# Patient Record
Sex: MALE | Race: WHITE | Employment: OTHER | URBAN - METROPOLITAN AREA
[De-identification: names, ages, dates, MRNs, and addresses within clinical notes are randomized per-mention and may not be internally consistent; named-entity substitution may affect disease eponyms.]

---

## 2017-09-11 PROBLEM — I87.009 POST-THROMBOTIC SYNDROME: Chronic | Status: ACTIVE | Noted: 2017-09-11

## 2018-05-23 ENCOUNTER — OFFICE VISIT (OUTPATIENT)
Dept: NON INVASIVE DIAGNOSTICS | Age: 68
End: 2018-05-23
Payer: MEDICARE

## 2018-05-23 VITALS
RESPIRATION RATE: 18 BRPM | HEIGHT: 67 IN | BODY MASS INDEX: 49.44 KG/M2 | HEART RATE: 71 BPM | DIASTOLIC BLOOD PRESSURE: 70 MMHG | WEIGHT: 315 LBS | SYSTOLIC BLOOD PRESSURE: 100 MMHG

## 2018-05-23 DIAGNOSIS — Z95.0 PACEMAKER: Primary | ICD-10-CM

## 2018-05-23 PROCEDURE — 93280 PM DEVICE PROGR EVAL DUAL: CPT | Performed by: INTERNAL MEDICINE

## 2018-05-23 PROCEDURE — 99214 OFFICE O/P EST MOD 30 MIN: CPT | Performed by: NURSE PRACTITIONER

## 2018-05-23 RX ORDER — ROSUVASTATIN CALCIUM 5 MG/1
5 TABLET, COATED ORAL DAILY
COMMUNITY
Start: 2018-05-14

## 2018-05-23 RX ORDER — AMOXICILLIN 500 MG/1
CAPSULE ORAL
Refills: 1 | COMMUNITY
Start: 2018-05-12 | End: 2019-03-22 | Stop reason: ALTCHOICE

## 2018-11-26 ENCOUNTER — NURSE ONLY (OUTPATIENT)
Dept: NON INVASIVE DIAGNOSTICS | Age: 68
End: 2018-11-26
Payer: MEDICARE

## 2018-11-26 DIAGNOSIS — Z95.0 PACEMAKER: Primary | ICD-10-CM

## 2018-11-26 PROCEDURE — 93280 PM DEVICE PROGR EVAL DUAL: CPT | Performed by: INTERNAL MEDICINE

## 2018-12-21 ENCOUNTER — TELEPHONE (OUTPATIENT)
Dept: NON INVASIVE DIAGNOSTICS | Age: 68
End: 2018-12-21

## 2018-12-21 ENCOUNTER — NURSE ONLY (OUTPATIENT)
Dept: NON INVASIVE DIAGNOSTICS | Age: 68
End: 2018-12-21
Payer: MEDICARE

## 2018-12-21 DIAGNOSIS — Z95.0 PACEMAKER: Primary | ICD-10-CM

## 2018-12-21 PROCEDURE — 93280 PM DEVICE PROGR EVAL DUAL: CPT | Performed by: INTERNAL MEDICINE

## 2018-12-21 RX ORDER — HYDROCHLOROTHIAZIDE 12.5 MG/1
6.25 CAPSULE, GELATIN COATED ORAL DAILY
COMMUNITY
End: 2019-03-22 | Stop reason: ALTCHOICE

## 2019-01-09 ENCOUNTER — TELEPHONE (OUTPATIENT)
Dept: NON INVASIVE DIAGNOSTICS | Age: 69
End: 2019-01-09

## 2019-02-11 ENCOUNTER — NURSE ONLY (OUTPATIENT)
Dept: NON INVASIVE DIAGNOSTICS | Age: 69
End: 2019-02-11
Payer: MEDICARE

## 2019-02-11 DIAGNOSIS — I44.2 COMPLETE HEART BLOCK (HCC): ICD-10-CM

## 2019-02-11 DIAGNOSIS — Z95.0 PACEMAKER: Primary | ICD-10-CM

## 2019-02-11 PROCEDURE — 93294 REM INTERROG EVL PM/LDLS PM: CPT | Performed by: INTERNAL MEDICINE

## 2019-02-11 PROCEDURE — 93296 REM INTERROG EVL PM/IDS: CPT | Performed by: INTERNAL MEDICINE

## 2019-02-12 PROBLEM — I44.2 COMPLETE HEART BLOCK (HCC): Status: ACTIVE | Noted: 2019-02-12

## 2019-03-22 ENCOUNTER — HOSPITAL ENCOUNTER (OUTPATIENT)
Dept: WOUND CARE | Age: 69
Discharge: HOME | End: 2019-03-22
Payer: MEDICARE

## 2019-03-22 VITALS
WEIGHT: 315 LBS | SYSTOLIC BLOOD PRESSURE: 120 MMHG | RESPIRATION RATE: 16 BRPM | BODY MASS INDEX: 47.74 KG/M2 | HEIGHT: 68 IN | TEMPERATURE: 99.1 F | DIASTOLIC BLOOD PRESSURE: 70 MMHG | HEART RATE: 72 BPM

## 2019-03-22 DIAGNOSIS — I87.2 VENOUS INSUFFICIENCY (CHRONIC) (PERIPHERAL): ICD-10-CM

## 2019-03-22 DIAGNOSIS — L97.922 NON-PRESSURE CHRONIC ULCER OF LEFT LOWER LEG WITH FAT LAYER EXPOSED (HCC): ICD-10-CM

## 2019-03-22 PROCEDURE — 99213 OFFICE O/P EST LOW 20 MIN: CPT

## 2019-03-22 PROCEDURE — 11042 DBRDMT SUBQ TIS 1ST 20SQCM/<: CPT

## 2019-03-22 PROCEDURE — 97605 NEG PRS WND THER DME<=50SQCM: CPT

## 2019-03-22 RX ORDER — ASCORBIC ACID 500 MG
500 TABLET ORAL DAILY
COMMUNITY
End: 2022-04-21 | Stop reason: ALTCHOICE

## 2019-03-22 ASSESSMENT — PAIN DESCRIPTION - ORIENTATION: ORIENTATION: LEFT

## 2019-03-22 ASSESSMENT — PAIN DESCRIPTION - DESCRIPTORS: DESCRIPTORS: ACHING

## 2019-03-22 ASSESSMENT — PAIN DESCRIPTION - LOCATION: LOCATION: LEG

## 2019-03-22 ASSESSMENT — PAIN DESCRIPTION - ONSET: ONSET: GRADUAL

## 2019-03-22 ASSESSMENT — PAIN DESCRIPTION - PAIN TYPE: TYPE: CHRONIC PAIN

## 2019-03-22 ASSESSMENT — PAIN SCALES - GENERAL: PAINLEVEL_OUTOF10: 4

## 2019-03-22 ASSESSMENT — PAIN DESCRIPTION - FREQUENCY: FREQUENCY: CONTINUOUS

## 2019-03-22 ASSESSMENT — PAIN DESCRIPTION - PROGRESSION: CLINICAL_PROGRESSION: NOT CHANGED

## 2019-03-22 ASSESSMENT — PAIN - FUNCTIONAL ASSESSMENT: PAIN_FUNCTIONAL_ASSESSMENT: ACTIVITIES ARE NOT PREVENTED

## 2019-03-29 ENCOUNTER — HOSPITAL ENCOUNTER (OUTPATIENT)
Dept: WOUND CARE | Age: 69
Discharge: HOME | End: 2019-03-29
Payer: MEDICARE

## 2019-03-29 VITALS
RESPIRATION RATE: 20 BRPM | HEIGHT: 68 IN | BODY MASS INDEX: 47.74 KG/M2 | DIASTOLIC BLOOD PRESSURE: 80 MMHG | WEIGHT: 315 LBS | SYSTOLIC BLOOD PRESSURE: 138 MMHG | HEART RATE: 80 BPM | TEMPERATURE: 98.3 F

## 2019-03-29 DIAGNOSIS — L97.922 NON-PRESSURE CHRONIC ULCER OF LEFT LOWER LEG WITH FAT LAYER EXPOSED (HCC): ICD-10-CM

## 2019-03-29 PROCEDURE — 97605 NEG PRS WND THER DME<=50SQCM: CPT

## 2019-03-29 PROCEDURE — 11042 DBRDMT SUBQ TIS 1ST 20SQCM/<: CPT

## 2019-03-29 PROCEDURE — 11045 DBRDMT SUBQ TISS EACH ADDL: CPT

## 2019-04-05 ENCOUNTER — HOSPITAL ENCOUNTER (OUTPATIENT)
Dept: WOUND CARE | Age: 69
Discharge: HOME | End: 2019-04-05
Payer: MEDICARE

## 2019-04-05 VITALS
SYSTOLIC BLOOD PRESSURE: 120 MMHG | HEART RATE: 64 BPM | HEIGHT: 68 IN | WEIGHT: 315 LBS | RESPIRATION RATE: 16 BRPM | BODY MASS INDEX: 47.74 KG/M2 | TEMPERATURE: 98 F | DIASTOLIC BLOOD PRESSURE: 80 MMHG

## 2019-04-05 DIAGNOSIS — L97.922 NON-PRESSURE CHRONIC ULCER OF LEFT LOWER LEG WITH FAT LAYER EXPOSED (HCC): ICD-10-CM

## 2019-04-05 PROCEDURE — 11042 DBRDMT SUBQ TIS 1ST 20SQCM/<: CPT

## 2019-04-05 PROCEDURE — 11045 DBRDMT SUBQ TISS EACH ADDL: CPT

## 2019-04-05 NOTE — PROGRESS NOTES
Wound Healing Center Followup Visit Note    Referring Physician : MD Tariq Boyer 112 RECORD NUMBER:  [id number]  AGE: 77 y.o. GENDER: male  : 1950  EPISODE DATE:  2019    Subjective:     Chief Complaint   Patient presents with    Wound Check     patient here for treatment of left lower leg ulcer      HISTORY of PRESENT ILLNESS HPI   Wallace Mendoza is a 77 y.o. male who presents today in regards to follow up evaluation and treatment of wound/ulcer. That patient's past medical, family and social hx were reviewed and changes were made if present. History of Wound Context:  Patient denies any nausea, vomiting, fever, chills, shortness of breath, chest pain, calf cramping and/or pain. Patient is tolerating the current dressing regimen without issues. No other new issues noted at this time. He is tolerating the SUAD system.      Wound/Ulcer Pain Timing/Severity: none  Quality of pain: N/A  Severity:  0 / 10   Modifying Factors: None  Associated Signs/Symptoms: edema    Ulcer Identification:  Ulcer Type: venous  Contributing Factors: edema, venous stasis and obesity    Diabetic/Pressure/Non Pressure Ulcers only:  Ulcer: Non-Pressure ulcer, fat layer exposed    Wound: Stasis ulceration left leg        PAST MEDICAL HISTORY      Diagnosis Date    CAD (coronary artery disease)     Cataracts, bilateral 2019    Cellulitis -    Left leg    Complete heart block (HCC)     h/o    Diabetes mellitus ([address]     Encounter for aspirin therapy     patient uses for heart health    H/O asbestosis     Hyperlipidemia     Hypertension     Hyperthyroidism     Lung disorder     weak lungs    Myocardial infarct ([address] 2010    Neuropathy     feet    Obesity     Obstructive sleep apnea     on cpap    Onychomycosis     Osteoarthritis     Pacemaker     Medtronic    Post-thrombotic syndrome 2017    Screening 16    for Colonoscopy    Stasis dermatitis     h/o mcg by mouth daily       gabapentin (NEURONTIN) 300 MG capsule Take 300 mg by mouth 3 times daily as needed Instructed to take with sip water am of procedure      aspirin 81 MG EC tablet Take 81 mg by mouth daily Last dose 1-14-16       No current facility-administered medications on file prior to encounter. REVIEW OF SYSTEMS See HPI    Objective:    /80   Pulse 64   Temp 98 °F (36.7 °C) (Oral)   Resp 16   Ht 5' 8\" (1.727 m)   Wt (!) 360 lb (163.3 kg)   BMI 54.74 kg/m²   Wt Readings from Last 3 Encounters:   04/05/19 (!) 360 lb (163.3 kg)   03/29/19 (!) 360 lb (163.3 kg)   03/22/19 (!) 360 lb (163.3 kg)     PHYSICAL EXAM  CONSTITUTIONAL:   Awake, alert, cooperative and in no acute distress  SKIN:  Open wound Present    Assessment:     Problem List Items Addressed This Visit     Non-pressure chronic ulcer of left lower leg with fat layer exposed ([address]        Procedure Note  Indications:  Based on my examination of this patient's wound(s)/ulcer(s) today, debridement is required to promote healing and evaluate the wound base. Performed by: Charles Bhagat DPM    Consent obtained:  Yes    Time out taken:  Yes    Pain Control: Anesthetic  Anesthetic: 2% Lidocaine Gel Topical     Debridement:Excisional Debridement    Using curette the wound(s)/ulcer(s) was/were sharply debrided down through and including the removal of subcutaneous tissue.         Devitalized Tissue Debrided:  fibrin and biofilm to stimulate bleeding to promote healing, post debridement good bleeding base and wound edges noted    Pre Debridement Measurements:  Are located in the [facility]  Documentation Flow Sheet    Wound/Ulcer #: 1    Post Debridement Measurements:  Wound/Ulcer Descriptions are Pre Debridement except measurements:    Wound 03/22/19 Leg Left;Lateral #1 acq: 3-22-17 Venous (Active)   Wound Image   3/22/2019  2:06 PM   Wound Diabetic [place] 1 3/22/2019  2:06 PM   Wound Length (cm) 4.8 cm 4/5/2019  3:02 PM Wound Width (cm) 5 cm 4/5/2019  3:02 PM   Wound Depth (cm) 0.1 cm 4/5/2019  3:02 PM   Wound Surface Area (cm^2) 24 cm^2 4/5/2019  3:02 PM   Change in Wound Size % (l*w) 1.23 4/5/2019  3:02 PM   Wound Volume (cm^3) 2.4 cm^3 4/5/2019  3:02 PM   Wound Healing % 1 4/5/2019  3:02 PM   Post-Procedure Length (cm) 4.8 cm 4/5/2019  3:05 PM   Post-Procedure Width (cm) 5 cm 4/5/2019  3:05 PM   Post-Procedure Depth (cm) 0.2 cm 4/5/2019  3:05 PM   Post-Procedure Surface Area (cm^2) 24 cm^2 4/5/2019  3:05 PM   Post-Procedure Volume (cm^3) 4.8 cm^3 4/5/2019  3:05 PM   Wound Assessment Red;Yellow 4/5/2019  3:02 PM   Drainage Amount Large 4/5/2019  3:02 PM   Drainage Description Yellow 4/5/2019  3:02 PM   Odor None 4/5/2019  3:02 PM   Maria Elena-wound Assessment Dark edges;Brown;Red 4/5/2019  3:02 PM   Number of days: 14     Percent of Wound/Ulcer Debrided: 100%    Total Surface Area Debrided:  24.0 sq cm (debridement +1)     Estimated Blood Loss:  Minimal  Hemostasis Achieved:  by pressure    Procedural Pain:  2  / 10   Post Procedural Pain:  0 / 10     Response to treatment:  Well tolerated by patient. We will continue with the SUAD system. Plan:   Treatment Note please see attached Discharge Instructions    Written patient dismissal instructions given to patient and signed by patient or POA. Discharge Instructions       Visit Discharge/Physician Orders     Discharge condition: Stable     Assessment of pain at discharge:minimal     Anesthetic used: 2% lidocaine     Discharge to: Home     Left via:Private automobile     Accompanied by: accompanied by wife     ECF/HHA:      Dressing Orders:LEFT LEG ULCER- SUAD VAC IN PLACE LEAVE DRESSING INTACT UNTIL NEXT APPOINTMENT.     IF VAC MALFUNCTIONS OR LEAKS APPLY DRY DRESSING.     Treatment Orders:  EAT FOODS HIGH IN PROTEIN AND VITAMIN C     TAKE MULTIVITAMIN DAILY IF OKAY WITH PRIMARY CARE     [address] [place] [address],[address] followup visit _____1 WEEK DR. LEVY________________________  (Please note your next appointment above and if you are unable to keep, kindly give a 24 hour notice.  Thank you.)     Physician signature:__________________________        If you experience any of the following, please call the [facility] during business hours:     * Increase in Pain  * Temperature over 101  * Increase in drainage from your wound  * Drainage with a foul odor  * Bleeding  * Increase in swelling  * Need for compression bandage changes due to slippage, breakthrough drainage.     If you need medical attention outside of the business hours of the [facility] please contact your PCP or go to the nearest emergency room.              Electronically signed by Nitin Alves DPM on 4/5/2019 at 3:39 PM

## 2019-04-12 ENCOUNTER — HOSPITAL ENCOUNTER (OUTPATIENT)
Dept: WOUND CARE | Age: 69
Discharge: HOME | End: 2019-04-12
Payer: MEDICARE

## 2019-04-12 VITALS
DIASTOLIC BLOOD PRESSURE: 80 MMHG | WEIGHT: 315 LBS | HEIGHT: 68 IN | TEMPERATURE: 97.9 F | BODY MASS INDEX: 47.74 KG/M2 | HEART RATE: 72 BPM | RESPIRATION RATE: 24 BRPM | SYSTOLIC BLOOD PRESSURE: 120 MMHG

## 2019-04-12 DIAGNOSIS — L97.922 NON-PRESSURE CHRONIC ULCER OF LEFT LOWER LEG WITH FAT LAYER EXPOSED (HCC): ICD-10-CM

## 2019-04-12 PROCEDURE — 97605 NEG PRS WND THER DME<=50SQCM: CPT

## 2019-04-12 PROCEDURE — 11042 DBRDMT SUBQ TIS 1ST 20SQCM/<: CPT

## 2019-04-12 NOTE — PROGRESS NOTES
Wound Healing Center Followup Visit Note    Referring Physician : MD Alyce SuarezJune aBr 112 RECORD NUMBER:  [id number]  AGE: 77 y.o. GENDER: male  : 1950  EPISODE DATE:  2019    Subjective:     Chief Complaint   Patient presents with    Wound Check     patient here for wound treatment of left lower leg ulcer      HISTORY of PRESENT ILLNESS HPI   Ruth Peña is a 77 y.o. male who presents today in regards to follow up evaluation and treatment of wound/ulcer. That patient's past medical, family and social hx were reviewed and changes were made if present. History of Wound Context:  Patient denies any nausea, vomiting, fever, chills, shortness of breath, chest pain, calf cramping and/or pain. Patient is tolerating the current dressing regimen without issues. No other new issues noted at this time. Patient is tolerating the SUAD system.      Wound/Ulcer Pain Timing/Severity: none  Quality of pain: N/A  Severity:  0 / 10   Modifying Factors: None  Associated Signs/Symptoms: edema    Ulcer Identification:  Ulcer Type: venous  Contributing Factors: edema, venous stasis and obesity    Diabetic/Pressure/Non Pressure Ulcers only:  Ulcer: Non-Pressure ulcer, fat layer exposed    Wound: Venous stasis ulceration left leg        PAST MEDICAL HISTORY      Diagnosis Date    CAD (coronary artery disease)     Cataracts, bilateral 2019    Cellulitis -    Left leg    Complete heart block (HCC)     h/o    Diabetes mellitus ([address]     Encounter for aspirin therapy     patient uses for heart health    H/O asbestosis     Hyperlipidemia     Hypertension     Hyperthyroidism     Lung disorder     weak lungs    Myocardial infarct ([address] 2010    Neuropathy     feet    Obesity     Obstructive sleep apnea     on cpap    Onychomycosis     Osteoarthritis     Pacemaker     Medtronic    Post-thrombotic syndrome 2017    Screening 16    for Colonoscopy    Stasis dermatitis     h/o on bilateral legs with leg edema    Varicose veins      Past Surgical History:   Procedure Laterality Date    CARDIAC PACEMAKER PLACEMENT  04/09/10    Medtronic    CHOLECYSTECTOMY      COLONOSCOPY  2016    JOINT REPLACEMENT Bilateral     Left TJAK     Family History   Problem Relation Age of Onset    Heart Disease Father            [facility] Other Mother             Other Brother         gall bladder     Social History     Tobacco Use    Smoking status: Former Smoker     Packs/day: 2.00     Years: 25.00     Pack years: 50.00     Types: Cigarettes     Start date: 1964     Last attempt to quit: 1990     Years since quittin.7    Smokeless tobacco: Never Used   Substance Use Topics    Alcohol use: No     Alcohol/week: 0.0 oz     Comment: rare   2 beers/month    Drug use: No     Allergies   Allergen Reactions    Other Hives and Shortness Of Breath     \"allergic to Potatoe white. \"    Clindamycin/Lincomycin      Not sure     Current Outpatient Medications on File Prior to Encounter   Medication Sig Dispense Refill    Indacaterol-Glycopyrrolate (Gely Check) 27.5-15.6 MCG CAPS Inhale 2 puffs into the lungs 2 times daily 60 capsule 5    vitamin C (ASCORBIC ACID) 500 MG tablet Take 500 mg by mouth daily      rosuvastatin (CRESTOR) 5 MG tablet Take 5 mg by mouth daily       glipiZIDE (GLUCOTROL XL) 5 MG extended release tablet Take 5 mg by mouth daily  3    ramipril (ALTACE) 10 MG capsule Take 10 mg by mouth daily       potassium chloride (KLOR-CON M) 20 MEQ extended release tablet Take 20 mEq by mouth daily       Cholecalciferol (VITAMIN D3) 2000 UNITS CAPS Take by mouth daily Last dose 16      carvedilol (COREG) 25 MG tablet Take 25 mg by mouth 2 times daily (with meals) Instructed to take with sip water am of procedure      Omega-3 Fatty Acids (FISH OIL) 600 MG CAPS Take 1,000 mg by mouth daily Last Dose 16      liothyronine (CYTOMEL) 50 MCG tablet Take 100 mcg by mouth daily       aspirin 81 MG EC tablet Take 81 mg by mouth daily Last dose 1-14-16      gabapentin (NEURONTIN) 300 MG capsule Take 300 mg by mouth 3 times daily as needed Instructed to take with sip water am of procedure       No current facility-administered medications on file prior to encounter. REVIEW OF SYSTEMS See HPI    Objective:    /80   Pulse 72   Temp 97.9 °F (36.6 °C) (Oral)   Resp 24   Ht 5' 8\" (1.727 m)   Wt (!) 360 lb (163.3 kg)   BMI 54.74 kg/m²   Wt Readings from Last 3 Encounters:   04/12/19 (!) 360 lb (163.3 kg)   04/05/19 (!) 360 lb (163.3 kg)   03/29/19 (!) 360 lb (163.3 kg)     PHYSICAL EXAM  CONSTITUTIONAL:   Awake, alert, cooperative and in no acute distress  SKIN:  Open wound Present    Assessment:     Problem List Items Addressed This Visit     Non-pressure chronic ulcer of left lower leg with fat layer exposed ([address]        Procedure Note  Indications:  Based on my examination of this patient's wound(s)/ulcer(s) today, debridement is required to promote healing and evaluate the wound base. Performed by: Alycia Farias DPM    Consent obtained:  Yes    Time out taken:  Yes    Pain Control: Anesthetic  Anesthetic: 2% Lidocaine Gel Topical     Debridement:Excisional Debridement    Using curette the wound(s)/ulcer(s) was/were sharply debrided down through and including the removal of subcutaneous tissue.         Devitalized Tissue Debrided:  fibrin and biofilm to stimulate bleeding to promote healing, post debridement good bleeding base and wound edges noted    Pre Debridement Measurements:  Are located in the [facility]  Documentation Flow Sheet    Wound/Ulcer #: 1    Post Debridement Measurements:  Wound/Ulcer Descriptions are Pre Debridement except measurements:    Wound 03/22/19 Leg Left;Lateral #1 acq: 3-22-17 Venous (Active)   Wound Image   3/22/2019  2:06 PM   Wound Diabetic [place] 1 3/22/2019  2:06 PM   Wound Length (cm) 4.3 cm 4/12/2019  2:35 PM   Wound Width (cm) 4.8 cm 4/12/2019  2:35 PM   Wound Depth (cm) 0.1 cm 4/12/2019  2:35 PM   Wound Surface Area (cm^2) 20.64 cm^2 4/12/2019  2:35 PM   Change in Wound Size % (l*w) 15.06 4/12/2019  2:35 PM   Wound Volume (cm^3) 2.06 cm^3 4/12/2019  2:35 PM   Wound Healing % 15 4/12/2019  2:35 PM   Post-Procedure Length (cm) 4.3 cm 4/12/2019  2:50 PM   Post-Procedure Width (cm) 4.8 cm 4/12/2019  2:50 PM   Post-Procedure Depth (cm) 0.2 cm 4/12/2019  2:50 PM   Post-Procedure Surface Area (cm^2) 20.64 cm^2 4/12/2019  2:50 PM   Post-Procedure Volume (cm^3) 4.13 cm^3 4/12/2019  2:50 PM   Wound Assessment Red;Yellow 4/12/2019  2:35 PM   Drainage Amount Large 4/12/2019  2:35 PM   Drainage Description Yellow 4/12/2019  2:35 PM   Odor None 4/12/2019  2:35 PM   Maria Elena-wound Assessment Dark edges;Brown;Red 4/12/2019  2:35 PM   Number of days: 21     Percent of Wound/Ulcer Debrided: 100%    Total Surface Area Debrided:  20.64 sq cm (debridement +1)    Estimated Blood Loss:  Minimal  Hemostasis Achieved:  by pressure    Procedural Pain:  2  / 10   Post Procedural Pain:  0 / 10     Response to treatment:  Well tolerated by patient. Will continue with the SUAD system. Plan:   Treatment Note please see attached Discharge Instructions    Written patient dismissal instructions given to patient and signed by patient or POA. Discharge Instructions         Visit Discharge/Physician Orders     Discharge condition: Stable     Assessment of pain at discharge:minimal     Anesthetic used: 2% lidocaine     Discharge to: Home     Left via:Private automobile     Accompanied by: accompanied by wife     ECF/HHA:      Dressing Orders:LEFT LEG ULCER- SUAD VAC IN PLACE LEAVE DRESSING INTACT UNTIL NEXT APPOINTMENT.     IF VAC MALFUNCTIONS OR LEAKS APPLY DRY DRESSING.     Treatment Orders:  EAT FOODS HIGH IN PROTEIN AND VITAMIN C     TAKE MULTIVITAMIN DAILY IF OKAY WITH PRIMARY CARE     [address] [place] [address],[address] followup visit _____1 WEEK  MILDRED at his main office, 2 weeks at wound care center________________________  (Please note your next appointment above and if you are unable to keep, kindly give a 24 hour notice.  Thank you.)     Physician signature:__________________________        If you experience any of the following, please call the [facility] during business hours:     * Increase in Pain  * Temperature over 101  * Increase in drainage from your wound  * Drainage with a foul odor  * Bleeding  * Increase in swelling  * Need for compression bandage changes due to slippage, breakthrough drainage.     If you need medical attention outside of the business hours of the [facility] please contact your PCP or go to the nearest emergency room.                         Electronically signed by Charles Amin DPM on 4/12/2019 at 3:27 PM

## 2019-04-26 ENCOUNTER — HOSPITAL ENCOUNTER (OUTPATIENT)
Dept: WOUND CARE | Age: 69
Discharge: HOME | End: 2019-04-26
Payer: MEDICARE

## 2019-04-26 VITALS
HEART RATE: 76 BPM | WEIGHT: 315 LBS | RESPIRATION RATE: 20 BRPM | DIASTOLIC BLOOD PRESSURE: 72 MMHG | BODY MASS INDEX: 47.74 KG/M2 | SYSTOLIC BLOOD PRESSURE: 138 MMHG | HEIGHT: 68 IN | TEMPERATURE: 98.7 F

## 2019-04-26 DIAGNOSIS — L97.922 NON-PRESSURE CHRONIC ULCER OF LEFT LOWER LEG WITH FAT LAYER EXPOSED (HCC): ICD-10-CM

## 2019-04-26 PROCEDURE — 11042 DBRDMT SUBQ TIS 1ST 20SQCM/<: CPT

## 2019-04-26 PROCEDURE — 97605 NEG PRS WND THER DME<=50SQCM: CPT

## 2019-04-26 ASSESSMENT — PAIN DESCRIPTION - PROGRESSION: CLINICAL_PROGRESSION: NOT CHANGED

## 2019-04-26 ASSESSMENT — PAIN DESCRIPTION - ORIENTATION: ORIENTATION: LEFT

## 2019-04-26 ASSESSMENT — PAIN DESCRIPTION - PAIN TYPE: TYPE: CHRONIC PAIN

## 2019-04-26 ASSESSMENT — PAIN - FUNCTIONAL ASSESSMENT: PAIN_FUNCTIONAL_ASSESSMENT: ACTIVITIES ARE NOT PREVENTED

## 2019-04-26 ASSESSMENT — PAIN SCALES - GENERAL: PAINLEVEL_OUTOF10: 1

## 2019-04-26 ASSESSMENT — PAIN DESCRIPTION - LOCATION: LOCATION: LEG

## 2019-04-26 ASSESSMENT — PAIN DESCRIPTION - FREQUENCY: FREQUENCY: INTERMITTENT

## 2019-04-26 ASSESSMENT — PAIN DESCRIPTION - ONSET: ONSET: GRADUAL

## 2019-04-26 NOTE — PROGRESS NOTES
Wound Healing Center Followup Visit Note    Referring Physician : MD Tariq Forde Yosvany 112 RECORD NUMBER:  [id number]  AGE: 77 y.o. GENDER: male  : 1950  EPISODE DATE:  2019    Subjective:     Chief Complaint   Patient presents with    Wound Check     left lateral lower leg      HISTORY of PRESENT ILLNESS HPI   Joel Sahu is a 77 y.o. male who presents today in regards to follow up evaluation and treatment of wound/ulcer. That patient's past medical, family and social hx were reviewed and changes were made if present. History of Wound Context:  Patient denies any nausea, vomiting, fever, chills, shortness of breath, chest pain, calf cramping and/or pain. Patient is tolerating the current dressing regimen without issues. No other new issues noted at this time.      Wound/Ulcer Pain Timing/Severity: none  Quality of pain: N/A  Severity:  0 / 10   Modifying Factors: None  Associated Signs/Symptoms: edema    Ulcer Identification:  Ulcer Type: venous  Contributing Factors: edema, venous stasis and obesity    Diabetic/Pressure/Non Pressure Ulcers only:  Ulcer: Non-Pressure ulcer, fat layer exposed    Wound: Venous stasis ulceration left leg        PAST MEDICAL HISTORY      Diagnosis Date    CAD (coronary artery disease)     Cataracts, bilateral 2019    Cellulitis -    Left leg    Complete heart block (HCC)     h/o    Diabetes mellitus ([address]     Encounter for aspirin therapy     patient uses for heart health    H/O asbestosis     Hyperlipidemia     Hypertension     Hyperthyroidism     Lung disorder     weak lungs    Myocardial infarct ([address] 2010    Neuropathy     feet    Obesity     Obstructive sleep apnea     on cpap    Onychomycosis     Osteoarthritis     Pacemaker     Medtronic    Post-thrombotic syndrome 2017    Screening 16    for Colonoscopy    Stasis dermatitis     h/o on bilateral legs with leg edema    Varicose veins Past Surgical History:   Procedure Laterality Date    CARDIAC PACEMAKER PLACEMENT  04/09/10    Medtronic    CHOLECYSTECTOMY      COLONOSCOPY  2016    JOINT REPLACEMENT Bilateral     Left TJAK     Family History   Problem Relation Age of Onset    Heart Disease Father            Vito Arora Other Mother             Other Brother         gall bladder     Social History     Tobacco Use    Smoking status: Former Smoker     Packs/day: 2.00     Years: 25.00     Pack years: 50.00     Types: Cigarettes     Start date: 1964     Last attempt to quit: 1990     Years since quittin.8    Smokeless tobacco: Never Used   Substance Use Topics    Alcohol use: No     Alcohol/week: 0.0 oz     Comment: rare   2 beers/month    Drug use: No     Allergies   Allergen Reactions    Other Hives and Shortness Of Breath     \"allergic to Potatoe white. \"    Clindamycin/Lincomycin      Not sure     Current Outpatient Medications on File Prior to Encounter   Medication Sig Dispense Refill    Indacaterol-Glycopyrrolate (Jilda Samples) 27.5-15.6 MCG CAPS Inhale 2 puffs into the lungs 2 times daily 60 capsule 5    vitamin C (ASCORBIC ACID) 500 MG tablet Take 500 mg by mouth daily      rosuvastatin (CRESTOR) 5 MG tablet Take 5 mg by mouth daily       glipiZIDE (GLUCOTROL XL) 5 MG extended release tablet Take 5 mg by mouth daily  3    ramipril (ALTACE) 10 MG capsule Take 10 mg by mouth daily       potassium chloride (KLOR-CON M) 20 MEQ extended release tablet Take 20 mEq by mouth daily       Cholecalciferol (VITAMIN D3) 2000 UNITS CAPS Take by mouth daily Last dose 16      carvedilol (COREG) 25 MG tablet Take 25 mg by mouth 2 times daily (with meals) Instructed to take with sip water am of procedure      Omega-3 Fatty Acids (FISH OIL) 600 MG CAPS Take 1,000 mg by mouth daily Last Dose 16      liothyronine (CYTOMEL) 50 MCG tablet Take 100 mcg by mouth daily       aspirin 81 MG EC tablet Take 81 mg by mouth daily Last dose 1-14-16      gabapentin (NEURONTIN) 300 MG capsule Take 300 mg by mouth 3 times daily as needed Instructed to take with sip water am of procedure       No current facility-administered medications on file prior to encounter. REVIEW OF SYSTEMS See HPI    Objective:    /72   Pulse 76   Temp 98.7 °F (37.1 °C) (Oral)   Resp 20   Ht 5' 8\" (1.727 m)   Wt (!) 360 lb (163.3 kg)   BMI 54.74 kg/m²   Wt Readings from Last 3 Encounters:   04/26/19 (!) 360 lb (163.3 kg)   04/12/19 (!) 360 lb (163.3 kg)   04/05/19 (!) 360 lb (163.3 kg)     PHYSICAL EXAM  CONSTITUTIONAL:   Awake, alert, cooperative and in no acute distress  SKIN:  Open wound Present    Assessment:     Problem List Items Addressed This Visit     Non-pressure chronic ulcer of left lower leg with fat layer exposed ([address]        Procedure Note  Indications:  Based on my examination of this patient's wound(s)/ulcer(s) today, debridement is required to promote healing and evaluate the wound base. Performed by: Arie Solorio DPM    Consent obtained:  Yes    Time out taken:  Yes    Pain Control: Anesthetic  Anesthetic: 2% Lidocaine Gel Topical     Debridement:Excisional Debridement    Using curette the wound(s)/ulcer(s) was/were sharply debrided down through and including the removal of subcutaneous tissue.         Devitalized Tissue Debrided:  fibrin, biofilm and slough to stimulate bleeding to promote healing, post debridement good bleeding base and wound edges noted    Pre Debridement Measurements:  Are located in the [facility]  Documentation Flow Sheet    Wound/Ulcer #: 1    Post Debridement Measurements:  Wound/Ulcer Descriptions are Pre Debridement except measurements:    Wound 03/22/19 Leg Left;Lateral #1 acq: 3-22-17 Venous (Active)   Wound Image   3/22/2019  2:06 PM   Wound Diabetic [place] 1 3/22/2019  2:06 PM   Dressing Changed Changed/New 4/26/2019  2:47 PM   Wound Cleansed Rinsed/Irrigated with saline 4/26/2019  2:47 PM   Wound Length (cm) 4 cm 4/26/2019  2:27 PM   Wound Width (cm) 4.8 cm 4/26/2019  2:27 PM   Wound Depth (cm) 0.1 cm 4/26/2019  2:27 PM   Wound Surface Area (cm^2) 19.2 cm^2 4/26/2019  2:27 PM   Change in Wound Size % (l*w) 20.99 4/26/2019  2:27 PM   Wound Volume (cm^3) 1.92 cm^3 4/26/2019  2:27 PM   Wound Healing % 21 4/26/2019  2:27 PM   Post-Procedure Length (cm) 4.3 cm 4/12/2019  2:50 PM   Post-Procedure Width (cm) 4.8 cm 4/12/2019  2:50 PM   Post-Procedure Depth (cm) 0.2 cm 4/12/2019  2:50 PM   Post-Procedure Surface Area (cm^2) 20.64 cm^2 4/12/2019  2:50 PM   Post-Procedure Volume (cm^3) 4.13 cm^3 4/12/2019  2:50 PM   Wound Assessment Red 4/26/2019  2:27 PM   Drainage Amount Moderate 4/26/2019  2:27 PM   Drainage Description Serosanguinous 4/26/2019  2:27 PM   Odor None 4/26/2019  2:27 PM   Maria Elena-wound Assessment Intact;Dry 4/26/2019  2:27 PM   Number of days: 35     Percent of Wound/Ulcer Debrided: 100%    Total Surface Area Debrided:  19.2 sq cm     Estimated Blood Loss:  Minimal  Hemostasis Achieved:  by pressure    Procedural Pain:  3  / 10   Post Procedural Pain:  0 / 10     Response to treatment:  Well tolerated by patient. SUAD device reapplied left lower extremity. Plan:   Treatment Note please see attached Discharge Instructions    Written patient dismissal instructions given to patient and signed by patient or POA.          Discharge Instructions         Visit Discharge/Physician Orders     Discharge condition: Stable     Assessment of pain at discharge:minimal     Anesthetic used: 2% lidocaine     Discharge to: Home     Left via:Private automobile     Accompanied by: accompanied by wife     ECF/HHA:      Dressing Orders:LEFT LEG ULCER- SUAD VAC IN PLACE LEAVE DRESSING INTACT UNTIL NEXT APPOINTMENT.     IF VAC MALFUNCTIONS OR LEAKS APPLY DRY DRESSING.     Treatment Orders:  EAT FOODS HIGH IN PROTEIN AND VITAMIN C     TAKE MULTIVITAMIN DAILY IF OKAY WITH PRIMARY CARE     [facility] followup visit _____1 WEEK DR. [place]a Penn _______________________  (Please note your next appointment above and if you are unable to keep, kindly give a 24 hour notice.  Thank you.)     Physician signature:__________________________        If you experience any of the following, please call the [facility] during business hours:     * Increase in Pain  * Temperature over 101  * Increase in drainage from your wound  * Drainage with a foul odor  * Bleeding  * Increase in swelling  * Need for compression bandage changes due to slippage, breakthrough drainage.     If you need medical attention outside of the business hours of the [facility] please contact your PCP or go to the nearest emergency room.                                      Electronically signed by Ju Joy DPM on 4/26/2019 at 2:47 PM

## 2019-05-03 ENCOUNTER — HOSPITAL ENCOUNTER (OUTPATIENT)
Dept: WOUND CARE | Age: 69
Discharge: HOME | End: 2019-05-03
Payer: MEDICARE

## 2019-05-03 VITALS
HEART RATE: 88 BPM | SYSTOLIC BLOOD PRESSURE: 146 MMHG | RESPIRATION RATE: 22 BRPM | WEIGHT: 315 LBS | DIASTOLIC BLOOD PRESSURE: 88 MMHG | BODY MASS INDEX: 47.74 KG/M2 | TEMPERATURE: 98.4 F | HEIGHT: 68 IN

## 2019-05-03 PROCEDURE — 99213 OFFICE O/P EST LOW 20 MIN: CPT

## 2019-05-03 PROCEDURE — 97605 NEG PRS WND THER DME<=50SQCM: CPT

## 2019-05-03 NOTE — PROGRESS NOTES
Post-thrombotic syndrome 2017    Screening 16    for Colonoscopy    Stasis dermatitis     h/o on bilateral legs with leg edema    Varicose veins      Past Surgical History:   Procedure Laterality Date    CARDIAC PACEMAKER PLACEMENT  04/09/10    Medtronic    CHOLECYSTECTOMY      COLONOSCOPY  2016    JOINT REPLACEMENT Bilateral     Left TJAK     Family History   Problem Relation Age of Onset    Heart Disease Father            Jason Kang Other Mother             Other Brother         gall bladder     Social History     Tobacco Use    Smoking status: Former Smoker     Packs/day: 2.00     Years: 25.00     Pack years: 50.00     Types: Cigarettes     Start date: 1964     Last attempt to quit: 1990     Years since quittin.8    Smokeless tobacco: Never Used   Substance Use Topics    Alcohol use: No     Alcohol/week: 0.0 oz     Comment: rare   2 beers/month    Drug use: No     Allergies   Allergen Reactions    Other Hives and Shortness Of Breath     \"allergic to Potatoe white. \"    Clindamycin/Lincomycin      Not sure     Current Outpatient Medications on File Prior to Encounter   Medication Sig Dispense Refill    Indacaterol-Glycopyrrolate (Wynettzeke Dukes) 27.5-15.6 MCG CAPS Inhale 2 puffs into the lungs 2 times daily 60 capsule 5    vitamin C (ASCORBIC ACID) 500 MG tablet Take 500 mg by mouth daily      rosuvastatin (CRESTOR) 5 MG tablet Take 5 mg by mouth daily       glipiZIDE (GLUCOTROL XL) 5 MG extended release tablet Take 5 mg by mouth daily  3    ramipril (ALTACE) 10 MG capsule Take 10 mg by mouth daily       potassium chloride (KLOR-CON M) 20 MEQ extended release tablet Take 20 mEq by mouth daily       Cholecalciferol (VITAMIN D3) 2000 UNITS CAPS Take by mouth daily Last dose 16      carvedilol (COREG) 25 MG tablet Take 25 mg by mouth 2 times daily (with meals) Instructed to take with sip water am of procedure      Omega-3 Fatty Acids (FISH OIL) 600 MG CAPS Take 1,000 mg by mouth daily Last Dose 1-14-16      liothyronine (CYTOMEL) 50 MCG tablet Take 100 mcg by mouth daily       aspirin 81 MG EC tablet Take 81 mg by mouth daily Last dose 1-14-16      gabapentin (NEURONTIN) 300 MG capsule Take 300 mg by mouth 3 times daily as needed Instructed to take with sip water am of procedure       No current facility-administered medications on file prior to encounter. REVIEW OF SYSTEMS See HPI    Objective:    BP (!) 146/88   Pulse 88   Temp 98.4 °F (36.9 °C) (Oral)   Resp 22   Ht 5' 8\" (1.727 m)   Wt (!) 360 lb (163.3 kg)   BMI 54.74 kg/m²   Wt Readings from Last 3 Encounters:   05/03/19 (!) 360 lb (163.3 kg)   04/26/19 (!) 360 lb (163.3 kg)   04/12/19 (!) 360 lb (163.3 kg)     PHYSICAL EXAM  CONSTITUTIONAL:   Awake, alert, cooperative and in no acute distress  SKIN:  Open wound Present    Assessment:     Problem List Items Addressed This Visit     None        Procedure Note  Indications:  Based on my examination of this patient's wound(s)/ulcer(s) today, debridement is not required to promote healing and evaluate the wound base. - No debridement performed on today's visit.       Wound 03/22/19 Leg Left;Lateral #1 acq: 3-22-17 Venous (Active)   Wound Image   3/22/2019  2:06 PM   Wound Diabetic [place] 1 3/22/2019  2:06 PM   Dressing Changed Changed/New 4/26/2019  2:47 PM   Wound Cleansed Rinsed/Irrigated with saline 4/26/2019  2:47 PM   Wound Length (cm) 4 cm 5/3/2019  2:18 PM   Wound Width (cm) 5.1 cm 5/3/2019  2:18 PM   Wound Depth (cm) 0.1 cm 5/3/2019  2:18 PM   Wound Surface Area (cm^2) 20.4 cm^2 5/3/2019  2:18 PM   Change in Wound Size % (l*w) 16.05 5/3/2019  2:18 PM   Wound Volume (cm^3) 2.04 cm^3 5/3/2019  2:18 PM   Wound Healing % 16 5/3/2019  2:18 PM   Post-Procedure Length (cm) 4 cm 4/26/2019  2:47 PM   Post-Procedure Width (cm) 4.8 cm 4/26/2019  2:47 PM   Post-Procedure Depth (cm) 0.1 cm 4/26/2019  2:47 PM   Post-Procedure Surface Area (cm^2) at 2:42 PM

## 2019-05-10 ENCOUNTER — HOSPITAL ENCOUNTER (OUTPATIENT)
Dept: WOUND CARE | Age: 69
Discharge: HOME | End: 2019-05-10
Payer: MEDICARE

## 2019-05-10 VITALS
BODY MASS INDEX: 54.74 KG/M2 | SYSTOLIC BLOOD PRESSURE: 140 MMHG | TEMPERATURE: 99 F | WEIGHT: 315 LBS | DIASTOLIC BLOOD PRESSURE: 76 MMHG | HEART RATE: 80 BPM | RESPIRATION RATE: 18 BRPM

## 2019-05-10 DIAGNOSIS — L97.222 VARICOSE VEINS OF LEFT LOWER EXTREMITY WITH ULCER OF CALF WITH FAT LAYER EXPOSED (HCC): ICD-10-CM

## 2019-05-10 DIAGNOSIS — I87.2 VENOUS INSUFFICIENCY (CHRONIC) (PERIPHERAL): ICD-10-CM

## 2019-05-10 DIAGNOSIS — L97.922 NON-PRESSURE CHRONIC ULCER OF LEFT LOWER LEG WITH FAT LAYER EXPOSED (HCC): Primary | ICD-10-CM

## 2019-05-10 DIAGNOSIS — I83.022 VARICOSE VEINS OF LEFT LOWER EXTREMITY WITH ULCER OF CALF WITH FAT LAYER EXPOSED (HCC): ICD-10-CM

## 2019-05-10 PROCEDURE — 11042 DBRDMT SUBQ TIS 1ST 20SQCM/<: CPT

## 2019-05-10 PROCEDURE — 11045 DBRDMT SUBQ TISS EACH ADDL: CPT

## 2019-05-10 PROCEDURE — 11042 DBRDMT SUBQ TIS 1ST 20SQCM/<: CPT | Performed by: PODIATRIST

## 2019-05-10 PROCEDURE — 11045 DBRDMT SUBQ TISS EACH ADDL: CPT | Performed by: PODIATRIST

## 2019-05-10 NOTE — PROGRESS NOTES
Wound Healing Center Followup Visit Note    Referring Physician : MD Alyce MeyersJune Bar [address] RECORD NUMBER:  [id number]  AGE: 77 y.o. GENDER: male  : 1950  EPISODE DATE:  5/10/2019    Subjective:     Chief Complaint   Patient presents with    Wound Check     Left leg      HISTORY of PRESENT ILLNESS HPI   Mahi Pratt is a 77 y.o. male who presents today in regards to follow up evaluation and treatment of wound/ulcer. That patient's past medical, family and social hx were reviewed and changes were made if present. History of Wound Context:  Patient did not have greqat success in keeping the SUAD device intact over the last week, and wanted to discuss additional treatment options.        Wound/Ulcer Pain Timing/Severity: mild  Quality of pain: dull, aching  Severity:  2 / 10   Modifying Factors: Pain is relieved/improved with rest  Associated Signs/Symptoms: edema    Ulcer Identification:  Ulcer Type: venous  Contributing Factors: edema, venous stasis, diabetes and obesity    Diabetic/Pressure/Non Pressure Ulcers only:  Ulcer: Non-Pressure ulcer, fat layer exposed    Wound: Chronic venous stasis ulceration left lower extremity        PAST MEDICAL HISTORY      Diagnosis Date    CAD (coronary artery disease)     Cataracts, bilateral 2019    Cellulitis -    Left leg    Complete heart block (HCC)     h/o    Diabetes mellitus ([address]     Encounter for aspirin therapy     patient uses for heart health    H/O asbestosis     Hyperlipidemia     Hypertension     Hyperthyroidism     Lung disorder     weak lungs    Myocardial infarct ([address] 2010    Neuropathy     feet    Obesity     Obstructive sleep apnea     on cpap    Onychomycosis     Osteoarthritis     Pacemaker     Medtronic    Post-thrombotic syndrome 2017    Screening 16    for Colonoscopy    Stasis dermatitis     h/o on bilateral legs with leg edema    Varicose veins      Past Surgical History:   Procedure Laterality Date    CARDIAC PACEMAKER PLACEMENT  04/09/10    Medtronic    CHOLECYSTECTOMY      COLONOSCOPY  2016    JOINT REPLACEMENT Bilateral     Left TJAK     Family History   Problem Relation Age of Onset    Heart Disease Father            [de-identified] Other Mother             Other Brother         gall bladder     Social History     Tobacco Use    Smoking status: Former Smoker     Packs/day: 2.00     Years: 25.00     Pack years: 50.00     Types: Cigarettes     Start date: 1964     Last attempt to quit: 1990     Years since quittin.8    Smokeless tobacco: Never Used   Substance Use Topics    Alcohol use: No     Alcohol/week: 0.0 oz     Comment: rare   2 beers/month    Drug use: No     Allergies   Allergen Reactions    Other Hives and Shortness Of Breath     \"allergic to Potatoe white. \"    Clindamycin/Lincomycin      Not sure     Current Outpatient Medications on File Prior to Encounter   Medication Sig Dispense Refill    Indacaterol-Glycopyrrolate (Asmita Kasal) 27.5-15.6 MCG CAPS Inhale 2 puffs into the lungs 2 times daily 60 capsule 5    vitamin C (ASCORBIC ACID) 500 MG tablet Take 500 mg by mouth daily      rosuvastatin (CRESTOR) 5 MG tablet Take 5 mg by mouth daily       glipiZIDE (GLUCOTROL XL) 5 MG extended release tablet Take 5 mg by mouth daily  3    ramipril (ALTACE) 10 MG capsule Take 10 mg by mouth daily       potassium chloride (KLOR-CON M) 20 MEQ extended release tablet Take 20 mEq by mouth daily       Cholecalciferol (VITAMIN D3) 2000 UNITS CAPS Take by mouth daily Last dose 16      carvedilol (COREG) 25 MG tablet Take 25 mg by mouth 2 times daily (with meals) Instructed to take with sip water am of procedure      Omega-3 Fatty Acids (FISH OIL) 600 MG CAPS Take 1,000 mg by mouth daily Last Dose 16      liothyronine (CYTOMEL) 50 MCG tablet Take 100 mcg by mouth daily       gabapentin (NEURONTIN) 300 MG capsule Take 300 mg by mouth 3 times daily as needed Instructed to take with sip water am of procedure      aspirin 81 MG EC tablet Take 81 mg by mouth daily Last dose 1-14-16       No current facility-administered medications on file prior to encounter. REVIEW OF SYSTEMS See HPI    Objective:    BP (!) 140/76   Pulse 80   Temp 99 °F (37.2 °C) (Oral)   Resp 18   Wt (!) 360 lb (163.3 kg)   BMI 54.74 kg/m²   Wt Readings from Last 3 Encounters:   05/10/19 (!) 360 lb (163.3 kg)   05/03/19 (!) 360 lb (163.3 kg)   04/26/19 (!) 360 lb (163.3 kg)     PHYSICAL EXAM  CONSTITUTIONAL:   Awake, alert, cooperative and in no acute distress  SKIN:  Open wound Present    Assessment:     Problem List Items Addressed This Visit     Non-pressure chronic ulcer of left lower leg with fat layer exposed ([address] - Primary    Venous insufficiency (chronic) (peripheral)    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 Performed by: Gustavo VelautantYOLY    Consent obtained:  Yes    Time out taken:  Yes    Pain Control: Anesthetic  Anesthetic: 2% Lidocaine Gel Topical     Debridement:Excisional Debridement    Using curette the wound(s)/ulcer(s) was/were sharply debrided down through and including the removal of subcutaneous tissue.         Devitalized Tissue Debrided:  fibrin and biofilm to stimulate bleeding to promote healing, post debridement good bleeding base and wound edges noted    Pre Debridement Measurements:  Are located in the [facility]  Documentation Flow Sheet    Wound/Ulcer #: 1    Post Debridement Measurements:  Wound/Ulcer Descriptions are Pre Debridement except measurements:    Wound 03/22/19 Leg Left;Lateral #1 acq: 3-22-17 Venous (Active)   Wound Image   3/22/2019  2:06 PM   Wound Diabetic [place] 1 3/22/2019 showering    Treatment Orders:  EAT FOODS HIGH IN PROTEIN AND VITAMIN C     TAKE MULTIVITAMIN DAILY IF OKAY WITH PRIMARY CARE     [facility] followup visit _____1 WEEK DR. Byron Steinberg _______________________  (Please note your next appointment above and if you are unable to keep, kindly give a 24 hour notice.  Thank you.)     Physician signature:__________________________        If you experience any of the following, please call the [facility] during business hours:     * Increase in Pain  * Temperature over 101  * Increase in drainage from your wound  * Drainage with a foul odor  * Bleeding  * Increase in swelling  * Need for compression bandage changes due to slippage, breakthrough drainage.     If you need medical attention outside of the business hours of the [facility] please contact your PCP or go to the nearest emergency room.                          Electronically signed by Gerry Pace DPM on 5/10/2019 at 1:26 PM

## 2019-05-13 ENCOUNTER — NURSE ONLY (OUTPATIENT)
Dept: NON INVASIVE DIAGNOSTICS | Age: 69
End: 2019-05-13
Payer: MEDICARE

## 2019-05-13 DIAGNOSIS — I44.2 COMPLETE HEART BLOCK (HCC): ICD-10-CM

## 2019-05-13 DIAGNOSIS — Z95.0 PACEMAKER: Primary | ICD-10-CM

## 2019-05-13 PROCEDURE — 93296 REM INTERROG EVL PM/IDS: CPT | Performed by: INTERNAL MEDICINE

## 2019-05-13 PROCEDURE — 93294 REM INTERROG EVL PM/LDLS PM: CPT | Performed by: INTERNAL MEDICINE

## 2019-05-17 ENCOUNTER — TELEPHONE (OUTPATIENT)
Dept: NON INVASIVE DIAGNOSTICS | Age: 69
End: 2019-05-17

## 2019-05-17 ENCOUNTER — HOSPITAL ENCOUNTER (OUTPATIENT)
Dept: WOUND CARE | Age: 69
Discharge: HOME | End: 2019-05-17
Payer: MEDICARE

## 2019-05-17 VITALS
TEMPERATURE: 98.8 F | RESPIRATION RATE: 18 BRPM | HEART RATE: 78 BPM | HEIGHT: 68 IN | BODY MASS INDEX: 47.74 KG/M2 | SYSTOLIC BLOOD PRESSURE: 110 MMHG | WEIGHT: 315 LBS | DIASTOLIC BLOOD PRESSURE: 62 MMHG

## 2019-05-17 DIAGNOSIS — L97.922 NON-PRESSURE CHRONIC ULCER OF LEFT LOWER LEG WITH FAT LAYER EXPOSED (HCC): ICD-10-CM

## 2019-05-17 DIAGNOSIS — L97.222 VARICOSE VEINS OF LEFT LOWER EXTREMITY WITH ULCER OF CALF WITH FAT LAYER EXPOSED (HCC): Primary | ICD-10-CM

## 2019-05-17 DIAGNOSIS — I83.022 VARICOSE VEINS OF LEFT LOWER EXTREMITY WITH ULCER OF CALF WITH FAT LAYER EXPOSED (HCC): Primary | ICD-10-CM

## 2019-05-17 DIAGNOSIS — I73.9 PERIPHERAL VASCULAR DISEASE (HCC): ICD-10-CM

## 2019-05-17 PROCEDURE — 11042 DBRDMT SUBQ TIS 1ST 20SQCM/<: CPT

## 2019-05-17 NOTE — TELEPHONE ENCOUNTER
We have received your remote transmission. Our staff will contact you if there is anything that needs to be discussed. Your next appointment is 07/19/2019 remote transmission from home    Pt is non-wireless.

## 2019-05-17 NOTE — PROGRESS NOTES
See [facility] report. Remote monitoring reviewed over a 90 day period. End of 90 day monitoring period date of service 5-13-19. Watching battery, dependent.      Terrell Arnold RN, BSN  [facility]

## 2019-05-17 NOTE — PROGRESS NOTES
Wound Healing Center Followup Visit Note    Referring Physician : MD Alyce DumontJune [address] RECORD NUMBER:  [id number]  AGE: 77 y.o. GENDER: male  : 1950  EPISODE DATE:  2019    Subjective:     Chief Complaint   Patient presents with    Wound Check     left leg      HISTORY of PRESENT ILLNESS HPI   Candy Alexis is a 77 y.o. male who presents today with his wife in regards to follow up evaluation and treatment of wound/ulcer. He denies pain.  + edema, keeps his legs in dependent position. That patient's past medical, family and social hx were reviewed and changes were made if present.     History of Wound Context:       Wound/Ulcer Pain Timing/Severity: none  Quality of pain: N/A  Severity:  0 / 10   Modifying Factors: None  Associated Signs/Symptoms: none    Ulcer Identification:  Ulcer Type: venous  Contributing Factors: edema, venous stasis, diabetes and obesity    Diabetic/Pressure/Non Pressure Ulcers only:  Ulcer: Non-Pressure ulcer, fat layer exposed    Wound: venous ulcer LLE        PAST MEDICAL HISTORY      Diagnosis Date    CAD (coronary artery disease)     Cataracts, bilateral 2019    Cellulitis -    Left leg    Complete heart block (HCC)     h/o    Diabetes mellitus ([address]     Encounter for aspirin therapy     patient uses for heart health    H/O asbestosis     Hyperlipidemia     Hypertension     Hyperthyroidism     Lung disorder     weak lungs    Myocardial infarct ([address] 2010    Neuropathy     feet    Obesity     Obstructive sleep apnea     on cpap    Onychomycosis     Osteoarthritis     Pacemaker     Medtronic    Post-thrombotic syndrome 2017    Screening 16    for Colonoscopy    Stasis dermatitis     h/o on bilateral legs with leg edema    Varicose veins      Past Surgical History:   Procedure Laterality Date    CARDIAC PACEMAKER PLACEMENT  04/09/10    [employer]tronic    CHOLECYSTECTOMY      COLONOSCOPY  2016    JOINT REPLACEMENT Bilateral     Left TJAK     Family History   Problem Relation Age of Onset    Heart Disease Father            [facility] Other Mother             Other Brother         gall bladder     Social History     Tobacco Use    Smoking status: Former Smoker     Packs/day: 2.00     Years: 25.00     Pack years: 50.00     Types: Cigarettes     Start date: 1964     Last attempt to quit: 1990     Years since quittin.8    Smokeless tobacco: Never Used   Substance Use Topics    Alcohol use: No     Alcohol/week: 0.0 oz     Comment: rare   2 beers/month    Drug use: No     Allergies   Allergen Reactions    Other Hives and Shortness Of Breath     \"allergic to Potatoe white. \"    Clindamycin/Lincomycin      Not sure     Current Outpatient Medications on File Prior to Encounter   Medication Sig Dispense Refill    Indacaterol-Glycopyrrolate (Gely Check) 27.5-15.6 MCG CAPS Inhale 2 puffs into the lungs 2 times daily 60 capsule 5    vitamin C (ASCORBIC ACID) 500 MG tablet Take 500 mg by mouth daily      rosuvastatin (CRESTOR) 5 MG tablet Take 5 mg by mouth daily       glipiZIDE (GLUCOTROL XL) 5 MG extended release tablet Take 5 mg by mouth daily  3    ramipril (ALTACE) 10 MG capsule Take 10 mg by mouth daily       potassium chloride (KLOR-CON M) 20 MEQ extended release tablet Take 20 mEq by mouth daily       Cholecalciferol (VITAMIN D3) 2000 UNITS CAPS Take by mouth daily Last dose 16      carvedilol (COREG) 25 MG tablet Take 25 mg by mouth 2 times daily (with meals) Instructed to take with sip water am of procedure      Omega-3 Fatty Acids (FISH OIL) 600 MG CAPS Take 1,000 mg by mouth daily Last Dose 16      liothyronine (CYTOMEL) 50 MCG tablet Take 100 mcg by mouth daily       gabapentin (NEURONTIN) 300 MG capsule Take 300 mg by mouth 3 times daily as needed Instructed to take with sip water am of procedure      aspirin 81 MG EC tablet Take 81 mg by mouth daily Last dose Descriptions are Pre Debridement except measurements:    Wound 03/22/19 Leg Left;Lateral #1 acq: 3-22-17 Venous (Active)   Wound Image   5/17/2019 12:57 PM   Wound Diabetic [place] 1 3/22/2019  2:06 PM   Dressing Status Clean;Dry; Intact 5/17/2019  1:36 PM   Dressing Changed Changed/New 5/17/2019  1:36 PM   Dressing/Treatment ABD; Moist to dry 5/17/2019  1:36 PM   Wound Cleansed Rinsed/Irrigated with saline 5/17/2019  1:36 PM   Wound Length (cm) 4.5 cm 5/17/2019 12:57 PM   Wound Width (cm) 5 cm 5/17/2019 12:57 PM   Wound Depth (cm) 0.3 cm 5/17/2019 12:57 PM   Wound Surface Area (cm^2) 22.5 cm^2 5/17/2019 12:57 PM   Change in Wound Size % (l*w) 7.41 5/17/2019 12:57 PM   Wound Volume (cm^3) 6.75 cm^3 5/17/2019 12:57 PM   Wound Healing % -178 5/17/2019 12:57 PM   Post-Procedure Length (cm) 4.5 cm 5/17/2019  1:18 PM   Post-Procedure Width (cm) 5 cm 5/17/2019  1:18 PM   Post-Procedure Depth (cm) 0.4 cm 5/17/2019  1:18 PM   Post-Procedure Surface Area (cm^2) 22.5 cm^2 5/17/2019  1:18 PM   Post-Procedure Volume (cm^3) 9 cm^3 5/17/2019  1:18 PM   Wound Assessment Zoë Garcia 5/17/2019 12:57 PM   Drainage Amount Moderate 5/17/2019 12:57 PM   Drainage Description Serosanguinous 5/17/2019 12:57 PM   Odor None 5/17/2019 12:57 PM   Maria Elena-wound Assessment Intact 5/17/2019 12:57 PM   Number of days: 56     Percent of Wound/Ulcer Debrided: 80%    Total Surface Area Debrided:  16 sq cm     Estimated Blood Loss:  Minimal  Hemostasis Achieved:  by pressure    Procedural Pain:  0  / 10   Post Procedural Pain:  0 / 10     Response to treatment:  Well tolerated by patient. Plan:   Treatment Note please see attached Discharge Instructions. Start santyl, tubi , vasc studies, than consider follow up venous to eval for valvular insufficiency. Keep legs elevated. Keep BS under control, consider A1c. Written patient dismissal instructions given to patient and signed by patient or POA.          Discharge Instructions         Visit

## 2019-05-17 NOTE — TELEPHONE ENCOUNTER
----- Message from Ashley Lyons RN sent at 5/17/2019  7:30 AM EDT -----  Early remote due to battery. Successful transmission received. Please call patient and give next appointment.

## 2019-05-24 ENCOUNTER — HOSPITAL ENCOUNTER (OUTPATIENT)
Dept: WOUND CARE | Age: 69
Discharge: HOME | End: 2019-05-24
Payer: MEDICARE

## 2019-05-24 VITALS
WEIGHT: 315 LBS | DIASTOLIC BLOOD PRESSURE: 62 MMHG | SYSTOLIC BLOOD PRESSURE: 120 MMHG | RESPIRATION RATE: 18 BRPM | HEART RATE: 76 BPM | TEMPERATURE: 97.7 F | BODY MASS INDEX: 54.74 KG/M2

## 2019-05-24 DIAGNOSIS — L97.922 NON-PRESSURE CHRONIC ULCER OF LEFT LOWER LEG WITH FAT LAYER EXPOSED (HCC): Primary | ICD-10-CM

## 2019-05-24 DIAGNOSIS — I83.022 VARICOSE VEINS OF LEFT LOWER EXTREMITY WITH ULCER OF CALF WITH FAT LAYER EXPOSED (HCC): ICD-10-CM

## 2019-05-24 DIAGNOSIS — I87.2 VENOUS INSUFFICIENCY (CHRONIC) (PERIPHERAL): ICD-10-CM

## 2019-05-24 DIAGNOSIS — L97.222 VARICOSE VEINS OF LEFT LOWER EXTREMITY WITH ULCER OF CALF WITH FAT LAYER EXPOSED (HCC): ICD-10-CM

## 2019-05-24 PROCEDURE — 11045 DBRDMT SUBQ TISS EACH ADDL: CPT

## 2019-05-24 PROCEDURE — 11042 DBRDMT SUBQ TIS 1ST 20SQCM/<: CPT | Performed by: PODIATRIST

## 2019-05-24 PROCEDURE — 11042 DBRDMT SUBQ TIS 1ST 20SQCM/<: CPT

## 2019-05-24 PROCEDURE — 11045 DBRDMT SUBQ TISS EACH ADDL: CPT | Performed by: PODIATRIST

## 2019-05-24 RX ORDER — LIDOCAINE HYDROCHLORIDE 20 MG/ML
JELLY TOPICAL ONCE
Status: DISCONTINUED | OUTPATIENT
Start: 2019-05-24 | End: 2019-05-25 | Stop reason: HOSPADM

## 2019-05-24 NOTE — PROGRESS NOTES
syndrome 2017    Screening 16    for Colonoscopy    Stasis dermatitis     h/o on bilateral legs with leg edema    Varicose veins      Past Surgical History:   Procedure Laterality Date    CARDIAC PACEMAKER PLACEMENT  04/09/10    Medtronic    CHOLECYSTECTOMY      COLONOSCOPY  2016    JOINT REPLACEMENT Bilateral     Left TJAK     Family History   Problem Relation Age of Onset    Heart Disease Father            Belkys Henriquez Other Mother             Other Brother         gall bladder     Social History     Tobacco Use    Smoking status: Former Smoker     Packs/day: 2.00     Years: 25.00     Pack years: 50.00     Types: Cigarettes     Start date: 1964     Last attempt to quit: 1990     Years since quittin.9    Smokeless tobacco: Never Used   Substance Use Topics    Alcohol use: No     Alcohol/week: 0.0 oz     Comment: rare   2 beers/month    Drug use: No     Allergies   Allergen Reactions    Other Hives and Shortness Of Breath     \"allergic to Potatoe white. \"    Clindamycin/Lincomycin      Not sure     Current Outpatient Medications on File Prior to Encounter   Medication Sig Dispense Refill    collagenase (SANTYL) 250 UNIT/GM ointment Apply topically daily to left lower extremity wound as instructed.  1 Tube 2    Indacaterol-Glycopyrrolate (UTIBRON NEOHALER) 27.5-15.6 MCG CAPS Inhale 2 puffs into the lungs 2 times daily 60 capsule 5    vitamin C (ASCORBIC ACID) 500 MG tablet Take 500 mg by mouth daily      rosuvastatin (CRESTOR) 5 MG tablet Take 5 mg by mouth daily       glipiZIDE (GLUCOTROL XL) 5 MG extended release tablet Take 5 mg by mouth daily  3    ramipril (ALTACE) 10 MG capsule Take 10 mg by mouth daily       potassium chloride (KLOR-CON M) 20 MEQ extended release tablet Take 20 mEq by mouth daily       Cholecalciferol (VITAMIN D3) 2000 UNITS CAPS Take by mouth daily Last dose 16      carvedilol (COREG) 25 MG tablet Take 25 mg by mouth 2 times daily (with meals) Instructed to take with sip water am of procedure      Omega-3 Fatty Acids (FISH OIL) 600 MG CAPS Take 1,000 mg by mouth daily Last Dose 1-14-16      liothyronine (CYTOMEL) 50 MCG tablet Take 75 mcg by mouth daily       aspirin 81 MG EC tablet Take 81 mg by mouth daily Last dose 1-14-16      gabapentin (NEURONTIN) 300 MG capsule Take 300 mg by mouth 3 times daily as needed Instructed to take with sip water am of procedure       No current facility-administered medications on file prior to encounter. REVIEW OF SYSTEMS See HPI    Objective:    /62   Pulse 76   Temp 97.7 °F (36.5 °C) (Oral)   Resp 18   Wt (!) 360 lb (163.3 kg)   BMI 54.74 kg/m²   Wt Readings from Last 3 Encounters:   05/24/19 (!) 360 lb (163.3 kg)   05/17/19 (!) 360 lb (163.3 kg)   05/10/19 (!) 360 lb (163.3 kg)     PHYSICAL EXAM  CONSTITUTIONAL:   Awake, alert, cooperative and in no acute distress  SKIN:  Open wound Present    Assessment:     Problem List Items Addressed This Visit     Non-pressure chronic ulcer of left lower leg with fat layer exposed ([address] - Primary    Venous insufficiency (chronic) (peripheral)    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 Performed by: Greta Hayes DPM    Consent obtained:  Yes    Time out taken:  Yes    Pain Control: Anesthetic  Anesthetic: 2% Lidocaine Gel Topical     Debridement:Excisional Debridement    Using curette the wound(s)/ulcer(s) was/were sharply debrided down through and including the removal of subcutaneous tissue.         Devitalized Tissue Debrided:  fibrin and biofilm to stimulate bleeding to promote healing, post debridement good bleeding base and wound edges noted    Pre Debridement Measurements:  Are located in the [facility]  Documentation Flow Sheet    Wound/Ulcer #: 1    Post Debridement Measurements:  Wound/Ulcer Descriptions are Pre Debridement except measurements:    Wound 03/22/19 Leg Left;Lateral #1 acq: 3-22-17 Venous (Active)   Wound Image   5/17/2019 12:57 PM   Wound Diabetic [place] 1 3/22/2019  2:06 PM   Dressing Status Clean;Dry; Intact 5/17/2019  1:36 PM   Dressing Changed Changed/New 5/17/2019  1:36 PM   Dressing/Treatment ABD; Moist to dry 5/17/2019  1:36 PM   Wound Cleansed Rinsed/Irrigated with saline 5/17/2019  1:36 PM   Wound Length (cm) 4.5 cm 5/24/2019  1:04 PM   Wound Width (cm) 5.5 cm 5/24/2019  1:04 PM   Wound Depth (cm) 0.2 cm 5/24/2019  1:04 PM   Wound Surface Area (cm^2) 24.75 cm^2 5/24/2019  1:04 PM   Change in Wound Size % (l*w) -1.85 5/24/2019  1:04 PM   Wound Volume (cm^3) 4.95 cm^3 5/24/2019  1:04 PM   Wound Healing % -104 5/24/2019  1:04 PM   Post-Procedure Length (cm) 4.5 cm 5/24/2019  1:17 PM   Post-Procedure Width (cm) 5.5 cm 5/24/2019  1:17 PM   Post-Procedure Depth (cm) 0.2 cm 5/24/2019  1:17 PM   Post-Procedure Surface Area (cm^2) 24.75 cm^2 5/24/2019  1:17 PM   Post-Procedure Volume (cm^3) 4.95 cm^3 5/24/2019  1:17 PM   Wound Assessment Red;Pink 5/24/2019  1:04 PM   Drainage Amount Moderate 5/24/2019  1:04 PM   Drainage Description Serosanguinous 5/24/2019  1:04 PM   Odor None 5/24/2019  1:04 PM   Maria Elena-wound Assessment Intact 5/24/2019  1:04 PM   Number of days: 63     Percent of Wound/Ulcer Debrided: 100%    Total Surface Area Debrided:  24.75 sq cm (debridement + 1)    Estimated Blood Loss:  Minimal  Hemostasis Achieved:  by pressure    Procedural Pain:  2  / 10   Post Procedural Pain:  0 / 10     Response to treatment:  Well tolerated by patient. SUAD device was applied today. Plan:   Treatment Note please see attached Discharge Instructions    Written patient dismissal instructions given to patient and signed by patient or POA.          Discharge Instructions         Visit Discharge/Physician Orders     Discharge condition: Stable     Assessment of pain at discharge:minimal     Anesthetic used: 2% lidocaine     Discharge to: Home     Left via:Private automobile     Accompanied by: accompanied by wife     ECF/HHA:      Dressing Orders:LEFT LEG ULCER- SUAD VAC APPLIED LEAVE IN PLACE X 1WEEK   single tubi  to bilateral lower legs     Vasculars/SHASHI to be scheduled     Treatment Orders:  EAT FOODS HIGH IN PROTEIN AND VITAMIN C     TAKE MULTIVITAMIN DAILY IF OKAY WITH PRIMARY CARE     [facility] followup visit _____1 WEEK DR. Victorina Delgado _______________________  (Please note your next appointment above and if you are unable to keep, kindly give a 24 hour notice.  Thank you.)     Physician signature:__________________________        If you experience any of the following, please call the [address] [place] [address] [employer] during business hours:     * Increase in Pain  * Temperature over 101  * Increase in drainage from your wound  * Drainage with a foul odor  * Bleeding  * Increase in swelling  * Need for compression bandage changes due to slippage, breakthrough drainage.     If you need medical attention outside of the business hours of the [address] [place] [address] Road please contact your PCP or go to the nearest emergency room.                                                     Electronically signed by Greta Hayes DPM on 5/24/2019 at 2:05 PM

## 2019-05-29 ENCOUNTER — HOSPITAL ENCOUNTER (OUTPATIENT)
Dept: INTERVENTIONAL RADIOLOGY/VASCULAR | Age: 69
Discharge: HOME | End: 2019-05-31
Payer: MEDICARE

## 2019-05-29 DIAGNOSIS — I73.9 PERIPHERAL VASCULAR DISEASE (HCC): ICD-10-CM

## 2019-05-29 PROCEDURE — 93923 UPR/LXTR ART STDY 3+ LVLS: CPT

## 2019-05-31 ENCOUNTER — HOSPITAL ENCOUNTER (OUTPATIENT)
Dept: WOUND CARE | Age: 69
Discharge: HOME | End: 2019-05-31
Payer: MEDICARE

## 2019-05-31 VITALS
TEMPERATURE: 98.7 F | HEIGHT: 68 IN | HEART RATE: 70 BPM | RESPIRATION RATE: 18 BRPM | DIASTOLIC BLOOD PRESSURE: 70 MMHG | SYSTOLIC BLOOD PRESSURE: 122 MMHG | WEIGHT: 315 LBS | BODY MASS INDEX: 47.74 KG/M2

## 2019-05-31 DIAGNOSIS — I83.022 VARICOSE VEINS OF LEFT LOWER EXTREMITY WITH ULCER OF CALF WITH FAT LAYER EXPOSED (HCC): Primary | ICD-10-CM

## 2019-05-31 DIAGNOSIS — L97.222 VARICOSE VEINS OF LEFT LOWER EXTREMITY WITH ULCER OF CALF WITH FAT LAYER EXPOSED (HCC): Primary | ICD-10-CM

## 2019-05-31 DIAGNOSIS — L97.922 NON-PRESSURE CHRONIC ULCER OF LEFT LOWER LEG WITH FAT LAYER EXPOSED (HCC): ICD-10-CM

## 2019-05-31 DIAGNOSIS — I87.2 VENOUS INSUFFICIENCY (CHRONIC) (PERIPHERAL): ICD-10-CM

## 2019-05-31 PROCEDURE — 11045 DBRDMT SUBQ TISS EACH ADDL: CPT | Performed by: PODIATRIST

## 2019-05-31 PROCEDURE — 97605 NEG PRS WND THER DME<=50SQCM: CPT

## 2019-05-31 PROCEDURE — 11042 DBRDMT SUBQ TIS 1ST 20SQCM/<: CPT

## 2019-05-31 PROCEDURE — 11042 DBRDMT SUBQ TIS 1ST 20SQCM/<: CPT | Performed by: PODIATRIST

## 2019-05-31 PROCEDURE — 11045 DBRDMT SUBQ TISS EACH ADDL: CPT

## 2019-05-31 RX ORDER — LIDOCAINE HYDROCHLORIDE 20 MG/ML
JELLY TOPICAL ONCE
Status: DISCONTINUED | OUTPATIENT
Start: 2019-05-31 | End: 2019-06-01 | Stop reason: HOSPADM

## 2019-05-31 NOTE — PROGRESS NOTES
Wound Healing Center Followup Visit Note    Referring Physician : MD Alyce ShresthaJune Bar [address] RECORD NUMBER:  [id number]  AGE: 77 y.o. GENDER: male  : 1950  EPISODE DATE:  2019    Subjective:     Chief Complaint   Patient presents with    Wound Check     left leg wound      HISTORY of PRESENT ILLNESS HPI   Kiersten Nicolas is a 77 y.o. male who presents today in regards to follow up evaluation and treatment of wound/ulcer. That patient's past medical, family and social hx were reviewed and changes were made if present. History of Wound Context:  Patient denies any nausea, vomiting, fever, chills, shortness of breath, chest pain, calf cramping and/or pain. Patient is tolerating the current dressing regimen without issues. No other new issues noted at this time.      Wound/Ulcer Pain Timing/Severity: none  Quality of pain: N/A  Severity:  0 / 10   Modifying Factors: None  Associated Signs/Symptoms: none    Ulcer Identification:  Ulcer Type: venous  Contributing Factors: edema, venous stasis and obesity    Diabetic/Pressure/Non Pressure Ulcers only:  Ulcer: Non-Pressure ulcer, fat layer exposed    Wound: Chronic venous stasis ulceration left lower extremity        PAST MEDICAL HISTORY      Diagnosis Date    CAD (coronary artery disease)     Cataracts, bilateral 2019    Cellulitis -    Left leg    Complete heart block (HCC)     h/o    Diabetes mellitus ([address]     Encounter for aspirin therapy     patient uses for heart health    H/O asbestosis     Hyperlipidemia     Hypertension     Hyperthyroidism     Lung disorder     weak lungs    Myocardial infarct ([address]     Neuropathy     feet    Obesity     Obstructive sleep apnea     on cpap    Onychomycosis     Osteoarthritis     Pacemaker     Medtronic    Post-thrombotic syndrome 2017    Screening 16    for Colonoscopy    Stasis dermatitis     h/o on bilateral legs with leg edema    Varicose veins      Past Surgical History:   Procedure Laterality Date    CARDIAC PACEMAKER PLACEMENT  04/09/10    [employer]tronic    CHOLECYSTECTOMY      COLONOSCOPY  2016    JOINT REPLACEMENT Bilateral     Left TJAK     Family History   Problem Relation Age of Onset    Heart Disease Father            [facility] Other Mother             Other Brother         gall bladder     Social History     Tobacco Use    Smoking status: Former Smoker     Packs/day: 2.00     Years: 25.00     Pack years: 50.00     Types: Cigarettes     Start date: 1964     Last attempt to quit: 1990     Years since quittin.9    Smokeless tobacco: Never Used   Substance Use Topics    Alcohol use: No     Alcohol/week: 0.0 oz     Comment: rare   2 beers/month    Drug use: No     Allergies   Allergen Reactions    Other Hives and Shortness Of Breath     \"allergic to Potatoe white. \"    Clindamycin/Lincomycin      Not sure     Current Outpatient Medications on File Prior to Encounter   Medication Sig Dispense Refill    Indacaterol-Glycopyrrolate (Maile Lecher) 27.5-15.6 MCG CAPS Inhale 2 puffs into the lungs 2 times daily 60 capsule 5    vitamin C (ASCORBIC ACID) 500 MG tablet Take 500 mg by mouth daily      rosuvastatin (CRESTOR) 5 MG tablet Take 5 mg by mouth daily       glipiZIDE (GLUCOTROL XL) 5 MG extended release tablet Take 5 mg by mouth daily  3    ramipril (ALTACE) 10 MG capsule Take 10 mg by mouth daily       potassium chloride (KLOR-CON M) 20 MEQ extended release tablet Take 20 mEq by mouth daily       Cholecalciferol (VITAMIN D3) 2000 UNITS CAPS Take by mouth daily Last dose 16      carvedilol (COREG) 25 MG tablet Take 25 mg by mouth 2 times daily (with meals) Instructed to take with sip water am of procedure      Omega-3 Fatty Acids (FISH OIL) 600 MG CAPS Take 1,000 mg by mouth daily Last Dose 16      liothyronine (CYTOMEL) 50 MCG tablet Take 75 mcg by mouth daily       gabapentin (NEURONTIN) 300 MG capsule Take 300 mg by mouth 3 times daily as needed Instructed to take with sip water am of procedure      aspirin 81 MG EC tablet Take 81 mg by mouth daily Last dose 1-14-16       No current facility-administered medications on file prior to encounter. REVIEW OF SYSTEMS See HPI    Objective:    /70   Pulse 70   Temp 98.7 °F (37.1 °C) (Oral)   Resp 18   Ht 5' 8\" (1.727 m)   Wt (!) 360 lb (163.3 kg)   BMI 54.74 kg/m²   Wt Readings from Last 3 Encounters:   05/31/19 (!) 360 lb (163.3 kg)   05/24/19 (!) 360 lb (163.3 kg)   05/17/19 (!) 360 lb (163.3 kg)     PHYSICAL EXAM  CONSTITUTIONAL:   Awake, alert, cooperative and in no acute distress  SKIN:  Open wound Present    Assessment:     Problem List Items Addressed This Visit     Non-pressure chronic ulcer of left lower leg with fat layer exposed ([address]    Venous insufficiency (chronic) (peripheral)    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 Performed by: Mireille Pyle DPM    Consent obtained:  Yes    Time out taken:  Yes    Pain Control: Anesthetic  Anesthetic: 2% Lidocaine Gel Topical     Debridement:Excisional Debridement    Using curette the wound(s)/ulcer(s) was/were sharply debrided down through and including the removal of subcutaneous tissue.         Devitalized Tissue Debrided:  fibrin and biofilm to stimulate bleeding to promote healing, post debridement good bleeding base and wound edges noted    Pre Debridement Measurements:  Are located in the [facility]  Documentation Flow Sheet    Wound/Ulcer #: 1    Post Debridement Measurements:  Wound/Ulcer Descriptions are Pre Debridement except measurements:    Wound 03/22/19 Leg Left;Lateral #1 acq: 3-22-17 Venous (Active)   Wound Image   5/17/2019 12:57 PM   Wound Diabetic [place] 1 3/22/2019  2:06 PM   Dressing Status Clean;Dry; Intact 5/31/2019  1:28 PM   Dressing Changed Changed/New 5/31/2019  1:28 PM   Dressing/Treatment Vacuum dressing 5/31/2019  1:28 PM   Wound Cleansed Rinsed/Irrigated with saline 5/31/2019  1:28 PM   Wound Length (cm) 6.2 cm 5/31/2019  1:02 PM   Wound Width (cm) 8.9 cm 5/31/2019  1:02 PM   Wound Depth (cm) 0.2 cm 5/31/2019  1:02 PM   Wound Surface Area (cm^2) 55.18 cm^2 5/31/2019  1:02 PM   Change in Wound Size % (l*w) -127.08 5/31/2019  1:02 PM   Wound Volume (cm^3) 11.04 cm^3 5/31/2019  1:02 PM   Wound Healing % -354 5/31/2019  1:02 PM   Post-Procedure Length (cm) 6.2 cm 5/31/2019  1:12 PM   Post-Procedure Width (cm) 8.9 cm 5/31/2019  1:12 PM   Post-Procedure Depth (cm) 0.2 cm 5/31/2019  1:12 PM   Post-Procedure Surface Area (cm^2) 55.18 cm^2 5/31/2019  1:12 PM   Post-Procedure Volume (cm^3) 11.04 cm^3 5/31/2019  1:12 PM   Wound Assessment Pink;Red;Yellow 5/31/2019  1:02 PM   Drainage Amount Moderate 5/31/2019  1:02 PM   Drainage Description Serosanguinous 5/31/2019  1:02 PM   Odor None 5/31/2019  1:02 PM   Maria Elena-wound Assessment Intact 5/31/2019  1:02 PM   Number of days: 70     Percent of Wound/Ulcer Debrided: 100%    Total Surface Area Debrided:  55.18 sq cm     Estimated Blood Loss:  Minimal  Hemostasis Achieved:  by pressure    Procedural Pain:  2  / 10   Post Procedural Pain:  0 / 10     Response to treatment:  Well tolerated by patient. Will continue with SUAD system. Plan:   Treatment Note please see attached Discharge Instructions    Written patient dismissal instructions given to patient and signed by patient or POA.          Discharge Instructions         Visit Discharge/Physician Orders     Discharge condition: Stable     Assessment of pain at discharge:minimal     Anesthetic used: 2% lidocaine     Discharge to: Home     Left via:Private automobile     Accompanied by: accompanied by wife     ECF/HHA:      Dressing Orders:LEFT LEG ULCER- SUAD VAC APPLIED LEAVE IN PLACE X 1WEEK   single tubi  to bilateral lower legs          Treatment Orders:  EAT FOODS HIGH IN PROTEIN AND VITAMIN C     TAKE MULTIVITAMIN DAILY IF OKAY WITH PRIMARY CARE     [facility] followup visit _____1 WEEK DR. Carina Benito _______________________  (Please note your next appointment above and if you are unable to keep, kindly give a 24 hour notice.  Thank you.)     Physician signature:__________________________        If you experience any of the following, please call the [facility] [address] during business hours:     * Increase in Pain  * Temperature over 101  * Increase in drainage from your wound  * Drainage with a foul odor  * Bleeding  * Increase in swelling  * Need for compression bandage changes due to slippage, breakthrough drainage.     If you need medical attention outside of the business hours of the [facility] [address] please contact your PCP or go to the nearest emergency room.                                                                      Electronically signed by Yesica Villalobos DPM on 5/31/2019 at 1:42 PM

## 2019-06-07 ENCOUNTER — HOSPITAL ENCOUNTER (OUTPATIENT)
Dept: WOUND CARE | Age: 69
Discharge: HOME | End: 2019-06-07
Payer: MEDICARE

## 2019-06-07 VITALS
HEART RATE: 68 BPM | BODY MASS INDEX: 47.74 KG/M2 | DIASTOLIC BLOOD PRESSURE: 60 MMHG | WEIGHT: 315 LBS | HEIGHT: 68 IN | RESPIRATION RATE: 18 BRPM | TEMPERATURE: 98.1 F | SYSTOLIC BLOOD PRESSURE: 118 MMHG

## 2019-06-07 DIAGNOSIS — I87.2 VENOUS INSUFFICIENCY (CHRONIC) (PERIPHERAL): ICD-10-CM

## 2019-06-07 DIAGNOSIS — L97.922 NON-PRESSURE CHRONIC ULCER OF LEFT LOWER LEG WITH FAT LAYER EXPOSED (HCC): ICD-10-CM

## 2019-06-07 DIAGNOSIS — L97.222 VARICOSE VEINS OF LEFT LOWER EXTREMITY WITH ULCER OF CALF WITH FAT LAYER EXPOSED (HCC): Primary | ICD-10-CM

## 2019-06-07 DIAGNOSIS — I83.022 VARICOSE VEINS OF LEFT LOWER EXTREMITY WITH ULCER OF CALF WITH FAT LAYER EXPOSED (HCC): Primary | ICD-10-CM

## 2019-06-07 PROCEDURE — 11042 DBRDMT SUBQ TIS 1ST 20SQCM/<: CPT | Performed by: PODIATRIST

## 2019-06-07 PROCEDURE — 11045 DBRDMT SUBQ TISS EACH ADDL: CPT

## 2019-06-07 PROCEDURE — 11042 DBRDMT SUBQ TIS 1ST 20SQCM/<: CPT

## 2019-06-07 PROCEDURE — 11045 DBRDMT SUBQ TISS EACH ADDL: CPT | Performed by: PODIATRIST

## 2019-06-07 RX ORDER — LIDOCAINE HYDROCHLORIDE 20 MG/ML
JELLY TOPICAL ONCE
Status: DISCONTINUED | OUTPATIENT
Start: 2019-06-07 | End: 2019-06-08 | Stop reason: HOSPADM

## 2019-06-07 ASSESSMENT — PAIN DESCRIPTION - LOCATION: LOCATION: LEG

## 2019-06-07 ASSESSMENT — PAIN DESCRIPTION - PROGRESSION: CLINICAL_PROGRESSION: NOT CHANGED

## 2019-06-07 ASSESSMENT — PAIN DESCRIPTION - FREQUENCY: FREQUENCY: INTERMITTENT

## 2019-06-07 ASSESSMENT — PAIN DESCRIPTION - ONSET: ONSET: ON-GOING

## 2019-06-07 ASSESSMENT — PAIN SCALES - GENERAL: PAINLEVEL_OUTOF10: 0

## 2019-06-07 ASSESSMENT — PAIN DESCRIPTION - DESCRIPTORS: DESCRIPTORS: ACHING;NAGGING

## 2019-06-07 ASSESSMENT — PAIN DESCRIPTION - PAIN TYPE: TYPE: ACUTE PAIN

## 2019-06-07 ASSESSMENT — PAIN DESCRIPTION - ORIENTATION: ORIENTATION: LEFT

## 2019-06-07 NOTE — PROGRESS NOTES
Wound Healing Center Followup Visit Note    Referring Physician : MD Tariq Samuels [address] RECORD NUMBER:  [id number]  AGE: 77 y.o. GENDER: male  : 1950  EPISODE DATE:  2019    Subjective:     Chief Complaint   Patient presents with    Wound Check     [facility] Wound Care Follow Up Appt @ [facility] with Dr Ning Casey of PRESENT ILLNESS ALY Alvarez is a 77 y.o. male who presents today in regards to follow up evaluation and treatment of wound/ulcer. That patient's past medical, family and social hx were reviewed and changes were made if present. History of Wound Context:  Patient denies any nausea, vomiting, fever, chills, shortness of breath, chest pain, calf cramping and/or pain. Patient is tolerating the current dressing regimen without issues. No other new issues noted at this time.      Wound/Ulcer Pain Timing/Severity: none  Quality of pain: N/A  Severity:  0 / 10   Modifying Factors: None  Associated Signs/Symptoms: edema    Ulcer Identification:  Ulcer Type: venous  Contributing Factors: edema and venous stasis    Diabetic/Pressure/Non Pressure Ulcers only:  Ulcer: Non-Pressure ulcer, fat layer exposed    Wound: Chronic venous stasis ulceration        PAST MEDICAL HISTORY      Diagnosis Date    CAD (coronary artery disease)     Cataracts, bilateral 2019    Cellulitis -    Left leg    Complete heart block (HCC)     h/o    Diabetes mellitus ([address]     Encounter for aspirin therapy     patient uses for heart health    H/O asbestosis     Hyperlipidemia     Hypertension     Hyperthyroidism     Lung disorder     weak lungs    Myocardial infarct ([address]     Neuropathy     feet    Obesity     Obstructive sleep apnea     on cpap    Onychomycosis     Osteoarthritis     Pacemaker     [employer]tronic    Post-thrombotic syndrome 2017    Screening 16    for Colonoscopy    Stasis dermatitis     h/o on bilateral legs with leg edema    Varicose veins      Past Surgical History:   Procedure Laterality Date    CARDIAC PACEMAKER PLACEMENT  04/09/10    [employer]tr[employer]    CHOLECYSTECTOMY      COLONOSCOPY  2016    JOINT REPLACEMENT Bilateral     Left TJAK     Family History   Problem Relation Age of Onset    Heart Disease Father            [facility] Other Mother             Other Brother         gall bladder     Social History     Tobacco Use    Smoking status: Former Smoker     Packs/day: 2.00     Years: 25.00     Pack years: 50.00     Types: Cigarettes     Start date: 1964     Last attempt to quit: 1990     Years since quittin.9    Smokeless tobacco: Never Used   Substance Use Topics    Alcohol use: No     Alcohol/week: 0.0 oz     Comment: rare   2 beers/month    Drug use: No     Allergies   Allergen Reactions    Other Hives and Shortness Of Breath     \"allergic to Potatoe white. \"    Clindamycin/Lincomycin      Not sure     Current Outpatient Medications on File Prior to Encounter   Medication Sig Dispense Refill    Indacaterol-Glycopyrrolate (Arnetha Spry) 27.5-15.6 MCG CAPS Inhale 2 puffs into the lungs 2 times daily 60 capsule 5    vitamin C (ASCORBIC ACID) 500 MG tablet Take 500 mg by mouth daily      rosuvastatin (CRESTOR) 5 MG tablet Take 5 mg by mouth daily       glipiZIDE (GLUCOTROL XL) 5 MG extended release tablet Take 5 mg by mouth daily  3    ramipril (ALTACE) 10 MG capsule Take 10 mg by mouth daily       potassium chloride (KLOR-CON M) 20 MEQ extended release tablet Take 20 mEq by mouth daily       Cholecalciferol (VITAMIN D3) 2000 UNITS CAPS Take by mouth daily Last dose 16      carvedilol (COREG) 25 MG tablet Take 25 mg by mouth 2 times daily (with meals) Instructed to take with sip water am of procedure      Omega-3 Fatty Acids (FISH OIL) 600 MG CAPS Take 1,000 mg by mouth daily Last Dose 16      liothyronine (CYTOMEL) 50 MCG tablet Take 75 mcg by mouth daily       gabapentin (NEURONTIN) 300 MG capsule Take 300 mg by mouth 3 times daily as needed Instructed to take with sip water am of procedure      aspirin 81 MG EC tablet Take 81 mg by mouth daily Last dose 1-14-16       No current facility-administered medications on file prior to encounter. REVIEW OF SYSTEMS See HPI    Objective:    /60   Pulse 68   Temp 98.1 °F (36.7 °C) (Oral)   Resp 18   Ht 5' 8\" (1.727 m)   Wt (!) 360 lb (163.3 kg)   BMI 54.74 kg/m²   Wt Readings from Last 3 Encounters:   06/07/19 (!) 360 lb (163.3 kg)   05/31/19 (!) 360 lb (163.3 kg)   05/24/19 (!) 360 lb (163.3 kg)     PHYSICAL EXAM  CONSTITUTIONAL:   Awake, alert, cooperative and in no acute distress  SKIN:  Open wound Present    Assessment:     Problem List Items Addressed This Visit     Non-pressure chronic ulcer of left lower leg with fat layer exposed ([address]    Venous insufficiency (chronic) (peripheral)    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 Performed by: Jeremiah Gibson DPM    Consent obtained:  Yes    Time out taken:  Yes    Pain Control: Anesthetic  Anesthetic: 2% Lidocaine Gel Topical     Debridement:Excisional Debridement    Using curette the wound(s)/ulcer(s) was/were sharply debrided down through and including the removal of subcutaneous tissue.         Devitalized Tissue Debrided:  fibrin, biofilm and exudate to stimulate bleeding to promote healing, post debridement good bleeding base and wound edges noted    Pre Debridement Measurements:  Are located in the [place] [facility]  Documentation Flow Sheet    Wound/Ulcer #: 1    Post Debridement Measurements:  Wound/Ulcer Descriptions are Pre Debridement except measurements:    Wound 03/22/19 Leg Left;Lateral #1 acq: 3-22-17 Venous accompanied by wife     ECF/HHA:      Dressing Orders:LEFT LEG ULCER- SUAD VAC APPLIED LEAVE IN PLACE X 1WEEK   single tubi  to bilateral lower legs           Treatment Orders:  EAT FOODS HIGH IN PROTEIN AND VITAMIN C     TAKE MULTIVITAMIN DAILY IF OKAY WITH PRIMARY CARE     [address] [place] [address] followup visit _____1 WEEK DR. Kingston Martin _______________________  (Please note your next appointment above and if you are unable to keep, kindly give a 24 hour notice.  Thank you.)     Physician signature:__________________________        If you experience any of the following, please call the [facility] during business hours:     * Increase in Pain  * Temperature over 101  * Increase in drainage from your wound  * Drainage with a foul odor  * Bleeding  * Increase in swelling  * Need for compression bandage changes due to slippage, breakthrough drainage.     If you need medical attention outside of the business hours of the [facility] please contact your PCP or go to the nearest emergency room.                          Electronically signed by Ana Musa DPM on 6/7/2019 at 2:00 PM

## 2019-06-14 ENCOUNTER — HOSPITAL ENCOUNTER (OUTPATIENT)
Dept: WOUND CARE | Age: 69
Discharge: HOME | End: 2019-06-14
Payer: MEDICARE

## 2019-06-14 VITALS
TEMPERATURE: 98.1 F | RESPIRATION RATE: 18 BRPM | SYSTOLIC BLOOD PRESSURE: 132 MMHG | HEIGHT: 68 IN | HEART RATE: 80 BPM | BODY MASS INDEX: 47.74 KG/M2 | DIASTOLIC BLOOD PRESSURE: 76 MMHG | WEIGHT: 315 LBS

## 2019-06-14 DIAGNOSIS — I87.2 VENOUS INSUFFICIENCY (CHRONIC) (PERIPHERAL): ICD-10-CM

## 2019-06-14 DIAGNOSIS — L97.922 NON-PRESSURE CHRONIC ULCER OF LEFT LOWER LEG WITH FAT LAYER EXPOSED (HCC): Primary | ICD-10-CM

## 2019-06-14 DIAGNOSIS — L97.222 VARICOSE VEINS OF LEFT LOWER EXTREMITY WITH ULCER OF CALF WITH FAT LAYER EXPOSED (HCC): ICD-10-CM

## 2019-06-14 DIAGNOSIS — I83.022 VARICOSE VEINS OF LEFT LOWER EXTREMITY WITH ULCER OF CALF WITH FAT LAYER EXPOSED (HCC): ICD-10-CM

## 2019-06-14 PROCEDURE — 97605 NEG PRS WND THER DME<=50SQCM: CPT

## 2019-06-14 PROCEDURE — 11042 DBRDMT SUBQ TIS 1ST 20SQCM/<: CPT | Performed by: PODIATRIST

## 2019-06-14 PROCEDURE — 11045 DBRDMT SUBQ TISS EACH ADDL: CPT

## 2019-06-14 PROCEDURE — 11042 DBRDMT SUBQ TIS 1ST 20SQCM/<: CPT

## 2019-06-14 PROCEDURE — 11045 DBRDMT SUBQ TISS EACH ADDL: CPT | Performed by: PODIATRIST

## 2019-06-14 RX ORDER — LIDOCAINE HYDROCHLORIDE 20 MG/ML
JELLY TOPICAL ONCE
Status: DISCONTINUED | OUTPATIENT
Start: 2019-06-14 | End: 2019-06-15 | Stop reason: HOSPADM

## 2019-06-14 NOTE — PROGRESS NOTES
W[facility] Healing Center Followup Visit Note    Referring Physician : MD Alyce MalloryJune Bar 112 RECORD NUMBER:  [id number]  AGE: 77 y.o. GENDER: male  : 1950  EPISODE DATE:  2019    Subjective:     Chief Complaint   Patient presents with    Wound Check     left leg      HISTORY of PRESENT ILLNESS ALY Barbosa is a 77 y.o. male who presents today in regards to follow up evaluation and treatment of wound/ulcer. That patient's past medical, family and social hx were reviewed and changes were made if present. History of Wound Context:  Patient denies any nausea, vomiting, fever, chills, shortness of breath, chest pain, calf cramping and/or pain. Patient is tolerating the current dressing regimen without issues. No other new issues noted at this time. Patient is tolerating this current compression dressing and caroline system without issues.     Wound/Ulcer Pain Timing/Severity: none  Quality of pain: N/A  Severity:  0 / 10   Modifying Factors: None  Associated Signs/Symptoms: edema    Ulcer Identification:  Ulcer Type: venous  Contributing Factors: edema, venous stasis and obesity    Diabetic/Pressure/Non Pressure Ulcers only:  Ulcer: Non-Pressure ulcer, fat layer exposed    Wound: Chronic venous stasis ulcer left lower extremity        PAST MEDICAL HISTORY      Diagnosis Date    CAD (coronary artery disease)     Cataracts, bilateral 2019    Cellulitis -    Left leg    Complete heart block (HCC)     h/o    Diabetes mellitus ([address]     Encounter for aspirin therapy     patient uses for heart health    H/O asbestosis     Hyperlipidemia     Hypertension     Hyperthyroidism     Lung disorder     weak lungs    Myocardial infarct ([address] 2010    Neuropathy     feet    Obesity     Obstructive sleep apnea     on cpap    Onychomycosis     Osteoarthritis     Pacemaker     Medtronic    Post-thrombotic syndrome 2017    Screening 16    for Colonoscopy    Stasis dermatitis     h/o on bilateral legs with leg edema    Varicose veins      Past Surgical History:   Procedure Laterality Date    CARDIAC PACEMAKER PLACEMENT  04/09/10    Medtronic    CHOLECYSTECTOMY      COLONOSCOPY  2016    JOINT REPLACEMENT Bilateral     Left TJAK     Family History   Problem Relation Age of Onset    Heart Disease Father            Olga Lidia Aguilar Other Mother             Other Brother         gall bladder     Social History     Tobacco Use    Smoking status: Former Smoker     Packs/day: 2.00     Years: 25.00     Pack years: 50.00     Types: Cigarettes     Start date: 1964     Last attempt to quit: 1990     Years since quittin.9    Smokeless tobacco: Never Used   Substance Use Topics    Alcohol use: No     Alcohol/week: 0.0 oz     Comment: rare   2 beers/month    Drug use: No     Allergies   Allergen Reactions    Other Hives and Shortness Of Breath     \"allergic to Potatoe white. \"    Clindamycin/Lincomycin      Not sure     Current Outpatient Medications on File Prior to Encounter   Medication Sig Dispense Refill    Indacaterol-Glycopyrrolate (Mar Garrick) 27.5-15.6 MCG CAPS Inhale 2 puffs into the lungs 2 times daily 60 capsule 5    vitamin C (ASCORBIC ACID) 500 MG tablet Take 500 mg by mouth daily      rosuvastatin (CRESTOR) 5 MG tablet Take 5 mg by mouth daily       glipiZIDE (GLUCOTROL XL) 5 MG extended release tablet Take 5 mg by mouth daily  3    ramipril (ALTACE) 10 MG capsule Take 10 mg by mouth daily       potassium chloride (KLOR-CON M) 20 MEQ extended release tablet Take 20 mEq by mouth daily       Cholecalciferol (VITAMIN D3) 2000 UNITS CAPS Take by mouth daily Last dose 16      carvedilol (COREG) 25 MG tablet Take 25 mg by mouth 2 times daily (with meals) Instructed to take with sip water am of procedure      Omega-3 Fatty Acids (FISH OIL) 600 MG CAPS Take 1,000 mg by mouth daily Last Dose 16      liothyronine (CYTOMEL) 50 MCG tablet Take 75 mcg by mouth daily       aspirin 81 MG EC tablet Take 81 mg by mouth daily Last dose 1-14-16      gabapentin (NEURONTIN) 300 MG capsule Take 300 mg by mouth 3 times daily as needed Instructed to take with sip water am of procedure       No current facility-administered medications on file prior to encounter. REVIEW OF SYSTEMS See HPI    Objective:    /76   Pulse 80   Temp 98.1 °F (36.7 °C) (Oral)   Resp 18   Ht 5' 8\" (1.727 m)   Wt (!) 360 lb (163.3 kg)   BMI 54.74 kg/m²   Wt Readings from Last 3 Encounters:   06/14/19 (!) 360 lb (163.3 kg)   06/07/19 (!) 360 lb (163.3 kg)   05/31/19 (!) 360 lb (163.3 kg)     PHYSICAL EXAM  CONSTITUTIONAL:   Awake, alert, cooperative and in no acute distress  SKIN:  Open wound Present    Assessment:     Problem List Items Addressed This Visit     Non-pressure chronic ulcer of left lower leg with fat layer exposed ([address] - Primary    Venous insufficiency (chronic) (peripheral)    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 Performed by: Gerry Pace DPM    Consent obtained:  Yes    Time out taken:  Yes    Pain Control: Anesthetic  Anesthetic: 2% Lidocaine Gel Topical     Debridement:Excisional Debridement    Using curette the wound(s)/ulcer(s) was/were sharply debrided down through and including the removal of subcutaneous tissue.         Devitalized Tissue Debrided:  fibrin and biofilm to stimulate bleeding to promote healing, post debridement good bleeding base and wound edges noted    Pre Debridement Measurements:  Are located in the Wound/Ulcer Documentation Flow Sheet    Wound/Ulcer #: 1    Post Debridement Measurements:  Wound/Ulcer Descriptions are Pre Debridement except measurements:    Wound 03/22/19 Leg Left;Lateral #1 acq: 3-22-17 Venous (Active)   Wound Image   6/14/2019 12:59 PM   Wound Diabetic [place] 1 3/22/2019  2:06 PM   Dressing Status Clean;Dry; Intact 5/31/2019  1:28 PM   Dressing Changed Changed/New 5/31/2019  1:28 PM   Dressing/Treatment Vacuum dressing 5/31/2019  1:28 PM   Wound Cleansed Rinsed/Irrigated with saline 5/31/2019  1:28 PM   Wound Length (cm) 6 cm 6/14/2019 12:59 PM   Wound Width (cm) 9 cm 6/14/2019 12:59 PM   Wound Depth (cm) 0.2 cm 6/14/2019 12:59 PM   Wound Surface Area (cm^2) 54 cm^2 6/14/2019 12:59 PM   Change in Wound Size % (l*w) -122.22 6/14/2019 12:59 PM   Wound Volume (cm^3) 10.8 cm^3 6/14/2019 12:59 PM   Wound Healing % -344 6/14/2019 12:59 PM   Post-Procedure Length (cm) 6 cm 6/14/2019  1:17 PM   Post-Procedure Width (cm) 9 cm 6/14/2019  1:17 PM   Post-Procedure Depth (cm) 0.2 cm 6/14/2019  1:17 PM   Post-Procedure Surface Area (cm^2) 54 cm^2 6/14/2019  1:17 PM   Post-Procedure Volume (cm^3) 10.8 cm^3 6/14/2019  1:17 PM   Wound Assessment Pink;Red;Yellow 6/14/2019 12:59 PM   Drainage Amount Large 6/14/2019 12:59 PM   Drainage Description Serous; Serosanguinous; Yellow 6/14/2019 12:59 PM   Odor None 6/14/2019 12:59 PM   Maria Elena-wound Assessment Pink 6/14/2019 12:59 PM   Number of days: 84     Percent of Wound/Ulcer Debrided: 100%    Total Surface Area Debrided:  54.0 sq cm (debridement +2)     Estimated Blood Loss:  Minimal  Hemostasis Achieved:  by pressure    Procedural Pain:  2  / 10   Post Procedural Pain:  0 / 10     Response to treatment:  Well tolerated by patient. Continue with [facility]. Plan:   Treatment Note please see attached Discharge Instructions    Written patient dismissal instructions given to patient and signed by patient or POA.          Discharge Instructions         Visit Discharge/Physician Orders     Discharge condition: Stable     Assessment of pain at discharge:minimal     Anesthetic used: 2% lidocaine     Discharge to: Home     Left via:Private automobile     Accompanied by: accompanied by wife     ECF/HHA:      Dressing Orders:LEFT LEG ULCER- SUAD VAC APPLIED LEAVE IN PLACE X 1WEEK   single tubi  to bilateral lower legs           Treatment Orders:  EAT FOODS HIGH IN PROTEIN AND VITAMIN C     TAKE MULTIVITAMIN DAILY IF OKAY WITH PRIMARY CARE     [address] [place] [address],[address] followup visit _____1 WEEK DR. Cheryl Milner _______________________  (Please note your next appointment above and if you are unable to keep, kindly give a 24 hour notice.  Thank you.)     Physician signature:__________________________        If you experience any of the following, please call the [facility] during business hours:     * Increase in Pain  * Temperature over 101  * Increase in drainage from your wound  * Drainage with a foul odor  * Bleeding  * Increase in swelling  * Need for compression bandage changes due to slippage, breakthrough drainage.     If you need medical attention outside of the business hours of the [facility] please contact your PCP or go to the nearest emergency room.                                      Electronically signed by Winifred Barron DPM on 6/14/2019 at 1:25 PM

## 2019-06-21 ENCOUNTER — HOSPITAL ENCOUNTER (OUTPATIENT)
Dept: WOUND CARE | Age: 69
Discharge: HOME | End: 2019-06-21
Payer: MEDICARE

## 2019-06-21 VITALS
HEART RATE: 76 BPM | DIASTOLIC BLOOD PRESSURE: 70 MMHG | WEIGHT: 315 LBS | HEIGHT: 68 IN | RESPIRATION RATE: 18 BRPM | SYSTOLIC BLOOD PRESSURE: 138 MMHG | TEMPERATURE: 98.4 F | BODY MASS INDEX: 47.74 KG/M2

## 2019-06-21 DIAGNOSIS — L97.922 NON-PRESSURE CHRONIC ULCER OF LEFT LOWER LEG WITH FAT LAYER EXPOSED (HCC): ICD-10-CM

## 2019-06-21 DIAGNOSIS — I83.022 VARICOSE VEINS OF LEFT LOWER EXTREMITY WITH ULCER OF CALF WITH FAT LAYER EXPOSED (HCC): Primary | ICD-10-CM

## 2019-06-21 DIAGNOSIS — L97.222 VARICOSE VEINS OF LEFT LOWER EXTREMITY WITH ULCER OF CALF WITH FAT LAYER EXPOSED (HCC): Primary | ICD-10-CM

## 2019-06-21 DIAGNOSIS — I87.2 VENOUS INSUFFICIENCY (CHRONIC) (PERIPHERAL): ICD-10-CM

## 2019-06-21 PROCEDURE — 11042 DBRDMT SUBQ TIS 1ST 20SQCM/<: CPT | Performed by: PODIATRIST

## 2019-06-21 PROCEDURE — 11042 DBRDMT SUBQ TIS 1ST 20SQCM/<: CPT

## 2019-06-21 PROCEDURE — 11045 DBRDMT SUBQ TISS EACH ADDL: CPT | Performed by: PODIATRIST

## 2019-06-21 PROCEDURE — 11045 DBRDMT SUBQ TISS EACH ADDL: CPT

## 2019-06-21 PROCEDURE — 97605 NEG PRS WND THER DME<=50SQCM: CPT

## 2019-06-21 RX ORDER — LIDOCAINE HYDROCHLORIDE 20 MG/ML
JELLY TOPICAL ONCE
Status: DISCONTINUED | OUTPATIENT
Start: 2019-06-21 | End: 2019-06-22 | Stop reason: HOSPADM

## 2019-06-21 ASSESSMENT — PAIN SCALES - GENERAL: PAINLEVEL_OUTOF10: 0

## 2019-06-21 NOTE — PROGRESS NOTES
Surgical History:   Procedure Laterality Date    CARDIAC PACEMAKER PLACEMENT  04/09/10    Medtronic    CHOLECYSTECTOMY      COLONOSCOPY  2016    JOINT REPLACEMENT Bilateral     Left TJAK     Family History   Problem Relation Age of Onset    Heart Disease Father            [de-identified] Other Mother             Other Brother         gall bladder     Social History     Tobacco Use    Smoking status: Former Smoker     Packs/day: 2.00     Years: 25.00     Pack years: 50.00     Types: Cigarettes     Start date: 1964     Last attempt to quit: 1990     Years since quittin.9    Smokeless tobacco: Never Used   Substance Use Topics    Alcohol use: No     Alcohol/week: 0.0 oz     Comment: rare   2 beers/month    Drug use: No     Allergies   Allergen Reactions    Other Hives and Shortness Of Breath     \"allergic to Potatoe white. \"    Clindamycin/Lincomycin      Not sure     Current Outpatient Medications on File Prior to Encounter   Medication Sig Dispense Refill    Indacaterol-Glycopyrrolate (Lynett ) 27.5-15.6 MCG CAPS Inhale 2 puffs into the lungs 2 times daily 60 capsule 5    vitamin C (ASCORBIC ACID) 500 MG tablet Take 500 mg by mouth daily      rosuvastatin (CRESTOR) 5 MG tablet Take 5 mg by mouth daily       ramipril (ALTACE) 10 MG capsule Take 10 mg by mouth daily       potassium chloride (KLOR-CON M) 20 MEQ extended release tablet Take 20 mEq by mouth daily       Cholecalciferol (VITAMIN D3) 2000 UNITS CAPS Take by mouth daily Last dose 16      carvedilol (COREG) 25 MG tablet Take 25 mg by mouth 2 times daily (with meals) Instructed to take with sip water am of procedure      Omega-3 Fatty Acids (FISH OIL) 600 MG CAPS Take 1,000 mg by mouth daily Last Dose 16      liothyronine (CYTOMEL) 50 MCG tablet Take 75 mcg by mouth daily       gabapentin (NEURONTIN) 300 MG capsule Take 300 mg by mouth 3 times daily as needed Instructed to take with sip water am of procedure      aspirin 81 MG EC tablet Take 81 mg by mouth daily Last dose 1-14-16      glipiZIDE (GLUCOTROL XL) 5 MG extended release tablet Take 5 mg by mouth daily  3     No current facility-administered medications on file prior to encounter. REVIEW OF SYSTEMS See HPI    Objective:    /70   Pulse 76   Temp 98.4 °F (36.9 °C) (Oral)   Resp 18   Ht 5' 8\" (1.727 m)   Wt (!) 360 lb (163.3 kg)   BMI 54.74 kg/m²   Wt Readings from Last 3 Encounters:   06/21/19 (!) 360 lb (163.3 kg)   06/14/19 (!) 360 lb (163.3 kg)   06/07/19 (!) 360 lb (163.3 kg)     PHYSICAL EXAM  CONSTITUTIONAL:   Awake, alert, cooperative and in no acute distress  SKIN:  Open wound Present    Assessment:     Problem List Items Addressed This Visit     Non-pressure chronic ulcer of left lower leg with fat layer exposed ([address]    Venous insufficiency (chronic) (peripheral)    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 Performed by: Mireille Pyle DPM    Consent obtained:  Yes    Time out taken:  Yes    Pain Control: Anesthetic  Anesthetic: 2% Lidocaine Gel Topical     Debridement:Excisional Debridement    Using curette the wound(s)/ulcer(s) was/were sharply debrided down through and including the removal of subcutaneous tissue.         Devitalized Tissue Debrided:  fibrin and biofilm to stimulate bleeding to promote healing, post debridement good bleeding base and wound edges noted    Pre Debridement Measurements:  Are located in the [facility]  Documentation Flow Sheet    Wound/Ulcer #: 1    Post Debridement Measurements:  Wound/Ulcer Descriptions are Pre Debridement except measurements:    Wound 03/22/19 Leg Left;Lateral #1 acq: 3-22-17 Venous (Active)   Wound Image   6/14/2019 12:59 PM   Wound Diabetic [place] 1 3/22/2019  2:06 PM   Dressing Status Other (Comment) 6/14/2019  1:30 PM   Dressing Changed Changed/New 6/21/2019  1:36 PM   Dressing/Treatment Vacuum dressing 6/21/2019  1:36 PM   Wound Cleansed Rinsed/Irrigated with saline 6/21/2019  1:36 PM   Wound Length (cm) 7.6 cm 6/21/2019  1:27 PM   Wound Width (cm) 13 cm 6/21/2019  1:27 PM   Wound Depth (cm) 0.2 cm 6/21/2019  1:27 PM   Wound Surface Area (cm^2) 98.8 cm^2 6/21/2019  1:27 PM   Change in Wound Size % (l*w) -306.58 6/21/2019  1:27 PM   Wound Volume (cm^3) 19.76 cm^3 6/21/2019  1:27 PM   Wound Healing % -713 6/21/2019  1:27 PM   Post-Procedure Length (cm) 7.6 cm 6/21/2019  1:36 PM   Post-Procedure Width (cm) 13 cm 6/21/2019  1:36 PM   Post-Procedure Depth (cm) 0.2 cm 6/21/2019  1:36 PM   Post-Procedure Surface Area (cm^2) 98.8 cm^2 6/21/2019  1:36 PM   Post-Procedure Volume (cm^3) 19.76 cm^3 6/21/2019  1:36 PM   Wound Assessment Pink;Yellow 6/21/2019  1:27 PM   Drainage Amount Large 6/21/2019  1:27 PM   Drainage Description Serous; Yellow 6/21/2019  1:27 PM   Odor None 6/21/2019  1:27 PM   Maria Elena-wound Assessment Intact; Pink 6/21/2019  1:27 PM   Number of days: 91     Percent of Wound/Ulcer Debrided: 100%    Total Surface Area Debrided:  98.8 sq cm (debridement + 4)    Estimated Blood Loss:  Minimal  Hemostasis Achieved:  by pressure    Procedural Pain:  2  / 10   Post Procedural Pain:  0 / 10     Response to treatment:  Well tolerated by patient. Plan:   Treatment Note please see attached Discharge Instructions    Written patient dismissal instructions given to patient and signed by patient or POA.          Discharge Instructions         Visit Discharge/Physician Orders     Discharge condition: Stable     Assessment of pain at discharge:minimal     Anesthetic used: 2% lidocaine     Discharge to: Home     Left via:Private automobile     Accompanied by: accompanied by wife     ECF/HHA:      Dressing Orders:LEFT LEG ULCER- SUAD VAC APPLIED LEAVE IN PLACE X 1WEEK   single tubi  to bilateral lower legs           Treatment Orders:  EAT FOODS HIGH IN PROTEIN AND VITAMIN C     TAKE MULTIVITAMIN DAILY IF OKAY WITH PRIMARY CARE     [facility] followup visit _____1 WEEK DR. Mica Harrington _______________________  (Please note your next appointment above and if you are unable to keep, kindly give a 24 hour notice.  Thank you.)     Physician signature:__________________________        If you experience any of the following, please call the [facility] during business hours:     * Increase in Pain  * Temperature over 101  * Increase in drainage from your wound  * Drainage with a foul odor  * Bleeding  * Increase in swelling  * Need for compression bandage changes due to slippage, breakthrough drainage.     If you need medical attention outside of the business hours of the [facility] please contact your PCP or go to the nearest emergency room.                                                     Electronically signed by Jerry Lara DPM on 6/21/2019 at 2:06 PM 75

## 2019-06-28 ENCOUNTER — HOSPITAL ENCOUNTER (OUTPATIENT)
Dept: WOUND CARE | Age: 69
Discharge: HOME | End: 2019-06-28
Payer: MEDICARE

## 2019-06-28 VITALS
BODY MASS INDEX: 47.74 KG/M2 | TEMPERATURE: 98.8 F | SYSTOLIC BLOOD PRESSURE: 142 MMHG | HEART RATE: 80 BPM | HEIGHT: 68 IN | WEIGHT: 315 LBS | RESPIRATION RATE: 18 BRPM | DIASTOLIC BLOOD PRESSURE: 70 MMHG

## 2019-06-28 DIAGNOSIS — L97.922 NON-PRESSURE CHRONIC ULCER OF LEFT LOWER LEG WITH FAT LAYER EXPOSED (HCC): ICD-10-CM

## 2019-06-28 DIAGNOSIS — I83.022 VARICOSE VEINS OF LEFT LOWER EXTREMITY WITH ULCER OF CALF WITH FAT LAYER EXPOSED (HCC): Primary | ICD-10-CM

## 2019-06-28 DIAGNOSIS — L97.222 VARICOSE VEINS OF LEFT LOWER EXTREMITY WITH ULCER OF CALF WITH FAT LAYER EXPOSED (HCC): Primary | ICD-10-CM

## 2019-06-28 DIAGNOSIS — I87.2 VENOUS INSUFFICIENCY (CHRONIC) (PERIPHERAL): ICD-10-CM

## 2019-06-28 PROCEDURE — 11045 DBRDMT SUBQ TISS EACH ADDL: CPT | Performed by: PODIATRIST

## 2019-06-28 PROCEDURE — 11042 DBRDMT SUBQ TIS 1ST 20SQCM/<: CPT

## 2019-06-28 PROCEDURE — 97605 NEG PRS WND THER DME<=50SQCM: CPT

## 2019-06-28 PROCEDURE — 11045 DBRDMT SUBQ TISS EACH ADDL: CPT

## 2019-06-28 PROCEDURE — 11042 DBRDMT SUBQ TIS 1ST 20SQCM/<: CPT | Performed by: PODIATRIST

## 2019-06-28 RX ORDER — LIDOCAINE HYDROCHLORIDE 20 MG/ML
JELLY TOPICAL ONCE
Status: DISCONTINUED | OUTPATIENT
Start: 2019-06-28 | End: 2019-06-29 | Stop reason: HOSPADM

## 2019-06-28 NOTE — PROGRESS NOTES
Wound Healing Center Followup Visit Note    Referring Physician : MD Tariq Leeramonazeke 112 RECORD NUMBER:  [id number]  AGE: 77 y.o. GENDER: male  : 1950  EPISODE DATE:  2019    Subjective:     Chief Complaint   Patient presents with    Wound Check     right leg      HISTORY of PRESENT ILLNESS HPI   Juan Antonio Oshea is a 77 y.o. male who presents today in regards to follow up evaluation and treatment of wound/ulcer. That patient's past medical, family and social hx were reviewed and changes were made if present. History of Wound Context:  Patient denies any nausea, vomiting, fever, chills, shortness of breath, chest pain, calf cramping and/or pain. Patient is tolerating the current dressing regimen without issues. No other new issues noted at this time.      Wound/Ulcer Pain Timing/Severity: none  Quality of pain: N/A  Severity:  0 / 10   Modifying Factors: None  Associated Signs/Symptoms: edema    Ulcer Identification:  Ulcer Type: venous  Contributing Factors: edema and obesity    Diabetic/Pressure/Non Pressure Ulcers only:  Ulcer: Non-Pressure ulcer, fat layer exposed    Wound: Chronic venous stasis ulcer left leg        PAST MEDICAL HISTORY      Diagnosis Date    CAD (coronary artery disease)     Cataracts, bilateral 2019    Cellulitis -    Left leg    Complete heart block (HCC)     h/o    Diabetes [facility]     Encounter for aspirin therapy     patient uses for heart health    H/O asbestosis     Hyperlipidemia     Hypertension     Hyperthyroidism     Lung disorder     weak lungs    Myocardial infarct ([facility]     Neuropathy     feet    Obesity     Obstructive sleep apnea     on cpap    Onychomycosis     Osteoarthritis     Pacemaker     Medtronic    Post-thrombotic syndrome 2017    Screening 16    for Colonoscopy    Stasis dermatitis     h/o on bilateral legs with leg edema    Varicose veins      Past Surgical History: Procedure Laterality Date    CARDIAC PACEMAKER PLACEMENT  04/09/10    Medtronic    CHOLECYSTECTOMY      COLONOSCOPY  2016    JOINT REPLACEMENT Bilateral     Left TJAK     Family History   Problem Relation Age of Onset    Heart Disease Father            Otoniel Gayle Other Mother             Other Brother         gall bladder     Social History     Tobacco Use    Smoking status: Former Smoker     Packs/day: 2.00     Years: 25.00     Pack years: 50.00     Types: Cigarettes     Start date: 1964     Last attempt to quit: 1990     Years since quittin.9    Smokeless tobacco: Never Used   Substance Use Topics    Alcohol use: No     Alcohol/week: 0.0 oz     Comment: rare   2 beers/month    Drug use: No     Allergies   Allergen Reactions    Other Hives and Shortness Of Breath     \"allergic to Potatoe white. \"    Clindamycin/Lincomycin      Not sure     Current Outpatient Medications on File Prior to Encounter   Medication Sig Dispense Refill    Indacaterol-Glycopyrrolate (Roxana Lee) 27.5-15.6 MCG CAPS Inhale 2 puffs into the lungs 2 times daily 60 capsule 5    vitamin C (ASCORBIC ACID) 500 MG tablet Take 500 mg by mouth daily      rosuvastatin (CRESTOR) 5 MG tablet Take 5 mg by mouth daily       glipiZIDE (GLUCOTROL XL) 5 MG extended release tablet Take 5 mg by mouth daily  3    ramipril (ALTACE) 10 MG capsule Take 10 mg by mouth daily       potassium chloride (KLOR-CON M) 20 MEQ extended release tablet Take 20 mEq by mouth daily       Cholecalciferol (VITAMIN D3) 2000 UNITS CAPS Take by mouth daily Last dose 16      carvedilol (COREG) 25 MG tablet Take 25 mg by mouth 2 times daily (with meals) Instructed to take with sip water am of procedure      Omega-3 Fatty Acids (FISH OIL) 600 MG CAPS Take 1,000 mg by mouth daily Last Dose 16      liothyronine (CYTOMEL) 50 MCG tablet Take 75 mcg by mouth daily       gabapentin (NEURONTIN) 300 MG capsule Take 300 mg by [place] 1 3/22/2019  2:06 PM   Dressing Status Other (Comment) 6/14/2019  1:30 PM   Dressing Changed Changed/New 6/21/2019  1:36 PM   Dressing/Treatment Vacuum dressing 6/21/2019  1:36 PM   Wound Cleansed Rinsed/Irrigated with saline 6/21/2019  1:36 PM   Wound Length (cm) 5 cm 6/28/2019  1:07 PM   Wound Width (cm) 11 cm 6/28/2019  1:07 PM   Wound Depth (cm) 0.2 cm 6/28/2019  1:07 PM   Wound Surface Area (cm^2) 55 cm^2 6/28/2019  1:07 PM   Change in Wound Size % (l*w) -126.34 6/28/2019  1:07 PM   Wound Volume (cm^3) 11 cm^3 6/28/2019  1:07 PM   Wound Healing % -353 6/28/2019  1:07 PM   Post-Procedure Length (cm) 5 cm 6/28/2019  1:33 PM   Post-Procedure Width (cm) 11 cm 6/28/2019  1:33 PM   Post-Procedure Depth (cm) 0.3 cm 6/28/2019  1:33 PM   Post-Procedure Surface Area (cm^2) 55 cm^2 6/28/2019  1:33 PM   Post-Procedure Volume (cm^3) 16.5 cm^3 6/28/2019  1:33 PM   Wound Assessment Pink;Yellow 6/28/2019  1:07 PM   Drainage Amount Large 6/28/2019  1:07 PM   Drainage Description Serous; Yellow 6/28/2019  1:07 PM   Odor None 6/28/2019  1:07 PM   Maria Elena-wound Assessment Maceration 6/28/2019  1:07 PM   Number of days: 98     Percent of Wound/Ulcer Debrided: 100%    Total Surface Area Debrided:  55 sq cm (debridement +2)    Estimated Blood Loss:  Minimal  Hemostasis Achieved:  by pressure    Procedural Pain:  2  / 10   Post Procedural Pain:  0 / 10     Response to treatment:  Well tolerated by patient. Continue with SUAD device. Plan:   Treatment Note please see attached Discharge Instructions    Written patient dismissal instructions given to patient and signed by patient or POA.          Discharge Instructions         Visit Discharge/Physician Orders     Discharge condition: Stable     Assessment of pain at discharge:minimal     Anesthetic used: 2% lidocaine     Discharge to: Home     Left via:Private automobile     Accompanied by: accompanied by wife     ECF/HHA:      Dressing Orders:LEFT LEG ULCER- SUAD VAC APPLIED LEAVE IN PLACE X 1WEEK   single tubi  to bilateral lower legs           Treatment Orders:  EAT FOODS HIGH IN PROTEIN AND VITAMIN C     TAKE MULTIVITAMIN DAILY IF OKAY WITH PRIMARY CARE     [facility],[address] followup visit _____1 WEEK DR. Byron Steinberg _______________________  (Please note your next appointment above and if you are unable to keep, kindly give a 24 hour notice.  Thank you.)     Physician signature:__________________________        If you experience any of the following, please call the [email or web address] during business hours:     * Increase in Pain  * Temperature over 101  * Increase in drainage from your wound  * Drainage with a foul odor  * Bleeding  * Increase in swelling  * Need for compression bandage changes due to slippage, breakthrough drainage.     If you need medical attention outside of the business hours of the [email or web address] please contact your PCP or go to the nearest emergency room.                                                                        Electronically signed by Gerry Pace DPM on 6/28/2019 at 1:39 PM

## 2019-07-01 ENCOUNTER — PROCEDURE VISIT (OUTPATIENT)
Dept: PODIATRY | Age: 69
End: 2019-07-01
Payer: MEDICARE

## 2019-07-01 VITALS — WEIGHT: 315 LBS | HEIGHT: 68 IN | BODY MASS INDEX: 47.74 KG/M2

## 2019-07-01 DIAGNOSIS — R26.2 DIFFICULTY WALKING: ICD-10-CM

## 2019-07-01 DIAGNOSIS — B35.1 ONYCHOMYCOSIS: Primary | ICD-10-CM

## 2019-07-01 DIAGNOSIS — E11.51 TYPE II DIABETES MELLITUS WITH PERIPHERAL CIRCULATORY DISORDER (HCC): ICD-10-CM

## 2019-07-01 DIAGNOSIS — I73.9 PVD (PERIPHERAL VASCULAR DISEASE) (HCC): ICD-10-CM

## 2019-07-01 DIAGNOSIS — M79.675 PAIN OF TOE OF LEFT FOOT: ICD-10-CM

## 2019-07-01 DIAGNOSIS — M79.674 PAIN OF TOE OF RIGHT FOOT: ICD-10-CM

## 2019-07-01 PROCEDURE — 11721 DEBRIDE NAIL 6 OR MORE: CPT | Performed by: PODIATRIST

## 2019-07-01 NOTE — PROGRESS NOTES
Patient in today for nail care. Patient does not have any complaints of pain at this time.  Patient's PCP is Carmen Hubbard MD date of last ov  8-1-18    Yasmin Dominguez LPN

## 2019-07-05 ENCOUNTER — HOSPITAL ENCOUNTER (OUTPATIENT)
Dept: WOUND CARE | Age: 69
Discharge: HOME | End: 2019-07-05
Payer: MEDICARE

## 2019-07-05 VITALS
DIASTOLIC BLOOD PRESSURE: 80 MMHG | HEIGHT: 68 IN | RESPIRATION RATE: 18 BRPM | SYSTOLIC BLOOD PRESSURE: 140 MMHG | TEMPERATURE: 97.6 F | WEIGHT: 315 LBS | BODY MASS INDEX: 47.74 KG/M2 | HEART RATE: 80 BPM

## 2019-07-05 DIAGNOSIS — I87.2 VENOUS INSUFFICIENCY (CHRONIC) (PERIPHERAL): ICD-10-CM

## 2019-07-05 DIAGNOSIS — L97.222 VARICOSE VEINS OF LEFT LOWER EXTREMITY WITH ULCER OF CALF WITH FAT LAYER EXPOSED (HCC): Primary | ICD-10-CM

## 2019-07-05 DIAGNOSIS — L97.922 NON-PRESSURE CHRONIC ULCER OF LEFT LOWER LEG WITH FAT LAYER EXPOSED (HCC): ICD-10-CM

## 2019-07-05 DIAGNOSIS — I83.022 VARICOSE VEINS OF LEFT LOWER EXTREMITY WITH ULCER OF CALF WITH FAT LAYER EXPOSED (HCC): Primary | ICD-10-CM

## 2019-07-05 PROCEDURE — 97605 NEG PRS WND THER DME<=50SQCM: CPT

## 2019-07-05 PROCEDURE — 11042 DBRDMT SUBQ TIS 1ST 20SQCM/<: CPT

## 2019-07-05 PROCEDURE — 11042 DBRDMT SUBQ TIS 1ST 20SQCM/<: CPT | Performed by: PODIATRIST

## 2019-07-05 PROCEDURE — 11045 DBRDMT SUBQ TISS EACH ADDL: CPT | Performed by: PODIATRIST

## 2019-07-05 PROCEDURE — 11045 DBRDMT SUBQ TISS EACH ADDL: CPT

## 2019-07-05 RX ORDER — LIDOCAINE HYDROCHLORIDE 20 MG/ML
JELLY TOPICAL ONCE
Status: DISCONTINUED | OUTPATIENT
Start: 2019-07-05 | End: 2019-07-06 | Stop reason: HOSPADM

## 2019-07-05 ASSESSMENT — PAIN DESCRIPTION - ORIENTATION: ORIENTATION: LEFT

## 2019-07-05 ASSESSMENT — PAIN DESCRIPTION - ONSET: ONSET: ON-GOING

## 2019-07-05 ASSESSMENT — PAIN DESCRIPTION - LOCATION: LOCATION: LEG

## 2019-07-05 ASSESSMENT — PAIN DESCRIPTION - DESCRIPTORS: DESCRIPTORS: ACHING;NAGGING

## 2019-07-05 ASSESSMENT — PAIN DESCRIPTION - PROGRESSION: CLINICAL_PROGRESSION: NOT CHANGED

## 2019-07-05 ASSESSMENT — PAIN SCALES - GENERAL: PAINLEVEL_OUTOF10: 0

## 2019-07-05 ASSESSMENT — PAIN DESCRIPTION - FREQUENCY: FREQUENCY: INTERMITTENT

## 2019-07-05 ASSESSMENT — PAIN DESCRIPTION - PAIN TYPE: TYPE: ACUTE PAIN

## 2019-07-05 NOTE — PROGRESS NOTES
[facility]aling Center Followup Visit Note    Referring Physician : MD Tariq Duffy 112 RECORD NUMBER:  [id number]  AGE: 77 y.o. GENDER: male  : 1950  EPISODE DATE:  2019    Subjective:     Chief Complaint   Patient presents with    Wound Check     [facility] Follow Up Appt with Dr Jessica Gilmore @ [address]      HISTORY of PRESENT ILLNESS HPI   Jorge L Draper is a 77 y.o. male who presents today in regards to follow up evaluation and treatment of wound/ulcer. That patient's past medical, family and social hx were reviewed and changes were made if present. History of Wound Context:  Patient denies any nausea, vomiting, fever, chills, shortness of breath, chest pain, calf cramping and/or pain. Patient is tolerating the current dressing regimen without issues. No other new issues noted at this time.      Wound/Ulcer Pain Timing/Severity: none  Quality of pain: N/A  Severity:  0 / 10   Modifying Factors: None  Associated Signs/Symptoms: edema    Ulcer Identification:  Ulcer Type: venous  Contributing Factors: edema, venous stasis and obesity    Diabetic/Pressure/Non Pressure Ulcers only:  Ulcer: Non-Pressure ulcer, fat layer exposed    Wound: Chronic venous stasis ulceration left lower extremity        PAST MEDICAL HISTORY      Diagnosis Date    CAD (coronary artery disease)     Cataracts, bilateral 2019    Cellulitis -    Left leg    Complete heart block (HCC)     h/o    Diabetes mellitus ([address]     Encounter for aspirin therapy     patient uses for heart health    H/O asbestosis     Hyperlipidemia     Hypertension     Hyperthyroidism     Lung disorder     weak lungs    [facility] 2010    Neuropathy     feet    Obesity     Obstructive sleep apnea     on cpap    Onychomycosis     Osteoarthritis     Pacemaker     Medtronic    Post-thrombotic syndrome 2017    Screening 16    for Colonoscopy    Stasis dermatitis     h/o on bilateral legs with leg edema    Varicose veins      Past Surgical History:   Procedure Laterality Date    CARDIAC PACEMAKER PLACEMENT  04/09/10    Medtronic    CHOLECYSTECTOMY      COLONOSCOPY  2016    JOINT REPLACEMENT Bilateral     Left TJAK     Family History   Problem Relation Age of Onset    Heart Disease Father            Jose Luis Lindsay Other Mother             Other Brother         gall bladder     Social History     Tobacco Use    Smoking status: Former Smoker     Packs/day: 2.00     Years: 25.00     Pack years: 50.00     Types: Cigarettes     Start date: 1964     Last attempt to quit: 1990     Years since quittin.0    Smokeless tobacco: Never Used   Substance Use Topics    Alcohol use: No     Alcohol/week: 0.0 oz     Comment: rare   2 beers/month    Drug use: No     Allergies   Allergen Reactions    Other Hives and Shortness Of Breath     \"allergic to Potatoe white. \"    Adaptic Non-Adhering Dressing  [Wound Dressings]     Clindamycin/Lincomycin      Not sure     Current Outpatient Medications on File Prior to Encounter   Medication Sig Dispense Refill    Indacaterol-Glycopyrrolate ([place]) 27.5-15.6 MCG CAPS Inhale 2 puffs into the lungs 2 times daily 60 capsule 5    vitamin C (ASCORBIC ACID) 500 MG tablet Take 500 mg by mouth daily      rosuvastatin (CRESTOR) 5 MG tablet Take 5 mg by mouth daily       glipiZIDE (GLUCOTROL XL) 5 MG extended release tablet Take 5 mg by mouth daily  3    ramipril (ALTACE) 10 MG capsule Take 10 mg by mouth daily       potassium chloride (KLOR-CON M) 20 MEQ extended release tablet Take 20 mEq by mouth daily       Cholecalciferol (VITAMIN D3) 2000 UNITS CAPS Take by mouth daily Last dose 16      carvedilol (COREG) 25 MG tablet Take 25 mg by mouth 2 times daily (with meals) Instructed to take with sip water am of procedure      Omega-3 Fatty Acids (FISH OIL) 600 MG CAPS Take 1,000 mg by mouth daily Last Dose 16 measurements:    Wound 03/22/19 Leg Left;Lateral #1 acq: 3-22-17 Venous (Active)   Wound Image   6/14/2019 12:59 PM   Wound Diabetic [place] 1 3/22/2019  2:06 PM   Dressing Status Other (Comment) 6/14/2019  1:30 PM   Dressing Changed Changed/New 7/5/2019  1:22 PM   Dressing/Treatment Vacuum dressing 7/5/2019  1:22 PM   Wound Cleansed Rinsed/Irrigated with saline 7/5/2019  1:22 PM   Wound Length (cm) 5 cm 7/5/2019  1:04 PM   Wound Width (cm) 8.7 cm 7/5/2019  1:04 PM   Wound Depth (cm) 0.2 cm 7/5/2019  1:04 PM   Wound Surface Area (cm^2) 43.5 cm^2 7/5/2019  1:04 PM   Change in Wound Size % (l*w) -79.01 7/5/2019  1:04 PM   Wound Volume (cm^3) 8.7 cm^3 7/5/2019  1:04 PM   Wound Healing % -258 7/5/2019  1:04 PM   Post-Procedure Length (cm) 5 cm 7/5/2019  1:15 PM   Post-Procedure Width (cm) 8.7 cm 7/5/2019  1:15 PM   Post-Procedure Depth (cm) 0.2 cm 7/5/2019  1:15 PM   Post-Procedure Surface Area (cm^2) 43.5 cm^2 7/5/2019  1:15 PM   Post-Procedure Volume (cm^3) 8.7 cm^3 7/5/2019  1:15 PM   Wound Assessment Drainage;Pink;Yellow 7/5/2019  1:04 PM   Drainage Amount Large 7/5/2019  1:04 PM   Drainage Description Serous; Yellow 7/5/2019  1:04 PM   Odor None 7/5/2019  1:04 PM   Maria Elena-wound Assessment Maceration 7/5/2019  1:04 PM   Number of days: 105     Percent of Wound/Ulcer Debrided: 100%    Total Surface Area Debrided:  43.5 sq cm (debridement + 2)    Estimated Blood Loss:  Minimal  Hemostasis Achieved:  by pressure    Procedural Pain:  2  / 10   Post Procedural Pain:  0 / 10     Response to treatment:  Well tolerated by patient. Plan:   Treatment Note please see attached Discharge Instructions    Written patient dismissal instructions given to patient and signed by patient or POA.          Discharge Instructions       Visit Discharge/Physician Orders    Discharge condition: Stable     Assessment of pain at discharge:minimal     Anesthetic used: 2% lidocaine     Discharge to: Home     Left via:Private

## 2019-07-12 ENCOUNTER — HOSPITAL ENCOUNTER (OUTPATIENT)
Dept: WOUND CARE | Age: 69
Discharge: HOME | End: 2019-07-12
Payer: MEDICARE

## 2019-07-12 VITALS
HEART RATE: 72 BPM | TEMPERATURE: 98 F | RESPIRATION RATE: 18 BRPM | SYSTOLIC BLOOD PRESSURE: 138 MMHG | HEIGHT: 68 IN | DIASTOLIC BLOOD PRESSURE: 80 MMHG | BODY MASS INDEX: 47.74 KG/M2 | WEIGHT: 315 LBS

## 2019-07-12 DIAGNOSIS — L97.222 VARICOSE VEINS OF LEFT LOWER EXTREMITY WITH ULCER OF CALF WITH FAT LAYER EXPOSED (HCC): Primary | ICD-10-CM

## 2019-07-12 DIAGNOSIS — L97.922 NON-PRESSURE CHRONIC ULCER OF LEFT LOWER LEG WITH FAT LAYER EXPOSED (HCC): ICD-10-CM

## 2019-07-12 DIAGNOSIS — I87.2 VENOUS INSUFFICIENCY (CHRONIC) (PERIPHERAL): ICD-10-CM

## 2019-07-12 DIAGNOSIS — I83.022 VARICOSE VEINS OF LEFT LOWER EXTREMITY WITH ULCER OF CALF WITH FAT LAYER EXPOSED (HCC): Primary | ICD-10-CM

## 2019-07-12 PROCEDURE — 11045 DBRDMT SUBQ TISS EACH ADDL: CPT | Performed by: PODIATRIST

## 2019-07-12 PROCEDURE — 11042 DBRDMT SUBQ TIS 1ST 20SQCM/<: CPT | Performed by: PODIATRIST

## 2019-07-12 PROCEDURE — 97605 NEG PRS WND THER DME<=50SQCM: CPT

## 2019-07-12 RX ORDER — LIDOCAINE HYDROCHLORIDE 20 MG/ML
JELLY TOPICAL ONCE
Status: DISCONTINUED | OUTPATIENT
Start: 2019-07-12 | End: 2019-07-13 | Stop reason: HOSPADM

## 2019-07-12 ASSESSMENT — PAIN SCALES - GENERAL: PAINLEVEL_OUTOF10: 0

## 2019-07-12 NOTE — PROGRESS NOTES
 liothyronine (CYTOMEL) 50 MCG tablet Take 75 mcg by mouth daily       gabapentin (NEURONTIN) 300 MG capsule Take 300 mg by mouth 3 times daily as needed Instructed to take with sip water am of procedure      aspirin 81 MG EC tablet Take 81 mg by mouth daily Last dose 1-14-16       No current facility-administered medications on file prior to encounter. REVIEW OF SYSTEMS See HPI    Objective:    /80   Pulse 72   Temp 98 °F (36.7 °C) (Oral)   Resp 18   Ht 5' 8\" (1.727 m)   Wt (!) 356 lb (161.5 kg)   BMI 54.13 kg/m²   Wt Readings from Last 3 Encounters:   07/12/19 (!) 356 lb (161.5 kg)   07/05/19 (!) 356 lb (161.5 kg)   07/01/19 (!) 360 lb (163.3 kg)     PHYSICAL EXAM  CONSTITUTIONAL:   Awake, alert, cooperative and in acute distress  SKIN:  Open wound Present    Assessment:     Problem List Items Addressed This Visit     Non-pressure chronic ulcer of left lower leg with fat layer exposed ([address]    Venous insufficiency (chronic) (peripheral)    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 Performed by: Maribel Taylor DPM    Consent obtained:  Yes    Time out taken:  Yes    Pain Control:       Debridement:Excisional Debridement    Using curette the wound(s)/ulcer(s) was/were sharply debrided down through and including the removal of subcutaneous tissue.         Devitalized Tissue Debrided:  fibrin, biofilm and slough to stimulate bleeding to promote healing, post debridement good bleeding base and wound edges noted    Pre Debridement Measurements:  Are located in the [place]  Documentation Flow Sheet    Wound/Ulcer #: 1    Post Debridement Measurements:  Wound/Ulcer Descriptions are Pre Debridement except measurements:    Wound 03/22/19 Leg Left;Lateral #1 acq: 3-22-17 Venous (Active)   Wound Image   7/12/2019  1:26 PM   Wound Diabetic [place] 1 3/22/2019  2:06 PM   Dressing Status Other (Comment) 6/14/2019  1:30 PM   Dressing Changed Changed/New 7/12/2019  1:45 PM   Dressing/Treatment Vacuum dressing 7/12/2019  1:45 PM   Wound Cleansed Rinsed/Irrigated with saline 7/12/2019  1:45 PM   Wound Length (cm) 6 cm 7/12/2019  1:26 PM   Wound Width (cm) 9 cm 7/12/2019  1:26 PM   Wound Depth (cm) 0.2 cm 7/12/2019  1:26 PM   Wound Surface Area (cm^2) 54 cm^2 7/12/2019  1:26 PM   Change in Wound Size % (l*w) -122.22 7/12/2019  1:26 PM   Wound Volume (cm^3) 10.8 cm^3 7/12/2019  1:26 PM   Wound Healing % -344 7/12/2019  1:26 PM   Post-Procedure Length (cm) 6 cm 7/12/2019  1:45 PM   Post-Procedure Width (cm) 9 cm 7/12/2019  1:45 PM   Post-Procedure Depth (cm) 0.2 cm 7/12/2019  1:45 PM   Post-Procedure Surface Area (cm^2) 54 cm^2 7/12/2019  1:45 PM   Post-Procedure Volume (cm^3) 10.8 cm^3 7/12/2019  1:45 PM   Wound Assessment Drainage;Pink;Tan;Yellow 7/12/2019  1:26 PM   Drainage Amount Large 7/12/2019  1:26 PM   Drainage Description Serous; Yellow 7/12/2019  1:26 PM   Odor None 7/12/2019  1:26 PM   Maria Elena-wound Assessment Maceration 7/12/2019  1:26 PM   Number of days: 112     Percent of Wound/Ulcer Debrided: 100%    Total Surface Area Debrided:  54.0 sq cm (debridement +2)    Estimated Blood Loss:  Minimal  Hemostasis Achieved:  by pressure    Procedural Pain:  2  / 10   Post Procedural Pain:  0 / 10     Response to treatment:  Well tolerated by patient. We will continue with SUAD device. Plan:   Treatment Note please see attached Discharge Instructions    Written patient dismissal instructions given to patient and signed by patient or POA.          Discharge Instructions       Visit Discharge/Physician Orders    Discharge condition: Stable     Assessment of pain at discharge:minimal     Anesthetic used: 2% lidocaine     Discharge to: Home     Left via:Private automobile     Accompanied by: accompanied by wife     ECF/HHA:      Dressing Orders:LEFT LEG ULCER-Cleansed left leg ulcers with normal saline. To superficial abrasion left pretib, apply alginate and bordered gauze. To left lateral lower leg ulcer, apply SUAD VAC APPLIED LEAVE IN PLACE X 1WEEK   single tubi  to bilateral lower legs     Please watch salt intake. Limit to less than 2000 mg sodium daily. Watch for \"hidden salt\"            Treatment Orders:  EAT FOODS HIGH IN PROTEIN AND VITAMIN C     TAKE MULTIVITAMIN DAILY IF OKAY WITH PRIMARY CARE     [facility] followup visit _____1 WEEK DR. Emi Powers _______________________  (Please note your next appointment above and if you are unable to keep, kindly give a 24 hour notice. Thank you.)    Physician signature:__________________________      If you experience any of the following, please call the [address] [place] [address] during business hours:    * Increase in Pain  * Temperature over 101  * Increase in drainage from your wound  * Drainage with a foul odor  * Bleeding  * Increase in swelling  * Need for compression bandage changes due to slippage, breakthrough drainage. If you need medical attention outside of the business hours of the [address] [place] [address] [employer] please contact your PCP or go to the nearest emergency room.         Electronically signed by Jose Arora DPM on 7/12/2019 at 1:55 PM

## 2019-07-19 ENCOUNTER — HOSPITAL ENCOUNTER (OUTPATIENT)
Dept: WOUND CARE | Age: 69
Discharge: HOME | End: 2019-07-19
Payer: MEDICARE

## 2019-07-19 VITALS
SYSTOLIC BLOOD PRESSURE: 124 MMHG | TEMPERATURE: 97.9 F | HEART RATE: 72 BPM | DIASTOLIC BLOOD PRESSURE: 80 MMHG | BODY MASS INDEX: 47.74 KG/M2 | WEIGHT: 315 LBS | RESPIRATION RATE: 18 BRPM | HEIGHT: 68 IN

## 2019-07-19 DIAGNOSIS — L97.922 NON-PRESSURE CHRONIC ULCER OF LEFT LOWER LEG WITH FAT LAYER EXPOSED (HCC): ICD-10-CM

## 2019-07-19 DIAGNOSIS — I83.022 VARICOSE VEINS OF LEFT LOWER EXTREMITY WITH ULCER OF CALF WITH FAT LAYER EXPOSED (HCC): Primary | ICD-10-CM

## 2019-07-19 DIAGNOSIS — I87.2 VENOUS INSUFFICIENCY (CHRONIC) (PERIPHERAL): ICD-10-CM

## 2019-07-19 DIAGNOSIS — L97.222 VARICOSE VEINS OF LEFT LOWER EXTREMITY WITH ULCER OF CALF WITH FAT LAYER EXPOSED (HCC): Primary | ICD-10-CM

## 2019-07-19 PROCEDURE — 11042 DBRDMT SUBQ TIS 1ST 20SQCM/<: CPT | Performed by: PODIATRIST

## 2019-07-19 PROCEDURE — 11045 DBRDMT SUBQ TISS EACH ADDL: CPT

## 2019-07-19 PROCEDURE — 11045 DBRDMT SUBQ TISS EACH ADDL: CPT | Performed by: PODIATRIST

## 2019-07-19 PROCEDURE — 11042 DBRDMT SUBQ TIS 1ST 20SQCM/<: CPT

## 2019-07-19 PROCEDURE — 97605 NEG PRS WND THER DME<=50SQCM: CPT

## 2019-07-19 RX ORDER — LIDOCAINE HYDROCHLORIDE 20 MG/ML
JELLY TOPICAL ONCE
Status: DISCONTINUED | OUTPATIENT
Start: 2019-07-19 | End: 2019-07-20 | Stop reason: HOSPADM

## 2019-07-19 ASSESSMENT — PAIN SCALES - GENERAL: PAINLEVEL_OUTOF10: 0

## 2019-07-19 NOTE — PROGRESS NOTES
dermatitis     h/o on bilateral legs with leg edema    Varicose veins      Past Surgical History:   Procedure Laterality Date    CARDIAC PACEMAKER PLACEMENT  04/09/10    Medtronic    CHOLECYSTECTOMY      COLONOSCOPY  2016    JOINT REPLACEMENT Bilateral     Left TJAK     Family History   Problem Relation Age of Onset    Heart Disease Father            [place] Other Mother             Other Brother         gall bladder     Social History     Tobacco Use    Smoking status: Former Smoker     Packs/day: 2.00     Years: 25.00     Pack years: 50.00     Types: Cigarettes     Start date: 1964     Last attempt to quit: 1990     Years since quittin.0    Smokeless tobacco: Never Used   Substance Use Topics    Alcohol use: No     Alcohol/week: 0.0 standard drinks     Comment: rare   2 beers/month    Drug use: No     Allergies   Allergen Reactions    Other Hives and Shortness Of Breath     \"allergic to [place] white. \"    Adaptic Non-Adhering Dressing  [Wound Dressings]     Clindamycin/Lincomycin      Not sure     Current Outpatient Medications on File Prior to Encounter   Medication Sig Dispense Refill    Indacaterol-Glycopyrrolate ([place]) 27.5-15.6 MCG CAPS Inhale 2 puffs into the lungs 2 times daily 60 capsule 5    vitamin C (ASCORBIC ACID) 500 MG tablet Take 500 mg by mouth daily      rosuvastatin (CRESTOR) 5 MG tablet Take 5 mg by mouth daily       glipiZIDE (GLUCOTROL XL) 5 MG extended release tablet Take 5 mg by mouth daily  3    ramipril (ALTACE) 10 MG capsule Take 10 mg by mouth daily       potassium chloride (KLOR-CON M) 20 MEQ extended release tablet Take 20 mEq by mouth daily       Cholecalciferol (VITAMIN D3) 2000 UNITS CAPS Take by mouth daily Last dose 16      carvedilol (COREG) 25 MG tablet Take 25 mg by mouth 2 times daily (with meals) Instructed to take with sip water am of procedure      Omega-3 Fatty Acids (FISH OIL) 600 MG CAPS Take 1,000 mg 03/22/19 Leg Left;Lateral #1 acq: 3-22-17 Venous (Active)   Wound Image   7/12/2019  1:26 PM   Wound Diabetic [place] 1 3/22/2019  2:06 PM   Dressing Status Other (Comment) 7/12/2019  2:06 PM   Dressing Changed Changed/New 7/19/2019  1:32 PM   Dressing/Treatment Vacuum dressing 7/19/2019  1:32 PM   Wound Cleansed Rinsed/Irrigated with saline 7/19/2019  1:32 PM   Wound Length (cm) 4.5 cm 7/19/2019  1:05 PM   Wound Width (cm) 7 cm 7/19/2019  1:05 PM   Wound Depth (cm) 0.2 cm 7/19/2019  1:05 PM   Wound Surface Area (cm^2) 31.5 cm^2 7/19/2019  1:05 PM   Change in Wound Size % (l*w) -29.63 7/19/2019  1:05 PM   Wound Volume (cm^3) 6.3 cm^3 7/19/2019  1:05 PM   Wound Healing % -159 7/19/2019  1:05 PM   Post-Procedure Length (cm) 6 cm 7/12/2019  1:45 PM   Post-Procedure Width (cm) 9 cm 7/12/2019  1:45 PM   Post-Procedure Depth (cm) 0.2 cm 7/12/2019  1:45 PM   Post-Procedure Surface Area (cm^2) 54 cm^2 7/12/2019  1:45 PM   Post-Procedure Volume (cm^3) 10.8 cm^3 7/12/2019  1:45 PM   Distance Tunneling (cm) 4.5 cm 7/19/2019  1:32 PM   Tunneling Position ___ O'Clock 7 7/19/2019  1:32 PM   Undermining Starts ___ O'Clock 0.3 7/19/2019  1:32 PM   Wound Assessment Drainage;[place]; Tan 7/19/2019  1:05 PM   Drainage Amount Moderate 7/19/2019  1:05 PM   Drainage Description Serous; Yellow 7/19/2019  1:05 PM   Odor None 7/19/2019  1:05 PM   Maria Elena-wound Assessment Dry;Pink 7/19/2019  1:05 PM   Number of days: 119     Percent of Wound/Ulcer Debrided: 100%    Total Surface Area Debrided:  31.5 sq cm     Estimated Blood Loss:  Minimal  Hemostasis Achieved:  by pressure    Procedural Pain:  2  / 10   Post Procedural Pain:  0 / 10     Response to treatment:  Well tolerated by patient. Will continue with SUAD device. Plan:   Treatment Note please see attached Discharge Instructions    Written patient dismissal instructions given to patient and signed by patient or POA.          Discharge Instructions         Visit Discharge/Physician

## 2019-07-24 ENCOUNTER — TELEPHONE (OUTPATIENT)
Dept: NON INVASIVE DIAGNOSTICS | Age: 69
End: 2019-07-24

## 2019-07-26 ENCOUNTER — HOSPITAL ENCOUNTER (OUTPATIENT)
Dept: WOUND CARE | Age: 69
Discharge: HOME | End: 2019-07-26
Payer: MEDICARE

## 2019-07-26 VITALS
HEIGHT: 68 IN | SYSTOLIC BLOOD PRESSURE: 110 MMHG | TEMPERATURE: 97.6 F | DIASTOLIC BLOOD PRESSURE: 58 MMHG | BODY MASS INDEX: 47.74 KG/M2 | HEART RATE: 88 BPM | RESPIRATION RATE: 20 BRPM | WEIGHT: 315 LBS

## 2019-07-26 DIAGNOSIS — I87.2 VENOUS INSUFFICIENCY (CHRONIC) (PERIPHERAL): ICD-10-CM

## 2019-07-26 DIAGNOSIS — I83.022 VARICOSE VEINS OF LEFT LOWER EXTREMITY WITH ULCER OF CALF WITH FAT LAYER EXPOSED (HCC): Primary | ICD-10-CM

## 2019-07-26 DIAGNOSIS — L97.922 NON-PRESSURE CHRONIC ULCER OF LEFT LOWER LEG WITH FAT LAYER EXPOSED (HCC): ICD-10-CM

## 2019-07-26 DIAGNOSIS — L97.222 VARICOSE VEINS OF LEFT LOWER EXTREMITY WITH ULCER OF CALF WITH FAT LAYER EXPOSED (HCC): Primary | ICD-10-CM

## 2019-07-26 PROCEDURE — 11045 DBRDMT SUBQ TISS EACH ADDL: CPT

## 2019-07-26 PROCEDURE — 11042 DBRDMT SUBQ TIS 1ST 20SQCM/<: CPT

## 2019-07-26 PROCEDURE — 97605 NEG PRS WND THER DME<=50SQCM: CPT

## 2019-07-26 PROCEDURE — 11042 DBRDMT SUBQ TIS 1ST 20SQCM/<: CPT | Performed by: PODIATRIST

## 2019-07-26 PROCEDURE — 11045 DBRDMT SUBQ TISS EACH ADDL: CPT | Performed by: PODIATRIST

## 2019-07-26 RX ORDER — LIDOCAINE HYDROCHLORIDE 10 MG/ML
2 INJECTION, SOLUTION EPIDURAL; INFILTRATION; INTRACAUDAL; PERINEURAL ONCE
Status: DISCONTINUED | OUTPATIENT
Start: 2019-07-26 | End: 2019-07-27 | Stop reason: HOSPADM

## 2019-07-26 NOTE — PROGRESS NOTES
mouth daily       gabapentin (NEURONTIN) 300 MG capsule Take 300 mg by mouth 3 times daily as needed Instructed to take with sip water am of procedure      aspirin 81 MG EC tablet Take 81 mg by mouth daily Last dose 1-14-16       No current facility-administered medications on file prior to encounter. REVIEW OF SYSTEMS See HPI    Objective:    BP (!) 110/58   Pulse 88   Temp 97.6 °F (36.4 °C) (Oral)   Resp 20   Ht 5' 8\" (1.727 m)   Wt (!) 356 lb (161.5 kg)   BMI 54.13 kg/m²   Wt Readings from Last 3 Encounters:   07/26/19 (!) 356 lb (161.5 kg)   07/19/19 (!) 356 lb (161.5 kg)   07/12/19 (!) 356 lb (161.5 kg)     PHYSICAL EXAM  CONSTITUTIONAL:   Awake, alert, cooperative and in no acute distress  SKIN:  Open wound Present    Assessment:     Problem List Items Addressed This Visit     Non-pressure chronic ulcer of left lower leg with fat layer exposed ([address]    Venous insufficiency (chronic) (peripheral)    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 Performed by: Ganga Weiss DPM    Consent obtained:  Yes    Time out taken:  Yes    Pain Control: Anesthetic  Anesthetic: 2% Lidocaine Gel Topical     Debridement:Excisional Debridement    Using curette the wound(s)/ulcer(s) was/were sharply debrided down through and including the removal of subcutaneous tissue.         Devitalized Tissue Debrided:  fibrin and biofilm to stimulate bleeding to promote healing, post debridement good bleeding base and wound edges noted    Pre Debridement Measurements:  Are located in the [place]  Documentation Flow Sheet    Wound/Ulcer #: 1    Post Debridement Measurements:  Wound/Ulcer Descriptions are Pre Debridement except measurements:    Wound 03/22/19 Leg Left;Lateral #1 acq:

## 2019-08-02 ENCOUNTER — HOSPITAL ENCOUNTER (OUTPATIENT)
Dept: WOUND CARE | Age: 69
Discharge: HOME | End: 2019-08-02
Payer: MEDICARE

## 2019-08-02 VITALS
SYSTOLIC BLOOD PRESSURE: 130 MMHG | DIASTOLIC BLOOD PRESSURE: 88 MMHG | RESPIRATION RATE: 20 BRPM | TEMPERATURE: 98.6 F | HEART RATE: 80 BPM

## 2019-08-02 DIAGNOSIS — L97.922 NON-PRESSURE CHRONIC ULCER OF LEFT LOWER LEG WITH FAT LAYER EXPOSED (HCC): ICD-10-CM

## 2019-08-02 DIAGNOSIS — L97.222 VARICOSE VEINS OF LEFT LOWER EXTREMITY WITH ULCER OF CALF WITH FAT LAYER EXPOSED (HCC): Primary | ICD-10-CM

## 2019-08-02 DIAGNOSIS — I87.2 VENOUS INSUFFICIENCY (CHRONIC) (PERIPHERAL): ICD-10-CM

## 2019-08-02 DIAGNOSIS — I83.022 VARICOSE VEINS OF LEFT LOWER EXTREMITY WITH ULCER OF CALF WITH FAT LAYER EXPOSED (HCC): Primary | ICD-10-CM

## 2019-08-02 PROCEDURE — 11042 DBRDMT SUBQ TIS 1ST 20SQCM/<: CPT

## 2019-08-02 PROCEDURE — 11045 DBRDMT SUBQ TISS EACH ADDL: CPT | Performed by: PODIATRIST

## 2019-08-02 PROCEDURE — 97605 NEG PRS WND THER DME<=50SQCM: CPT

## 2019-08-02 PROCEDURE — 11042 DBRDMT SUBQ TIS 1ST 20SQCM/<: CPT | Performed by: PODIATRIST

## 2019-08-02 PROCEDURE — 11045 DBRDMT SUBQ TISS EACH ADDL: CPT

## 2019-08-02 NOTE — PROGRESS NOTES
Wound Healing Center Followup Visit Note    Referring Physician : MD Tariq Whitmore 112 RECORD NUMBER:  [id number]  AGE: 68 y.o. GENDER: male  : 1950  EPISODE DATE:  2019    Subjective:     Chief Complaint   Patient presents with    Wound Check      HISTORY of PRESENT ILLNESS HPI   Mari Barbosa is a 68 y.o. male who presents today in regards to follow up evaluation and treatment of wound/ulcer. That patient's past medical, family and social hx were reviewed and changes were made if present. History of Wound Context:  Patient denies any nausea, vomiting, fever, chills, shortness of breath, chest pain, calf cramping and/or pain. Patient is tolerating the current dressing regimen without issues. No other new issues noted at this time.      Wound/Ulcer Pain Timing/Severity: none  Quality of pain: N/A  Severity:  0 / 10   Modifying Factors: None  Associated Signs/Symptoms: edema    Ulcer Identification:  Ulcer Type: venous  Contributing Factors: edema, venous stasis and obesity    Diabetic/Pressure/Non Pressure Ulcers only:  Ulcer: Non-Pressure ulcer, fat layer exposed    Wound: Venous stasis ulceration left leg        PAST MEDICAL HISTORY      Diagnosis Date    CAD (coronary artery disease)     Cataracts, bilateral 2019    Cellulitis -    Left leg    Complete heart block (HCC)     h/o    Diabetes mellitus ([address]     Encounter for aspirin therapy     patient uses for heart health    H/O asbestosis     Hyperlipidemia     Hypertension     Hyperthyroidism     Lung disorder     weak lungs    Myocardial infarct ([address] 2010    Neuropathy     feet    Obesity     Obstructive sleep apnea     on cpap    Onychomycosis     Osteoarthritis     Pacemaker     Medtronic    Post-thrombotic syndrome 2017    Screening 16    for Colonoscopy    Stasis dermatitis     h/o on bilateral legs with leg edema    Varicose veins      Past Surgical History: daily       gabapentin (NEURONTIN) 300 MG capsule Take 300 mg by mouth 3 times daily as needed Instructed to take with sip water am of procedure      aspirin 81 MG EC tablet Take 81 mg by mouth daily Last dose 1-14-16       No current facility-administered medications on file prior to encounter. REVIEW OF SYSTEMS See HPI    Objective:    /88   Pulse 80   Temp 98.6 °F (37 °C) (Oral)   Resp 20   Wt Readings from Last 3 Encounters:   07/26/19 (!) 356 lb (161.5 kg)   07/19/19 (!) 356 lb (161.5 kg)   07/12/19 (!) 356 lb (161.5 kg)     PHYSICAL EXAM  CONSTITUTIONAL:   Awake, alert, cooperative and in no acute distress  SKIN:  Open wound Present    Assessment:     Problem List Items Addressed This Visit     Non-pressure chronic ulcer of left lower leg with fat layer exposed ([address]    Venous insufficiency (chronic) (peripheral)    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 Performed by: Daniel Guerra DPM    Consent obtained:  Yes    Time out taken:  Yes    Pain Control:       Debridement:Excisional Debridement    Using curette the wound(s)/ulcer(s) was/were sharply debrided down through and including the removal of subcutaneous tissue.         Devitalized Tissue Debrided:  fibrin and biofilm to stimulate bleeding to promote healing, post debridement good bleeding base and wound edges noted    Pre Debridement Measurements:  Are located in the [place]  Documentation Flow Sheet    Wound/Ulcer #: 1    Post Debridement Measurements:  Wound/Ulcer Descriptions are Pre Debridement except measurements:    Wound 03/22/19 Leg Left;Lateral #1 acq: 3-22-17 Venous (Active)   Wound Image   7/12/2019  1:26 PM   Wound Diabetic [place] 1 3/22/2019  2:06 PM   Dressing Status Other (Comment) 7/12/2019  2:06 PM   Dressing Changed Changed/New 8/2/2019  1:32 PM   Dressing/Treatment Vacuum dressing 8/2/2019  1:32 PM   Wound Cleansed Rinsed/Irrigated with saline 8/2/2019  1:32 PM   Wound Length (cm) 4.4 cm 8/2/2019  1:08 PM   Wound Width (cm) 7.4 cm 8/2/2019  1:08 PM   Wound Depth (cm) 0.3 cm 8/2/2019  1:08 PM   Wound Surface Area (cm^2) 32.56 cm^2 8/2/2019  1:08 PM   Change in Wound Size % (l*w) -33.99 8/2/2019  1:08 PM   Wound Volume (cm^3) 9.77 cm^3 8/2/2019  1:08 PM   Wound Healing % -302 8/2/2019  1:08 PM   Post-Procedure Length (cm) 4.4 cm 8/2/2019  1:32 PM   Post-Procedure Width (cm) 7.4 cm 8/2/2019  1:32 PM   Post-Procedure Depth (cm) 0.3 cm 8/2/2019  1:32 PM   Post-Procedure Surface Area (cm^2) 32.56 cm^2 8/2/2019  1:32 PM   Post-Procedure Volume (cm^3) 9.77 cm^3 8/2/2019  1:32 PM   Distance Tunneling (cm) 4.5 cm 7/19/2019  1:32 PM   Tunneling Position ___ O'Clock 7 7/19/2019  1:32 PM   Undermining Starts ___ O'Clock 0.3 7/19/2019  1:32 PM   Undermining [place] Distance (cm) p 8/2/2019  1:08 PM   Wound Assessment Pink;Yellow 8/2/2019  1:08 PM   Drainage Amount Moderate 8/2/2019  1:08 PM   Drainage Description Yellow;Serosanguinous 8/2/2019  1:08 PM   Odor None 8/2/2019  1:08 PM   Maria Elena-wound Assessment Pink 8/2/2019  1:08 PM   Number of days: 133     Percent of Wound/Ulcer Debrided: 100%    Total Surface Area Debrided:  32.56 sq cm (debridement + 1)    Estimated Blood Loss:  Minimal  Hemostasis Achieved:  by pressure    Procedural Pain:  2  / 10   Post Procedural Pain:  0 / 10     Response to treatment:  Well tolerated by patient. Plan:   Treatment Note please see attached Discharge Instructions    Written patient dismissal instructions given to patient and signed by patient or POA.          Discharge Instructions       Visit Discharge/Physician Orders     Discharge condition: Stable     Assessment of pain at discharge:minimal     Anesthetic used: 2% lidocaine     Discharge to:

## 2019-08-09 ENCOUNTER — HOSPITAL ENCOUNTER (OUTPATIENT)
Dept: WOUND CARE | Age: 69
Discharge: HOME | End: 2019-08-09
Payer: MEDICARE

## 2019-08-09 VITALS
SYSTOLIC BLOOD PRESSURE: 122 MMHG | RESPIRATION RATE: 20 BRPM | HEART RATE: 84 BPM | DIASTOLIC BLOOD PRESSURE: 68 MMHG | HEIGHT: 68 IN | BODY MASS INDEX: 47.74 KG/M2 | WEIGHT: 315 LBS | TEMPERATURE: 97.9 F

## 2019-08-09 DIAGNOSIS — L97.922 NON-PRESSURE CHRONIC ULCER OF LEFT LOWER LEG WITH FAT LAYER EXPOSED (HCC): ICD-10-CM

## 2019-08-09 DIAGNOSIS — I87.2 VENOUS INSUFFICIENCY (CHRONIC) (PERIPHERAL): ICD-10-CM

## 2019-08-09 DIAGNOSIS — L97.222 VARICOSE VEINS OF LEFT LOWER EXTREMITY WITH ULCER OF CALF WITH FAT LAYER EXPOSED (HCC): Primary | ICD-10-CM

## 2019-08-09 DIAGNOSIS — I83.022 VARICOSE VEINS OF LEFT LOWER EXTREMITY WITH ULCER OF CALF WITH FAT LAYER EXPOSED (HCC): Primary | ICD-10-CM

## 2019-08-09 PROCEDURE — 11045 DBRDMT SUBQ TISS EACH ADDL: CPT | Performed by: PODIATRIST

## 2019-08-09 PROCEDURE — 97605 NEG PRS WND THER DME<=50SQCM: CPT

## 2019-08-09 PROCEDURE — 11042 DBRDMT SUBQ TIS 1ST 20SQCM/<: CPT | Performed by: PODIATRIST

## 2019-08-09 PROCEDURE — 11045 DBRDMT SUBQ TISS EACH ADDL: CPT

## 2019-08-09 PROCEDURE — 11042 DBRDMT SUBQ TIS 1ST 20SQCM/<: CPT

## 2019-08-09 RX ORDER — LIDOCAINE HYDROCHLORIDE 20 MG/ML
JELLY TOPICAL ONCE
Status: DISCONTINUED | OUTPATIENT
Start: 2019-08-09 | End: 2019-08-10 | Stop reason: HOSPADM

## 2019-08-16 ENCOUNTER — HOSPITAL ENCOUNTER (OUTPATIENT)
Dept: WOUND CARE | Age: 69
Discharge: HOME | End: 2019-08-16
Payer: MEDICARE

## 2019-08-16 VITALS
TEMPERATURE: 98.2 F | HEART RATE: 84 BPM | HEIGHT: 68 IN | SYSTOLIC BLOOD PRESSURE: 138 MMHG | DIASTOLIC BLOOD PRESSURE: 86 MMHG | RESPIRATION RATE: 18 BRPM | WEIGHT: 315 LBS | BODY MASS INDEX: 47.74 KG/M2

## 2019-08-16 DIAGNOSIS — L97.922 NON-PRESSURE CHRONIC ULCER OF LEFT LOWER LEG WITH FAT LAYER EXPOSED (HCC): ICD-10-CM

## 2019-08-16 DIAGNOSIS — L97.222 VARICOSE VEINS OF LEFT LOWER EXTREMITY WITH ULCER OF CALF WITH FAT LAYER EXPOSED (HCC): Primary | ICD-10-CM

## 2019-08-16 DIAGNOSIS — I83.022 VARICOSE VEINS OF LEFT LOWER EXTREMITY WITH ULCER OF CALF WITH FAT LAYER EXPOSED (HCC): Primary | ICD-10-CM

## 2019-08-16 DIAGNOSIS — I87.2 VENOUS INSUFFICIENCY (CHRONIC) (PERIPHERAL): ICD-10-CM

## 2019-08-16 PROCEDURE — 11045 DBRDMT SUBQ TISS EACH ADDL: CPT | Performed by: PODIATRIST

## 2019-08-16 PROCEDURE — 11045 DBRDMT SUBQ TISS EACH ADDL: CPT

## 2019-08-16 PROCEDURE — 11042 DBRDMT SUBQ TIS 1ST 20SQCM/<: CPT | Performed by: PODIATRIST

## 2019-08-16 PROCEDURE — 97605 NEG PRS WND THER DME<=50SQCM: CPT

## 2019-08-16 PROCEDURE — 11042 DBRDMT SUBQ TIS 1ST 20SQCM/<: CPT

## 2019-08-16 RX ORDER — LIDOCAINE HYDROCHLORIDE 20 MG/ML
JELLY TOPICAL ONCE
Status: DISCONTINUED | OUTPATIENT
Start: 2019-08-16 | End: 2019-08-17 | Stop reason: HOSPADM

## 2019-08-16 NOTE — PROGRESS NOTES
[facility]aling Center Followup Visit Note    Referring Physician : MD Tariq Shrestha 112 RECORD NUMBER:  [id number]  AGE: 68 y.o. GENDER: male  : 1950  EPISODE DATE:  2019    Subjective:     Chief Complaint   Patient presents with    Wound Check     LEFT LOWER LEG      HISTORY of PRESENT ILLNESS HPI   Gustavo Reinoso is a 68 y.o. male who presents today in regards to follow up evaluation and treatment of wound/ulcer. That patient's past medical, family and social hx were reviewed and changes were made if present. History of Wound Context:  Patient denies any nausea, vomiting, fever, chills, shortness of breath, chest pain, calf cramping and/or pain. Patient is tolerating the current dressing regimen without issues. No other new issues noted at this time.      Wound/Ulcer Pain Timing/Severity: none  Quality of pain: N/A  Severity:  0 / 10   Modifying Factors: None  Associated Signs/Symptoms: edema    Ulcer Identification:  Ulcer Type: venous  Contributing Factors: edema, venous stasis and obesity    Diabetic/Pressure/Non Pressure Ulcers only:  Ulcer: Non-Pressure ulcer, fat layer exposed    Wound: Venous stasis ulceration left leg        PAST MEDICAL HISTORY      Diagnosis Date    CAD (coronary artery disease)     Cataracts, bilateral 2019    Cellulitis -    Left leg    Complete heart block (HCC)     h/o    Diabetes mellitus ([address]     Encounter for aspirin therapy     patient uses for heart health    H/O asbestosis     Hyperlipidemia     Hypertension     Hyperthyroidism     Lung disorder     weak lungs    Myocardial infarct ([address] 2010    Neuropathy     feet    Obesity     Obstructive sleep apnea     on cpap    Onychomycosis     Osteoarthritis     Pacemaker     Medtronic    Post-thrombotic syndrome 2017    Screening 16    for Colonoscopy    Stasis dermatitis     h/o on bilateral legs with leg edema    Varicose veins      Past 75 mcg by mouth daily       gabapentin (NEURONTIN) 300 MG capsule Take 300 mg by mouth 3 times daily as needed Instructed to take with sip water am of procedure      aspirin 81 MG EC tablet Take 81 mg by mouth daily Last dose 1-14-16       No current facility-administered medications on file prior to encounter. REVIEW OF SYSTEMS See HPI    Objective:    /86   Pulse 84   Temp 98.2 °F (36.8 °C) (Oral)   Resp 18   Ht 5' 8\" (1.727 m)   Wt (!) 356 lb (161.5 kg)   BMI 54.13 kg/m²   Wt Readings from Last 3 Encounters:   08/16/19 (!) 356 lb (161.5 kg)   08/09/19 (!) 356 lb (161.5 kg)   07/26/19 (!) 356 lb (161.5 kg)     PHYSICAL EXAM  CONSTITUTIONAL:   Awake, alert, cooperative and in no acute distress  SKIN:  Open wound Present    Assessment:     Problem List Items Addressed This Visit     Non-pressure chronic ulcer of left lower leg with fat layer exposed ([address]    Venous insufficiency (chronic) (peripheral)    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 Performed by: Musa Harmon DPM    Consent obtained:  Yes    Time out taken:  Yes    Pain Control: Anesthetic  Anesthetic: 2% Lidocaine Gel Topical     Debridement:Excisional Debridement    Using curette the wound(s)/ulcer(s) was/were sharply debrided down through and including the removal of subcutaneous tissue.         Devitalized Tissue Debrided:  fibrin and biofilm to stimulate bleeding to promote healing, post debridement good bleeding base and wound edges noted    Pre Debridement Measurements:  Are located in the [facility]  Documentation Flow Sheet    Wound/Ulcer #: 1    Post Debridement Measurements:  Wound/Ulcer Descriptions are Pre Debridement except measurements:    Wound 03/22/19 Leg Left;Lateral #1 acq: Orders     Discharge condition: Stable     Assessment of pain at discharge:minimal     Anesthetic used: 2% lidocaine     Discharge to: Home     Left via:Private automobile     Accompanied by: accompanied by wife     ECF/HHA:      Dressing Orders:LEFT LEG ULCER-Cleansed left leg ulcer with normal saline.    To left lateral lower leg ulcer, apply SUAD VAC APPLIED LEAVE IN PLACE X 1WEEK   single tubi  to bilateral lower legs     Please watch salt intake. Limit to less than 2000 mg sodium daily.  Watch for \"hidden salt\"            Treatment Orders:  EAT FOODS HIGH IN PROTEIN AND VITAMIN C     TAKE MULTIVITAMIN DAILY IF OKAY WITH PRIMARY CARE     [address] [place] [address],[address] followup visit _____1 WEEK DR. Kaylynn Preston _______________________  (Please note your next appointment above and if you are unable to keep, kindly give a 24 hour notice.  Thank you.)     Physician signature:__________________________        If you experience any of the following, please call the [facility] [place] during business hours:     * Increase in Pain  * Temperature over 101  * Increase in drainage from your wound  * Drainage with a foul odor  * Bleeding  * Increase in swelling  * Need for compression bandage changes due to slippage, breakthrough drainage.     If you need medical attention outside of the business hours of the [facility] [place] please contact your PCP or go to the nearest emergency room.                                                                                                                      Electronically signed by Gunner Wolf DPM on 8/16/2019 at 1:35 PM

## 2019-08-23 ENCOUNTER — HOSPITAL ENCOUNTER (OUTPATIENT)
Dept: WOUND CARE | Age: 69
Discharge: HOME | End: 2019-08-23
Payer: MEDICARE

## 2019-08-23 VITALS
WEIGHT: 315 LBS | HEIGHT: 68 IN | DIASTOLIC BLOOD PRESSURE: 80 MMHG | BODY MASS INDEX: 47.74 KG/M2 | SYSTOLIC BLOOD PRESSURE: 136 MMHG | RESPIRATION RATE: 22 BRPM | HEART RATE: 88 BPM | TEMPERATURE: 97.9 F

## 2019-08-23 DIAGNOSIS — L97.222 VARICOSE VEINS OF LEFT LOWER EXTREMITY WITH ULCER OF CALF WITH FAT LAYER EXPOSED (HCC): Primary | ICD-10-CM

## 2019-08-23 DIAGNOSIS — I87.2 VENOUS INSUFFICIENCY (CHRONIC) (PERIPHERAL): ICD-10-CM

## 2019-08-23 DIAGNOSIS — L97.922 NON-PRESSURE CHRONIC ULCER OF LEFT LOWER LEG WITH FAT LAYER EXPOSED (HCC): ICD-10-CM

## 2019-08-23 DIAGNOSIS — I83.022 VARICOSE VEINS OF LEFT LOWER EXTREMITY WITH ULCER OF CALF WITH FAT LAYER EXPOSED (HCC): Primary | ICD-10-CM

## 2019-08-23 PROCEDURE — 11045 DBRDMT SUBQ TISS EACH ADDL: CPT | Performed by: PODIATRIST

## 2019-08-23 PROCEDURE — 11042 DBRDMT SUBQ TIS 1ST 20SQCM/<: CPT | Performed by: PODIATRIST

## 2019-08-23 PROCEDURE — 11042 DBRDMT SUBQ TIS 1ST 20SQCM/<: CPT

## 2019-08-23 PROCEDURE — 11045 DBRDMT SUBQ TISS EACH ADDL: CPT

## 2019-08-23 PROCEDURE — 97605 NEG PRS WND THER DME<=50SQCM: CPT

## 2019-08-23 RX ORDER — LIDOCAINE HYDROCHLORIDE 20 MG/ML
JELLY TOPICAL ONCE
Status: DISCONTINUED | OUTPATIENT
Start: 2019-08-23 | End: 2019-08-24 | Stop reason: HOSPADM

## 2019-08-23 NOTE — PROGRESS NOTES
Wound Healing Center Followup Visit Note    Referring Physician : MD Alyce CanalesJune Bar 112 RECORD NUMBER:  [id number]  AGE: 68 y.o. GENDER: male  : 1950  EPISODE DATE:  2019    Subjective:     Chief Complaint   Patient presents with    Wound Check     patient here for treatment of left lower leg ulcer. HISTORY of PRESENT ILLNESS HPI   Carlota Crespo is a 68 y.o. male who presents today in regards to follow up evaluation and treatment of wound/ulcer. That patient's past medical, family and social hx were reviewed and changes were made if present. History of Wound Context:  Patient denies any nausea, vomiting, fever, chills, shortness of breath, chest pain, calf cramping and/or pain. Patient is tolerating the current dressing regimen without issues. No other new issues noted at this time. He is tolerating the SUAD device without issues.      Wound/Ulcer Pain Timing/Severity: none  Quality of pain: N/A  Severity:  0 / 10   Modifying Factors: None  Associated Signs/Symptoms: edema    Ulcer Identification:  Ulcer Type: venous  Contributing Factors: edema, venous stasis and obesity    Diabetic/Pressure/Non Pressure Ulcers only:  Ulcer: Non-Pressure ulcer, fat layer exposed    Wound: Chronic venous stasis ulceration left leg        PAST MEDICAL HISTORY      Diagnosis Date    CAD (coronary artery disease)     Cataracts, bilateral 2019    Cellulitis -    Left leg    Complete heart block (HCC)     h/o    Diabetes mellitus ([address]     Encounter for aspirin therapy     patient uses for heart health    H/O asbestosis     Hyperlipidemia     Hypertension     Hyperthyroidism     Lung disorder     weak lungs    Myocardial infarct ([address]     Neuropathy     feet    Obesity     Obstructive sleep apnea     on cpap    Onychomycosis     Osteoarthritis     Pacemaker     Medtronic    Post-thrombotic syndrome 2017    Screening 16    for Colonoscopy  Stasis dermatitis     h/o on bilateral legs with leg edema    Varicose veins      Past Surgical History:   Procedure Laterality Date    CARDIAC PACEMAKER PLACEMENT  04/09/10    Medtronic    CHOLECYSTECTOMY      COLONOSCOPY  2016    JOINT REPLACEMENT Bilateral     Left TJAK     Family History   Problem Relation Age of Onset    Heart Disease Father            [facility] Other Mother             Other Brother         gall bladder     Social History     Tobacco Use    Smoking status: Former Smoker     Packs/day: 2.00     Years: 25.00     Pack years: 50.00     Types: Cigarettes     Start date: 1964     Last attempt to quit: 1990     Years since quittin.1    Smokeless tobacco: Never Used   Substance Use Topics    Alcohol use: No     Alcohol/week: 0.0 standard drinks     Comment: rare   2 beers/month    Drug use: No     Allergies   Allergen Reactions    Other Hives and Shortness Of Breath     \"allergic to Potatoe white. \"    Adaptic Non-Adhering Dressing  [Wound Dressings]     Clindamycin/Lincomycin      Not sure     Current Outpatient Medications on File Prior to Encounter   Medication Sig Dispense Refill    Indacaterol-Glycopyrrolate (Erika Rian) 27.5-15.6 MCG CAPS Inhale 2 puffs into the lungs 2 times daily 60 capsule 5    vitamin C (ASCORBIC ACID) 500 MG tablet Take 500 mg by mouth daily      rosuvastatin (CRESTOR) 5 MG tablet Take 5 mg by mouth daily       glipiZIDE (GLUCOTROL XL) 5 MG extended release tablet Take 5 mg by mouth daily  3    ramipril (ALTACE) 10 MG capsule Take 10 mg by mouth daily       potassium chloride (KLOR-CON M) 20 MEQ extended release tablet Take 20 mEq by mouth daily       Cholecalciferol (VITAMIN D3) 2000 UNITS CAPS Take by mouth daily Last dose 16      carvedilol (COREG) 25 MG tablet Take 25 mg by mouth 2 times daily (with meals) Instructed to take with sip water am of procedure      Omega-3 Fatty Acids (FISH OIL) 600 MG CAPS Take 1,000 mg by mouth daily Last Dose 1-14-16      liothyronine (CYTOMEL) 50 MCG tablet Take 75 mcg by mouth daily       gabapentin (NEURONTIN) 300 MG capsule Take 300 mg by mouth 3 times daily as needed Instructed to take with sip water am of procedure      aspirin 81 MG EC tablet Take 81 mg by mouth daily Last dose 1-14-16       No current facility-administered medications on file prior to encounter. REVIEW OF SYSTEMS See HPI    Objective:    /80   Pulse 88   Temp 97.9 °F (36.6 °C) (Oral)   Resp 22   Ht 5' 8\" (1.727 m)   Wt (!) 354 lb (160.6 kg)   BMI 53.83 kg/m²   Wt Readings from Last 3 Encounters:   08/23/19 (!) 354 lb (160.6 kg)   08/16/19 (!) 356 lb (161.5 kg)   08/09/19 (!) 356 lb (161.5 kg)     PHYSICAL EXAM  CONSTITUTIONAL:   Awake, alert, cooperative and in no acute distress  SKIN:  Open wound Present    Assessment:     Problem List Items Addressed This Visit     Non-pressure chronic ulcer of left lower leg with fat layer exposed ([address]    Venous insufficiency (chronic) (peripheral)    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 Performed by: Abebe Pollock DPM    Consent obtained:  Yes    Time out taken:  Yes    Pain Control: Anesthetic  Anesthetic: 2% Lidocaine Gel Topical     Debridement:Excisional Debridement    Using curette the wound(s)/ulcer(s) was/were sharply debrided down through and including the removal of subcutaneous tissue.         Devitalized Tissue Debrided:  fibrin and biofilm to stimulate bleeding to promote healing, post debridement good bleeding base and wound edges noted    Pre Debridement Measurements:  Are located in the Wound/Ulcer Documentation Flow Sheet    Wound/Ulcer #: 1    Post Debridement Measurements:  Wound/Ulcer Descriptions are Pre Debridement except measurements:    Wound 03/22/19 Leg Left;Lateral #1 acq: 3-22-17 Venous (Active)   Wound Image   8/9/2019  1:21 PM   Wound Diabetic [place] 1 3/22/2019  2:06 PM   Dressing Status Other (Comment) 7/12/2019  2:06 PM   Dressing Changed Changed/New 8/23/2019  1:44 PM   Dressing/Treatment Vacuum dressing 8/23/2019  1:44 PM   Wound Cleansed Rinsed/Irrigated with saline 8/23/2019  1:44 PM   Wound Length (cm) 5.5 cm 8/23/2019  1:21 PM   Wound Width (cm) 7.5 cm 8/23/2019  1:21 PM   Wound Depth (cm) 0.2 cm 8/23/2019  1:21 PM   Wound Surface Area (cm^2) 41.25 cm^2 8/23/2019  1:21 PM   Change in Wound Size % (l*w) -69.75 8/23/2019  1:21 PM   Wound Volume (cm^3) 8.25 cm^3 8/23/2019  1:21 PM   Wound Healing % -240 8/23/2019  1:21 PM   Post-Procedure Length (cm) 5.5 cm 8/23/2019  1:38 PM   Post-Procedure Width (cm) 7.5 cm 8/23/2019  1:38 PM   Post-Procedure Depth (cm) 0.2 cm 8/23/2019  1:38 PM   Post-Procedure Surface Area (cm^2) 41.25 cm^2 8/23/2019  1:38 PM   Post-Procedure Volume (cm^3) 8.25 cm^3 8/23/2019  1:38 PM   Distance Tunneling (cm) 4.5 cm 7/19/2019  1:32 PM   Tunneling Position ___ O'Clock 7 7/19/2019  1:32 PM   Undermining Starts ___ O'Clock 0.3 7/19/2019  1:32 PM   Undermining Maxium Distance (cm) p 8/2/2019  1:08 PM   Wound Assessment Pink;Yellow;Gray 8/23/2019  1:21 PM   Drainage Amount Large 8/23/2019  1:21 PM   Drainage Description Serous 8/23/2019  1:21 PM   Odor None 8/23/2019  1:21 PM   Maria Elena-wound Assessment Red;Fragile 8/23/2019  1:21 PM   Number of days: 154     Percent of Wound/Ulcer Debrided: 100%    Total Surface Area Debrided:  41.25 sq cm (debridement + 2)    Estimated Blood Loss:  Minimal  Hemostasis Achieved:  by pressure    Procedural Pain:  2  / 10   Post Procedural Pain:  0 / 10     Response to treatment:  Well tolerated by patient.      Plan:   Treatment Note please see attached Discharge Instructions    Written patient dismissal instructions given to patient and signed by patient or POA. Discharge Instructions       Visit Discharge/Physician Orders     Discharge condition: Stable     Assessment of pain at discharge:minimal     Anesthetic used: 2% lidocaine     Discharge to: Home     Left via:Private automobile     Accompanied by: accompanied by wife     ECF/HHA:      Dressing Orders:LEFT LEG ULCER-Cleansed left leg ulcer with normal saline.    To left lateral lower leg ulcer, apply SUAD VAC APPLIED LEAVE IN PLACE X 1WEEK   single tubi  to bilateral lower legs     Please watch salt intake. Limit to less than 2000 mg sodium daily.  Watch for \"hidden salt\"            Treatment Orders:  EAT FOODS HIGH IN PROTEIN AND VITAMIN C     TAKE MULTIVITAMIN DAILY IF OKAY WITH PRIMARY CARE     [facility] followup visit _____1 WEEK DR. Zaina Pastor _______________________  (Please note your next appointment above and if you are unable to keep, kindly give a 24 hour notice.  Thank you.)     Physician signature:__________________________        If you experience any of the following, please call the [address] [place] [address] during business hours:     * Increase in Pain  * Temperature over 101  * Increase in drainage from your wound  * Drainage with a foul odor  * Bleeding  * Increase in swelling  * Need for compression bandage changes due to slippage, breakthrough drainage.     If you need medical attention outside of the business hours of the [address] [place] [address] [facility] please contact your PCP or go to the nearest emergency room.                                                                                                                                           Electronically signed by Daniel Guerra DPM on 8/23/2019 at 2:02 PM

## 2019-08-30 ENCOUNTER — HOSPITAL ENCOUNTER (OUTPATIENT)
Dept: WOUND CARE | Age: 69
Discharge: HOME | End: 2019-08-30
Payer: MEDICARE

## 2019-08-30 VITALS
HEIGHT: 68 IN | BODY MASS INDEX: 47.74 KG/M2 | TEMPERATURE: 98.2 F | HEART RATE: 80 BPM | DIASTOLIC BLOOD PRESSURE: 74 MMHG | WEIGHT: 315 LBS | SYSTOLIC BLOOD PRESSURE: 142 MMHG | RESPIRATION RATE: 22 BRPM

## 2019-08-30 DIAGNOSIS — L97.222 VARICOSE VEINS OF LEFT LOWER EXTREMITY WITH ULCER OF CALF WITH FAT LAYER EXPOSED (HCC): Primary | ICD-10-CM

## 2019-08-30 DIAGNOSIS — I87.2 VENOUS INSUFFICIENCY (CHRONIC) (PERIPHERAL): ICD-10-CM

## 2019-08-30 DIAGNOSIS — L97.922 NON-PRESSURE CHRONIC ULCER OF LEFT LOWER LEG WITH FAT LAYER EXPOSED (HCC): ICD-10-CM

## 2019-08-30 DIAGNOSIS — I83.022 VARICOSE VEINS OF LEFT LOWER EXTREMITY WITH ULCER OF CALF WITH FAT LAYER EXPOSED (HCC): Primary | ICD-10-CM

## 2019-08-30 PROCEDURE — 11042 DBRDMT SUBQ TIS 1ST 20SQCM/<: CPT

## 2019-08-30 PROCEDURE — 11042 DBRDMT SUBQ TIS 1ST 20SQCM/<: CPT | Performed by: PODIATRIST

## 2019-08-30 PROCEDURE — 97605 NEG PRS WND THER DME<=50SQCM: CPT

## 2019-08-30 PROCEDURE — 11045 DBRDMT SUBQ TISS EACH ADDL: CPT

## 2019-08-30 RX ORDER — LIDOCAINE HYDROCHLORIDE 20 MG/ML
JELLY TOPICAL ONCE
Status: DISCONTINUED | OUTPATIENT
Start: 2019-08-30 | End: 2019-08-31 | Stop reason: HOSPADM

## 2019-08-30 ASSESSMENT — PAIN SCALES - GENERAL: PAINLEVEL_OUTOF10: 3

## 2019-08-30 ASSESSMENT — PAIN DESCRIPTION - FREQUENCY: FREQUENCY: INTERMITTENT

## 2019-08-30 ASSESSMENT — PAIN - FUNCTIONAL ASSESSMENT: PAIN_FUNCTIONAL_ASSESSMENT: ACTIVITIES ARE NOT PREVENTED

## 2019-08-30 ASSESSMENT — PAIN DESCRIPTION - PAIN TYPE: TYPE: CHRONIC PAIN

## 2019-08-30 ASSESSMENT — PAIN DESCRIPTION - PROGRESSION: CLINICAL_PROGRESSION: NOT CHANGED

## 2019-08-30 ASSESSMENT — PAIN DESCRIPTION - LOCATION: LOCATION: LEG

## 2019-08-30 ASSESSMENT — PAIN DESCRIPTION - DESCRIPTORS: DESCRIPTORS: BURNING

## 2019-08-30 ASSESSMENT — PAIN DESCRIPTION - ONSET: ONSET: ON-GOING

## 2019-08-30 ASSESSMENT — PAIN DESCRIPTION - ORIENTATION: ORIENTATION: LEFT

## 2019-08-30 NOTE — PROGRESS NOTES
Past Surgical History:   Procedure Laterality Date    CARDIAC PACEMAKER PLACEMENT  04/09/10    Medtronic    CHOLECYSTECTOMY      COLONOSCOPY  2016    JOINT REPLACEMENT Bilateral     Left TJAK     Family History   Problem Relation Age of Onset    Heart Disease Father            [place] Other Mother             Other Brother         gall bladder     Social History     Tobacco Use    Smoking status: Former Smoker     Packs/day: 2.00     Years: 25.00     Pack years: 50.00     Types: Cigarettes     Start date: 1964     Last attempt to quit: 1990     Years since quittin.1    Smokeless tobacco: Never Used   Substance Use Topics    Alcohol use: No     Alcohol/week: 0.0 standard drinks     Comment: rare   2 beers/month    Drug use: No     Allergies   Allergen Reactions    Other Hives and Shortness Of Breath     \"allergic to Potatoe white. \"    Adaptic Non-Adhering Dressing  [Wound Dressings]     Clindamycin/Lincomycin      Not sure     Current Outpatient Medications on File Prior to Encounter   Medication Sig Dispense Refill    Indacaterol-Glycopyrrolate (Ruslan Hutching) 27.5-15.6 MCG CAPS Inhale 2 puffs into the lungs 2 times daily 60 capsule 5    vitamin C (ASCORBIC ACID) 500 MG tablet Take 500 mg by mouth daily      rosuvastatin (CRESTOR) 5 MG tablet Take 5 mg by mouth daily       glipiZIDE (GLUCOTROL XL) 5 MG extended release tablet Take 5 mg by mouth daily  3    ramipril (ALTACE) 10 MG capsule Take 10 mg by mouth daily       potassium chloride (KLOR-CON M) 20 MEQ extended release tablet Take 20 mEq by mouth daily       Cholecalciferol (VITAMIN D3) 2000 UNITS CAPS Take by mouth daily Last dose 16      carvedilol (COREG) 25 MG tablet Take 25 mg by mouth 2 times daily (with meals) Instructed to take with sip water am of procedure      Omega-3 Fatty Acids (FISH OIL) 600 MG CAPS Take 1,000 mg by mouth daily Last Dose 16      liothyronine (CYTOMEL) 50 MCG Leg Left;Lateral #1 acq: 3-22-17 Venous (Active)   Wound Image   8/9/2019  1:21 PM   Wound Diabetic [place] 1 3/22/2019  2:06 PM   Dressing Status Other (Comment) 7/12/2019  2:06 PM   Dressing Changed Changed/New 8/30/2019  1:56 PM   Dressing/Treatment Vacuum dressing 8/30/2019  1:56 PM   Wound Cleansed Rinsed/Irrigated with saline 8/30/2019  1:56 PM   Wound Length (cm) 2.8 cm 8/30/2019  1:22 PM   Wound Width (cm) 7 cm 8/30/2019  1:22 PM   Wound Depth (cm) 0.2 cm 8/30/2019  1:22 PM   Wound Surface Area (cm^2) 19.6 cm^2 8/30/2019  1:22 PM   Change in Wound Size % (l*w) 19.34 8/30/2019  1:22 PM   Wound Volume (cm^3) 3.92 cm^3 8/30/2019  1:22 PM   Wound Healing % -61 8/30/2019  1:22 PM   Post-Procedure Length (cm) 2.8 cm 8/30/2019  1:47 PM   Post-Procedure Width (cm) 7 cm 8/30/2019  1:47 PM   Post-Procedure Depth (cm) 0.2 cm 8/30/2019  1:47 PM   Post-Procedure Surface Area (cm^2) 19.6 cm^2 8/30/2019  1:47 PM   Post-Procedure Volume (cm^3) 3.92 cm^3 8/30/2019  1:47 PM   Distance Tunneling (cm) 4.5 cm 7/19/2019  1:32 PM   Tunneling Position ___ O'Clock 7 7/19/2019  1:32 PM   Undermining Starts ___ O'Clock 0.3 7/19/2019  1:32 PM   Undermining Maxium Distance (cm) p 8/2/2019  1:08 PM   Wound Assessment Pink;Yellow 8/30/2019  1:22 PM   Drainage Amount Moderate 8/30/2019  1:22 PM   Drainage Description Serosanguinous; Yellow 8/30/2019  1:22 PM   Odor None 8/30/2019  1:22 PM   Maria Elena-wound Assessment Red 8/30/2019  1:22 PM   Number of days: 161     Percent of Wound/Ulcer Debrided: 100%    Total Surface Area Debrided:  19.6 sq cm     Estimated Blood Loss:  Minimal  Hemostasis Achieved:  by pressure    Procedural Pain:  2  / 10   Post Procedural Pain:  0 / 10     Response to treatment:  Well tolerated by patient. We will continue with the SUAD device. Plan:   Treatment Note please see attached Discharge Instructions    Written patient dismissal instructions given to patient and signed by patient or POA.          Discharge

## 2019-09-06 ENCOUNTER — HOSPITAL ENCOUNTER (OUTPATIENT)
Dept: WOUND CARE | Age: 69
Discharge: HOME | End: 2019-09-06
Payer: MEDICARE

## 2019-09-06 VITALS
DIASTOLIC BLOOD PRESSURE: 80 MMHG | SYSTOLIC BLOOD PRESSURE: 134 MMHG | RESPIRATION RATE: 20 BRPM | TEMPERATURE: 98.2 F | HEART RATE: 78 BPM

## 2019-09-06 DIAGNOSIS — I83.022 VARICOSE VEINS OF LEFT LOWER EXTREMITY WITH ULCER OF CALF WITH FAT LAYER EXPOSED (HCC): Primary | ICD-10-CM

## 2019-09-06 DIAGNOSIS — L97.222 VARICOSE VEINS OF LEFT LOWER EXTREMITY WITH ULCER OF CALF WITH FAT LAYER EXPOSED (HCC): Primary | ICD-10-CM

## 2019-09-06 DIAGNOSIS — I87.2 VENOUS INSUFFICIENCY (CHRONIC) (PERIPHERAL): ICD-10-CM

## 2019-09-06 DIAGNOSIS — L97.922 NON-PRESSURE CHRONIC ULCER OF LEFT LOWER LEG WITH FAT LAYER EXPOSED (HCC): ICD-10-CM

## 2019-09-06 PROCEDURE — 11045 DBRDMT SUBQ TISS EACH ADDL: CPT | Performed by: PODIATRIST

## 2019-09-06 PROCEDURE — 97607 NEG PRS WND THR NDME<=50SQCM: CPT

## 2019-09-06 PROCEDURE — 11042 DBRDMT SUBQ TIS 1ST 20SQCM/<: CPT | Performed by: PODIATRIST

## 2019-09-06 PROCEDURE — 11042 DBRDMT SUBQ TIS 1ST 20SQCM/<: CPT

## 2019-09-06 RX ORDER — LIDOCAINE HYDROCHLORIDE 20 MG/ML
JELLY TOPICAL ONCE
Status: DISCONTINUED | OUTPATIENT
Start: 2019-09-06 | End: 2019-09-07 | Stop reason: HOSPADM

## 2019-09-06 NOTE — PROGRESS NOTES
[facility] Cardiac Electrophysiology Progress Note    Ratna Lomax  1950  Date of Service: 9/11/19   PCP: Dr. Norbert Stewart,   Cardiologist: Dr. 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 ([address] 05/10/2019    Non-pressure chronic ulcer of left lower leg with fat layer exposed ([address] 03/22/2019    Venous insufficiency (chronic) (peripheral) 03/22/2019    Complete heart block ([address] 02/12/2019    Post-thrombotic syndrome 09/11/2017    Pulmonary emboli ([address] 08/23/2016    Chest pain 08/22/2016    Abdominal pain 08/22/2016    Cellulitis and abscess of leg 10/26/2012    SIRS (systemic inflammatory response syndrome) ([address] 10/26/2012    Shortness of breath 10/26/2012    Pain in lower limb 10/26/2012    Cellulitis 07/06/2012    Arrhythmia 07/05/2011     Overview Note:     A. H/O complete heart block      Pacemaker 07/05/2011     Overview Note:     A. Dual chamber pacemaker implant on 4/9/10: Medtronic Adapta [id number], serial [id number]  B. Right atrial lead: [employer]: 1888TC, serial # [id number]  C. Right ventricular lead: Medtronic [id number], serial # [id number]      HTN (hypertension) 07/05/2011    Hyperthyroidism 07/05/2011    DEMETRIA (obstructive sleep apnea) 07/05/2011     Overview Note:     A.  Current use CPAP         Current Outpatient Medications   Medication Sig Dispense Refill    Indacaterol-Glycopyrrolate (UTIBRON NEOHALER) 27.5-15.6 MCG CAPS Inhale 2 puffs into the lungs 2 times daily 60 capsule 5    vitamin C (ASCORBIC ACID) 500 MG tablet Take 500 mg by mouth daily      rosuvastatin (CRESTOR) 5 MG tablet

## 2019-09-06 NOTE — PROGRESS NOTES
Surgical History:   Procedure Laterality Date    CARDIAC PACEMAKER PLACEMENT  04/09/10    Medtronic    CHOLECYSTECTOMY      COLONOSCOPY  2016    JOINT REPLACEMENT Bilateral     Left TJAK     Family History   Problem Relation Age of Onset    Heart Disease Father            Jon Knox Other Mother             Other Brother         gall bladder     Social History     Tobacco Use    Smoking status: Former Smoker     Packs/day: 2.00     Years: 25.00     Pack years: 50.00     Types: Cigarettes     Start date: 1964     Last attempt to quit: 1990     Years since quittin.1    Smokeless tobacco: Never Used   Substance Use Topics    Alcohol use: No     Alcohol/week: 0.0 standard drinks     Comment: rare   2 beers/month    Drug use: No     Allergies   Allergen Reactions    Other Hives and Shortness Of Breath     \"allergic to Potatoe white. \"    Adaptic Non-Adhering Dressing  [Wound Dressings]     Clindamycin/Lincomycin      Not sure     Current Outpatient Medications on File Prior to Encounter   Medication Sig Dispense Refill    Indacaterol-Glycopyrrolate (Jessica Presley) 27.5-15.6 MCG CAPS Inhale 2 puffs into the lungs 2 times daily 60 capsule 5    vitamin C (ASCORBIC ACID) 500 MG tablet Take 500 mg by mouth daily      rosuvastatin (CRESTOR) 5 MG tablet Take 5 mg by mouth daily       glipiZIDE (GLUCOTROL XL) 5 MG extended release tablet Take 5 mg by mouth daily  3    ramipril (ALTACE) 10 MG capsule Take 10 mg by mouth daily       potassium chloride (KLOR-CON M) 20 MEQ extended release tablet Take 20 mEq by mouth daily       carvedilol (COREG) 25 MG tablet Take 25 mg by mouth 2 times daily (with meals) Instructed to take with sip water am of procedure      liothyronine (CYTOMEL) 50 MCG tablet Take 75 mcg by mouth daily       gabapentin (NEURONTIN) 300 MG capsule Take 300 mg by mouth 3 times daily as needed Instructed to take with sip water am of procedure      aspirin 81 MG EC discharge:minimal     Anesthetic used: 2% lidocaine     Discharge to: Home     Left via:Private automobile     Accompanied by: accompanied by wife     ECF/HHA:      Dressing Orders:LEFT LEG ULCER-Cleansed left leg ulcer with normal saline.    To left lateral lower leg ulcer, apply SUAD VAC APPLIED LEAVE IN PLACE X 1WEEK   single tubi  to bilateral lower legs     Please watch salt intake. Limit to less than 2000 mg sodium daily.  Watch for \"hidden salt\"            Treatment Orders:  EAT FOODS HIGH IN PROTEIN AND VITAMIN C     TAKE MULTIVITAMIN DAILY IF OKAY WITH PRIMARY CARE     [address] [place] [address],[address] followup visit _____1 WEEK DR. Roberto Sanchez _______________________  (Please note your next appointment above and if you are unable to keep, kindly give a 24 hour notice. Thank you.)    Physician signature:__________________________      If you experience any of the following, please call the [facility] during business hours:    * Increase in Pain  * Temperature over 101  * Increase in drainage from your wound  * Drainage with a foul odor  * Bleeding  * Increase in swelling  * Need for compression bandage changes due to slippage, breakthrough drainage. If you need medical attention outside of the business hours of the [facility] please contact your PCP or go to the nearest emergency room.         Electronically signed by Abebe Pollock DPM on 9/6/2019 at 1:57 PM

## 2019-09-11 ENCOUNTER — OFFICE VISIT (OUTPATIENT)
Dept: NON INVASIVE DIAGNOSTICS | Age: 69
End: 2019-09-11
Payer: MEDICARE

## 2019-09-11 VITALS
BODY MASS INDEX: 47.74 KG/M2 | SYSTOLIC BLOOD PRESSURE: 118 MMHG | RESPIRATION RATE: 18 BRPM | HEIGHT: 68 IN | DIASTOLIC BLOOD PRESSURE: 80 MMHG | WEIGHT: 315 LBS | HEART RATE: 80 BPM

## 2019-09-11 DIAGNOSIS — Z95.0 PACEMAKER: Primary | ICD-10-CM

## 2019-09-11 PROCEDURE — 99214 OFFICE O/P EST MOD 30 MIN: CPT | Performed by: INTERNAL MEDICINE

## 2019-09-11 PROCEDURE — 93280 PM DEVICE PROGR EVAL DUAL: CPT | Performed by: INTERNAL MEDICINE

## 2019-09-13 ENCOUNTER — HOSPITAL ENCOUNTER (OUTPATIENT)
Dept: WOUND CARE | Age: 69
Discharge: HOME | End: 2019-09-13
Payer: MEDICARE

## 2019-09-13 VITALS
RESPIRATION RATE: 18 BRPM | DIASTOLIC BLOOD PRESSURE: 64 MMHG | SYSTOLIC BLOOD PRESSURE: 140 MMHG | BODY MASS INDEX: 47.74 KG/M2 | HEIGHT: 68 IN | WEIGHT: 315 LBS | HEART RATE: 80 BPM | TEMPERATURE: 97.9 F

## 2019-09-13 DIAGNOSIS — L97.222 VARICOSE VEINS OF LEFT LOWER EXTREMITY WITH ULCER OF CALF WITH FAT LAYER EXPOSED (HCC): Primary | ICD-10-CM

## 2019-09-13 DIAGNOSIS — I83.022 VARICOSE VEINS OF LEFT LOWER EXTREMITY WITH ULCER OF CALF WITH FAT LAYER EXPOSED (HCC): Primary | ICD-10-CM

## 2019-09-13 DIAGNOSIS — L97.922 NON-PRESSURE CHRONIC ULCER OF LEFT LOWER LEG WITH FAT LAYER EXPOSED (HCC): ICD-10-CM

## 2019-09-13 DIAGNOSIS — I87.2 VENOUS INSUFFICIENCY (CHRONIC) (PERIPHERAL): ICD-10-CM

## 2019-09-13 PROCEDURE — 11042 DBRDMT SUBQ TIS 1ST 20SQCM/<: CPT | Performed by: PODIATRIST

## 2019-09-13 PROCEDURE — 11042 DBRDMT SUBQ TIS 1ST 20SQCM/<: CPT

## 2019-09-13 PROCEDURE — 11045 DBRDMT SUBQ TISS EACH ADDL: CPT

## 2019-09-13 PROCEDURE — 11045 DBRDMT SUBQ TISS EACH ADDL: CPT | Performed by: PODIATRIST

## 2019-09-13 RX ORDER — LIDOCAINE HYDROCHLORIDE 20 MG/ML
JELLY TOPICAL ONCE
Status: DISCONTINUED | OUTPATIENT
Start: 2019-09-13 | End: 2019-09-14 | Stop reason: HOSPADM

## 2019-09-13 ASSESSMENT — PAIN SCALES - GENERAL: PAINLEVEL_OUTOF10: 0

## 2019-09-20 ENCOUNTER — HOSPITAL ENCOUNTER (OUTPATIENT)
Dept: WOUND CARE | Age: 69
Discharge: HOME | End: 2019-09-20
Payer: MEDICARE

## 2019-09-20 VITALS
DIASTOLIC BLOOD PRESSURE: 70 MMHG | HEIGHT: 68 IN | SYSTOLIC BLOOD PRESSURE: 120 MMHG | HEART RATE: 72 BPM | RESPIRATION RATE: 18 BRPM | BODY MASS INDEX: 47.74 KG/M2 | WEIGHT: 315 LBS | TEMPERATURE: 98 F

## 2019-09-20 DIAGNOSIS — I87.2 VENOUS INSUFFICIENCY (CHRONIC) (PERIPHERAL): ICD-10-CM

## 2019-09-20 DIAGNOSIS — L97.222 VARICOSE VEINS OF LEFT LOWER EXTREMITY WITH ULCER OF CALF WITH FAT LAYER EXPOSED (HCC): Primary | ICD-10-CM

## 2019-09-20 DIAGNOSIS — L97.922 NON-PRESSURE CHRONIC ULCER OF LEFT LOWER LEG WITH FAT LAYER EXPOSED (HCC): ICD-10-CM

## 2019-09-20 DIAGNOSIS — I83.022 VARICOSE VEINS OF LEFT LOWER EXTREMITY WITH ULCER OF CALF WITH FAT LAYER EXPOSED (HCC): Primary | ICD-10-CM

## 2019-09-20 PROCEDURE — 11045 DBRDMT SUBQ TISS EACH ADDL: CPT | Performed by: PODIATRIST

## 2019-09-20 PROCEDURE — 11045 DBRDMT SUBQ TISS EACH ADDL: CPT

## 2019-09-20 PROCEDURE — 11042 DBRDMT SUBQ TIS 1ST 20SQCM/<: CPT | Performed by: PODIATRIST

## 2019-09-20 PROCEDURE — 97605 NEG PRS WND THER DME<=50SQCM: CPT

## 2019-09-20 PROCEDURE — 11042 DBRDMT SUBQ TIS 1ST 20SQCM/<: CPT

## 2019-09-20 RX ORDER — LIDOCAINE HYDROCHLORIDE 20 MG/ML
JELLY TOPICAL ONCE
Status: DISCONTINUED | OUTPATIENT
Start: 2019-09-20 | End: 2019-09-21 | Stop reason: HOSPADM

## 2019-09-20 ASSESSMENT — PAIN DESCRIPTION - PROGRESSION: CLINICAL_PROGRESSION: NOT CHANGED

## 2019-09-20 ASSESSMENT — PAIN DESCRIPTION - PAIN TYPE: TYPE: CHRONIC PAIN

## 2019-09-20 ASSESSMENT — PAIN DESCRIPTION - ONSET: ONSET: ON-GOING

## 2019-09-20 ASSESSMENT — PAIN DESCRIPTION - DESCRIPTORS: DESCRIPTORS: BURNING

## 2019-09-20 ASSESSMENT — PAIN DESCRIPTION - FREQUENCY: FREQUENCY: INTERMITTENT

## 2019-09-20 ASSESSMENT — PAIN DESCRIPTION - LOCATION: LOCATION: LEG

## 2019-09-20 ASSESSMENT — PAIN DESCRIPTION - ORIENTATION: ORIENTATION: LEFT

## 2019-09-20 ASSESSMENT — PAIN SCALES - GENERAL: PAINLEVEL_OUTOF10: 0

## 2019-09-20 NOTE — PROGRESS NOTES
bilateral legs with leg edema    Varicose veins      Past Surgical History:   Procedure Laterality Date    CARDIAC PACEMAKER PLACEMENT  04/09/10    [employer]    CHOLECYSTECTOMY      COLONOSCOPY  2016    JOINT REPLACEMENT Bilateral     Left TJAK     Family History   Problem Relation Age of Onset    Heart Disease Father            [facility] Other Mother             Other Brother         gall bladder     Social History     Tobacco Use    Smoking status: Former Smoker     Packs/day: 2.00     Years: 25.00     Pack years: 50.00     Types: Cigarettes     Start date: 1964     Last attempt to quit: 1990     Years since quittin.2    Smokeless tobacco: Never Used   Substance Use Topics    Alcohol use: No     Alcohol/week: 0.0 standard drinks     Comment: rare   2 beers/month    Drug use: No     Allergies   Allergen Reactions    Other Hives and Shortness Of Breath     \"allergic to Potatoe white. \"    Adaptic Non-Adhering Dressing  [Wound Dressings]     Clindamycin/Lincomycin      Not sure     Current Outpatient Medications on File Prior to Encounter   Medication Sig Dispense Refill    Indacaterol-Glycopyrrolate (Arnol Catina) 27.5-15.6 MCG CAPS Inhale 2 puffs into the lungs 2 times daily 60 capsule 5    vitamin C (ASCORBIC ACID) 500 MG tablet Take 500 mg by mouth daily      rosuvastatin (CRESTOR) 5 MG tablet Take 5 mg by mouth daily       glipiZIDE (GLUCOTROL XL) 5 MG extended release tablet Take 5 mg by mouth daily  3    ramipril (ALTACE) 10 MG capsule Take 10 mg by mouth daily       potassium chloride (KLOR-CON M) 20 MEQ extended release tablet Take 20 mEq by mouth daily       Cholecalciferol (VITAMIN D3) 2000 UNITS CAPS Take by mouth daily Last dose 16      carvedilol (COREG) 25 MG tablet Take 25 mg by mouth 2 times daily (with meals) Instructed to take with sip water am of procedure      Omega-3 Fatty Acids (FISH OIL) 600 MG CAPS Take 1,000 mg by mouth daily Last Dose 1-14-16      liothyronine (CYTOMEL) 50 MCG tablet Take 75 mcg by mouth daily       gabapentin (NEURONTIN) 300 MG capsule Take 300 mg by mouth 3 times daily as needed Instructed to take with sip water am of procedure      aspirin 81 MG EC tablet Take 81 mg by mouth daily Last dose 1-14-16       No current facility-administered medications on file prior to encounter. REVIEW OF SYSTEMS See HPI    Objective:    /70   Pulse 72   Temp 98 °F (36.7 °C) (Oral)   Resp 18   Ht 5' 8\" (1.727 m)   Wt (!) 356 lb (161.5 kg)   BMI 54.13 kg/m²   Wt Readings from Last 3 Encounters:   09/20/19 (!) 356 lb (161.5 kg)   09/13/19 (!) 356 lb (161.5 kg)   09/11/19 (!) 365 lb (165.6 kg)     PHYSICAL EXAM  CONSTITUTIONAL:   Awake, alert, cooperative and in no acute distress  SKIN:  Open wound Present    Assessment:     Problem List Items Addressed This Visit     Non-pressure chronic ulcer of left lower leg with fat layer exposed ([address]    Venous insufficiency (chronic) (peripheral)    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 Performed by: Ganga Weiss DPM    Consent obtained:  Yes    Time out taken:  Yes    Pain Control: Anesthetic  Anesthetic: 2% Lidocaine Gel Topical     Debridement:Excisional Debridement    Using curette the wound(s)/ulcer(s) was/were sharply debrided down through and including the removal of subcutaneous tissue.         Devitalized Tissue Debrided:  fibrin and biofilm to stimulate bleeding to promote healing, post debridement good bleeding base and wound edges noted    Pre Debridement Measurements:  Are located in the Wound/Ulcer Documentation Flow Sheet    Wound/Ulcer #: 1    Post Debridement Measurements:  Wound/Ulcer Descriptions are Pre Debridement

## 2019-09-24 PROBLEM — J41.0 SIMPLE CHRONIC BRONCHITIS (HCC): Status: ACTIVE | Noted: 2019-09-24

## 2019-09-27 ENCOUNTER — HOSPITAL ENCOUNTER (OUTPATIENT)
Dept: WOUND CARE | Age: 69
Discharge: HOME | End: 2019-09-27
Payer: MEDICARE

## 2019-09-27 VITALS
TEMPERATURE: 98 F | WEIGHT: 315 LBS | RESPIRATION RATE: 16 BRPM | BODY MASS INDEX: 47.74 KG/M2 | SYSTOLIC BLOOD PRESSURE: 120 MMHG | HEIGHT: 68 IN | HEART RATE: 72 BPM | DIASTOLIC BLOOD PRESSURE: 70 MMHG

## 2019-09-27 DIAGNOSIS — I87.2 VENOUS INSUFFICIENCY (CHRONIC) (PERIPHERAL): ICD-10-CM

## 2019-09-27 DIAGNOSIS — L97.222 VARICOSE VEINS OF LEFT LOWER EXTREMITY WITH ULCER OF CALF WITH FAT LAYER EXPOSED (HCC): Primary | ICD-10-CM

## 2019-09-27 DIAGNOSIS — I83.022 VARICOSE VEINS OF LEFT LOWER EXTREMITY WITH ULCER OF CALF WITH FAT LAYER EXPOSED (HCC): Primary | ICD-10-CM

## 2019-09-27 DIAGNOSIS — L97.922 NON-PRESSURE CHRONIC ULCER OF LEFT LOWER LEG WITH FAT LAYER EXPOSED (HCC): ICD-10-CM

## 2019-09-27 PROCEDURE — 97605 NEG PRS WND THER DME<=50SQCM: CPT

## 2019-09-27 PROCEDURE — 11042 DBRDMT SUBQ TIS 1ST 20SQCM/<: CPT | Performed by: PODIATRIST

## 2019-09-27 PROCEDURE — 11045 DBRDMT SUBQ TISS EACH ADDL: CPT | Performed by: PODIATRIST

## 2019-09-27 PROCEDURE — 11042 DBRDMT SUBQ TIS 1ST 20SQCM/<: CPT

## 2019-09-27 ASSESSMENT — PAIN SCALES - GENERAL: PAINLEVEL_OUTOF10: 0

## 2019-09-27 NOTE — PROGRESS NOTES
[facility] Followup Visit Note    Referring Physician : MD Tariq Rea [id number] RECORD NUMBER:  [id number]  AGE: 68 y.o. GENDER: male  : 1950  EPISODE DATE:  2019    Subjective:     Chief Complaint   Patient presents with    Wound Check     Left Lower Leg wound Care Follow Up Appt @ [facility] with Dr Leann Hassan of [facility] H[facility]   Krystian Freeman is a 68 y.o. male who presents today in regards to follow up evaluation and treatment of wound/ulcer. That patient's past medical, family and social hx were reviewed and changes were made if present. History of Wound Context:  Patient denies any nausea, vomiting, fever, chills, shortness of breath, chest pain, calf cramping and/or pain. Patient is tolerating the current dressing regimen without issues. No other new issues noted at this time.      Wound/Ulcer Pain Timing/Severity: none  Quality of pain: N/A  Severity:  0 / 10   Modifying Factors: None  Associated Signs/Symptoms: edema    Ulcer Identification:  Ulcer Type: venous  Contributing Factors: edema, venous stasis and obesity    Diabetic/Pressure/Non Pressure Ulcers only:  Ulcer: Non-Pressure ulcer, fat layer exposed    Wound: Venous stasis ulceration left leg        PAST MEDICAL HISTORY      Diagnosis Date    CAD (coronary artery disease)     Cataracts, bilateral 2019    Cellulitis -    Left leg    Complete heart block (HCC)     h/o    Diabetes mellitus ([address]     Encounter for aspirin therapy     patient uses for heart health    H/O asbestosis     Hyperlipidemia     Hypertension     Hyperthyroidism     Lung disorder     weak lungs    Myocardial infarct ([address] 2010    Neuropathy     feet    Obesity     Obstructive sleep apnea     on cpap    Onychomycosis     Osteoarthritis     Pacemaker     Medtronic    Post-thrombotic syndrome 2017    Screening 16    for Colonoscopy    Stasis dermatitis     h/o on

## 2019-09-30 ENCOUNTER — PROCEDURE VISIT (OUTPATIENT)
Dept: PODIATRY | Age: 69
End: 2019-09-30
Payer: MEDICARE

## 2019-09-30 VITALS — WEIGHT: 315 LBS | HEIGHT: 68 IN | RESPIRATION RATE: 20 BRPM | BODY MASS INDEX: 47.74 KG/M2

## 2019-09-30 DIAGNOSIS — M79.674 PAIN OF TOE OF RIGHT FOOT: ICD-10-CM

## 2019-09-30 DIAGNOSIS — I87.2 VENOUS INSUFFICIENCY (CHRONIC) (PERIPHERAL): ICD-10-CM

## 2019-09-30 DIAGNOSIS — E11.51 TYPE II DIABETES MELLITUS WITH PERIPHERAL CIRCULATORY DISORDER (HCC): ICD-10-CM

## 2019-09-30 DIAGNOSIS — B35.1 ONYCHOMYCOSIS: Primary | ICD-10-CM

## 2019-09-30 DIAGNOSIS — M79.675 PAIN OF TOE OF LEFT FOOT: ICD-10-CM

## 2019-09-30 PROCEDURE — 11721 DEBRIDE NAIL 6 OR MORE: CPT | Performed by: PODIATRIST

## 2019-10-04 ENCOUNTER — HOSPITAL ENCOUNTER (OUTPATIENT)
Dept: WOUND CARE | Age: 69
Discharge: HOME | End: 2019-10-04
Payer: MEDICARE

## 2019-10-04 VITALS
BODY MASS INDEX: 47.74 KG/M2 | WEIGHT: 315 LBS | HEART RATE: 88 BPM | DIASTOLIC BLOOD PRESSURE: 60 MMHG | TEMPERATURE: 97.7 F | SYSTOLIC BLOOD PRESSURE: 120 MMHG | RESPIRATION RATE: 20 BRPM | HEIGHT: 68 IN

## 2019-10-04 DIAGNOSIS — I83.022 VARICOSE VEINS OF LEFT LOWER EXTREMITY WITH ULCER OF CALF WITH FAT LAYER EXPOSED (HCC): Primary | ICD-10-CM

## 2019-10-04 DIAGNOSIS — I87.2 VENOUS INSUFFICIENCY (CHRONIC) (PERIPHERAL): ICD-10-CM

## 2019-10-04 DIAGNOSIS — L97.922 NON-PRESSURE CHRONIC ULCER OF LEFT LOWER LEG WITH FAT LAYER EXPOSED (HCC): ICD-10-CM

## 2019-10-04 DIAGNOSIS — L97.222 VARICOSE VEINS OF LEFT LOWER EXTREMITY WITH ULCER OF CALF WITH FAT LAYER EXPOSED (HCC): Primary | ICD-10-CM

## 2019-10-04 PROCEDURE — 11042 DBRDMT SUBQ TIS 1ST 20SQCM/<: CPT | Performed by: PODIATRIST

## 2019-10-04 PROCEDURE — 11045 DBRDMT SUBQ TISS EACH ADDL: CPT

## 2019-10-04 PROCEDURE — 97605 NEG PRS WND THER DME<=50SQCM: CPT

## 2019-10-04 PROCEDURE — 11045 DBRDMT SUBQ TISS EACH ADDL: CPT | Performed by: PODIATRIST

## 2019-10-04 PROCEDURE — 11042 DBRDMT SUBQ TIS 1ST 20SQCM/<: CPT

## 2019-10-04 RX ORDER — LIDOCAINE HYDROCHLORIDE 20 MG/ML
JELLY TOPICAL ONCE
Status: DISCONTINUED | OUTPATIENT
Start: 2019-10-04 | End: 2019-10-05 | Stop reason: HOSPADM

## 2019-10-11 ENCOUNTER — HOSPITAL ENCOUNTER (OUTPATIENT)
Dept: WOUND CARE | Age: 69
Discharge: HOME | End: 2019-10-11
Payer: MEDICARE

## 2019-10-11 VITALS
TEMPERATURE: 98.7 F | DIASTOLIC BLOOD PRESSURE: 76 MMHG | RESPIRATION RATE: 18 BRPM | SYSTOLIC BLOOD PRESSURE: 120 MMHG | HEART RATE: 80 BPM

## 2019-10-11 DIAGNOSIS — I83.022 VARICOSE VEINS OF LEFT LOWER EXTREMITY WITH ULCER OF CALF WITH FAT LAYER EXPOSED (HCC): Primary | ICD-10-CM

## 2019-10-11 DIAGNOSIS — L97.222 VARICOSE VEINS OF LEFT LOWER EXTREMITY WITH ULCER OF CALF WITH FAT LAYER EXPOSED (HCC): Primary | ICD-10-CM

## 2019-10-11 DIAGNOSIS — I87.2 VENOUS INSUFFICIENCY (CHRONIC) (PERIPHERAL): ICD-10-CM

## 2019-10-11 DIAGNOSIS — L97.922 NON-PRESSURE CHRONIC ULCER OF LEFT LOWER LEG WITH FAT LAYER EXPOSED (HCC): ICD-10-CM

## 2019-10-11 PROCEDURE — 11045 DBRDMT SUBQ TISS EACH ADDL: CPT

## 2019-10-11 PROCEDURE — 11042 DBRDMT SUBQ TIS 1ST 20SQCM/<: CPT | Performed by: PODIATRIST

## 2019-10-11 PROCEDURE — 97605 NEG PRS WND THER DME<=50SQCM: CPT

## 2019-10-11 PROCEDURE — 11045 DBRDMT SUBQ TISS EACH ADDL: CPT | Performed by: PODIATRIST

## 2019-10-11 PROCEDURE — 11042 DBRDMT SUBQ TIS 1ST 20SQCM/<: CPT

## 2019-10-18 ENCOUNTER — HOSPITAL ENCOUNTER (OUTPATIENT)
Dept: WOUND CARE | Age: 69
Discharge: HOME | End: 2019-10-18
Payer: MEDICARE

## 2019-10-18 VITALS
HEIGHT: 68 IN | TEMPERATURE: 97.7 F | SYSTOLIC BLOOD PRESSURE: 140 MMHG | BODY MASS INDEX: 47.74 KG/M2 | HEART RATE: 74 BPM | RESPIRATION RATE: 18 BRPM | WEIGHT: 315 LBS | DIASTOLIC BLOOD PRESSURE: 74 MMHG

## 2019-10-18 DIAGNOSIS — L97.922 NON-PRESSURE CHRONIC ULCER OF LEFT LOWER LEG WITH FAT LAYER EXPOSED (HCC): ICD-10-CM

## 2019-10-18 DIAGNOSIS — L97.222 VARICOSE VEINS OF LEFT LOWER EXTREMITY WITH ULCER OF CALF WITH FAT LAYER EXPOSED (HCC): Primary | ICD-10-CM

## 2019-10-18 DIAGNOSIS — I83.022 VARICOSE VEINS OF LEFT LOWER EXTREMITY WITH ULCER OF CALF WITH FAT LAYER EXPOSED (HCC): Primary | ICD-10-CM

## 2019-10-18 DIAGNOSIS — I87.2 VENOUS INSUFFICIENCY (CHRONIC) (PERIPHERAL): ICD-10-CM

## 2019-10-18 PROCEDURE — 11042 DBRDMT SUBQ TIS 1ST 20SQCM/<: CPT | Performed by: PODIATRIST

## 2019-10-18 PROCEDURE — 11042 DBRDMT SUBQ TIS 1ST 20SQCM/<: CPT

## 2019-10-18 PROCEDURE — 97607 NEG PRS WND THR NDME<=50SQCM: CPT

## 2019-10-18 RX ORDER — LIDOCAINE HYDROCHLORIDE 20 MG/ML
JELLY TOPICAL PRN
Status: DISCONTINUED | OUTPATIENT
Start: 2019-10-18 | End: 2019-10-19 | Stop reason: HOSPADM

## 2019-10-18 ASSESSMENT — PAIN SCALES - GENERAL: PAINLEVEL_OUTOF10: 0

## 2019-10-25 ENCOUNTER — HOSPITAL ENCOUNTER (OUTPATIENT)
Dept: WOUND CARE | Age: 69
Discharge: HOME | End: 2019-10-25
Payer: MEDICARE

## 2019-10-25 VITALS
DIASTOLIC BLOOD PRESSURE: 70 MMHG | SYSTOLIC BLOOD PRESSURE: 120 MMHG | TEMPERATURE: 98.1 F | RESPIRATION RATE: 18 BRPM | HEART RATE: 80 BPM

## 2019-10-25 DIAGNOSIS — I83.022 VARICOSE VEINS OF LEFT LOWER EXTREMITY WITH ULCER OF CALF WITH FAT LAYER EXPOSED (HCC): Primary | ICD-10-CM

## 2019-10-25 DIAGNOSIS — L97.222 VARICOSE VEINS OF LEFT LOWER EXTREMITY WITH ULCER OF CALF WITH FAT LAYER EXPOSED (HCC): Primary | ICD-10-CM

## 2019-10-25 DIAGNOSIS — L97.922 NON-PRESSURE CHRONIC ULCER OF LEFT LOWER LEG WITH FAT LAYER EXPOSED (HCC): ICD-10-CM

## 2019-10-25 DIAGNOSIS — I87.2 VENOUS INSUFFICIENCY (CHRONIC) (PERIPHERAL): ICD-10-CM

## 2019-10-25 PROCEDURE — 11042 DBRDMT SUBQ TIS 1ST 20SQCM/<: CPT

## 2019-10-25 PROCEDURE — 11042 DBRDMT SUBQ TIS 1ST 20SQCM/<: CPT | Performed by: PODIATRIST

## 2019-10-25 PROCEDURE — 97605 NEG PRS WND THER DME<=50SQCM: CPT

## 2019-10-25 RX ORDER — LIDOCAINE HYDROCHLORIDE 20 MG/ML
JELLY TOPICAL ONCE
Status: DISCONTINUED | OUTPATIENT
Start: 2019-10-25 | End: 2019-10-26 | Stop reason: HOSPADM

## 2019-10-25 ASSESSMENT — PAIN SCALES - GENERAL: PAINLEVEL_OUTOF10: 0

## 2019-10-29 ENCOUNTER — HOSPITAL ENCOUNTER (OUTPATIENT)
Dept: SLEEP CENTER | Age: 69
Discharge: HOME | End: 2019-10-29
Payer: MEDICARE

## 2019-10-29 DIAGNOSIS — G47.33 OSA (OBSTRUCTIVE SLEEP APNEA): ICD-10-CM

## 2019-10-29 PROCEDURE — 95811 POLYSOM 6/>YRS CPAP 4/> PARM: CPT

## 2019-10-30 VITALS
DIASTOLIC BLOOD PRESSURE: 86 MMHG | WEIGHT: 315 LBS | OXYGEN SATURATION: 95 % | SYSTOLIC BLOOD PRESSURE: 134 MMHG | BODY MASS INDEX: 52.48 KG/M2 | HEIGHT: 65 IN | HEART RATE: 68 BPM

## 2019-10-30 ASSESSMENT — SLEEP AND FATIGUE QUESTIONNAIRES
HOW LIKELY ARE YOU TO NOD OFF OR FALL ASLEEP IN A CAR, WHILE STOPPED FOR A FEW MINUTES IN TRAFFIC: 0
HOW LIKELY ARE YOU TO NOD OFF OR FALL ASLEEP WHILE SITTING INACTIVE IN A PUBLIC PLACE: 0
HOW LIKELY ARE YOU TO NOD OFF OR FALL ASLEEP WHILE WATCHING TV: 3
ESS TOTAL SCORE: 6
HOW LIKELY ARE YOU TO NOD OFF OR FALL ASLEEP WHEN YOU ARE A PASSENGER IN A CAR FOR AN HOUR WITHOUT A BREAK: 0
HOW LIKELY ARE YOU TO NOD OFF OR FALL ASLEEP WHILE SITTING AND READING: 0
HOW LIKELY ARE YOU TO NOD OFF OR FALL ASLEEP WHILE SITTING QUIETLY AFTER LUNCH WITHOUT ALCOHOL: 0
HOW LIKELY ARE YOU TO NOD OFF OR FALL ASLEEP WHILE LYING DOWN TO REST IN THE AFTERNOON WHEN CIRCUMSTANCES PERMIT: 3
HOW LIKELY ARE YOU TO NOD OFF OR FALL ASLEEP WHILE SITTING AND TALKING TO SOMEONE: 0

## 2019-11-01 ENCOUNTER — HOSPITAL ENCOUNTER (OUTPATIENT)
Dept: WOUND CARE | Age: 69
Discharge: HOME | End: 2019-11-01
Payer: MEDICARE

## 2019-11-01 VITALS
DIASTOLIC BLOOD PRESSURE: 80 MMHG | TEMPERATURE: 98.2 F | RESPIRATION RATE: 18 BRPM | SYSTOLIC BLOOD PRESSURE: 136 MMHG | HEART RATE: 80 BPM

## 2019-11-01 DIAGNOSIS — L97.222 VARICOSE VEINS OF LEFT LOWER EXTREMITY WITH ULCER OF CALF WITH FAT LAYER EXPOSED (HCC): Primary | ICD-10-CM

## 2019-11-01 DIAGNOSIS — I87.2 VENOUS INSUFFICIENCY (CHRONIC) (PERIPHERAL): ICD-10-CM

## 2019-11-01 DIAGNOSIS — L97.922 NON-PRESSURE CHRONIC ULCER OF LEFT LOWER LEG WITH FAT LAYER EXPOSED (HCC): ICD-10-CM

## 2019-11-01 DIAGNOSIS — I83.022 VARICOSE VEINS OF LEFT LOWER EXTREMITY WITH ULCER OF CALF WITH FAT LAYER EXPOSED (HCC): Primary | ICD-10-CM

## 2019-11-01 PROCEDURE — 97605 NEG PRS WND THER DME<=50SQCM: CPT

## 2019-11-01 PROCEDURE — 11042 DBRDMT SUBQ TIS 1ST 20SQCM/<: CPT | Performed by: PODIATRIST

## 2019-11-01 PROCEDURE — 11042 DBRDMT SUBQ TIS 1ST 20SQCM/<: CPT

## 2019-11-01 RX ORDER — LIDOCAINE HYDROCHLORIDE 20 MG/ML
JELLY TOPICAL ONCE
Status: DISCONTINUED | OUTPATIENT
Start: 2019-11-01 | End: 2019-11-02 | Stop reason: HOSPADM

## 2019-11-01 ASSESSMENT — PAIN SCALES - GENERAL: PAINLEVEL_OUTOF10: 0

## 2019-11-08 ENCOUNTER — HOSPITAL ENCOUNTER (OUTPATIENT)
Dept: WOUND CARE | Age: 69
Discharge: HOME | End: 2019-11-08
Payer: MEDICARE

## 2019-11-08 VITALS
RESPIRATION RATE: 18 BRPM | SYSTOLIC BLOOD PRESSURE: 122 MMHG | TEMPERATURE: 97.9 F | HEART RATE: 76 BPM | DIASTOLIC BLOOD PRESSURE: 80 MMHG

## 2019-11-08 DIAGNOSIS — L97.222 VARICOSE VEINS OF LEFT LOWER EXTREMITY WITH ULCER OF CALF WITH FAT LAYER EXPOSED (HCC): Primary | ICD-10-CM

## 2019-11-08 DIAGNOSIS — L97.922 NON-PRESSURE CHRONIC ULCER OF LEFT LOWER LEG WITH FAT LAYER EXPOSED (HCC): ICD-10-CM

## 2019-11-08 DIAGNOSIS — I87.2 VENOUS INSUFFICIENCY (CHRONIC) (PERIPHERAL): ICD-10-CM

## 2019-11-08 DIAGNOSIS — I83.022 VARICOSE VEINS OF LEFT LOWER EXTREMITY WITH ULCER OF CALF WITH FAT LAYER EXPOSED (HCC): Primary | ICD-10-CM

## 2019-11-08 PROCEDURE — 97605 NEG PRS WND THER DME<=50SQCM: CPT

## 2019-11-08 PROCEDURE — 11042 DBRDMT SUBQ TIS 1ST 20SQCM/<: CPT

## 2019-11-08 PROCEDURE — 11042 DBRDMT SUBQ TIS 1ST 20SQCM/<: CPT | Performed by: PODIATRIST

## 2019-11-08 RX ORDER — LIDOCAINE HYDROCHLORIDE 20 MG/ML
JELLY TOPICAL ONCE
Status: DISCONTINUED | OUTPATIENT
Start: 2019-11-08 | End: 2019-11-10 | Stop reason: HOSPADM

## 2019-11-08 ASSESSMENT — PAIN SCALES - GENERAL: PAINLEVEL_OUTOF10: 0

## 2019-11-15 ENCOUNTER — HOSPITAL ENCOUNTER (OUTPATIENT)
Dept: WOUND CARE | Age: 69
Discharge: HOME | End: 2019-11-15
Payer: MEDICARE

## 2019-11-15 VITALS
TEMPERATURE: 98.5 F | RESPIRATION RATE: 18 BRPM | BODY MASS INDEX: 52.48 KG/M2 | DIASTOLIC BLOOD PRESSURE: 70 MMHG | HEIGHT: 65 IN | HEART RATE: 76 BPM | WEIGHT: 315 LBS | SYSTOLIC BLOOD PRESSURE: 102 MMHG

## 2019-11-15 DIAGNOSIS — L97.922 NON-PRESSURE CHRONIC ULCER OF LEFT LOWER LEG WITH FAT LAYER EXPOSED (HCC): ICD-10-CM

## 2019-11-15 DIAGNOSIS — I87.2 VENOUS INSUFFICIENCY (CHRONIC) (PERIPHERAL): ICD-10-CM

## 2019-11-15 DIAGNOSIS — I83.022 VARICOSE VEINS OF LEFT LOWER EXTREMITY WITH ULCER OF CALF WITH FAT LAYER EXPOSED (HCC): Primary | ICD-10-CM

## 2019-11-15 DIAGNOSIS — L97.222 VARICOSE VEINS OF LEFT LOWER EXTREMITY WITH ULCER OF CALF WITH FAT LAYER EXPOSED (HCC): Primary | ICD-10-CM

## 2019-11-15 PROCEDURE — 11042 DBRDMT SUBQ TIS 1ST 20SQCM/<: CPT | Performed by: PODIATRIST

## 2019-11-15 PROCEDURE — 11042 DBRDMT SUBQ TIS 1ST 20SQCM/<: CPT

## 2019-11-15 PROCEDURE — 97605 NEG PRS WND THER DME<=50SQCM: CPT

## 2019-11-15 RX ORDER — LIDOCAINE HYDROCHLORIDE 20 MG/ML
JELLY TOPICAL ONCE
Status: DISCONTINUED | OUTPATIENT
Start: 2019-11-15 | End: 2019-11-16 | Stop reason: HOSPADM

## 2019-11-22 ENCOUNTER — HOSPITAL ENCOUNTER (OUTPATIENT)
Dept: WOUND CARE | Age: 69
Discharge: HOME | End: 2019-11-22
Payer: MEDICARE

## 2019-11-22 VITALS
HEART RATE: 82 BPM | RESPIRATION RATE: 18 BRPM | TEMPERATURE: 97.8 F | SYSTOLIC BLOOD PRESSURE: 120 MMHG | DIASTOLIC BLOOD PRESSURE: 80 MMHG

## 2019-11-22 DIAGNOSIS — I83.022 VARICOSE VEINS OF LEFT LOWER EXTREMITY WITH ULCER OF CALF WITH FAT LAYER EXPOSED (HCC): Primary | ICD-10-CM

## 2019-11-22 DIAGNOSIS — L97.922 NON-PRESSURE CHRONIC ULCER OF LEFT LOWER LEG WITH FAT LAYER EXPOSED (HCC): ICD-10-CM

## 2019-11-22 DIAGNOSIS — L97.222 VARICOSE VEINS OF LEFT LOWER EXTREMITY WITH ULCER OF CALF WITH FAT LAYER EXPOSED (HCC): Primary | ICD-10-CM

## 2019-11-22 DIAGNOSIS — I87.2 VENOUS INSUFFICIENCY (CHRONIC) (PERIPHERAL): ICD-10-CM

## 2019-11-22 PROCEDURE — 11042 DBRDMT SUBQ TIS 1ST 20SQCM/<: CPT | Performed by: PODIATRIST

## 2019-11-22 PROCEDURE — 11042 DBRDMT SUBQ TIS 1ST 20SQCM/<: CPT

## 2019-11-22 PROCEDURE — 97607 NEG PRS WND THR NDME<=50SQCM: CPT

## 2019-11-22 RX ORDER — LIDOCAINE HYDROCHLORIDE 20 MG/ML
JELLY TOPICAL ONCE
Status: DISCONTINUED | OUTPATIENT
Start: 2019-11-22 | End: 2019-11-23 | Stop reason: HOSPADM

## 2019-11-22 ASSESSMENT — PAIN SCALES - GENERAL: PAINLEVEL_OUTOF10: 0

## 2019-11-27 ENCOUNTER — HOSPITAL ENCOUNTER (OUTPATIENT)
Dept: WOUND CARE | Age: 69
Discharge: HOME | End: 2019-11-27
Payer: MEDICARE

## 2019-11-27 VITALS
BODY MASS INDEX: 52.48 KG/M2 | SYSTOLIC BLOOD PRESSURE: 122 MMHG | HEART RATE: 80 BPM | DIASTOLIC BLOOD PRESSURE: 74 MMHG | WEIGHT: 315 LBS | TEMPERATURE: 97.8 F | RESPIRATION RATE: 20 BRPM | HEIGHT: 65 IN

## 2019-11-27 DIAGNOSIS — L97.922 NON-PRESSURE CHRONIC ULCER OF LEFT LOWER LEG WITH FAT LAYER EXPOSED (HCC): ICD-10-CM

## 2019-11-27 PROCEDURE — 97605 NEG PRS WND THER DME<=50SQCM: CPT

## 2019-12-06 ENCOUNTER — HOSPITAL ENCOUNTER (OUTPATIENT)
Dept: WOUND CARE | Age: 69
Discharge: HOME | End: 2019-12-06
Payer: MEDICARE

## 2019-12-06 VITALS
TEMPERATURE: 97.9 F | RESPIRATION RATE: 16 BRPM | SYSTOLIC BLOOD PRESSURE: 130 MMHG | DIASTOLIC BLOOD PRESSURE: 80 MMHG | HEART RATE: 80 BPM | HEIGHT: 67 IN | WEIGHT: 315 LBS | BODY MASS INDEX: 49.44 KG/M2

## 2019-12-06 DIAGNOSIS — I87.2 VENOUS INSUFFICIENCY (CHRONIC) (PERIPHERAL): ICD-10-CM

## 2019-12-06 DIAGNOSIS — L97.222 VARICOSE VEINS OF LEFT LOWER EXTREMITY WITH ULCER OF CALF WITH FAT LAYER EXPOSED (HCC): Primary | ICD-10-CM

## 2019-12-06 DIAGNOSIS — I83.022 VARICOSE VEINS OF LEFT LOWER EXTREMITY WITH ULCER OF CALF WITH FAT LAYER EXPOSED (HCC): Primary | ICD-10-CM

## 2019-12-06 DIAGNOSIS — L97.922 NON-PRESSURE CHRONIC ULCER OF LEFT LOWER LEG WITH FAT LAYER EXPOSED (HCC): ICD-10-CM

## 2019-12-06 PROCEDURE — 11042 DBRDMT SUBQ TIS 1ST 20SQCM/<: CPT | Performed by: PODIATRIST

## 2019-12-06 PROCEDURE — 97605 NEG PRS WND THER DME<=50SQCM: CPT

## 2019-12-06 PROCEDURE — 11045 DBRDMT SUBQ TISS EACH ADDL: CPT

## 2019-12-06 PROCEDURE — 11045 DBRDMT SUBQ TISS EACH ADDL: CPT | Performed by: PODIATRIST

## 2019-12-06 PROCEDURE — 11042 DBRDMT SUBQ TIS 1ST 20SQCM/<: CPT

## 2019-12-06 RX ORDER — LIDOCAINE HYDROCHLORIDE 20 MG/ML
JELLY TOPICAL ONCE
Status: DISCONTINUED | OUTPATIENT
Start: 2019-12-06 | End: 2019-12-07 | Stop reason: HOSPADM

## 2019-12-11 ENCOUNTER — NURSE ONLY (OUTPATIENT)
Dept: NON INVASIVE DIAGNOSTICS | Age: 69
End: 2019-12-11
Payer: MEDICARE

## 2019-12-11 PROCEDURE — 93296 REM INTERROG EVL PM/IDS: CPT | Performed by: INTERNAL MEDICINE

## 2019-12-11 PROCEDURE — 93294 REM INTERROG EVL PM/LDLS PM: CPT | Performed by: INTERNAL MEDICINE

## 2019-12-13 ENCOUNTER — HOSPITAL ENCOUNTER (OUTPATIENT)
Dept: WOUND CARE | Age: 69
Discharge: HOME | End: 2019-12-13
Payer: MEDICARE

## 2019-12-13 VITALS
SYSTOLIC BLOOD PRESSURE: 128 MMHG | HEART RATE: 88 BPM | WEIGHT: 315 LBS | DIASTOLIC BLOOD PRESSURE: 80 MMHG | TEMPERATURE: 98.2 F | RESPIRATION RATE: 20 BRPM | BODY MASS INDEX: 49.44 KG/M2 | HEIGHT: 67 IN

## 2019-12-13 DIAGNOSIS — I87.2 VENOUS INSUFFICIENCY (CHRONIC) (PERIPHERAL): ICD-10-CM

## 2019-12-13 DIAGNOSIS — L97.922 NON-PRESSURE CHRONIC ULCER OF LEFT LOWER LEG WITH FAT LAYER EXPOSED (HCC): ICD-10-CM

## 2019-12-13 DIAGNOSIS — L97.222 VARICOSE VEINS OF LEFT LOWER EXTREMITY WITH ULCER OF CALF WITH FAT LAYER EXPOSED (HCC): Primary | ICD-10-CM

## 2019-12-13 DIAGNOSIS — I83.022 VARICOSE VEINS OF LEFT LOWER EXTREMITY WITH ULCER OF CALF WITH FAT LAYER EXPOSED (HCC): Primary | ICD-10-CM

## 2019-12-13 PROCEDURE — 11045 DBRDMT SUBQ TISS EACH ADDL: CPT

## 2019-12-13 PROCEDURE — 11042 DBRDMT SUBQ TIS 1ST 20SQCM/<: CPT

## 2019-12-13 PROCEDURE — 97606 NEG PRS WND THER DME>50 SQCM: CPT

## 2019-12-13 PROCEDURE — 11042 DBRDMT SUBQ TIS 1ST 20SQCM/<: CPT | Performed by: PODIATRIST

## 2019-12-13 PROCEDURE — 11045 DBRDMT SUBQ TISS EACH ADDL: CPT | Performed by: PODIATRIST

## 2019-12-13 RX ORDER — LIDOCAINE HYDROCHLORIDE 20 MG/ML
JELLY TOPICAL ONCE
Status: DISCONTINUED | OUTPATIENT
Start: 2019-12-13 | End: 2019-12-14 | Stop reason: HOSPADM

## 2019-12-20 ENCOUNTER — HOSPITAL ENCOUNTER (OUTPATIENT)
Dept: WOUND CARE | Age: 69
Discharge: HOME | End: 2019-12-20
Payer: MEDICARE

## 2019-12-20 VITALS
HEART RATE: 84 BPM | WEIGHT: 315 LBS | HEIGHT: 67 IN | DIASTOLIC BLOOD PRESSURE: 74 MMHG | RESPIRATION RATE: 18 BRPM | TEMPERATURE: 98.1 F | BODY MASS INDEX: 49.44 KG/M2 | SYSTOLIC BLOOD PRESSURE: 118 MMHG

## 2019-12-20 DIAGNOSIS — L97.922 NON-PRESSURE CHRONIC ULCER OF LEFT LOWER LEG WITH FAT LAYER EXPOSED (HCC): ICD-10-CM

## 2019-12-20 DIAGNOSIS — I83.022 VARICOSE VEINS OF LEFT LOWER EXTREMITY WITH ULCER OF CALF WITH FAT LAYER EXPOSED (HCC): Primary | ICD-10-CM

## 2019-12-20 DIAGNOSIS — L97.222 VARICOSE VEINS OF LEFT LOWER EXTREMITY WITH ULCER OF CALF WITH FAT LAYER EXPOSED (HCC): Primary | ICD-10-CM

## 2019-12-20 DIAGNOSIS — I87.2 VENOUS INSUFFICIENCY (CHRONIC) (PERIPHERAL): ICD-10-CM

## 2019-12-20 PROCEDURE — 11045 DBRDMT SUBQ TISS EACH ADDL: CPT | Performed by: PODIATRIST

## 2019-12-20 PROCEDURE — 11042 DBRDMT SUBQ TIS 1ST 20SQCM/<: CPT | Performed by: PODIATRIST

## 2019-12-20 PROCEDURE — 11042 DBRDMT SUBQ TIS 1ST 20SQCM/<: CPT

## 2019-12-20 PROCEDURE — 11045 DBRDMT SUBQ TISS EACH ADDL: CPT

## 2019-12-20 PROCEDURE — 97605 NEG PRS WND THER DME<=50SQCM: CPT

## 2019-12-20 RX ORDER — LIDOCAINE HYDROCHLORIDE 20 MG/ML
JELLY TOPICAL ONCE
Status: DISCONTINUED | OUTPATIENT
Start: 2019-12-20 | End: 2019-12-21 | Stop reason: HOSPADM

## 2019-12-20 ASSESSMENT — PAIN SCALES - GENERAL: PAINLEVEL_OUTOF10: 0

## 2019-12-27 ENCOUNTER — HOSPITAL ENCOUNTER (OUTPATIENT)
Dept: WOUND CARE | Age: 69
Discharge: HOME | End: 2019-12-27
Payer: MEDICARE

## 2019-12-27 VITALS
HEART RATE: 80 BPM | RESPIRATION RATE: 18 BRPM | DIASTOLIC BLOOD PRESSURE: 80 MMHG | HEIGHT: 67 IN | BODY MASS INDEX: 49.44 KG/M2 | SYSTOLIC BLOOD PRESSURE: 122 MMHG | TEMPERATURE: 98.2 F | WEIGHT: 315 LBS

## 2019-12-27 DIAGNOSIS — L97.222 VARICOSE VEINS OF LEFT LOWER EXTREMITY WITH ULCER OF CALF WITH FAT LAYER EXPOSED (HCC): Primary | ICD-10-CM

## 2019-12-27 DIAGNOSIS — I83.022 VARICOSE VEINS OF LEFT LOWER EXTREMITY WITH ULCER OF CALF WITH FAT LAYER EXPOSED (HCC): Primary | ICD-10-CM

## 2019-12-27 DIAGNOSIS — I87.2 VENOUS INSUFFICIENCY (CHRONIC) (PERIPHERAL): ICD-10-CM

## 2019-12-27 DIAGNOSIS — L97.922 NON-PRESSURE CHRONIC ULCER OF LEFT LOWER LEG WITH FAT LAYER EXPOSED (HCC): ICD-10-CM

## 2019-12-27 PROCEDURE — 11042 DBRDMT SUBQ TIS 1ST 20SQCM/<: CPT | Performed by: PODIATRIST

## 2019-12-27 PROCEDURE — 11045 DBRDMT SUBQ TISS EACH ADDL: CPT | Performed by: PODIATRIST

## 2019-12-27 PROCEDURE — 11045 DBRDMT SUBQ TISS EACH ADDL: CPT

## 2019-12-27 PROCEDURE — 11042 DBRDMT SUBQ TIS 1ST 20SQCM/<: CPT

## 2019-12-27 PROCEDURE — 97605 NEG PRS WND THER DME<=50SQCM: CPT

## 2019-12-27 RX ORDER — LIDOCAINE HYDROCHLORIDE 20 MG/ML
JELLY TOPICAL ONCE
Status: DISCONTINUED | OUTPATIENT
Start: 2019-12-27 | End: 2019-12-28 | Stop reason: HOSPADM

## 2019-12-30 ENCOUNTER — PROCEDURE VISIT (OUTPATIENT)
Dept: PODIATRY | Age: 69
End: 2019-12-30
Payer: MEDICARE

## 2019-12-30 VITALS — HEIGHT: 67 IN | BODY MASS INDEX: 49.44 KG/M2 | WEIGHT: 315 LBS

## 2019-12-30 DIAGNOSIS — R26.2 DIFFICULTY WALKING: ICD-10-CM

## 2019-12-30 DIAGNOSIS — I87.2 VENOUS INSUFFICIENCY (CHRONIC) (PERIPHERAL): ICD-10-CM

## 2019-12-30 DIAGNOSIS — M79.675 PAIN OF TOE OF LEFT FOOT: ICD-10-CM

## 2019-12-30 DIAGNOSIS — B35.1 ONYCHOMYCOSIS: Primary | ICD-10-CM

## 2019-12-30 DIAGNOSIS — E11.51 TYPE II DIABETES MELLITUS WITH PERIPHERAL CIRCULATORY DISORDER (HCC): ICD-10-CM

## 2019-12-30 DIAGNOSIS — M79.674 PAIN OF TOE OF RIGHT FOOT: ICD-10-CM

## 2019-12-30 PROCEDURE — 11721 DEBRIDE NAIL 6 OR MORE: CPT | Performed by: PODIATRIST

## 2020-01-03 ENCOUNTER — HOSPITAL ENCOUNTER (OUTPATIENT)
Dept: WOUND CARE | Age: 70
Discharge: HOME | End: 2020-01-03
Payer: MEDICARE

## 2020-01-03 VITALS
TEMPERATURE: 98.1 F | RESPIRATION RATE: 18 BRPM | DIASTOLIC BLOOD PRESSURE: 70 MMHG | SYSTOLIC BLOOD PRESSURE: 118 MMHG | HEART RATE: 80 BPM

## 2020-01-03 PROCEDURE — 11042 DBRDMT SUBQ TIS 1ST 20SQCM/<: CPT | Performed by: PODIATRIST

## 2020-01-03 PROCEDURE — 11045 DBRDMT SUBQ TISS EACH ADDL: CPT | Performed by: PODIATRIST

## 2020-01-03 PROCEDURE — 11042 DBRDMT SUBQ TIS 1ST 20SQCM/<: CPT

## 2020-01-03 PROCEDURE — 11045 DBRDMT SUBQ TISS EACH ADDL: CPT

## 2020-01-03 RX ORDER — LIDOCAINE HYDROCHLORIDE 20 MG/ML
JELLY TOPICAL ONCE
Status: DISCONTINUED | OUTPATIENT
Start: 2020-01-03 | End: 2020-01-04 | Stop reason: HOSPADM

## 2020-01-03 ASSESSMENT — PAIN SCALES - GENERAL: PAINLEVEL_OUTOF10: 0

## 2020-01-03 NOTE — PROGRESS NOTES
Laterality Date    CARDIAC PACEMAKER PLACEMENT  04/09/10    [employer]tronic    CHOLECYSTECTOMY      COLONOSCOPY  2016    JOINT REPLACEMENT Bilateral     Left TJAK     Family History   Problem Relation Age of Onset    Heart Disease Father            [employer] Other Mother             Other Brother         gall bladder     Social History     Tobacco Use    Smoking status: Former Smoker     Packs/day: 2.00     Years: 25.00     Pack years: 50.00     Types: Cigarettes     Start date: 1964     Last attempt to quit: 1990     Years since quittin.5    Smokeless tobacco: Never Used   Substance Use Topics    Alcohol use: No     Alcohol/week: 0.0 standard drinks     Comment: rare   2 beers/month    Drug use: No     Allergies   Allergen Reactions    Other Hives and Shortness Of Breath     \"allergic to Potatoe white. \"    Adaptic Non-Adhering Dressing  [Wound Dressings]     Clindamycin/Lincomycin      Not sure     Current Outpatient Medications on File Prior to Encounter   Medication Sig Dispense Refill    Indacaterol-Glycopyrrolate (Michaelkathy Donath) 27.5-15.6 MCG CAPS Inhale 2 puffs into the lungs 2 times daily 60 capsule 5    vitamin C (ASCORBIC ACID) 500 MG tablet Take 500 mg by mouth daily      rosuvastatin (CRESTOR) 5 MG tablet Take 5 mg by mouth daily       glipiZIDE (GLUCOTROL XL) 5 MG extended release tablet Take 5 mg by mouth daily  3    ramipril (ALTACE) 10 MG capsule Take 10 mg by mouth daily       potassium chloride (KLOR-CON M) 20 MEQ extended release tablet Take 20 mEq by mouth daily       Cholecalciferol (VITAMIN D3) 2000 UNITS CAPS Take by mouth daily Last dose 16      carvedilol (COREG) 25 MG tablet Take 25 mg by mouth 2 times daily (with meals) Instructed to take with sip water am of procedure      Omega-3 Fatty Acids (FISH OIL) 600 MG CAPS Take 1,000 mg by mouth daily Last Dose 16      liothyronine (CYTOMEL) 50 MCG tablet Take 75 mcg by mouth daily       gabapentin (NEURONTIN) 300 MG capsule Take 300 mg by mouth 3 times daily as needed Instructed to take with sip water am of procedure      aspirin 81 MG EC tablet Take 81 mg by mouth daily Last dose 1-14-16      albuterol (ACCUNEB) 0.63 MG/3ML nebulizer solution Take 3 mLs by nebulization every 6 hours as needed for Wheezing 270 mL 3     No current facility-administered medications on file prior to encounter.         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 ([address] - Primary          Pre Debridement Measurements:  Are located in the [facility]  Documentation Flow Sheet  Post Debridement Measurements:  Wound/Ulcer Descriptions are Pre Debridement except measurements:     Wound 03/22/19 Leg Left;Lateral #1 acq: 3-22-17 Venous (Active)   Wound Image   1/3/2020  1:10 PM   Wound Diabetic [place] 1 3/22/2019  2:06 PM   Dressing Status Other (Comment) 7/12/2019  2:06 PM   Dressing Changed Changed/New 12/27/2019  1:31 PM   Dressing/Treatment Vacuum dressing 12/27/2019  1:31 PM   Wound Cleansed Rinsed/Irrigated with saline 12/27/2019  1:31 PM   Wound Length (cm) 5.5 cm 1/3/2020  1:08 PM   Wound Width (cm) 9 cm 1/3/2020  1:08 PM   Wound Depth (cm) 0.2 cm 1/3/2020  1:08 PM   Wound Surface Area (cm^2) 49.5 cm^2 1/3/2020  1:08 PM   Change in Wound Size % (l*w) -103.7 1/3/2020  1:08 PM   Wound Volume (cm^3) 9.9 cm^3 1/3/2020  1:08 PM   Wound Healing % -307 Orders     Discharge condition: Stable     Assessment of pain at discharge:none     Anesthetic used: 2% lidocaine gel     Discharge to: Home     Left via:Private automobile     Accompanied by:  child     ECF/HHA: n/a     Dressing Orders: LEFT LOWER LEG Cleanse with normal saline, apply dakins moistened guaze and  dry dressing daily and as needed. Compression hose to right leg-over the counter strength     Treatment Orders:Eat a diet high in protein and vitamin C. Take a multiple vitamin daily unless contraindicated.      [facility] followup visit :1week dr. romero________________________  (Please note your next appointment above and if you are unable to keep, kindly give a 24 hour notice.  Thank you.)     Physician signature:__________________________        If you experience any of the following, please call the [facility] during business hours:     * Increase in Pain  * Temperature over 101  * Increase in drainage from your wound  * Drainage with a foul odor  * Bleeding  * Increase in swelling  * Need for compression bandage changes due to slippage, breakthrough drainage.     If you need medical attention outside of the business hours of the [address] please contact your PCP or go to the nearest emergency room.                                                                                                                                                                                                  Electronically signed by Pearl Alvarez DPM on 1/3/2020 at 1:36 PM

## 2020-01-07 NOTE — PROGRESS NOTES
See [facility] [place] report. Remote monitoring reviewed over a 90 day period. End of 90 day monitoring period date of service 12-12-19. Message left for patient -remote transmission received - battery longevity decreasing- please resend 1-.

## 2020-01-10 ENCOUNTER — HOSPITAL ENCOUNTER (OUTPATIENT)
Dept: WOUND CARE | Age: 70
Discharge: HOME | End: 2020-01-10
Payer: MEDICARE

## 2020-01-10 VITALS
HEART RATE: 88 BPM | SYSTOLIC BLOOD PRESSURE: 142 MMHG | RESPIRATION RATE: 24 BRPM | HEIGHT: 67 IN | BODY MASS INDEX: 49.44 KG/M2 | TEMPERATURE: 98.5 F | DIASTOLIC BLOOD PRESSURE: 80 MMHG | WEIGHT: 315 LBS

## 2020-01-10 PROCEDURE — 11042 DBRDMT SUBQ TIS 1ST 20SQCM/<: CPT

## 2020-01-10 PROCEDURE — 11042 DBRDMT SUBQ TIS 1ST 20SQCM/<: CPT | Performed by: PODIATRIST

## 2020-01-10 PROCEDURE — 11045 DBRDMT SUBQ TISS EACH ADDL: CPT | Performed by: PODIATRIST

## 2020-01-10 RX ORDER — LIDOCAINE HYDROCHLORIDE 20 MG/ML
JELLY TOPICAL ONCE
Status: DISCONTINUED | OUTPATIENT
Start: 2020-01-10 | End: 2020-01-11 | Stop reason: HOSPADM

## 2020-01-10 NOTE — PROGRESS NOTES
meals) Instructed to take with sip water am of procedure      Omega-3 Fatty Acids (FISH OIL) 600 MG CAPS Take 1,000 mg by mouth daily Last Dose 1-14-16      liothyronine (CYTOMEL) 50 MCG tablet Take 75 mcg by mouth daily       aspirin 81 MG EC tablet Take 81 mg by mouth daily Last dose 1-14-16      gabapentin (NEURONTIN) 300 MG capsule Take 300 mg by mouth 3 times daily as needed Instructed to take with sip water am of procedure       No current facility-administered medications on file prior to encounter.         REVIEW OF SYSTEMS See HPI    Objective:    BP (!) 142/80   Pulse 88   Temp 98.5 °F (36.9 °C) (Oral)   Resp 24   Ht 5' 7\" (1.702 m)   Wt (!) 373 lb (169.2 kg)   BMI 58.42 kg/m²   Wt Readings from Last 3 Encounters:   01/10/20 (!) 373 lb (169.2 kg)   12/30/19 (!) 373 lb (169.2 kg)   12/27/19 (!) 373 lb (169.2 kg)     PHYSICAL EXAM  CONSTITUTIONAL:   Awake, alert, cooperative and in no acute distress  SKIN:  Open wound Present    Assessment:     Problem List Items Addressed This Visit     Non-pressure chronic ulcer of left lower leg with fat layer exposed ([address]    Venous insufficiency (chronic) (peripheral)    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 ([address] - Primary          Pre Debridement Measurements:  Are located in the [place]  Documentation Flow Sheet  Post Debridement Measurements:  Wound/Ulcer Descriptions are Pre Debridement except measurements:     Wound 03/22/19 Leg Left;Lateral #1 acq: 3-22-17 Venous (Active)   Wound Image   1/3/2020  1:10 PM   Wound Diabetic [place] 1 3/22/2019  2:06 PM   Dressing Status Other (Comment) 7/12/2019  2:06 PM   Dressing Changed Changed/New 1/3/2020  3:54 PM   Wound Cleansed Rinsed/Irrigated with saline 12/27/2019  1:31 PM   Wound Length (cm) 5.5 cm 1/10/2020 12:56 PM   Wound Width (cm) 6.5 cm 1/10/2020 12:56 PM   Wound Depth (cm) 0.2 cm 1/10/2020 12:56 PM   Wound Surface Area (cm^2) 35.75 cm^2 1/10/2020 12:56 PM Change in Wound Size % (l*w) -47.12 1/10/2020 12:56 PM   Wound Volume (cm^3) 7.15 cm^3 1/10/2020 12:56 PM   Wound Healing % -194 1/10/2020 12:56 PM   Post-Procedure Length (cm) 5.5 cm 1/10/2020  1:24 PM   Post-Procedure Width (cm) 6.5 cm 1/10/2020  1:24 PM   Post-Procedure Depth (cm) 0.2 cm 1/10/2020  1:24 PM   Post-Procedure Surface Area (cm^2) 35.75 cm^2 1/10/2020  1:24 PM   Post-Procedure Volume (cm^3) 7.15 cm^3 1/10/2020  1:24 PM   Distance Tunneling (cm) 4.5 cm 7/19/2019  1:32 PM   Tunneling Position ___ O'Clock 7 7/19/2019  1:32 PM   Undermining Starts ___ O'Clock 0.3 7/19/2019  1:32 PM   Undermining Maxium Distance (cm) p 8/2/2019  1:08 PM   Wound Assessment Yellow;Red 1/10/2020 12:56 PM   Drainage Amount Copious 1/10/2020 12:56 PM   Drainage Description Yellow 1/10/2020 12:56 PM   Odor None 1/10/2020 12:56 PM   Maria Elena-wound Assessment White 1/10/2020 12:56 PM   Number of days: 294          Procedure Note  Indications:  Based on my examination of this patient's wound(s)/ulcer(s) today, debridement is required to promote healing and evaluate the wound base. Performed by: Rodrigo Turpin DPM    Consent obtained:  Yes    Time out taken:  Yes    Pain Control: Anesthetic  Anesthetic: 2% Lidocaine Gel Topical     Debridement:Excisional Debridement    Using curette the wound(s)/ulcer(s) was/were sharply debrided down through and including the removal of subcutaneous tissue. Devitalized Tissue Debrided:  fibrin, biofilm and slough to stimulate bleeding to promote healing, post debridement good bleeding base and wound edges noted    Wound/Ulcer #: 1    Percent of Wound/Ulcer Debrided: 100%    Total Surface Area Debrided:  35.75 sq cm (debridement + 1)    Estimated Blood Loss:  Minimal  Hemostasis Achieved:  by pressure    Procedural Pain:  2  / 10   Post Procedural Pain:  0 / 10     Response to treatment:  Well tolerated by patient.      Plan:   Treatment Note please see attached Discharge

## 2020-01-17 ENCOUNTER — HOSPITAL ENCOUNTER (OUTPATIENT)
Dept: WOUND CARE | Age: 70
Discharge: HOME | End: 2020-01-17
Payer: MEDICARE

## 2020-01-17 VITALS
DIASTOLIC BLOOD PRESSURE: 80 MMHG | HEART RATE: 84 BPM | SYSTOLIC BLOOD PRESSURE: 120 MMHG | TEMPERATURE: 98.4 F | RESPIRATION RATE: 22 BRPM

## 2020-01-17 PROCEDURE — 11045 DBRDMT SUBQ TISS EACH ADDL: CPT | Performed by: PODIATRIST

## 2020-01-17 PROCEDURE — 11045 DBRDMT SUBQ TISS EACH ADDL: CPT

## 2020-01-17 PROCEDURE — 11042 DBRDMT SUBQ TIS 1ST 20SQCM/<: CPT

## 2020-01-17 PROCEDURE — 11042 DBRDMT SUBQ TIS 1ST 20SQCM/<: CPT | Performed by: PODIATRIST

## 2020-01-17 NOTE — PROGRESS NOTES
 Varicose veins      Past Surgical History:   Procedure Laterality Date    CARDIAC PACEMAKER PLACEMENT  04/09/10    Medtronic    CHOLECYSTECTOMY      COLONOSCOPY  2016    JOINT REPLACEMENT Bilateral     Left TJAK     Family History   Problem Relation Age of Onset    Heart Disease Father            Bedelia Fruits Other Mother             Other Brother         gall bladder     Social History     Tobacco Use    Smoking status: Former Smoker     Packs/day: 2.00     Years: 25.00     Pack years: 50.00     Types: Cigarettes     Start date: 1964     Last attempt to quit: 1990     Years since quittin.5    Smokeless tobacco: Never Used   Substance Use Topics    Alcohol use: No     Alcohol/week: 0.0 standard drinks     Comment: rare   2 beers/month    Drug use: No     Allergies   Allergen Reactions    Other Hives and Shortness Of Breath     \"allergic to Potatoe white. \"    Adaptic Non-Adhering Dressing  [Wound Dressings]     Clindamycin/Lincomycin      Not sure     Current Outpatient Medications on File Prior to Encounter   Medication Sig Dispense Refill    Indacaterol-Glycopyrrolate (Luis Gulling) 27.5-15.6 MCG CAPS Inhale 2 puffs into the lungs 2 times daily 180 capsule 3    sodium hypochlorite (DAKINS) 0.25 % SOLN Irrigate with 2 mLs as directed daily 473 mL 0    albuterol (ACCUNEB) 0.63 MG/3ML nebulizer solution Take 3 mLs by nebulization every 6 hours as needed for Wheezing 270 mL 3    vitamin C (ASCORBIC ACID) 500 MG tablet Take 500 mg by mouth daily      rosuvastatin (CRESTOR) 5 MG tablet Take 5 mg by mouth daily       glipiZIDE (GLUCOTROL XL) 5 MG extended release tablet Take 5 mg by mouth daily  3    ramipril (ALTACE) 10 MG capsule Take 10 mg by mouth daily       potassium chloride (KLOR-CON M) 20 MEQ extended release tablet Take 20 mEq by mouth daily       Cholecalciferol (VITAMIN D3) 2000 UNITS CAPS Take by mouth daily Last dose 16      carvedilol (COREG) 25 MG tablet Take 25 mg by mouth 2 times daily (with meals) Instructed to take with sip water am of procedure      Omega-3 Fatty Acids (FISH OIL) 600 MG CAPS Take 1,000 mg by mouth daily Last Dose 1-14-16      liothyronine (CYTOMEL) 50 MCG tablet Take 75 mcg by mouth daily       gabapentin (NEURONTIN) 300 MG capsule Take 300 mg by mouth 3 times daily as needed Instructed to take with sip water am of procedure      aspirin 81 MG EC tablet Take 81 mg by mouth daily Last dose 1-14-16       No current facility-administered medications on file prior to encounter. 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 ([address] - Primary          Pre Debridement Measurements:  Are located in the [facility]  Documentation Flow Sheet  Post Debridement Measurements:  Wound/Ulcer Descriptions are Pre Debridement except measurements:     Wound 03/22/19 Leg Left;Lateral #1 acq: 3-22-17 Venous (Active)   Wound Image   1/3/2020  1:10 PM   Wound Diabetic [place] 1 3/22/2019  2:06 PM   Dressing Status Clean;Dry; Intact 1/10/2020  1:45 PM   Dressing Changed Changed/New 1/10/2020  1:45 PM   Dressing/Treatment Moist to dry;ABD 1/10/2020  1:45 PM   Wound Cleansed Rinsed/Irrigated with saline 1/10/2020  1:45 PM   Wound Length (cm) 5.5 cm 1/17/2020  1:09 PM   Wound Width (cm) 8.5 cm 1/17/2020  1:09 PM   Wound Depth (cm) 0.2 cm 1/17/2020  1:09 PM   Wound Surface Area (cm^2)

## 2020-01-24 ENCOUNTER — HOSPITAL ENCOUNTER (OUTPATIENT)
Dept: WOUND CARE | Age: 70
Discharge: HOME | End: 2020-01-24
Payer: MEDICARE

## 2020-01-24 VITALS
HEART RATE: 78 BPM | BODY MASS INDEX: 49.44 KG/M2 | DIASTOLIC BLOOD PRESSURE: 80 MMHG | HEIGHT: 67 IN | SYSTOLIC BLOOD PRESSURE: 132 MMHG | RESPIRATION RATE: 20 BRPM | WEIGHT: 315 LBS | TEMPERATURE: 97.7 F

## 2020-01-24 PROCEDURE — 11045 DBRDMT SUBQ TISS EACH ADDL: CPT

## 2020-01-24 PROCEDURE — 11042 DBRDMT SUBQ TIS 1ST 20SQCM/<: CPT | Performed by: PODIATRIST

## 2020-01-24 PROCEDURE — 11042 DBRDMT SUBQ TIS 1ST 20SQCM/<: CPT

## 2020-01-24 PROCEDURE — 11045 DBRDMT SUBQ TISS EACH ADDL: CPT | Performed by: PODIATRIST

## 2020-01-24 RX ORDER — LIDOCAINE HYDROCHLORIDE 20 MG/ML
JELLY TOPICAL ONCE
Status: DISCONTINUED | OUTPATIENT
Start: 2020-01-24 | End: 2020-01-25 | Stop reason: HOSPADM

## 2020-01-24 ASSESSMENT — PAIN SCALES - GENERAL: PAINLEVEL_OUTOF10: 0

## 2020-01-24 ASSESSMENT — PAIN DESCRIPTION - LOCATION: LOCATION: LEG

## 2020-01-24 ASSESSMENT — PAIN DESCRIPTION - ORIENTATION: ORIENTATION: LEFT

## 2020-01-24 ASSESSMENT — PAIN DESCRIPTION - DESCRIPTORS: DESCRIPTORS: BURNING

## 2020-01-24 NOTE — PROGRESS NOTES
Wound Healing Center Followup Visit Note    Referring Physician : MD Tariq G[address]mez [address] RECORD NUMBER:  [id number]  AGE: 68 y.o. GENDER: male  : 1950  EPISODE DATE:  2020    Subjective:     Chief Complaint   Patient presents with    Wound Check     LEFT LEG WOUND      HISTORY of PRESENT ILLNESS ALY Mack is a 68 y.o. male who presents today in regards to follow up evaluation and treatment of wound/ulcer. That patient's past medical, family and social hx were reviewed and changes were made if present. History of Wound Context:  Patient denies any nausea, vomiting, fever, chills, shortness of breath, chest pain, calf cramping and/or pain. Patient is tolerating the current dressing regimen without issues. No other new issues noted at this time.      Wound/Ulcer Pain Timing/Severity: none  Quality of pain: N/A  Severity:  0 / 10   Modifying Factors: None  Associated Signs/Symptoms: edema    Ulcer Identification:  Ulcer Type: venous and diabetic  Contributing Factors: edema, venous stasis, diabetes, poor glucose control and obesity    Diabetic/Pressure/Non Pressure Ulcers only:  Ulcer: Non-Pressure ulcer, fat layer exposed    Wound: Contusion        PAST MEDICAL HISTORY      Diagnosis Date    CAD (coronary artery disease)     Cataracts, bilateral 2019    Cellulitis -    Left leg    Complete heart block (HCC)     h/o    Diabetes mellitus ([address]     Encounter for aspirin therapy     patient uses for heart health    H/O asbestosis     Hyperlipidemia     Hypertension     Hyperthyroidism     Lung disorder     weak lungs    Myocardial infarct ([address]     Neuropathy     feet    Obesity     Obstructive sleep apnea     on cpap    Onychomycosis     Osteoarthritis     Pacemaker     Medtronic    Post-thrombotic syndrome 2017    Screening 16    for Colonoscopy    Stasis dermatitis     h/o on bilateral legs with leg edema    to treatment:  Well tolerated by patient. Plan:   Treatment Note please see attached Discharge Instructions    Written patient dismissal instructions given to patient and signed by patient or POA. Discharge Instructions         Visit Discharge/Physician Orders     Discharge condition: Stable     Assessment of pain at discharge:none     Anesthetic used: 2% lidocaine gel     Discharge to: Home     Left via:Private automobile     Accompanied by:  wife     ECF/HHA:[employer] for supplies     Dressing Orders: LEFT LOWER LEG: for this week, continue to  Cleanse with normal saline, apply HY PO SEPT moistened guaze and  dry dressing daily and as needed. APPLY skin emollient daily to left leg     Compression hose to right leg-over the counter strength. Apply SPANDAGRIP to left leg; wear during day; may remove at night.      Treatment Orders  When seated, elevate legs to heart level if able. :Eat a diet high in protein and vitamin C., limit sodium and salt intake during day. READ all labels for SODIUM content. Take a multiple vitamin daily unless contraindicated.      [facility] followup visit :1week dr. romero________________________  (Please note your next appointment above and if you are unable to keep, kindly give a 24 hour notice.  Thank you.)     Physician signature:__________________________        If you experience any of the following, please call the [facility] during business hours:     * Increase in Pain  * Temperature over 101  * Increase in drainage from your wound  * Drainage with a foul odor  * Bleeding  * Increase in swelling  * Need for compression bandage changes due to slippage, breakthrough drainage.     If you need medical attention outside of the business hours of the [facility] please contact your PCP or go to the nearest emergency room.                                                                                     Electronically signed by Griselda Lopez DPM on 1/24/2020 at 2:17 PM

## 2020-01-27 ENCOUNTER — TELEPHONE (OUTPATIENT)
Dept: NON INVASIVE DIAGNOSTICS | Age: 70
End: 2020-01-27

## 2020-01-27 NOTE — TELEPHONE ENCOUNTER
Reviewed 2 remotes from 1-23-20. Watching battery. 10 months, <1-21 months. Presenting: AS- @ 75 ppm.  AP: 19.6%   : 99.8%    1 NSVT on 1-12-20 for 6 beats. Called and spoke with the patient to let him know his battery is fine and to resend a remote on 2-27-20 (not wireless). He states he is feeling fine.     Michael Sauceda RN  [facility] [address]

## 2020-01-31 ENCOUNTER — HOSPITAL ENCOUNTER (OUTPATIENT)
Dept: WOUND CARE | Age: 70
Discharge: HOME | End: 2020-01-31
Payer: MEDICARE

## 2020-01-31 VITALS
DIASTOLIC BLOOD PRESSURE: 72 MMHG | TEMPERATURE: 98.1 F | SYSTOLIC BLOOD PRESSURE: 142 MMHG | HEART RATE: 78 BPM | RESPIRATION RATE: 20 BRPM

## 2020-01-31 PROCEDURE — 11045 DBRDMT SUBQ TISS EACH ADDL: CPT | Performed by: PODIATRIST

## 2020-01-31 PROCEDURE — 11042 DBRDMT SUBQ TIS 1ST 20SQCM/<: CPT | Performed by: PODIATRIST

## 2020-01-31 PROCEDURE — 11042 DBRDMT SUBQ TIS 1ST 20SQCM/<: CPT

## 2020-01-31 PROCEDURE — 11045 DBRDMT SUBQ TISS EACH ADDL: CPT

## 2020-01-31 RX ORDER — LIDOCAINE HYDROCHLORIDE 20 MG/ML
JELLY TOPICAL ONCE
Status: DISCONTINUED | OUTPATIENT
Start: 2020-01-31 | End: 2020-02-01 | Stop reason: HOSPADM

## 2020-01-31 ASSESSMENT — PAIN SCALES - GENERAL: PAINLEVEL_OUTOF10: 0

## 2020-01-31 NOTE — PROGRESS NOTES
Wound Healing Center Followup Visit Note    Referring Physician : MD Tariq Vas[address] 112 RECORD NUMBER:  [id number]  AGE: 68 y.o. GENDER: male  : 1950  EPISODE DATE:  2020    Subjective:     Chief Complaint   Patient presents with    Wound Check     LEFT LATERALOWER LEG      HISTORY of PRESENT ILLNESS HPI   Jordyn Borden is a 68 y.o. male who presents today in regards to follow up evaluation and treatment of wound/ulcer. That patient's past medical, family and social hx were reviewed and changes were made if present. History of Wound Context:  Patient denies any nausea, vomiting, fever, chills, shortness of breath, chest pain, calf cramping and/or pain. Patient is tolerating the current dressing regimen without issues. No other new issues noted at this time.      Wound/Ulcer Pain Timing/Severity: none  Quality of pain: N/A  Severity:  0 / 10   Modifying Factors: None  Associated Signs/Symptoms: edema    Ulcer Identification:  Ulcer Type: venous and diabetic  Contributing Factors: lymphedema, diabetes and obesity    Diabetic/Pressure/Non Pressure Ulcers only:  Ulcer: Non-Pressure ulcer, fat layer exposed    Wound: Chronic venous ulceration left leg        PAST MEDICAL HISTORY      Diagnosis Date    CAD (coronary artery disease)     Cataracts, bilateral 2019    Cellulitis -    Left leg    Complete heart block (HCC)     h/o    Diabetes mellitus ([address]     Encounter for aspirin therapy     patient uses for heart health    H/O asbestosis     Hyperlipidemia     Hypertension     Hyperthyroidism     Lung disorder     weak lungs    Myocardial infarct ([address] 2010    Neuropathy     feet    Obesity     Obstructive sleep apnea     on cpap    Onychomycosis     Osteoarthritis     Pacemaker     Medtronic    Post-thrombotic syndrome 2017    Screening 16    for Colonoscopy    Stasis dermatitis     h/o on bilateral legs with leg edema    Varicose veins      Past Surgical History:   Procedure Laterality Date    CARDIAC PACEMAKER PLACEMENT  04/09/10    Medtronic    CHOLECYSTECTOMY      COLONOSCOPY  2016    JOINT REPLACEMENT Bilateral     Left TJAK     Family History   Problem Relation Age of Onset    Heart Disease Father            [employer] Fruits Other Mother             Other Brother         gall bladder     Social History     Tobacco Use    Smoking status: Former Smoker     Packs/day: 2.00     Years: 25.00     Pack years: 50.00     Types: Cigarettes     Start date: 1964     Last attempt to quit: 1990     Years since quittin.5    Smokeless tobacco: Never Used   Substance Use Topics    Alcohol use: No     Alcohol/week: 0.0 standard drinks     Comment: rare   2 beers/month    Drug use: No     Allergies   Allergen Reactions    Other Hives and Shortness Of Breath     \"allergic to Potatoe white. \"    Adaptic Non-Adhering Dressing  [Wound Dressings]     Clindamycin/Lincomycin      Not sure     Current Outpatient Medications on File Prior to Encounter   Medication Sig Dispense Refill    Indacaterol-Glycopyrrolate (Luis Gulling) 27.5-15.6 MCG CAPS Inhale 2 puffs into the lungs 2 times daily 180 capsule 3    sodium hypochlorite (DAKINS) 0.25 % SOLN Irrigate with 2 mLs as directed daily 473 mL 0    albuterol (ACCUNEB) 0.63 MG/3ML nebulizer solution Take 3 mLs by nebulization every 6 hours as needed for Wheezing 270 mL 3    vitamin C (ASCORBIC ACID) 500 MG tablet Take 500 mg by mouth daily      rosuvastatin (CRESTOR) 5 MG tablet Take 5 mg by mouth daily       glipiZIDE (GLUCOTROL XL) 5 MG extended release tablet Take 5 mg by mouth daily  3    ramipril (ALTACE) 10 MG capsule Take 10 mg by mouth daily       potassium chloride (KLOR-CON M) 20 MEQ extended release tablet Take 20 mEq by mouth daily       Cholecalciferol (VITAMIN D3) 2000 UNITS CAPS Take by mouth daily Last dose 16      carvedilol (COREG) 25 MG tablet Take 25 mg by mouth 2 times daily (with meals) Instructed to take with sip water am of procedure      Omega-3 Fatty Acids (FISH OIL) 600 MG CAPS Take 1,000 mg by mouth daily Last Dose 1-14-16      liothyronine (CYTOMEL) 50 MCG tablet Take 75 mcg by mouth daily       gabapentin (NEURONTIN) 300 MG capsule Take 300 mg by mouth 3 times daily as needed Instructed to take with sip water am of procedure      aspirin 81 MG EC tablet Take 81 mg by mouth daily Last dose 1-14-16       No current facility-administered medications on file prior to encounter. 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 ([address] - Primary          Pre Debridement Measurements:  Are located in the [facility]  Documentation Flow Sheet  Post Debridement Measurements:  Wound/Ulcer Descriptions are Pre Debridement except measurements:     Wound 03/22/19 Leg Left;Lateral #1 acq: 3-22-17 Venous (Active)   Wound Image   1/3/2020  1:10 PM   Wound Diabetic [place] 1 3/22/2019  2:06 PM   Dressing Status Clean;Dry; Intact 1/31/2020  1:42 PM   Dressing Changed Changed/New 1/31/2020  1:42 PM   Dressing/Treatment ABD; Moist to dry 1/31/2020  1:42 PM   Wound Cleansed Other (Comment); Rinsed/Irrigated with saline 1/31/2020  1:42 PM   Wound Length (cm) 5.7 cm 1/31/2020  1:17 PM   Wound Width (cm) 8.5 cm 1/31/2020  1:17 PM   Wound Depth (cm) 0.4 cm 1/31/2020  1:17 PM Wound Surface Area (cm^2) 48.45 cm^2 1/31/2020  1:17 PM   Change in Wound Size % (l*w) -99.38 1/31/2020  1:17 PM   Wound Volume (cm^3) 19.38 cm^3 1/31/2020  1:17 PM   Wound Healing % -698 1/31/2020  1:17 PM   Post-Procedure Length (cm) 5.7 cm 1/31/2020  1:36 PM   Post-Procedure Width (cm) 8.5 cm 1/31/2020  1:36 PM   Post-Procedure Depth (cm) 0.5 cm 1/31/2020  1:36 PM   Post-Procedure Surface Area (cm^2) 48.45 cm^2 1/31/2020  1:36 PM   Post-Procedure Volume (cm^3) 24.22 cm^3 1/31/2020  1:36 PM   Distance Tunneling (cm) 4.5 cm 7/19/2019  1:32 PM   Tunneling Position ___ O'Clock 7 7/19/2019  1:32 PM   Undermining Starts ___ O'Clock 0.3 7/19/2019  1:32 PM   Undermining Maxium Distance (cm) p 8/2/2019  1:08 PM   Wound Assessment Pink;Red;Yellow 1/31/2020  1:17 PM   Drainage Amount Small 1/31/2020  1:17 PM   Drainage Description Yellow 1/31/2020  1:17 PM   Odor None 1/31/2020  1:17 PM   Maria Elena-wound Assessment Pink 1/31/2020  1:17 PM   Number of days: 315          Procedure Note  Indications:  Based on my examination of this patient's wound(s)/ulcer(s) today, debridement is required to promote healing and evaluate the wound base. Performed by: Mana Macdonald DPM    Consent obtained:  Yes    Time out taken:  Yes    Pain Control: Anesthetic  Anesthetic: 2% Lidocaine Gel Topical     Debridement:Excisional Debridement    Using curette the wound(s)/ulcer(s) was/were sharply debrided down through and including the removal of subcutaneous tissue. Devitalized Tissue Debrided:  fibrin and biofilm to stimulate bleeding to promote healing, post debridement good bleeding base and wound edges noted    Wound/Ulcer #: 1    Percent of Wound/Ulcer Debrided: 100%    Total Surface Area Debrided:  48.45 sq cm (debridement + 2)    Estimated Blood Loss:  Minimal  Hemostasis Achieved:  by pressure    Procedural Pain:  2  / 10   Post Procedural Pain:  0 / 10     Response to treatment:  Well tolerated by patient.   We are going

## 2020-02-07 ENCOUNTER — HOSPITAL ENCOUNTER (OUTPATIENT)
Dept: WOUND CARE | Age: 70
Discharge: HOME | End: 2020-02-07
Payer: MEDICARE

## 2020-02-07 VITALS
SYSTOLIC BLOOD PRESSURE: 122 MMHG | DIASTOLIC BLOOD PRESSURE: 78 MMHG | TEMPERATURE: 98.1 F | RESPIRATION RATE: 16 BRPM | HEART RATE: 77 BPM

## 2020-02-07 PROCEDURE — 11045 DBRDMT SUBQ TISS EACH ADDL: CPT | Performed by: PODIATRIST

## 2020-02-07 PROCEDURE — 11042 DBRDMT SUBQ TIS 1ST 20SQCM/<: CPT | Performed by: PODIATRIST

## 2020-02-07 PROCEDURE — 11042 DBRDMT SUBQ TIS 1ST 20SQCM/<: CPT

## 2020-02-07 PROCEDURE — 11045 DBRDMT SUBQ TISS EACH ADDL: CPT

## 2020-02-07 RX ORDER — LIDOCAINE HYDROCHLORIDE 20 MG/ML
JELLY TOPICAL ONCE
Status: DISCONTINUED | OUTPATIENT
Start: 2020-02-07 | End: 2020-02-08 | Stop reason: HOSPADM

## 2020-02-07 ASSESSMENT — PAIN SCALES - GENERAL: PAINLEVEL_OUTOF10: 0

## 2020-02-07 NOTE — PROGRESS NOTES
Wound Healing Center Followup Visit Note    Referring Physician : MD Alyce NicholasJune Bar [address] RECORD NUMBER:  [id number]  AGE: 68 y.o. GENDER: male  : 1950  EPISODE DATE:  2020    Subjective:     Chief Complaint   Patient presents with    Wound Check     left leg lower      HISTORY of PRESENT ILLNESS AYL Balderrama is a 68 y.o. male who presents today in regards to follow up evaluation and treatment of wound/ulcer. That patient's past medical, family and social hx were reviewed and changes were made if present. History of Wound Context:  Patient denies any nausea, vomiting, fever, chills, shortness of breath, chest pain, calf cramping and/or pain. Patient is tolerating the current dressing regimen without issues. No other new issues noted at this time.      Wound/Ulcer Pain Timing/Severity: none  Quality of pain: N/A  Severity:  0 / 10   Modifying Factors: None  Associated Signs/Symptoms: edema    Ulcer Identification:  Ulcer Type: venous  Contributing Factors: venous stasis, diabetes and obesity    Diabetic/Pressure/Non Pressure Ulcers only:  Ulcer: Non-Pressure ulcer, fat layer exposed    Wound: Contusion        PAST MEDICAL HISTORY      Diagnosis Date    CAD (coronary artery disease)     Cataracts, bilateral 2019    Cellulitis -    Left leg    Complete heart block (HCC)     h/o    Diabetes mellitus ([address]     Encounter for aspirin therapy     patient uses for heart health    H/O asbestosis     Hyperlipidemia     Hypertension     Hyperthyroidism     Lung disorder     weak lungs    Myocardial infarct ([address] 2010    Neuropathy     feet    Obesity     Obstructive sleep apnea     on cpap    Onychomycosis     Osteoarthritis     Pacemaker     Medtronic    Post-thrombotic syndrome 2017    Screening 16    for Colonoscopy    Stasis dermatitis     h/o on bilateral legs with leg edema    Varicose veins      Past Surgical History: Procedure Laterality Date    CARDIAC PACEMAKER PLACEMENT  04/09/10    Medtronic    CHOLECYSTECTOMY      COLONOSCOPY  2016    JOINT REPLACEMENT Bilateral     Left TJAK     Family History   Problem Relation Age of Onset    Heart Disease Father            [facility] Other Mother             Other Brother         gall bladder     Social History     Tobacco Use    Smoking status: Former Smoker     Packs/day: 2.00     Years: 25.00     Pack years: 50.00     Types: Cigarettes     Start date: 1964     Last attempt to quit: 1990     Years since quittin.6    Smokeless tobacco: Never Used   Substance Use Topics    Alcohol use: No     Alcohol/week: 0.0 standard drinks     Comment: rare   2 beers/month    Drug use: No     Allergies   Allergen Reactions    Other Hives and Shortness Of Breath     \"allergic to Potatoe white. \"    Adaptic Non-Adhering Dressing  [Wound Dressings]     Clindamycin/Lincomycin      Not sure     Current Outpatient Medications on File Prior to Encounter   Medication Sig Dispense Refill    Indacaterol-Glycopyrrolate (Aubree Lapidus) 27.5-15.6 MCG CAPS Inhale 2 puffs into the lungs 2 times daily 180 capsule 3    sodium hypochlorite (DAKINS) 0.25 % SOLN Irrigate with 2 mLs as directed daily 473 mL 0    albuterol (ACCUNEB) 0.63 MG/3ML nebulizer solution Take 3 mLs by nebulization every 6 hours as needed for Wheezing 270 mL 3    vitamin C (ASCORBIC ACID) 500 MG tablet Take 500 mg by mouth daily      rosuvastatin (CRESTOR) 5 MG tablet Take 5 mg by mouth daily       glipiZIDE (GLUCOTROL XL) 5 MG extended release tablet Take 5 mg by mouth daily  3    ramipril (ALTACE) 10 MG capsule Take 10 mg by mouth daily       potassium chloride (KLOR-CON M) 20 MEQ extended release tablet Take 20 mEq by mouth daily       Cholecalciferol (VITAMIN D3) 2000 UNITS CAPS Take by mouth daily Last dose 16      carvedilol (COREG) 25 MG tablet Take 25 mg by mouth 2 times daily (with in Wound Size % (l*w) -64.61 2/7/2020  1:25 PM   Wound Volume (cm^3) 16 cm^3 2/7/2020  1:25 PM   Wound Healing % -558 2/7/2020  1:25 PM   Post-Procedure Length (cm) 8 cm 2/7/2020  1:35 PM   Post-Procedure Width (cm) 5 cm 2/7/2020  1:35 PM   Post-Procedure Depth (cm) 0.4 cm 2/7/2020  1:35 PM   Post-Procedure Surface Area (cm^2) 40 cm^2 2/7/2020  1:35 PM   Post-Procedure Volume (cm^3) 16 cm^3 2/7/2020  1:35 PM   Distance Tunneling (cm) 4.5 cm 7/19/2019  1:32 PM   Tunneling Position ___ O'Clock 7 7/19/2019  1:32 PM   Undermining Starts ___ O'Clock 0.3 7/19/2019  1:32 PM   Undermining Maxium Distance (cm) p 8/2/2019  1:08 PM   Wound Assessment Drainage;Yellow;Red 2/7/2020  1:25 PM   Drainage Amount Large 2/7/2020  1:25 PM   Drainage Description Yellow 2/7/2020  1:25 PM   Odor None 2/7/2020  1:25 PM   Maria Elena-wound Assessment Pink 2/7/2020  1:25 PM   Number of days: 052          Procedure Note  Indications:  Based on my examination of this patient's wound(s)/ulcer(s) today, debridement is required to promote healing and evaluate the wound base. Performed by: Regina Roberts DPM    Consent obtained:  Yes    Time out taken:  Yes    Pain Control: Anesthetic  Anesthetic: 2% Lidocaine Gel Topical     Debridement:Excisional Debridement    Using curette the wound(s)/ulcer(s) was/were sharply debrided down through and including the removal of subcutaneous tissue. Devitalized Tissue Debrided:  fibrin, biofilm and slough to stimulate bleeding to promote healing, post debridement good bleeding base and wound edges noted    Wound/Ulcer #: 1    Percent of Wound/Ulcer Debrided: 100%    Total Surface Area Debrided:  40.0 sq cm (debridement + 1)     Estimated Blood Loss:  Minimal  Hemostasis Achieved:  by pressure    Procedural Pain:  2  / 10   Post Procedural Pain:  0 / 10     Response to treatment:  Well tolerated by patient.      Plan:   Treatment Note please see attached Discharge Instructions    Written patient dismissal instructions given to patient and signed by patient or POA. Discharge Instructions       Visit Discharge/Physician Orders    Discharge condition: Stable     Assessment of pain at discharge:none     Anesthetic used: 2% lidocaine gel     Discharge to: Home     Left via:Private automobile     Accompanied by:  wife     ECF/HHA:[employer] for supplies     Dressing Orders: LEFT LOWER LEG: for this week, continue to [facility] with normal saline, apply HY SEPT (aka: Dakins) moistened guaze and  dry dressing daily and as needed. APPLY skin emollient daily to left leg  APPLY XEROFORM TO WOUND, COVER WITH DRY DRESSING TO SECURE.     Compression hose to right leg-over the counter strength. Apply SPANDAGRIP to left leg; wear during day; may remove at night.      Treatment Orders   When seated, elevate legs to heart level if able. :Eat a diet high in protein and vitamin C., limit sodium and salt intake during day. READ all labels for SODIUM content.  Take a multiple vitamin daily unless contraindicated.      [facility] followup visit :_________1week dr. romero________________________  (Please note your next appointment above and if you are unable to keep, kindly give a 24 hour notice. Thank you.)    Physician signature:__________________________      If you experience any of the following, please call the [facility] during business hours:    * Increase in Pain  * Temperature over 101  * Increase in drainage from your wound  * Drainage with a foul odor  * Bleeding  * Increase in swelling  * Need for compression bandage changes due to slippage, breakthrough drainage. If you need medical attention outside of the business hours of the [facility] [facility] please contact your PCP or go to the nearest emergency room.         Electronically signed by Regina Roberts DPM on 2/7/2020 at 1:54 PM

## 2020-02-14 ENCOUNTER — HOSPITAL ENCOUNTER (OUTPATIENT)
Dept: WOUND CARE | Age: 70
Discharge: HOME | End: 2020-02-14
Payer: MEDICARE

## 2020-02-19 ENCOUNTER — OFFICE VISIT (OUTPATIENT)
Dept: PODIATRY | Age: 70
End: 2020-02-19
Payer: MEDICARE

## 2020-02-19 PROBLEM — E11.42 DIABETIC POLYNEUROPATHY ASSOCIATED WITH TYPE 2 DIABETES MELLITUS (HCC): Status: ACTIVE | Noted: 2020-02-19

## 2020-02-19 PROCEDURE — 99213 OFFICE O/P EST LOW 20 MIN: CPT | Performed by: PODIATRIST

## 2020-02-19 NOTE — PROGRESS NOTES
20     Gabrielle Rodriguez    : 1950   Sex: male    Age: 68 y.o. Patient's PCP/Provider is:  Marcelo Jaffe MD    Subjective:  Patient is seen today for follow-up regarding continued treatment stasis ulceration left lower extremity. He also started noticing some neuropathy issues recently potentially due to some oral medication he was prescribed. He denies any additional issues at this time. He denies any nausea, vomiting, fever, chills. Chief Complaint   Patient presents with    Numbness       ROS:  Const: Positives and pertinent negatives as per HPI. Musculo: Denies symptoms other than stated above. Neuro: Denies symptoms other than stated above. Skin: Denies symptoms other than stated above.     Current Medications:    Current Outpatient Medications:     Folinic Acid-Vit B6-Vit B12 (FOLINIC-PLUS) 4-50-2 MG TABS, Take 4-50 mg by mouth 2 times daily, Disp: 90 tablet, Rfl: 2    Indacaterol-Glycopyrrolate (UTIBRON NEOHALER) 27.5-15.6 MCG CAPS, Inhale 2 puffs into the lungs 2 times daily, Disp: 180 capsule, Rfl: 3    sodium hypochlorite (DAKINS) 0.25 % SOLN, Irrigate with 2 mLs as directed daily, Disp: 473 mL, Rfl: 0    albuterol (ACCUNEB) 0.63 MG/3ML nebulizer solution, Take 3 mLs by nebulization every 6 hours as needed for Wheezing, Disp: 270 mL, Rfl: 3    vitamin C (ASCORBIC ACID) 500 MG tablet, Take 500 mg by mouth daily, Disp: , Rfl:     rosuvastatin (CRESTOR) 5 MG tablet, Take 5 mg by mouth daily , Disp: , Rfl:     glipiZIDE (GLUCOTROL XL) 5 MG extended release tablet, Take 5 mg by mouth daily, Disp: , Rfl: 3    ramipril (ALTACE) 10 MG capsule, Take 10 mg by mouth daily , Disp: , Rfl:     potassium chloride (KLOR-CON M) 20 MEQ extended release tablet, Take 20 mEq by mouth daily , Disp: , Rfl:     Cholecalciferol (VITAMIN D3) 2000 UNITS CAPS, Take by mouth daily Last dose 16, Disp: , Rfl:     carvedilol (COREG) 25 MG tablet, Take 25 mg by mouth 2 times daily (with call if they experience any. Will be followed up in 1 week's time or sooner if needed for continued ulcer evaluation and management. Seen By:    Raimundo Lizama DPM    Electronically signed by Raimundo Lizama DPM on 2/19/2020 at 2:57 PM    This note was created using voice recognition software. The note was reviewed however may contain grammatical errors.

## 2020-02-21 ENCOUNTER — HOSPITAL ENCOUNTER (OUTPATIENT)
Dept: WOUND CARE | Age: 70
Discharge: HOME | End: 2020-02-21
Payer: MEDICARE

## 2020-02-26 ENCOUNTER — OFFICE VISIT (OUTPATIENT)
Dept: PODIATRY | Age: 70
End: 2020-02-26
Payer: MEDICARE

## 2020-02-26 PROCEDURE — 99213 OFFICE O/P EST LOW 20 MIN: CPT | Performed by: PODIATRIST

## 2020-03-02 ENCOUNTER — TELEPHONE (OUTPATIENT)
Dept: NON INVASIVE DIAGNOSTICS | Age: 70
End: 2020-03-02

## 2020-03-02 NOTE — TELEPHONE ENCOUNTER
Reviewed remote from 2-28-20. Watching battery. 9 months,  <1-19 months. AP: 20.6%   : 99.8%    Presenting AP- @ 67 ppm.  1 VHR on 1-12-20 for 6 beats. Called and spoke with the patient's wife regarding results of remote and to have the patient resend on 3-31-20. She voiced understanding. Patient on recall list for OV with Aidan Olsen NP for March 2020. Will forward to scheduling.     Nicholas Medellin RN  [facility] [address]

## 2020-03-04 ENCOUNTER — OFFICE VISIT (OUTPATIENT)
Dept: PODIATRY | Age: 70
End: 2020-03-04
Payer: MEDICARE

## 2020-03-04 PROCEDURE — 99213 OFFICE O/P EST LOW 20 MIN: CPT | Performed by: PODIATRIST

## 2020-03-04 NOTE — PROGRESS NOTES
Patient in today for 1 week follow up right lower extremity wound. Patient denies any new concerns at this time.

## 2020-03-04 NOTE — PROGRESS NOTES
3/4/20     Nicko Caldera    : 1950   Sex: male    Age: 68 y.o. Patient's PCP/Provider is:  Steve Garrett MD    Subjective:  Patient is seen today for follow-up regarding continued care stasis ulceration left lower extremity. Patient has noticed continual improvement with the use of the current topical wound dressings. He denies any nausea, vomiting, fever, chills. Chief Complaint   Patient presents with    Wound Check     right lower extremity       ROS:  Const: Positives and pertinent negatives as per HPI. Musculo: Denies symptoms other than stated above. Neuro: Denies symptoms other than stated above. Skin: Denies symptoms other than stated above.     Current Medications:    Current Outpatient Medications:     Folinic Acid-Vit B6-Vit B12 (FOLINIC-PLUS) 4-50-2 MG TABS, Take 4-50 mg by mouth 2 times daily, Disp: 90 tablet, Rfl: 2    Indacaterol-Glycopyrrolate (UTIBRON NEOHALER) 27.5-15.6 MCG CAPS, Inhale 2 puffs into the lungs 2 times daily, Disp: 180 capsule, Rfl: 3    sodium hypochlorite (DAKINS) 0.25 % SOLN, Irrigate with 2 mLs as directed daily, Disp: 473 mL, Rfl: 0    albuterol (ACCUNEB) 0.63 MG/3ML nebulizer solution, Take 3 mLs by nebulization every 6 hours as needed for Wheezing, Disp: 270 mL, Rfl: 3    vitamin C (ASCORBIC ACID) 500 MG tablet, Take 500 mg by mouth daily, Disp: , Rfl:     rosuvastatin (CRESTOR) 5 MG tablet, Take 5 mg by mouth daily , Disp: , Rfl:     glipiZIDE (GLUCOTROL XL) 5 MG extended release tablet, Take 5 mg by mouth daily, Disp: , Rfl: 3    ramipril (ALTACE) 10 MG capsule, Take 10 mg by mouth daily , Disp: , Rfl:     potassium chloride (KLOR-CON M) 20 MEQ extended release tablet, Take 20 mEq by mouth daily , Disp: , Rfl:     Cholecalciferol (VITAMIN D3) 2000 UNITS CAPS, Take by mouth daily Last dose 16, Disp: , Rfl:     carvedilol (COREG) 25 MG tablet, Take 25 mg by mouth 2 times daily (with meals) Instructed to take with sip water am of procedure, Disp: , Rfl:     Omega-3 Fatty Acids (FISH OIL) 600 MG CAPS, Take 1,000 mg by mouth daily Last Dose 1-14-16, Disp: , Rfl:     liothyronine (CYTOMEL) 50 MCG tablet, Take 75 mcg by mouth daily , Disp: , Rfl:     gabapentin (NEURONTIN) 300 MG capsule, Take 300 mg by mouth 3 times daily as needed Instructed to take with sip water am of procedure, Disp: , Rfl:     aspirin 81 MG EC tablet, Take 81 mg by mouth daily Last dose 1-14-16, Disp: , Rfl:     Allergies: Allergies   Allergen Reactions    Other Hives and Shortness Of Breath     \"allergic to Potatoe white. \"    Adaptic Non-Adhering Dressing  [Wound Dressings]     Clindamycin/Lincomycin      Not sure       There were no vitals filed for this visit. Exam:  Neurovascular status unchanged. Ulcerative area continues to improve lateral aspect left leg. Area is noted without undermining of wound edges. Ulcerative area measures approximately 2.3 x 3.6 x 0.2 cm. No signs of infection noted left lower extremity. Edematous issues are stable left lower extremity. Diagnostic Studies:     No results found. Procedures:    None    Plan Per Assessment  Marifer Culver was seen today for wound check. 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 Evaluation and management  2. We did discuss continued use of the Dakin's solution ulcerative area to be changed on a daily basis. 3. Was advised continued use of the Tubigrip stockings for edema control measures daily. 4. Patient will be followed up in 1 week's time or sooner if needed for continued wound evaluation and care.       Seen By:    Akbar Alejandro DPM    Electronically signed by Akbar Alejandro DPM on 3/4/2020 at 11:39 AM    This note was created using voice recognition software. The note was reviewed however may contain grammatical errors.

## 2020-03-09 ENCOUNTER — OFFICE VISIT (OUTPATIENT)
Dept: PODIATRY | Age: 70
End: 2020-03-09
Payer: MEDICARE

## 2020-03-09 VITALS — WEIGHT: 315 LBS | BODY MASS INDEX: 49.44 KG/M2 | HEIGHT: 67 IN

## 2020-03-09 PROCEDURE — 99213 OFFICE O/P EST LOW 20 MIN: CPT | Performed by: PODIATRIST

## 2020-03-09 NOTE — PROGRESS NOTES
Patient is in today for 1 week follow up of left leg wound. Patient says he had pain in his leg the other night and states there is yellow around the wound site.  pcp is Daniel Marlow MD last ov
YOLY    Electronically signed by Abdullahi Hodge DPM on 3/9/2020 at 12:05 PM    This note was created using voice recognition software. The note was reviewed however may contain grammatical errors.

## 2020-03-16 ENCOUNTER — OFFICE VISIT (OUTPATIENT)
Dept: PODIATRY | Age: 70
End: 2020-03-16
Payer: MEDICARE

## 2020-03-16 VITALS
OXYGEN SATURATION: 94 % | WEIGHT: 315 LBS | HEART RATE: 93 BPM | TEMPERATURE: 97.7 F | BODY MASS INDEX: 49.44 KG/M2 | HEIGHT: 67 IN

## 2020-03-16 PROCEDURE — 99213 OFFICE O/P EST LOW 20 MIN: CPT | Performed by: PODIATRIST

## 2020-03-16 NOTE — PROGRESS NOTES
Patient in today for 1 week follow up wound left lower extremity.
Instructed to take with sip water am of procedure, Disp: , Rfl:     Omega-3 Fatty Acids (FISH OIL) 600 MG CAPS, Take 1,000 mg by mouth daily Last Dose 1-14-16, Disp: , Rfl:     liothyronine (CYTOMEL) 50 MCG tablet, Take 75 mcg by mouth daily , Disp: , Rfl:     gabapentin (NEURONTIN) 300 MG capsule, Take 300 mg by mouth 3 times daily as needed Instructed to take with sip water am of procedure, Disp: , Rfl:     aspirin 81 MG EC tablet, Take 81 mg by mouth daily Last dose 1-14-16, Disp: , Rfl:     Allergies: Allergies   Allergen Reactions    Other Hives and Shortness Of Breath     \"allergic to Potatoe white. \"    Adaptic Non-Adhering Dressing  [Wound Dressings]     Clindamycin/Lincomycin      Not sure       Vitals:    03/16/20 1115   Pulse: 93   Temp: 97.7 °F (36.5 °C)   SpO2: 94%   Weight: (!) 365 lb (165.6 kg)   Height: 5' 7\" (1.702 m)       Exam:  Neurovascular status unchanged. Ulcerative area stable left lateral leg. Area measures approximately 3.0 x 3.7 x 0.2 cm. No undermining of the wound edges noted. No purulence, odor, erythema or any signs of infection noted left lower extremity. Diagnostic Studies:     No results found. Procedures:    None    Plan Per Assessment  Marifer Culver was seen today for wound check. 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 Evaluation and management  2. We will continue with the topical Dakin's solution to the ulcerative area to be changed on a daily basis. Compression dressings will be continued left lower extremity. 3. Patient was advised to elevate the left lower extremity throughout the day to allow for continued ulcerative healing. 4. Patient will be followed up at a later date for continued evaluation and care.       Seen By:    Sergei Noe

## 2020-03-24 ENCOUNTER — TELEPHONE (OUTPATIENT)
Dept: WOUND CARE | Age: 70
End: 2020-03-24

## 2020-03-25 ENCOUNTER — OFFICE VISIT (OUTPATIENT)
Dept: PODIATRY | Age: 70
End: 2020-03-25
Payer: MEDICARE

## 2020-03-25 VITALS — OXYGEN SATURATION: 96 % | TEMPERATURE: 97.6 F | HEART RATE: 91 BPM

## 2020-03-25 PROCEDURE — 11042 DBRDMT SUBQ TIS 1ST 20SQCM/<: CPT | Performed by: PODIATRIST

## 2020-03-25 NOTE — PROGRESS NOTES
3/25/20     Lucille Vega    : 1950 Sex: male   Age: 68 y.o. Subjective:  Chief Complaint   Patient presents with    Wound Check     left ulcer       HPI: Patient seen today to discuss continued care stasis ulceration left lower extremity. He is tolerating the current dressings without issues. Patient denies any nausea, vomiting, fever, and/or chills. No other additional complaints noted.       ROS:  Const: Positives and pertinent negatives as per HPI  Musculo: Denies symptoms other than stated above  Neuro: Denies symptoms other than stated above  Skin: Denies symptoms other than stated above    Current Medications:    Current Outpatient Medications:     Folinic Acid-Vit B6-Vit B12 (FOLINIC-PLUS) 4-50-2 MG TABS, Take 4-50 mg by mouth 2 times daily, Disp: 90 tablet, Rfl: 2    Indacaterol-Glycopyrrolate (Deward Bones) 27.5-15.6 MCG CAPS, Inhale 2 puffs into the lungs 2 times daily, Disp: 180 capsule, Rfl: 3    sodium hypochlorite (DAKINS) 0.25 % SOLN, Irrigate with 2 mLs as directed daily, Disp: 473 mL, Rfl: 0    albuterol (ACCUNEB) 0.63 MG/3ML nebulizer solution, Take 3 mLs by nebulization every 6 hours as needed for Wheezing, Disp: 270 mL, Rfl: 3    vitamin C (ASCORBIC ACID) 500 MG tablet, Take 500 mg by mouth daily, Disp: , Rfl:     rosuvastatin (CRESTOR) 5 MG tablet, Take 5 mg by mouth daily , Disp: , Rfl:     glipiZIDE (GLUCOTROL XL) 5 MG extended release tablet, Take 5 mg by mouth daily, Disp: , Rfl: 3    ramipril (ALTACE) 10 MG capsule, Take 10 mg by mouth daily , Disp: , Rfl:     potassium chloride (KLOR-CON M) 20 MEQ extended release tablet, Take 20 mEq by mouth daily , Disp: , Rfl:     Cholecalciferol (VITAMIN D3) 2000 UNITS CAPS, Take by mouth daily Last dose 16, Disp: , Rfl:     carvedilol (COREG) 25 MG tablet, Take 25 mg by mouth 2 times daily (with meals) Instructed to take with sip water am of procedure, Disp: , Rfl:     Omega-3 Fatty Acids (FISH OIL) 600 MG

## 2020-03-31 ENCOUNTER — NURSE ONLY (OUTPATIENT)
Dept: NON INVASIVE DIAGNOSTICS | Age: 70
End: 2020-03-31

## 2020-04-01 NOTE — PROGRESS NOTES
Reviewed remote from 03/31/2020 (SEE MEDIA)  Watching battery.   6 months,  <1-15 months.     AP: 21.8%   : 99.8%     Presenting AP- @ 77 ppm.  1 VHR on 01/12/2020 for 6 beats.     Called and spoke with the patient's wife regarding results of remote and to have the patient resend on 05/04/2020  Has OV with Ivory Score 06/09/2020

## 2020-04-08 ENCOUNTER — OFFICE VISIT (OUTPATIENT)
Dept: PODIATRY | Age: 70
End: 2020-04-08
Payer: MEDICARE

## 2020-04-08 VITALS — TEMPERATURE: 97.3 F | OXYGEN SATURATION: 92 % | HEART RATE: 102 BPM

## 2020-04-08 PROCEDURE — 99213 OFFICE O/P EST LOW 20 MIN: CPT | Performed by: PODIATRIST

## 2020-04-08 NOTE — PROGRESS NOTES
Patient in today for follow up evaluation left lower extremity ulcer. Patient denies any problems at this time.
with any questions or concerns in the interim. Seen By:    Scar Mata DPM    Electronically signed by Scar Mata DPM on 4/8/2020 at 11:11 AM    This note was created using voice recognition software. The note was reviewed however may contain grammatical errors.

## 2020-04-22 ENCOUNTER — OFFICE VISIT (OUTPATIENT)
Dept: PODIATRY | Age: 70
End: 2020-04-22
Payer: MEDICARE

## 2020-04-22 VITALS — TEMPERATURE: 97.7 F

## 2020-04-22 PROCEDURE — 99999 PR OFFICE/OUTPT VISIT,PROCEDURE ONLY: CPT | Performed by: PODIATRIST

## 2020-04-22 PROCEDURE — 11042 DBRDMT SUBQ TIS 1ST 20SQCM/<: CPT | Performed by: PODIATRIST

## 2020-04-22 PROCEDURE — 11045 DBRDMT SUBQ TISS EACH ADDL: CPT | Performed by: PODIATRIST

## 2020-05-04 ENCOUNTER — NURSE ONLY (OUTPATIENT)
Dept: NON INVASIVE DIAGNOSTICS | Age: 70
End: 2020-05-04
Payer: MEDICARE

## 2020-05-04 PROCEDURE — 93294 REM INTERROG EVL PM/LDLS PM: CPT | Performed by: INTERNAL MEDICINE

## 2020-05-04 PROCEDURE — 93296 REM INTERROG EVL PM/IDS: CPT | Performed by: INTERNAL MEDICINE

## 2020-05-06 ENCOUNTER — OFFICE VISIT (OUTPATIENT)
Dept: PODIATRY | Age: 70
End: 2020-05-06
Payer: MEDICARE

## 2020-05-06 VITALS — HEIGHT: 67 IN | BODY MASS INDEX: 49.44 KG/M2 | WEIGHT: 315 LBS

## 2020-05-06 PROCEDURE — 99213 OFFICE O/P EST LOW 20 MIN: CPT | Performed by: PODIATRIST

## 2020-05-08 ENCOUNTER — TELEPHONE (OUTPATIENT)
Dept: NON INVASIVE DIAGNOSTICS | Age: 70
End: 2020-05-08

## 2020-05-08 NOTE — PROGRESS NOTES
See [employer] [place] report. Remote monitoring reviewed over a 90 day period. End of 90 day monitoring period date of service 5-4-20    Make/Model Medtronic [place] ORANTES (dependent)   DOI: 4/9/10  Mode DDDR 60/120 ppm  P wave: >2.8 mV  Impedance: 458 ohms   Threshold: 0.75 V @ 0.4 ms  RV R wave: paced mV  Impedance: 520 ohms   Threshold: 0.5 V @ 0.4 ms  Pacing: A: 24.7%  RV: 99.9%     Battery Voltage/Longevity: 2 mths (<1-11 mths)14 months   -longevity 6 mths 3/21/20  Arrhythmias: 5 beats NSVT (3 sec)    Comment:  -Stress test 9/10/10 [facility]-Sreedhar Roach MD): Large moderately severe basal to distal inferolateral fixed defect of myocardial perfusion with hypokinetic wall motion. EF 54%    Medications:  -Coreg 25 mg BID  -ASA 81 mg QD      Plan:  -Monthly remote transmissions > monitor battery  -OV 6/9/20 with MARY ANNE Bassett APRN-CNP, [address] [place] [address] [facility]icians      ADDENDUM    Discussed with Dr Lashawn Torres. Given percentage of RV pacing, will obtain an echocardiogram and schedule VV with Dr Lashawn Torres.     SUSANNA Bassett-CNP, [address] [place] [address] Physicians

## 2020-05-15 NOTE — PROGRESS NOTES
[facility] Cardiac Electrophysiology Virtual Video Visit    Peyton Lomax  1950  Date of Service: 5/18/20   PCP: Dr. Melisa Rey,   Cardiologist: Dr. Vega Wilson  Electrophysiologist: Pastora Reeder DO    Patient Active Problem List    Diagnosis Date Noted    Diabetic polyneuropathy associated with type 2 diabetes mellitus ([address] 02/19/2020    Pain of toe of right foot 09/30/2019    Pain of toe of left foot 09/30/2019    Simple chronic bronchitis ([address] 09/24/2019    Onychomycosis 07/01/2019    PVD (peripheral vascular disease) ([address] 07/01/2019    Type II diabetes mellitus with peripheral circulatory disorder ([address] 07/01/2019    Difficulty walking 07/01/2019    Peripheral vascular disease ([address] 05/17/2019    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 ([address] 05/10/2019    Non-pressure chronic ulcer of left lower leg with fat layer exposed ([address] 03/22/2019    Venous insufficiency (chronic) (peripheral) 03/22/2019    Complete heart block ([address] 02/12/2019    Post-thrombotic syndrome 09/11/2017    Pulmonary emboli ([address] 08/23/2016    Chest pain 08/22/2016    Abdominal pain 08/22/2016    Cellulitis and abscess of leg 10/26/2012    SIRS (systemic inflammatory response syndrome) ([address] 10/26/2012    Shortness of breath 10/26/2012    Pain in lower limb 10/26/2012    Cellulitis 07/06/2012    Arrhythmia 07/05/2011     Overview Note:     A. H/O complete heart block      Pacemaker 07/05/2011     Overview Note:     A. Dual chamber pacemaker implant on 4/9/10: Medtronic Adapta [id number], serial [id number]  B. Right atrial lead: [place]: 1888TC, serial # [id number]  C. Right ventricular lead: Medtronic [id number], serial # [id number]      HTN (hypertension) 07/05/2011    Hyperthyroidism 07/05/2011    DEMETRIA (obstructive sleep apnea) 07/05/2011     Overview Note:     A.  Current use CPAP         Current Outpatient Medications   Medication Sig Dispense Refill    Folinic visit.

## 2020-05-18 ENCOUNTER — VIRTUAL VISIT (OUTPATIENT)
Dept: NON INVASIVE DIAGNOSTICS | Age: 70
End: 2020-05-18
Payer: MEDICARE

## 2020-05-18 PROCEDURE — 99214 OFFICE O/P EST MOD 30 MIN: CPT | Performed by: INTERNAL MEDICINE

## 2020-05-27 ENCOUNTER — OFFICE VISIT (OUTPATIENT)
Dept: PODIATRY | Age: 70
End: 2020-05-27
Payer: MEDICARE

## 2020-05-27 VITALS — OXYGEN SATURATION: 93 % | HEART RATE: 71 BPM | TEMPERATURE: 97.8 F

## 2020-05-27 PROCEDURE — 99213 OFFICE O/P EST LOW 20 MIN: CPT | Performed by: PODIATRIST

## 2020-06-12 ENCOUNTER — OFFICE VISIT (OUTPATIENT)
Dept: PODIATRY | Age: 70
End: 2020-06-12
Payer: MEDICARE

## 2020-06-12 VITALS — HEIGHT: 67 IN | WEIGHT: 315 LBS | BODY MASS INDEX: 49.44 KG/M2

## 2020-06-12 PROCEDURE — 99213 OFFICE O/P EST LOW 20 MIN: CPT | Performed by: PODIATRIST

## 2020-06-12 NOTE — PROGRESS NOTES
20     Earline Castellanos    : 1950   Sex: male    Age: 68 y.o. Patient's PCP/Provider is:  Alex Momin MD    Subjective:  Patient is seen today for follow-up regarding continued care stasis ulceration left lower extremity. Patient is still applying the topical medication as instructed. He denies any additional issues at this time. He denies any nausea, vomiting, fever, chills. Chief Complaint   Patient presents with    Wound Check     Left        ROS:  Const: Positives and pertinent negatives as per HPI. Musculo: Denies symptoms other than stated above. Neuro: Denies symptoms other than stated above. Skin: Denies symptoms other than stated above.     Current Medications:    Current Outpatient Medications:     montelukast (SINGULAIR) 10 MG tablet, Take 1 tablet by mouth nightly, Disp: 30 tablet, Rfl: 3    Folinic Acid-Vit B6-Vit B12 (FOLINIC-PLUS) 4-50-2 MG TABS, Take 4-50 mg by mouth 2 times daily, Disp: 90 tablet, Rfl: 2    Indacaterol-Glycopyrrolate (UTIBRON NEOHALER) 27.5-15.6 MCG CAPS, Inhale 2 puffs into the lungs 2 times daily, Disp: 180 capsule, Rfl: 3    sodium hypochlorite (DAKINS) 0.25 % SOLN, Irrigate with 2 mLs as directed daily (Patient taking differently: Irrigate with 2 mLs as directed daily Taking hysept), Disp: 473 mL, Rfl: 0    albuterol (ACCUNEB) 0.63 MG/3ML nebulizer solution, Take 3 mLs by nebulization every 6 hours as needed for Wheezing, Disp: 270 mL, Rfl: 3    vitamin C (ASCORBIC ACID) 500 MG tablet, Take 500 mg by mouth daily, Disp: , Rfl:     rosuvastatin (CRESTOR) 5 MG tablet, Take 5 mg by mouth daily , Disp: , Rfl:     glipiZIDE (GLUCOTROL XL) 5 MG extended release tablet, Take 5 mg by mouth daily, Disp: , Rfl: 3    ramipril (ALTACE) 10 MG capsule, Take 10 mg by mouth daily , Disp: , Rfl:     potassium chloride (KLOR-CON M) 20 MEQ extended release tablet, Take 20 mEq by mouth daily , Disp: , Rfl:     Cholecalciferol (VITAMIN D3) 2000 UNITS extremity. 3. Patient was advised to limit his daily activities and elevate both lower extremities throughout the day to allow for proper healing. 4. Patient will be followed up in 2 weeks time or sooner if needed for reevaluation. Seen By:    Cheryl Lorenz DPM    Electronically signed by Cheryl Lorenz DPM on 6/12/2020 at 11:35 AM    This note was created using voice recognition software. The note was reviewed however may contain grammatical errors.

## 2020-06-13 ENCOUNTER — HOSPITAL ENCOUNTER (INPATIENT)
Age: 70
LOS: 10 days | Discharge: NURSING, REHAB OR LONG-TERM CARE | DRG: 871 | End: 2020-06-23
Attending: EMERGENCY MEDICINE | Admitting: INTERNAL MEDICINE
Payer: MEDICARE

## 2020-06-13 ENCOUNTER — APPOINTMENT (OUTPATIENT)
Dept: GENERAL RADIOLOGY | Age: 70
DRG: 871 | End: 2020-06-13
Payer: MEDICARE

## 2020-06-13 PROBLEM — J18.9 PNEUMONIA: Status: ACTIVE | Noted: 2020-06-13

## 2020-06-13 LAB
ALBUMIN SERPL-MCNC: 3.7 G/DL (ref 3.5–5.2)
ALP BLD-CCNC: 74 U/L (ref 40–129)
ALT SERPL-CCNC: 32 U/L (ref 0–40)
ANION GAP SERPL CALCULATED.3IONS-SCNC: 16 MMOL/L (ref 7–16)
ANION GAP SERPL CALCULATED.3IONS-SCNC: 17 MMOL/L (ref 7–16)
ANISOCYTOSIS: ABNORMAL
AST SERPL-CCNC: 49 U/L (ref 0–39)
BASOPHILS ABSOLUTE: 0.02 E9/L (ref 0–0.2)
BASOPHILS ABSOLUTE: 0.03 E9/L (ref 0–0.2)
BASOPHILS RELATIVE PERCENT: 0.1 % (ref 0–2)
BASOPHILS RELATIVE PERCENT: 0.2 % (ref 0–2)
BILIRUB SERPL-MCNC: 0.9 MG/DL (ref 0–1.2)
BUN BLDV-MCNC: 23 MG/DL (ref 8–23)
BUN BLDV-MCNC: 24 MG/DL (ref 8–23)
CALCIUM SERPL-MCNC: 8.9 MG/DL (ref 8.6–10.2)
CALCIUM SERPL-MCNC: 9 MG/DL (ref 8.6–10.2)
CHLORIDE BLD-SCNC: 103 MMOL/L (ref 98–107)
CHLORIDE BLD-SCNC: 103 MMOL/L (ref 98–107)
CO2: 18 MMOL/L (ref 22–29)
CO2: 22 MMOL/L (ref 22–29)
CREAT SERPL-MCNC: 1.1 MG/DL (ref 0.7–1.2)
CREAT SERPL-MCNC: 1.1 MG/DL (ref 0.7–1.2)
EOSINOPHILS ABSOLUTE: 0 E9/L (ref 0.05–0.5)
EOSINOPHILS ABSOLUTE: 0.03 E9/L (ref 0.05–0.5)
EOSINOPHILS RELATIVE PERCENT: 0 % (ref 0–6)
EOSINOPHILS RELATIVE PERCENT: 0.2 % (ref 0–6)
GFR AFRICAN AMERICAN: >60
GFR AFRICAN AMERICAN: >60
GFR NON-AFRICAN AMERICAN: >60 ML/MIN/1.73
GFR NON-AFRICAN AMERICAN: >60 ML/MIN/1.73
GLUCOSE BLD-MCNC: 227 MG/DL (ref 74–99)
GLUCOSE BLD-MCNC: 266 MG/DL (ref 74–99)
HCT VFR BLD CALC: 43.4 % (ref 37–54)
HCT VFR BLD CALC: 43.4 % (ref 37–54)
HEMOGLOBIN: 13.5 G/DL (ref 12.5–16.5)
HEMOGLOBIN: 13.6 G/DL (ref 12.5–16.5)
IMMATURE GRANULOCYTES #: 0.06 E9/L
IMMATURE GRANULOCYTES #: 0.34 E9/L
IMMATURE GRANULOCYTES %: 0.4 % (ref 0–5)
IMMATURE GRANULOCYTES %: 1.9 % (ref 0–5)
INR BLD: 1.4
LACTIC ACID, SEPSIS: 2.4 MMOL/L (ref 0.5–1.9)
LACTIC ACID, SEPSIS: 2.8 MMOL/L (ref 0.5–1.9)
LYMPHOCYTES ABSOLUTE: 0.43 E9/L (ref 1.5–4)
LYMPHOCYTES ABSOLUTE: 0.69 E9/L (ref 1.5–4)
LYMPHOCYTES RELATIVE PERCENT: 2.5 % (ref 20–42)
LYMPHOCYTES RELATIVE PERCENT: 5.1 % (ref 20–42)
MCH RBC QN AUTO: 28.9 PG (ref 26–35)
MCH RBC QN AUTO: 29 PG (ref 26–35)
MCHC RBC AUTO-ENTMCNC: 31.1 % (ref 32–34.5)
MCHC RBC AUTO-ENTMCNC: 31.3 % (ref 32–34.5)
MCV RBC AUTO: 92.3 FL (ref 80–99.9)
MCV RBC AUTO: 93.1 FL (ref 80–99.9)
METER GLUCOSE: 248 MG/DL (ref 74–99)
METER GLUCOSE: 342 MG/DL (ref 74–99)
MONOCYTES ABSOLUTE: 0.56 E9/L (ref 0.1–0.95)
MONOCYTES ABSOLUTE: 0.76 E9/L (ref 0.1–0.95)
MONOCYTES RELATIVE PERCENT: 3.2 % (ref 2–12)
MONOCYTES RELATIVE PERCENT: 5.6 % (ref 2–12)
NEUTROPHILS ABSOLUTE: 12.08 E9/L (ref 1.8–7.3)
NEUTROPHILS ABSOLUTE: 16.2 E9/L (ref 1.8–7.3)
NEUTROPHILS RELATIVE PERCENT: 88.5 % (ref 43–80)
NEUTROPHILS RELATIVE PERCENT: 92.3 % (ref 43–80)
OVALOCYTES: ABNORMAL
PDW BLD-RTO: 15 FL (ref 11.5–15)
PDW BLD-RTO: 15.2 FL (ref 11.5–15)
PLATELET # BLD: 120 E9/L (ref 130–450)
PLATELET # BLD: 135 E9/L (ref 130–450)
PMV BLD AUTO: 10.4 FL (ref 7–12)
PMV BLD AUTO: 10.6 FL (ref 7–12)
POIKILOCYTES: ABNORMAL
POLYCHROMASIA: ABNORMAL
POTASSIUM REFLEX MAGNESIUM: 5.2 MMOL/L (ref 3.5–5)
POTASSIUM SERPL-SCNC: 4.3 MMOL/L (ref 3.5–5)
PRO-BNP: 1183 PG/ML (ref 0–125)
PROTHROMBIN TIME: 16.2 SEC (ref 9.3–12.4)
RBC # BLD: 4.66 E12/L (ref 3.8–5.8)
RBC # BLD: 4.7 E12/L (ref 3.8–5.8)
SODIUM BLD-SCNC: 138 MMOL/L (ref 132–146)
SODIUM BLD-SCNC: 141 MMOL/L (ref 132–146)
TOTAL PROTEIN: 7.1 G/DL (ref 6.4–8.3)
TROPONIN: <0.01 NG/ML (ref 0–0.03)
WBC # BLD: 13.7 E9/L (ref 4.5–11.5)
WBC # BLD: 17.6 E9/L (ref 4.5–11.5)

## 2020-06-13 PROCEDURE — 2580000003 HC RX 258: Performed by: EMERGENCY MEDICINE

## 2020-06-13 PROCEDURE — 82962 GLUCOSE BLOOD TEST: CPT

## 2020-06-13 PROCEDURE — 94660 CPAP INITIATION&MGMT: CPT

## 2020-06-13 PROCEDURE — 71045 X-RAY EXAM CHEST 1 VIEW: CPT

## 2020-06-13 PROCEDURE — 6360000002 HC RX W HCPCS: Performed by: EMERGENCY MEDICINE

## 2020-06-13 PROCEDURE — 2500000003 HC RX 250 WO HCPCS: Performed by: EMERGENCY MEDICINE

## 2020-06-13 PROCEDURE — 2060000000 HC ICU INTERMEDIATE R&B

## 2020-06-13 PROCEDURE — 6370000000 HC RX 637 (ALT 250 FOR IP): Performed by: INTERNAL MEDICINE

## 2020-06-13 PROCEDURE — 87186 SC STD MICRODIL/AGAR DIL: CPT

## 2020-06-13 PROCEDURE — 6360000002 HC RX W HCPCS: Performed by: INTERNAL MEDICINE

## 2020-06-13 PROCEDURE — 94664 DEMO&/EVAL PT USE INHALER: CPT

## 2020-06-13 PROCEDURE — 87147 CULTURE TYPE IMMUNOLOGIC: CPT

## 2020-06-13 PROCEDURE — 2580000003 HC RX 258: Performed by: INTERNAL MEDICINE

## 2020-06-13 PROCEDURE — 93005 ELECTROCARDIOGRAM TRACING: CPT | Performed by: EMERGENCY MEDICINE

## 2020-06-13 PROCEDURE — 85025 COMPLETE CBC W/AUTO DIFF WBC: CPT

## 2020-06-13 PROCEDURE — 2700000000 HC OXYGEN THERAPY PER DAY

## 2020-06-13 PROCEDURE — 94640 AIRWAY INHALATION TREATMENT: CPT

## 2020-06-13 PROCEDURE — 83605 ASSAY OF LACTIC ACID: CPT

## 2020-06-13 PROCEDURE — 80053 COMPREHEN METABOLIC PANEL: CPT

## 2020-06-13 PROCEDURE — 99285 EMERGENCY DEPT VISIT HI MDM: CPT

## 2020-06-13 PROCEDURE — 87040 BLOOD CULTURE FOR BACTERIA: CPT

## 2020-06-13 PROCEDURE — 84484 ASSAY OF TROPONIN QUANT: CPT

## 2020-06-13 PROCEDURE — 87150 DNA/RNA AMPLIFIED PROBE: CPT

## 2020-06-13 PROCEDURE — 80048 BASIC METABOLIC PNL TOTAL CA: CPT

## 2020-06-13 PROCEDURE — 85610 PROTHROMBIN TIME: CPT

## 2020-06-13 PROCEDURE — 83880 ASSAY OF NATRIURETIC PEPTIDE: CPT

## 2020-06-13 PROCEDURE — 36415 COLL VENOUS BLD VENIPUNCTURE: CPT

## 2020-06-13 RX ORDER — NICOTINE POLACRILEX 4 MG
15 LOZENGE BUCCAL PRN
Status: DISCONTINUED | OUTPATIENT
Start: 2020-06-13 | End: 2020-06-23 | Stop reason: HOSPADM

## 2020-06-13 RX ORDER — DOXYCYCLINE HYCLATE 100 MG/1
100 CAPSULE ORAL EVERY 12 HOURS SCHEDULED
Status: DISCONTINUED | OUTPATIENT
Start: 2020-06-13 | End: 2020-06-16

## 2020-06-13 RX ORDER — 0.9 % SODIUM CHLORIDE 0.9 %
1000 INTRAVENOUS SOLUTION INTRAVENOUS ONCE
Status: COMPLETED | OUTPATIENT
Start: 2020-06-13 | End: 2020-06-13

## 2020-06-13 RX ORDER — LIOTHYRONINE SODIUM 25 UG/1
75 TABLET ORAL DAILY
Status: DISCONTINUED | OUTPATIENT
Start: 2020-06-13 | End: 2020-06-23 | Stop reason: HOSPADM

## 2020-06-13 RX ORDER — ASCORBIC ACID 500 MG
500 TABLET ORAL DAILY
Status: DISCONTINUED | OUTPATIENT
Start: 2020-06-13 | End: 2020-06-23 | Stop reason: HOSPADM

## 2020-06-13 RX ORDER — DEXTROSE MONOHYDRATE 25 G/50ML
12.5 INJECTION, SOLUTION INTRAVENOUS PRN
Status: DISCONTINUED | OUTPATIENT
Start: 2020-06-13 | End: 2020-06-23 | Stop reason: HOSPADM

## 2020-06-13 RX ORDER — RAMIPRIL 5 MG/1
10 CAPSULE ORAL DAILY
Status: DISCONTINUED | OUTPATIENT
Start: 2020-06-13 | End: 2020-06-23 | Stop reason: HOSPADM

## 2020-06-13 RX ORDER — ROSUVASTATIN CALCIUM 5 MG/1
5 TABLET, COATED ORAL DAILY
Status: DISCONTINUED | OUTPATIENT
Start: 2020-06-13 | End: 2020-06-23 | Stop reason: HOSPADM

## 2020-06-13 RX ORDER — ARFORMOTEROL TARTRATE 15 UG/2ML
15 SOLUTION RESPIRATORY (INHALATION) 2 TIMES DAILY
Status: DISCONTINUED | OUTPATIENT
Start: 2020-06-13 | End: 2020-06-23 | Stop reason: HOSPADM

## 2020-06-13 RX ORDER — MONTELUKAST SODIUM 10 MG/1
10 TABLET ORAL NIGHTLY
Status: DISCONTINUED | OUTPATIENT
Start: 2020-06-13 | End: 2020-06-23 | Stop reason: HOSPADM

## 2020-06-13 RX ORDER — POTASSIUM CHLORIDE 20 MEQ/1
20 TABLET, EXTENDED RELEASE ORAL DAILY
Status: DISCONTINUED | OUTPATIENT
Start: 2020-06-13 | End: 2020-06-15

## 2020-06-13 RX ORDER — DEXTROSE MONOHYDRATE 50 MG/ML
100 INJECTION, SOLUTION INTRAVENOUS PRN
Status: DISCONTINUED | OUTPATIENT
Start: 2020-06-13 | End: 2020-06-23 | Stop reason: HOSPADM

## 2020-06-13 RX ORDER — SODIUM CHLORIDE 0.9 % (FLUSH) 0.9 %
10 SYRINGE (ML) INJECTION EVERY 12 HOURS SCHEDULED
Status: DISCONTINUED | OUTPATIENT
Start: 2020-06-13 | End: 2020-06-18 | Stop reason: SDUPTHER

## 2020-06-13 RX ORDER — ALBUTEROL SULFATE 0.63 MG/3ML
1 SOLUTION RESPIRATORY (INHALATION) EVERY 6 HOURS PRN
Status: DISCONTINUED | OUTPATIENT
Start: 2020-06-13 | End: 2020-06-23 | Stop reason: HOSPADM

## 2020-06-13 RX ORDER — CARVEDILOL 25 MG/1
25 TABLET ORAL 2 TIMES DAILY WITH MEALS
Status: DISCONTINUED | OUTPATIENT
Start: 2020-06-13 | End: 2020-06-23 | Stop reason: HOSPADM

## 2020-06-13 RX ORDER — GLIPIZIDE 5 MG/1
5 TABLET ORAL
Status: DISCONTINUED | OUTPATIENT
Start: 2020-06-14 | End: 2020-06-23 | Stop reason: HOSPADM

## 2020-06-13 RX ORDER — SODIUM CHLORIDE 9 MG/ML
INJECTION, SOLUTION INTRAVENOUS CONTINUOUS
Status: DISCONTINUED | OUTPATIENT
Start: 2020-06-13 | End: 2020-06-18

## 2020-06-13 RX ORDER — GABAPENTIN 300 MG/1
300 CAPSULE ORAL 3 TIMES DAILY
Status: DISCONTINUED | OUTPATIENT
Start: 2020-06-13 | End: 2020-06-23 | Stop reason: HOSPADM

## 2020-06-13 RX ORDER — SODIUM CHLORIDE 0.9 % (FLUSH) 0.9 %
10 SYRINGE (ML) INJECTION PRN
Status: DISCONTINUED | OUTPATIENT
Start: 2020-06-13 | End: 2020-06-18 | Stop reason: SDUPTHER

## 2020-06-13 RX ORDER — ASPIRIN 81 MG/1
81 TABLET ORAL DAILY
Status: DISCONTINUED | OUTPATIENT
Start: 2020-06-13 | End: 2020-06-16

## 2020-06-13 RX ADMIN — DOXYCYCLINE HYCLATE 100 MG: 100 CAPSULE ORAL at 21:27

## 2020-06-13 RX ADMIN — ROSUVASTATIN CALCIUM 5 MG: 5 TABLET, FILM COATED ORAL at 18:09

## 2020-06-13 RX ADMIN — INSULIN LISPRO 4 UNITS: 100 INJECTION, SOLUTION INTRAVENOUS; SUBCUTANEOUS at 21:28

## 2020-06-13 RX ADMIN — ASPIRIN 81 MG: 81 TABLET, COATED ORAL at 18:09

## 2020-06-13 RX ADMIN — IPRATROPIUM BROMIDE 0.5 MG: 0.5 SOLUTION RESPIRATORY (INHALATION) at 21:41

## 2020-06-13 RX ADMIN — ARFORMOTEROL TARTRATE 15 MCG: 15 SOLUTION RESPIRATORY (INHALATION) at 21:42

## 2020-06-13 RX ADMIN — CARVEDILOL 25 MG: 25 TABLET, FILM COATED ORAL at 18:09

## 2020-06-13 RX ADMIN — GABAPENTIN 300 MG: 300 CAPSULE ORAL at 21:27

## 2020-06-13 RX ADMIN — INSULIN LISPRO 4 UNITS: 100 INJECTION, SOLUTION INTRAVENOUS; SUBCUTANEOUS at 17:49

## 2020-06-13 RX ADMIN — Medication 500 MG: at 18:09

## 2020-06-13 RX ADMIN — SODIUM CHLORIDE: 9 INJECTION, SOLUTION INTRAVENOUS at 18:29

## 2020-06-13 RX ADMIN — DOXYCYCLINE 100 MG: 100 INJECTION, POWDER, LYOPHILIZED, FOR SOLUTION INTRAVENOUS at 18:20

## 2020-06-13 RX ADMIN — CEFTRIAXONE SODIUM 1 G: 1 INJECTION, POWDER, FOR SOLUTION INTRAMUSCULAR; INTRAVENOUS at 16:18

## 2020-06-13 RX ADMIN — MONTELUKAST SODIUM 10 MG: 10 TABLET, FILM COATED ORAL at 21:27

## 2020-06-13 RX ADMIN — SODIUM CHLORIDE 1000 ML: 9 INJECTION, SOLUTION INTRAVENOUS at 16:19

## 2020-06-13 RX ADMIN — LIOTHYRONINE SODIUM 75 MCG: 25 TABLET ORAL at 18:09

## 2020-06-13 RX ADMIN — RAMIPRIL 10 MG: 5 CAPSULE ORAL at 18:09

## 2020-06-13 RX ADMIN — SODIUM CHLORIDE, PRESERVATIVE FREE 10 ML: 5 INJECTION INTRAVENOUS at 21:28

## 2020-06-13 ASSESSMENT — ENCOUNTER SYMPTOMS
ABDOMINAL PAIN: 0
COLOR CHANGE: 0
SHORTNESS OF BREATH: 1
COUGH: 1
NAUSEA: 0
VOMITING: 0

## 2020-06-13 ASSESSMENT — PAIN SCALES - GENERAL
PAINLEVEL_OUTOF10: 0

## 2020-06-13 NOTE — ED PROVIDER NOTES
HPI:    Vickie Corley is a 68 y.o. male presenting to the ED for shortness of breath, beginning today. The complaint has been constant, moderate in severity, and worsened by nothing. Per EMS, patient was sitting on the porch in the warmth when he had progressively worsening shortness of breath. They gave aspirin, nitro and Solu-Medrol in route. On arrival patient on nonrebreather able to say several words until he needs to catch his breath. Patient complains associated dry cough. Patient denies any sick contacts or exposure [facility] [place]. He denies any recent illness, fever, chills, chest pain or abdominal pain. Patient uses BiPAP at night. Patient admits to wound to his left lower extremity which is chronic. The history is provided by the patient and the EMS personnel. Review of Systems   Constitutional: Negative for chills and fever. HENT: Negative for congestion. Eyes: Negative for visual disturbance. Respiratory: Positive for cough and shortness of breath. Cardiovascular: Negative for chest pain. Gastrointestinal: Negative for abdominal pain, nausea and vomiting. Genitourinary: Negative for difficulty urinating. Musculoskeletal: Negative for neck pain. Skin: Negative for color change. Neurological: Negative for numbness and headaches. Psychiatric/Behavioral: Negative for confusion. Physical Exam  Vitals signs and nursing note reviewed. Constitutional:       General: He is in acute distress. Appearance: He is ill-appearing. HENT:      Head: Normocephalic and atraumatic. Right Ear: External ear normal.      Left Ear: External ear normal.      Nose: Nose normal.      Mouth/Throat:      Mouth: Mucous membranes are moist.   Eyes:      General: No scleral icterus. Extraocular Movements: Extraocular movements intact. Conjunctiva/sclera: Conjunctivae normal.      Pupils: Pupils are equal, round, and reactive to light.    Neck:

## 2020-06-14 LAB
AADO2: 119.8 MMHG
ACINETOBACTER BAUMANNII BY PCR: NOT DETECTED
B.E.: -2 MMOL/L (ref -3–3)
BOTTLE TYPE: ABNORMAL
CANDIDA ALBICANS BY PCR: NOT DETECTED
CANDIDA GLABRATA BY PCR: NOT DETECTED
CANDIDA KRUSEI BY PCR: NOT DETECTED
CANDIDA PARAPSILOSIS BY PCR: NOT DETECTED
CANDIDA TROPICALIS BY PCR: NOT DETECTED
COHB: 0.6 % (ref 0–1.5)
CRITICAL: ABNORMAL
DATE ANALYZED: ABNORMAL
DATE OF COLLECTION: ABNORMAL
EKG ATRIAL RATE: 60 BPM
EKG Q-T INTERVAL: 460 MS
EKG QRS DURATION: 194 MS
EKG QTC CALCULATION (BAZETT): 478 MS
EKG R AXIS: -77 DEGREES
EKG T AXIS: 75 DEGREES
EKG VENTRICULAR RATE: 65 BPM
ENTEROBACTER CLOACAE COMPLEX BY PCR: NOT DETECTED
ENTEROBACTERALES BY PCR: NOT DETECTED
ENTEROCOCCUS BY PCR: NOT DETECTED
ESCHERICHIA COLI BY PCR: NOT DETECTED
FIO2: 50 %
HAEMOPHILUS INFLUENZAE BY PCR: NOT DETECTED
HCO3: 20.9 MMOL/L (ref 22–26)
HHB: 1 % (ref 0–5)
KLEBSIELLA OXYTOCA BY PCR: NOT DETECTED
KLEBSIELLA PNEUMONIAE GROUP BY PCR: NOT DETECTED
LAB: ABNORMAL
LACTIC ACID: 1.4 MMOL/L (ref 0.5–2.2)
LACTIC ACID: 2.8 MMOL/L (ref 0.5–2.2)
LISTERIA MONOCYTOGENES BY PCR: NOT DETECTED
Lab: ABNORMAL
METER GLUCOSE: 182 MG/DL (ref 74–99)
METER GLUCOSE: 183 MG/DL (ref 74–99)
METER GLUCOSE: 190 MG/DL (ref 74–99)
METER GLUCOSE: 199 MG/DL (ref 74–99)
METHB: 0.2 % (ref 0–1.5)
MODE: ABNORMAL
NEISSERIA MENINGITIDIS BY PCR: NOT DETECTED
O2 CONTENT: 18.1 ML/DL
O2 SATURATION: 99 % (ref 92–98.5)
O2HB: 98.2 % (ref 94–97)
OPERATOR ID: 2156
ORDER NUMBER: ABNORMAL
PATIENT TEMP: 37 C
PCO2: 30.3 MMHG (ref 35–45)
PEEP/CPAP: 8 CMH2O
PFO2: 3.8 MMHG/%
PH BLOOD GAS: 7.46 (ref 7.35–7.45)
PO2: 190.1 MMHG (ref 75–100)
PROCALCITONIN: 1.66 NG/ML (ref 0–0.08)
PROTEUS BY PCR: NOT DETECTED
PS: 18 CMH20
PSEUDOMONAS AERUGINOSA BY PCR: NOT DETECTED
RI(T): 0.63
SERRATIA MARCESCENS BY PCR: NOT DETECTED
SOURCE OF BLOOD CULTURE: ABNORMAL
SOURCE, BLOOD GAS: ABNORMAL
STAPHYLOCOCCUS AUREUS BY PCR: NOT DETECTED
STAPHYLOCOCCUS SPECIES BY PCR: NOT DETECTED
STREPTOCOCCUS AGALACTIAE BY PCR: NOT DETECTED
STREPTOCOCCUS PNEUMONIAE BY PCR: NOT DETECTED
STREPTOCOCCUS PYOGENES  BY PCR: NOT DETECTED
STREPTOCOCCUS SPECIES BY PCR: DETECTED
THB: 12.8 G/DL (ref 11.5–16.5)
TIME ANALYZED: 1815

## 2020-06-14 PROCEDURE — 2580000003 HC RX 258: Performed by: EMERGENCY MEDICINE

## 2020-06-14 PROCEDURE — 6370000000 HC RX 637 (ALT 250 FOR IP): Performed by: INTERNAL MEDICINE

## 2020-06-14 PROCEDURE — 94660 CPAP INITIATION&MGMT: CPT

## 2020-06-14 PROCEDURE — 6360000002 HC RX W HCPCS: Performed by: INTERNAL MEDICINE

## 2020-06-14 PROCEDURE — 84145 PROCALCITONIN (PCT): CPT

## 2020-06-14 PROCEDURE — 2700000000 HC OXYGEN THERAPY PER DAY

## 2020-06-14 PROCEDURE — 2580000003 HC RX 258: Performed by: INTERNAL MEDICINE

## 2020-06-14 PROCEDURE — 36415 COLL VENOUS BLD VENIPUNCTURE: CPT

## 2020-06-14 PROCEDURE — 94640 AIRWAY INHALATION TREATMENT: CPT

## 2020-06-14 PROCEDURE — 36600 WITHDRAWAL OF ARTERIAL BLOOD: CPT

## 2020-06-14 PROCEDURE — 82805 BLOOD GASES W/O2 SATURATION: CPT

## 2020-06-14 PROCEDURE — 82962 GLUCOSE BLOOD TEST: CPT

## 2020-06-14 PROCEDURE — 83605 ASSAY OF LACTIC ACID: CPT

## 2020-06-14 PROCEDURE — 2060000000 HC ICU INTERMEDIATE R&B

## 2020-06-14 RX ORDER — ACETAMINOPHEN 325 MG/1
650 TABLET ORAL EVERY 4 HOURS PRN
Status: DISCONTINUED | OUTPATIENT
Start: 2020-06-14 | End: 2020-06-23 | Stop reason: HOSPADM

## 2020-06-14 RX ORDER — IPRATROPIUM BROMIDE AND ALBUTEROL SULFATE 2.5; .5 MG/3ML; MG/3ML
1 SOLUTION RESPIRATORY (INHALATION)
Status: DISCONTINUED | OUTPATIENT
Start: 2020-06-14 | End: 2020-06-23 | Stop reason: HOSPADM

## 2020-06-14 RX ADMIN — GABAPENTIN 300 MG: 300 CAPSULE ORAL at 10:55

## 2020-06-14 RX ADMIN — CARVEDILOL 25 MG: 25 TABLET, FILM COATED ORAL at 10:56

## 2020-06-14 RX ADMIN — ARFORMOTEROL TARTRATE 15 MCG: 15 SOLUTION RESPIRATORY (INHALATION) at 22:36

## 2020-06-14 RX ADMIN — ROSUVASTATIN CALCIUM 5 MG: 5 TABLET, FILM COATED ORAL at 10:55

## 2020-06-14 RX ADMIN — CARVEDILOL 25 MG: 25 TABLET, FILM COATED ORAL at 16:45

## 2020-06-14 RX ADMIN — IPRATROPIUM BROMIDE 0.5 MG: 0.5 SOLUTION RESPIRATORY (INHALATION) at 06:00

## 2020-06-14 RX ADMIN — ARFORMOTEROL TARTRATE 15 MCG: 15 SOLUTION RESPIRATORY (INHALATION) at 06:35

## 2020-06-14 RX ADMIN — VANCOMYCIN HYDROCHLORIDE 1750 MG: 10 INJECTION, POWDER, LYOPHILIZED, FOR SOLUTION INTRAVENOUS at 14:49

## 2020-06-14 RX ADMIN — INSULIN LISPRO 1 UNITS: 100 INJECTION, SOLUTION INTRAVENOUS; SUBCUTANEOUS at 22:24

## 2020-06-14 RX ADMIN — Medication 500 MG: at 10:55

## 2020-06-14 RX ADMIN — DOXYCYCLINE HYCLATE 100 MG: 100 CAPSULE ORAL at 22:22

## 2020-06-14 RX ADMIN — LIOTHYRONINE SODIUM 75 MCG: 25 TABLET ORAL at 10:55

## 2020-06-14 RX ADMIN — INSULIN LISPRO 2 UNITS: 100 INJECTION, SOLUTION INTRAVENOUS; SUBCUTANEOUS at 10:55

## 2020-06-14 RX ADMIN — ASPIRIN 81 MG: 81 TABLET, COATED ORAL at 10:56

## 2020-06-14 RX ADMIN — RAMIPRIL 10 MG: 5 CAPSULE ORAL at 10:56

## 2020-06-14 RX ADMIN — IPRATROPIUM BROMIDE 0.5 MG: 0.5 SOLUTION RESPIRATORY (INHALATION) at 11:58

## 2020-06-14 RX ADMIN — INSULIN LISPRO 2 UNITS: 100 INJECTION, SOLUTION INTRAVENOUS; SUBCUTANEOUS at 18:19

## 2020-06-14 RX ADMIN — GABAPENTIN 300 MG: 300 CAPSULE ORAL at 22:23

## 2020-06-14 RX ADMIN — SODIUM CHLORIDE, PRESERVATIVE FREE 10 ML: 5 INJECTION INTRAVENOUS at 22:23

## 2020-06-14 RX ADMIN — DOXYCYCLINE HYCLATE 100 MG: 100 CAPSULE ORAL at 10:56

## 2020-06-14 RX ADMIN — SODIUM CHLORIDE: 9 INJECTION, SOLUTION INTRAVENOUS at 22:40

## 2020-06-14 RX ADMIN — IPRATROPIUM BROMIDE AND ALBUTEROL SULFATE 1 AMPULE: 2.5; .5 SOLUTION RESPIRATORY (INHALATION) at 22:35

## 2020-06-14 RX ADMIN — POTASSIUM CHLORIDE 20 MEQ: 20 TABLET, EXTENDED RELEASE ORAL at 11:02

## 2020-06-14 RX ADMIN — GLIPIZIDE 5 MG: 5 TABLET ORAL at 06:40

## 2020-06-14 RX ADMIN — INSULIN LISPRO 2 UNITS: 100 INJECTION, SOLUTION INTRAVENOUS; SUBCUTANEOUS at 13:03

## 2020-06-14 RX ADMIN — SODIUM CHLORIDE, PRESERVATIVE FREE 10 ML: 5 INJECTION INTRAVENOUS at 10:56

## 2020-06-14 RX ADMIN — MONTELUKAST SODIUM 10 MG: 10 TABLET, FILM COATED ORAL at 22:22

## 2020-06-14 RX ADMIN — GABAPENTIN 300 MG: 300 CAPSULE ORAL at 14:46

## 2020-06-14 ASSESSMENT — PAIN SCALES - GENERAL
PAINLEVEL_OUTOF10: 0
PAINLEVEL_OUTOF10: 0

## 2020-06-14 NOTE — PROGRESS NOTES
Date: 6/13/2020    Time: 9:58 PM    Patient Placed On BIPAP/CPAP/ Non-Invasive Ventilation? Yes    If no must comment. Facial area red/color change? No           If YES are Blister/Lesion present? No   If yes must notify nursing staff  BIPAP/CPAP skin barrier?   Yes    Skin barrier type:duoderm       Comments:        Kathrin Sellers

## 2020-06-14 NOTE — H&P
strain: Not on file    Food insecurity     Worry: Not on file     Inability: Not on file    Transportation needs     Medical: Not on file     Non-medical: Not on file   Tobacco Use    Smoking status: Former Smoker     Packs/day: 2.00     Years: 25.00     Pack years: 50.00     Types: Cigarettes     Start date: 1964     Last attempt to quit: 1990     Years since quittin.9    Smokeless tobacco: Never Used   Substance and Sexual Activity    Alcohol use: No     Alcohol/week: 0.0 standard drinks     Comment: rare   2 beers/month    Drug use: No    Sexual activity: Not on file   Lifestyle    Physical activity     Days per week: Not on file     Minutes per session: Not on file    Stress: Not on file   Relationships    Social connections     Talks on phone: Not on file     Gets together: Not on file     Attends Yazidi service: Not on file     Active member of club or organization: Not on file     Attends meetings of clubs or organizations: Not on file     Relationship status: Not on file    Intimate partner violence     Fear of current or ex partner: Not on file     Emotionally abused: Not on file     Physically abused: Not on file     Forced sexual activity: Not on file   Other Topics Concern    Not on file   Social History Narrative    Not on file         Family History:       Problem Relation Age of Onset    Heart Disease Father            [facility] Other Mother             Other Brother         gall bladder       REVIEW OF SYSTEMS:    General ROS: Positive for generalized weakness, chills  Hematological and Lymphatic ROS: negative  Endocrine ROS: negative  Respiratory ROS: Positive for shortness of breath  Cardiovascular ROS: no chest pain or dyspnea on exertion  Gastrointestinal ROS: no abdominal pain, change in bowel habits, or black or bloody stools  Genito-Urinary ROS: no dysuria, trouble voiding, or hematuria  Neurological ROS: no TIA or stroke symptoms  negative    Vitals:  BP (!) 114/59   Pulse 65   Temp 98.4 °F (36.9 °C) (Oral)   Resp 30   Ht 5' 7\" (1.702 m)   Wt (!) 360 lb (163.3 kg)   SpO2 97%   BMI 56.38 kg/m²     PHYSICAL EXAM:  General:  Awake, alert, oriented X 3. Admitted on BiPAP and seems to be in at least moderate respiratory distress   HEENT:  Normocephalic, atraumatic. Pupils equal, round, reactive to light. No scleral icterus. No conjunctival injection. Neck:  Supple, no carotid bruits  Heart:  RRR,   Lungs:  CTA bilaterally, bilat symmetrical expansion, no wheeze, rales, or rhonchi  Abdomen:   Bowel sounds present, soft, nontender, no masses, no organomegaly, no peritoneal signs  Extremities:  No clubbing, cyanosis, there is 2+ edema with stasis dermatitis changes, there is also left lower extremity as well  Skin:  Warm and dry, no open lesions or rash  Neuro:  Cranial nerves 2-12 intact, no focal deficits      DATA:     Recent Results (from the past 24 hour(s))   POCT Glucose    Collection Time: 06/13/20  4:24 PM   Result Value Ref Range    Meter Glucose 248 (H) 74 - 99 mg/dL   Lactate, Sepsis    Collection Time: 06/13/20  5:16 PM   Result Value Ref Range    Lactic Acid, Sepsis 2.4 (H) 0.5 - 1.9 mmol/L   CBC WITH AUTO DIFFERENTIAL    Collection Time: 06/13/20  5:16 PM   Result Value Ref Range    WBC 17.6 (H) 4.5 - 11.5 E9/L    RBC 4.66 3.80 - 5.80 E12/L    Hemoglobin 13.5 12.5 - 16.5 g/dL    Hematocrit 43.4 37.0 - 54.0 %    MCV 93.1 80.0 - 99.9 fL    MCH 29.0 26.0 - 35.0 pg    MCHC 31.1 (L) 32.0 - 34.5 %    RDW 15.2 (H) 11.5 - 15.0 fL    Platelets 330 (L) 868 - 450 E9/L    MPV 10.6 7.0 - 12.0 fL    Neutrophils % 92.3 (H) 43.0 - 80.0 %    Immature Granulocytes % 1.9 0.0 - 5.0 %    Lymphocytes % 2.5 (L) 20.0 - 42.0 %    Monocytes % 3.2 2.0 - 12.0 %    Eosinophils % 0.0 0.0 - 6.0 %    Basophils % 0.1 0.0 - 2.0 %    Neutrophils Absolute 16.20 (H) 1.80 - 7.30 E9/L    Immature Granulocytes # 0.34 E9/L    Lymphocytes Absolute 0.43

## 2020-06-14 NOTE — CONSULTS
 cefTRIAXone (ROCEPHIN) 1 g in sterile water 10 mL IV syringe  1 g Intravenous Q24H Doron Santos MD        doxycycline hyclate (VIBRAMYCIN) capsule 100 mg  100 mg Oral 2 times per day Doron Santos MD   100 mg at 20 1056    0.9 % sodium chloride infusion   Intravenous Continuous Doron Santos  mL/hr at 20 1829      ipratropium (ATROVENT) 0.02 % nebulizer solution 0.5 mg  0.5 mg Nebulization 4x daily Doron Santos MD   0.5 mg at 20 1158    And    Arformoterol Tartrate (BROVANA) nebulizer solution 15 mcg  15 mcg Nebulization BID Doron Santos MD   15 mcg at 20 3566       Allergies:  Clindamycin/lincomycin;  Other; and Adaptic non-adhering dressing [wound dressings]    Social History:      Social History     Socioeconomic History    Marital status:      Spouse name: Not on file    Number of children: Not on file    Years of education: Not on file    Highest education level: Not on file   Occupational History    Not on file   Social Needs    Financial resource strain: Not on file    Food insecurity     Worry: Not on file     Inability: Not on file   Swype needs     Medical: Not on file     Non-medical: Not on file   Tobacco Use    Smoking status: Former Smoker     Packs/day: 2.00     Years: 25.00     Pack years: 50.00     Types: Cigarettes     Start date: 1964     Last attempt to quit: 1990     Years since quittin.9    Smokeless tobacco: Never Used   Substance and Sexual Activity    Alcohol use: No     Alcohol/week: 0.0 standard drinks     Comment: rare   2 beers/month    Drug use: No    Sexual activity: Not on file   Lifestyle    Physical activity     Days per week: Not on file     Minutes per session: Not on file    Stress: Not on file   Relationships    Social connections     Talks on phone: Not on file     Gets together: Not on file     Attends [employer]ism service: Not on file     Active member of club or organization: Not on file     Attends meetings of clubs or organizations: Not on file     Relationship status: Not on file    Intimate partner violence     Fear of current or ex partner: Not on file     Emotionally abused: Not on file     Physically abused: Not on file     Forced sexual activity: Not on file   Other Topics Concern    Not on file   Social History Narrative    Not on file         Family History:     Family History   Problem Relation Age of Onset    Heart Disease Father            Alexa Eastman Other Mother            Alexa Eastman Other Brother         gall bladder       REVIEW OF SYSTEMS:    CONSTITUTIONAL:  Denies fever, chill or rigors  HEENT: denies blurring of vision or double vision, denies hearing problem  RESPIRATORY: SOB   CARDIOVASCULAR:  Denies palpitation  GASTROINTESTINAL:  Denies abdomen pain, diarrhea or constipation. GENITOURINARY:  Denies burning urination or frequency of urination  INTEGUMENT: denies wound , rash  HEMATOLOGIC/LYMPHATIC:  Denies lymph node swelling, gum bleeding or easy bruising. MUSCULOSKELETAL: leg swelling, left leg non healing wound   NEUROLOGICAL:  Denies light headed, dizziness, loss of consciousness, weakness of lower extremities, bowel or bladder incontinence. PHYSICAL EXAM:      Vitals:     BP (!) 114/59   Pulse 65   Temp 98.4 °F (36.9 °C) (Oral)   Resp 30   Ht 5' 7\" (1.702 m)   Wt (!) 360 lb (163.3 kg)   SpO2 97%   BMI 56.38 kg/m²     General Appearance:    Awake, alert , on BiPAP    Head:    Normocephalic, atraumatic   Eyes:    No pallor, no icterus,   Ears:    No obvious deformity or drainage.    Nose:   No nasal drainage   Throat:   Mucosa moist, no oral thrush   Neck:   Supple, no lymphadenopathy   Lungs:      Wheeze     Heart:    Regular rate and rhythm,   Abdomen:     Soft, non-tender, bowel sounds present    Extremities:   Bilateral pitting edema, left leg full skin thickness wound , non tender    Pulses:   Dorsalis pedis palpable Skin:   Left leg wound      CBC with Differential:      Lab Results   Component Value Date    WBC 17.6 06/13/2020    RBC 4.66 06/13/2020    HGB 13.5 06/13/2020    HCT 43.4 06/13/2020     06/13/2020    MCV 93.1 06/13/2020    MCH 29.0 06/13/2020    MCHC 31.1 06/13/2020    RDW 15.2 06/13/2020    SEGSPCT 69 07/07/2012    LYMPHOPCT 2.5 06/13/2020    MONOPCT 3.2 06/13/2020    BASOPCT 0.1 06/13/2020    MONOSABS 0.56 06/13/2020    LYMPHSABS 0.43 06/13/2020    EOSABS 0.00 06/13/2020    BASOSABS 0.02 06/13/2020       CMP     Lab Results   Component Value Date     06/13/2020    K 4.3 06/13/2020    K 5.2 06/13/2020     06/13/2020    CO2 18 06/13/2020    BUN 24 06/13/2020    CREATININE 1.1 06/13/2020    GFRAA >60 06/13/2020    LABGLOM >60 06/13/2020    GLUCOSE 266 06/13/2020    PROT 7.1 06/13/2020    LABALBU 3.7 06/13/2020    CALCIUM 8.9 06/13/2020    BILITOT 0.9 06/13/2020    ALKPHOS 74 06/13/2020    AST 49 06/13/2020    ALT 32 06/13/2020         Hepatic Function Panel:    Lab Results   Component Value Date    ALKPHOS 74 06/13/2020    ALT 32 06/13/2020    AST 49 06/13/2020    PROT 7.1 06/13/2020    BILITOT 0.9 06/13/2020    LABALBU 3.7 06/13/2020       PT/INR:    Lab Results   Component Value Date    PROTIME 16.2 06/13/2020    INR 1.4 06/13/2020       TSH:    Lab Results   Component Value Date    TSH 0.022 07/07/2012       U/A:  No results found for: NITRITE, COLORU, PHUR, LABCAST, WBCUA, RBCUA, MUCUS, TRICHOMONAS, YEAST, BACTERIA, CLARITYU, SPECGRAV, LEUKOCYTESUR, UROBILINOGEN, BILIRUBINUR, BLOODU, GLUCOSEU, AMORPHOUS    ABG:  No results found for: HQT0WKK, BEART, T9MOHQLX, PHART, THGBART, DZM2WYM, PO2ART, ZKG1BQO    MICROBIOLOGY:    Blood culture -    Specimen Source: Blood     Culture, Blood 2 --Abnormal     Gram stain performed from blood culture bottle media   Gram positive cocci in chains   Abnormal    Narrative:     CALL  [facility] [phone] tel. ,  Previous panic on this admission - call not needed per SOP, 06/14/2020 11:00,  by Ashly Whitfield   Culture, Blood 1 [[id number]] (Abnormal)    Collected: 06/13/20 1203    Updated: 06/14/20 1100    Specimen Source: Blood     Blood Culture, Routine --Abnormal     Gram stain performed from blood culture bottle media   Gram positive cocci in chains   Abnormal            Radiology :    Chest X ray    Confluent density in the left lower chest suggests airspace  disease/pneumonia and/or pleural fluid. Bronchovascular crowding in  the right lower lung could represent early pneumonia or atelectasis  from splinting due to left chest discomfort. There is cardiomegaly without definite evidence of cardiac  decompensation. Left-sided bipolar pacemaker is present.           IMPRESSION:     1. GPC ( Strep ) bacteremia r/o pacer endocarditis   2. Respiratory failure/ DEMETRIA  ? Pneumonia vs CHF   3. Leukocytosis   4. Left leg wound     RECOMMENDATIONS:     1. Stop rocephin   2. Vancomycin 1750 gm IV q 12 hrs   3. Doxycycline 100 mg po q 12 hrs  4. Procalcitonin level   5. Echo   6.  BiPAP     Thank you Dr Rosy Tovar for the consult

## 2020-06-14 NOTE — CONSULTS
[facility]  [address], [address]  [facility], 17 [facility]    Pulmonary Consultation      Reason for Consult:  Sob and hypoxia    Requesting Physician:  Dimitry Lockwood MD    CHIEF COMPLAINT:  Sob and hypoxia    History Obtained From:  electronic medical record    HISTORY OF PRESENT ILLNESS:                The patient is a 68 y.o. male with significant past medical history of DEMETRIA and COPD who presents with increased sob. Was seen in office early this week with complaints of increasing sob for past few months which pt felt may be due to need to replace pacemaker. Was told to start singulair and cont with utibron. Appears to have been gradually getting worse since then, (?) increased cough as per ER note. Had been going through wound care for lower leg, had been using NIPPV consistently when asked about this in office. Currently on BIPAP 18/8 50% since this morning, has been oxygenating ok on 6 li NC as per nurse when eating but wants back on BIPAP afterwards. arousable and answering questions but goes back to sleep, appears oriented to place and time, moving extremities, feels BIPAP helping with breathing as he states its better than when initially admitted. Started on antibiotics due to (+) blood cultures, no clear cough/congestion as per nurse.     Past Medical History:        Diagnosis Date    CAD (coronary artery disease)     Cataracts, bilateral 2019    Cellulitis 1-2016    Left leg    Complete heart block (HCC)     h/o    Diabetes mellitus ([address]     Encounter for aspirin therapy     patient uses for heart health    H/O asbestosis     Hyperlipidemia     Hypertension     Hyperthyroidism     Lung disorder     weak lungs    Myocardial infarct ([address] 2010    Neuropathy     feet    Obesity     Obstructive sleep apnea     on cpap    Onychomycosis     Osteoarthritis     Pacemaker     Medtronic    Post-thrombotic syndrome nebulizer solution 0.5 mg, 0.5 mg, Nebulization, 4x daily **AND** Arformoterol Tartrate (BROVANA) nebulizer solution 15 mcg, 15 mcg, Nebulization, BID    Allergies:  Clindamycin/lincomycin; Other; and Adaptic non-adhering dressing [wound dressings]    Social History:    TOBACCO:   reports that he quit smoking about 29 years ago. His smoking use included cigarettes. He started smoking about 55 years ago. He has a 50.00 pack-year smoking history. He has never used smokeless tobacco.    Family History:       Problem Relation Age of Onset    Heart Disease Father             Other Mother             Other Brother         gall bladder       REVIEW OF SYSTEMS:    Review of systems not obtained due to patient factors - mental status  Remainder of complete ROS is negative. PHYSICAL EXAM:      Vitals:    BP (!) 114/59   Pulse 65   Temp 98.4 °F (36.9 °C) (Oral)   Resp 30   Ht 5' 7\" (1.702 m)   Wt (!) 360 lb (163.3 kg)   SpO2 97%   BMI 56.38 kg/m²     CONSTITUTIONAL:  obese  EYES:  Lids and lashes normal, pupils equal, round and reactive to light, extra ocular muscles intact, sclera clear, conjunctiva normal  ENT:  Normocephalic, without obvious abnormality, atraumatic, sinuses nontender on palpation, external ears without lesions, oral pharynx with moist mucus membranes, tonsils without erythema or exudates, gums normal and good dentition.   NECK:  Supple, symmetrical, trachea midline, no adenopathy, thyroid symmetric, not enlarged and no tenderness, skin normal  LUNGS:  Minimal bibasal ronchi  CARDIOVASCULAR:  Normal apical impulse, regular rate and rhythm, normal S1 and S2, no S3 or S4, and no murmur noted  ABDOMEN:  No scars, normal bowel sounds, soft, non-distended, non-tender, no masses palpated, no hepatosplenomegally  MUSCULOSKELETAL:  Bipedal edema with chronic skin discolorations, (+) surgical dressing left lower leg  NEUROLOGIC:  Arousable but lethargic, occasionally answering questions, oriented, moving extremities    DATA:    CBC:   Recent Labs     06/13/20  1203 06/13/20  1716   WBC 13.7* 17.6*   HGB 13.6 13.5   HCT 43.4 43.4   MCV 92.3 93.1    120*       BMP:  Recent Labs     06/13/20  1203 06/13/20  1716    138   K 5.2* 4.3    103   CO2 22 18*   BUN 23 24*   CREATININE 1.1 1.1    ALB:3,BILIDIR:3,BILITOT:3,ALKPHOS:3)@    PT/INR:   Recent Labs     06/13/20  1203   PROTIME 16.2*   INR 1.4       ABG:   No results for input(s): PH, PO2, PCO2, HCO3, BE, O2SAT, METHB, O2HB, COHB, O2CON, HHB, THB in the last 72 hours. FiO2 : 50 %       Radiology Review:  CXR reviewed with haziness/effusion/opacity retrocardiac/left lower lung field and increased interstitial markings right lower lung field compared to previous    IMPRESSION/RECOMMENDATIONS:      Bacteremia  DEMETRIA  COPD    1. Cont with antibiotics as per ID  2. Cont with BIPAP, will check ABG assess ventilation and oxygenation, NC in between when more stable  3. Cont with Brovana bid, will start duonebs q4 WA  4. Observe if any worsening of effusion that will require a thoracentesis, CXR in 1-2 days for ffup        Thanks for letting us see this patient in consultation. Please contact us with any questions. Office [phone] or after hours through [employer], x [phone].

## 2020-06-14 NOTE — PROGRESS NOTES
Pharmacy Consultation Note  (Antibiotic Dosing and Monitoring)    Initial consult date: 2020  Consulting physician: Dr. Shanna Dalal  Drug(s): Vancomycin  Indication:    Ht Readings from Last 1 Encounters:   20 5' 7\" (1.702 m)     Wt Readings from Last 1 Encounters:   20 (!) 360 lb (163.3 kg)       Age/Gender IBW DW  Allergy Information   68 y.o. male 66.1 kg 163.3 kg  Clindamycin/lincomycin; Other; and Adaptic non-adhering dressing [wound dressings]               Date  WBC BUN/CR Drug/Dose Time   Given Level(s)   (Time) Comments    17.6  () 24/1.1  () Vancomycin 1750 mg IV Q12H                                   Estimated Creatinine Clearance: 91 mL/min (based on SCr of 1.1 mg/dL).       Intake/Output Summary (Last 24 hours) at 2020 1310  Last data filed at 2020 1055  Gross per 24 hour   Intake 3373.27 ml   Output 800 ml   Net 2573.27 ml       Temp max: Temp (24hrs), Av.7 °F (37.1 °C), Min:98 °F (36.7 °C), Max:99.7 °F (37.6 °C)      Cultures:  available culture and sensitivity results were reviewed in [facility]   blood cx - Streptococcus species    Assessment:  · Patient is a 68year old male with Streptococcus bacteremia, on doxycycline and vancomycin  · Consulted by Dr. Zoe Boyle to dose/monitor vancomycin  · Goal trough level: 15-20 mcg/mL; goal AUC/BEN: 400-600    Plan:  · Will continue vancomycin 1750 mg IV every 12 hours  · Will check trough level at steady state if vancomycin continues  · Pharmacist will follow and monitor/adjust dosing as necessary    Meghan Mojica, Hilaria, [facility] Hospital[facility]  2020  1:25 PM

## 2020-06-15 ENCOUNTER — APPOINTMENT (OUTPATIENT)
Dept: GENERAL RADIOLOGY | Age: 70
DRG: 871 | End: 2020-06-15
Payer: MEDICARE

## 2020-06-15 LAB
METER GLUCOSE: 162 MG/DL (ref 74–99)
METER GLUCOSE: 167 MG/DL (ref 74–99)
METER GLUCOSE: 171 MG/DL (ref 74–99)
METER GLUCOSE: 172 MG/DL (ref 74–99)

## 2020-06-15 PROCEDURE — 92611 MOTION FLUOROSCOPY/SWALLOW: CPT

## 2020-06-15 PROCEDURE — 87040 BLOOD CULTURE FOR BACTERIA: CPT

## 2020-06-15 PROCEDURE — 2060000000 HC ICU INTERMEDIATE R&B

## 2020-06-15 PROCEDURE — 2700000000 HC OXYGEN THERAPY PER DAY

## 2020-06-15 PROCEDURE — 94660 CPAP INITIATION&MGMT: CPT

## 2020-06-15 PROCEDURE — 6370000000 HC RX 637 (ALT 250 FOR IP): Performed by: INTERNAL MEDICINE

## 2020-06-15 PROCEDURE — 87186 SC STD MICRODIL/AGAR DIL: CPT

## 2020-06-15 PROCEDURE — 2580000003 HC RX 258: Performed by: INTERNAL MEDICINE

## 2020-06-15 PROCEDURE — 87070 CULTURE OTHR SPECIMN AEROBIC: CPT

## 2020-06-15 PROCEDURE — 82962 GLUCOSE BLOOD TEST: CPT

## 2020-06-15 PROCEDURE — 94640 AIRWAY INHALATION TREATMENT: CPT

## 2020-06-15 PROCEDURE — 36415 COLL VENOUS BLD VENIPUNCTURE: CPT

## 2020-06-15 PROCEDURE — 6360000002 HC RX W HCPCS: Performed by: INTERNAL MEDICINE

## 2020-06-15 RX ORDER — SODIUM HYPOCHLORITE 1.25 MG/ML
SOLUTION TOPICAL DAILY
Status: DISCONTINUED | OUTPATIENT
Start: 2020-06-15 | End: 2020-06-23 | Stop reason: HOSPADM

## 2020-06-15 RX ORDER — POTASSIUM CHLORIDE 20 MEQ/1
40 TABLET, EXTENDED RELEASE ORAL DAILY
Status: DISCONTINUED | OUTPATIENT
Start: 2020-06-16 | End: 2020-06-16

## 2020-06-15 RX ADMIN — LIOTHYRONINE SODIUM 75 MCG: 25 TABLET ORAL at 09:09

## 2020-06-15 RX ADMIN — GABAPENTIN 300 MG: 300 CAPSULE ORAL at 09:09

## 2020-06-15 RX ADMIN — GABAPENTIN 300 MG: 300 CAPSULE ORAL at 21:16

## 2020-06-15 RX ADMIN — ROSUVASTATIN CALCIUM 5 MG: 5 TABLET, FILM COATED ORAL at 09:09

## 2020-06-15 RX ADMIN — CARVEDILOL 25 MG: 25 TABLET, FILM COATED ORAL at 17:34

## 2020-06-15 RX ADMIN — DOXYCYCLINE HYCLATE 100 MG: 100 CAPSULE ORAL at 21:16

## 2020-06-15 RX ADMIN — IPRATROPIUM BROMIDE AND ALBUTEROL SULFATE 1 AMPULE: 2.5; .5 SOLUTION RESPIRATORY (INHALATION) at 09:43

## 2020-06-15 RX ADMIN — MONTELUKAST SODIUM 10 MG: 10 TABLET, FILM COATED ORAL at 21:16

## 2020-06-15 RX ADMIN — ARFORMOTEROL TARTRATE 15 MCG: 15 SOLUTION RESPIRATORY (INHALATION) at 21:04

## 2020-06-15 RX ADMIN — CARVEDILOL 25 MG: 25 TABLET, FILM COATED ORAL at 09:09

## 2020-06-15 RX ADMIN — IPRATROPIUM BROMIDE AND ALBUTEROL SULFATE 1 AMPULE: 2.5; .5 SOLUTION RESPIRATORY (INHALATION) at 21:03

## 2020-06-15 RX ADMIN — RAMIPRIL 10 MG: 5 CAPSULE ORAL at 09:09

## 2020-06-15 RX ADMIN — AMPICILLIN SODIUM 2 G: 2 INJECTION, POWDER, FOR SOLUTION INTRAMUSCULAR; INTRAVENOUS at 17:26

## 2020-06-15 RX ADMIN — Medication 500 MG: at 09:09

## 2020-06-15 RX ADMIN — INSULIN LISPRO 2 UNITS: 100 INJECTION, SOLUTION INTRAVENOUS; SUBCUTANEOUS at 12:36

## 2020-06-15 RX ADMIN — ASPIRIN 81 MG: 81 TABLET, COATED ORAL at 09:09

## 2020-06-15 RX ADMIN — INSULIN LISPRO 2 UNITS: 100 INJECTION, SOLUTION INTRAVENOUS; SUBCUTANEOUS at 18:17

## 2020-06-15 RX ADMIN — DOXYCYCLINE HYCLATE 100 MG: 100 CAPSULE ORAL at 09:09

## 2020-06-15 RX ADMIN — GABAPENTIN 300 MG: 300 CAPSULE ORAL at 17:34

## 2020-06-15 RX ADMIN — IPRATROPIUM BROMIDE AND ALBUTEROL SULFATE 1 AMPULE: 2.5; .5 SOLUTION RESPIRATORY (INHALATION) at 17:26

## 2020-06-15 RX ADMIN — GLIPIZIDE 5 MG: 5 TABLET ORAL at 09:09

## 2020-06-15 RX ADMIN — INSULIN LISPRO 1 UNITS: 100 INJECTION, SOLUTION INTRAVENOUS; SUBCUTANEOUS at 21:18

## 2020-06-15 RX ADMIN — VANCOMYCIN HYDROCHLORIDE 1750 MG: 10 INJECTION, POWDER, LYOPHILIZED, FOR SOLUTION INTRAVENOUS at 01:09

## 2020-06-15 RX ADMIN — IPRATROPIUM BROMIDE AND ALBUTEROL SULFATE 1 AMPULE: 2.5; .5 SOLUTION RESPIRATORY (INHALATION) at 13:30

## 2020-06-15 RX ADMIN — INSULIN LISPRO 2 UNITS: 100 INJECTION, SOLUTION INTRAVENOUS; SUBCUTANEOUS at 09:10

## 2020-06-15 RX ADMIN — ARFORMOTEROL TARTRATE 15 MCG: 15 SOLUTION RESPIRATORY (INHALATION) at 09:43

## 2020-06-15 RX ADMIN — POTASSIUM CHLORIDE 20 MEQ: 20 TABLET, EXTENDED RELEASE ORAL at 09:10

## 2020-06-15 ASSESSMENT — PAIN SCALES - GENERAL
PAINLEVEL_OUTOF10: 0

## 2020-06-15 NOTE — PROGRESS NOTES
Pharmacy Consultation Note  (Antibiotic Dosing and Monitoring)    Initial consult date: 6/14/2020  Consulting physician: Dr. Delmar Wells  Drug(s): Vancomycin  Indication:     Vancomycin has been discontinued   [address] Physician to re-consult pharmacy if future dosing is needed    Thank you for the consult,      Galina WallerD, BCPS 6/15/2020 3:58 PM  Phone: [phone]

## 2020-06-15 NOTE — PROGRESS NOTES
healing wound   NEUROLOGICAL:  Denies light headed, dizziness, loss of consciousness, weakness of lower extremities, bowel or bladder incontinence. PHYSICAL EXAM:      Vitals:     BP (!) 133/57   Pulse 65   Temp 98.2 °F (36.8 °C) (Temporal)   Resp 24   Ht 5' 7\" (1.702 m)   Wt (!) 360 lb (163.3 kg)   SpO2 98%   BMI 56.38 kg/m²     General Appearance:    Awake, alert , on BiPAP    Head:    Normocephalic, atraumatic   Eyes:    No pallor, no icterus,   Ears:    No obvious deformity or drainage.    Nose:   No nasal drainage   Throat:   Mucosa moist, no oral thrush   Neck:   Supple, no lymphadenopathy   Lungs:      Wheeze     Heart:    Regular rate and rhythm,   Abdomen:     Soft, non-tender, bowel sounds present    Extremities:   Bilateral pitting edema, left leg full skin thickness wound , non tender    Pulses:   Dorsalis pedis palpable    Skin:   Left leg wound      CBC with Differential:      Lab Results   Component Value Date    WBC 17.6 06/13/2020    RBC 4.66 06/13/2020    HGB 13.5 06/13/2020    HCT 43.4 06/13/2020     06/13/2020    MCV 93.1 06/13/2020    MCH 29.0 06/13/2020    MCHC 31.1 06/13/2020    RDW 15.2 06/13/2020    SEGSPCT 69 07/07/2012    LYMPHOPCT 2.5 06/13/2020    MONOPCT 3.2 06/13/2020    BASOPCT 0.1 06/13/2020    MONOSABS 0.56 06/13/2020    LYMPHSABS 0.43 06/13/2020    EOSABS 0.00 06/13/2020    BASOSABS 0.02 06/13/2020       CMP     Lab Results   Component Value Date     06/13/2020    K 4.3 06/13/2020    K 5.2 06/13/2020     06/13/2020    CO2 18 06/13/2020    BUN 24 06/13/2020    CREATININE 1.1 06/13/2020    GFRAA >60 06/13/2020    LABGLOM >60 06/13/2020    GLUCOSE 266 06/13/2020    PROT 7.1 06/13/2020    LABALBU 3.7 06/13/2020    CALCIUM 8.9 06/13/2020    BILITOT 0.9 06/13/2020    ALKPHOS 74 06/13/2020    AST 49 06/13/2020    ALT 32 06/13/2020         Hepatic Function Panel:    Lab Results   Component Value Date    ALKPHOS 74 06/13/2020    ALT 32 06/13/2020    AST 49

## 2020-06-15 NOTE — PROGRESS NOTES
Occupational Therapy   OT evaluation second attempt. Pt leaving floor for testing. Will attempt eval at a later time.   Fatou Fuchs, OTR/L [id number]

## 2020-06-15 NOTE — PROGRESS NOTES
[facility]  [address], [address]  [facility], 17 [facility]      Pulmonary Progress Note      SUBJECTIVE:  On BIPAP, slightly more awake and conversant, sleepy, oriented, claims some better with breathing, not much coughing though stated had an episode of this after eating earlier this morning.     OBJECTIVE    Medications    Continuous Infusions:   dextrose      sodium chloride 100 mL/hr at 20 2240       Scheduled Meds:   [START ON 2020] potassium chloride  40 mEq Oral Daily    vancomycin  1,750 mg Intravenous Q12H    ipratropium-albuterol  1 ampule Inhalation Q4H WA    sodium chloride flush  10 mL Intravenous 2 times per day    aspirin  81 mg Oral Daily    gabapentin  300 mg Oral TID    liothyronine  75 mcg Oral Daily    carvedilol  25 mg Oral BID WC    ramipril  10 mg Oral Daily    glipiZIDE  5 mg Oral QAM AC    rosuvastatin  5 mg Oral Daily    vitamin C  500 mg Oral Daily    montelukast  10 mg Oral Nightly    insulin lispro  0-12 Units Subcutaneous TID WC    insulin lispro  0-6 Units Subcutaneous Nightly    doxycycline hyclate  100 mg Oral 2 times per day    Arformoterol Tartrate  15 mcg Nebulization BID       PRN Meds:acetaminophen, sodium chloride flush, albuterol, glucose, dextrose, glucagon (rDNA), dextrose    Physical    VITALS:  BP (!) 133/57   Pulse 65   Temp 98.2 °F (36.8 °C) (Temporal)   Resp 24   Ht 5' 7\" (1.702 m)   Wt (!) 360 lb (163.3 kg)   SpO2 98%   BMI 56.38 kg/m²     24HR INTAKE/OUTPUT:      Intake/Output Summary (Last 24 hours) at 6/15/2020 1232  Last data filed at 6/15/2020 0930  Gross per 24 hour   Intake 120 ml   Output 375 ml   Net -255 ml       24HR PULSE OXIMETRY RANGE:    SpO2  Av %  Min: 98 %  Max: 100 %    General appearance: alert, appears stated age and cooperative, obese  Lungs: rhonchi bibasilar minimal  Heart: regular rate and rhythm, S1, S2 normal, no murmur, click, rub or

## 2020-06-15 NOTE — CONSULTS
Podiatry  Attending Consult Note      Reason for Consult: Left lower extremity ulceration  Requesting Physician:  Dr. Coker Number:  Left lower extremity ulceration    HISTORY OF PRESENT ILLNESS:      The patient is a 68 y.o. male seen today in consultation for a left lower extremity ulceration, chronic type. Patient recently admitted to the facility for shortness of breath. He follows with Dr. Patricia Bedolla for the left lower extremity wound. Patient at present denies any nausea, vomiting, fever or chills. Currently no left lower extremity pain.   Past Medical History:    Past Medical History:   Diagnosis Date    CAD (coronary artery disease)     Cataracts, bilateral 2019    Cellulitis 1-2016    Left leg    Complete heart block (HCC)     h/o    [facility] ([facility]     Encounter for aspirin therapy     patient uses for heart health    H/O asbestosis     Hyperlipidemia     Hypertension     Hyperthyroidism     Lung disorder     weak lungs    Myocardial infarct ([facility] 2010    Neuropathy     feet    Obesity     Obstructive sleep apnea     on cpap    Onychomycosis     Osteoarthritis     Pacemaker     Medtronic    Post-thrombotic syndrome 9/11/2017    Screening 1-20-16    for Colonoscopy    Stasis dermatitis     h/o on bilateral legs with leg edema    Varicose veins      Past Surgical History:    Past Surgical History:   Procedure Laterality Date    CARDIAC PACEMAKER PLACEMENT  04/09/10    Medtronic    CHOLECYSTECTOMY      COLONOSCOPY  1/20/2016    JOINT REPLACEMENT Bilateral     Left TJAK     Current Medications:     [START ON 6/16/2020] potassium chloride  40 mEq Oral Daily    ampicillin IV  2 g Intravenous 4 times per day    ipratropium-albuterol  1 ampule Inhalation Q4H WA    sodium chloride flush  10 mL Intravenous 2 times per day    aspirin  81 mg Oral Daily    gabapentin  300 mg Oral TID    liothyronine  75 mcg Oral Daily    carvedilol  25 mg Oral BID     organizations: Not on file     Relationship status: Not on file    Intimate partner violence     Fear of current or ex partner: Not on file     Emotionally abused: Not on file     Physically abused: Not on file     Forced sexual activity: Not on file   Other Topics Concern    Not on file   Social History Narrative    Not on file     Family History:       Problem Relation Age of Onset    Heart Disease Father            Clyde Ordonez Other Mother             Other Brother         gall bladder     PHYSICAL EXAM:      Vitals:    BP (!) 133/57   Pulse 65   Temp 98.2 °F (36.8 °C) (Temporal)   Resp 24   Ht 5' 7\" (1.702 m)   Wt (!) 360 lb (163.3 kg)   SpO2 98%   BMI 56.38 kg/m²     DERM: Left lower extremity wound is clean, there is no purulence or odor. No surrounding erythema. Stasis changes. NEUROLOGIC: Soft touch intact  VASCULAR: Pedal pulses noted with hand-held Doppler. Positive edema  MUSCULOSKELETAL: No left lower extremity pain. Wound Care Documentation:  Wound 20 Ankle Left;Posterior (Active)   Dressing Status Clean;Dry; Intact 6/15/2020  7:45 AM   Dressing Changed Changed/New 2020  3:20 PM   Dressing/Treatment ABD; Moist to dry 6/15/2020  1:23 AM   Dressing Change Due 06/16/20 6/15/2020  7:45 AM   Wound Length (cm) 5 cm 2020  5:00 PM   Wound Width (cm) 7 cm 2020  5:00 PM   Wound Depth (cm) 0.5 cm 2020  5:00 PM   Wound Surface Area (cm^2) 35 cm^2 2020  5:00 PM   Wound Volume (cm^3) 17.5 cm^3 2020  5:00 PM   Maria Elena-wound Assessment Fragile 6/15/2020  1:23 AM   Number of days: 1       Labs:  Cultures :   Organism   Date Value Ref Range Status   2020 Beta Strep Group C (A)  Preliminary   2020 Beta Strep Group C (A)  Preliminary     No results found for: WNDABS      IMPRESSION/RECOMMENDATIONS:    1. Left lower extremity ulcer in a patient with venous insufficiency, edema. Cleanse wound with Dakin's, Xeroform with dressing. Tubigrip for compression.

## 2020-06-15 NOTE — PROGRESS NOTES
Date: 6/15/2020    Time: 12:17 AM    Patient Placed On BIPAP/CPAP/ Non-Invasive Ventilation? Patient continues on bipap    If no must comment. Facial area red/color change? No           If YES are Blister/Lesion present? No   If yes must notify nursing staff  BIPAP/CPAP skin barrier?   Yes    Skin barrier type:duoderm       Comments:        Pradeep Teague, RRT

## 2020-06-16 ENCOUNTER — APPOINTMENT (OUTPATIENT)
Dept: GENERAL RADIOLOGY | Age: 70
DRG: 871 | End: 2020-06-16
Payer: MEDICARE

## 2020-06-16 LAB
CULTURE, BLOOD 2: ABNORMAL
METER GLUCOSE: 138 MG/DL (ref 74–99)
METER GLUCOSE: 143 MG/DL (ref 74–99)
METER GLUCOSE: 146 MG/DL (ref 74–99)
METER GLUCOSE: 150 MG/DL (ref 74–99)
METER GLUCOSE: 165 MG/DL (ref 74–99)
ORGANISM: ABNORMAL
ORGANISM: ABNORMAL

## 2020-06-16 PROCEDURE — 6370000000 HC RX 637 (ALT 250 FOR IP): Performed by: INTERNAL MEDICINE

## 2020-06-16 PROCEDURE — 97530 THERAPEUTIC ACTIVITIES: CPT

## 2020-06-16 PROCEDURE — 71045 X-RAY EXAM CHEST 1 VIEW: CPT

## 2020-06-16 PROCEDURE — 82962 GLUCOSE BLOOD TEST: CPT

## 2020-06-16 PROCEDURE — 74018 RADEX ABDOMEN 1 VIEW: CPT

## 2020-06-16 PROCEDURE — 97166 OT EVAL MOD COMPLEX 45 MIN: CPT

## 2020-06-16 PROCEDURE — 2709999900 HC NON-CHARGEABLE SUPPLY

## 2020-06-16 PROCEDURE — 94660 CPAP INITIATION&MGMT: CPT

## 2020-06-16 PROCEDURE — 94640 AIRWAY INHALATION TREATMENT: CPT

## 2020-06-16 PROCEDURE — 6360000002 HC RX W HCPCS: Performed by: INTERNAL MEDICINE

## 2020-06-16 PROCEDURE — 2580000003 HC RX 258: Performed by: INTERNAL MEDICINE

## 2020-06-16 PROCEDURE — 2700000000 HC OXYGEN THERAPY PER DAY

## 2020-06-16 PROCEDURE — 92610 EVALUATE SWALLOWING FUNCTION: CPT

## 2020-06-16 PROCEDURE — 2060000000 HC ICU INTERMEDIATE R&B

## 2020-06-16 PROCEDURE — 97162 PT EVAL MOD COMPLEX 30 MIN: CPT

## 2020-06-16 PROCEDURE — 97535 SELF CARE MNGMENT TRAINING: CPT

## 2020-06-16 RX ORDER — ASPIRIN 81 MG/1
81 TABLET, CHEWABLE ORAL DAILY
Status: DISCONTINUED | OUTPATIENT
Start: 2020-06-17 | End: 2020-06-23 | Stop reason: HOSPADM

## 2020-06-16 RX ORDER — PETROLATUM 42 G/100G
OINTMENT TOPICAL 2 TIMES DAILY
Status: DISCONTINUED | OUTPATIENT
Start: 2020-06-16 | End: 2020-06-23 | Stop reason: HOSPADM

## 2020-06-16 RX ADMIN — ROSUVASTATIN CALCIUM 5 MG: 5 TABLET, FILM COATED ORAL at 18:46

## 2020-06-16 RX ADMIN — CEFEPIME 2 G: 2 INJECTION, POWDER, FOR SOLUTION INTRAVENOUS at 22:17

## 2020-06-16 RX ADMIN — GABAPENTIN 300 MG: 300 CAPSULE ORAL at 20:25

## 2020-06-16 RX ADMIN — CARVEDILOL 25 MG: 25 TABLET, FILM COATED ORAL at 18:47

## 2020-06-16 RX ADMIN — AMPICILLIN SODIUM 2 G: 2 INJECTION, POWDER, FOR SOLUTION INTRAMUSCULAR; INTRAVENOUS at 00:03

## 2020-06-16 RX ADMIN — IPRATROPIUM BROMIDE AND ALBUTEROL SULFATE 1 AMPULE: 2.5; .5 SOLUTION RESPIRATORY (INHALATION) at 14:10

## 2020-06-16 RX ADMIN — Medication 500 MG: at 18:47

## 2020-06-16 RX ADMIN — MONTELUKAST SODIUM 10 MG: 10 TABLET, FILM COATED ORAL at 20:25

## 2020-06-16 RX ADMIN — ASPIRIN 81 MG: 81 TABLET, COATED ORAL at 18:47

## 2020-06-16 RX ADMIN — LIOTHYRONINE SODIUM 75 MCG: 25 TABLET ORAL at 18:47

## 2020-06-16 RX ADMIN — POTASSIUM BICARBONATE 40 MEQ: 782 TABLET, EFFERVESCENT ORAL at 20:25

## 2020-06-16 RX ADMIN — IPRATROPIUM BROMIDE AND ALBUTEROL SULFATE 1 AMPULE: 2.5; .5 SOLUTION RESPIRATORY (INHALATION) at 20:43

## 2020-06-16 RX ADMIN — GLIPIZIDE 5 MG: 5 TABLET ORAL at 06:46

## 2020-06-16 RX ADMIN — SKIN PROTECTANT: 44 OINTMENT TOPICAL at 18:48

## 2020-06-16 RX ADMIN — RAMIPRIL 10 MG: 5 CAPSULE ORAL at 18:47

## 2020-06-16 RX ADMIN — ARFORMOTEROL TARTRATE 15 MCG: 15 SOLUTION RESPIRATORY (INHALATION) at 20:43

## 2020-06-16 RX ADMIN — DEXTROSE MONOHYDRATE 1.5 G: 50 INJECTION, SOLUTION INTRAVENOUS at 19:59

## 2020-06-16 RX ADMIN — AMPICILLIN SODIUM 2 G: 2 INJECTION, POWDER, FOR SOLUTION INTRAMUSCULAR; INTRAVENOUS at 11:36

## 2020-06-16 RX ADMIN — IPRATROPIUM BROMIDE AND ALBUTEROL SULFATE 1 AMPULE: 2.5; .5 SOLUTION RESPIRATORY (INHALATION) at 16:50

## 2020-06-16 RX ADMIN — SODIUM CHLORIDE: 9 INJECTION, SOLUTION INTRAVENOUS at 00:03

## 2020-06-16 RX ADMIN — SODIUM CHLORIDE: 9 INJECTION, SOLUTION INTRAVENOUS at 19:57

## 2020-06-16 RX ADMIN — SKIN PROTECTANT: 44 OINTMENT TOPICAL at 20:27

## 2020-06-16 ASSESSMENT — PAIN SCALES - GENERAL
PAINLEVEL_OUTOF10: 0

## 2020-06-16 NOTE — FLOWSHEET NOTE
Inpatient Wound Care(initial evaluation) 4507a    Admit Date: 6/13/2020 11:49 AM    Reason for consult:  BLE    Significant history: per H & P  Presented to the emergency room with shortness of breath, hx obstructive sleep apnea and wears BiPAP at night. He also has left lower extremity wound that is being treated by podiatry. In the emergency room patient was noted to be acutely hypoxic. He was noted to have pneumonia. He was then admitted for further evaluation and treatment. Wound history:  Admitted with wound    Findings:     06/16/20 1330   Rhythm Interpretation   Pulse 83   Skin Integrity   Skin Integrity Redness  (edema, bruising, dry flaky, vascular discoloration)   Location LLE   Skin Integrity Site 2   Skin Integrity Location 2   (vascular discoloration, dry flaky)   Location 2 RLE   Wound 06/13/20 Leg Left; Lower; Lateral   Date First Assessed/Time First Assessed: 06/13/20 1700   Present on Hospital Admission: Yes  Location: Leg  Wound Location Orientation: Left; Lower; Lateral   Wound Image    Wound Venous   Dressing/Treatment Alginate;Dry dressing   Wound Length (cm) 6 cm   Wound Width (cm) 7 cm   Wound Depth (cm) 0.6 cm   Wound Surface Area (cm^2) 42 cm^2   Change in Wound Size % (l*w) -20   Wound Volume (cm^3) 25.2 cm^3   Wound Healing % -44   Wound Assessment Red   Drainage Amount Scant   Drainage Description Serosanguinous   Odor None   Maria Elena-wound Assessment Maceration   Non-staged Wound Description Partial thickness   Red%Wound Bed 100     **Informed Consent**    The patient has given verbal consent to have photos taken of wound  and inserted into their chart as part of their permanent medical record for purposes of documentation, treatment management and/or medical review. All Images taken on 6/16/20 of patient name: Sophie Clarke were transmitted and stored on secured [employer] located within [employer] by a registered [employer] Mobile Application Device.      Impression: Chronic venous ulcer    Plan:  opticell to wound bed  Will need continued preventative care  Aquaphor to dry skin    Efra Garrison 6/16/2020 1:48 PM

## 2020-06-16 NOTE — PROGRESS NOTES
Date: 6/15/2020    Time: 10:56 PM    Patient Placed On BIPAP/CPAP/ Non-Invasive Ventilation? No    If no must comment. Bipap just placed on patient by RN  Facial area red/color change? No           If YES are Blister/Lesion present? No   If yes must notify nursing staff  BIPAP/CPAP skin barrier?   Yes    Skin barrier type:duoderm       Comments:        Caryle Linsey

## 2020-06-16 NOTE — PROGRESS NOTES
Department of Internal Medicine  Infectious Diseases  Progress  Note      C/C : Group C Strep bacteremia , pneumonia       Pt is awake and alert  Denies fever and chills  Reports shortness of breath   Failed swallow evaluation   Left leg redness    Afebrile       Current Facility-Administered Medications   Medication Dose Route Frequency Provider Last Rate Last Dose    mineral oil-hydrophilic petrolatum (HYDROPHOR) ointment   Topical BID Azam Finn MD        potassium chloride (KLOR-CON M) extended release tablet 40 mEq  40 mEq Oral Daily Azam Finn MD        barium sulfate 40 % paste 15 mL  15 mL Oral ONCE PRN Oralee Cough, PA-C        barium sulfate 40 % reconsitutable suspension  15 mL Oral ONCE PRN Oralee Cough, PA-C        barium sulfate 40 % suspension 15 mL  15 mL Oral ONCE PRN Oralee Cough, PA-C        ampicillin 2 g ivpb mini bag  2 g Intravenous 4 times per day Cristopher Kiser MD   Stopped at 06/16/20 1206    sodium hypochlorite (DAKINS) 0.125 % external solution   Irrigation Daily Lesvia Menjivar DPM        acetaminophen (TYLENOL) tablet 650 mg  650 mg Oral Q4H PRN Zara Guillen MD        ipratropium-albuterol (DUONEB) nebulizer solution 1 ampule  1 ampule Inhalation Q4H WA Fabiola Hale MD   1 ampule at 06/16/20 1410    sodium chloride flush 0.9 % injection 10 mL  10 mL Intravenous 2 times per day Pineda Campbell DO   10 mL at 06/14/20 2223    sodium chloride flush 0.9 % injection 10 mL  10 mL Intravenous PRN Pineda Campbell DO        aspirin EC tablet 81 mg  81 mg Oral Daily Zara Guillen MD   81 mg at 06/15/20 6320    gabapentin (NEURONTIN) capsule 300 mg  300 mg Oral TID Zara Guillen MD   300 mg at 06/15/20 2116    liothyronine (CYTOMEL) tablet 75 mcg  75 mcg Oral Daily Zara Guillen MD   75 mcg at 06/15/20 0909    carvedilol (COREG) tablet 25 mg  25 mg Oral BID WC Zara Guillen MD   25 mg at 06/15/20 1734    ramipril (ALTACE) capsule 10 mg  10 mg Oral Daily Osavldo Suarez MD   10 mg at 06/15/20 9110    glipiZIDE (GLUCOTROL) tablet 5 mg  5 mg Oral QAM AC Osvaldo Suarez MD   5 mg at 06/16/20 0646    rosuvastatin (CRESTOR) tablet 5 mg  5 mg Oral Daily Osvaldo Suarez MD   5 mg at 06/15/20 6435    vitamin C (ASCORBIC ACID) tablet 500 mg  500 mg Oral Daily Osvaldo Suarez MD   500 mg at 06/15/20 0909    albuterol (ACCUNEB) nebulizer solution 0.63 mg  1 ampule Nebulization Q6H PRN Osvaldo Suarez MD        montelukast (SINGULAIR) tablet 10 mg  10 mg Oral Nightly Osvaldo Suarez MD   10 mg at 06/15/20 2116    insulin lispro (HUMALOG) injection vial 0-12 Units  0-12 Units Subcutaneous TID WC Osvaldo Suarez MD   2 Units at 06/15/20 1817    insulin lispro (HUMALOG) injection vial 0-6 Units  0-6 Units Subcutaneous Nightly Osvaldo Suarez MD   1 Units at 06/15/20 2118    glucose (GLUTOSE) 40 % oral gel 15 g  15 g Oral PRN Osvaldo Suarez MD        dextrose 50 % IV solution  12.5 g Intravenous PRN Osvaldo Suarez MD        glucagon (rDNA) injection 1 mg  1 mg Intramuscular PRN Osvaldo Suarez MD        dextrose 5 % solution  100 mL/hr Intravenous PRN Osvaldo Suarez MD        doxycycline hyclate (VIBRAMYCIN) capsule 100 mg  100 mg Oral 2 times per day Osvaldo Suarez MD   100 mg at 06/15/20 2116    0.9 % sodium chloride infusion   Intravenous Continuous Osvaldo Suarez  mL/hr at 06/16/20 0450      Arformoterol Tartrate (BROVANA) nebulizer solution 15 mcg  15 mcg Nebulization BID Osvaldo Suarez MD   15 mcg at 06/15/20 2104       REVIEW OF SYSTEMS:    CONSTITUTIONAL:  Denies fever, chill or rigors  HEENT: denies blurring of vision or double vision, denies hearing problem  RESPIRATORY: SOB   CARDIOVASCULAR:  Denies palpitation  GASTROINTESTINAL:  Denies abdomen pain, diarrhea or constipation.   GENITOURINARY:  Denies burning urination or frequency of BILITOT 0.9 06/13/2020    ALKPHOS 74 06/13/2020    AST 49 06/13/2020    ALT 32 06/13/2020         Hepatic Function Panel:    Lab Results   Component Value Date    ALKPHOS 74 06/13/2020    ALT 32 06/13/2020    AST 49 06/13/2020    PROT 7.1 06/13/2020    BILITOT 0.9 06/13/2020    LABALBU 3.7 06/13/2020       PT/INR:    Lab Results   Component Value Date    PROTIME 16.2 06/13/2020    INR 1.4 06/13/2020       TSH:    Lab Results   Component Value Date    TSH 0.022 07/07/2012       U/A:  No results found for: NITRITE, COLORU, PHUR, LABCAST, WBCUA, RBCUA, MUCUS, TRICHOMONAS, YEAST, BACTERIA, CLARITYU, SPECGRAV, LEUKOCYTESUR, UROBILINOGEN, BILIRUBINUR, BLOODU, GLUCOSEU, AMORPHOUS    ABG:  No results found for: DNI0NUK, BEART, D5MTYWMT, PHART, THGBART, VIU6HQB, PO2ART, BZB2NFV    MICROBIOLOGY:    Blood culture -    Group C Streptococcus               Radiology :    Chest X ray    Confluent density in the left lower chest suggests airspace  disease/pneumonia and/or pleural fluid. Bronchovascular crowding in  the right lower lung could represent early pneumonia or atelectasis  from splinting due to left chest discomfort. There is cardiomegaly without definite evidence of cardiac  decompensation. Left-sided bipolar pacemaker is present.       Pro BNP 1183  Procalcitonin 1.66      IMPRESSION:     1. Group C strep bacteremia r/o pacer endocarditis   2. Respiratory failure/ DEMETRIA  ? Pneumonia vs CHF   3. Leukocytosis   4. Left leg wound / Left leg cellulitis     RECOMMENDATIONS:     1. Vancomycin 1500 mg IV q 12 hrs, Cefepime 2 gram IV q `1 hrs   2.  Stop ampicillin

## 2020-06-16 NOTE — PROGRESS NOTES
SPEECH/LANGUAGE PATHOLOGY  BEDSIDE SWALLOWING EVALUATION    PATIENT NAME:  Luz March      :  1950          TODAY'S DATE:  2020 ROOM:  8507/8507-A      SUMMARY OF EVALUATION     DYSPHAGIA DIAGNOSIS:  Marked pharyngeal dysphagia      DIET RECOMMENDATIONS:  NPO (nothing by mouth including oral meds)       FEEDING RECOMMENDATIONS:     Assistance level:  Not applicable      Compensatory strategies recommended: Not applicable    THERAPY RECOMMENDATIONS:      Dysphagia therapy is recommended 3-5 times per week with emphasis on the following  Pt will complete BOTR strength/ ROM exercises to reduce pharyngeal residuals and improve epiglottic inversion with minimal verbal prompts. Pt will complete laryngeal strength/ ROM therapeutic exercises to improve airway protection for the least restrictive PO diet with minimal verbal prompts                   PROCEDURE     Consistencies Administered During the Evaluation   Liquids: thin liquid   Solids:  soft solid foods and solid foods      Method of Intake:   cup, straw, spoon  Self fed, Fed by clinician      Position:   Seated, upright                  RESULTS     Oral Stage: The oral stage of swallowing was within functional limits      Pharyngeal Stage:      Throat clearing present after presentation of thin liquid and solid foods, Immediate wet cough was noted after presentation of thin liquid and solid foods and Wet/gurgly vocal quality was noted after presentation of thin liquid and solid foods                  Prognosis for improvements is good  This plan will be re-evaluated and revised in 1 week  if warranted.    Patient stated goals: Agreed with above  Treatment goals discussed with Patient   The Patient understand(s) the diagnosis, prognosis and plan of care       CPT code:  25879  bedside swallow eval      [x]The admitting diagnosis and active problem list, as listed below have been reviewed prior to initiation of this

## 2020-06-16 NOTE — PROGRESS NOTES
generalized weakness and mild safety awareness deficits. Pt would benefit from continued skilled OT to increase functional independence and quality of life. mod  Profile and History- med (extensive chart review)  Assessment of Occupational Performance and Identification of Deficits- med  Clinical Decision Making- med    Evaluation time includes thorough review of current medical information, gathering information on past medical history/social history and prior level of function, completion of standardized testing/informal observation of tasks, assessment of data, and development of POC/Goals. Assessment of current deficits   Functional mobility [x]  ADLs [x] Strength [x]  Cognition []  Functional transfers  [x] IADLs [] Safety Awareness [x]  Endurance [x]  Fine Motor Coordination [] Balance [x] Vision/perception [] Sensation []   Gross Motor Coordination [] ROM [] Delirium []                  Motor Control []    Plan of Care:   5-7 days  ADL retraining [x]   Equipment needs [x]   Neuromuscular re-education [x] Energy Conservation Techniques [x]  Functional Transfer training [x] Patient and/or Family Education [x]  Functional Mobility training [x]  Environmental Modifications [x]  Cognitive re-training []   Compensatory techniques for ADLs [x]  Splinting Needs []   Positioning to improve overall function [x]   Therapeutic Activity [x]  Therapeutic Exercise  [x]  Visual/Perceptual: []    Delirium prevention/treatment  []   Other:  []    Rehab Potential: Good for established goals    Patient / Family Goal: Not stated     Patient and/or family were instructed on diagnosis, prognosis/goals and plan of care. Demonstrated fair+ understanding. [] Malnutrition indicators have been identified and nursing has been notified to ensure a dietitian consult is ordered.        Mod Evaluation completed +    Treatment Time In:09:38            Treatment Time Out:10:02               Treatment Charges: Mins Units   Ther Ex

## 2020-06-16 NOTE — PROGRESS NOTES
education  Pt educated on pursed lip breathing. Patient response to education:   Pt verbalized understanding Pt demonstrated skill Pt requires further education in this area   Yes With cueing Yes     ASSESSMENT:    Comments:   Pt was supine in bed upon room entry, agreeable to PT evaluation with OT collaboration. Pt was on 5 L O2/min on wall and O2 sat was 98% at rest. Pt was moved to 4 L O2/min on tank for activity and O2 sat was monitored frequently with all activity. Pt required assistance for trunk mobility during supine to sit transfer. Pt was moderately SOB follow transfer but O2 sat was 98% on 4 L O2/min; HR was 65 bpm. Pt performed sit to stand transfer. Pt was moderately unsteady once standing but was able to sidestep with HHA. Pt sidestepped to L and R and was seated. Pt again was moderately SOB and required several minutes of seated rest. O2 sat was 98% and HR was 65 bpm. Pt performed second sit to stand transfer with improved technique/outcome and pivoted to chair where he was seated. Pt rested for several minutes and O2 sat was 98%. Pt was kept on 4 L O2/min following session; RN aware. Pt was left seated in chair with all needs met at conclusion of session. RN notified of pt's performance and position in chair following session. Treatment:  Patient practiced and was instructed in the following treatment:     Therapeutic activities: Pt completed all therapeutic activities noted above. Pt was cued for pursed lip breathing when feeling SOB. Pt was provided rest breaks when symptomatic and activity was adjusted to pt's tolerance. Pt performed multiple sit to stand transfers with cueing for hand placement. Pt sidestepped and pivoted at EOB with cueing for technique and safety. Pt's/family goals:   1. To return home. Patient and or family understand(s) diagnosis, prognosis, and plan of care. Yes. PLAN:    PT care will be provided in accordance with the objectives noted above.  Exercises and functional mobility practice will be used as well as appropriate assistive devices or modalities to obtain goals. Patient and family education will also be administered as needed. Frequency of treatments: 2-5x/week x 3-5 days. Time in: 0930  Time out: 1005    Total Treatment Time 25 minutes     Evaluation Time includes thorough review of current medical information, gathering information on past medical history/social history and prior level of function, completion of standardized testing/informal observation of tasks, assessment of data and education on plan of care and goals.     CPT codes:  [] Low Complexity PT evaluation 65232  [x] Moderate Complexity PT evaluation 51716  [] High Complexity PT evaluation 08806  [] PT Re-evaluation 99068  [] Gait training 20658 0 minutes  [] Manual therapy 08881 0 minutes  [x] Therapeutic activities 93573 25 minutes  [] Therapeutic exercises 75135 0 minutes  [] Neuromuscular reeducation 18203 0 minutes     Richard Banuelos, PT, DPT  [id number]

## 2020-06-16 NOTE — PROGRESS NOTES
Subjective: The patient is awake and alert. patient is agreeable to having an NG tube placed      Objective:    /77   Pulse 65   Temp 98 °F (36.7 °C) (Temporal)   Resp 18   Ht 5' 7\" (1.702 m)   Wt (!) 360 lb (163.3 kg)   SpO2 98%   BMI 56.38 kg/m²     In: 3760 [P.O.:660; I.V.:3000]  Out: 550   Morbidly obese male  HEENT: NCAT,  PERRLA, No JVD  Heart: Pacemaker in place RRR, no murmurs, gallops, or rubs.   Lungs: Coarse breath sounds bilateral lung fields  Abd: bowel sounds present, nontender, nondistended, no masses  Extrem:  No clubbing, cyanosis, or edema     Recent Labs     06/13/20  1203 06/13/20  1716   WBC 13.7* 17.6*   HGB 13.6 13.5   HCT 43.4 43.4    120*       Recent Labs     06/13/20  1203 06/13/20  1716    138   K 5.2* 4.3    103   CO2 22 18*   BUN 23 24*   CREATININE 1.1 1.1   CALCIUM 9.0 8.9       Assessment:    Patient Active Problem List   Diagnosis    Arrhythmia    Pacemaker    HTN (hypertension)    Hyperthyroidism    DEMETRIA (obstructive sleep apnea)    Cellulitis    Cellulitis and abscess of leg    SIRS (systemic inflammatory response syndrome) (HCC)    Shortness of breath    Pain in lower limb    Chest pain    Abdominal pain    Pulmonary emboli (HCC)    Post-thrombotic syndrome    Complete heart block (HCC)    Non-pressure chronic ulcer of left lower leg with fat layer exposed ([address]    Venous insufficiency (chronic) (peripheral)    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

## 2020-06-17 ENCOUNTER — TELEPHONE (OUTPATIENT)
Dept: CARDIOLOGY | Age: 70
End: 2020-06-17

## 2020-06-17 LAB
METER GLUCOSE: 136 MG/DL (ref 74–99)
METER GLUCOSE: 153 MG/DL (ref 74–99)
METER GLUCOSE: 180 MG/DL (ref 74–99)
METER GLUCOSE: 185 MG/DL (ref 74–99)
ORGANISM: ABNORMAL
WOUND/ABSCESS: ABNORMAL
WOUND/ABSCESS: ABNORMAL

## 2020-06-17 PROCEDURE — 6370000000 HC RX 637 (ALT 250 FOR IP): Performed by: INTERNAL MEDICINE

## 2020-06-17 PROCEDURE — 2700000000 HC OXYGEN THERAPY PER DAY

## 2020-06-17 PROCEDURE — 6370000000 HC RX 637 (ALT 250 FOR IP): Performed by: PODIATRIST

## 2020-06-17 PROCEDURE — 2580000003 HC RX 258: Performed by: PODIATRIST

## 2020-06-17 PROCEDURE — 2580000003 HC RX 258: Performed by: EMERGENCY MEDICINE

## 2020-06-17 PROCEDURE — 82962 GLUCOSE BLOOD TEST: CPT

## 2020-06-17 PROCEDURE — 2580000003 HC RX 258: Performed by: INTERNAL MEDICINE

## 2020-06-17 PROCEDURE — 97530 THERAPEUTIC ACTIVITIES: CPT | Performed by: PHYSICAL THERAPIST

## 2020-06-17 PROCEDURE — 93308 TTE F-UP OR LMTD: CPT

## 2020-06-17 PROCEDURE — 92526 ORAL FUNCTION THERAPY: CPT

## 2020-06-17 PROCEDURE — 94640 AIRWAY INHALATION TREATMENT: CPT

## 2020-06-17 PROCEDURE — 6360000002 HC RX W HCPCS: Performed by: INTERNAL MEDICINE

## 2020-06-17 PROCEDURE — 94660 CPAP INITIATION&MGMT: CPT

## 2020-06-17 PROCEDURE — 2060000000 HC ICU INTERMEDIATE R&B

## 2020-06-17 PROCEDURE — 97116 GAIT TRAINING THERAPY: CPT | Performed by: PHYSICAL THERAPIST

## 2020-06-17 RX ORDER — LINEZOLID 600 MG/1
600 TABLET, FILM COATED ORAL EVERY 12 HOURS SCHEDULED
Status: DISCONTINUED | OUTPATIENT
Start: 2020-06-17 | End: 2020-06-23 | Stop reason: HOSPADM

## 2020-06-17 RX ADMIN — ROSUVASTATIN CALCIUM 5 MG: 5 TABLET, FILM COATED ORAL at 08:15

## 2020-06-17 RX ADMIN — SKIN PROTECTANT: 44 OINTMENT TOPICAL at 08:16

## 2020-06-17 RX ADMIN — MONTELUKAST SODIUM 10 MG: 10 TABLET, FILM COATED ORAL at 21:07

## 2020-06-17 RX ADMIN — IPRATROPIUM BROMIDE AND ALBUTEROL SULFATE 1 AMPULE: 2.5; .5 SOLUTION RESPIRATORY (INHALATION) at 16:46

## 2020-06-17 RX ADMIN — POTASSIUM BICARBONATE 40 MEQ: 782 TABLET, EFFERVESCENT ORAL at 08:15

## 2020-06-17 RX ADMIN — RAMIPRIL 10 MG: 5 CAPSULE ORAL at 08:14

## 2020-06-17 RX ADMIN — DEXTROSE MONOHYDRATE 1.5 G: 50 INJECTION, SOLUTION INTRAVENOUS at 03:33

## 2020-06-17 RX ADMIN — SODIUM CHLORIDE: 9 INJECTION, SOLUTION INTRAVENOUS at 05:38

## 2020-06-17 RX ADMIN — IPRATROPIUM BROMIDE AND ALBUTEROL SULFATE 1 AMPULE: 2.5; .5 SOLUTION RESPIRATORY (INHALATION) at 09:23

## 2020-06-17 RX ADMIN — HYOSCYAMINE SULFATE: 16 SOLUTION at 09:05

## 2020-06-17 RX ADMIN — CARVEDILOL 25 MG: 25 TABLET, FILM COATED ORAL at 08:15

## 2020-06-17 RX ADMIN — LINEZOLID 600 MG: 600 TABLET, FILM COATED ORAL at 21:07

## 2020-06-17 RX ADMIN — IPRATROPIUM BROMIDE AND ALBUTEROL SULFATE 1 AMPULE: 2.5; .5 SOLUTION RESPIRATORY (INHALATION) at 13:23

## 2020-06-17 RX ADMIN — SODIUM CHLORIDE, PRESERVATIVE FREE 10 ML: 5 INJECTION INTRAVENOUS at 08:16

## 2020-06-17 RX ADMIN — IPRATROPIUM BROMIDE AND ALBUTEROL SULFATE 1 AMPULE: 2.5; .5 SOLUTION RESPIRATORY (INHALATION) at 20:10

## 2020-06-17 RX ADMIN — GABAPENTIN 300 MG: 300 CAPSULE ORAL at 13:13

## 2020-06-17 RX ADMIN — INSULIN LISPRO 1 UNITS: 100 INJECTION, SOLUTION INTRAVENOUS; SUBCUTANEOUS at 21:10

## 2020-06-17 RX ADMIN — CEFEPIME 2 G: 2 INJECTION, POWDER, FOR SOLUTION INTRAVENOUS at 16:07

## 2020-06-17 RX ADMIN — GLIPIZIDE 5 MG: 5 TABLET ORAL at 08:15

## 2020-06-17 RX ADMIN — INSULIN LISPRO 2 UNITS: 100 INJECTION, SOLUTION INTRAVENOUS; SUBCUTANEOUS at 17:59

## 2020-06-17 RX ADMIN — Medication 500 MG: at 08:15

## 2020-06-17 RX ADMIN — ARFORMOTEROL TARTRATE 15 MCG: 15 SOLUTION RESPIRATORY (INHALATION) at 20:10

## 2020-06-17 RX ADMIN — GABAPENTIN 300 MG: 300 CAPSULE ORAL at 21:07

## 2020-06-17 RX ADMIN — ASPIRIN 81 MG 81 MG: 81 TABLET ORAL at 08:15

## 2020-06-17 RX ADMIN — ARFORMOTEROL TARTRATE 15 MCG: 15 SOLUTION RESPIRATORY (INHALATION) at 09:24

## 2020-06-17 RX ADMIN — CARVEDILOL 25 MG: 25 TABLET, FILM COATED ORAL at 16:07

## 2020-06-17 RX ADMIN — SKIN PROTECTANT: 44 OINTMENT TOPICAL at 21:08

## 2020-06-17 RX ADMIN — DEXTROSE MONOHYDRATE 1.5 G: 50 INJECTION, SOLUTION INTRAVENOUS at 14:16

## 2020-06-17 RX ADMIN — SODIUM CHLORIDE: 9 INJECTION, SOLUTION INTRAVENOUS at 20:00

## 2020-06-17 RX ADMIN — GABAPENTIN 300 MG: 300 CAPSULE ORAL at 08:15

## 2020-06-17 RX ADMIN — CEFEPIME 2 G: 2 INJECTION, POWDER, FOR SOLUTION INTRAVENOUS at 04:13

## 2020-06-17 RX ADMIN — LIOTHYRONINE SODIUM 75 MCG: 25 TABLET ORAL at 08:15

## 2020-06-17 ASSESSMENT — PAIN SCALES - GENERAL
PAINLEVEL_OUTOF10: 0
PAINLEVEL_OUTOF10: 0

## 2020-06-17 NOTE — PROGRESS NOTES
Information:  · Nutrition-Focused Physical Findings: pt alert, bloated abd, active BS, clamped corpak s/p failed swallow eval, +1 pitting edema, +I/Os  · Wound Type: Venous Stasis(noted abrasion)  · Current Nutrition Therapies:  · Oral Diet Orders: NPO   · Anthropometric Measures:  · Ht: 5' 7\" (170.2 cm)   · Current Body Wt: 387 lb (175.5 kg)(bed scale 6/22)  · Admission Body Wt: 360 lb (163.3 kg)(6/13/20, no method )  · Usual Body Wt: (354-380# stated per EMR over last year)  · Weight Change: noted wt gain from stated adm wt w/ +fluid balance at this time, unable to accurately assess wt changes at this time   · Ideal Body Wt: 148 lb (67.1 kg), % Ideal Body 261%  · BMI Classification: BMI > or equal to 40.0 Obese Class III    Nutrition Interventions:   Continued Inpatient Monitoring, Education Initiated, Coordination of Care(reviewed TF w/ pt)    Nutrition Evaluation:   · Evaluation: Goals set(new goal)   · Goals:  Tolerance to TF at goal rate   · Monitoring: TF Intake, TF Tolerance, Skin Integrity, Wound Healing, I&O, Weight, Pertinent Labs, Chewing/Swallowing, Monitor Bowel Function      Electronically signed by Deja Torres MS, RD, LD on 6/17/20 at 1:16 PM EDT    Contact Number: [phone]

## 2020-06-17 NOTE — PROGRESS NOTES
appears stated age and cooperative, obese  Lungs: rhonchi bibasilar minimal  Heart: regular rate and rhythm, S1, S2 normal, no murmur, click, rub or gallop  Abdomen: soft, non-tender; bowel sounds normal; no masses,  no organomegaly  Extremities: edema bipedal with chronic skin discolorations, (+) surgical dressing left lower leg  Neurologic: Mental status: arousable, lethargic, oriented, moving extremities    Data    CBC:   No results for input(s): WBC, HGB, HCT, MCV, PLT in the last 72 hours. BMP:  No results for input(s): NA, K, CL, CO2, PHOS, BUN, CREATININE in the last 72 hours. Invalid input(s): CA ALB:3,BILIDIR:3,BILITOT:3,ALKPHOS:3)@    PT/INR:   No results for input(s): PROTIME, INR in the last 72 hours. ABG:   Recent Labs     06/14/20  1815   PH 7.456*   PO2 190.1*   PCO2 30.3*   HCO3 20.9*   BE -2.0   O2SAT 99.0*   METHB 0.2   O2HB 98.2*   COHB 0.6   O2CON 18.1   HHB 1.0   THB 12.8     FiO2 : 50 %       Radiology/Other tests reviewed: CXR reviewed with slightly better aeration both lower lung fields    Assessment:     Active Problems:    Pneumonia  Resolved Problems:    * No resolved hospital problems. *  DEMETRIA  Bacteremia  COPD      Plan:       1. Cont with antibiotics as per ID  2. Cont with nebs, observe respiratory function  3. Cont with BIPAP qhs, NC in daytime, observe oxygenation  4. Wound care as per protocol  5. Dysphagia as per PCP, for NGT placement (?) PEG tube needed  6. OOB to chair with assistance        Thanks for letting us see this patient in consultation. Please contact us with any questions. Office [phone] or after hours through [employer], x [phone].

## 2020-06-17 NOTE — PROGRESS NOTES
Subjective: The patient is awake and alert. He did agree to NG tube but was unable to tolerate and pulled it out  No acute issues or complaints through the night  Pleasant no acute complaints       Objective:    BP 96/76   Pulse 65   Temp 97.1 °F (36.2 °C) (Temporal)   Resp 18   Ht 5' 7\" (1.702 m)   Wt (!) 360 lb (163.3 kg)   SpO2 99%   BMI 56.38 kg/m²     In: 2536.7 [I.V.:2536.7]  Out: 900   Morbidly obese male  HEENT: NCAT,  PERRLA, No JVD  Heart: Pacemaker in place RRR, no murmurs, gallops, or rubs. Lungs: Coarse breath sounds bilateral lung fields  Abd: bowel sounds present, nontender, nondistended, no masses  Extrem:  No clubbing, cyanosis, or edema     No results for input(s): WBC, HGB, HCT, PLT in the last 72 hours. No results for input(s): NA, K, CL, CO2, BUN, CREATININE, CALCIUM in the last 72 hours.     Invalid input(s): GLU    Assessment:    Patient Active Problem List   Diagnosis    Arrhythmia    Pacemaker    HTN (hypertension)    Hyperthyroidism    DEMETRIA (obstructive sleep apnea)    Cellulitis    Cellulitis and abscess of leg    SIRS (systemic inflammatory response syndrome) (HCC)    Shortness of breath    Pain in lower limb    Chest pain    Abdominal pain    Pulmonary emboli (HCC)    Post-thrombotic syndrome    Complete heart block (HCC)    Non-pressure chronic ulcer of left lower leg with fat layer exposed ([address]    Venous insufficiency (chronic) (peripheral)    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

## 2020-06-17 NOTE — PROGRESS NOTES
palpitation  GASTROINTESTINAL:  Denies abdomen pain, diarrhea or constipation. GENITOURINARY:  Denies burning urination or frequency of urination  INTEGUMENT: denies wound , rash  HEMATOLOGIC/LYMPHATIC:  Denies lymph node swelling, gum bleeding or easy bruising. MUSCULOSKELETAL: leg swelling, left leg non healing wound   NEUROLOGICAL:  Denies light headed, dizziness, loss of consciousness, weakness of lower extremities, bowel or bladder incontinence. PHYSICAL EXAM:      Vitals:     /73   Pulse 65   Temp 97.9 °F (36.6 °C) (Temporal)   Resp 18   Ht 5' 7\" (1.702 m)   Wt (!) 387 lb 9.6 oz (175.8 kg)   SpO2 96%   BMI 60.71 kg/m²     General Appearance:    Awake, alert , on BiPAP    Head:    Normocephalic, atraumatic   Eyes:    No pallor, no icterus,   Ears:    No obvious deformity or drainage. Nose:   No nasal drainage   Throat:   Mucosa moist, no oral thrush   Neck:   Supple, no lymphadenopathy   Lungs:      Wheeze     Heart:    Regular rate and rhythm,   Abdomen:     Soft, non-tender, bowel sounds present    Extremities:   Bilateral pitting edema, left leg full skin thickness wound.  periwound erythema , tender, and warm to touch    Pulses:   Dorsalis pedis palpable    Skin:   Left leg erythema- improving today      CBC with Differential:      Lab Results   Component Value Date    WBC 17.6 06/13/2020    RBC 4.66 06/13/2020    HGB 13.5 06/13/2020    HCT 43.4 06/13/2020     06/13/2020    MCV 93.1 06/13/2020    MCH 29.0 06/13/2020    MCHC 31.1 06/13/2020    RDW 15.2 06/13/2020    SEGSPCT 69 07/07/2012    LYMPHOPCT 2.5 06/13/2020    MONOPCT 3.2 06/13/2020    BASOPCT 0.1 06/13/2020    MONOSABS 0.56 06/13/2020    LYMPHSABS 0.43 06/13/2020    EOSABS 0.00 06/13/2020    BASOSABS 0.02 06/13/2020       CMP     Lab Results   Component Value Date     06/13/2020    K 4.3 06/13/2020    K 5.2 06/13/2020     06/13/2020    CO2 18 06/13/2020    BUN 24 06/13/2020    CREATININE 1.1 06/13/2020 RECOMMENDATIONS:     1. Stop vancomycin   2. Cefepime 2 gram IV q `1 hrs   3.  Linezolid 600 mg po q 12 hrs

## 2020-06-17 NOTE — TELEPHONE ENCOUNTER
CALLED PATIENT AND SPOKE WITH PATIENTS WIFE NEGRO. TO SCHEDULE AN ECHO. PATIENT IS IN THE HOSPITAL AT THIS TIME.   Electronically signed by Ray Perez on 6/17/2020 at 9:04 AM

## 2020-06-17 NOTE — PROGRESS NOTES
Daily Progress Note      SUBJECTIVE: Patient seen in follow-up stasis ulcer left lower extremity. Awake alert. Working with physical therapy, ambulating with walker. He denies any nausea, vomiting, fever or chills. Currently no lower extremity pain    OBJECTIVE: Dressing/compression intact left lower extremity. Just changed by nursing. No strikethrough. Stasis changes.     Medications    Current Facility-Administered Medications: mineral oil-hydrophilic petrolatum (HYDROPHOR) ointment, , Topical, BID  vancomycin 1.5 g in dextrose 5% 300 mL IVPB, 1,500 mg, Intravenous, Q12H  cefepime (MAXIPIME) 2 g IVPB extended (mini-bag), 2 g, Intravenous, Q12H  0.9 % sodium chloride infusion admixture, , Intravenous, Q12H  aspirin chewable tablet 81 mg, 81 mg, Oral, Daily  potassium bicarb-citric acid (EFFER-K) effervescent tablet 40 mEq, 40 mEq, Oral, Daily  barium sulfate 40 % paste 15 mL, 15 mL, Oral, ONCE PRN  barium sulfate 40 % reconsitutable suspension, 15 mL, Oral, ONCE PRN  barium sulfate 40 % suspension 15 mL, 15 mL, Oral, ONCE PRN  sodium hypochlorite (DAKINS) 0.125 % external solution, , Irrigation, Daily  acetaminophen (TYLENOL) tablet 650 mg, 650 mg, Oral, Q4H PRN  ipratropium-albuterol (DUONEB) nebulizer solution 1 ampule, 1 ampule, Inhalation, Q4H WA  sodium chloride flush 0.9 % injection 10 mL, 10 mL, Intravenous, 2 times per day  sodium chloride flush 0.9 % injection 10 mL, 10 mL, Intravenous, PRN  gabapentin (NEURONTIN) capsule 300 mg, 300 mg, Oral, TID  liothyronine (CYTOMEL) tablet 75 mcg, 75 mcg, Oral, Daily  carvedilol (COREG) tablet 25 mg, 25 mg, Oral, BID WC  ramipril (ALTACE) capsule 10 mg, 10 mg, Oral, Daily  glipiZIDE (GLUCOTROL) tablet 5 mg, 5 mg, Oral, QAM AC  rosuvastatin (CRESTOR) tablet 5 mg, 5 mg, Oral, Daily  vitamin C (ASCORBIC ACID) tablet 500 mg, 500 mg, Oral, Daily  albuterol (ACCUNEB) nebulizer solution 0.63 mg, 1 ampule, Nebulization, Q6H PRN  montelukast (SINGULAIR) tablet 10 mg, 10 mg, Oral, Nightly  insulin lispro (HUMALOG) injection vial 0-12 Units, 0-12 Units, Subcutaneous, TID WC  insulin lispro (HUMALOG) injection vial 0-6 Units, 0-6 Units, Subcutaneous, Nightly  glucose (GLUTOSE) 40 % oral gel 15 g, 15 g, Oral, PRN  dextrose 50 % IV solution, 12.5 g, Intravenous, PRN  glucagon (rDNA) injection 1 mg, 1 mg, Intramuscular, PRN  dextrose 5 % solution, 100 mL/hr, Intravenous, PRN  0.9 % sodium chloride infusion, , Intravenous, Continuous  [DISCONTINUED] ipratropium (ATROVENT) 0.02 % nebulizer solution 0.5 mg, 0.5 mg, Nebulization, 4x daily **AND** Arformoterol Tartrate (BROVANA) nebulizer solution 15 mcg, 15 mcg, Nebulization, BID  Physical    VITALS:  BP 96/76   Pulse 65   Temp 97.1 °F (36.2 °C) (Temporal)   Resp 18   Ht 5' 7\" (1.702 m)   Wt (!) 360 lb (163.3 kg)   SpO2 99%   BMI 56.38 kg/m²   TEMPERATURE:  Current - Temp: 97.1 °F (36.2 °C);  Max - Temp  Av.3 °F (36.8 °C)  Min: 97.1 °F (36.2 °C)  Max: 98.9 °F (37.2 °C)  RESPIRATIONS RANGE: Resp  Av.2  Min: 18  Max: 28  PULSE RANGE: Pulse  Av.6  Min: 64  Max: 83  BLOOD PRESSURE RANGE:  Systolic (99MHK), IBQ:243 , Min:96 , OJU:579   ; Diastolic (81WFF), SNF:77, Min:64, Max:85    PULSE OXIMETRY RANGE: SpO2  Av.8 %  Min: 94 %  Max: 99 %  24HR INTAKE/OUTPUT:      Intake/Output Summary (Last 24 hours) at 2020 1159  Last data filed at 2020 0935  Gross per 24 hour   Intake 2536.67 ml   Output 1400 ml   Net 1136.67 ml           Data    CBC with Differential:    Lab Results   Component Value Date    WBC 17.6 2020    RBC 4.66 2020    HGB 13.5 2020    HCT 43.4 2020     2020    MCV 93.1 2020    MCH 29.0 2020    MCHC 31.1 2020    RDW 15.2 2020    SEGSPCT 69 2012    LYMPHOPCT 2.5 2020    MONOPCT 3.2 2020    BASOPCT 0.1 2020    MONOSABS 0.56 2020    LYMPHSABS 0.43 2020    EOSABS 0.00 2020    BASOSABS 0.02 2020

## 2020-06-18 ENCOUNTER — APPOINTMENT (OUTPATIENT)
Dept: CARDIAC CATH/INVASIVE PROCEDURES | Age: 70
DRG: 871 | End: 2020-06-18
Payer: MEDICARE

## 2020-06-18 ENCOUNTER — APPOINTMENT (OUTPATIENT)
Dept: GENERAL RADIOLOGY | Age: 70
DRG: 871 | End: 2020-06-18
Payer: MEDICARE

## 2020-06-18 ENCOUNTER — ANESTHESIA EVENT (OUTPATIENT)
Dept: CARDIAC CATH/INVASIVE PROCEDURES | Age: 70
DRG: 871 | End: 2020-06-18
Payer: MEDICARE

## 2020-06-18 ENCOUNTER — ANESTHESIA (OUTPATIENT)
Dept: CARDIAC CATH/INVASIVE PROCEDURES | Age: 70
DRG: 871 | End: 2020-06-18
Payer: MEDICARE

## 2020-06-18 LAB
METER GLUCOSE: 152 MG/DL (ref 74–99)
METER GLUCOSE: 178 MG/DL (ref 74–99)
METER GLUCOSE: 196 MG/DL (ref 74–99)
METER GLUCOSE: 203 MG/DL (ref 74–99)

## 2020-06-18 PROCEDURE — 02HV33Z INSERTION OF INFUSION DEVICE INTO SUPERIOR VENA CAVA, PERCUTANEOUS APPROACH: ICD-10-PCS | Performed by: RADIOLOGY

## 2020-06-18 PROCEDURE — 97530 THERAPEUTIC ACTIVITIES: CPT

## 2020-06-18 PROCEDURE — 97535 SELF CARE MNGMENT TRAINING: CPT

## 2020-06-18 PROCEDURE — 6360000002 HC RX W HCPCS: Performed by: INTERNAL MEDICINE

## 2020-06-18 PROCEDURE — 82962 GLUCOSE BLOOD TEST: CPT

## 2020-06-18 PROCEDURE — 36569 INSJ PICC 5 YR+ W/O IMAGING: CPT

## 2020-06-18 PROCEDURE — 6370000000 HC RX 637 (ALT 250 FOR IP): Performed by: INTERNAL MEDICINE

## 2020-06-18 PROCEDURE — 2580000003 HC RX 258: Performed by: NURSE ANESTHETIST, CERTIFIED REGISTERED

## 2020-06-18 PROCEDURE — 92526 ORAL FUNCTION THERAPY: CPT

## 2020-06-18 PROCEDURE — 2580000003 HC RX 258: Performed by: INTERNAL MEDICINE

## 2020-06-18 PROCEDURE — 2700000000 HC OXYGEN THERAPY PER DAY

## 2020-06-18 PROCEDURE — C1751 CATH, INF, PER/CENT/MIDLINE: HCPCS

## 2020-06-18 PROCEDURE — 71045 X-RAY EXAM CHEST 1 VIEW: CPT

## 2020-06-18 PROCEDURE — 2060000000 HC ICU INTERMEDIATE R&B

## 2020-06-18 PROCEDURE — 76937 US GUIDE VASCULAR ACCESS: CPT

## 2020-06-18 PROCEDURE — 94660 CPAP INITIATION&MGMT: CPT

## 2020-06-18 PROCEDURE — 97116 GAIT TRAINING THERAPY: CPT

## 2020-06-18 PROCEDURE — 94640 AIRWAY INHALATION TREATMENT: CPT

## 2020-06-18 RX ORDER — LIDOCAINE HYDROCHLORIDE 10 MG/ML
5 INJECTION, SOLUTION INFILTRATION; PERINEURAL ONCE
Status: DISCONTINUED | OUTPATIENT
Start: 2020-06-18 | End: 2020-06-23 | Stop reason: HOSPADM

## 2020-06-18 RX ORDER — HEPARIN SODIUM (PORCINE) LOCK FLUSH IV SOLN 100 UNIT/ML 100 UNIT/ML
3 SOLUTION INTRAVENOUS PRN
Status: DISCONTINUED | OUTPATIENT
Start: 2020-06-18 | End: 2020-06-23 | Stop reason: HOSPADM

## 2020-06-18 RX ORDER — SODIUM CHLORIDE 0.9 % (FLUSH) 0.9 %
10 SYRINGE (ML) INJECTION PRN
Status: DISCONTINUED | OUTPATIENT
Start: 2020-06-18 | End: 2020-06-18 | Stop reason: SDUPTHER

## 2020-06-18 RX ORDER — SODIUM CHLORIDE 0.9 % (FLUSH) 0.9 %
10 SYRINGE (ML) INJECTION EVERY 12 HOURS SCHEDULED
Status: DISCONTINUED | OUTPATIENT
Start: 2020-06-18 | End: 2020-06-23 | Stop reason: HOSPADM

## 2020-06-18 RX ORDER — HEPARIN SODIUM (PORCINE) LOCK FLUSH IV SOLN 100 UNIT/ML 100 UNIT/ML
3 SOLUTION INTRAVENOUS EVERY 12 HOURS SCHEDULED
Status: DISCONTINUED | OUTPATIENT
Start: 2020-06-18 | End: 2020-06-23 | Stop reason: HOSPADM

## 2020-06-18 RX ORDER — LIDOCAINE HYDROCHLORIDE 10 MG/ML
5 INJECTION, SOLUTION EPIDURAL; INFILTRATION; INTRACAUDAL; PERINEURAL ONCE
Status: DISCONTINUED | OUTPATIENT
Start: 2020-06-18 | End: 2020-06-18 | Stop reason: SDUPTHER

## 2020-06-18 RX ORDER — SODIUM CHLORIDE 9 MG/ML
INJECTION, SOLUTION INTRAVENOUS CONTINUOUS PRN
Status: DISCONTINUED | OUTPATIENT
Start: 2020-06-18 | End: 2020-06-19 | Stop reason: HOSPADM

## 2020-06-18 RX ORDER — SODIUM CHLORIDE 0.9 % (FLUSH) 0.9 %
10 SYRINGE (ML) INJECTION PRN
Status: DISCONTINUED | OUTPATIENT
Start: 2020-06-18 | End: 2020-06-23 | Stop reason: HOSPADM

## 2020-06-18 RX ADMIN — IPRATROPIUM BROMIDE AND ALBUTEROL SULFATE 1 AMPULE: 2.5; .5 SOLUTION RESPIRATORY (INHALATION) at 16:32

## 2020-06-18 RX ADMIN — SKIN PROTECTANT: 44 OINTMENT TOPICAL at 09:00

## 2020-06-18 RX ADMIN — SKIN PROTECTANT: 44 OINTMENT TOPICAL at 21:18

## 2020-06-18 RX ADMIN — IPRATROPIUM BROMIDE AND ALBUTEROL SULFATE 1 AMPULE: 2.5; .5 SOLUTION RESPIRATORY (INHALATION) at 09:21

## 2020-06-18 RX ADMIN — INSULIN LISPRO 2 UNITS: 100 INJECTION, SOLUTION INTRAVENOUS; SUBCUTANEOUS at 19:02

## 2020-06-18 RX ADMIN — CEFEPIME 2 G: 2 INJECTION, POWDER, FOR SOLUTION INTRAVENOUS at 04:00

## 2020-06-18 RX ADMIN — IPRATROPIUM BROMIDE AND ALBUTEROL SULFATE 1 AMPULE: 2.5; .5 SOLUTION RESPIRATORY (INHALATION) at 22:38

## 2020-06-18 RX ADMIN — SODIUM CHLORIDE: 9 INJECTION, SOLUTION INTRAVENOUS at 21:20

## 2020-06-18 RX ADMIN — MONTELUKAST SODIUM 10 MG: 10 TABLET, FILM COATED ORAL at 21:19

## 2020-06-18 RX ADMIN — ARFORMOTEROL TARTRATE 15 MCG: 15 SOLUTION RESPIRATORY (INHALATION) at 22:37

## 2020-06-18 RX ADMIN — INSULIN LISPRO 1 UNITS: 100 INJECTION, SOLUTION INTRAVENOUS; SUBCUTANEOUS at 21:29

## 2020-06-18 RX ADMIN — CARVEDILOL 25 MG: 25 TABLET, FILM COATED ORAL at 21:19

## 2020-06-18 RX ADMIN — CEFEPIME 2 G: 2 INJECTION, POWDER, FOR SOLUTION INTRAVENOUS at 18:35

## 2020-06-18 RX ADMIN — SODIUM CHLORIDE, PRESERVATIVE FREE 10 ML: 5 INJECTION INTRAVENOUS at 21:20

## 2020-06-18 RX ADMIN — SODIUM CHLORIDE: 9 INJECTION, SOLUTION INTRAVENOUS at 13:10

## 2020-06-18 RX ADMIN — GABAPENTIN 300 MG: 300 CAPSULE ORAL at 21:19

## 2020-06-18 RX ADMIN — LINEZOLID 600 MG: 600 TABLET, FILM COATED ORAL at 21:19

## 2020-06-18 RX ADMIN — GABAPENTIN 300 MG: 300 CAPSULE ORAL at 15:29

## 2020-06-18 RX ADMIN — HYOSCYAMINE SULFATE: 16 SOLUTION at 09:00

## 2020-06-18 RX ADMIN — ARFORMOTEROL TARTRATE 15 MCG: 15 SOLUTION RESPIRATORY (INHALATION) at 09:22

## 2020-06-18 ASSESSMENT — ENCOUNTER SYMPTOMS: SHORTNESS OF BREATH: 1

## 2020-06-18 ASSESSMENT — PAIN SCALES - GENERAL
PAINLEVEL_OUTOF10: 0

## 2020-06-18 NOTE — PROGRESS NOTES
good progress towards established goals. Will continue with current POC.         PLAN:    Time in  1037  Time out  1115    Total Treatment Time  38    CPT codes:  [x] Gait training 52819 15 minutes  [] Manual therapy [id number] 0 minutes  [x] Therapeutic activities 97140 23 minutes  [] Therapeutic exercises 24217 0 minutes  [] Neuromuscular reeducation 67439 0 minutes      Amira Toshia, PT, DPT  License [id number]

## 2020-06-18 NOTE — PROGRESS NOTES
Pt seen for dysphagia therapy. Pt completion of base of tongue and laryngeal exercises was good with minimal cueing. Educated patient about benefit of swallowing exercises and encouraged patient to perform exercises independently. Patient was agreeable.    Keiko Enriquez., Speech Pathology Graduate Extern

## 2020-06-18 NOTE — PROGRESS NOTES
pneumonia  IV fluids, supportive care with nebulizers  IV antibiotic therapy,-currently on vancomycin  Await pan culture results--beta strep group C in  blood cultures (strep by PCR) and staph aureus with corynebacteria in ankle wound cultures  Infectious disease/podiatry following    Aspiration   n.p.o. recommendation by speech  CorPak placement for ongoing nutrition by IV team  Initiate diabetic tube feeds on hold today for GABBY    Concern for infected pacemaker secondary to positive blood cultures  May require extraction  EP evaluation   Transthoracic echo equivocal  Await GABBY today  insulin sliding scale      Daily labs   Discussed importance of weight loss with   dietary discretion     Family asking about testing for COVID and why this has not been done:  there is no indication to test for COVID-19 because etiology of his symptoms is secondary to bacteremia and aspiration pneumonia    DVT Prophylaxis   PT/OT  Discharge planning     ,  All consultants notes reviewed    Siva Grissom MD  10:48 AM  6/18/2020

## 2020-06-18 NOTE — PROGRESS NOTES
Department of Internal Medicine  Infectious Diseases  Progress  Note      C/C : Group C Strep bacteremia , pneumonia , left leg cellulitis       Pt is awake and alert  Denies fever and chills  Reports shortness of breath   Left leg redness - improving     Afebrile       Current Facility-Administered Medications   Medication Dose Route Frequency Provider Last Rate Last Dose    lidocaine PF 1 % injection 5 mL  5 mL Intradermal Once Tereza Hernandez MD        sodium chloride flush 0.9 % injection 10 mL  10 mL Intravenous PRN Tereza Hernandez MD        heparin flush 100 UNIT/ML injection 300 Units  3 mL Intravenous 2 times per day Tereza Hernandez MD        heparin flush 100 UNIT/ML injection 300 Units  3 mL Intracatheter PRN Tereza Hernandez MD        linezolid (ZYVOX) tablet 600 mg  600 mg Oral 2 times per day Meryl Campos MD   600 mg at 06/17/20 2107    mineral oil-hydrophilic petrolatum (HYDROPHOR) ointment   Topical BID Terzea Hernandez MD        cefepime (MAXIPIME) 2 g IVPB extended (mini-bag)  2 g Intravenous Q12H Dexter Pugh MD   Stopped at 06/18/20 1028    0.9 % sodium chloride infusion admixture   Intravenous Q12H Meryl Campos MD   Stopped at 06/17/20 2058    aspirin chewable tablet 81 mg  81 mg Oral Daily Marzena Soto MD   81 mg at 06/17/20 0815    potassium bicarb-citric acid (EFFER-K) effervescent tablet 40 mEq  40 mEq Oral Daily Tereza Hernandez MD   40 mEq at 06/17/20 0815    barium sulfate 40 % paste 15 mL  15 mL Oral ONCE PRN Foy Score, PA-C        barium sulfate 40 % reconsitutable suspension  15 mL Oral ONCE PRN Foy Score, PA-C        barium sulfate 40 % suspension 15 mL  15 mL Oral ONCE PRN Foy Score, PA-C        sodium hypochlorite (DAKINS) 0.125 % external solution   Irrigation Daily Cotyarielle Bowling DPM        acetaminophen (TYLENOL) tablet 650 mg  650 mg Oral Q4H PRN Marzena Soto MD        ipratropium-albuterol (DUONEB) nebulizer solution 1 ampule  1 ampule Inhalation Q4H Osvaldo Suarez MD   15 mcg at 06/18/20 5896     Facility-Administered Medications Ordered in Other Encounters   Medication Dose Route Frequency Provider Last Rate Last Dose    0.9 % sodium chloride infusion    Continuous PRN Andrea Penrashel, APRN - CRNA           REVIEW OF SYSTEMS:      CONSTITUTIONAL:  Denies fever, chill or rigors  HEENT: denies blurring of vision or double vision, denies hearing problem  RESPIRATORY: SOB   CARDIOVASCULAR:  Denies palpitation  GASTROINTESTINAL:  Denies abdomen pain, diarrhea or constipation. GENITOURINARY:  Denies burning urination or frequency of urination  INTEGUMENT: denies wound , rash  HEMATOLOGIC/LYMPHATIC:  Denies lymph node swelling, gum bleeding or easy bruising. MUSCULOSKELETAL: leg swelling, left leg non healing wound   NEUROLOGICAL:  Denies light headed, dizziness,. PHYSICAL EXAM:      Vitals:     BP (!) 151/73   Pulse 64   Temp 98.1 °F (36.7 °C)   Resp 18   Ht 5' 7\" (1.702 m)   Wt (!) 387 lb 9.6 oz (175.8 kg)   SpO2 98%   BMI 60.71 kg/m²     General Appearance:    Awake, alert , on BiPAP    Head:    Normocephalic, atraumatic   Eyes:    No pallor, no icterus,   Ears:    No obvious deformity or drainage. Nose:   No nasal drainage   Throat:   Mucosa moist, no oral thrush   Neck:   Supple, no lymphadenopathy   Lungs:      Wheeze     Heart:    Regular rate and rhythm,   Abdomen:     Soft, non-tender, bowel sounds present    Extremities:   Bilateral pitting edema, left leg full skin thickness wound.  periwound erythema , tender, and warm to touch    Pulses:   Dorsalis pedis palpable    Skin:   Left leg erythema- improving today      CBC with Differential:      Lab Results   Component Value Date    WBC 17.6 06/13/2020    RBC 4.66 06/13/2020    HGB 13.5 06/13/2020    HCT 43.4 06/13/2020     06/13/2020    MCV 93.1 06/13/2020    MCH 29.0 06/13/2020    MCHC 31.1 06/13/2020    RDW 15.2 06/13/2020    SEGSPCT 69 07/07/2012    LYMPHOPCT 2.5 06/13/2020 MONOPCT 3.2 06/13/2020    BASOPCT 0.1 06/13/2020    MONOSABS 0.56 06/13/2020    LYMPHSABS 0.43 06/13/2020    EOSABS 0.00 06/13/2020    BASOSABS 0.02 06/13/2020       CMP     Lab Results   Component Value Date     06/13/2020    K 4.3 06/13/2020    K 5.2 06/13/2020     06/13/2020    CO2 18 06/13/2020    BUN 24 06/13/2020    CREATININE 1.1 06/13/2020    GFRAA >60 06/13/2020    LABGLOM >60 06/13/2020    GLUCOSE 266 06/13/2020    PROT 7.1 06/13/2020    LABALBU 3.7 06/13/2020    CALCIUM 8.9 06/13/2020    BILITOT 0.9 06/13/2020    ALKPHOS 74 06/13/2020    AST 49 06/13/2020    ALT 32 06/13/2020         Hepatic Function Panel:    Lab Results   Component Value Date    ALKPHOS 74 06/13/2020    ALT 32 06/13/2020    AST 49 06/13/2020    PROT 7.1 06/13/2020    BILITOT 0.9 06/13/2020    LABALBU 3.7 06/13/2020       PT/INR:    Lab Results   Component Value Date    PROTIME 16.2 06/13/2020    INR 1.4 06/13/2020       TSH:    Lab Results   Component Value Date    TSH 0.022 07/07/2012       U/A:  No results found for: NITRITE, COLORU, PHUR, LABCAST, WBCUA, RBCUA, MUCUS, TRICHOMONAS, YEAST, BACTERIA, CLARITYU, SPECGRAV, LEUKOCYTESUR, UROBILINOGEN, BILIRUBINUR, BLOODU, GLUCOSEU, AMORPHOUS    ABG:  No results found for: UTY7XSE, BEART, T3TQBGZH, PHART, THGBART, JMF4LEI, PO2ART, FBS9NQR    MICROBIOLOGY:    Blood culture -    Group C Streptococcus       Wound cx -      Conclusions      Summary   Very technically limited study.      inadequate to assess for endocarditis.      Consider GABBY if clinically appropriate. Radiology :    Chest X ray    Confluent density in the left lower chest suggests airspace  disease/pneumonia and/or pleural fluid. Bronchovascular crowding in  the right lower lung could represent early pneumonia or atelectasis  from splinting due to left chest discomfort. There is cardiomegaly without definite evidence of cardiac  decompensation.  Left-sided bipolar pacemaker is present.       Pro BNP

## 2020-06-18 NOTE — PROGRESS NOTES
lispro (HUMALOG) injection vial 0-12 Units, 0-12 Units, Subcutaneous, TID WC  insulin lispro (HUMALOG) injection vial 0-6 Units, 0-6 Units, Subcutaneous, Nightly  glucose (GLUTOSE) 40 % oral gel 15 g, 15 g, Oral, PRN  dextrose 50 % IV solution, 12.5 g, Intravenous, PRN  glucagon (rDNA) injection 1 mg, 1 mg, Intramuscular, PRN  dextrose 5 % solution, 100 mL/hr, Intravenous, PRN  [DISCONTINUED] ipratropium (ATROVENT) 0.02 % nebulizer solution 0.5 mg, 0.5 mg, Nebulization, 4x daily **AND** Arformoterol Tartrate (BROVANA) nebulizer solution 15 mcg, 15 mcg, Nebulization, BID  Physical    VITALS:  BP (!) 151/73   Pulse 64   Temp 98.1 °F (36.7 °C)   Resp 18   Ht 5' 7\" (1.702 m)   Wt (!) 387 lb 9.6 oz (175.8 kg)   SpO2 98%   BMI 60.71 kg/m²   TEMPERATURE:  Current - Temp: 98.1 °F (36.7 °C);  Max - Temp  Av.9 °F (36.6 °C)  Min: 97 °F (36.1 °C)  Max: 98.4 °F (36.9 °C)  RESPIRATIONS RANGE: Resp  Av.4  Min: 18  Max: 20  PULSE RANGE: Pulse  Av.5  Min: 64  Max: 65  BLOOD PRESSURE RANGE:  Systolic (53GYM), IQD:592 , Min:113 , OJJ:045   ; Diastolic (52IRQ), CLD:61, Min:60, Max:73    PULSE OXIMETRY RANGE: SpO2  Av %  Min: 96 %  Max: 100 %  24HR INTAKE/OUTPUT:      Intake/Output Summary (Last 24 hours) at 2020 1254  Last data filed at 2020 0726  Gross per 24 hour   Intake 2879 ml   Output 1375 ml   Net 1504 ml           Data    CBC with Differential:    Lab Results   Component Value Date    WBC 17.6 2020    RBC 4.66 2020    HGB 13.5 2020    HCT 43.4 2020     2020    MCV 93.1 2020    MCH 29.0 2020    MCHC 31.1 2020    RDW 15.2 2020    SEGSPCT 69 2012    LYMPHOPCT 2.5 2020    MONOPCT 3.2 2020    BASOPCT 0.1 2020    MONOSABS 0.56 2020    LYMPHSABS 0.43 2020    EOSABS 0.00 2020    BASOSABS 0.02 2020     CMP:    Lab Results   Component Value Date     2020    K 4.3 2020    K 5.2 06/13/2020     06/13/2020    CO2 18 06/13/2020    BUN 24 06/13/2020    CREATININE 1.1 06/13/2020    GFRAA >60 06/13/2020    LABGLOM >60 06/13/2020    GLUCOSE 266 06/13/2020    PROT 7.1 06/13/2020    LABALBU 3.7 06/13/2020    CALCIUM 8.9 06/13/2020    BILITOT 0.9 06/13/2020    ALKPHOS 74 06/13/2020    AST 49 06/13/2020    ALT 32 06/13/2020       ASSESSMENT AND PLAN: Ulcer left lower extremity. Stasis changes. Edema. Local care, no pressure. Antibiotics per infectious disease.

## 2020-06-18 NOTE — ANESTHESIA PRE PROCEDURE
Acids (FISH OIL) 600 MG CAPS Take 1,000 mg by mouth daily Last Dose 1-14-16   Yes Historical Provider, MD   liothyronine (CYTOMEL) 50 MCG tablet Take 75 mcg by mouth daily    Yes Historical Provider, MD   aspirin 81 MG EC tablet Take 81 mg by mouth daily Last dose 1-14-16   Yes Historical Provider, MD       Current medications:    Current Facility-Administered Medications   Medication Dose Route Frequency Provider Last Rate Last Dose    linezolid (ZYVOX) tablet 600 mg  600 mg Oral 2 times per day Carisa Fay MD   600 mg at 06/17/20 2107    mineral oil-hydrophilic petrolatum (HYDROPHOR) ointment   Topical BID Jasmin Rodriguez MD        cefepime (MAXIPIME) 2 g IVPB extended (mini-bag)  2 g Intravenous Q12H Dexter Pugh MD   Stopped at 06/18/20 1028    0.9 % sodium chloride infusion admixture   Intravenous Q12H Carisa Fay MD   Stopped at 06/17/20 2058    aspirin chewable tablet 81 mg  81 mg Oral Daily Ryder Shah MD   81 mg at 06/17/20 0815    potassium bicarb-citric acid (EFFER-K) effervescent tablet 40 mEq  40 mEq Oral Daily Jasmin Rodriguez MD   40 mEq at 06/17/20 0815    barium sulfate 40 % paste 15 mL  15 mL Oral ONCE PRN Chan Grijalva PA-C        barium sulfate 40 % reconsitutable suspension  15 mL Oral ONCE PRN Chan Grijalva PA-C        barium sulfate 40 % suspension 15 mL  15 mL Oral ONCE PRN Chan Grijalva PA-C        sodium hypochlorite (DAKINS) 0.125 % external solution   Irrigation Daily Aster Rasmussen DPM        acetaminophen (TYLENOL) tablet 650 mg  650 mg Oral Q4H PRN Ryder hSah MD        ipratropium-albuterol (DUONEB) nebulizer solution 1 ampule  1 ampule Inhalation Q4H BHAVNA Lloyd MD   1 ampule at 06/18/20 3541    sodium chloride flush 0.9 % injection 10 mL  10 mL Intravenous 2 times per day Montrell Grier DO   10 mL at 06/17/20 0816    sodium chloride flush 0.9 % injection 10 mL  10 mL Intravenous PRN Montrell Grier DO        gabapentin (NEURONTIN) CALCIUM 8.9 06/13/2020    BILITOT 0.9 06/13/2020    ALKPHOS 74 06/13/2020    AST 49 06/13/2020    ALT 32 06/13/2020       POC Tests: No results for input(s): POCGLU, POCNA, POCK, POCCL, POCBUN, POCHEMO, POCHCT in the last 72 hours. Coags:   Lab Results   Component Value Date    PROTIME 16.2 06/13/2020    INR 1.4 06/13/2020       HCG (If Applicable): No results found for: PREGTESTUR, PREGSERUM, HCG, HCGQUANT     ABGs: No results found for: PHART, PO2ART, EYC2OFX, WTH0YPU, BEART, F7JIEPDP     Type & Screen (If Applicable):  No results found for: LABABO, LABRH    Drug/Infectious Status (If Applicable):  No results found for: HIV, HEPCAB    COVID-19 Screening (If Applicable): No results found for: COVID19      Anesthesia Evaluation  Patient summary reviewed no history of anesthetic complications:   Airway: Mallampati: III  TM distance: >3 FB   Neck ROM: full  Mouth opening: > = 3 FB Dental:          Pulmonary:   (+) shortness of breath:  sleep apnea:  decreased breath sounds,                             Cardiovascular:    (+) hypertension:, pacemaker: pacemaker, past MI:, CAD:, dysrhythmias: paced rhythm, hyperlipidemia      ECG reviewed  Rhythm: regular  Rate: normal                    Neuro/Psych:   (+) neuromuscular disease (Neuropathy feet ):,             GI/Hepatic/Renal:   (+) morbid obesity          Endo/Other:    (+) DiabetesType II DM, , hyperthyroidism::., .                 Abdominal:   (+) obese,         Vascular:   + PVD, aortic or cerebral, . Anesthesia Plan      MAC     ASA 4       Induction: intravenous. Anesthetic plan and risks discussed with patient. Plan discussed with CRNA.                   Kd Lundy MD   6/18/2020

## 2020-06-19 ENCOUNTER — ANESTHESIA EVENT (OUTPATIENT)
Dept: CARDIAC CATH/INVASIVE PROCEDURES | Age: 70
DRG: 871 | End: 2020-06-19
Payer: MEDICARE

## 2020-06-19 ENCOUNTER — ANESTHESIA (OUTPATIENT)
Dept: CARDIAC CATH/INVASIVE PROCEDURES | Age: 70
DRG: 871 | End: 2020-06-19
Payer: MEDICARE

## 2020-06-19 VITALS
RESPIRATION RATE: 25 BRPM | OXYGEN SATURATION: 96 % | DIASTOLIC BLOOD PRESSURE: 76 MMHG | SYSTOLIC BLOOD PRESSURE: 117 MMHG

## 2020-06-19 LAB
ABO/RH: NORMAL
ANION GAP SERPL CALCULATED.3IONS-SCNC: 11 MMOL/L (ref 7–16)
ANION GAP SERPL CALCULATED.3IONS-SCNC: 11 MMOL/L (ref 7–16)
ANTIBODY SCREEN: NORMAL
BASOPHILS ABSOLUTE: 0.01 E9/L (ref 0–0.2)
BASOPHILS ABSOLUTE: 0.02 E9/L (ref 0–0.2)
BASOPHILS RELATIVE PERCENT: 0.2 % (ref 0–2)
BASOPHILS RELATIVE PERCENT: 0.4 % (ref 0–2)
BUN BLDV-MCNC: 14 MG/DL (ref 8–23)
BUN BLDV-MCNC: 14 MG/DL (ref 8–23)
CALCIUM SERPL-MCNC: 8 MG/DL (ref 8.6–10.2)
CALCIUM SERPL-MCNC: 8.2 MG/DL (ref 8.6–10.2)
CHLORIDE BLD-SCNC: 104 MMOL/L (ref 98–107)
CHLORIDE BLD-SCNC: 106 MMOL/L (ref 98–107)
CO2: 21 MMOL/L (ref 22–29)
CO2: 25 MMOL/L (ref 22–29)
CREAT SERPL-MCNC: 0.8 MG/DL (ref 0.7–1.2)
CREAT SERPL-MCNC: 0.8 MG/DL (ref 0.7–1.2)
EOSINOPHILS ABSOLUTE: 0.14 E9/L (ref 0.05–0.5)
EOSINOPHILS ABSOLUTE: 0.2 E9/L (ref 0.05–0.5)
EOSINOPHILS RELATIVE PERCENT: 3.2 % (ref 0–6)
EOSINOPHILS RELATIVE PERCENT: 3.6 % (ref 0–6)
GFR AFRICAN AMERICAN: >60
GFR AFRICAN AMERICAN: >60
GFR NON-AFRICAN AMERICAN: >60 ML/MIN/1.73
GFR NON-AFRICAN AMERICAN: >60 ML/MIN/1.73
GLUCOSE BLD-MCNC: 155 MG/DL (ref 74–99)
GLUCOSE BLD-MCNC: 157 MG/DL (ref 74–99)
HCT VFR BLD CALC: 21.9 % (ref 37–54)
HCT VFR BLD CALC: 47 % (ref 37–54)
HEMOGLOBIN: 15.1 G/DL (ref 12.5–16.5)
HEMOGLOBIN: 6.8 G/DL (ref 12.5–16.5)
IMMATURE GRANULOCYTES #: 0.03 E9/L
IMMATURE GRANULOCYTES #: 0.04 E9/L
IMMATURE GRANULOCYTES %: 0.7 % (ref 0–5)
IMMATURE GRANULOCYTES %: 0.7 % (ref 0–5)
LYMPHOCYTES ABSOLUTE: 0.68 E9/L (ref 1.5–4)
LYMPHOCYTES ABSOLUTE: 0.71 E9/L (ref 1.5–4)
LYMPHOCYTES RELATIVE PERCENT: 12.8 % (ref 20–42)
LYMPHOCYTES RELATIVE PERCENT: 15.5 % (ref 20–42)
MCH RBC QN AUTO: 28.2 PG (ref 26–35)
MCH RBC QN AUTO: 28.9 PG (ref 26–35)
MCHC RBC AUTO-ENTMCNC: 31.1 % (ref 32–34.5)
MCHC RBC AUTO-ENTMCNC: 32.1 % (ref 32–34.5)
MCV RBC AUTO: 87.9 FL (ref 80–99.9)
MCV RBC AUTO: 93.2 FL (ref 80–99.9)
METER GLUCOSE: 136 MG/DL (ref 74–99)
METER GLUCOSE: 164 MG/DL (ref 74–99)
METER GLUCOSE: 170 MG/DL (ref 74–99)
MONOCYTES ABSOLUTE: 0.4 E9/L (ref 0.1–0.95)
MONOCYTES ABSOLUTE: 0.42 E9/L (ref 0.1–0.95)
MONOCYTES RELATIVE PERCENT: 7.6 % (ref 2–12)
MONOCYTES RELATIVE PERCENT: 9.1 % (ref 2–12)
NEUTROPHILS ABSOLUTE: 3.13 E9/L (ref 1.8–7.3)
NEUTROPHILS ABSOLUTE: 4.14 E9/L (ref 1.8–7.3)
NEUTROPHILS RELATIVE PERCENT: 71.3 % (ref 43–80)
NEUTROPHILS RELATIVE PERCENT: 74.9 % (ref 43–80)
PDW BLD-RTO: 15.3 FL (ref 11.5–15)
PDW BLD-RTO: 15.4 FL (ref 11.5–15)
PLATELET # BLD: 64 E9/L (ref 130–450)
PLATELET # BLD: 92 E9/L (ref 130–450)
PLATELET CONFIRMATION: NORMAL
PLATELET CONFIRMATION: NORMAL
PMV BLD AUTO: 10.4 FL (ref 7–12)
PMV BLD AUTO: 10.6 FL (ref 7–12)
POTASSIUM SERPL-SCNC: 4 MMOL/L (ref 3.5–5)
POTASSIUM SERPL-SCNC: 4.1 MMOL/L (ref 3.5–5)
RBC # BLD: 2.35 E12/L (ref 3.8–5.8)
RBC # BLD: 5.35 E12/L (ref 3.8–5.8)
SODIUM BLD-SCNC: 138 MMOL/L (ref 132–146)
SODIUM BLD-SCNC: 140 MMOL/L (ref 132–146)
WBC # BLD: 4.4 E9/L (ref 4.5–11.5)
WBC # BLD: 5.5 E9/L (ref 4.5–11.5)

## 2020-06-19 PROCEDURE — 6370000000 HC RX 637 (ALT 250 FOR IP): Performed by: INTERNAL MEDICINE

## 2020-06-19 PROCEDURE — B24BZZ4 ULTRASONOGRAPHY OF HEART WITH AORTA, TRANSESOPHAGEAL: ICD-10-PCS | Performed by: INTERNAL MEDICINE

## 2020-06-19 PROCEDURE — 92526 ORAL FUNCTION THERAPY: CPT

## 2020-06-19 PROCEDURE — 80048 BASIC METABOLIC PNL TOTAL CA: CPT

## 2020-06-19 PROCEDURE — 86850 RBC ANTIBODY SCREEN: CPT

## 2020-06-19 PROCEDURE — 86900 BLOOD TYPING SEROLOGIC ABO: CPT

## 2020-06-19 PROCEDURE — 2580000003 HC RX 258: Performed by: INTERNAL MEDICINE

## 2020-06-19 PROCEDURE — 93321 DOPPLER ECHO F-UP/LMTD STD: CPT

## 2020-06-19 PROCEDURE — 2700000000 HC OXYGEN THERAPY PER DAY

## 2020-06-19 PROCEDURE — 94660 CPAP INITIATION&MGMT: CPT

## 2020-06-19 PROCEDURE — 93325 DOPPLER ECHO COLOR FLOW MAPG: CPT

## 2020-06-19 PROCEDURE — 6360000002 HC RX W HCPCS: Performed by: ANESTHESIOLOGIST ASSISTANT

## 2020-06-19 PROCEDURE — 85025 COMPLETE CBC W/AUTO DIFF WBC: CPT

## 2020-06-19 PROCEDURE — 2060000000 HC ICU INTERMEDIATE R&B

## 2020-06-19 PROCEDURE — 6360000002 HC RX W HCPCS: Performed by: INTERNAL MEDICINE

## 2020-06-19 PROCEDURE — 93312 ECHO TRANSESOPHAGEAL: CPT

## 2020-06-19 PROCEDURE — 94640 AIRWAY INHALATION TREATMENT: CPT

## 2020-06-19 PROCEDURE — 36415 COLL VENOUS BLD VENIPUNCTURE: CPT

## 2020-06-19 PROCEDURE — 2580000003 HC RX 258: Performed by: ANESTHESIOLOGIST ASSISTANT

## 2020-06-19 PROCEDURE — 2709999900 HC NON-CHARGEABLE SUPPLY

## 2020-06-19 PROCEDURE — 82962 GLUCOSE BLOOD TEST: CPT

## 2020-06-19 PROCEDURE — 86901 BLOOD TYPING SEROLOGIC RH(D): CPT

## 2020-06-19 RX ORDER — PROPOFOL 10 MG/ML
INJECTION, EMULSION INTRAVENOUS PRN
Status: DISCONTINUED | OUTPATIENT
Start: 2020-06-19 | End: 2020-06-19 | Stop reason: SDUPTHER

## 2020-06-19 RX ORDER — SODIUM CHLORIDE 9 MG/ML
INJECTION, SOLUTION INTRAVENOUS CONTINUOUS PRN
Status: DISCONTINUED | OUTPATIENT
Start: 2020-06-19 | End: 2020-06-19 | Stop reason: SDUPTHER

## 2020-06-19 RX ADMIN — SODIUM CHLORIDE, PRESERVATIVE FREE 10 ML: 5 INJECTION INTRAVENOUS at 20:19

## 2020-06-19 RX ADMIN — CEFEPIME 2 G: 2 INJECTION, POWDER, FOR SOLUTION INTRAVENOUS at 04:15

## 2020-06-19 RX ADMIN — SKIN PROTECTANT: 44 OINTMENT TOPICAL at 13:55

## 2020-06-19 RX ADMIN — SODIUM CHLORIDE, PRESERVATIVE FREE 300 UNITS: 5 INJECTION INTRAVENOUS at 20:19

## 2020-06-19 RX ADMIN — GLIPIZIDE 5 MG: 5 TABLET ORAL at 13:54

## 2020-06-19 RX ADMIN — GABAPENTIN 300 MG: 300 CAPSULE ORAL at 13:59

## 2020-06-19 RX ADMIN — INSULIN LISPRO 2 UNITS: 100 INJECTION, SOLUTION INTRAVENOUS; SUBCUTANEOUS at 17:15

## 2020-06-19 RX ADMIN — LIOTHYRONINE SODIUM 75 MCG: 25 TABLET ORAL at 13:54

## 2020-06-19 RX ADMIN — SODIUM CHLORIDE: 9 INJECTION, SOLUTION INTRAVENOUS at 10:36

## 2020-06-19 RX ADMIN — PROPOFOL 140 MG: 10 INJECTION, EMULSION INTRAVENOUS at 10:39

## 2020-06-19 RX ADMIN — CEFEPIME 2 G: 2 INJECTION, POWDER, FOR SOLUTION INTRAVENOUS at 16:59

## 2020-06-19 RX ADMIN — CARVEDILOL 25 MG: 25 TABLET, FILM COATED ORAL at 17:14

## 2020-06-19 RX ADMIN — SODIUM CHLORIDE: 9 INJECTION, SOLUTION INTRAVENOUS at 09:34

## 2020-06-19 RX ADMIN — LINEZOLID 600 MG: 600 TABLET, FILM COATED ORAL at 09:28

## 2020-06-19 RX ADMIN — INSULIN LISPRO 1 UNITS: 100 INJECTION, SOLUTION INTRAVENOUS; SUBCUTANEOUS at 20:20

## 2020-06-19 RX ADMIN — SKIN PROTECTANT: 44 OINTMENT TOPICAL at 20:19

## 2020-06-19 RX ADMIN — ARFORMOTEROL TARTRATE 15 MCG: 15 SOLUTION RESPIRATORY (INHALATION) at 20:38

## 2020-06-19 RX ADMIN — LINEZOLID 600 MG: 600 TABLET, FILM COATED ORAL at 20:18

## 2020-06-19 RX ADMIN — ROSUVASTATIN CALCIUM 5 MG: 5 TABLET, FILM COATED ORAL at 13:55

## 2020-06-19 RX ADMIN — GABAPENTIN 300 MG: 300 CAPSULE ORAL at 20:18

## 2020-06-19 RX ADMIN — IPRATROPIUM BROMIDE AND ALBUTEROL SULFATE 1 AMPULE: 2.5; .5 SOLUTION RESPIRATORY (INHALATION) at 07:58

## 2020-06-19 RX ADMIN — HYOSCYAMINE SULFATE: 16 SOLUTION at 09:45

## 2020-06-19 RX ADMIN — SODIUM CHLORIDE, PRESERVATIVE FREE 10 ML: 5 INJECTION INTRAVENOUS at 08:24

## 2020-06-19 RX ADMIN — CARVEDILOL 25 MG: 25 TABLET, FILM COATED ORAL at 08:19

## 2020-06-19 RX ADMIN — IPRATROPIUM BROMIDE AND ALBUTEROL SULFATE 1 AMPULE: 2.5; .5 SOLUTION RESPIRATORY (INHALATION) at 20:37

## 2020-06-19 RX ADMIN — SODIUM CHLORIDE, PRESERVATIVE FREE 300 UNITS: 5 INJECTION INTRAVENOUS at 08:25

## 2020-06-19 RX ADMIN — ARFORMOTEROL TARTRATE 15 MCG: 15 SOLUTION RESPIRATORY (INHALATION) at 07:57

## 2020-06-19 RX ADMIN — MONTELUKAST SODIUM 10 MG: 10 TABLET, FILM COATED ORAL at 20:18

## 2020-06-19 RX ADMIN — RAMIPRIL 10 MG: 5 CAPSULE ORAL at 13:54

## 2020-06-19 RX ADMIN — POTASSIUM BICARBONATE 40 MEQ: 782 TABLET, EFFERVESCENT ORAL at 13:59

## 2020-06-19 RX ADMIN — ASPIRIN 81 MG 81 MG: 81 TABLET ORAL at 13:59

## 2020-06-19 RX ADMIN — Medication 500 MG: at 13:53

## 2020-06-19 ASSESSMENT — ENCOUNTER SYMPTOMS: SHORTNESS OF BREATH: 1

## 2020-06-19 ASSESSMENT — PAIN SCALES - GENERAL: PAINLEVEL_OUTOF10: 0

## 2020-06-19 NOTE — PROGRESS NOTES
Daily Progress Note      SUBJECTIVE: Patient seen in follow-up stasis ulcer left lower extremity. Awake alert. Up in chair. He denies any nausea, vomiting, fever or chills. Currently no lower extremity pain    OBJECTIVE: Dressing/compression intact left lower extremity. Wound lateral aspect clean, beefy, no purulence or odor. Few small areas anterior leg, clean base. Stasis changes bilateral LE, +++edema.     Medications    Current Facility-Administered Medications: heparin flush 100 UNIT/ML injection 300 Units, 3 mL, Intravenous, 2 times per day  heparin flush 100 UNIT/ML injection 300 Units, 3 mL, Intracatheter, PRN  lidocaine 1 % injection 5 mL, 5 mL, Intradermal, Once  sodium chloride flush 0.9 % injection 10 mL, 10 mL, Intravenous, 2 times per day  sodium chloride flush 0.9 % injection 10 mL, 10 mL, Intravenous, PRN  linezolid (ZYVOX) tablet 600 mg, 600 mg, Oral, 2 times per day  mineral oil-hydrophilic petrolatum (HYDROPHOR) ointment, , Topical, BID  cefepime (MAXIPIME) 2 g IVPB extended (mini-bag), 2 g, Intravenous, Q12H  0.9 % sodium chloride infusion admixture, , Intravenous, Q12H  aspirin chewable tablet 81 mg, 81 mg, Oral, Daily  potassium bicarb-citric acid (EFFER-K) effervescent tablet 40 mEq, 40 mEq, Oral, Daily  barium sulfate 40 % paste 15 mL, 15 mL, Oral, ONCE PRN  barium sulfate 40 % reconsitutable suspension, 15 mL, Oral, ONCE PRN  barium sulfate 40 % suspension 15 mL, 15 mL, Oral, ONCE PRN  sodium hypochlorite (DAKINS) 0.125 % external solution, , Irrigation, Daily  acetaminophen (TYLENOL) tablet 650 mg, 650 mg, Oral, Q4H PRN  ipratropium-albuterol (DUONEB) nebulizer solution 1 ampule, 1 ampule, Inhalation, Q4H WA  gabapentin (NEURONTIN) capsule 300 mg, 300 mg, Oral, TID  liothyronine (CYTOMEL) tablet 75 mcg, 75 mcg, Oral, Daily  carvedilol (COREG) tablet 25 mg, 25 mg, Oral, BID WC  ramipril (ALTACE) capsule 10 mg, 10 mg, Oral, Daily  glipiZIDE (GLUCOTROL) tablet 5 mg, 5 mg, Oral, QAM

## 2020-06-19 NOTE — PROGRESS NOTES
[facility] [address] [facility]  [address], [address]  [facility], [address] [facility]      Pulmonary Progress Note      SUBJECTIVE:  On NC, wore BIPAP last night, awake and conversant, just back from GABBY which was (-) for endocarditis, stable with respiratory function, has NGT and occasional coughing.     OBJECTIVE    Medications    Continuous Infusions:   dextrose         Scheduled Meds:   heparin flush  3 mL Intravenous 2 times per day    lidocaine 1 % injection  5 mL Intradermal Once    sodium chloride flush  10 mL Intravenous 2 times per day    linezolid  600 mg Oral 2 times per day    mineral oil-hydrophilic petrolatum   Topical BID    cefepime  2 g Intravenous Q12H    sodium chloride   Intravenous Q12H    aspirin  81 mg Oral Daily    potassium bicarb-citric acid  40 mEq Oral Daily    sodium hypochlorite   Irrigation Daily    ipratropium-albuterol  1 ampule Inhalation Q4H WA    gabapentin  300 mg Oral TID    liothyronine  75 mcg Oral Daily    carvedilol  25 mg Oral BID WC    ramipril  10 mg Oral Daily    glipiZIDE  5 mg Oral QAM AC    rosuvastatin  5 mg Oral Daily    vitamin C  500 mg Oral Daily    montelukast  10 mg Oral Nightly    insulin lispro  0-12 Units Subcutaneous TID WC    insulin lispro  0-6 Units Subcutaneous Nightly    Arformoterol Tartrate  15 mcg Nebulization BID       PRN Meds:heparin flush, sodium chloride flush, barium sulfate, barium sulfate, barium sulfate, acetaminophen, albuterol, glucose, dextrose, glucagon (rDNA), dextrose    Physical    VITALS:  /73   Pulse 65   Temp 98.2 °F (36.8 °C) (Temporal)   Resp 19   Ht 5' 7\" (1.702 m)   Wt (!) 387 lb 9.6 oz (175.8 kg)   SpO2 95%   BMI 60.71 kg/m²     24HR INTAKE/OUTPUT:      Intake/Output Summary (Last 24 hours) at 6/19/2020 1333  Last data filed at 6/19/2020 1055  Gross per 24 hour   Intake 670 ml   Output 1050 ml   Net -380 ml       24HR PULSE OXIMETRY RANGE:    SpO2

## 2020-06-19 NOTE — PROGRESS NOTES
mg Oral Q4H PRN Allegra Gant MD        ipratropium-albuterol (DUONEB) nebulizer solution 1 ampule  1 ampule Inhalation Q4H WA Tapan Bower MD   1 ampule at 06/19/20 0758    gabapentin (NEURONTIN) capsule 300 mg  300 mg Oral TID Allegra Gant MD   300 mg at 06/19/20 1359    liothyronine (CYTOMEL) tablet 75 mcg  75 mcg Oral Daily Allegra Gant MD   75 mcg at 06/19/20 1354    carvedilol (COREG) tablet 25 mg  25 mg Oral BID  Allegra Gant MD   25 mg at 06/19/20 0819    ramipril (ALTACE) capsule 10 mg  10 mg Oral Daily Allegra Gant MD   10 mg at 06/19/20 1354    glipiZIDE (GLUCOTROL) tablet 5 mg  5 mg Oral QAM AC Allegra Gant MD   5 mg at 06/19/20 1354    rosuvastatin (CRESTOR) tablet 5 mg  5 mg Oral Daily Allegra Gant MD   5 mg at 06/19/20 1355    vitamin C (ASCORBIC ACID) tablet 500 mg  500 mg Oral Daily Allegra Gant MD   500 mg at 06/19/20 1353    albuterol (ACCUNEB) nebulizer solution 0.63 mg  1 ampule Nebulization Q6H PRN Allegra Gant MD        montelukast (SINGULAIR) tablet 10 mg  10 mg Oral Nightly Allegra Gant MD   10 mg at 06/18/20 2119    insulin lispro (HUMALOG) injection vial 0-12 Units  0-12 Units Subcutaneous TID  Allegra Gant MD   2 Units at 06/18/20 1902    insulin lispro (HUMALOG) injection vial 0-6 Units  0-6 Units Subcutaneous Nightly Allegra Gant MD   1 Units at 06/18/20 2129    glucose (GLUTOSE) 40 % oral gel 15 g  15 g Oral PRN Allegra Gant MD        dextrose 50 % IV solution  12.5 g Intravenous PRN Allegra Gant MD        glucagon (rDNA) injection 1 mg  1 mg Intramuscular PRN Allegra Gant MD        dextrose 5 % solution  100 mL/hr Intravenous PRN Allegra Gant MD        Arformoterol Tartrate (BROVANA) nebulizer solution 15 mcg  15 mcg Nebulization BID Allegra Gant MD   15 mcg at 06/19/20 8209       REVIEW OF SYSTEMS: gram IV q `1 hrs   2. Linezolid 600 mg po q 12 hrs   3. Local wound care   4.  Watch over the weekend

## 2020-06-19 NOTE — ANESTHESIA PRE PROCEDURE
 Hyperlipidemia     Hypertension     Hyperthyroidism     Lung disorder     weak lungs    Myocardial infarct ([facility] [address] 2010    Neuropathy     feet    Obesity     Obstructive sleep apnea     on cpap    Onychomycosis     Osteoarthritis     Pacemaker     Medtronic    Post-thrombotic syndrome 2017    Screening 16    for Colonoscopy    Stasis dermatitis     h/o on bilateral legs with leg edema    Varicose veins        Past Surgical History:        Procedure Laterality Date    CARDIAC PACEMAKER PLACEMENT  04/09/10    [employer]tronic    CHOLECYSTECTOMY      COLONOSCOPY  2016    JOINT REPLACEMENT Bilateral     Left TJAK       Social History:    Social History     Tobacco Use    Smoking status: Former Smoker     Packs/day: 2.00     Years: 25.00     Pack years: 50.00     Types: Cigarettes     Start date: 1964     Last attempt to quit: 1990     Years since quittin.9    Smokeless tobacco: Never Used   Substance Use Topics    Alcohol use: No     Alcohol/week: 0.0 standard drinks     Comment: rare   2 beers/month                                Counseling given: Not Answered      Vital Signs (Current): There were no vitals filed for this visit. BP Readings from Last 3 Encounters:   20 125/62   06/10/20 120/80   20 122/78       NPO Status:  > 8 hours per pt.                                                                                 BMI:   Wt Readings from Last 3 Encounters:   20 (!) 387 lb 9.6 oz (175.8 kg)   20 (!) 360 lb (163.3 kg)   06/10/20 (!) 380 lb (172.4 kg)     There is no height or weight on file to calculate BMI.    CBC:   Lab Results   Component Value Date    WBC 5.5 2020    RBC 5.35 2020    HGB 15.1 2020    HCT 47.0 2020    MCV 87.9 2020    RDW 15.3 2020    PLT 92 2020       CMP:   Lab Results   Component Value Date     2020    K 4.0 2020    K 5.2 Induction: intravenous. Anesthetic plan and risks discussed with patient. Plan discussed with attending.                   Aniya El   6/19/2020

## 2020-06-20 LAB
BLOOD CULTURE, ROUTINE: NORMAL
LACTIC ACID: 0.9 MMOL/L (ref 0.5–2.2)
METER GLUCOSE: 173 MG/DL (ref 74–99)
METER GLUCOSE: 176 MG/DL (ref 74–99)
METER GLUCOSE: 177 MG/DL (ref 74–99)
METER GLUCOSE: 212 MG/DL (ref 74–99)

## 2020-06-20 PROCEDURE — 6360000002 HC RX W HCPCS: Performed by: INTERNAL MEDICINE

## 2020-06-20 PROCEDURE — 6370000000 HC RX 637 (ALT 250 FOR IP): Performed by: INTERNAL MEDICINE

## 2020-06-20 PROCEDURE — 2060000000 HC ICU INTERMEDIATE R&B

## 2020-06-20 PROCEDURE — 2700000000 HC OXYGEN THERAPY PER DAY

## 2020-06-20 PROCEDURE — 83605 ASSAY OF LACTIC ACID: CPT

## 2020-06-20 PROCEDURE — 36415 COLL VENOUS BLD VENIPUNCTURE: CPT

## 2020-06-20 PROCEDURE — 94660 CPAP INITIATION&MGMT: CPT

## 2020-06-20 PROCEDURE — 82962 GLUCOSE BLOOD TEST: CPT

## 2020-06-20 PROCEDURE — 2580000003 HC RX 258: Performed by: INTERNAL MEDICINE

## 2020-06-20 PROCEDURE — 94640 AIRWAY INHALATION TREATMENT: CPT

## 2020-06-20 RX ADMIN — SKIN PROTECTANT: 44 OINTMENT TOPICAL at 23:10

## 2020-06-20 RX ADMIN — INSULIN LISPRO 4 UNITS: 100 INJECTION, SOLUTION INTRAVENOUS; SUBCUTANEOUS at 08:48

## 2020-06-20 RX ADMIN — POTASSIUM BICARBONATE 40 MEQ: 782 TABLET, EFFERVESCENT ORAL at 08:46

## 2020-06-20 RX ADMIN — SODIUM CHLORIDE: 9 INJECTION, SOLUTION INTRAVENOUS at 20:30

## 2020-06-20 RX ADMIN — ROSUVASTATIN CALCIUM 5 MG: 5 TABLET, FILM COATED ORAL at 08:46

## 2020-06-20 RX ADMIN — CEFEPIME 2 G: 2 INJECTION, POWDER, FOR SOLUTION INTRAVENOUS at 16:28

## 2020-06-20 RX ADMIN — ARFORMOTEROL TARTRATE 15 MCG: 15 SOLUTION RESPIRATORY (INHALATION) at 20:30

## 2020-06-20 RX ADMIN — LIOTHYRONINE SODIUM 75 MCG: 25 TABLET ORAL at 08:45

## 2020-06-20 RX ADMIN — IPRATROPIUM BROMIDE AND ALBUTEROL SULFATE 1 AMPULE: 2.5; .5 SOLUTION RESPIRATORY (INHALATION) at 11:24

## 2020-06-20 RX ADMIN — SODIUM CHLORIDE, PRESERVATIVE FREE 300 UNITS: 5 INJECTION INTRAVENOUS at 20:18

## 2020-06-20 RX ADMIN — GABAPENTIN 300 MG: 300 CAPSULE ORAL at 20:15

## 2020-06-20 RX ADMIN — LINEZOLID 600 MG: 600 TABLET, FILM COATED ORAL at 20:15

## 2020-06-20 RX ADMIN — SODIUM CHLORIDE, PRESERVATIVE FREE 10 ML: 5 INJECTION INTRAVENOUS at 20:17

## 2020-06-20 RX ADMIN — ASPIRIN 81 MG 81 MG: 81 TABLET ORAL at 08:44

## 2020-06-20 RX ADMIN — IPRATROPIUM BROMIDE AND ALBUTEROL SULFATE 1 AMPULE: 2.5; .5 SOLUTION RESPIRATORY (INHALATION) at 08:08

## 2020-06-20 RX ADMIN — IPRATROPIUM BROMIDE AND ALBUTEROL SULFATE 1 AMPULE: 2.5; .5 SOLUTION RESPIRATORY (INHALATION) at 16:20

## 2020-06-20 RX ADMIN — CARVEDILOL 25 MG: 25 TABLET, FILM COATED ORAL at 16:29

## 2020-06-20 RX ADMIN — CEFEPIME 2 G: 2 INJECTION, POWDER, FOR SOLUTION INTRAVENOUS at 03:49

## 2020-06-20 RX ADMIN — CARVEDILOL 25 MG: 25 TABLET, FILM COATED ORAL at 08:45

## 2020-06-20 RX ADMIN — SKIN PROTECTANT: 44 OINTMENT TOPICAL at 08:47

## 2020-06-20 RX ADMIN — GABAPENTIN 300 MG: 300 CAPSULE ORAL at 08:44

## 2020-06-20 RX ADMIN — HYOSCYAMINE SULFATE: 16 SOLUTION at 08:47

## 2020-06-20 RX ADMIN — SODIUM CHLORIDE, PRESERVATIVE FREE 300 UNITS: 5 INJECTION INTRAVENOUS at 08:46

## 2020-06-20 RX ADMIN — LINEZOLID 600 MG: 600 TABLET, FILM COATED ORAL at 08:45

## 2020-06-20 RX ADMIN — RAMIPRIL 10 MG: 5 CAPSULE ORAL at 08:45

## 2020-06-20 RX ADMIN — GABAPENTIN 300 MG: 300 CAPSULE ORAL at 14:44

## 2020-06-20 RX ADMIN — IPRATROPIUM BROMIDE AND ALBUTEROL SULFATE 1 AMPULE: 2.5; .5 SOLUTION RESPIRATORY (INHALATION) at 20:30

## 2020-06-20 RX ADMIN — MONTELUKAST SODIUM 10 MG: 10 TABLET, FILM COATED ORAL at 20:15

## 2020-06-20 RX ADMIN — ARFORMOTEROL TARTRATE 15 MCG: 15 SOLUTION RESPIRATORY (INHALATION) at 08:08

## 2020-06-20 RX ADMIN — SODIUM CHLORIDE, PRESERVATIVE FREE 10 ML: 5 INJECTION INTRAVENOUS at 08:46

## 2020-06-20 RX ADMIN — INSULIN LISPRO 2 UNITS: 100 INJECTION, SOLUTION INTRAVENOUS; SUBCUTANEOUS at 12:21

## 2020-06-20 RX ADMIN — Medication 500 MG: at 08:46

## 2020-06-20 RX ADMIN — INSULIN LISPRO 2 UNITS: 100 INJECTION, SOLUTION INTRAVENOUS; SUBCUTANEOUS at 16:33

## 2020-06-20 RX ADMIN — INSULIN LISPRO 1 UNITS: 100 INJECTION, SOLUTION INTRAVENOUS; SUBCUTANEOUS at 20:25

## 2020-06-20 RX ADMIN — GLIPIZIDE 5 MG: 5 TABLET ORAL at 06:44

## 2020-06-20 ASSESSMENT — PAIN SCALES - GENERAL
PAINLEVEL_OUTOF10: 0
PAINLEVEL_OUTOF10: 0

## 2020-06-20 NOTE — PROGRESS NOTES
Department of Internal Medicine  Infectious Diseases  Progress  Note    Face to face encounter   C/C : Group C Strep bacteremia , pneumonia , left leg cellulitis       Pt is awake and alert  Denies fever and chills. No rash. Left leg feeling better- tubi- on  Afebrile   Has NG tube   Wife at bedside.       Current Facility-Administered Medications   Medication Dose Route Frequency Provider Last Rate Last Dose    heparin flush 100 UNIT/ML injection 300 Units  3 mL Intravenous 2 times per day Deirdre Zhang MD   300 Units at 06/20/20 0846    heparin flush 100 UNIT/ML injection 300 Units  3 mL Intracatheter PRN Deirdre Zhang MD        lidocaine 1 % injection 5 mL  5 mL Intradermal Once Kaiden Pearson MD        sodium chloride flush 0.9 % injection 10 mL  10 mL Intravenous 2 times per day Kaiden Pearson MD   10 mL at 06/20/20 0846    sodium chloride flush 0.9 % injection 10 mL  10 mL Intravenous PRN Dexter Pugh MD        linezolid (ZYVOX) tablet 600 mg  600 mg Oral 2 times per day Kaiden Pearson MD   600 mg at 06/20/20 0845    mineral oil-hydrophilic petrolatum (HYDROPHOR) ointment   Topical BID Deirdre Zhang MD        cefepime (MAXIPIME) 2 g IVPB extended (mini-bag)  2 g Intravenous Q12H Kaiden Pearson MD   Stopped at 06/20/20 0750    0.9 % sodium chloride infusion admixture   Intravenous Q12H Kaiden Pearson MD   Stopped at 06/19/20 1353    aspirin chewable tablet 81 mg  81 mg Oral Daily Olegario Vergara MD   81 mg at 06/20/20 0844    potassium bicarb-citric acid (EFFER-K) effervescent tablet 40 mEq  40 mEq Oral Daily Deirdre Zhang MD   40 mEq at 06/20/20 0846    barium sulfate 40 % paste 15 mL  15 mL Oral ONCE PRN Elma Valle PA-C        barium sulfate 40 % reconsitutable suspension  15 mL Oral ONCE PRN Elma Valle PA-C        barium sulfate 40 % suspension 15 mL  15 mL Oral ONCE PRN Elma Valle PA-C        sodium hypochlorite (DAKINS) 0.125 % external solution   Irrigation Daily Vahid Curtis

## 2020-06-21 LAB
METER GLUCOSE: 156 MG/DL (ref 74–99)
METER GLUCOSE: 176 MG/DL (ref 74–99)
METER GLUCOSE: 177 MG/DL (ref 74–99)
METER GLUCOSE: 186 MG/DL (ref 74–99)

## 2020-06-21 PROCEDURE — 97530 THERAPEUTIC ACTIVITIES: CPT

## 2020-06-21 PROCEDURE — 2580000003 HC RX 258: Performed by: INTERNAL MEDICINE

## 2020-06-21 PROCEDURE — 94640 AIRWAY INHALATION TREATMENT: CPT

## 2020-06-21 PROCEDURE — 97116 GAIT TRAINING THERAPY: CPT

## 2020-06-21 PROCEDURE — 6370000000 HC RX 637 (ALT 250 FOR IP): Performed by: INTERNAL MEDICINE

## 2020-06-21 PROCEDURE — 6360000002 HC RX W HCPCS: Performed by: INTERNAL MEDICINE

## 2020-06-21 PROCEDURE — 94660 CPAP INITIATION&MGMT: CPT

## 2020-06-21 PROCEDURE — 97535 SELF CARE MNGMENT TRAINING: CPT

## 2020-06-21 PROCEDURE — 82962 GLUCOSE BLOOD TEST: CPT

## 2020-06-21 PROCEDURE — 2060000000 HC ICU INTERMEDIATE R&B

## 2020-06-21 PROCEDURE — 2700000000 HC OXYGEN THERAPY PER DAY

## 2020-06-21 RX ORDER — FUROSEMIDE 10 MG/ML
40 INJECTION INTRAMUSCULAR; INTRAVENOUS ONCE
Status: COMPLETED | OUTPATIENT
Start: 2020-06-21 | End: 2020-06-21

## 2020-06-21 RX ADMIN — CARVEDILOL 25 MG: 25 TABLET, FILM COATED ORAL at 08:44

## 2020-06-21 RX ADMIN — HYOSCYAMINE SULFATE: 16 SOLUTION at 08:42

## 2020-06-21 RX ADMIN — SODIUM CHLORIDE, PRESERVATIVE FREE 300 UNITS: 5 INJECTION INTRAVENOUS at 08:42

## 2020-06-21 RX ADMIN — FUROSEMIDE 40 MG: 10 INJECTION, SOLUTION INTRAMUSCULAR; INTRAVENOUS at 16:27

## 2020-06-21 RX ADMIN — GABAPENTIN 300 MG: 300 CAPSULE ORAL at 20:33

## 2020-06-21 RX ADMIN — ROSUVASTATIN CALCIUM 5 MG: 5 TABLET, FILM COATED ORAL at 08:43

## 2020-06-21 RX ADMIN — IPRATROPIUM BROMIDE AND ALBUTEROL SULFATE 1 AMPULE: 2.5; .5 SOLUTION RESPIRATORY (INHALATION) at 21:12

## 2020-06-21 RX ADMIN — INSULIN LISPRO 1 UNITS: 100 INJECTION, SOLUTION INTRAVENOUS; SUBCUTANEOUS at 20:48

## 2020-06-21 RX ADMIN — SKIN PROTECTANT: 44 OINTMENT TOPICAL at 20:35

## 2020-06-21 RX ADMIN — GABAPENTIN 300 MG: 300 CAPSULE ORAL at 08:44

## 2020-06-21 RX ADMIN — ASPIRIN 81 MG 81 MG: 81 TABLET ORAL at 08:45

## 2020-06-21 RX ADMIN — IPRATROPIUM BROMIDE AND ALBUTEROL SULFATE 1 AMPULE: 2.5; .5 SOLUTION RESPIRATORY (INHALATION) at 07:55

## 2020-06-21 RX ADMIN — SODIUM CHLORIDE, PRESERVATIVE FREE 10 ML: 5 INJECTION INTRAVENOUS at 08:44

## 2020-06-21 RX ADMIN — INSULIN LISPRO 2 UNITS: 100 INJECTION, SOLUTION INTRAVENOUS; SUBCUTANEOUS at 17:00

## 2020-06-21 RX ADMIN — GABAPENTIN 300 MG: 300 CAPSULE ORAL at 14:43

## 2020-06-21 RX ADMIN — CEFEPIME 2 G: 2 INJECTION, POWDER, FOR SOLUTION INTRAVENOUS at 16:27

## 2020-06-21 RX ADMIN — INSULIN LISPRO 2 UNITS: 100 INJECTION, SOLUTION INTRAVENOUS; SUBCUTANEOUS at 11:50

## 2020-06-21 RX ADMIN — RAMIPRIL 10 MG: 5 CAPSULE ORAL at 08:43

## 2020-06-21 RX ADMIN — POTASSIUM BICARBONATE 40 MEQ: 782 TABLET, EFFERVESCENT ORAL at 08:44

## 2020-06-21 RX ADMIN — ARFORMOTEROL TARTRATE 15 MCG: 15 SOLUTION RESPIRATORY (INHALATION) at 07:55

## 2020-06-21 RX ADMIN — ARFORMOTEROL TARTRATE 15 MCG: 15 SOLUTION RESPIRATORY (INHALATION) at 21:13

## 2020-06-21 RX ADMIN — SODIUM CHLORIDE, PRESERVATIVE FREE 300 UNITS: 5 INJECTION INTRAVENOUS at 20:34

## 2020-06-21 RX ADMIN — SODIUM CHLORIDE, PRESERVATIVE FREE 10 ML: 5 INJECTION INTRAVENOUS at 20:34

## 2020-06-21 RX ADMIN — IPRATROPIUM BROMIDE AND ALBUTEROL SULFATE 1 AMPULE: 2.5; .5 SOLUTION RESPIRATORY (INHALATION) at 17:07

## 2020-06-21 RX ADMIN — LIOTHYRONINE SODIUM 75 MCG: 25 TABLET ORAL at 08:43

## 2020-06-21 RX ADMIN — CARVEDILOL 25 MG: 25 TABLET, FILM COATED ORAL at 17:00

## 2020-06-21 RX ADMIN — SODIUM CHLORIDE: 9 INJECTION, SOLUTION INTRAVENOUS at 20:34

## 2020-06-21 RX ADMIN — LINEZOLID 600 MG: 600 TABLET, FILM COATED ORAL at 20:33

## 2020-06-21 RX ADMIN — MONTELUKAST SODIUM 10 MG: 10 TABLET, FILM COATED ORAL at 20:33

## 2020-06-21 RX ADMIN — LINEZOLID 600 MG: 600 TABLET, FILM COATED ORAL at 08:43

## 2020-06-21 RX ADMIN — SKIN PROTECTANT: 44 OINTMENT TOPICAL at 08:42

## 2020-06-21 RX ADMIN — INSULIN LISPRO 2 UNITS: 100 INJECTION, SOLUTION INTRAVENOUS; SUBCUTANEOUS at 08:45

## 2020-06-21 RX ADMIN — CEFEPIME 2 G: 2 INJECTION, POWDER, FOR SOLUTION INTRAVENOUS at 04:30

## 2020-06-21 RX ADMIN — GLIPIZIDE 5 MG: 5 TABLET ORAL at 06:46

## 2020-06-21 RX ADMIN — Medication 500 MG: at 08:44

## 2020-06-21 ASSESSMENT — PAIN SCALES - GENERAL
PAINLEVEL_OUTOF10: 0
PAINLEVEL_OUTOF10: 0

## 2020-06-21 NOTE — PROGRESS NOTES
Department of Internal Medicine  Infectious Diseases  Progress  Note    Face to face encounter   C/C : Group C Strep bacteremia , pneumonia , left leg cellulitis       Pt is awake and alert  Denies fever and chills. No rash.  Left leg feeling better- tubi- on  Afebrile   Has NG tube   Sitting up to chair this am       Current Facility-Administered Medications   Medication Dose Route Frequency Provider Last Rate Last Dose    heparin flush 100 UNIT/ML injection 300 Units  3 mL Intravenous 2 times per day Kendrick Burroughs MD   300 Units at 06/21/20 0842    heparin flush 100 UNIT/ML injection 300 Units  3 mL Intracatheter PRN Kendrick Burroughs MD        lidocaine 1 % injection 5 mL  5 mL Intradermal Once Ashli Powers MD        sodium chloride flush 0.9 % injection 10 mL  10 mL Intravenous 2 times per day Ashli Powers MD   10 mL at 06/21/20 0844    sodium chloride flush 0.9 % injection 10 mL  10 mL Intravenous PRN Dexter Pugh MD        linezolid (ZYVOX) tablet 600 mg  600 mg Oral 2 times per day Ashli Powers MD   600 mg at 06/21/20 0843    mineral oil-hydrophilic petrolatum (HYDROPHOR) ointment   Topical BID Kendrick Burroughs MD        cefepime (MAXIPIME) 2 g IVPB extended (mini-bag)  2 g Intravenous Q12H Ashli Powers MD   Stopped at 06/21/20 0845    0.9 % sodium chloride infusion admixture   Intravenous Q12H Ashli Powers MD   Stopped at 06/21/20 0103    aspirin chewable tablet 81 mg  81 mg Oral Daily Farzaneh Benitez MD   81 mg at 06/21/20 0845    potassium bicarb-citric acid (EFFER-K) effervescent tablet 40 mEq  40 mEq Oral Daily Kendrick Burroughs MD   40 mEq at 06/21/20 0844    barium sulfate 40 % paste 15 mL  15 mL Oral ONCE PRN Hiro Hooper PA-C        barium sulfate 40 % reconsitutable suspension  15 mL Oral ONCE PRN Hiro Hooper PA-C        barium sulfate 40 % suspension 15 mL  15 mL Oral ONCE PRN Hiro Hooper PA-C        sodium hypochlorite (DAKINS) 0.125 % external solution   Irrigation

## 2020-06-21 NOTE — PROGRESS NOTES
Date: 6/21/2020    Time: 8:00 AM    Patient Placed On BIPAP/CPAP/ Non-Invasive Ventilation? No    If no must comment. Facial area red/color change? No           If YES are Blister/Lesion present? No   If yes must notify nursing staff  BIPAP/CPAP skin barrier? Yes    Skin barrier type:duoderm     Comments: Pt remains on from previous shift    Saskia Patel, RRT

## 2020-06-21 NOTE — PLAN OF CARE
Problem: Falls - Risk of:  Goal: Will remain free from falls  Description: Will remain free from falls  Outcome: Met This Shift  Goal: Absence of physical injury  Description: Absence of physical injury  Outcome: Met This Shift     Problem: Skin Integrity:  Goal: Will show no infection signs and symptoms  Description: Will show no infection signs and symptoms  Outcome: Met This Shift  Goal: Absence of new skin breakdown  Description: Absence of new skin breakdown  Outcome: Met This Shift     Problem: Airway Clearance - Ineffective:  Goal: Clear lung sounds  Description: Clear lung sounds  Outcome: Met This Shift  Goal: Ability to maintain a clear airway will improve  Description: Ability to maintain a clear airway will improve  Outcome: Met This Shift     Problem: Gas Exchange - Impaired:  Goal: Levels of oxygenation will improve  Description: Levels of oxygenation will improve  Outcome: Met This Shift

## 2020-06-21 NOTE — PROGRESS NOTES
to OT note  BLE: NT  BLE: grossly 4/5     Balance Sitting EOB: SBA  Dynamic Standing: Mod A with HHA Sitting EOB: SBA  Dynamic standing: CGA Sitting EOB: Independent   Dynamic Standing: Supervision with AAD vs without AD      Pt is A & O x 4  Sensation:  Pt denies numbness and tingling to extremities  Edema:  Generalized edema    Patient education  Pt educated on cues for breathing technique, sequencing and technique with bed mobility and transfers, improved posture and gait mechanics with amb    Patient response to education:   Pt verbalized understanding Pt demonstrated skill Pt requires further education in this area   yes yes yes     ASSESSMENT:    Comments:  Pt resting semi-supine upon arrival, agreeable to PT session. Pt eager for OOB activity. VCs for BLE progression to EOB and physical assist for trunk righting due to body habitus. VCs to not hold breath with transfer to sitting. Pt appeared SOB following sitting up, but SaO2 was 98% on 3L O2. Pt was able to perform STS without assist this date, but required VCS for safety and technique. Pt amb with decreased gait speed and increased WB on BWW. Wide WAQAS with increased lateral sway and FFP that progressed with increasing distance. Pt required several rest breaks during amb as noted above. Pt returned to sitting in bedside chair with call button within reach and all needs met. Pt would benefit from continued comprehensive and intensive therapy to return to prior independent level of functional mobility. Treatment:  Patient practiced and was instructed in the following treatment:     Bed mobility: max cues for avoidance of breath holding, cues for sequencing and improved technique, manual assistance for progression of B LEs over EOB and trunk lift/lower.    Transfer training: cues for hand and B LE placement with sit<>stand transfers, hands on assistance for lift/lower   Gait training: cues for improved posture and walker approximation, manual

## 2020-06-21 NOTE — PROGRESS NOTES
Wife called ext [phone] answered by RN on floor, wife looking for prasanna case managment to inform her she would like jojo for pt at discharge.

## 2020-06-21 NOTE — PROGRESS NOTES
to supine: NT  Mod A  Supine to sit  Rolling: Supervision   Supine to sit: Stand by Assist   Sit to supine: Stand by Assist    Functional Transfers Sit><stand: Mod>  Min A   (improved as progressed)  Stand pivot transfers: Mod A w/ HHA Sit-stand: Min A  Stand-sit: Min A  BSC: Min A SBA   Functional Mobility Mod A w/ HHA  Lateral sidesteps  CGA (using ww, to/from BSC and in hallway with PT) SBA   Balance Sitting: SBA  Standing: Min><Mod A w/ HHA Sitting: SBA  Standing: CGA        Activity Tolerance Fair-  Fair  (SpO2 >95% on 3L) Fair+   Visual/  Perceptual Glasses: yes                      Education:  Pt was educated through out treatment regarding proper technique & safety with bed mobility, functional transfers & mobility, ADL compensatory strategies to ease tasks to improve safety & prevent falls and allow pt to return home safely. Educated on AE for ease with LB dressing. Pt required skilled monitoring of SpO2 during session and required intermittent rest breaks. Educated on pursed lip breathing. Comments: Upon arrival pt was in bed & agreeable for thearpy. At end of session pt was seated in chair, nsg informed all lines and tubes intact, call light within reach. · Pt has made Fair progress towards set goals.    · Continue with current plan of care      Treatment Time In: 10:55           Treatment Time Out: 11:30           Treatment Charges: Mins Units   Ther Ex  05710     Manual Therapy 56492     Thera Activities 95609 15 1   ADL/Home Mgt 02515 20 1   Neuro Re-ed 83859     Group Therapy      Orthotic manage/training  10580     Non-Billable Time     Total Timed Treatment [address] [facility], [facility] [id number]

## 2020-06-22 LAB
ANION GAP SERPL CALCULATED.3IONS-SCNC: 8 MMOL/L (ref 7–16)
BASOPHILS ABSOLUTE: 0.05 E9/L (ref 0–0.2)
BASOPHILS RELATIVE PERCENT: 0.6 % (ref 0–2)
BUN BLDV-MCNC: 18 MG/DL (ref 8–23)
CALCIUM SERPL-MCNC: 9.2 MG/DL (ref 8.6–10.2)
CHLORIDE BLD-SCNC: 101 MMOL/L (ref 98–107)
CO2: 32 MMOL/L (ref 22–29)
CREAT SERPL-MCNC: 0.9 MG/DL (ref 0.7–1.2)
EOSINOPHILS ABSOLUTE: 0.14 E9/L (ref 0.05–0.5)
EOSINOPHILS RELATIVE PERCENT: 1.8 % (ref 0–6)
GFR AFRICAN AMERICAN: >60
GFR NON-AFRICAN AMERICAN: >60 ML/MIN/1.73
GLUCOSE BLD-MCNC: 198 MG/DL (ref 74–99)
HCT VFR BLD CALC: 40.1 % (ref 37–54)
HEMOGLOBIN: 12.7 G/DL (ref 12.5–16.5)
IMMATURE GRANULOCYTES #: 0.08 E9/L
IMMATURE GRANULOCYTES %: 1 % (ref 0–5)
LYMPHOCYTES ABSOLUTE: 0.96 E9/L (ref 1.5–4)
LYMPHOCYTES RELATIVE PERCENT: 12.1 % (ref 20–42)
MCH RBC QN AUTO: 28.5 PG (ref 26–35)
MCHC RBC AUTO-ENTMCNC: 31.7 % (ref 32–34.5)
MCV RBC AUTO: 90.1 FL (ref 80–99.9)
METER GLUCOSE: 167 MG/DL (ref 74–99)
METER GLUCOSE: 177 MG/DL (ref 74–99)
METER GLUCOSE: 188 MG/DL (ref 74–99)
MONOCYTES ABSOLUTE: 0.66 E9/L (ref 0.1–0.95)
MONOCYTES RELATIVE PERCENT: 8.3 % (ref 2–12)
NEUTROPHILS ABSOLUTE: 6.03 E9/L (ref 1.8–7.3)
NEUTROPHILS RELATIVE PERCENT: 76.2 % (ref 43–80)
PDW BLD-RTO: 15.1 FL (ref 11.5–15)
PLATELET # BLD: 179 E9/L (ref 130–450)
PMV BLD AUTO: 9.8 FL (ref 7–12)
POTASSIUM SERPL-SCNC: 4.9 MMOL/L (ref 3.5–5)
RBC # BLD: 4.45 E12/L (ref 3.8–5.8)
SODIUM BLD-SCNC: 141 MMOL/L (ref 132–146)
WBC # BLD: 7.9 E9/L (ref 4.5–11.5)

## 2020-06-22 PROCEDURE — 6360000002 HC RX W HCPCS: Performed by: INTERNAL MEDICINE

## 2020-06-22 PROCEDURE — 6370000000 HC RX 637 (ALT 250 FOR IP): Performed by: INTERNAL MEDICINE

## 2020-06-22 PROCEDURE — 97530 THERAPEUTIC ACTIVITIES: CPT

## 2020-06-22 PROCEDURE — 82962 GLUCOSE BLOOD TEST: CPT

## 2020-06-22 PROCEDURE — 97535 SELF CARE MNGMENT TRAINING: CPT

## 2020-06-22 PROCEDURE — 97116 GAIT TRAINING THERAPY: CPT

## 2020-06-22 PROCEDURE — 94660 CPAP INITIATION&MGMT: CPT

## 2020-06-22 PROCEDURE — 1200000000 HC SEMI PRIVATE

## 2020-06-22 PROCEDURE — 85025 COMPLETE CBC W/AUTO DIFF WBC: CPT

## 2020-06-22 PROCEDURE — 80048 BASIC METABOLIC PNL TOTAL CA: CPT

## 2020-06-22 PROCEDURE — 92526 ORAL FUNCTION THERAPY: CPT

## 2020-06-22 PROCEDURE — 2580000003 HC RX 258: Performed by: INTERNAL MEDICINE

## 2020-06-22 PROCEDURE — 94640 AIRWAY INHALATION TREATMENT: CPT

## 2020-06-22 PROCEDURE — 2700000000 HC OXYGEN THERAPY PER DAY

## 2020-06-22 PROCEDURE — 36415 COLL VENOUS BLD VENIPUNCTURE: CPT

## 2020-06-22 RX ADMIN — CEFEPIME 2 G: 2 INJECTION, POWDER, FOR SOLUTION INTRAVENOUS at 17:32

## 2020-06-22 RX ADMIN — LINEZOLID 600 MG: 600 TABLET, FILM COATED ORAL at 22:04

## 2020-06-22 RX ADMIN — RAMIPRIL 10 MG: 5 CAPSULE ORAL at 10:08

## 2020-06-22 RX ADMIN — ARFORMOTEROL TARTRATE 15 MCG: 15 SOLUTION RESPIRATORY (INHALATION) at 08:52

## 2020-06-22 RX ADMIN — SODIUM CHLORIDE, PRESERVATIVE FREE 300 UNITS: 5 INJECTION INTRAVENOUS at 22:04

## 2020-06-22 RX ADMIN — SODIUM CHLORIDE, PRESERVATIVE FREE 10 ML: 5 INJECTION INTRAVENOUS at 10:09

## 2020-06-22 RX ADMIN — SODIUM CHLORIDE: 9 INJECTION, SOLUTION INTRAVENOUS at 22:05

## 2020-06-22 RX ADMIN — MONTELUKAST SODIUM 10 MG: 10 TABLET, FILM COATED ORAL at 22:05

## 2020-06-22 RX ADMIN — ARFORMOTEROL TARTRATE 15 MCG: 15 SOLUTION RESPIRATORY (INHALATION) at 21:31

## 2020-06-22 RX ADMIN — SODIUM CHLORIDE: 9 INJECTION, SOLUTION INTRAVENOUS at 08:00

## 2020-06-22 RX ADMIN — GLIPIZIDE 5 MG: 5 TABLET ORAL at 06:34

## 2020-06-22 RX ADMIN — CARVEDILOL 25 MG: 25 TABLET, FILM COATED ORAL at 17:32

## 2020-06-22 RX ADMIN — POTASSIUM BICARBONATE 40 MEQ: 782 TABLET, EFFERVESCENT ORAL at 10:14

## 2020-06-22 RX ADMIN — SKIN PROTECTANT: 44 OINTMENT TOPICAL at 10:24

## 2020-06-22 RX ADMIN — Medication 500 MG: at 10:08

## 2020-06-22 RX ADMIN — SKIN PROTECTANT: 44 OINTMENT TOPICAL at 22:16

## 2020-06-22 RX ADMIN — IPRATROPIUM BROMIDE AND ALBUTEROL SULFATE 1 AMPULE: 2.5; .5 SOLUTION RESPIRATORY (INHALATION) at 21:31

## 2020-06-22 RX ADMIN — CEFEPIME 2 G: 2 INJECTION, POWDER, FOR SOLUTION INTRAVENOUS at 03:40

## 2020-06-22 RX ADMIN — ASPIRIN 81 MG 81 MG: 81 TABLET ORAL at 10:15

## 2020-06-22 RX ADMIN — INSULIN LISPRO 2 UNITS: 100 INJECTION, SOLUTION INTRAVENOUS; SUBCUTANEOUS at 17:02

## 2020-06-22 RX ADMIN — SODIUM CHLORIDE, PRESERVATIVE FREE 10 ML: 5 INJECTION INTRAVENOUS at 22:04

## 2020-06-22 RX ADMIN — IPRATROPIUM BROMIDE AND ALBUTEROL SULFATE 1 AMPULE: 2.5; .5 SOLUTION RESPIRATORY (INHALATION) at 08:50

## 2020-06-22 RX ADMIN — HYOSCYAMINE SULFATE: 16 SOLUTION at 10:25

## 2020-06-22 RX ADMIN — GABAPENTIN 300 MG: 300 CAPSULE ORAL at 10:15

## 2020-06-22 RX ADMIN — GABAPENTIN 300 MG: 300 CAPSULE ORAL at 22:04

## 2020-06-22 RX ADMIN — LIOTHYRONINE SODIUM 75 MCG: 25 TABLET ORAL at 10:08

## 2020-06-22 RX ADMIN — SODIUM CHLORIDE, PRESERVATIVE FREE 300 UNITS: 5 INJECTION INTRAVENOUS at 10:14

## 2020-06-22 RX ADMIN — CARVEDILOL 25 MG: 25 TABLET, FILM COATED ORAL at 10:08

## 2020-06-22 RX ADMIN — IPRATROPIUM BROMIDE AND ALBUTEROL SULFATE 1 AMPULE: 2.5; .5 SOLUTION RESPIRATORY (INHALATION) at 16:37

## 2020-06-22 RX ADMIN — IPRATROPIUM BROMIDE AND ALBUTEROL SULFATE 1 AMPULE: 2.5; .5 SOLUTION RESPIRATORY (INHALATION) at 14:02

## 2020-06-22 RX ADMIN — LINEZOLID 600 MG: 600 TABLET, FILM COATED ORAL at 10:07

## 2020-06-22 ASSESSMENT — PAIN SCALES - GENERAL: PAINLEVEL_OUTOF10: 0

## 2020-06-22 NOTE — PROGRESS NOTES
Nutrition Assessment    Type and Reason for Visit: Reassess    Nutrition Recommendations: Continue TF w/ addition of daily protein modular to better meet estimated needs  Current TF w/ daily pro mod will provide 2744 kcal, 136 gm pro total.    Nutrition Assessment: Pt status remains the same, tolerating TF well via corpak per RN awaiting possible repeat swallow eval. Will restart daily protein modular and monitor    Malnutrition Assessment:  · Malnutrition Status: No malnutrition  · Context: Acute illness or injury  · Findings of the 6 clinical characteristics of malnutrition (Minimum of 2 out of 6 clinical characteristics is required to make the diagnosis of moderate or severe Protein Calorie Malnutrition based on AND/ASPEN Guidelines):  1. Energy Intake-Greater than 75% of estimated energy requirement, Greater than or equal to 5 days(w/ TF)    2. Weight Loss-No significant weight loss    3. Fat Loss-No significant subcutaneous fat loss    4. Muscle Loss-No significant muscle mass loss    5. Fluid Accumulation-No significant fluid accumulation    6.  Strength-Not measured    Nutrition Risk Level:  Moderate    Nutrition Needs:  · Estimated Daily Total Kcal: (MSJ 2477 x 1.1 SF)  · Estimated Daily Protein (g): 130-150(2.0-2.2 gm/kg IBW)    Nutrition Diagnosis:   · Problem: Inadequate oral intake  · Etiology: related to Difficulty swallowing     Signs and symptoms:  as evidenced by NPO status due to medical condition, Nutrition support - EN, Swallow study results    Objective Information:  · Nutrition-Focused Physical Findings: pt alert at bedside, abd distention, active BS, corpak w/ TF, +1-2 pitting edema, +I/Os  · Wound Type: Venous Stasis(noted abrasion)  · Current Nutrition Therapies:  · Oral Diet Orders: NPO   · Tube Feeding (TF) Orders:   · Feeding Route: Nasoenteric  · Formula: Fluid Restricted  · Rate (ml/hr):55 ml/hr    · Volume (ml/day): 1320 ml tv  · Duration: Continuous  · Water

## 2020-06-22 NOTE — PROGRESS NOTES
Pt seen for dysphagia therapy. Pt completion of base of tongue and laryngeal exercises was fair+ with minimal cueing. Patient reported that he had independently completed exercises on a few occasions after previous dysphagia treatment. Spoke to physical therapist about pt's stability to stand for video swallow study, pt is not stable enough for prolonged standing at this time. Will re-check physical status on Wednesday.     Richard Laureano., Speech Pathology Graduate Extern

## 2020-06-22 NOTE — PROGRESS NOTES
fluids, supportive care with nebulizers  IV antibiotic therapy,-currently on vancomycin  Await pan culture results--beta strep group C in  blood cultures (strep by PCR) and staph aureus with corynebacteria in ankle wound cultures  Infectious disease/podiatry following    Aspiration   n.p.o. recommendation by speech  CorPak placement for ongoing nutrition by IV team  Initiate diabetic tube feeds on hold today for GABBY    Concern for infected pacemaker secondary to positive blood cultures  May require extraction-will defer to EP  Transthoracic echo equivocal  GABBY 6/19/2020-no evidence of endocarditis  Plan is to remove pacemaker next month secondary to battery running low  insulin sliding scale      Daily labs   Discussed importance of weight loss with   dietary discretion     Family asking about testing for COVID and why this has not been done:  there is no indication to test for COVID-19 because etiology of his symptoms is secondary to bacteremia and aspiration pneumonia  Discussed with wife and all questions answered 6/19/2020 and bedside 6/20/2020      DVT Prophylaxis   PT/OT  Discharge plan- awaiting acceptance to [facility]  -hopefully they  will accept CorPak,   may ultimately need PEG tube if no improvement in swallow function over next couple of weeks  Okay to transfer to general medical floor while awaiting pre-CERT  All consultants notes reviewed    Reji Maldonado MD  10:08 AM  6/22/2020

## 2020-06-22 NOTE — PROGRESS NOTES
acetaminophen (TYLENOL) tablet 650 mg  650 mg Oral Q4H PRN Jase Brenner MD        ipratropium-albuterol (DUONEB) nebulizer solution 1 ampule  1 ampule Inhalation Q4H WA Maulik Amado MD   1 ampule at 06/22/20 0850    gabapentin (NEURONTIN) capsule 300 mg  300 mg Oral TID Jase Brenner MD   300 mg at 06/22/20 1015    liothyronine (CYTOMEL) tablet 75 mcg  75 mcg Oral Daily Jase Brenner MD   75 mcg at 06/22/20 1008    carvedilol (COREG) tablet 25 mg  25 mg Oral BID  Jase Brenner MD   25 mg at 06/22/20 1008    ramipril (ALTACE) capsule 10 mg  10 mg Oral Daily Jase Brenner MD   10 mg at 06/22/20 1008    glipiZIDE (GLUCOTROL) tablet 5 mg  5 mg Oral QAM AC Jase Brenner MD   5 mg at 06/22/20 3627    rosuvastatin (CRESTOR) tablet 5 mg  5 mg Oral Daily Jase Brenner MD   5 mg at 06/21/20 6466    vitamin C (ASCORBIC ACID) tablet 500 mg  500 mg Oral Daily Jase Brenner MD   500 mg at 06/22/20 1008    albuterol (ACCUNEB) nebulizer solution 0.63 mg  1 ampule Nebulization Q6H PRN Jase Brenner MD        montelukast (SINGULAIR) tablet 10 mg  10 mg Oral Nightly Jase Brenner MD   10 mg at 06/21/20 2033    insulin lispro (HUMALOG) injection vial 0-12 Units  0-12 Units Subcutaneous TID LU Brenner MD   2 Units at 06/21/20 1700    insulin lispro (HUMALOG) injection vial 0-6 Units  0-6 Units Subcutaneous Nightly Jase Brenner MD   1 Units at 06/21/20 2048    glucose (GLUTOSE) 40 % oral gel 15 g  15 g Oral PRN Jase Brenner MD        dextrose 50 % IV solution  12.5 g Intravenous PRN Jase Brenner MD        glucagon (rDNA) injection 1 mg  1 mg Intramuscular PRN Jase Brenner MD        dextrose 5 % solution  100 mL/hr Intravenous PRN Jase Brenner MD        Arformoterol Tartrate (BROVANA) nebulizer solution 15 mcg  15 mcg Nebulization BID Jase Brenner MD   15 mcg at 06/22/20 2861       REVIEW OF SYSTEMS:      CONSTITUTIONAL:  Denies fever, chill or rigors  HEENT: denies blurring of vision or double vision, denies hearing problem  RESPIRATORY: SOB - improving   CARDIOVASCULAR:  Denies palpitation  GASTROINTESTINAL:  Denies abdomen pain, diarrhea or constipation. GENITOURINARY:  Denies burning urination or frequency of urination  INTEGUMENT: denies wound , rash  HEMATOLOGIC/LYMPHATIC:  Denies lymph node swelling, gum bleeding or easy bruising. MUSCULOSKELETAL: leg swelling, left leg non healing wound   NEUROLOGICAL:  Denies light headed, dizziness,. PHYSICAL EXAM:      Vitals:     BP (!) 141/72   Pulse 65   Temp 97 °F (36.1 °C) (Temporal)   Resp 18   Ht 5' 7\" (1.702 m)   Wt (!) 387 lb 9.6 oz (175.8 kg)   SpO2 98%   BMI 60.71 kg/m²     General Appearance:    Awake, alert , sitting up to chair. Head:    Normocephalic, atraumatic   Eyes:    No pallor, no icterus,   Ears:    No obvious deformity or drainage. Nose:   No nasal drainage, has NG tube in place   Throat:   Mucosa-dry , no oral thrush   Neck:   Supple, no lymphadenopathy   Lungs:      Diminished to bases. On nasal canula   Heart:    Regular rate and rhythm, heart sounds distant. Left side pacemaker    Abdomen:     Soft, non-tender, bowel sounds present.  Large    Extremities:   Bilateral pitting edema, left leg with wound dressed-        Skin:   Left leg erythema- much improved - tubi  on- less tender   Right upper arm with line- no phelbitis            CBC with Differential:      Lab Results   Component Value Date    WBC 5.5 06/19/2020    RBC 5.35 06/19/2020    HGB 15.1 06/19/2020    HCT 47.0 06/19/2020    PLT 92 06/19/2020    MCV 87.9 06/19/2020    MCH 28.2 06/19/2020    MCHC 32.1 06/19/2020    RDW 15.3 06/19/2020    SEGSPCT 69 07/07/2012    LYMPHOPCT 12.8 06/19/2020    MONOPCT 7.6 06/19/2020    BASOPCT 0.4 06/19/2020    MONOSABS 0.42 06/19/2020    LYMPHSABS 0.71 06/19/2020    EOSABS 0.20 06/19/2020 RECOMMENDATIONS:     1. Cefepime 2 gram IV q 12 hrs for now- Day # 7  2. Linezolid 600 mg po q 12 hrs - Day # 6   3. Local wound care   4. CBC @am   5.  Awaiting placement     Dexter SINGH Limb  6/22/2020

## 2020-06-23 VITALS
TEMPERATURE: 97.5 F | RESPIRATION RATE: 18 BRPM | SYSTOLIC BLOOD PRESSURE: 148 MMHG | WEIGHT: 315 LBS | OXYGEN SATURATION: 98 % | HEIGHT: 67 IN | DIASTOLIC BLOOD PRESSURE: 78 MMHG | HEART RATE: 69 BPM | BODY MASS INDEX: 49.44 KG/M2

## 2020-06-23 LAB
METER GLUCOSE: 161 MG/DL (ref 74–99)
METER GLUCOSE: 166 MG/DL (ref 74–99)
METER GLUCOSE: 223 MG/DL (ref 74–99)
SARS-COV-2, NAAT: NOT DETECTED

## 2020-06-23 PROCEDURE — 6360000002 HC RX W HCPCS: Performed by: INTERNAL MEDICINE

## 2020-06-23 PROCEDURE — 82962 GLUCOSE BLOOD TEST: CPT

## 2020-06-23 PROCEDURE — 6370000000 HC RX 637 (ALT 250 FOR IP): Performed by: INTERNAL MEDICINE

## 2020-06-23 PROCEDURE — 92526 ORAL FUNCTION THERAPY: CPT

## 2020-06-23 PROCEDURE — 2580000003 HC RX 258: Performed by: INTERNAL MEDICINE

## 2020-06-23 PROCEDURE — 94660 CPAP INITIATION&MGMT: CPT

## 2020-06-23 PROCEDURE — 97530 THERAPEUTIC ACTIVITIES: CPT

## 2020-06-23 PROCEDURE — 94760 N-INVAS EAR/PLS OXIMETRY 1: CPT

## 2020-06-23 PROCEDURE — U0002 COVID-19 LAB TEST NON-CDC: HCPCS

## 2020-06-23 PROCEDURE — 94640 AIRWAY INHALATION TREATMENT: CPT

## 2020-06-23 PROCEDURE — 2700000000 HC OXYGEN THERAPY PER DAY

## 2020-06-23 RX ORDER — LINEZOLID 600 MG/1
600 TABLET, FILM COATED ORAL EVERY 12 HOURS SCHEDULED
Qty: 14 TABLET | Refills: 0 | DISCHARGE
Start: 2020-06-23 | End: 2020-06-30

## 2020-06-23 RX ADMIN — SKIN PROTECTANT: 44 OINTMENT TOPICAL at 09:00

## 2020-06-23 RX ADMIN — GABAPENTIN 300 MG: 300 CAPSULE ORAL at 13:30

## 2020-06-23 RX ADMIN — INSULIN LISPRO 4 UNITS: 100 INJECTION, SOLUTION INTRAVENOUS; SUBCUTANEOUS at 10:14

## 2020-06-23 RX ADMIN — INSULIN LISPRO 2 UNITS: 100 INJECTION, SOLUTION INTRAVENOUS; SUBCUTANEOUS at 12:04

## 2020-06-23 RX ADMIN — ROSUVASTATIN CALCIUM 5 MG: 5 TABLET, FILM COATED ORAL at 10:06

## 2020-06-23 RX ADMIN — IPRATROPIUM BROMIDE AND ALBUTEROL SULFATE 1 AMPULE: 2.5; .5 SOLUTION RESPIRATORY (INHALATION) at 12:24

## 2020-06-23 RX ADMIN — Medication 500 MG: at 09:25

## 2020-06-23 RX ADMIN — IPRATROPIUM BROMIDE AND ALBUTEROL SULFATE 1 AMPULE: 2.5; .5 SOLUTION RESPIRATORY (INHALATION) at 16:40

## 2020-06-23 RX ADMIN — IPRATROPIUM BROMIDE AND ALBUTEROL SULFATE 1 AMPULE: 2.5; .5 SOLUTION RESPIRATORY (INHALATION) at 09:08

## 2020-06-23 RX ADMIN — CARVEDILOL 25 MG: 25 TABLET, FILM COATED ORAL at 17:33

## 2020-06-23 RX ADMIN — CEFEPIME 2 G: 2 INJECTION, POWDER, FOR SOLUTION INTRAVENOUS at 05:25

## 2020-06-23 RX ADMIN — GLIPIZIDE 5 MG: 5 TABLET ORAL at 09:26

## 2020-06-23 RX ADMIN — LINEZOLID 600 MG: 600 TABLET, FILM COATED ORAL at 09:27

## 2020-06-23 RX ADMIN — HYOSCYAMINE SULFATE: 16 SOLUTION at 14:00

## 2020-06-23 RX ADMIN — CARVEDILOL 25 MG: 25 TABLET, FILM COATED ORAL at 09:26

## 2020-06-23 RX ADMIN — POTASSIUM BICARBONATE 40 MEQ: 782 TABLET, EFFERVESCENT ORAL at 09:25

## 2020-06-23 RX ADMIN — GABAPENTIN 300 MG: 300 CAPSULE ORAL at 09:27

## 2020-06-23 RX ADMIN — ASPIRIN 81 MG 81 MG: 81 TABLET ORAL at 09:26

## 2020-06-23 RX ADMIN — SODIUM CHLORIDE, PRESERVATIVE FREE 10 ML: 5 INJECTION INTRAVENOUS at 17:30

## 2020-06-23 RX ADMIN — INSULIN LISPRO 2 UNITS: 100 INJECTION, SOLUTION INTRAVENOUS; SUBCUTANEOUS at 17:36

## 2020-06-23 RX ADMIN — LIOTHYRONINE SODIUM 75 MCG: 25 TABLET ORAL at 09:26

## 2020-06-23 RX ADMIN — ARFORMOTEROL TARTRATE 15 MCG: 15 SOLUTION RESPIRATORY (INHALATION) at 09:07

## 2020-06-23 RX ADMIN — RAMIPRIL 10 MG: 5 CAPSULE ORAL at 09:26

## 2020-06-23 ASSESSMENT — PAIN SCALES - GENERAL
PAINLEVEL_OUTOF10: 0
PAINLEVEL_OUTOF10: 0

## 2020-06-23 NOTE — DISCHARGE SUMMARY
and CTA chest August 23, 2016 Findings: The lungs show confluent density obscuring the left heart border and left hemidiaphragm, suggesting pleural effusion or airspace disease in the left lower lobe or lingular region. There is bronchovascular crowding and peribronchial density in the right lower lung without obscuring the diaphragm. The lateral sulcus appears unchanged. There is cardiomegaly with a left-sided bipolar pacemaker. Aortic intimal calcification is noted in the arch. There is no evidence of cardiac decompensation. . Skeletal structures are fractionally depicted due to large habitus. Degenerative spinal findings are noted. Overlying EKG leads are present. Confluent density in the left lower chest suggests airspace disease/pneumonia and/or pleural fluid. Bronchovascular crowding in the right lower lung could represent early pneumonia or atelectasis from splinting due to left chest discomfort. There is cardiomegaly without definite evidence of cardiac decompensation. Left-sided bipolar pacemaker is present. Discharge Exam:    HEENT: NCAT,  PERRLA, No JVD-CorPak in place tolerating well  Heart:  RRR, no murmurs, gallops, or rubs.   Lungs: Clear at the time of discharge  Abd: bowel sounds present, nontender, nondistended, no masses  Extrem:  No clubbing, cyanosis, or edema    Disposition: SNF     Patient Condition at Discharge: Stable    Patient Instructions:      Medication List      CHANGE how you take these medications    sodium hypochlorite 0.25 % Soln  Commonly known as:  DAKINS  Irrigate with 2 mLs as directed daily  What changed:  additional instructions        CONTINUE taking these medications    albuterol 0.63 MG/3ML nebulizer solution  Commonly known as:  ACCUNEB  Take 3 mLs by nebulization every 6 hours as needed for Wheezing     aspirin 81 MG EC tablet     carvedilol 25 MG tablet  Commonly known as:  COREG     Fish Oil 600 MG Caps     Folinic Acid-Vit B6-Vit B12 4-50-2 MG Tabs  Commonly

## 2020-06-23 NOTE — CARE COORDINATION
6/23 Care Coordination: Plan for discharge today to [place]. Facility will pick patient up at 1800. CM/SW will continue to follow for discharge planning.  Aye ABRAHAMN,RN-BC  [phone]
Met with the patient and wife Peg Courser at the bedside to discuss transition of care planning. Discussed additional options for transition of care in the [facility] area and their start ratings with medicare/medicaid. After brief discussion, they would like to transition to [facility] for rehab. Reviewed the importance of working with the therapists and doing the exercises the therapists give for in between sessions. Patient and wife expressed understanding. Expressed that the he would not like to keep a feeding tube. Also explained that the facility would retest in a couple weeks to see if his swallow is improved. Call placed to Abhijeet Bowman with [address] [place]. They will initiate authorization for the patient to transition to the [facility] today. Anticipate authorization late tomorrow vs early Wednesday. Will continue to follow.      Cha Meyer.  P:  [phone]
SOCIAL WORK / DISCHARGE PLANNING:  Sw met with pt at bedside to discuss transition to care. Pt reports to reside with wife stuart in 1 story 1 LIVE home. He states he is independent with ambulation and ADLS. Has BSC, ww, shower chair and w/c although none used but shower chair. Pt currently wearing O2, no home O2, has home bipap (previously from [facility]). He would like to have home O2 but states he didn't qualify, would use [employer] as needed. Denies hx HHC but did use [facility] post TKR but can not recall which one. PCP Dr Gerhardt Shy. Pt plans to return home as previous, although has not been out of bed yet. Wife does dressing change on chronic wound daily, q 2 weeks podiatrist who manages wound.                  Electronically signed by GIANNA Ramirez on 6/15/2020 at 10:57 AM
care/goals, treatment preferences and shares the quality data associated with the providers.  [x] Yes [] No

## 2020-06-23 NOTE — PROGRESS NOTES
EOB: SBA  Dynamic Standing: Mod A with HHA Sitting EOB: SBA  Dynamic standing: SBA Sitting EOB: Independent   Dynamic Standing: Supervision with AAD vs without AD      Pt is A & O x 4  Sensation:  Pt denies numbness and tingling to extremities  Edema:  Generalized edema     Patient education  Pt educated on cues for breathing technique, sequencing and technique with bed mobility and transfers, improved posture and gait mechanics with amb     Patient response to education:   Pt verbalized understanding Pt demonstrated skill Pt requires further education in this area   yes yes yes      ASSESSMENT:     Comments:  Pt in bed upon entering the room. Agreeable to treatment this date. Bed mobility competed with Supervision. Required BWW to complete transfers and ambulation. Patientable to amblate to restroom with minimal cues to for safety while ambulating. O2 sat remained above 96% with nasal cannula. Following use of commode gait completed back to bed where he sat EOB. BLE exercises completed with cues for pacing. Pt was left sitting up with all needs met and call button within reach.     Treatment:  Patient practiced and was instructed in the following treatment:    · Transfer training: cues for hand and B LE placement with sit<>stand transfers, hands on assistance for lift/lower  · Static standing balance/endurance assessment >5 minutes. · Gait training: cues for improved posture and walker approximation, manual assist for balance and intermittent adjustment of walker placement, cues for increased step length and symmetry. VCs for effective breathing, consistent gait speed and [employer] approximation     PLAN:     Patient is making good progress towards established goals. Will continue with current POC.          PLAN:     Time in  1145  Time out  1210     Total Treatment Time  25     CPT codes:  []? Gait training 68380 10 minutes  []? Manual therapy 43394 0 minutes  [x]? Therapeutic activities 90029 25 minutes  []?

## 2020-06-23 NOTE — PROGRESS NOTES
99 %  Min: 98 %  Max: 100 %    General appearance: alert, appears stated age and cooperative, obese  Lungs: rhonchi bibasilar minimal  Heart: regular rate and rhythm, S1, S2 normal, no murmur, click, rub or gallop  Abdomen: soft, non-tender; bowel sounds normal; no masses,  no organomegaly  Extremities: edema bipedal with chronic skin discolorations, (+) surgical dressing left lower leg  Neurologic: Mental status: awake, oriented, moving extremities    Data    CBC:   Recent Labs     06/22/20  1508   WBC 7.9   HGB 12.7   HCT 40.1   MCV 90.1          BMP:  Recent Labs     06/22/20  1508      K 4.9      CO2 32*   BUN 18   CREATININE 0.9    ALB:3,BILIDIR:3,BILITOT:3,ALKPHOS:3)@    PT/INR:   No results for input(s): PROTIME, INR in the last 72 hours. ABG:   No results for input(s): PH, PO2, PCO2, HCO3, BE, O2SAT, METHB, O2HB, COHB, O2CON, HHB, THB in the last 72 hours. FiO2 : 50 %       Radiology/Other tests reviewed: CXR 6/18 reviewed with slightly better aeration both lower lung fields    Assessment:     Active Problems:    Pneumonia  Resolved Problems:    * No resolved hospital problems. *  DEMETRIA  Bacteremia  COPD      Plan:       1. Cont with antibiotics as per ID  2. Cont with nebs, observe respiratory function  3. Cont with BIPAP qhs, NC in daytime, observe oxygenation  4. Wound care as per protocol  5. CXR as out-pt in 1-2 weeks with ffup  6. OOB to chair with assistance  7. Can be discharged from pulmonary pov        Thanks for letting us see this patient in consultation. Please contact us with any questions. Office [phone] or after hours through [employer], x [phone].

## 2020-06-23 NOTE — PROGRESS NOTES
Pt seen for dysphagia therapy. Pt completion of base of tongue and laryngeal exercises was fair+ with minimal cueing. Patient reported that he had independently completed exercises on a few occasions after previous dysphagia treatment. Pt stated that he believes there are plans to be discharged to [address] [facility] tomorrow (Wednesday 6/23). Recommend video swallow study completed Wed morning in order to improve diet recommendations prior to discharge.        15 minute dysphagia treatment    NASRIN Lenz, Speech Pathology Graduate Extern

## 2020-06-23 NOTE — PROGRESS NOTES
Department of Internal Medicine  Infectious Diseases  Progress  Note      C/C : Group C Strep bacteremia , pneumonia , left leg cellulitis     Pt is awake and alert  Denies SOB   Feels better  Afebrile       Current Facility-Administered Medications   Medication Dose Route Frequency Provider Last Rate Last Dose    heparin flush 100 UNIT/ML injection 300 Units  3 mL Intravenous 2 times per day Reji Maldonado MD   300 Units at 06/22/20 2204    heparin flush 100 UNIT/ML injection 300 Units  3 mL Intracatheter PRN Reji Maldonado MD        lidocaine 1 % injection 5 mL  5 mL Intradermal Once Cliff Gonzalez MD        sodium chloride flush 0.9 % injection 10 mL  10 mL Intravenous 2 times per day Cliff Gonzalez MD   10 mL at 06/22/20 2204    sodium chloride flush 0.9 % injection 10 mL  10 mL Intravenous PRN Dexter Pugh MD        linezolid (ZYVOX) tablet 600 mg  600 mg Oral 2 times per day Cliff Gonzalez MD   600 mg at 06/23/20 5374    mineral oil-hydrophilic petrolatum (HYDROPHOR) ointment   Topical BID Reji Maldonado MD        cefepime (MAXIPIME) 2 g IVPB extended (mini-bag)  2 g Intravenous Q12H Dexter Pugh MD 12.5 mL/hr at 06/23/20 0525 2 g at 06/23/20 0525    0.9 % sodium chloride infusion admixture   Intravenous Q12H Dexter Pugh MD   Stopped at 06/23/20 0000    aspirin chewable tablet 81 mg  81 mg Oral Daily Martin Alex MD   81 mg at 06/23/20 0926    potassium bicarb-citric acid (EFFER-K) effervescent tablet 40 mEq  40 mEq Oral Daily eRji Maldonado MD   40 mEq at 06/23/20 0925    barium sulfate 40 % paste 15 mL  15 mL Oral ONCE PRN Kailyn Dave PA-C        barium sulfate 40 % reconsitutable suspension  15 mL Oral ONCE PRN Kailyn Dave PA-C        barium sulfate 40 % suspension 15 mL  15 mL Oral ONCE PRN Kailyn Dave PA-C        sodium hypochlorite (DAKINS) 0.125 % external solution   Irrigation Daily Josephmayda Perez, DPTESSIE        acetaminophen (TYLENOL) tablet 650 mg  650 mg Oral Q4H PRN fever, chill or rigors  HEENT: denies blurring of vision or double vision, denies hearing problem  RESPIRATORY: SOB - improving   CARDIOVASCULAR:  Denies palpitation  GASTROINTESTINAL:  Denies abdomen pain, diarrhea or constipation. GENITOURINARY:  Denies burning urination or frequency of urination  INTEGUMENT: denies wound , rash  HEMATOLOGIC/LYMPHATIC:  Denies lymph node swelling, gum bleeding or easy bruising. MUSCULOSKELETAL: Leg swelling    NEUROLOGICAL:  Denies light headed, dizziness,. PHYSICAL EXAM:      Vitals:     BP (!) 152/64   Pulse 65   Temp 97.5 °F (36.4 °C) (Temporal)   Resp 18   Ht 5' 7\" (1.702 m)   Wt (!) 387 lb 9.6 oz (175.8 kg)   SpO2 100%   BMI 60.71 kg/m²     General Appearance:    Awake, alert , sitting up to chair. Head:    Normocephalic, atraumatic   Eyes:    No pallor, no icterus,   Ears:    No obvious deformity or drainage. Nose:   No nasal drainage, has NG tube in place   Throat:   Mucosa-dry , no oral thrush   Neck:   Supple, no lymphadenopathy   Lungs:     Clear    Heart:    Regular rate and rhythm   Abdomen:     Soft, non-tender, bowel sounds present.  Large    Extremities:   Bilateral pitting edema, left leg with wound dressed-        Skin:   Left leg erythema- much improved - tubi  on- less tender   Right upper arm with line- no phelbitis            CBC with Differential:      Lab Results   Component Value Date    WBC 7.9 06/22/2020    RBC 4.45 06/22/2020    HGB 12.7 06/22/2020    HCT 40.1 06/22/2020     06/22/2020    MCV 90.1 06/22/2020    MCH 28.5 06/22/2020    MCHC 31.7 06/22/2020    RDW 15.1 06/22/2020    SEGSPCT 69 07/07/2012    LYMPHOPCT 12.1 06/22/2020    MONOPCT 8.3 06/22/2020    BASOPCT 0.6 06/22/2020    MONOSABS 0.66 06/22/2020    LYMPHSABS 0.96 06/22/2020    EOSABS 0.14 06/22/2020    BASOSABS 0.05 06/22/2020       CMP     Lab Results   Component Value Date     06/22/2020    K 4.9 06/22/2020    K 5.2 06/13/2020     06/22/2020    CO2

## 2020-06-23 NOTE — DISCHARGE INSTR - COC
Continuity of Care Form    Patient Name: Silke Mix   :  1950  MRN:  [id number]    Admit date:  2020  Discharge date:  ***    Code Status Order: Prior   Advance Directives:   [facility] Documentation     Date/Time Healthcare Directive Type of Healthcare Directive Copy in [address] Agent's Name Healthcare Agent's Phone Number    20 1552  Yes, patient has an advance directive for healthcare treatment  --  --  --  --  --          Admitting Physician:  Stan Ying MD  PCP: Delfin Brito MD    Discharging Nurse: [facility] Unit/Room#: 7353/9139-N  Discharging Unit Phone Number: ***    Emergency Contact:   Extended Emergency Contact Information  Primary Emergency Contact: Maxine Vega  Address: [address], [address] Phone: [phone]  Mobile Phone: [phone]  Relation: Spouse  Secondary Emergency Contact: [address] Phone: [phone]  Relation: Child    Past Surgical History:  Past Surgical History:   Procedure Laterality Date    CARDIAC PACEMAKER PLACEMENT  04/09/10    [employer]    CHOLECYSTECTOMY      COLONOSCOPY  2016    JOINT REPLACEMENT Bilateral     Left TJAK    TRANSESOPHAGEAL ECHOCARDIOGRAM  2020    With [address]       Immunization History:   Immunization History   Administered Date(s) Administered    PPD Test 2010       Active Problems:  Patient Active Problem List   Diagnosis Code    Arrhythmia I49.9    Pacemaker Z95.0    HTN (hypertension) I10    Hyperthyroidism E05.90    DEMETRIA (obstructive sleep apnea) G47.33    Cellulitis L03.90    Cellulitis and abscess of leg L03.119, L02.419    SIRS (systemic inflammatory response syndrome) (HCC) R65.10    Shortness of breath R06.02    Pain in lower limb M79.606    Chest pain R07.9    Abdominal pain R10.9    Pulmonary emboli ([address] I26.99    Post-thrombotic syndrome I87.009    Complete heart block (HCC) I44.2    Non-pressure chronic ulcer of left lower leg with fat layer exposed ([facility]) L97.922    Venous insufficiency (chronic) (peripheral) I87.2    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) I83.022, Q92.529    Peripheral vascular disease ([facility]) I73.9    Onychomycosis B35.1    PVD (peripheral vascular disease) ([facility]) I73.9    Type II diabetes mellitus with peripheral circulatory disorder ([facility]) E11.51    Difficulty walking R26.2    Simple chronic bronchitis ([facility]) J41.0    Pain of toe of right foot M79.674    Pain of toe of left foot M79.675    Diabetic polyneuropathy associated with type 2 diabetes mellitus ([facility]) E11.42    Pneumonia J18.9       Isolation/Infection:   Isolation          No Isolation        Patient Infection Status     None to display          Nurse Assessment:  Last Vital Signs: BP (!) 152/64   Pulse 65   Temp 97.5 °F (36.4 °C) (Temporal)   Resp 18   Ht 5' 7\" (1.702 m)   Wt (!) 387 lb 9.6 oz (175.8 kg)   SpO2 100%   BMI 60.71 kg/m²     Last documented pain score (0-10 scale): Pain Level: 0  Last Weight:   Wt Readings from Last 1 Encounters:   06/17/20 (!) 387 lb 9.6 oz (175.8 kg)     Mental Status:  {IP PT MENTAL STATUS:84472}    IV Access:  { JESUS IV ACCESS:389191023}    Nursing Mobility/ADLs:  Walking   {[facility] DME JOJR:214338740}  Transfer  {[facility] DME ZMUH:565048527}  Bathing  {[facility] DME IJWH:971586010}  Dressing  {[facility] TDDC:285285967}  Toileting  {[facility] JCIX:137303967}  Feeding  {[facility] JBFS:558372375}  Med Admin  {[facility] HJJE:130825228}  Med Delivery   {[facility] MED Delivery:155922418}    Wound Care Documentation and Therapy:  Wound 06/13/20 Leg Left; Lower; Lateral (Active)   Wound Image   6/16/2020  1:30 PM   Wound Venous 6/16/2020  1:30 PM   Dressing Status Dry; Intact 6/23/2020 12:00 AM   Dressing Changed Changed/New 6/19/2020  5:15 AM   Dressing/Treatment INTERVENTION:2995561656}  Weight Bearing Status/Restrictions: [address] [facility] [employer] Weight Bearin}  Other Medical Equipment (for information only, NOT a DME order):  {EQUIPMENT:335846172}  Other Treatments: ***    Patient's personal belongings (please select all that are sent with patient):  {CHP DME Belongings:221706080}    RN SIGNATURE:  {Esignature:442080756}    CASE MANAGEMENT/SOCIAL WORK SECTION    Inpatient Status Date: ***    Readmission Risk Assessment Score:  Readmission Risk              Risk of Unplanned Readmission:        14           Discharging to Facility/ Agency   · Name: [facility]  · Address:  · Phone:  · Fax:    Dialysis Facility (if applicable)   · Name:  · Address:  · Dialysis Schedule:  · Phone:  · Fax:    / signature: Electronically signed by Duane Lore, LSW on 2020 at 8:10 AM      PHYSICIAN SECTION    Prognosis: Fair    Condition at Discharge: Stable    Rehab Potential (if transferring to Rehab): Fair    Recommended Labs or Other Treatments After Discharge: Repeat swallow evaluation in 1 week in order to assess for either PEG placement or resumption of p.o. intake, CBC and BMP in 2 days    Physician Certification: I certify the above information and transfer of Michael Jolly  is necessary for the continuing treatment of the diagnosis listed and that he requires [facility] for less 30 days.      Update Admission H&P: No change in H&P    PHYSICIAN SIGNATURE:  Electronically signed by Maribel Fagan MD on 20 at 12:14 PM EDT

## 2020-06-24 PROBLEM — J13 COMMUNITY ACQUIRED PNEUMONIA DUE TO STREPTOCOCCUS PNEUMONIAE (HCC): Status: ACTIVE | Noted: 2020-06-24

## 2020-07-10 ENCOUNTER — OFFICE VISIT (OUTPATIENT)
Dept: PODIATRY | Age: 70
End: 2020-07-10
Payer: MEDICARE

## 2020-07-10 PROCEDURE — 99213 OFFICE O/P EST LOW 20 MIN: CPT | Performed by: PODIATRIST

## 2020-07-12 NOTE — PROGRESS NOTES
20     Richardson Call    : 1950   Sex: male    Age: 68 y.o. Patient's PCP/Provider is:  Jose Abbasi MD    Subjective:  Patient is seen today for follow-up regarding continued care stasis ulceration left lower extremity. Patient was hospitalized due to some pulmonary and cellulitis issues. He does have home nursing coming out and performing dressing changes. He denies any nausea, vomiting, fever, chills. He has noticed improvement to the left wound site since discharge from hospital.  No other additional issues noted at this time. Chief Complaint   Patient presents with    Wound Check       ROS:  Const: Positives and pertinent negatives as per HPI. Musculo: Denies symptoms other than stated above. Neuro: Denies symptoms other than stated above. Skin: Denies symptoms other than stated above.     Current Medications:    Current Outpatient Medications:     Folinic Acid-Vit B6-Vit B12 (FOLINIC-PLUS) 4-50-2 MG TABS, Take 4-50 mg by mouth 2 times daily, Disp: 90 tablet, Rfl: 2    sodium hypochlorite (DAKINS) 0.25 % SOLN, Irrigate with 2 mLs as directed daily (Patient taking differently: Irrigate with 2 mLs as directed daily Taking hysept), Disp: 473 mL, Rfl: 0    albuterol (ACCUNEB) 0.63 MG/3ML nebulizer solution, Take 3 mLs by nebulization every 6 hours as needed for Wheezing, Disp: 270 mL, Rfl: 3    vitamin C (ASCORBIC ACID) 500 MG tablet, Take 500 mg by mouth daily, Disp: , Rfl:     rosuvastatin (CRESTOR) 5 MG tablet, Take 5 mg by mouth daily , Disp: , Rfl:     glipiZIDE (GLUCOTROL XL) 5 MG extended release tablet, Take 5 mg by mouth daily, Disp: , Rfl: 3    ramipril (ALTACE) 10 MG capsule, Take 10 mg by mouth daily , Disp: , Rfl:     potassium chloride (KLOR-CON M) 20 MEQ extended release tablet, Take 20 mEq by mouth daily , Disp: , Rfl:     Cholecalciferol (VITAMIN D3) 2000 UNITS CAPS, Take by mouth daily Last dose 16, Disp: , Rfl:     carvedilol (COREG) 25 MG tablet, Take 25 mg by mouth 2 times daily (with meals) Instructed to take with sip water am of procedure, Disp: , Rfl:     Omega-3 Fatty Acids (FISH OIL) 600 MG CAPS, Take 1,000 mg by mouth daily Last Dose 16, Disp: , Rfl:     liothyronine (CYTOMEL) 50 MCG tablet, Take 75 mcg by mouth daily , Disp: , Rfl:     aspirin 81 MG EC tablet, Take 81 mg by mouth daily Last dose 16, Disp: , Rfl:     Indacaterol-Glycopyrrolate (UTIBRON NEOHALER) 27.5-15.6 MCG CAPS, Inhale 2 puffs into the lungs 2 times daily (Patient not taking: Reported on 7/10/2020), Disp: 180 capsule, Rfl: 3    Allergies: Allergies   Allergen Reactions    Clindamycin/Lincomycin      Extreme SOB     Other Hives and Shortness Of Breath     \"allergic to [place] white. \"    Singulair [Montelukast Sodium]     Adaptic Non-Adhering Dressing [Wound Dressings]      [place]        There were no vitals filed for this visit. Exam:  Neurovascular status unchanged. Ulcerative area is improved lateral aspect left leg. No signs of infection noted left lower extremity. Edematous changes are stable left lower extremity. Diagnostic Studies:     No results found. Xr Abdomen (kub) (single Ap View)    Result Date: 2020  Patient MRN: [id number] : 1950 Age:  68 years Gender: Male Order Date: 2020 3:45 PM Exam: XR ABDOMEN (KUB) (SINGLE AP VIEW) Number of Images: 1 views Indication:   enteric feeding tube enteric feeding tube Comparison: None. Findings: Single Limited anterior view low chest upper abdomen demonstrates feeding tube in place extending to the level of the fourth portion of the duodenum. Feeding tube as noted     Xr Chest Portable    Result Date: 2020  Patient MRN:  [id number] : 1950 Age: 68 years Gender: Male Order Date:  2020 5:15 PM EXAM: XR CHEST PORTABLE COMPARISON: 2020 INDICATION:  right picc right picc FINDINGS: Right PICC line is present.  Distal tip of the PICC line is not optimally visualized due to overlying pacemaker leads. Nasogastric tube is present. Distal tip is not visualized on this exam. There is no pneumothorax. There are persistent interstitial and hazy opacities bilaterally notable perihilar and infrahilar locations. This finding appears similar to prior. The heart appears be normal in size. 1. Status post placement of right PICC line. Distal tip is not definitively visualized due to overlying pacemaker leads. Oblique imaging may be helpful for further evaluation. 2. No pneumothorax. 3. Opacities persist in the lung bases. Xr Chest Portable    Result Date: 2020  Patient MRN:  [id number] : 1950 Age: 68 years Gender: Male Order Date:  2020 12:00 AM EXAM: XR CHEST PORTABLE INDICATION:  pneumonia Tomorrow am pneumonia COMPARISON: 2020 FINDINGS:  Patient body habitus limits evaluation. Left lower lobe infiltrate and left effusion appear slightly less prominent than the prior study. Right lung is normal. There is a left-sided pacer. Study is limited by technique and patient body habitus. Left-sided infiltrate and effusion appear less prominent     Xr Chest Portable    Result Date: 2020  Patient MRN: [id number] : 1950 Age:  68 years Gender: Male Order Date: 2020 12:00 PM Exam: XR CHEST PORTABLE Number of Images: 1 view Indication:  Sepsis Comparison: Previous 2 view upright chest 2016 and CTA chest 2016 Findings: The lungs show confluent density obscuring the left heart border and left hemidiaphragm, suggesting pleural effusion or airspace disease in the left lower lobe or lingular region. There is bronchovascular crowding and peribronchial density in the right lower lung without obscuring the diaphragm. The lateral sulcus appears unchanged. There is cardiomegaly with a left-sided bipolar pacemaker. Aortic intimal calcification is noted in the arch. There is no evidence of cardiac decompensation. . Skeletal structures

## 2020-07-16 ENCOUNTER — TELEPHONE (OUTPATIENT)
Dept: NON INVASIVE DIAGNOSTICS | Age: 70
End: 2020-07-16

## 2020-07-16 NOTE — TELEPHONE ENCOUNTER
Spoke with patient and his wife let them know procedure is scheduled on 07/23/2020 at 11:30. Patient to arrive at main at 9:30. Nothing to eat or drink after midnight, patient to have  and both wear masks. Patient to hold Glipizide, lasix and aspirin morning of and aspirin day before. Patient is having COVID testing done at [facility] on 07/17/2020. They both verbalized understanding.

## 2020-07-17 ENCOUNTER — HOSPITAL ENCOUNTER (OUTPATIENT)
Age: 70
Discharge: HOME | End: 2020-07-19
Payer: MEDICARE

## 2020-07-17 ENCOUNTER — OFFICE VISIT (OUTPATIENT)
Dept: PODIATRY | Age: 70
End: 2020-07-17
Payer: MEDICARE

## 2020-07-17 VITALS — TEMPERATURE: 97.5 F

## 2020-07-17 PROCEDURE — 99213 OFFICE O/P EST LOW 20 MIN: CPT | Performed by: PODIATRIST

## 2020-07-17 PROCEDURE — U0003 INFECTIOUS AGENT DETECTION BY NUCLEIC ACID (DNA OR RNA); SEVERE ACUTE RESPIRATORY SYNDROME CORONAVIRUS 2 (SARS-COV-2) (CORONAVIRUS DISEASE [COVID-19]), AMPLIFIED PROBE TECHNIQUE, MAKING USE OF HIGH THROUGHPUT TECHNOLOGIES AS DESCRIBED BY CMS-2020-01-R: HCPCS

## 2020-07-17 NOTE — PROGRESS NOTES
20     Hector Carlson    : 1950   Sex: male    Age: 68 y.o. Patient's PCP/Provider is:  Susan Barrios MD    Subjective:  Patient is seen today for follow-up regarding continued treatment stasis ulceration left lower extremity. Patient has noticed continual improvement with current wound dressings. They deny any additional issues at this time. Patient denies any nausea, vomiting, fever, chills. Chief Complaint   Patient presents with    Wound Check       ROS:  Const: Positives and pertinent negatives as per HPI. Musculo: Denies symptoms other than stated above. Neuro: Denies symptoms other than stated above. Skin: Denies symptoms other than stated above.     Current Medications:    Current Outpatient Medications:     doxycycline hyclate (VIBRA-TABS) 100 MG tablet, TAKE 1 TABLET BY MOUTH TWICE A DAY FOR 14 DAYS, Disp: , Rfl:     furosemide (LASIX) 40 MG tablet, , Disp: , Rfl:     potassium chloride (KLOR-CON M) 20 MEQ TBCR extended release tablet, , Disp: , Rfl:     Calcium Alginate (ALGISITE M 6\"X8\" EX), Apply topically daily, Disp: , Rfl:     Folinic Acid-Vit B6-Vit B12 (FOLINIC-PLUS) 4-50-2 MG TABS, Take 4-50 mg by mouth 2 times daily, Disp: 90 tablet, Rfl: 2    Indacaterol-Glycopyrrolate (UTIBRON NEOHALER) 27.5-15.6 MCG CAPS, Inhale 2 puffs into the lungs 2 times daily (Patient not taking: Reported on 7/10/2020), Disp: 180 capsule, Rfl: 3    sodium hypochlorite (DAKINS) 0.25 % SOLN, Irrigate with 2 mLs as directed daily (Patient not taking: Reported on 2020), Disp: 473 mL, Rfl: 0    albuterol (ACCUNEB) 0.63 MG/3ML nebulizer solution, Take 3 mLs by nebulization every 6 hours as needed for Wheezing, Disp: 270 mL, Rfl: 3    vitamin C (ASCORBIC ACID) 500 MG tablet, Take 500 mg by mouth daily, Disp: , Rfl:     rosuvastatin (CRESTOR) 5 MG tablet, Take 5 mg by mouth daily , Disp: , Rfl:     glipiZIDE (GLUCOTROL XL) 5 MG extended release tablet, Take 5 mg by mouth daily, Disp: , Rfl: 3    ramipril (ALTACE) 10 MG capsule, Take 10 mg by mouth daily , Disp: , Rfl:     potassium chloride (KLOR-CON M) 20 MEQ extended release tablet, Take 20 mEq by mouth daily , Disp: , Rfl:     Cholecalciferol (VITAMIN D3) 2000 UNITS CAPS, Take by mouth daily Last dose 16, Disp: , Rfl:     carvedilol (COREG) 25 MG tablet, Take 25 mg by mouth 2 times daily (with meals) Instructed to take with sip water am of procedure, Disp: , Rfl:     Omega-3 Fatty Acids (FISH OIL) 600 MG CAPS, Take 1,000 mg by mouth daily Last Dose 16, Disp: , Rfl:     liothyronine (CYTOMEL) 50 MCG tablet, Take 100 mcg by mouth daily , Disp: , Rfl:     aspirin 81 MG EC tablet, Take 81 mg by mouth daily Last dose 16, Disp: , Rfl:     Allergies: Allergies   Allergen Reactions    Clindamycin/Lincomycin      Extreme SOB     Other Hives and Shortness Of Breath     \"allergic to Potatoe white. \"    Singulair [Montelukast Sodium]     Adaptic Non-Adhering Dressing [Wound Dressings]      [place]        Vitals:    20 0844   Temp: 97.5 °F (36.4 °C)       Exam:  Neurovascular status unchanged. Ulcerative area improved lateral aspect left lower leg. Area measures approximately 3.2 x 3.5 x 0.2 cm. No undermining of the wound edges noted. No signs of infection noted left lower extremity. Edematous changes are improved with current use of diuretics. Diagnostic Studies:     Xr Chest Portable    Result Date: 2020  Patient MRN:  [id number] : 1950 Age: 68 years Gender: Male Order Date:  2020 5:15 PM EXAM: XR CHEST PORTABLE COMPARISON: 2020 INDICATION:  right picc right picc FINDINGS: Right PICC line is present. Distal tip of the PICC line is not optimally visualized due to overlying pacemaker leads. Nasogastric tube is present. Distal tip is not visualized on this exam. There is no pneumothorax.  There are persistent interstitial and hazy opacities bilaterally notable perihilar and infrahilar locations. This finding appears similar to prior. The heart appears be normal in size. 1. Status post placement of right PICC line. Distal tip is not definitively visualized due to overlying pacemaker leads. Oblique imaging may be helpful for further evaluation. 2. No pneumothorax. 3. Opacities persist in the lung bases. Procedures:    None    Plan Per Assessment  Yovany Angulo was seen today for wound check. 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 Evaluation and management  2. We will continue with the current primary wound dressing being applied to the left lower extremity. 3. Patient was to continue with his Tubigrip stocking for edema control measures and ulcer healing. 4. Patient will be followed up in 2 weeks time or sooner if needed for reevaluation. Seen By:    Allison Fisher DPM    Electronically signed by Allison Fisher DPM on 7/17/2020 at 9:08 AM    This note was created using voice recognition software. The note was reviewed however may contain grammatical errors.

## 2020-07-18 LAB
SARS-COV-2: NOT DETECTED
SOURCE: NORMAL

## 2020-07-22 NOTE — H&P
[address]     Myranda Walters  1950  Date of Service: 7/23/2020  PCP: Dr. Valencia Hernandez,   Cardiologist: Dr. Chris Del Angel  Electrophysiologist: Carlota Bacon DO    Patient Active Problem List    Diagnosis Date Noted    Community acquired pneumonia due to Streptococcus pneumoniae ([facility] 06/24/2020    Pneumonia 06/13/2020    Diabetic polyneuropathy associated with type 2 diabetes mellitus ([address] 02/19/2020    Pain of toe of right foot 09/30/2019    Pain of toe of left foot 09/30/2019    Simple chronic bronchitis ([address] 09/24/2019    Onychomycosis 07/01/2019    PVD (peripheral vascular disease) ([address] 07/01/2019    Type II diabetes mellitus with peripheral circulatory disorder ([address] 07/01/2019    Difficulty walking 07/01/2019    Peripheral vascular disease ([address] 05/17/2019    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 ([address] 05/10/2019    Non-pressure chronic ulcer of left lower leg with fat layer exposed ([address] 03/22/2019    Venous insufficiency (chronic) (peripheral) 03/22/2019    Complete heart block ([address] 02/12/2019    Post-thrombotic syndrome 09/11/2017    Pulmonary emboli ([address] 08/23/2016    Chest pain 08/22/2016    Abdominal pain 08/22/2016    Cellulitis and abscess of leg 10/26/2012    SIRS (systemic inflammatory response syndrome) ([address] 10/26/2012    Shortness of breath 10/26/2012    Pain in lower limb 10/26/2012    Cellulitis 07/06/2012    Arrhythmia 07/05/2011     Overview Note:     A. H/O complete heart block      Pacemaker 07/05/2011     Overview Note:     A. Dual chamber pacemaker implant on 4/9/10: Medtronic Adapta [id number], serial [id number]  B. Right atrial lead: [employer] Lance: 1888TC, serial # [id number]  C.  Right ventricular lead: Medtronic [id number], serial # [id number]      HTN (hypertension) 07/05/2011    Hyperthyroidism 07/05/2011    DEMETRIA (obstructive sleep apnea) 07/05/2011     Overview Note:     A. Current use CPAP         Current Outpatient Medications   Medication Sig Dispense Refill    doxycycline hyclate (VIBRA-TABS) 100 MG tablet TAKE 1 TABLET BY MOUTH TWICE A DAY FOR 14 DAYS      furosemide (LASIX) 40 MG tablet       potassium chloride (KLOR-CON M) 20 MEQ TBCR extended release tablet       Calcium Alginate (ALGISITE M 6\"X8\" EX) Apply topically daily      Folinic Acid-Vit B6-Vit B12 (FOLINIC-PLUS) 4-50-2 MG TABS Take 4-50 mg by mouth 2 times daily 90 tablet 2    Indacaterol-Glycopyrrolate (Jackquline Muck) 27.5-15.6 MCG CAPS Inhale 2 puffs into the lungs 2 times daily (Patient not taking: Reported on 7/10/2020) 180 capsule 3    sodium hypochlorite (DAKINS) 0.25 % SOLN Irrigate with 2 mLs as directed daily (Patient not taking: Reported on 7/16/2020) 473 mL 0    albuterol (ACCUNEB) 0.63 MG/3ML nebulizer solution Take 3 mLs by nebulization every 6 hours as needed for Wheezing 270 mL 3    vitamin C (ASCORBIC ACID) 500 MG tablet Take 500 mg by mouth daily      rosuvastatin (CRESTOR) 5 MG tablet Take 5 mg by mouth daily       glipiZIDE (GLUCOTROL XL) 5 MG extended release tablet Take 5 mg by mouth daily  3    ramipril (ALTACE) 10 MG capsule Take 10 mg by mouth daily       potassium chloride (KLOR-CON M) 20 MEQ extended release tablet Take 20 mEq by mouth daily       Cholecalciferol (VITAMIN D3) 2000 UNITS CAPS Take by mouth daily Last dose 1-14-16      carvedilol (COREG) 25 MG tablet Take 25 mg by mouth 2 times daily (with meals) Instructed to take with sip water am of procedure      Omega-3 Fatty Acids (FISH OIL) 600 MG CAPS Take 1,000 mg by mouth daily Last Dose 1-14-16      liothyronine (CYTOMEL) 50 MCG tablet Take 100 mcg by mouth daily       aspirin 81 MG EC tablet Take 81 mg by mouth daily Last dose 1-14-16       No current facility-administered medications for this encounter.          Allergies   Allergen Reactions    Clindamycin/Lincomycin Extreme SOB     Other Hives and Shortness Of Breath     \"allergic to Potatoe white. \"   Nida John [Montelukast Sodium]     Adaptic Non-Adhering Dressing [Wound Dressings]      [place]        9/6/19: Shzaia Perdomo presents to the office today with the chief complaint: CHB and pacemaker in situ. He is accompanied by his wife. Since his last office visit Mr. Nelda Florentino states that he is feeling fine. His device site looks well healed and free from infection or erosion. He offers no complaints from a device POV. He did report that he had a stress test yesterday at [facility]. He denies any angina, syncope, dyspnea on exertion, paroxysmal nocturnal dyspnea and palpitations. 05/18/20--VV: Shazia Perdomo gives verbal consent for a telephone visit from his home via my office. Since his last OV he offers no complaints from an EP or cardiac perspective. In review of his transmission from below, he is approaching GURPREET from a battery perspective. He is pacemaker dependent.    07/23/20--H&P: Shazia Perdomo presents to the hospital for an elective pacemaker generator change with TVP. [place] [facility] and he is pacemaker dependent. ROS:   Constitutional: Negative for fever, activity change and appetite change. HENT: Negative for epistaxis, difficulty swallowing. Eyes: Negative for blurred vision or double vision. Respiratory: Negative for cough, chest tightness, shortness of breath. Cardiovascular: per HPI. Gastrointestinal: Negative for abdominal pain, heartburn, or blood in stool. Genitourinary: Negative for hematuria, burning or frequency. Musculoskeletal: Negative for myalgias, stiffness, or swelling. Skin: Negative for rash, pain, or lumps. Neurological: Negative for syncope, seizures, or headaches. Psychiatric/Behavioral: Negative for confusion and agitation. The patient is not nervous/anxious. PHYSICAL EXAM:  Constitutional: Oriented to person, place, and time. Well-developed and cooperative. Head: Normocephalic and atraumatic. Musculoskeletal: Normal range of motion of all extremities, no muscle weakness. Neurological: Alert and oriented to person, place, and time. Gait normal.   Skin: Skin is warm and dry. No bruising, no ecchymosis and no rash noted. Extremity: No clubbing or cyanosis. No edema. Psychiatric: Normal mood and affect. Thought content normal.  Pacemaker site: Well healed, no sign of infection or erosion. Remote Device Transmission--5/6/20:  Make/Model Medtronic Adriennea  (dependent)   DOI: 4/9/10  Mode DDDR 60/120 ppm  P wave: >2.8 mV  Impedance: 458 ohms   Threshold: 0.75 V @ 0.4 ms  RV R wave: paced mV  Impedance: 520 ohms   Threshold: 0.5 V @ 0.4 ms  Pacing: A: 24.7%  RV: 99.9%     Battery Voltage/Longevity: 2 mths (<1-11 mths)14 months   -longevity 6 mths 3/21/20  Arrhythmias: 5 beats NSVT (3 sec)     Comment:  -Stress test 9/10/10 [facility] MD): Large moderately severe basal to distal inferolateral fixed defect of myocardial perfusion with hypokinetic wall motion. EF 54%     Medications:  -Coreg 25 mg BID  -ASA 81 mg QD        Plan:  -Monthly remote transmissions > monitor battery  -OV 6/9/20 with Cy Fairchild, MARY ANNE Fairchild, SUSANNA-[facility], [address] [place] [address] Physicians        ADDENDUM     Discussed with Dr Gracy Zimmerman. Given percentage of RV pacing, will obtain an echocardiogram and schedule VV with Dr Bo Benjamin, APRRICH-[facility], [address] [place] [address] Physicians    Last GABBY--6/19/20:   Summary   No evidence of endocarditis. Overall ejection fraction is normal .   Normal right ventricle structure and function. Right ventricle pacemaker   lead noted. Physiologic and/or trace mitral regurgitation is present. Mild tricuspid regurgitation.     I have independently reviewed all of the ECGs and rhythm strips per above. I have personally reviewed the laboratory, cardiac diagnostic and radiographic testing as outlined above. I have reviewed previous records noted in [address]. Impressions:    1. CHB  - Pacemaker dependent    2. Dual chamber PPM in situ  A. Dual chamber pacemaker implant on 4/9/10: Medtronic Adapta [id number], serial [id number]  B. Right atrial lead: St. Lance: 1888TC, serial # [id number]  C. Right ventricular lead: Medtronic [id number], serial # [id number]  D. At GURPREET    3. DEMETRIA  - CPAP    4. HTN    5. HLD    6. Morbid obesity  - BMI: 54.43 kg/m2    7. Venous Ulcer  - L leg  - undergoing wound care treatment    8. Group C Strep bacteremia  - GABBY w/o vegetation  - Treated with Linizolid    Plan:     Mr. Coty Marino pacemaker function is stable but is approaching GURPREET. He recently had Group C streptococcus bacteremia and was treated with Linezolid. His GABBY was negative and the decision, given that he is pacemaker dependent, was to treat the infection and if bacteremia recurred, then we most likely would need device and lead extraction. He denies any fever or chills and his most recent CBC is normal. Today we discussed undergoing a pacemaker generator change . Risks, benefits, and alternatives of permanent pacemaker generator change were discussed in detail today. These risks include but are not limited to bleeding, blood clot, infection, pneumothorax, hemothorax, cardiac perforation and tamponade, and death. We also discussed TVP use during the procedure. The patient understands these risks and agrees to proceed with the procedure. The patient was counseled at length about the risks of rachael Covid-19 during their perioperative period and any recovery window from their procedure. The patient was made aware that rachael Covid-19  may worsen their prognosis for recovering from their procedure  and lend to a higher morbidity and/or mortality risk.   All material risks, benefits, and reasonable alternatives including postponing the procedure were discussed. The patient does wish to proceed with the procedure at this time.     Lore Granados DO  [facility] Cardiac Electrophysiology  . [facility]

## 2020-07-23 ENCOUNTER — ANESTHESIA EVENT (OUTPATIENT)
Dept: CARDIAC CATH/INVASIVE PROCEDURES | Age: 70
End: 2020-07-23

## 2020-07-23 ENCOUNTER — ANESTHESIA (OUTPATIENT)
Dept: CARDIAC CATH/INVASIVE PROCEDURES | Age: 70
End: 2020-07-23

## 2020-07-23 ENCOUNTER — HOSPITAL ENCOUNTER (OUTPATIENT)
Dept: CARDIAC CATH/INVASIVE PROCEDURES | Age: 70
Discharge: HOME | End: 2020-07-23
Payer: MEDICARE

## 2020-07-23 VITALS
HEART RATE: 67 BPM | DIASTOLIC BLOOD PRESSURE: 66 MMHG | TEMPERATURE: 96.1 F | OXYGEN SATURATION: 96 % | RESPIRATION RATE: 20 BRPM | SYSTOLIC BLOOD PRESSURE: 118 MMHG

## 2020-07-23 VITALS — DIASTOLIC BLOOD PRESSURE: 59 MMHG | SYSTOLIC BLOOD PRESSURE: 107 MMHG | OXYGEN SATURATION: 94 %

## 2020-07-23 PROBLEM — Z45.010 ENCOUNTER FOR PACEMAKER AT END OF BATTERY LIFE: Status: ACTIVE | Noted: 2020-07-23

## 2020-07-23 LAB
ANION GAP SERPL CALCULATED.3IONS-SCNC: 12 MMOL/L (ref 7–16)
BASOPHILS ABSOLUTE: 0.06 E9/L (ref 0–0.2)
BASOPHILS RELATIVE PERCENT: 0.9 % (ref 0–2)
BUN BLDV-MCNC: 26 MG/DL (ref 8–23)
CALCIUM SERPL-MCNC: 9.1 MG/DL (ref 8.6–10.2)
CHLORIDE BLD-SCNC: 107 MMOL/L (ref 98–107)
CO2: 24 MMOL/L (ref 22–29)
CREAT SERPL-MCNC: 0.9 MG/DL (ref 0.7–1.2)
EOSINOPHILS ABSOLUTE: 0.15 E9/L (ref 0.05–0.5)
EOSINOPHILS RELATIVE PERCENT: 2.2 % (ref 0–6)
GFR AFRICAN AMERICAN: >60
GFR NON-AFRICAN AMERICAN: >60 ML/MIN/1.73
GLUCOSE BLD-MCNC: 162 MG/DL (ref 74–99)
HCT VFR BLD CALC: 39 % (ref 37–54)
HEMOGLOBIN: 12.1 G/DL (ref 12.5–16.5)
IMMATURE GRANULOCYTES #: 0.02 E9/L
IMMATURE GRANULOCYTES %: 0.3 % (ref 0–5)
LYMPHOCYTES ABSOLUTE: 1.59 E9/L (ref 1.5–4)
LYMPHOCYTES RELATIVE PERCENT: 23.1 % (ref 20–42)
MAGNESIUM: 1.9 MG/DL (ref 1.6–2.6)
MCH RBC QN AUTO: 28.5 PG (ref 26–35)
MCHC RBC AUTO-ENTMCNC: 31 % (ref 32–34.5)
MCV RBC AUTO: 92 FL (ref 80–99.9)
METER GLUCOSE: 150 MG/DL (ref 74–99)
MONOCYTES ABSOLUTE: 0.66 E9/L (ref 0.1–0.95)
MONOCYTES RELATIVE PERCENT: 9.6 % (ref 2–12)
NEUTROPHILS ABSOLUTE: 4.41 E9/L (ref 1.8–7.3)
NEUTROPHILS RELATIVE PERCENT: 63.9 % (ref 43–80)
PDW BLD-RTO: 15.8 FL (ref 11.5–15)
PLATELET # BLD: 152 E9/L (ref 130–450)
PMV BLD AUTO: 10.3 FL (ref 7–12)
POTASSIUM SERPL-SCNC: 3.9 MMOL/L (ref 3.5–5)
RBC # BLD: 4.24 E12/L (ref 3.8–5.8)
SODIUM BLD-SCNC: 143 MMOL/L (ref 132–146)
WBC # BLD: 6.9 E9/L (ref 4.5–11.5)

## 2020-07-23 PROCEDURE — 83735 ASSAY OF MAGNESIUM: CPT

## 2020-07-23 PROCEDURE — C1785 PMKR, DUAL, RATE-RESP: HCPCS

## 2020-07-23 PROCEDURE — 2500000003 HC RX 250 WO HCPCS

## 2020-07-23 PROCEDURE — 6360000002 HC RX W HCPCS: Performed by: NURSE ANESTHETIST, CERTIFIED REGISTERED

## 2020-07-23 PROCEDURE — 2580000003 HC RX 258

## 2020-07-23 PROCEDURE — 3700000001 HC ADD 15 MINUTES (ANESTHESIA)

## 2020-07-23 PROCEDURE — C1732 CATH, EP, DIAG/ABL, 3D/VECT: HCPCS

## 2020-07-23 PROCEDURE — 80048 BASIC METABOLIC PNL TOTAL CA: CPT

## 2020-07-23 PROCEDURE — 6360000002 HC RX W HCPCS

## 2020-07-23 PROCEDURE — 2780000010 HC IMPLANT OTHER

## 2020-07-23 PROCEDURE — 82962 GLUCOSE BLOOD TEST: CPT

## 2020-07-23 PROCEDURE — 36415 COLL VENOUS BLD VENIPUNCTURE: CPT

## 2020-07-23 PROCEDURE — 85025 COMPLETE CBC W/AUTO DIFF WBC: CPT

## 2020-07-23 PROCEDURE — 2580000003 HC RX 258: Performed by: INTERNAL MEDICINE

## 2020-07-23 PROCEDURE — C1894 INTRO/SHEATH, NON-LASER: HCPCS

## 2020-07-23 PROCEDURE — 33228 REMV&REPLC PM GEN DUAL LEAD: CPT | Performed by: INTERNAL MEDICINE

## 2020-07-23 PROCEDURE — 2720000010 HC SURG SUPPLY STERILE

## 2020-07-23 PROCEDURE — 2709999900 HC NON-CHARGEABLE SUPPLY

## 2020-07-23 PROCEDURE — 3700000000 HC ANESTHESIA ATTENDED CARE

## 2020-07-23 RX ORDER — FENTANYL CITRATE 50 UG/ML
INJECTION, SOLUTION INTRAMUSCULAR; INTRAVENOUS PRN
Status: DISCONTINUED | OUTPATIENT
Start: 2020-07-23 | End: 2020-07-23 | Stop reason: SDUPTHER

## 2020-07-23 RX ORDER — PROPOFOL 10 MG/ML
INJECTION, EMULSION INTRAVENOUS CONTINUOUS PRN
Status: DISCONTINUED | OUTPATIENT
Start: 2020-07-23 | End: 2020-07-23 | Stop reason: SDUPTHER

## 2020-07-23 RX ORDER — SODIUM CHLORIDE 9 MG/ML
INJECTION, SOLUTION INTRAVENOUS ONCE
Status: COMPLETED | OUTPATIENT
Start: 2020-07-23 | End: 2020-07-23

## 2020-07-23 RX ORDER — SODIUM CHLORIDE 9 MG/ML
INJECTION, SOLUTION INTRAVENOUS ONCE
Status: DISCONTINUED | OUTPATIENT
Start: 2020-07-23 | End: 2020-07-24 | Stop reason: HOSPADM

## 2020-07-23 RX ORDER — CEFAZOLIN SODIUM 1 G/3ML
INJECTION, POWDER, FOR SOLUTION INTRAMUSCULAR; INTRAVENOUS PRN
Status: DISCONTINUED | OUTPATIENT
Start: 2020-07-23 | End: 2020-07-23 | Stop reason: SDUPTHER

## 2020-07-23 RX ORDER — PROPOFOL 10 MG/ML
INJECTION, EMULSION INTRAVENOUS PRN
Status: DISCONTINUED | OUTPATIENT
Start: 2020-07-23 | End: 2020-07-23 | Stop reason: SDUPTHER

## 2020-07-23 RX ORDER — DOXYCYCLINE HYCLATE 100 MG
100 TABLET ORAL 2 TIMES DAILY
Qty: 14 TABLET | Refills: 0 | Status: SHIPPED | OUTPATIENT
Start: 2020-07-23 | End: 2020-07-30

## 2020-07-23 RX ORDER — MIDAZOLAM HYDROCHLORIDE 1 MG/ML
INJECTION INTRAMUSCULAR; INTRAVENOUS PRN
Status: DISCONTINUED | OUTPATIENT
Start: 2020-07-23 | End: 2020-07-23 | Stop reason: SDUPTHER

## 2020-07-23 RX ADMIN — SODIUM CHLORIDE: 9 INJECTION, SOLUTION INTRAVENOUS at 08:29

## 2020-07-23 RX ADMIN — FENTANYL CITRATE 25 MCG: 50 INJECTION, SOLUTION INTRAMUSCULAR; INTRAVENOUS at 09:15

## 2020-07-23 RX ADMIN — MIDAZOLAM 1 MG: 1 INJECTION INTRAMUSCULAR; INTRAVENOUS at 08:35

## 2020-07-23 RX ADMIN — PROPOFOL 50 MG: 10 INJECTION, EMULSION INTRAVENOUS at 08:45

## 2020-07-23 RX ADMIN — FENTANYL CITRATE 25 MCG: 50 INJECTION, SOLUTION INTRAMUSCULAR; INTRAVENOUS at 08:58

## 2020-07-23 RX ADMIN — FENTANYL CITRATE 50 MCG: 50 INJECTION, SOLUTION INTRAMUSCULAR; INTRAVENOUS at 08:45

## 2020-07-23 RX ADMIN — CEFAZOLIN 3000 MG: 1 INJECTION, POWDER, FOR SOLUTION INTRAMUSCULAR; INTRAVENOUS at 08:40

## 2020-07-23 RX ADMIN — PROPOFOL 15 MCG/KG/MIN: 10 INJECTION, EMULSION INTRAVENOUS at 08:53

## 2020-07-23 RX ADMIN — MIDAZOLAM 1 MG: 1 INJECTION INTRAMUSCULAR; INTRAVENOUS at 08:58

## 2020-07-23 RX ADMIN — FENTANYL CITRATE 25 MCG: 50 INJECTION, SOLUTION INTRAMUSCULAR; INTRAVENOUS at 08:50

## 2020-07-23 ASSESSMENT — PULMONARY FUNCTION TESTS
PIF_VALUE: 16
PIF_VALUE: 12
PIF_VALUE: 1
PIF_VALUE: 13
PIF_VALUE: 14
PIF_VALUE: 1
PIF_VALUE: 16
PIF_VALUE: 1
PIF_VALUE: 20
PIF_VALUE: 16
PIF_VALUE: 1
PIF_VALUE: 16
PIF_VALUE: 12
PIF_VALUE: 15
PIF_VALUE: 13
PIF_VALUE: 13
PIF_VALUE: 16
PIF_VALUE: 14
PIF_VALUE: 1
PIF_VALUE: 12
PIF_VALUE: 1
PIF_VALUE: 46
PIF_VALUE: 13
PIF_VALUE: 15
PIF_VALUE: 17
PIF_VALUE: 1
PIF_VALUE: 16
PIF_VALUE: 13
PIF_VALUE: 16
PIF_VALUE: 12
PIF_VALUE: 13
PIF_VALUE: 1
PIF_VALUE: 16
PIF_VALUE: 13
PIF_VALUE: 15
PIF_VALUE: 13
PIF_VALUE: 14
PIF_VALUE: 13
PIF_VALUE: 1
PIF_VALUE: 13
PIF_VALUE: 13
PIF_VALUE: 14
PIF_VALUE: 20
PIF_VALUE: 14
PIF_VALUE: 14
PIF_VALUE: 13
PIF_VALUE: 15
PIF_VALUE: 1
PIF_VALUE: 12
PIF_VALUE: 13
PIF_VALUE: 14
PIF_VALUE: 14
PIF_VALUE: 1
PIF_VALUE: 13
PIF_VALUE: 12
PIF_VALUE: 15
PIF_VALUE: 12
PIF_VALUE: 1
PIF_VALUE: 23
PIF_VALUE: 13
PIF_VALUE: 1
PIF_VALUE: 15
PIF_VALUE: 13
PIF_VALUE: 13
PIF_VALUE: 12
PIF_VALUE: 13
PIF_VALUE: 15
PIF_VALUE: 17
PIF_VALUE: 1
PIF_VALUE: 13

## 2020-07-23 ASSESSMENT — LIFESTYLE VARIABLES: SMOKING_STATUS: 0

## 2020-07-23 ASSESSMENT — ENCOUNTER SYMPTOMS: SHORTNESS OF BREATH: 1

## 2020-07-23 NOTE — ANESTHESIA PRE PROCEDURE
Department of Anesthesiology  Preprocedure Note       Name:  Deepti Hi   Age:  68 y.o.  :  1950                                          MRN:  [id number]         Date:  2020      Surgeon: Kary Ayala    Procedure:   PGR w TVP  Medications prior to admission:   Prior to Admission medications    Medication Sig Start Date End Date Taking?  Authorizing Provider   doxycycline hyclate (VIBRA-TABS) 100 MG tablet TAKE 1 TABLET BY MOUTH TWICE A DAY FOR 14 DAYS 20  Yes Historical Provider, MD   furosemide (LASIX) 40 MG tablet  20  Yes Historical Provider, MD   potassium chloride (KLOR-CON M) 20 MEQ TBCR extended release tablet  20  Yes Historical Provider, MD   Calcium Alginate (ALGISITE M 6\"X8\" EX) Apply topically daily   Yes Historical Provider, MD   Folinic Acid-Vit B6-Vit B12 (FOLINIC-PLUS) 4-50-2 MG TABS Take 4-50 mg by mouth 2 times daily 20  Yes Nicole Park., YOLY   Indacaterol-Glycopyrrolate Trula Lone) 27.5-15.6 MCG CAPS Inhale 2 puffs into the lungs 2 times daily 20  Yes Coy Heath MD   albuterol (ACCUNEB) 0.63 MG/3ML nebulizer solution Take 3 mLs by nebulization every 6 hours as needed for Wheezing 19  Yes Coy Heath MD   vitamin C (ASCORBIC ACID) 500 MG tablet Take 500 mg by mouth daily   Yes Historical Provider, MD   rosuvastatin (CRESTOR) 5 MG tablet Take 5 mg by mouth daily  18  Yes Historical Provider, MD   glipiZIDE (GLUCOTROL XL) 5 MG extended release tablet Take 5 mg by mouth daily 17  Yes Historical Provider, MD   ramipril (ALTACE) 10 MG capsule Take 10 mg by mouth daily  3/4/17  Yes Historical Provider, MD   Cholecalciferol (VITAMIN D3) 2000 UNITS CAPS Take by mouth daily Last dose 16   Yes Historical Provider, MD   carvedilol (COREG) 25 MG tablet Take 25 mg by mouth 2 times daily (with meals) Instructed to take with sip water am of procedure   Yes Historical Provider, MD   Omega-3 Fatty Acids (FISH OIL) 600 MG CAPS Take 1,000 mg by mouth daily Last Dose 1-14-16   Yes Historical Provider, MD   liothyronine (CYTOMEL) 50 MCG tablet Take 100 mcg by mouth daily    Yes Historical Provider, MD   aspirin 81 MG EC tablet Take 81 mg by mouth daily Last dose 1-14-16   Yes Historical Provider, MD   sodium hypochlorite (DAKINS) 0.25 % SOLN Irrigate with 2 mLs as directed daily  Patient not taking: Reported on 7/16/2020 1/3/20   Allison Fuentes, DPM   potassium chloride (KLOR-CON M) 20 MEQ extended release tablet Take 20 mEq by mouth daily  3/23/17   Historical Provider, MD       Current medications:    Current Outpatient Medications   Medication Sig Dispense Refill    doxycycline hyclate (VIBRA-TABS) 100 MG tablet TAKE 1 TABLET BY MOUTH TWICE A DAY FOR 14 DAYS      furosemide (LASIX) 40 MG tablet       potassium chloride (KLOR-CON M) 20 MEQ TBCR extended release tablet       Calcium Alginate (ALGISITE M 6\"X8\" EX) Apply topically daily      Folinic Acid-Vit B6-Vit B12 (FOLINIC-PLUS) 4-50-2 MG TABS Take 4-50 mg by mouth 2 times daily 90 tablet 2    Indacaterol-Glycopyrrolate (Roosvelt Connie) 27.5-15.6 MCG CAPS Inhale 2 puffs into the lungs 2 times daily 180 capsule 3    albuterol (ACCUNEB) 0.63 MG/3ML nebulizer solution Take 3 mLs by nebulization every 6 hours as needed for Wheezing 270 mL 3    vitamin C (ASCORBIC ACID) 500 MG tablet Take 500 mg by mouth daily      rosuvastatin (CRESTOR) 5 MG tablet Take 5 mg by mouth daily       glipiZIDE (GLUCOTROL XL) 5 MG extended release tablet Take 5 mg by mouth daily  3    ramipril (ALTACE) 10 MG capsule Take 10 mg by mouth daily       Cholecalciferol (VITAMIN D3) 2000 UNITS CAPS Take by mouth daily Last dose 1-14-16      carvedilol (COREG) 25 MG tablet Take 25 mg by mouth 2 times daily (with meals) Instructed to take with sip water am of procedure      Omega-3 Fatty Acids (FISH OIL) 600 MG CAPS Take 1,000 mg by mouth daily Last Dose 1-14-16      liothyronine (CYTOMEL) 50 MCG tablet Take 100 mcg by mouth daily       aspirin 81 MG EC tablet Take 81 mg by mouth daily Last dose 1-14-16      sodium hypochlorite (DAKINS) 0.25 % SOLN Irrigate with 2 mLs as directed daily (Patient not taking: Reported on 7/16/2020) 473 mL 0    potassium chloride (KLOR-CON M) 20 MEQ extended release tablet Take 20 mEq by mouth daily        Current Facility-Administered Medications   Medication Dose Route Frequency Provider Last Rate Last Dose    0.9 % sodium chloride infusion   Intravenous Once Maurice Hickey DO         Facility-Administered Medications Ordered in Other Encounters   Medication Dose Route Frequency Provider Last Rate Last Dose    ceFAZolin (ANCEF) injection    PRN Kourtney Mora, APRN - CRNA   3,000 mg at 07/23/20 0840    midazolam (VERSED) injection    PRN Kourtney Barter, APRN - CRNA   1 mg at 07/23/20 0858    propofol injection    PRN Kourtney Barter, APRN - CRNA   50 mg at 07/23/20 0845    fentaNYL (SUBLIMAZE) injection    PRN Kourtney Mora, APRN - CRNA   25 mcg at 07/23/20 0915    propofol injection    Continuous PRN Kourtney Barter, APRN - CRNA 14.6 mL/hr at 07/23/20 0905 15 mcg/kg/min at 07/23/20 0905       Allergies: Allergies   Allergen Reactions    Clindamycin/Lincomycin      Extreme SOB     Other Hives and Shortness Of Breath     \"allergic to Potatoe white. \"    Singulair [Montelukast Sodium]     Adaptic Non-Adhering Dressing [Wound Dressings]      [place]        Problem List:    Patient Active Problem List   Diagnosis Code    Arrhythmia I49.9    Pacemaker Z95.0    HTN (hypertension) I10    Hyperthyroidism E05.90    DEMETRIA (obstructive sleep apnea) G47.33    Cellulitis L03.90    Cellulitis and abscess of leg L03.119, L02.419    SIRS (systemic inflammatory response syndrome) (HCC) R65.10    Shortness of breath R06.02    Pain in lower limb M79.606    Chest pain R07.9    Abdominal pain R10.9    Pulmonary emboli (HCC) I26.99    Post-thrombotic syndrome I87.009  Complete heart block (HCC) I44.2    Non-pressure chronic ulcer of left lower leg with fat layer exposed ([facility] L97.922    Venous insufficiency (chronic) (peripheral) I87.2    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 ([facility] I83.022, D81.776    Peripheral vascular disease (HCC) I73.9    Onychomycosis B35.1    PVD (peripheral vascular disease) ([facility] I73.9    Type II diabetes mellitus with peripheral circulatory disorder ([facility]) E11.51    Difficulty walking R26.2    Simple chronic bronchitis ([facility]) J41.0    Pain of toe of right foot M79.674    Pain of toe of left foot M79.675    Diabetic polyneuropathy associated with type 2 diabetes mellitus ([facility]) E11.42    Pneumonia J18.9    [facility] Streptococcus [facility] J13    Encounter for pacemaker at end of battery life Z45.010       Past Medical History:        Diagnosis Date    CAD (coronary artery disease)     Cataracts, bilateral 2019    Cellulitis 1-2016    Left leg    Complete heart block (HCC)     h/o    Diabetes mellitus ([facility]     Encounter for aspirin therapy     patient uses for heart health    H/O asbestosis     History of blood transfusion     Hx of blood clots     Hyperlipidemia     Hypertension     Hyperthyroidism     Lung disorder     weak lungs    Myocardial infarct ([facility] 2010    Neuropathy     feet    Obesity     Obstructive sleep apnea     on cpap    Onychomycosis     Osteoarthritis     Pacemaker     Medtronic    Post-thrombotic syndrome 9/11/2017    Screening 1-20-16    for Colonoscopy    Stasis dermatitis     h/o on bilateral legs with leg edema    Varicose veins        Past Surgical History:        Procedure Laterality Date    CARDIAC PACEMAKER PLACEMENT  04/09/10    Medtronic    CHOLECYSTECTOMY      COLONOSCOPY  1/20/2016    JOINT REPLACEMENT Bilateral     Left TJAK    TRANSESOPHAGEAL ECHOCARDIOGRAM  06/19/2020    With        Social Plan      MAC     ASA 4       Induction: intravenous. Anesthetic plan and risks discussed with patient. Use of blood products discussed with patient whom. Plan discussed with attending.                   Coy Amado,    7/23/2020

## 2020-07-23 NOTE — OP NOTE
ELECTROPHYSIOLOGY PROCEDURE REPORT      Didi Headley  1950    68 y.o.  male  Date of Service: 7/23/2020    Referring Provider: Freddy Denny MD     PROCEDURE:    1. Pacemaker Generator Change-dual  2. Pacemaker Explantation  3. TVP placement with removal    INDICATION:  1. EOL of the PPM.  2. PPM in situ. 3. Complete heart block. PROCEDURE PERFORMED BY: Rogerio Miller DO     ESTIMATED BLOOD LOSS: Approximately 10 cc    MEDICATIONS: Ancef 3 gram IV    PROCEDURE TIME: 1 hour. COMPLICATIONS: None immediate. ANESTHESIA: per anesthesia. FLUOROSCOPY: 1 minutes. DESCRIPTION OF PROCEDURE: The risks, benefits, and alternatives to the procedure were discussed with the patient, and informed consent was obtained. Prior to the pacemaker generator change, a TVP was placed into the RV apex via the left femoral vein utilizing ultrasound. The patient was then prepped and draped in the usual sterile fashion. The region of the left deltopectoral groove was liberally infiltrated with 1% Lidocaine. A 4 cm long transverse incision was made through the skin and subcutaneous tissue exposing the previously placed pacemaker and leads beneath. Hemostasis was achieved with electrocautery. The pacemaker was removed from the pocket and the leads were disconnected from the pacemaker. The leads were tested and revealed normal function. The previously implanted leads were connected securely to the new pacemaker generator. The previously formed pocket was irrigated with antibiotic solution as was the rest of the incision. The new pacemaker generator was placed inside a Tyrx pouch. The leads were wrapped carefully behind the pacemaker generator and placed in the pocket. A securing suture with 0-Ethibond was used to secure the pacemaker generator to the subcutaneous tissue. Hemostasis was assured one last time and the pocket was closed.   The pectoral fascia was closed with 2-0 Vicryl using a running mattress stitch. The subcutaneous and subcuticular layers were closed with 3-0 Vicryl & 4-0 Monocryl respectively. An AquaCel dressing was applied over the incision. Sponge and needle counts were correct at the end of the procedure. The patient was transferred to the recovery room in stable condition. NEW DEVICE INFORMATION: The pacemaker generator is a MDT Aure STEPHENSON, model #: [id number], serial #: [id number]. EXPLANTED DEVICE INFORMATION: The pacemaker generator is a MDT Marbella GARCIA, model #: Africa Bansal, serial #: [id number]. IMPLANTED LEADS:  The right atrial lead is an Abbott model #: M3207011, serial #: [id number]. The RV pacing lead is a MDT model #: W2407019, serial #: [id number]. MEASURED DATA: Right atrial P waves: 2.8 mV, Impedance: 380 ohms,  Threshold: 0.75 V at 0.4 ms. RV R waves: PACED mV, Impedance: 399 ohms, Threshold: 0.75 V at 0.4 ms. SUMMARY:  1. Successful dual chamber pacemaker generator change. 2. Pacemaker explantation. 3. TVP placement with removal.  4. Normal pacing and sensing function of the atrial & right ventricular leads. RECOMMENDATIONS:  1. Discharge to home when fully awake and alert, if clinically stable. 2. Resume all preoperative medications, including anticoagulation, if currently prescribed. 3. PO Antibiotic X 1 week. 4. F/U in the office in 7-10 days for a wound check.     Bassem Ordonez DO  [facility] Cardiac Electrophysiology  [facility]

## 2020-07-31 ENCOUNTER — TELEPHONE (OUTPATIENT)
Dept: CARDIOLOGY | Age: 70
End: 2020-07-31

## 2020-07-31 ENCOUNTER — OFFICE VISIT (OUTPATIENT)
Dept: PODIATRY | Age: 70
End: 2020-07-31
Payer: MEDICARE

## 2020-07-31 VITALS — TEMPERATURE: 97.1 F

## 2020-07-31 PROCEDURE — 99213 OFFICE O/P EST LOW 20 MIN: CPT | Performed by: PODIATRIST

## 2020-07-31 PROCEDURE — 11721 DEBRIDE NAIL 6 OR MORE: CPT | Performed by: PODIATRIST

## 2020-07-31 NOTE — PROGRESS NOTES
Patient is here today for follow up evaluation left lower extremity ulcer. He also would like to have his nails trimmed.  pcp is Allison Juarez MD last ov [phone]

## 2020-07-31 NOTE — TELEPHONE ENCOUNTER
I called Rob White to see how he's doing since his PGR on 7/23/20. There was no answer. I left a message reminding him that the the aqaucel dressing should have been removed yesterday, to leave the steri-strips intact, & to report any redness, bleeding, drainage, swelling, or fevers to Dr. Marie Muro. I also reminded him of his follow up appt at the [address] [place] [address] Drive on 8/6/20 at 9:00 am.  I left my number ([phone]) if he wishes to call me back.

## 2020-07-31 NOTE — PROGRESS NOTES
20     Pool Rodríguez    : 1950   Sex: male    Age: 65 y.o. Patient's PCP/Provider is:  German Willett MD    Subjective:  Patient is seen today for follow-up regarding continued treatment stasis ulceration left lower extremity. Patient also presents today for mycotic nail care. No other additional abnormalities noted at this time. Chief Complaint   Patient presents with    Wound Check    Nail Problem       ROS:  Const: Positives and pertinent negatives as per HPI. Musculo: Denies symptoms other than stated above. Neuro: Denies symptoms other than stated above. Skin: Denies symptoms other than stated above.     Current Medications:    Current Outpatient Medications:     furosemide (LASIX) 40 MG tablet, 20 mg , Disp: , Rfl:     potassium chloride (KLOR-CON M) 20 MEQ TBCR extended release tablet, , Disp: , Rfl:     Calcium Alginate (ALGISITE M 6\"X8\" EX), Apply topically daily, Disp: , Rfl:     Folinic Acid-Vit B6-Vit B12 (FOLINIC-PLUS) 4-50-2 MG TABS, Take 4-50 mg by mouth 2 times daily, Disp: 90 tablet, Rfl: 2    Indacaterol-Glycopyrrolate ([place] Standard) 27.5-15.6 MCG CAPS, Inhale 2 puffs into the lungs 2 times daily, Disp: 180 capsule, Rfl: 3    sodium hypochlorite (DAKINS) 0.25 % SOLN, Irrigate with 2 mLs as directed daily (Patient not taking: Reported on 2020), Disp: 473 mL, Rfl: 0    albuterol (ACCUNEB) 0.63 MG/3ML nebulizer solution, Take 3 mLs by nebulization every 6 hours as needed for Wheezing, Disp: 270 mL, Rfl: 3    vitamin C (ASCORBIC ACID) 500 MG tablet, Take 500 mg by mouth daily, Disp: , Rfl:     rosuvastatin (CRESTOR) 5 MG tablet, Take 5 mg by mouth daily , Disp: , Rfl:     glipiZIDE (GLUCOTROL XL) 5 MG extended release tablet, Take 5 mg by mouth daily, Disp: , Rfl: 3    ramipril (ALTACE) 10 MG capsule, Take 10 mg by mouth daily , Disp: , Rfl:     potassium chloride (KLOR-CON M) 20 MEQ extended release tablet, Take 20 mEq by mouth daily , Disp: , Rfl:     Cholecalciferol (VITAMIN D3) 2000 UNITS CAPS, Take by mouth daily Last dose 1-14-16, Disp: , Rfl:     carvedilol (COREG) 25 MG tablet, Take 25 mg by mouth 2 times daily (with meals) Instructed to take with sip water am of procedure, Disp: , Rfl:     Omega-3 Fatty Acids (FISH OIL) 600 MG CAPS, Take 1,000 mg by mouth daily Last Dose 1-14-16, Disp: , Rfl:     liothyronine (CYTOMEL) 50 MCG tablet, Take 100 mcg by mouth daily , Disp: , Rfl:     aspirin 81 MG EC tablet, Take 81 mg by mouth daily Last dose 1-14-16, Disp: , Rfl:     Allergies: Allergies   Allergen Reactions    Clindamycin/Lincomycin      Extreme SOB     Other Hives and Shortness Of Breath     \"allergic to Potatoe white. \"    Singulair [Montelukast Sodium]     Adaptic Non-Adhering Dressing [Wound Dressings]      [place]        Vitals:    07/31/20 1109   Temp: 97.1 °F (36.2 °C)       Exam:  Vascular status unchanged. Ulcerative area stable lateral aspect left lower extremity. Area measures approximately 2.8 cm in diameter with a depth of 0.2 cm. No undermining of the wound edges noted. No signs of infection noted left lower extremity. Edematous issues noted to both lower extremities with varicosities and stasis skin changes present bilaterally. Digital nails 1, 2, 5 bilaterally are noted to be thickened, dystrophic, discolored, with subungual debris present and tenderness noted to palpation. No maceration webspaces noted. Diminished hair growth noted to both lower extremities. Diagnostic Studies:     No results found. Procedures:    Mycotic nail debridement was performed as 1, 2, 5 bilaterally in thickness and in length to prevent infection and/or ulceration. Patient tolerated the debridement without issues. Plan Per Assessment  Daniel Gonzales was seen today for wound check and nail problem.     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 Evaluation and management  2. We did discuss continued ulcer treatment left lower extremity. 3. Mycotic nail debridement was performed on today's visit in both thickness and in length to prevent infection and/or ulceration to digital nails bilaterally. Patient was advised on elevating both lower extremities throughout the day and continued use of his Tubigrip stockings for edema control measures and ulcer healing. 4. Patient will be followed up in 2 weeks time or sooner if needed for reevaluation. Seen By:    Johnathan Prince DPM    Electronically signed by Johnathan Prince DPM on 7/31/2020 at 11:30 AM    This note was created using voice recognition software. The note was reviewed however may contain grammatical errors.

## 2020-08-06 ENCOUNTER — NURSE ONLY (OUTPATIENT)
Dept: NON INVASIVE DIAGNOSTICS | Age: 70
End: 2020-08-06
Payer: MEDICARE

## 2020-08-06 VITALS — TEMPERATURE: 97.1 F

## 2020-08-06 PROCEDURE — 93280 PM DEVICE PROGR EVAL DUAL: CPT | Performed by: INTERNAL MEDICINE

## 2020-08-12 ENCOUNTER — OFFICE VISIT (OUTPATIENT)
Dept: PODIATRY | Age: 70
End: 2020-08-12
Payer: MEDICARE

## 2020-08-12 VITALS — TEMPERATURE: 97.3 F | WEIGHT: 315 LBS | BODY MASS INDEX: 54.74 KG/M2

## 2020-08-12 PROCEDURE — 99213 OFFICE O/P EST LOW 20 MIN: CPT | Performed by: PODIATRIST

## 2020-08-13 ENCOUNTER — TELEPHONE (OUTPATIENT)
Dept: PODIATRY | Age: 70
End: 2020-08-13

## 2020-08-13 RX ORDER — DOXYCYCLINE HYCLATE 100 MG
100 TABLET ORAL 2 TIMES DAILY
Qty: 28 TABLET | Refills: 0 | Status: SHIPPED | OUTPATIENT
Start: 2020-08-13 | End: 2020-08-27

## 2020-08-13 NOTE — PROGRESS NOTES
20     Narinder Silva    : 1950   Sex: male    Age: 65 y.o. Patient's PCP/Provider is:  Belkis Antony MD    Subjective:  Patient is seen today for follow-up regarding continued care stasis ulceration left lower extremity. Patient and wife have noticed significant improvement in ulceration size since last visit. No other additional issues noted at this time. Chief Complaint   Patient presents with    Wound Check       ROS:  Const: Positives and pertinent negatives as per HPI. Musculo: Denies symptoms other than stated above. Neuro: Denies symptoms other than stated above. Skin: Denies symptoms other than stated above.     Current Medications:    Current Outpatient Medications:     furosemide (LASIX) 40 MG tablet, 20 mg , Disp: , Rfl:     potassium chloride (KLOR-CON M) 20 MEQ TBCR extended release tablet, , Disp: , Rfl:     Calcium Alginate (ALGISITE M 6\"X8\" EX), Apply topically daily, Disp: , Rfl:     Folinic Acid-Vit B6-Vit B12 (FOLINIC-PLUS) 4-50-2 MG TABS, Take 4-50 mg by mouth 2 times daily, Disp: 90 tablet, Rfl: 2    Indacaterol-Glycopyrrolate (Sherrel Maribell) 27.5-15.6 MCG CAPS, Inhale 2 puffs into the lungs 2 times daily, Disp: 180 capsule, Rfl: 3    sodium hypochlorite (DAKINS) 0.25 % SOLN, Irrigate with 2 mLs as directed daily (Patient not taking: Reported on 2020), Disp: 473 mL, Rfl: 0    albuterol (ACCUNEB) 0.63 MG/3ML nebulizer solution, Take 3 mLs by nebulization every 6 hours as needed for Wheezing, Disp: 270 mL, Rfl: 3    vitamin C (ASCORBIC ACID) 500 MG tablet, Take 500 mg by mouth daily, Disp: , Rfl:     rosuvastatin (CRESTOR) 5 MG tablet, Take 5 mg by mouth daily , Disp: , Rfl:     glipiZIDE (GLUCOTROL XL) 5 MG extended release tablet, Take 5 mg by mouth daily, Disp: , Rfl: 3    ramipril (ALTACE) 10 MG capsule, Take 10 mg by mouth daily , Disp: , Rfl:     potassium chloride (KLOR-CON M) 20 MEQ extended release tablet, Take 20 mEq by mouth daily , Disp: , Rfl:     Cholecalciferol (VITAMIN D3) 2000 UNITS CAPS, Take by mouth daily Last dose 1-14-16, Disp: , Rfl:     carvedilol (COREG) 25 MG tablet, Take 25 mg by mouth 2 times daily (with meals) Instructed to take with sip water am of procedure, Disp: , Rfl:     Omega-3 Fatty Acids (FISH OIL) 600 MG CAPS, Take 1,000 mg by mouth daily Last Dose 1-14-16, Disp: , Rfl:     liothyronine (CYTOMEL) 50 MCG tablet, Take 100 mcg by mouth daily , Disp: , Rfl:     aspirin 81 MG EC tablet, Take 81 mg by mouth daily Last dose 1-14-16, Disp: , Rfl:     Allergies: Allergies   Allergen Reactions    Clindamycin/Lincomycin      Extreme SOB     Other Hives and Shortness Of Breath     \"allergic to Potatoe white. \"    Singulair [Montelukast Sodium]     Adaptic Non-Adhering Dressing [Wound Dressings]      [place]        Vitals:    08/12/20 1018   Temp: 97.3 °F (36.3 °C)   Weight: (!) 360 lb (163.3 kg)       Exam:  Neurovascular status unchanged. Ulcerative area improved lateral aspect left leg. Area measures approximately 1.4 cm in diameter. No undermining of the wound borders noted. No signs of infection noted left lower extremity. Diagnostic Studies:     No results found. Procedures:    None    Plan Per Assessment  Serina Anthony was seen today for wound check. 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 Evaluation and management  2. We did discuss continued wound care treatment options with patient in detail today. 3. Did recommend continued use of the topical wound dressings to be changed on an every other day basis. 4. Patient will be followed up in 1 week's time or sooner if needed for reevaluation.       Seen By: Patricia Castillo DPM    Electronically signed by Patricia Castillo DPM on 8/12/2020 at 9:12 PM    This note was created using voice recognition software. The note was reviewed however may contain grammatical errors.

## 2020-08-21 ENCOUNTER — OFFICE VISIT (OUTPATIENT)
Dept: PODIATRY | Age: 70
End: 2020-08-21
Payer: MEDICARE

## 2020-08-21 VITALS — TEMPERATURE: 97 F

## 2020-08-21 PROCEDURE — 99213 OFFICE O/P EST LOW 20 MIN: CPT | Performed by: PODIATRIST

## 2020-08-21 NOTE — PROGRESS NOTES
20     Jackson Hare    : 1950   Sex: male    Age: 65 y.o. Patient's PCP/Provider is:  Bimal Tenorio MD    Subjective:  Patient is seen today for follow-up regarding continued evaluation stasis ulceration left lower extremity. Patient has noticed continual improvement with current wound dressings. He denies any nausea, vomiting, fever, chills. Chief Complaint   Patient presents with    Wound Check       ROS:  Const: Positives and pertinent negatives as per HPI. Musculo: Denies symptoms other than stated above. Neuro: Denies symptoms other than stated above. Skin: Denies symptoms other than stated above.     Current Medications:    Current Outpatient Medications:     doxycycline hyclate (VIBRA-TABS) 100 MG tablet, Take 1 tablet by mouth 2 times daily for 14 days, Disp: 28 tablet, Rfl: 0    furosemide (LASIX) 40 MG tablet, 20 mg , Disp: , Rfl:     potassium chloride (KLOR-CON M) 20 MEQ TBCR extended release tablet, , Disp: , Rfl:     Calcium Alginate (ALGISITE M 6\"X8\" EX), Apply topically daily, Disp: , Rfl:     Folinic Acid-Vit B6-Vit B12 (FOLINIC-PLUS) 4-50-2 MG TABS, Take 4-50 mg by mouth 2 times daily, Disp: 90 tablet, Rfl: 2    Indacaterol-Glycopyrrolate (UTIBRON NEOHALER) 27.5-15.6 MCG CAPS, Inhale 2 puffs into the lungs 2 times daily, Disp: 180 capsule, Rfl: 3    sodium hypochlorite (DAKINS) 0.25 % SOLN, Irrigate with 2 mLs as directed daily (Patient not taking: Reported on 2020), Disp: 473 mL, Rfl: 0    albuterol (ACCUNEB) 0.63 MG/3ML nebulizer solution, Take 3 mLs by nebulization every 6 hours as needed for Wheezing, Disp: 270 mL, Rfl: 3    vitamin C (ASCORBIC ACID) 500 MG tablet, Take 500 mg by mouth daily, Disp: , Rfl:     rosuvastatin (CRESTOR) 5 MG tablet, Take 5 mg by mouth daily , Disp: , Rfl:     glipiZIDE (GLUCOTROL XL) 5 MG extended release tablet, Take 5 mg by mouth daily, Disp: , Rfl: 3    ramipril (ALTACE) 10 MG capsule, Take 10 mg by mouth daily , Disp: , Rfl:     potassium chloride (KLOR-CON M) 20 MEQ extended release tablet, Take 20 mEq by mouth daily , Disp: , Rfl:     Cholecalciferol (VITAMIN D3) 2000 UNITS CAPS, Take by mouth daily Last dose 1-14-16, Disp: , Rfl:     carvedilol (COREG) 25 MG tablet, Take 25 mg by mouth 2 times daily (with meals) Instructed to take with sip water am of procedure, Disp: , Rfl:     Omega-3 Fatty Acids (FISH OIL) 600 MG CAPS, Take 1,000 mg by mouth daily Last Dose 1-14-16, Disp: , Rfl:     liothyronine (CYTOMEL) 50 MCG tablet, Take 100 mcg by mouth daily , Disp: , Rfl:     aspirin 81 MG EC tablet, Take 81 mg by mouth daily Last dose 1-14-16, Disp: , Rfl:     Allergies: Allergies   Allergen Reactions    Clindamycin/Lincomycin      Extreme SOB     Other Hives and Shortness Of Breath     \"allergic to Potatoe white. \"    Singulair [Montelukast Sodium]     Adaptic Non-Adhering Dressing [Wound Dressings]      [place]        Vitals:    08/21/20 1118   Temp: 97 °F (36.1 °C)       Exam:  Neurovascular status unchanged. Ulcerative area continues to improve lateral aspect left lower leg. Area measures approximately 1.4 cm in diameter with a depth of 0.2 cm. No undermining of the wound edges noted. No purulence, odor, erythema or any signs of infection noted left lower extremity. Diagnostic Studies:     No results found. Procedures:    None    Plan Per Assessment  Libertad Sims was seen today for wound check. 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 Evaluation and management  2. We did discuss continued wound care options with patient in detail today. He was advised continued use of the current wound dressings to be changed on a daily basis.   3. We did discuss

## 2020-08-28 ENCOUNTER — OFFICE VISIT (OUTPATIENT)
Dept: PODIATRY | Age: 70
End: 2020-08-28
Payer: MEDICARE

## 2020-08-28 VITALS — WEIGHT: 315 LBS | BODY MASS INDEX: 47.74 KG/M2 | TEMPERATURE: 97 F | HEIGHT: 68 IN

## 2020-08-28 PROCEDURE — 99213 OFFICE O/P EST LOW 20 MIN: CPT | Performed by: PODIATRIST

## 2020-08-28 NOTE — PROGRESS NOTES
tablet, Take 20 mEq by mouth daily , Disp: , Rfl:     Cholecalciferol (VITAMIN D3) 2000 UNITS CAPS, Take by mouth daily Last dose 1-14-16, Disp: , Rfl:     carvedilol (COREG) 25 MG tablet, Take 25 mg by mouth 2 times daily (with meals) Instructed to take with sip water am of procedure, Disp: , Rfl:     Omega-3 Fatty Acids (FISH OIL) 600 MG CAPS, Take 1,000 mg by mouth daily Last Dose 1-14-16, Disp: , Rfl:     liothyronine (CYTOMEL) 50 MCG tablet, Take 100 mcg by mouth daily , Disp: , Rfl:     aspirin 81 MG EC tablet, Take 81 mg by mouth daily Last dose 1-14-16, Disp: , Rfl:     Allergies: Allergies   Allergen Reactions    Clindamycin/Lincomycin      Extreme SOB     Other Hives and Shortness Of Breath     \"allergic to Potatoe white. \"    Singulair [Montelukast Sodium]     Adaptic Non-Adhering Dressing [Wound Dressings]      [place]        Vitals:    08/28/20 1100   Temp: 97 °F (36.1 °C)   Weight: (!) 360 lb (163.3 kg)   Height: 5' 8\" (1.727 m)       Exam:  Neurovascular status unchanged. Ulcerative area continues to improve lateral aspect left leg. Area measures approximately 1.4 x 1.6 x 0.2 cm. No undermining of the wound edges noted. No purulence, odor, erythema or any signs of infection noted the left lower extremity. Edematous changes are stable with use of compression garments. Diagnostic Studies:     No results found. Procedures:    None    Plan Per Assessment  Rene Larson was seen today for wound check. 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 Evaluation and management  2. We will continue with current wound dressings ulcerative area left lower extremity. Patient is to continue with his graduated compression garments for edema control measures daily.   3. Patient was advised to elevate both lower extremities throughout the day to prevent dependent edematous issues. 4. Patient will be followed up in 1 week's time or sooner if needed for continued evaluation and care. He was advised to call the office with any questions or concerns in the interim. Seen By:    Bijan Mack DPM    Electronically signed by Bijan Mack DPM on 8/28/2020 at 11:08 AM    This note was created using voice recognition software. The note was reviewed however may contain grammatical errors.

## 2020-09-04 ENCOUNTER — OFFICE VISIT (OUTPATIENT)
Dept: PODIATRY | Age: 70
End: 2020-09-04
Payer: MEDICARE

## 2020-09-04 VITALS — TEMPERATURE: 97 F

## 2020-09-04 PROCEDURE — 99213 OFFICE O/P EST LOW 20 MIN: CPT | Performed by: PODIATRIST

## 2020-09-04 NOTE — PROGRESS NOTES
20     Carol Bunch    : 1950   Sex: male    Age: 65 y.o. Patient's PCP/Provider is:  Carolann Rios MD    Subjective:  Patient is seen today for follow-up regarding continued treatment stasis ulceration left lower extremity. Patient has noticed continual improvement with use of the current dressing orders. No other additional abnormalities noted at this time. Chief Complaint   Patient presents with    Wound Check       ROS:  Const: Positives and pertinent negatives as per HPI. Musculo: Denies symptoms other than stated above. Neuro: Denies symptoms other than stated above. Skin: Denies symptoms other than stated above.     Current Medications:    Current Outpatient Medications:     furosemide (LASIX) 40 MG tablet, 20 mg , Disp: , Rfl:     potassium chloride (KLOR-CON M) 20 MEQ TBCR extended release tablet, , Disp: , Rfl:     Calcium Alginate (ALGISITE M 6\"X8\" EX), Apply topically daily, Disp: , Rfl:     Folinic Acid-Vit B6-Vit B12 (FOLINIC-PLUS) 4-50-2 MG TABS, Take 4-50 mg by mouth 2 times daily, Disp: 90 tablet, Rfl: 2    Indacaterol-Glycopyrrolate (Goel Tom) 27.5-15.6 MCG CAPS, Inhale 2 puffs into the lungs 2 times daily, Disp: 180 capsule, Rfl: 3    sodium hypochlorite (DAKINS) 0.25 % SOLN, Irrigate with 2 mLs as directed daily (Patient not taking: Reported on 2020), Disp: 473 mL, Rfl: 0    albuterol (ACCUNEB) 0.63 MG/3ML nebulizer solution, Take 3 mLs by nebulization every 6 hours as needed for Wheezing, Disp: 270 mL, Rfl: 3    vitamin C (ASCORBIC ACID) 500 MG tablet, Take 500 mg by mouth daily, Disp: , Rfl:     rosuvastatin (CRESTOR) 5 MG tablet, Take 5 mg by mouth daily , Disp: , Rfl:     glipiZIDE (GLUCOTROL XL) 5 MG extended release tablet, Take 5 mg by mouth daily, Disp: , Rfl: 3    ramipril (ALTACE) 10 MG capsule, Take 10 mg by mouth daily , Disp: , Rfl:     potassium chloride (KLOR-CON M) 20 MEQ extended release tablet, Take 20 mEq by mouth daily , Disp: , Rfl:     Cholecalciferol (VITAMIN D3) 2000 UNITS CAPS, Take by mouth daily Last dose 1-14-16, Disp: , Rfl:     carvedilol (COREG) 25 MG tablet, Take 25 mg by mouth 2 times daily (with meals) Instructed to take with sip water am of procedure, Disp: , Rfl:     Omega-3 Fatty Acids (FISH OIL) 600 MG CAPS, Take 1,000 mg by mouth daily Last Dose 1-14-16, Disp: , Rfl:     liothyronine (CYTOMEL) 50 MCG tablet, Take 100 mcg by mouth daily , Disp: , Rfl:     aspirin 81 MG EC tablet, Take 81 mg by mouth daily Last dose 1-14-16, Disp: , Rfl:     Allergies: Allergies   Allergen Reactions    Clindamycin/Lincomycin      Extreme SOB     Other Hives and Shortness Of Breath     \"allergic to Potatoe white. \"    Singulair [Montelukast Sodium]     Adaptic Non-Adhering Dressing [Wound Dressings]      [place]        Vitals:    09/04/20 1139   Temp: 97 °F (36.1 °C)       Exam:  Neurovascular status unchanged. Ulcerative area continues to improve distal lateral left lower extremity. Area measures approximately 1.5 cm in diameter with a depth of 0.2 cm. No undermining of the wound edges noted. No purulence, odor, erythema or any signs of infection noted left lower extremity. Diagnostic Studies:     No results found. Procedures:    None    Plan Per Assessment  Blayne Plascencia was seen today for wound check. 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 Evaluation and management  2. We did discuss continued use of the topical wound dressings to the ulcerative area to be changed daily. 3. Patient was advised continued use of his graduated compression garments on both lower extremities for edema control measures and ulcer healing.   4. Patient will be followed up in 1 week's time or sooner if needed for reevaluation. Seen By:    Yudith Hutson DPM    Electronically signed by Yudith Hutson DPM on 9/4/2020 at 11:59 AM    This note was created using voice recognition software. The note was reviewed however may contain grammatical errors.

## 2020-09-11 ENCOUNTER — OFFICE VISIT (OUTPATIENT)
Dept: PODIATRY | Age: 70
End: 2020-09-11
Payer: MEDICARE

## 2020-09-11 VITALS — BODY MASS INDEX: 47.74 KG/M2 | HEIGHT: 68 IN | TEMPERATURE: 97.1 F | WEIGHT: 315 LBS

## 2020-09-11 PROCEDURE — 99213 OFFICE O/P EST LOW 20 MIN: CPT | Performed by: PODIATRIST

## 2020-09-11 NOTE — PROGRESS NOTES
20     Diogo Waldron    : 1950   Sex: male    Age: 65 y.o. Patient's PCP/Provider is:  Jose Carlos Sam MD    Subjective:  Patient is seen today for follow-up regarding continued care stasis ulceration left lower extremity. Patient has noticed continual improvement with current wound dressings. He denies any nausea, vomiting, fever, chills. Chief Complaint   Patient presents with    Wound Check       ROS:  Const: Positives and pertinent negatives as per HPI. Musculo: Denies symptoms other than stated above. Neuro: Denies symptoms other than stated above. Skin: Denies symptoms other than stated above.     Current Medications:    Current Outpatient Medications:     furosemide (LASIX) 40 MG tablet, 20 mg , Disp: , Rfl:     potassium chloride (KLOR-CON M) 20 MEQ TBCR extended release tablet, , Disp: , Rfl:     Calcium Alginate (ALGISITE M 6\"X8\" EX), Apply topically daily, Disp: , Rfl:     Folinic Acid-Vit B6-Vit B12 (FOLINIC-PLUS) 4-50-2 MG TABS, Take 4-50 mg by mouth 2 times daily, Disp: 90 tablet, Rfl: 2    Indacaterol-Glycopyrrolate (Suszanne Clause) 27.5-15.6 MCG CAPS, Inhale 2 puffs into the lungs 2 times daily, Disp: 180 capsule, Rfl: 3    sodium hypochlorite (DAKINS) 0.25 % SOLN, Irrigate with 2 mLs as directed daily (Patient not taking: Reported on 2020), Disp: 473 mL, Rfl: 0    albuterol (ACCUNEB) 0.63 MG/3ML nebulizer solution, Take 3 mLs by nebulization every 6 hours as needed for Wheezing, Disp: 270 mL, Rfl: 3    vitamin C (ASCORBIC ACID) 500 MG tablet, Take 500 mg by mouth daily, Disp: , Rfl:     rosuvastatin (CRESTOR) 5 MG tablet, Take 5 mg by mouth daily , Disp: , Rfl:     glipiZIDE (GLUCOTROL XL) 5 MG extended release tablet, Take 5 mg by mouth daily, Disp: , Rfl: 3    ramipril (ALTACE) 10 MG capsule, Take 10 mg by mouth daily , Disp: , Rfl:     potassium chloride (KLOR-CON M) 20 MEQ extended release tablet, Take 20 mEq by mouth daily , Disp: , Rfl:   Cholecalciferol (VITAMIN D3) 2000 UNITS CAPS, Take by mouth daily Last dose 1-14-16, Disp: , Rfl:     carvedilol (COREG) 25 MG tablet, Take 25 mg by mouth 2 times daily (with meals) Instructed to take with sip water am of procedure, Disp: , Rfl:     Omega-3 Fatty Acids (FISH OIL) 600 MG CAPS, Take 1,000 mg by mouth daily Last Dose 1-14-16, Disp: , Rfl:     liothyronine (CYTOMEL) 50 MCG tablet, Take 100 mcg by mouth daily , Disp: , Rfl:     aspirin 81 MG EC tablet, Take 81 mg by mouth daily Last dose 1-14-16, Disp: , Rfl:     Allergies: Allergies   Allergen Reactions    Clindamycin/Lincomycin      Extreme SOB     Other Hives and Shortness Of Breath     \"allergic to Potatoe white. \"    Singulair [Montelukast Sodium]     Adaptic Non-Adhering Dressing [Wound Dressings]      [place]        Vitals:    09/11/20 1126   Temp: 97.1 °F (36.2 °C)   Weight: (!) 360 lb (163.3 kg)   Height: 5' 8\" (1.727 m)       Exam:  Neurovascular status unchanged. Ulcerative area improved distal lateral aspect left lower leg. Area measures approximately 1.2 cm in diameter with a depth of 0.2 cm. No undermining of the wound edges noted. No purulence, odor, erythema or any signs of infection noted left lower extremity. Edematous issues are stable left lower extremity. Diagnostic Studies:     No results found. Procedures:    None    Plan Per Assessment  Symone Brumfield was seen today for wound check. 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 Evaluation and management  2. We did discuss continued use of the topical wound dressing ulcerative area left lower extremity to be changed on a daily basis. 3. Patient is to continue with the graduated Tubigrip stocking compression left lower extremity.   4. Patient will be followed up in 1 week's time or sooner if

## 2020-09-18 ENCOUNTER — OFFICE VISIT (OUTPATIENT)
Dept: PODIATRY | Age: 70
End: 2020-09-18
Payer: MEDICARE

## 2020-09-18 VITALS
WEIGHT: 315 LBS | HEIGHT: 68 IN | SYSTOLIC BLOOD PRESSURE: 122 MMHG | DIASTOLIC BLOOD PRESSURE: 60 MMHG | BODY MASS INDEX: 47.74 KG/M2 | TEMPERATURE: 97.5 F

## 2020-09-18 PROCEDURE — 99213 OFFICE O/P EST LOW 20 MIN: CPT | Performed by: PODIATRIST

## 2020-09-19 NOTE — PROGRESS NOTES
Patient is here today for follow up evaluation of left ulcer.
UNITS CAPS, Take by mouth daily Last dose 1-14-16, Disp: , Rfl:     carvedilol (COREG) 25 MG tablet, Take 25 mg by mouth 2 times daily (with meals) Instructed to take with sip water am of procedure, Disp: , Rfl:     Omega-3 Fatty Acids (FISH OIL) 600 MG CAPS, Take 1,000 mg by mouth daily Last Dose 1-14-16, Disp: , Rfl:     liothyronine (CYTOMEL) 50 MCG tablet, Take 100 mcg by mouth daily , Disp: , Rfl:     aspirin 81 MG EC tablet, Take 81 mg by mouth daily Last dose 1-14-16, Disp: , Rfl:     Allergies: Allergies   Allergen Reactions    Clindamycin/Lincomycin      Extreme SOB     Other Hives and Shortness Of Breath     \"allergic to Potatoe white. \"    Singulair [Montelukast Sodium]     Adaptic Non-Adhering Dressing [Wound Dressings]      [place]        Vitals:    09/18/20 1112   BP: 122/60   Temp: 97.5 °F (36.4 °C)   Weight: (!) 360 lb (163.3 kg)   Height: 5' 8\" (1.727 m)       Exam:  Neurovascular status unchanged. Ulcerative area continues to improve lateral aspect left lower extremity. Area measures approximately 1.2 x 1.0 x 0.2 cm. No undermining of the wound edges noted. No signs of infection noted left lower extremity. Diagnostic Studies:     No results found. Procedures:    None    Plan Per Assessment  Radha Gay was seen today for wound check. 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 Evaluation and management  2. We did discuss continued treatment options regarding ulceration left lower extremity. We will maintain current dressing orders and compression. 3. Patient was advised to elevate the left lower extremity throughout the day to allow for continued improvement. 4. Patient will be followed up in 1 week's time or sooner if needed for reevaluation.       Seen By:    Pawel Bourgeois DPM    Electronically signed

## 2020-09-25 ENCOUNTER — OFFICE VISIT (OUTPATIENT)
Dept: PODIATRY | Age: 70
End: 2020-09-25
Payer: MEDICARE

## 2020-09-25 PROCEDURE — 99213 OFFICE O/P EST LOW 20 MIN: CPT | Performed by: PODIATRIST

## 2020-09-25 NOTE — PROGRESS NOTES
Rfl:     Cholecalciferol (VITAMIN D3) 2000 UNITS CAPS, Take by mouth daily Last dose 1-14-16, Disp: , Rfl:     carvedilol (COREG) 25 MG tablet, Take 25 mg by mouth 2 times daily (with meals) Instructed to take with sip water am of procedure, Disp: , Rfl:     Omega-3 Fatty Acids (FISH OIL) 600 MG CAPS, Take 1,000 mg by mouth daily Last Dose 1-14-16, Disp: , Rfl:     liothyronine (CYTOMEL) 50 MCG tablet, Take 100 mcg by mouth daily , Disp: , Rfl:     aspirin 81 MG EC tablet, Take 81 mg by mouth daily Last dose 1-14-16, Disp: , Rfl:     Allergies: Allergies   Allergen Reactions    Clindamycin/Lincomycin      Extreme SOB     Other Hives and Shortness Of Breath     \"allergic to Potatoe white. \"    Singulair [Montelukast Sodium]     Adaptic Non-Adhering Dressing [Wound Dressings]      [place]        There were no vitals filed for this visit. Exam:  Neurovascular status unchanged. Ulcerative area improved lateral aspect left lower leg. Area measures approximately 0.8 cm in diameter with a depth of 0.2 cm. No signs of infection noted left lower extremity. Edematous changes are stable with current compression therapy. Diagnostic Studies:     No results found. Procedures:    None    Plan Per Assessment  Yanick Posey was seen today for wound check. 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 Evaluation and management  2. Patient was advised to continue with current care and management at this time. He was advised to continue with his current wound care dressings and compression. 3. Patient will be followed up in 1 week's time for continued wound evaluation and management. He was advised to call the office with any questions or concerns in the interim.     Seen By:    Walter Chamorro YOLY    Electronically signed by Alber Albert DPM on 9/25/2020 at 11:28 AM    This note was created using voice recognition software. The note was reviewed however may contain grammatical errors.

## 2020-10-12 ENCOUNTER — PROCEDURE VISIT (OUTPATIENT)
Dept: PODIATRY | Age: 70
End: 2020-10-12
Payer: MEDICARE

## 2020-10-12 VITALS — BODY MASS INDEX: 47.74 KG/M2 | WEIGHT: 315 LBS | HEIGHT: 68 IN

## 2020-10-12 PROCEDURE — 11721 DEBRIDE NAIL 6 OR MORE: CPT | Performed by: PODIATRIST

## 2020-10-12 PROCEDURE — 99213 OFFICE O/P EST LOW 20 MIN: CPT | Performed by: PODIATRIST

## 2020-10-12 NOTE — PROGRESS NOTES
10/12/20     [facility]    : 1950  Sex: male  Age: 65 y.o. Subjective: The patient is seen today for evaluation regarding diabetic foot evaluation, mycotic nail care, and ulcer care left lower leg. No other complaints noted.     Chief Complaint   Patient presents with    Wound Check       Current Medications:    Current Outpatient Medications:     furosemide (LASIX) 40 MG tablet, 20 mg , Disp: , Rfl:     potassium chloride (KLOR-CON M) 20 MEQ TBCR extended release tablet, , Disp: , Rfl:     Calcium Alginate (ALGISITE M 6\"X8\" EX), Apply topically daily, Disp: , Rfl:     Folinic Acid-Vit B6-Vit B12 (FOLINIC-PLUS) 4-50-2 MG TABS, Take 4-50 mg by mouth 2 times daily, Disp: 90 tablet, Rfl: 2    Indacaterol-Glycopyrrolate (UTIBRON NEOHALER) 27.5-15.6 MCG CAPS, Inhale 2 puffs into the lungs 2 times daily, Disp: 180 capsule, Rfl: 3    sodium hypochlorite (DAKINS) 0.25 % SOLN, Irrigate with 2 mLs as directed daily (Patient not taking: Reported on 2020), Disp: 473 mL, Rfl: 0    albuterol (ACCUNEB) 0.63 MG/3ML nebulizer solution, Take 3 mLs by nebulization every 6 hours as needed for Wheezing, Disp: 270 mL, Rfl: 3    vitamin C (ASCORBIC ACID) 500 MG tablet, Take 500 mg by mouth daily, Disp: , Rfl:     rosuvastatin (CRESTOR) 5 MG tablet, Take 5 mg by mouth daily , Disp: , Rfl:     glipiZIDE (GLUCOTROL XL) 5 MG extended release tablet, Take 5 mg by mouth daily, Disp: , Rfl: 3    ramipril (ALTACE) 10 MG capsule, Take 10 mg by mouth daily , Disp: , Rfl:     potassium chloride (KLOR-CON M) 20 MEQ extended release tablet, Take 20 mEq by mouth daily , Disp: , Rfl:     Cholecalciferol (VITAMIN D3) 2000 UNITS CAPS, Take by mouth daily Last dose 16, Disp: , Rfl:     carvedilol (COREG) 25 MG tablet, Take 25 mg by mouth 2 times daily (with meals) Instructed to take with sip water am of procedure, Disp: , Rfl:     Omega-3 Fatty Acids (FISH OIL) 600 MG CAPS, Take 1,000 mg by mouth daily Last Dose 1-14-16, Disp: , Rfl:     liothyronine (CYTOMEL) 50 MCG tablet, Take 100 mcg by mouth daily , Disp: , Rfl:     aspirin 81 MG EC tablet, Take 81 mg by mouth daily Last dose 1-14-16, Disp: , Rfl:     Allergies: Allergies   Allergen Reactions    Clindamycin/Lincomycin      Extreme SOB     Other Hives and Shortness Of Breath     \"allergic to Potatoe white. \"    Singulair [Montelukast Sodium]     Adaptic Non-Adhering Dressing [Wound Dressings]      [place]        Past Surgical History:   Procedure Laterality Date    CARDIAC PACEMAKER PLACEMENT  04/09/10    [employer]    CHOLECYSTECTOMY      COLONOSCOPY  1/20/2016    JOINT REPLACEMENT Bilateral     Left TJAK    PACEMAKER PLACEMENT  07/23/2020    DUAL PPM GR    ([employer])     DR. MARTIN     TRANSESOPHAGEAL ECHOCARDIOGRAM  06/19/2020    With      Past Medical History:   Diagnosis Date    CAD (coronary artery disease)     Cataracts, bilateral 2019    Cellulitis 1-2016    Left leg    Complete heart block (HCC)     h/o    Diabetes mellitus ([address]     Encounter for aspirin therapy     patient uses for heart health    H/O asbestosis     History of blood transfusion     Hx of blood clots     Hyperlipidemia     Hypertension     Hyperthyroidism     Lung disorder     weak lungs    Myocardial infarct ([address] 2010    Neuropathy     feet    Obesity     Obstructive sleep apnea     on cpap    Onychomycosis     Osteoarthritis     Pacemaker     [employer]    Post-thrombotic syndrome 9/11/2017    Screening 1-20-16    for Colonoscopy    Stasis dermatitis     h/o on bilateral legs with leg edema    Varicose veins        Vitals:    10/12/20 1439   Weight: (!) 360 lb (163.3 kg)   Height: 5' 8\" (1.727 m)       Exam:  Neurovascular status unchanged. At this time the nail/s 1, 2, 5 right foot and nail/s 1, 2, 5 left foot are noted to be thickened, dystrophic and discolored with subungual debris present. Tenderness noted to palpation.   Diminished hair growth is noted to both lower extremities. Edema noted with both varicosities and stasis skin changes present bilaterally. Ulceration stable lateral aspect left lower extremity without undermining of the wound edges, or signs of infection noted. Coolness is noted to the digital regions to palpation. Capillary fill time delayed digital areas bilateral foot. No heel fissuring or macerations of the web spaces. No plantar calluses and/or ulcerative areas are noted. Patient is having difficulty with gait/walking. Plan Per Assessment  Srinath Elliott was seen today for wound check. 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 Evaluation and Management  2. Manual and electrical debridement of the mycotic nails was performed for thickness and length to prevent injection and/or ulceration. 3. I recommended antifungal cream to the nails daily. We did discuss continued use of the current wound dressings to the ulcerative area at left lower extremity. We will continue with Tubigrip stockings for edema control measures left lower extremity. 4. It was discussed in detail with the patient proper caring for the vascular compromised foot. The fact that they have compromised blood flow put the patient at risk for infection/gangrene/amputation. The patient should not walk barefoot. Shoe gear should fit properly and socks should be worn with shoes. Exercise is very important to prevent worsening of the disease process but before performing an exercise program should check with their family physician first.  If any skin lesions are noted, they are instructed to contact the office immediately.     5. We will see the patient back at a later date for continued podiatric management and care. Patient was advised to call the office with any questions or concerns prior to their next appointment if needed. Seen By:    Mesfin Mckinney DPM    Electronically signed by Mesfin Mckinney DPM on 10/12/2020 at 4:40 PM      This note was created using voice recognition software. The note was reviewed however may contain grammatical errors.

## 2020-10-12 NOTE — PROGRESS NOTES
Patient is here today for evaluation of left lower calf ulcer. He states his wife informed him that a few healed spots are opened up again.

## 2020-10-13 ENCOUNTER — TELEPHONE (OUTPATIENT)
Dept: PRIMARY CARE CLINIC | Age: 70
End: 2020-10-13

## 2020-10-23 ENCOUNTER — OFFICE VISIT (OUTPATIENT)
Dept: PODIATRY | Age: 70
End: 2020-10-23
Payer: MEDICARE

## 2020-10-23 VITALS — TEMPERATURE: 98 F | HEIGHT: 68 IN | WEIGHT: 315 LBS | BODY MASS INDEX: 47.74 KG/M2

## 2020-10-23 PROCEDURE — 29580 STRAPPING UNNA BOOT: CPT | Performed by: PODIATRIST

## 2020-10-23 PROCEDURE — 99213 OFFICE O/P EST LOW 20 MIN: CPT | Performed by: PODIATRIST

## 2020-10-23 RX ORDER — DOXYCYCLINE HYCLATE 100 MG
100 TABLET ORAL 2 TIMES DAILY
Qty: 28 TABLET | Refills: 0 | Status: SHIPPED
Start: 2020-10-23 | End: 2020-11-04

## 2020-10-23 NOTE — PROGRESS NOTES
MG extended release tablet, Take 5 mg by mouth daily, Disp: , Rfl: 3    ramipril (ALTACE) 10 MG capsule, Take 10 mg by mouth daily , Disp: , Rfl:     potassium chloride (KLOR-CON M) 20 MEQ extended release tablet, Take 20 mEq by mouth daily , Disp: , Rfl:     Cholecalciferol (VITAMIN D3) 2000 UNITS CAPS, Take by mouth daily Last dose 1-14-16, Disp: , Rfl:     carvedilol (COREG) 25 MG tablet, Take 25 mg by mouth 2 times daily (with meals) Instructed to take with sip water am of procedure, Disp: , Rfl:     Omega-3 Fatty Acids (FISH OIL) 600 MG CAPS, Take 1,000 mg by mouth daily Last Dose 1-14-16, Disp: , Rfl:     liothyronine (CYTOMEL) 50 MCG tablet, Take 100 mcg by mouth daily , Disp: , Rfl:     aspirin 81 MG EC tablet, Take 81 mg by mouth daily Last dose 1-14-16, Disp: , Rfl:     Allergies: Allergies   Allergen Reactions    Clindamycin/Lincomycin      Extreme SOB     Other Hives and Shortness Of Breath     \"allergic to Potatoe white. \"    Singulair [Montelukast Sodium]     Adaptic Non-Adhering Dressing [Wound Dressings]      [place]        Vitals:    10/23/20 1118   Temp: 98 °F (36.7 °C)   Weight: (!) 360 lb (163.3 kg)   Height: 5' 8\" (1.727 m)       Exam:  Neurovascular status unchanged. Ulcerative areas stable lateral left lower extremity. Mild erythema noted in the wound periphery. No purulence or any ascending cellulitic issues noted left lower extremity. Increased edematous issues noted left lower extremity. Diagnostic Studies:     No results found. Procedures: An unna boot  compressive wrap was applied to the left lower extremity. It's purpose is to  decrease  the amount of edema present, and to allow proper healing of the soft tissues. The patient was instructed to keep the unna boot clean, dry and intact until the next follow up visit. The patient was instructed to look for signs of redness, irritation, blistering and pain.   If these or any other symptoms were to develop, they were advised to contact the office immediately for reevaluation. Plan Per Assessment  Marissa Ax was seen today for wound check. 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 Evaluation and management  2. Compression dressing was applied to the symptomatic left lower extremity as described above. We did recommend elevating both lower extremities throughout the day to reduce dependent edematous issues and allow for ulcer healing. Prescription medication Doxycycline given with exact instructions on usage. I discussed the potential side effects that the patient may experience with the medication and the need to call if they experience any. Patient will be followed up in 1 week's time or sooner if needed for reevaluation. He was advised to call the office with any questions or concerns in the interim. Seen By:    Theresa Douglas DPM    Electronically signed by Theresa Douglas DPM on 10/23/2020 at 11:48 AM    This note was created using voice recognition software. The note was reviewed however may contain grammatical errors.

## 2020-10-28 ENCOUNTER — OFFICE VISIT (OUTPATIENT)
Dept: PODIATRY | Age: 70
End: 2020-10-28
Payer: MEDICARE

## 2020-10-28 VITALS — TEMPERATURE: 97.7 F

## 2020-10-28 PROCEDURE — 29580 STRAPPING UNNA BOOT: CPT | Performed by: PODIATRIST

## 2020-10-28 PROCEDURE — 99213 OFFICE O/P EST LOW 20 MIN: CPT | Performed by: PODIATRIST

## 2020-10-28 NOTE — PROGRESS NOTES
Patient is here today for follow up evaluation left lower extremity ulcer. He removed the unna boot this morning to shower. He does not have a dressing on the wound currently.

## 2020-10-28 NOTE — PROGRESS NOTES
10/28/20     Quynh Romo    : 1950   Sex: male    Age: 65 y.o. Patient's PCP/Provider is:  Jignesh Vo MD    Subjective:  Patient is seen today for follow-up regarding continued evaluation stasis ulceration left lower extremity. Overall patient is doing well at this time without any additional issues noted. No other abnormalities noted at this time. Chief Complaint   Patient presents with    Wound Check       ROS:  Const: Positives and pertinent negatives as per HPI. Musculo: Denies symptoms other than stated above. Neuro: Denies symptoms other than stated above. Skin: Denies symptoms other than stated above.     Current Medications:    Current Outpatient Medications:     doxycycline hyclate (VIBRA-TABS) 100 MG tablet, Take 1 tablet by mouth 2 times daily for 14 days, Disp: 28 tablet, Rfl: 0    furosemide (LASIX) 40 MG tablet, 20 mg , Disp: , Rfl:     potassium chloride (KLOR-CON M) 20 MEQ TBCR extended release tablet, , Disp: , Rfl:     Calcium Alginate (ALGISITE M 6\"X8\" EX), Apply topically daily, Disp: , Rfl:     Folinic Acid-Vit B6-Vit B12 (FOLINIC-PLUS) 4-50-2 MG TABS, Take 4-50 mg by mouth 2 times daily, Disp: 90 tablet, Rfl: 2    Indacaterol-Glycopyrrolate (UTIBRON NEOHALER) 27.5-15.6 MCG CAPS, Inhale 2 puffs into the lungs 2 times daily, Disp: 180 capsule, Rfl: 3    sodium hypochlorite (DAKINS) 0.25 % SOLN, Irrigate with 2 mLs as directed daily (Patient not taking: Reported on 2020), Disp: 473 mL, Rfl: 0    albuterol (ACCUNEB) 0.63 MG/3ML nebulizer solution, Take 3 mLs by nebulization every 6 hours as needed for Wheezing, Disp: 270 mL, Rfl: 3    vitamin C (ASCORBIC ACID) 500 MG tablet, Take 500 mg by mouth daily, Disp: , Rfl:     rosuvastatin (CRESTOR) 5 MG tablet, Take 5 mg by mouth daily , Disp: , Rfl:     glipiZIDE (GLUCOTROL XL) 5 MG extended release tablet, Take 5 mg by mouth daily, Disp: , Rfl: 3    ramipril (ALTACE) 10 MG capsule, Take 10 mg by mouth daily , Disp: , Rfl:     potassium chloride (KLOR-CON M) 20 MEQ extended release tablet, Take 20 mEq by mouth daily , Disp: , Rfl:     Cholecalciferol (VITAMIN D3) 2000 UNITS CAPS, Take by mouth daily Last dose 1-14-16, Disp: , Rfl:     carvedilol (COREG) 25 MG tablet, Take 25 mg by mouth 2 times daily (with meals) Instructed to take with sip water am of procedure, Disp: , Rfl:     Omega-3 Fatty Acids (FISH OIL) 600 MG CAPS, Take 1,000 mg by mouth daily Last Dose 1-14-16, Disp: , Rfl:     liothyronine (CYTOMEL) 50 MCG tablet, Take 100 mcg by mouth daily , Disp: , Rfl:     aspirin 81 MG EC tablet, Take 81 mg by mouth daily Last dose 1-14-16, Disp: , Rfl:     Allergies: Allergies   Allergen Reactions    Clindamycin/Lincomycin      Extreme SOB     Other Hives and Shortness Of Breath     \"allergic to Potatoe white. \"    Singulair [Montelukast Sodium]     Adaptic Non-Adhering Dressing [Wound Dressings]      [place]        Vitals:    10/28/20 1023   Temp: 97.7 °F (36.5 °C)       Exam:  Neurovascular status unchanged. Ulcerative areas are improved lateral aspect left lower extremity. No signs of infection noted. Edematous issues are improved with current dressings. Diagnostic Studies:     No results found. Procedures:    None    Plan Per Assessment  Denise Larson was seen today for wound check. 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 Evaluation and management  2. We did discuss applying an additional Unna boot dressing to the symptomatic ulcerative area left lower extremity. 3. Patient was advised to elevate the left lower extremity throughout the day to allow for continued ulcer improvement.   4. Patient will be followed up at a later date for continued evaluation and management. Seen By:    Jose Le DPM    Electronically signed by Jose Le DPM on 10/28/2020 at 10:37 AM    This note was created using voice recognition software. The note was reviewed however may contain grammatical errors.

## 2020-11-02 ENCOUNTER — NURSE ONLY (OUTPATIENT)
Dept: NON INVASIVE DIAGNOSTICS | Age: 70
End: 2020-11-02
Payer: MEDICARE

## 2020-11-02 PROCEDURE — 93294 REM INTERROG EVL PM/LDLS PM: CPT | Performed by: INTERNAL MEDICINE

## 2020-11-02 PROCEDURE — 93296 REM INTERROG EVL PM/IDS: CPT | Performed by: INTERNAL MEDICINE

## 2020-11-03 PROBLEM — I73.9 PERIPHERAL VASCULAR DISEASE (HCC): Status: RESOLVED | Noted: 2019-05-17 | Resolved: 2020-11-03

## 2020-11-04 ENCOUNTER — OFFICE VISIT (OUTPATIENT)
Dept: PODIATRY | Age: 70
End: 2020-11-04
Payer: MEDICARE

## 2020-11-04 PROCEDURE — 99999 PR OFFICE/OUTPT VISIT,PROCEDURE ONLY: CPT | Performed by: PODIATRIST

## 2020-11-04 PROCEDURE — 29580 STRAPPING UNNA BOOT: CPT | Performed by: PODIATRIST

## 2020-11-04 NOTE — PROGRESS NOTES
20     Marzena Yeung    : 1950   Sex: male    Age: 65 y.o. Patient's PCP/Provider is:  Gerald Shine MD    Subjective:  Patient is seen today for follow-up regarding continued treatment stasis ulceration left lower extremity. Overall patient is doing well at this time without any additional issues noted. He was able to keep the compression dressing on for several days prior to removing. He denies any nausea, vomiting, fever, chills. Chief Complaint   Patient presents with    Wound Check       ROS:  Const: Positives and pertinent negatives as per HPI. Musculo: Denies symptoms other than stated above. Neuro: Denies symptoms other than stated above. Skin: Denies symptoms other than stated above.     Current Medications:    Current Outpatient Medications:     furosemide (LASIX) 40 MG tablet, 20 mg , Disp: , Rfl:     potassium chloride (KLOR-CON M) 20 MEQ TBCR extended release tablet, , Disp: , Rfl:     Calcium Alginate (ALGISITE M 6\"X8\" EX), Apply topically daily, Disp: , Rfl:     Folinic Acid-Vit B6-Vit B12 (FOLINIC-PLUS) 4-50-2 MG TABS, Take 4-50 mg by mouth 2 times daily, Disp: 90 tablet, Rfl: 2    Indacaterol-Glycopyrrolate (UTIBRON NEOHALER) 27.5-15.6 MCG CAPS, Inhale 2 puffs into the lungs 2 times daily, Disp: 180 capsule, Rfl: 3    sodium hypochlorite (DAKINS) 0.25 % SOLN, Irrigate with 2 mLs as directed daily (Patient not taking: Reported on 2020), Disp: 473 mL, Rfl: 0    albuterol (ACCUNEB) 0.63 MG/3ML nebulizer solution, Take 3 mLs by nebulization every 6 hours as needed for Wheezing, Disp: 270 mL, Rfl: 3    vitamin C (ASCORBIC ACID) 500 MG tablet, Take 500 mg by mouth daily, Disp: , Rfl:     rosuvastatin (CRESTOR) 5 MG tablet, Take 5 mg by mouth daily , Disp: , Rfl:     glipiZIDE (GLUCOTROL XL) 5 MG extended release tablet, Take 5 mg by mouth daily, Disp: , Rfl: 3    ramipril (ALTACE) 10 MG capsule, Take 10 mg by mouth daily , Disp: , Rfl:     potassium extremity with ulcer of calf with fat layer exposed ([address]    Non-pressure chronic ulcer of left lower leg with fat layer exposed ([address]    Venous insufficiency (chronic) (peripheral)      1. Evaluation and management  2. Unna boot compression dressing was applied to the symptomatic left lower extremity as described above. Patient tolerated the dressing without issues. 3. Patient was advised to continue elevation left lower extremity throughout the day to allow for appropriate healing. 4. Will be followed up at a later date for continued wound evaluation and management. Seen By:    Amara Cintron DPM    Electronically signed by Amara Cintron DPM on 11/4/2020 at 12:08 PM    This note was created using voice recognition software. The note was reviewed however may contain grammatical errors.

## 2020-11-11 ENCOUNTER — OFFICE VISIT (OUTPATIENT)
Dept: PODIATRY | Age: 70
End: 2020-11-11
Payer: MEDICARE

## 2020-11-11 VITALS — TEMPERATURE: 97.8 F

## 2020-11-11 PROCEDURE — 99213 OFFICE O/P EST LOW 20 MIN: CPT | Performed by: PODIATRIST

## 2020-11-11 NOTE — PROGRESS NOTES
See [employer] report. Remote monitoring reviewed over a 90 day period. End of 90 day monitoring period date of service 11. 1.2020.

## 2020-11-12 NOTE — PROGRESS NOTES
20     Declan Marie    : 1950   Sex: male    Age: 65 y.o. Patient's PCP/Provider is:  Marlen Williamson MD    Subjective:  Patient is seen today for follow-up regarding continued care stasis ulcerations left lower extremity. Patient is tolerating current dressings without issues. No other additional abnormalities noted at this time. Chief Complaint   Patient presents with    Wound Check       ROS:  Const: Positives and pertinent negatives as per HPI. Musculo: Denies symptoms other than stated above. Neuro: Denies symptoms other than stated above. Skin: Denies symptoms other than stated above.     Current Medications:    Current Outpatient Medications:     furosemide (LASIX) 40 MG tablet, 20 mg , Disp: , Rfl:     potassium chloride (KLOR-CON M) 20 MEQ TBCR extended release tablet, , Disp: , Rfl:     Calcium Alginate (ALGISITE M 6\"X8\" EX), Apply topically daily, Disp: , Rfl:     Folinic Acid-Vit B6-Vit B12 (FOLINIC-PLUS) 4-50-2 MG TABS, Take 4-50 mg by mouth 2 times daily, Disp: 90 tablet, Rfl: 2    Indacaterol-Glycopyrrolate (Ruddy Crock) 27.5-15.6 MCG CAPS, Inhale 2 puffs into the lungs 2 times daily, Disp: 180 capsule, Rfl: 3    sodium hypochlorite (DAKINS) 0.25 % SOLN, Irrigate with 2 mLs as directed daily (Patient not taking: Reported on 2020), Disp: 473 mL, Rfl: 0    albuterol (ACCUNEB) 0.63 MG/3ML nebulizer solution, Take 3 mLs by nebulization every 6 hours as needed for Wheezing, Disp: 270 mL, Rfl: 3    vitamin C (ASCORBIC ACID) 500 MG tablet, Take 500 mg by mouth daily, Disp: , Rfl:     rosuvastatin (CRESTOR) 5 MG tablet, Take 5 mg by mouth daily , Disp: , Rfl:     glipiZIDE (GLUCOTROL XL) 5 MG extended release tablet, Take 5 mg by mouth daily, Disp: , Rfl: 3    ramipril (ALTACE) 10 MG capsule, Take 10 mg by mouth daily , Disp: , Rfl:     potassium chloride (KLOR-CON M) 20 MEQ extended release tablet, Take 20 mEq by mouth daily , Disp: , Rfl:    Cholecalciferol (VITAMIN D3) 2000 UNITS CAPS, Take by mouth daily Last dose 1-14-16, Disp: , Rfl:     carvedilol (COREG) 25 MG tablet, Take 25 mg by mouth 2 times daily (with meals) Instructed to take with sip water am of procedure, Disp: , Rfl:     Omega-3 Fatty Acids (FISH OIL) 600 MG CAPS, Take 1,000 mg by mouth daily Last Dose 1-14-16, Disp: , Rfl:     liothyronine (CYTOMEL) 50 MCG tablet, Take 100 mcg by mouth daily , Disp: , Rfl:     aspirin 81 MG EC tablet, Take 81 mg by mouth daily Last dose 1-14-16, Disp: , Rfl:     Allergies: Allergies   Allergen Reactions    Clindamycin/Lincomycin      Extreme SOB     Other Hives and Shortness Of Breath     \"allergic to Potatoe white. \"    Doxycycline Hyclate Other (See Comments)     tinnitus    Singulair [Montelukast Sodium]     Adaptic Non-Adhering Dressing [Wound Dressings]      [place]        Vitals:    11/11/20 1041   Temp: 97.8 °F (36.6 °C)       Exam:  Vascular status unchanged. Ulcerative areas improved lateral aspect left lower extremity. Edematous issues stable left lower extremity. No signs of infection noted left lower leg. Diagnostic Studies:     No results found. Procedures:    None    Plan Per Assessment  Shefali Noland was seen today for wound check. 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 Evaluation and management  2. We did discuss continued use of the alginate dressing to the wound site to be changed on an every other day basis. 3. He is to continue with his graduated compression garments for edema control measures. 4. She will be followed up in 1 week's time or sooner if needed for continued wound evaluation and management.       Seen By:    Reji Evans DPM    Electronically signed by Reji Evans YOLY on 11/11/2020 at 9:22 PM    This note was created using voice recognition software. The note was reviewed however may contain grammatical errors.

## 2020-11-20 ENCOUNTER — OFFICE VISIT (OUTPATIENT)
Dept: PODIATRY | Age: 70
End: 2020-11-20
Payer: MEDICARE

## 2020-11-20 PROCEDURE — 99213 OFFICE O/P EST LOW 20 MIN: CPT | Performed by: PODIATRIST

## 2020-11-20 NOTE — PROGRESS NOTES
20     Jaycee Patricia    : 1950   Sex: male    Age: 65 y.o. Patient's PCP/Provider is:  Ashleigh Barragan MD    Subjective:  Patient is seen today for follow-up regarding continued care stasis ulcerations left lower extremity. Overall patient is stable at this time with noted improvement with current dressings. He denies any nausea, vomiting, fever, chills. Patient has been trying to elevate both lower extremities throughout the day as recommended. Chief Complaint   Patient presents with    Wound Check       ROS:  Const: Positives and pertinent negatives as per HPI. Musculo: Denies symptoms other than stated above. Neuro: Denies symptoms other than stated above. Skin: Denies symptoms other than stated above.     Current Medications:    Current Outpatient Medications:     furosemide (LASIX) 40 MG tablet, 20 mg , Disp: , Rfl:     potassium chloride (KLOR-CON M) 20 MEQ TBCR extended release tablet, , Disp: , Rfl:     Calcium Alginate (ALGISITE M 6\"X8\" EX), Apply topically daily, Disp: , Rfl:     Folinic Acid-Vit B6-Vit B12 (FOLINIC-PLUS) 4-50-2 MG TABS, Take 4-50 mg by mouth 2 times daily, Disp: 90 tablet, Rfl: 2    Indacaterol-Glycopyrrolate (Maribel Ovens) 27.5-15.6 MCG CAPS, Inhale 2 puffs into the lungs 2 times daily, Disp: 180 capsule, Rfl: 3    sodium hypochlorite (DAKINS) 0.25 % SOLN, Irrigate with 2 mLs as directed daily (Patient not taking: Reported on 2020), Disp: 473 mL, Rfl: 0    albuterol (ACCUNEB) 0.63 MG/3ML nebulizer solution, Take 3 mLs by nebulization every 6 hours as needed for Wheezing, Disp: 270 mL, Rfl: 3    vitamin C (ASCORBIC ACID) 500 MG tablet, Take 500 mg by mouth daily, Disp: , Rfl:     rosuvastatin (CRESTOR) 5 MG tablet, Take 5 mg by mouth daily , Disp: , Rfl:     glipiZIDE (GLUCOTROL XL) 5 MG extended release tablet, Take 5 mg by mouth daily, Disp: , Rfl: 3    ramipril (ALTACE) 10 MG capsule, Take 10 mg by mouth daily , Disp: , Rfl:    potassium chloride (KLOR-CON M) 20 MEQ extended release tablet, Take 20 mEq by mouth daily , Disp: , Rfl:     Cholecalciferol (VITAMIN D3) 2000 UNITS CAPS, Take by mouth daily Last dose 1-14-16, Disp: , Rfl:     carvedilol (COREG) 25 MG tablet, Take 25 mg by mouth 2 times daily (with meals) Instructed to take with sip water am of procedure, Disp: , Rfl:     Omega-3 Fatty Acids (FISH OIL) 600 MG CAPS, Take 1,000 mg by mouth daily Last Dose 1-14-16, Disp: , Rfl:     liothyronine (CYTOMEL) 50 MCG tablet, Take 100 mcg by mouth daily , Disp: , Rfl:     aspirin 81 MG EC tablet, Take 81 mg by mouth daily Last dose 1-14-16, Disp: , Rfl:     Allergies: Allergies   Allergen Reactions    Clindamycin/Lincomycin      Extreme SOB     Other Hives and Shortness Of Breath     \"allergic to Potatoe white. \"    Doxycycline Hyclate Other (See Comments)     tinnitus    Singulair [Montelukast Sodium]     Adaptic Non-Adhering Dressing [Wound Dressings]      [place]        There were no vitals filed for this visit. Exam:  Neurovascular status unchanged. Ulcerative area is improved lateral aspect left lower extremity. Edematous issues are stable left lower extremity. No signs of infection noted left lower extremity. No tenderness noted to palpation in the periwound area left lower extremity. Diagnostic Studies:     No results found. Procedures:    None    Plan Per Assessment  Kenneth Medley was seen today for wound check. 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 Evaluation and management  2. We did discuss appropriate skin care techniques with patient today and wound care dressings to be performed as instructed every other day.   3. Patient is to continue with his graduated compression garments and elevating both lower extremities to allow for continued ulcer improvement. 4. Patient will be followed up within 7 to 10 days for continued evaluation and management. Seen By:    Idris Dobbs DPM    Electronically signed by Idris Dobbs DPM on 11/20/2020 at 12:03 PM    This note was created using voice recognition software. The note was reviewed however may contain grammatical errors.

## 2020-11-27 ENCOUNTER — HOSPITAL ENCOUNTER (OUTPATIENT)
Dept: GENERAL RADIOLOGY | Age: 70
Discharge: HOME | End: 2020-11-29
Payer: MEDICARE

## 2020-11-27 ENCOUNTER — HOSPITAL ENCOUNTER (OUTPATIENT)
Age: 70
Discharge: HOME | End: 2020-11-29
Payer: MEDICARE

## 2020-11-27 PROCEDURE — 72110 X-RAY EXAM L-2 SPINE 4/>VWS: CPT

## 2020-11-30 ENCOUNTER — OFFICE VISIT (OUTPATIENT)
Dept: PODIATRY | Age: 70
End: 2020-11-30
Payer: MEDICARE

## 2020-11-30 VITALS — WEIGHT: 315 LBS | BODY MASS INDEX: 54.74 KG/M2 | TEMPERATURE: 98 F

## 2020-11-30 PROBLEM — S80.821A BLISTER OF RIGHT LEG: Status: ACTIVE | Noted: 2020-11-30

## 2020-11-30 PROCEDURE — 99213 OFFICE O/P EST LOW 20 MIN: CPT | Performed by: PODIATRIST

## 2020-11-30 NOTE — PROGRESS NOTES
20     Raman Quezada    : 1950   Sex: male    Age: 65 y.o. Patient's PCP/Provider is:  Macarena Quinonez MD    Subjective:  Patient is seen today for follow-up regarding continued care stasis ulceration left lower extremity. Presents today with new onset blister to the lateral right lower extremity. Patient has been trying to utilize similar dressings to both areas. He denies any nausea, vomiting, fever, chills. No other additional abnormalities noted. Chief Complaint   Patient presents with    Wound Check       ROS:  Const: Positives and pertinent negatives as per HPI. Musculo: Denies symptoms other than stated above. Neuro: Denies symptoms other than stated above. Skin: Denies symptoms other than stated above.     Current Medications:    Current Outpatient Medications:     furosemide (LASIX) 40 MG tablet, 20 mg , Disp: , Rfl:     potassium chloride (KLOR-CON M) 20 MEQ TBCR extended release tablet, , Disp: , Rfl:     Calcium Alginate (ALGISITE M 6\"X8\" EX), Apply topically daily, Disp: , Rfl:     Folinic Acid-Vit B6-Vit B12 (FOLINIC-PLUS) 4-50-2 MG TABS, Take 4-50 mg by mouth 2 times daily, Disp: 90 tablet, Rfl: 2    Indacaterol-Glycopyrrolate (UTIBRON NEOHALER) 27.5-15.6 MCG CAPS, Inhale 2 puffs into the lungs 2 times daily, Disp: 180 capsule, Rfl: 3    sodium hypochlorite (DAKINS) 0.25 % SOLN, Irrigate with 2 mLs as directed daily (Patient not taking: Reported on 2020), Disp: 473 mL, Rfl: 0    albuterol (ACCUNEB) 0.63 MG/3ML nebulizer solution, Take 3 mLs by nebulization every 6 hours as needed for Wheezing, Disp: 270 mL, Rfl: 3    vitamin C (ASCORBIC ACID) 500 MG tablet, Take 500 mg by mouth daily, Disp: , Rfl:     rosuvastatin (CRESTOR) 5 MG tablet, Take 5 mg by mouth daily , Disp: , Rfl:     glipiZIDE (GLUCOTROL XL) 5 MG extended release tablet, Take 5 mg by mouth daily, Disp: , Rfl: 3    ramipril (ALTACE) 10 MG capsule, Take 10 mg by mouth daily , Disp: , Rfl:     potassium chloride (KLOR-CON M) 20 MEQ extended release tablet, Take 20 mEq by mouth daily , Disp: , Rfl:     Cholecalciferol (VITAMIN D3) 2000 UNITS CAPS, Take by mouth daily Last dose 1-14-16, Disp: , Rfl:     carvedilol (COREG) 25 MG tablet, Take 25 mg by mouth 2 times daily (with meals) Instructed to take with sip water am of procedure, Disp: , Rfl:     Omega-3 Fatty Acids (FISH OIL) 600 MG CAPS, Take 1,000 mg by mouth daily Last Dose 1-14-16, Disp: , Rfl:     liothyronine (CYTOMEL) 50 MCG tablet, Take 100 mcg by mouth daily , Disp: , Rfl:     aspirin 81 MG EC tablet, Take 81 mg by mouth daily Last dose 1-14-16, Disp: , Rfl:     Allergies: Allergies   Allergen Reactions    Clindamycin/Lincomycin      Extreme SOB     Other Hives and Shortness Of Breath     \"allergic to Potatoe white. \"    Doxycycline Hyclate Other (See Comments)     tinnitus    Singulair [Montelukast Sodium]     Adaptic Non-Adhering Dressing [Wound Dressings]      [place]        Vitals:    11/30/20 1452   Temp: 98 °F (36.7 °C)   Weight: (!) 360 lb (163.3 kg)       Exam:  Neurovascular status unchanged. Ulcerative areas stable distal lateral aspect left lower extremity. Blister areas noted to the anterior lateral right lower leg without underlying ulcerations noted. No signs of infection noted to either lower extremity sites. Edematous changes are stable bilateral lower extremities. Diagnostic Studies:     Xr Lumbar Spine (min 4 Views)    Result Date: 11/27/2020  EXAMINATION: 4  XRAY VIEWS OF THE LUMBAR SPINE 11/27/2020 11:35 am COMPARISON: None. HISTORY: ORDERING SYSTEM PROVIDED HISTORY: Low back pain, unspecified back pain laterality, unspecified chronicity, unspecified whether sciatica present FINDINGS: No fracture or dislocation. There is degenerative disc disease at multiple levels which is probably most pronounced from L4-S1. No spondylolysis or spondylolisthesis. Normal mineralization.   Unremarkable soft

## 2020-12-07 ENCOUNTER — OFFICE VISIT (OUTPATIENT)
Dept: PODIATRY | Age: 70
End: 2020-12-07
Payer: MEDICARE

## 2020-12-07 PROBLEM — L97.909 STASIS ULCER (HCC): Status: ACTIVE | Noted: 2020-12-07

## 2020-12-07 PROBLEM — L97.812 NON-PRESSURE CHRONIC ULCER OF OTHER PART OF RIGHT LOWER LEG WITH FAT LAYER EXPOSED (HCC): Status: ACTIVE | Noted: 2020-12-07

## 2020-12-07 PROBLEM — I83.009 STASIS ULCER (HCC): Status: ACTIVE | Noted: 2020-12-07

## 2020-12-07 PROCEDURE — 99213 OFFICE O/P EST LOW 20 MIN: CPT | Performed by: PODIATRIST

## 2020-12-07 PROCEDURE — 29580 STRAPPING UNNA BOOT: CPT | Performed by: PODIATRIST

## 2020-12-07 NOTE — PROGRESS NOTES
20     Bette Mcintosh    : 1950   Sex: male    Age: 65 y.o. Patient's PCP/Provider is:  Sherry Juarez MD    Subjective:  Patient is seen today for follow-up regarding continued management regarding ulcerations to both lower extremities at this time. Patient denies any nausea, vomiting, fever, chills. No other additional abnormalities noted at this time. He has been trying to utilize the wound dressings as recommended. He has been trying to elevate both lower extremities throughout the day as well to prevent worsening of symptoms. He has noticed increased serous drainage from the blister/wound site right lower extremity. Chief Complaint   Patient presents with    Wound Check       ROS:  Const: Positives and pertinent negatives as per HPI. Musculo: Denies symptoms other than stated above. Neuro: Denies symptoms other than stated above. Skin: Denies symptoms other than stated above.     Current Medications:    Current Outpatient Medications:     furosemide (LASIX) 40 MG tablet, 20 mg , Disp: , Rfl:     potassium chloride (KLOR-CON M) 20 MEQ TBCR extended release tablet, , Disp: , Rfl:     Calcium Alginate (ALGISITE M 6\"X8\" EX), Apply topically daily, Disp: , Rfl:     Folinic Acid-Vit B6-Vit B12 (FOLINIC-PLUS) 4-50-2 MG TABS, Take 4-50 mg by mouth 2 times daily, Disp: 90 tablet, Rfl: 2    Indacaterol-Glycopyrrolate (UTIBRON NEOHALER) 27.5-15.6 MCG CAPS, Inhale 2 puffs into the lungs 2 times daily, Disp: 180 capsule, Rfl: 3    sodium hypochlorite (DAKINS) 0.25 % SOLN, Irrigate with 2 mLs as directed daily (Patient not taking: Reported on 2020), Disp: 473 mL, Rfl: 0    albuterol (ACCUNEB) 0.63 MG/3ML nebulizer solution, Take 3 mLs by nebulization every 6 hours as needed for Wheezing, Disp: 270 mL, Rfl: 3    vitamin C (ASCORBIC ACID) 500 MG tablet, Take 500 mg by mouth daily, Disp: , Rfl:     rosuvastatin (CRESTOR) 5 MG tablet, Take 5 mg by mouth daily , Disp: , Rfl:   glipiZIDE (GLUCOTROL XL) 5 MG extended release tablet, Take 5 mg by mouth daily, Disp: , Rfl: 3    ramipril (ALTACE) 10 MG capsule, Take 10 mg by mouth daily , Disp: , Rfl:     potassium chloride (KLOR-CON M) 20 MEQ extended release tablet, Take 20 mEq by mouth daily , Disp: , Rfl:     Cholecalciferol (VITAMIN D3) 2000 UNITS CAPS, Take by mouth daily Last dose 1-14-16, Disp: , Rfl:     carvedilol (COREG) 25 MG tablet, Take 25 mg by mouth 2 times daily (with meals) Instructed to take with sip water am of procedure, Disp: , Rfl:     Omega-3 Fatty Acids (FISH OIL) 600 MG CAPS, Take 1,000 mg by mouth daily Last Dose 1-14-16, Disp: , Rfl:     liothyronine (CYTOMEL) 50 MCG tablet, Take 100 mcg by mouth daily , Disp: , Rfl:     aspirin 81 MG EC tablet, Take 81 mg by mouth daily Last dose 1-14-16, Disp: , Rfl:     Allergies: Allergies   Allergen Reactions    Clindamycin/Lincomycin      Extreme SOB     Other Hives and Shortness Of Breath     \"allergic to Potatoe white. \"    Doxycycline Hyclate Other (See Comments)     tinnitus    Singulair [Montelukast Sodium]     Adaptic Non-Adhering Dressing [Wound Dressings]      [place]        There were no vitals filed for this visit. Exam:  Neurovascular status unchanged. Ulcerative areas remained stable distal lateral aspect left lower extremity. New onset ulcerations noted to the distal lateral right lower extremity with serous drainage noted. No undermining of the wound edges noted to any extremity. No signs of infection noted to both lower extremities. Edematous changes still present but stable at this time. Diagnostic Studies:     Xr Lumbar Spine (min 4 Views)    Result Date: 11/27/2020  EXAMINATION: 4  XRAY VIEWS OF THE LUMBAR SPINE 11/27/2020 11:35 am COMPARISON: None.  HISTORY: ORDERING SYSTEM PROVIDED HISTORY: Low back pain, unspecified back pain laterality, unspecified chronicity, unspecified whether sciatica present FINDINGS: No fracture or dislocation. There is degenerative disc disease at multiple levels which is probably most pronounced from L4-S1. No spondylolysis or spondylolisthesis. Normal mineralization. Unremarkable soft tissues. Degenerative spondylotic changes. Procedures: An unna boot  compressive wrap was applied to the bilateral lower extremity. It's purpose is to  decrease  the amount of edema present, and to allow proper healing of the soft tissues. The patient was instructed to keep the unna boot clean, dry and intact until the next follow up visit. The patient was instructed to look for signs of redness, irritation, blistering and pain. If these or any other symptoms were to develop, they were advised to contact the office immediately for reevaluation. Plan Per Assessment  Rebecca Thomas was seen today for wound check. 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 Evaluation and management  2. We did discuss compression dressings which were applied to both symptomatic lower extremities as described above. Alginate dressing was applied to the wound sites primarily followed by the compression dressings. 3. Patient was advised to elevate both lower extremities throughout the day to prevent dependent edematous issues. 4. Patient will be followed up at a later date for continued evaluation and wound management. Seen By:    Lyle Mccabe DPM    Electronically signed by Lyle Mccabe DPM on 12/7/2020 at 11:43 AM    This note was created using voice recognition software.   The note was reviewed however may contain grammatical errors.

## 2020-12-14 ENCOUNTER — PROCEDURE VISIT (OUTPATIENT)
Dept: PODIATRY | Age: 70
End: 2020-12-14
Payer: MEDICARE

## 2020-12-14 PROCEDURE — 99213 OFFICE O/P EST LOW 20 MIN: CPT | Performed by: PODIATRIST

## 2020-12-14 NOTE — PROGRESS NOTES
Patient in today for nail care. Patient is also here for follow up bilateral lower extremity ulcers.  Patient's PCP is Joe Weems MD date of last ov    11-     Yissel Galvan LPN

## 2020-12-14 NOTE — PROGRESS NOTES
20     [facility]    : 1950   Sex: male    Age: 65 y.o. Patient's PCP/Provider is:  Malini Romo MD    Subjective:  Patient is seen today for follow-up regarding continued care stasis ulcerations to both lower extremities. Patient has been applying the topical wound dressings after he did remove the compression dressings applied previously. He denies any nausea, vomiting, fever, chills. He has been immobile over the last several days due to a back injury. Patient denies any nausea, vomiting, fever, chills. No other additional abnormalities noted at this time. Chief Complaint   Patient presents with    Nail Problem    Wound Check       ROS:  Const: Positives and pertinent negatives as per HPI. Musculo: Denies symptoms other than stated above. Neuro: Denies symptoms other than stated above. Skin: Denies symptoms other than stated above.     Current Medications:    Current Outpatient Medications:     furosemide (LASIX) 40 MG tablet, 20 mg , Disp: , Rfl:     potassium chloride (KLOR-CON M) 20 MEQ TBCR extended release tablet, , Disp: , Rfl:     Calcium Alginate (ALGISITE M 6\"X8\" EX), Apply topically daily, Disp: , Rfl:     Folinic Acid-Vit B6-Vit B12 (FOLINIC-PLUS) 4-50-2 MG TABS, Take 4-50 mg by mouth 2 times daily, Disp: 90 tablet, Rfl: 2    Indacaterol-Glycopyrrolate (UTIBRON NEOHALER) 27.5-15.6 MCG CAPS, Inhale 2 puffs into the lungs 2 times daily, Disp: 180 capsule, Rfl: 3    sodium hypochlorite (DAKINS) 0.25 % SOLN, Irrigate with 2 mLs as directed daily (Patient not taking: Reported on 2020), Disp: 473 mL, Rfl: 0    albuterol (ACCUNEB) 0.63 MG/3ML nebulizer solution, Take 3 mLs by nebulization every 6 hours as needed for Wheezing, Disp: 270 mL, Rfl: 3    vitamin C (ASCORBIC ACID) 500 MG tablet, Take 500 mg by mouth daily, Disp: , Rfl:     rosuvastatin (CRESTOR) 5 MG tablet, Take 5 mg by mouth daily , Disp: , Rfl:   glipiZIDE (GLUCOTROL XL) 5 MG extended release tablet, Take 5 mg by mouth daily, Disp: , Rfl: 3    ramipril (ALTACE) 10 MG capsule, Take 10 mg by mouth daily , Disp: , Rfl:     potassium chloride (KLOR-CON M) 20 MEQ extended release tablet, Take 20 mEq by mouth daily , Disp: , Rfl:     Cholecalciferol (VITAMIN D3) 2000 UNITS CAPS, Take by mouth daily Last dose 1-14-16, Disp: , Rfl:     carvedilol (COREG) 25 MG tablet, Take 25 mg by mouth 2 times daily (with meals) Instructed to take with sip water am of procedure, Disp: , Rfl:     Omega-3 Fatty Acids (FISH OIL) 600 MG CAPS, Take 1,000 mg by mouth daily Last Dose 1-14-16, Disp: , Rfl:     liothyronine (CYTOMEL) 50 MCG tablet, Take 100 mcg by mouth daily , Disp: , Rfl:     aspirin 81 MG EC tablet, Take 81 mg by mouth daily Last dose 1-14-16, Disp: , Rfl:     Allergies: Allergies   Allergen Reactions    Clindamycin/Lincomycin      Extreme SOB     Other Hives and Shortness Of Breath     \"allergic to Potatoe white. \"    Doxycycline Hyclate Other (See Comments)     tinnitus    Singulair [Montelukast Sodium]     Adaptic Non-Adhering Dressing [Wound Dressings]      [place]        There were no vitals filed for this visit. Exam:  Neurovascular status unchanged. Ulcerative areas are stable lateral aspects bilateral lower extremities. No purulence, odor, erythema or any signs of infection noted to both lower extremities. Edematous changes are noted to both lower extremities.       Diagnostic Studies:     Xr Lumbar Spine (min 4 Views)    Result Date: 11/27/2020 EXAMINATION: 4  XRAY VIEWS OF THE LUMBAR SPINE 11/27/2020 11:35 am COMPARISON: None. HISTORY: ORDERING SYSTEM PROVIDED HISTORY: Low back pain, unspecified back pain laterality, unspecified chronicity, unspecified whether sciatica present FINDINGS: No fracture or dislocation. There is degenerative disc disease at multiple levels which is probably most pronounced from L4-S1. No spondylolysis or spondylolisthesis. Normal mineralization. Unremarkable soft tissues. Degenerative spondylotic changes. Procedures:    None    Plan Per Assessment  Pedrito Hu was seen today for nail problem and wound check. 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 Evaluation and management  2. We did discuss the use of topical alginate dressings to both ulcerative areas bilaterally to be changed on a daily basis. Patient was advised continued use of his graduated compression garments to be worn on both lower extremities with all daily activities. 3. Patient will be followed up in 1 week's time or sooner if needed for reevaluation. He was advised to call the office with any questions or concerns in the interim. Seen By:    Sandee Ely DPM    Electronically signed by Sandee Ely DPM on 12/14/2020 at 4:25 PM    This note was created using voice recognition software. The note was reviewed however may contain grammatical errors.

## 2020-12-21 ENCOUNTER — PROCEDURE VISIT (OUTPATIENT)
Dept: PODIATRY | Age: 70
End: 2020-12-21
Payer: MEDICARE

## 2020-12-21 VITALS — HEIGHT: 67 IN | BODY MASS INDEX: 49.44 KG/M2 | WEIGHT: 315 LBS

## 2020-12-21 PROBLEM — I89.0 LYMPHEDEMA: Status: ACTIVE | Noted: 2020-12-21

## 2020-12-21 PROCEDURE — 99213 OFFICE O/P EST LOW 20 MIN: CPT | Performed by: PODIATRIST

## 2020-12-21 NOTE — PROGRESS NOTES
  ramipril (ALTACE) 10 MG capsule, Take 10 mg by mouth daily , Disp: , Rfl:     potassium chloride (KLOR-CON M) 20 MEQ extended release tablet, Take 20 mEq by mouth daily , Disp: , Rfl:     Cholecalciferol (VITAMIN D3) 2000 UNITS CAPS, Take by mouth daily Last dose 1-14-16, Disp: , Rfl:     carvedilol (COREG) 25 MG tablet, Take 25 mg by mouth 2 times daily (with meals) Instructed to take with sip water am of procedure, Disp: , Rfl:     Omega-3 Fatty Acids (FISH OIL) 600 MG CAPS, Take 1,000 mg by mouth daily Last Dose 1-14-16, Disp: , Rfl:     liothyronine (CYTOMEL) 50 MCG tablet, Take 100 mcg by mouth daily , Disp: , Rfl:     aspirin 81 MG EC tablet, Take 81 mg by mouth daily Last dose 1-14-16, Disp: , Rfl:     Allergies: Allergies   Allergen Reactions    Clindamycin/Lincomycin      Extreme SOB     Other Hives and Shortness Of Breath     \"allergic to Potatoe white. \"    Doxycycline Hyclate Other (See Comments)     tinnitus    Singulair [Montelukast Sodium]     Adaptic Non-Adhering Dressing [Wound Dressings]      [place]        Vitals:    12/21/20 1118   Weight: (!) 360 lb (163.3 kg)   Height: 5' 7\" (1.702 m)       Exam:  Neurovascular status unchanged. Ulcerative areas are stable lateral aspect bilateral lower extremities. No signs of infection noted at either wound site bilateral lower extremities. Edematous issues noted with stasis skin changes present bilateral lower extremities. Serous drainage noted from the wound sites.       Diagnostic Studies:     Xr Lumbar Spine (min 4 Views)    Result Date: 11/27/2020 EXAMINATION: 4  XRAY VIEWS OF THE LUMBAR SPINE 11/27/2020 11:35 am COMPARISON: None. HISTORY: ORDERING SYSTEM PROVIDED HISTORY: Low back pain, unspecified back pain laterality, unspecified chronicity, unspecified whether sciatica present FINDINGS: No fracture or dislocation. There is degenerative disc disease at multiple levels which is probably most pronounced from L4-S1. No spondylolysis or spondylolisthesis. Normal mineralization. Unremarkable soft tissues. Degenerative spondylotic changes. Procedures:    None    Plan Per Assessment  Conchita Burns was seen today for nail problem and wound check. 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 Evaluation and management  2. We did discuss continued wound care dressing options with patient in detail today. We did recommend daily dressing changes with the Maxorb alginate dressings. 3. We did discuss potentially getting patient evaluated for the lymphedema pumps to help alleviate his current edema issues. 4. Patient will be followed up in 1 week's time for continued wound evaluation and management. Seen By:    Claudia Leary DPM    Electronically signed by Claudia Leary DPM on 12/21/2020 at 1:39 PM    This note was created using voice recognition software. The note was reviewed however may contain grammatical errors.

## 2020-12-22 ENCOUNTER — HOSPITAL ENCOUNTER (OUTPATIENT)
Dept: CT IMAGING | Age: 70
Discharge: HOME | End: 2020-12-24
Payer: MEDICARE

## 2020-12-22 PROCEDURE — 72192 CT PELVIS W/O DYE: CPT

## 2020-12-28 ENCOUNTER — OFFICE VISIT (OUTPATIENT)
Dept: PODIATRY | Age: 70
End: 2020-12-28
Payer: MEDICARE

## 2020-12-28 VITALS — BODY MASS INDEX: 49.44 KG/M2 | HEIGHT: 67 IN | WEIGHT: 315 LBS

## 2020-12-28 DIAGNOSIS — L97.812 NON-PRESSURE CHRONIC ULCER OF OTHER PART OF RIGHT LOWER LEG WITH FAT LAYER EXPOSED (HCC): ICD-10-CM

## 2020-12-28 PROBLEM — L97.312 VARICOSE VEINS OF RIGHT LOWER EXTREMITY WITH INFLAMMATION, WITH ULCER OF ANKLE WITH FAT LAYER EXPOSED (HCC): Status: ACTIVE | Noted: 2020-12-28

## 2020-12-28 PROBLEM — I83.213 VARICOSE VEINS OF RIGHT LOWER EXTREMITY WITH INFLAMMATION, WITH ULCER OF ANKLE WITH FAT LAYER EXPOSED (HCC): Status: ACTIVE | Noted: 2020-12-28

## 2020-12-28 PROCEDURE — 99213 OFFICE O/P EST LOW 20 MIN: CPT | Performed by: PODIATRIST

## 2020-12-28 NOTE — PROGRESS NOTES
20     Bette Mcintosh    : 1950   Sex: male    Age: 65 y.o. Patient's PCP/Provider is:  Sherry Juarez MD    Subjective:  Patient is seen today for follow-up regarding continued care regarding stasis ulcerations to both lower extremities. Patient has been trying to increase his activity level to reduce edema into both lower extremities. He has been trying to monitor his diet as well to prevent increased edema into both lower extremities. We did discuss potentially getting him fitted for lymphedema pumps once insurance verification is performed. No other additional abnormalities noted at this time. Chief Complaint   Patient presents with    Wound Check     bilat legs       ROS:  Const: Positives and pertinent negatives as per HPI. Musculo: Denies symptoms other than stated above. Neuro: Denies symptoms other than stated above. Skin: Denies symptoms other than stated above.     Current Medications:    Current Outpatient Medications:     furosemide (LASIX) 40 MG tablet, 20 mg , Disp: , Rfl:     potassium chloride (KLOR-CON M) 20 MEQ TBCR extended release tablet, , Disp: , Rfl:     Calcium Alginate (ALGISITE M 6\"X8\" EX), Apply topically daily, Disp: , Rfl:     Folinic Acid-Vit B6-Vit B12 (FOLINIC-PLUS) 4-50-2 MG TABS, Take 4-50 mg by mouth 2 times daily, Disp: 90 tablet, Rfl: 2    Indacaterol-Glycopyrrolate (UTIBRON NEOHALER) 27.5-15.6 MCG CAPS, Inhale 2 puffs into the lungs 2 times daily, Disp: 180 capsule, Rfl: 3    sodium hypochlorite (DAKINS) 0.25 % SOLN, Irrigate with 2 mLs as directed daily, Disp: 473 mL, Rfl: 0    albuterol (ACCUNEB) 0.63 MG/3ML nebulizer solution, Take 3 mLs by nebulization every 6 hours as needed for Wheezing, Disp: 270 mL, Rfl: 3    vitamin C (ASCORBIC ACID) 500 MG tablet, Take 500 mg by mouth daily, Disp: , Rfl:     rosuvastatin (CRESTOR) 5 MG tablet, Take 5 mg by mouth daily , Disp: , Rfl:   glipiZIDE (GLUCOTROL XL) 5 MG extended release tablet, Take 5 mg by mouth daily, Disp: , Rfl: 3    ramipril (ALTACE) 10 MG capsule, Take 10 mg by mouth daily , Disp: , Rfl:     potassium chloride (KLOR-CON M) 20 MEQ extended release tablet, Take 20 mEq by mouth daily , Disp: , Rfl:     Cholecalciferol (VITAMIN D3) 2000 UNITS CAPS, Take by mouth daily Last dose 1-14-16, Disp: , Rfl:     carvedilol (COREG) 25 MG tablet, Take 25 mg by mouth 2 times daily (with meals) Instructed to take with sip water am of procedure, Disp: , Rfl:     Omega-3 Fatty Acids (FISH OIL) 600 MG CAPS, Take 1,000 mg by mouth daily Last Dose 1-14-16, Disp: , Rfl:     liothyronine (CYTOMEL) 50 MCG tablet, Take 100 mcg by mouth daily , Disp: , Rfl:     aspirin 81 MG EC tablet, Take 81 mg by mouth daily Last dose 1-14-16, Disp: , Rfl:     Allergies: Allergies   Allergen Reactions    Clindamycin/Lincomycin      Extreme SOB     Other Hives and Shortness Of Breath     \"allergic to Potatoe white. \"    Doxycycline Hyclate Other (See Comments)     tinnitus    Singulair [Montelukast Sodium]     Adaptic Non-Adhering Dressing [Wound Dressings]      [place]        Vitals:    12/28/20 1100   Weight: (!) 360 lb (163.3 kg)   Height: 5' 7\" (1.702 m)       Exam:  Neurovascular status unchanged. Ulcerations noted to the lateral aspect of both lower extremities and a mosaic type pattern. Areas are full-thickness in nature, without undermining of the wound edges noted. Edematous issues noted to both lower extremities with stasis skin changes present bilaterally. No signs of infection noted to both lower extremity wound sites. Adequate range of motion ankle and subtalar joints noted bilaterally.       Diagnostic Studies:     Ct Pelvis Wo Contrast Additional Contrast? None    Result Date: 12/22/2020 EXAMINATION: CT OF THE PELVIS WITHOUT CONTRAST 12/22/2020 1:22 pm TECHNIQUE: CT of the pelvis was performed without the administration of intravenous contrast.  Multiplanar reformatted images are provided for review. Dose modulation, iterative reconstruction, and/or weight based adjustment of the mA/kV was utilized to reduce the radiation dose to as low as reasonably achievable. Contrast is none. Dose was not provided. COMPARISON: CT abdomen and pelvis without contrast 09/10/2013. HISTORY: ORDERING SYSTEM PROVIDED HISTORY: Pelvic and perineal pain TECHNOLOGIST PROVIDED HISTORY: Additional Contrast?->None FINDINGS: There is no acute fracture of the pelvis. No acute fracture or dislocation at the hips. Minimal bilateral hip degeneration. Tiny spurs and subchondral cysts at the superolateral acetabular margins right more than left. The anterolateral right femoral neck shows a small, 4 mm subchondral cyst indicating chronic femoroacetabular impaction syndrome. Minimal changes. Femoral heads have normal rounded contour. Tiny calcification in the right hip labrum suggesting degeneration or old tear. No hip joint effusion. Negative for iliopsoas tendonitis. No obvious new gluteal tendinitis. No obvious hip joint effusion. Chronic moderate spurring and mild synovial thickening at the pubic symphysis. Bilateral sacroiliac joints show mild vacuum degeneration and mild marginal sclerosis. Minimal SI joint spurs. Degenerative changes in the lower lumbar spine. At L4/L5, there is mild vacuum disc degeneration and mild disc bulge. Vacuum degeneration in bilateral facet joints. Mild to moderate central stenosis. At L5/S1 there is minimal facet degeneration and no stenosis. Note is made of chronic fat in bilateral inguinal canals, moderate on the left and mild on the right. Right scrotal varicocele. Some vascular calcifications are also present. No herniated bowel.  A normal appendix in the right lower quadrant inferior to the cecum. No ascites. Visualized portions of the bowel do not show any acute inflammation or obstruction. Small bowel loops are not dilated. Chronic prostate enlargement with the prostate measuring at least 6.1 cm transverse by 5.8 cm AP x 6.5 cm in length. The bladder floor is elevated above the level of the pubic symphysis. Moderate posterior and right-sided prostate calcifications mostly along the transitional zone. The urinary bladder was mildly distended at the time of exam with minimal wall thickening likely related to incomplete distension. No evidence for a bladder calculus. Distal ureters are not dilated. Mild left pelvic lymphadenopathy. A lymph node just above the left inguinal ligament measures 2.9 cm x 1.2 cm, was previously 2.6 cm x 0.7 cm. Lateral mid left pelvic obturator lymph node measures 1.9 cm, was previously 1.6 cm. A few small lymph nodes around the aortic bifurcation measuring in the 1 cm size range. No ascites. 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 The visualized portions of the ischial rectal fat are clear, no obvious inflammatory changes. The visualized portions of the anise do not show obvious inflammation. The inferior aspect is not fully included. 1. Moderate prostate enlargement. 2. Right varicocele and chronic fat in bilateral inguinal canals. 3. Minimally increased mild left pelvic lymphadenopathy. 4. Chronic degeneration in the lower lumbar spine, SI joints and hips without a significant change. 5. No acute fracture in the pelvis or hips. 6. No pelvic hematomas or ascites. Procedures:    None    Plan Per Assessment  Alyssa Walden was seen today for wound check. 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 Evaluation and management  2. We did take a culture today of the wound sites right lower extremity due to some increased serous drainage noted. We will treat accordingly once culture results are obtained. 3. We will continue with the alginate dressing to the wound sites to be changed on a daily and or every other day basis depending on dressing saturation. Patient is to continue with his Tubigrip compression garments on both lower extremities as well. We will await insurance authorization for the lymphedema pumps. 4. Patient will be followed up in 1 week's time or sooner if needed for reevaluation. He was advised to call the office with any questions or concerns in the interim. Seen By:    Byron Ferrera DPM    Electronically signed by Byron Ferrera DPM on 12/28/2020 at 12:15 PM    This note was created using voice recognition software. The note was reviewed however may contain grammatical errors.

## 2020-12-28 NOTE — PROGRESS NOTES
Patient here for bilat wound check. Patient states that the wounds have been draining. No other concerns.

## 2021-01-01 DIAGNOSIS — L97.812 NON-PRESSURE CHRONIC ULCER OF OTHER PART OF RIGHT LOWER LEG WITH FAT LAYER EXPOSED (HCC): ICD-10-CM

## 2021-01-01 DIAGNOSIS — L97.922 NON-PRESSURE CHRONIC ULCER OF LEFT LOWER LEG WITH FAT LAYER EXPOSED (HCC): Primary | ICD-10-CM

## 2021-01-01 RX ORDER — SULFAMETHOXAZOLE AND TRIMETHOPRIM 800; 160 MG/1; MG/1
1 TABLET ORAL 2 TIMES DAILY
Qty: 20 TABLET | Refills: 0 | Status: SHIPPED
Start: 2021-01-01 | End: 2021-01-08 | Stop reason: SDUPTHER

## 2021-01-06 ENCOUNTER — OFFICE VISIT (OUTPATIENT)
Dept: PODIATRY | Age: 71
End: 2021-01-06
Payer: MEDICARE

## 2021-01-06 VITALS — HEIGHT: 67 IN | WEIGHT: 315 LBS | BODY MASS INDEX: 49.44 KG/M2

## 2021-01-06 DIAGNOSIS — L97.922 NON-PRESSURE CHRONIC ULCER OF LEFT LOWER LEG WITH FAT LAYER EXPOSED (HCC): ICD-10-CM

## 2021-01-06 DIAGNOSIS — I83.022 VARICOSE VEINS OF LEFT LOWER EXTREMITY WITH ULCER OF CALF WITH FAT LAYER EXPOSED (HCC): ICD-10-CM

## 2021-01-06 DIAGNOSIS — I83.213 VARICOSE VEINS OF RIGHT LOWER EXTREMITY WITH INFLAMMATION, WITH ULCER OF ANKLE WITH FAT LAYER EXPOSED (HCC): Primary | ICD-10-CM

## 2021-01-06 DIAGNOSIS — L97.222 VARICOSE VEINS OF LEFT LOWER EXTREMITY WITH ULCER OF CALF WITH FAT LAYER EXPOSED (HCC): ICD-10-CM

## 2021-01-06 DIAGNOSIS — L97.812 NON-PRESSURE CHRONIC ULCER OF OTHER PART OF RIGHT LOWER LEG WITH FAT LAYER EXPOSED (HCC): ICD-10-CM

## 2021-01-06 DIAGNOSIS — L97.312 VARICOSE VEINS OF RIGHT LOWER EXTREMITY WITH INFLAMMATION, WITH ULCER OF ANKLE WITH FAT LAYER EXPOSED (HCC): Primary | ICD-10-CM

## 2021-01-06 PROCEDURE — 99213 OFFICE O/P EST LOW 20 MIN: CPT | Performed by: PODIATRIST

## 2021-01-06 NOTE — PROGRESS NOTES
21     Prosper Haro    : 1950   Sex: male    Age: 65 y.o. Patient's PCP/Provider is:  Davion Barker MD    Subjective:  Patient is seen today for follow-up regarding continued issues stasis ulcerations to both lower extremities. Patient was placed on oral antibiotics over the weekend based on recent culture reports. Patient is tolerating current medications without issues. He denies any nausea, vomiting, fever, chills. Patient and his wife have noticed improvement in ulcerations to both lower extremities. No other additional abnormalities noted at this time. Chief Complaint   Patient presents with    Wound Check     bilat legs       ROS:  Const: Positives and pertinent negatives as per HPI. Musculo: Denies symptoms other than stated above. Neuro: Denies symptoms other than stated above. Skin: Denies symptoms other than stated above.     Current Medications:    Current Outpatient Medications:     sulfamethoxazole-trimethoprim (BACTRIM DS;SEPTRA DS) 800-160 MG per tablet, Take 1 tablet by mouth 2 times daily for 10 days, Disp: 20 tablet, Rfl: 0    furosemide (LASIX) 40 MG tablet, 20 mg , Disp: , Rfl:     potassium chloride (KLOR-CON M) 20 MEQ TBCR extended release tablet, , Disp: , Rfl:     Calcium Alginate (ALGISITE M 6\"X8\" EX), Apply topically daily, Disp: , Rfl:     Folinic Acid-Vit B6-Vit B12 (FOLINIC-PLUS) 4-50-2 MG TABS, Take 4-50 mg by mouth 2 times daily, Disp: 90 tablet, Rfl: 2    Indacaterol-Glycopyrrolate (UTIBRON NEOHALER) 27.5-15.6 MCG CAPS, Inhale 2 puffs into the lungs 2 times daily, Disp: 180 capsule, Rfl: 3    sodium hypochlorite (DAKINS) 0.25 % SOLN, Irrigate with 2 mLs as directed daily, Disp: 473 mL, Rfl: 0    albuterol (ACCUNEB) 0.63 MG/3ML nebulizer solution, Take 3 mLs by nebulization every 6 hours as needed for Wheezing, Disp: 270 mL, Rfl: 3    vitamin C (ASCORBIC ACID) 500 MG tablet, Take 500 mg by mouth daily, Disp: , Rfl:     rosuvastatin (CRESTOR) 5 MG tablet, Take 5 mg by mouth daily , Disp: , Rfl:     glipiZIDE (GLUCOTROL XL) 5 MG extended release tablet, Take 5 mg by mouth daily, Disp: , Rfl: 3    ramipril (ALTACE) 10 MG capsule, Take 10 mg by mouth daily , Disp: , Rfl:     potassium chloride (KLOR-CON M) 20 MEQ extended release tablet, Take 20 mEq by mouth daily , Disp: , Rfl:     Cholecalciferol (VITAMIN D3) 2000 UNITS CAPS, Take by mouth daily Last dose 1-14-16, Disp: , Rfl:     carvedilol (COREG) 25 MG tablet, Take 25 mg by mouth 2 times daily (with meals) Instructed to take with sip water am of procedure, Disp: , Rfl:     Omega-3 Fatty Acids (FISH OIL) 600 MG CAPS, Take 1,000 mg by mouth daily Last Dose 1-14-16, Disp: , Rfl:     liothyronine (CYTOMEL) 50 MCG tablet, Take 100 mcg by mouth daily , Disp: , Rfl:     aspirin 81 MG EC tablet, Take 81 mg by mouth daily Last dose 1-14-16, Disp: , Rfl:     Allergies: Allergies   Allergen Reactions    Clindamycin/Lincomycin      Extreme SOB     Other Hives and Shortness Of Breath     \"allergic to Potatoe white. \"    Doxycycline Hyclate Other (See Comments)     tinnitus    Singulair [Montelukast Sodium]     Adaptic Non-Adhering Dressing [Wound Dressings]      [place]        Vitals:    01/06/21 1042   Weight: (!) 360 lb (163.3 kg)   Height: 5' 7\" (1.702 m)       Exam:  Neurovascular status unchanged. Ulcerative areas are improved to both lower extremities. No erythema, abscess, skin crepitation, or any ascending cellulitic issues noted to both lower extremities. Edematous changes are still noted to both lower extremities secondary to patient's current back injury/sprain which has limited his ambulatory activities. No tenderness noted into the posterior calf regions bilaterally.       Diagnostic Studies:     Ct Pelvis Wo Contrast Additional Contrast? None    Result Date: 12/22/2020  EXAMINATION: CT OF THE PELVIS WITHOUT CONTRAST 12/22/2020 1:22 pm TECHNIQUE: CT of the pelvis was performed obstruction. Small bowel loops are not dilated. Chronic prostate enlargement with the prostate measuring at least 6.1 cm transverse by 5.8 cm AP x 6.5 cm in length. The bladder floor is elevated above the level of the pubic symphysis. Moderate posterior and right-sided prostate calcifications mostly along the transitional zone. The urinary bladder was mildly distended at the time of exam with minimal wall thickening likely related to incomplete distension. No evidence for a bladder calculus. Distal ureters are not dilated. Mild left pelvic lymphadenopathy. A lymph node just above the left inguinal ligament measures 2.9 cm x 1.2 cm, was previously 2.6 cm x 0.7 cm. Lateral mid left pelvic obturator lymph node measures 1.9 cm, was previously 1.6 cm. A few small lymph nodes around the aortic bifurcation measuring in the 1 cm size range. No ascites. 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 The visualized portions of the ischial rectal fat are clear, no obvious inflammatory changes. The visualized portions of the anise do not show obvious inflammation. The inferior aspect is not fully included. 1. Moderate prostate enlargement. 2. Right varicocele and chronic fat in bilateral inguinal canals. 3. Minimally increased mild left pelvic lymphadenopathy. 4. Chronic degeneration in the lower lumbar spine, SI joints and hips without a significant change. 5. No acute fracture in the pelvis or hips. 6. No pelvic hematomas or ascites. Procedures:    None    Plan Per Assessment  Edwina Garcia was seen today for wound check.     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) - Vanna Herring MD, Infectious DiseaseJerri ([place])    Non-pressure chronic ulcer of left lower leg with fat layer exposed ([address]  -     ([facility]) - Vanna Herring MD, Infectious DiseaseJerri ([place])      1. Evaluation and management  2. We did will continue with current wound dressings to both lower extremities to be changed on a daily basis. Did recommend patient's wife to utilize a different type of gauze to wick away the fluid better than her current product. Patient is to continue taking the oral antibiotics until finished. We are going to set up an infectious disease consultation to discuss potential low-dose/long-term antibiosis until ulcerations are healed. 3. Patient is to continue with his compression garments on both lower extremities. Patient is to elevate both lower extremities throughout the day, and maintain his diet to prevent worsening of the edematous issues. 4. Patient will be followed up in 1 week's time or sooner if needed for reevaluation. They were advised to call the office with any questions or concerns in the interim. Seen By:    Stiven Valenzuela DPM    Electronically signed by Stiven Valenzuela DPM on 1/6/2021 at 11:37 AM    This note was created using voice recognition software. The note was reviewed however may contain grammatical errors.

## 2021-01-06 NOTE — PROGRESS NOTES
Patient is here for a one week follow up wound check legs bilaterally. Patient has a couple white spots on the shins that are questionable. Patient's wife is wondering if the patient should be on a antibiotic to help with infection.      Electronically signed by Tawana Duverney, MA on 1/6/2021 at 10:42 AM

## 2021-01-08 DIAGNOSIS — L97.922 NON-PRESSURE CHRONIC ULCER OF LEFT LOWER LEG WITH FAT LAYER EXPOSED (HCC): ICD-10-CM

## 2021-01-08 DIAGNOSIS — L97.812 NON-PRESSURE CHRONIC ULCER OF OTHER PART OF RIGHT LOWER LEG WITH FAT LAYER EXPOSED (HCC): ICD-10-CM

## 2021-01-08 RX ORDER — SULFAMETHOXAZOLE AND TRIMETHOPRIM 800; 160 MG/1; MG/1
1 TABLET ORAL 2 TIMES DAILY
Qty: 20 TABLET | Refills: 0 | Status: SHIPPED | OUTPATIENT
Start: 2021-01-08 | End: 2021-01-18

## 2021-01-13 ENCOUNTER — OFFICE VISIT (OUTPATIENT)
Dept: PODIATRY | Age: 71
End: 2021-01-13
Payer: MEDICARE

## 2021-01-13 VITALS — WEIGHT: 315 LBS | HEIGHT: 67 IN | BODY MASS INDEX: 49.44 KG/M2

## 2021-01-13 DIAGNOSIS — L97.812 NON-PRESSURE CHRONIC ULCER OF OTHER PART OF RIGHT LOWER LEG WITH FAT LAYER EXPOSED (HCC): ICD-10-CM

## 2021-01-13 DIAGNOSIS — M79.672 LEFT FOOT PAIN: Primary | ICD-10-CM

## 2021-01-13 DIAGNOSIS — I83.022 VARICOSE VEINS OF LEFT LOWER EXTREMITY WITH ULCER OF CALF WITH FAT LAYER EXPOSED (HCC): ICD-10-CM

## 2021-01-13 DIAGNOSIS — L97.922 NON-PRESSURE CHRONIC ULCER OF LEFT LOWER LEG WITH FAT LAYER EXPOSED (HCC): ICD-10-CM

## 2021-01-13 DIAGNOSIS — L97.222 VARICOSE VEINS OF LEFT LOWER EXTREMITY WITH ULCER OF CALF WITH FAT LAYER EXPOSED (HCC): ICD-10-CM

## 2021-01-13 DIAGNOSIS — I89.0 LYMPHEDEMA: ICD-10-CM

## 2021-01-13 DIAGNOSIS — L97.312 VARICOSE VEINS OF RIGHT LOWER EXTREMITY WITH INFLAMMATION, WITH ULCER OF ANKLE WITH FAT LAYER EXPOSED (HCC): ICD-10-CM

## 2021-01-13 DIAGNOSIS — I83.213 VARICOSE VEINS OF RIGHT LOWER EXTREMITY WITH INFLAMMATION, WITH ULCER OF ANKLE WITH FAT LAYER EXPOSED (HCC): ICD-10-CM

## 2021-01-13 PROCEDURE — 99213 OFFICE O/P EST LOW 20 MIN: CPT | Performed by: PODIATRIST

## 2021-01-13 NOTE — PROGRESS NOTES
21     eYe Henriquez    : 1950   Sex: male    Age: 65 y.o. Patient's PCP/Provider is:  Tc Eubanks MD    Subjective:  Patient is seen today for follow-up regarding continued care chronic stasis ulcerations to both lower extremities. Patient has been applying the topical medications and compression dressings overlying without issues to both lower extremities daily. Patient did not have his ID appointment to this point in time. Patient is to continue taking the oral antibiotics until finished. He denies any nausea, vomiting, fever, chills. No other additional abnormalities noted at this time. Chief Complaint   Patient presents with    Wound Check     bilat legs. ROS:  Const: Positives and pertinent negatives as per HPI. Musculo: Denies symptoms other than stated above. Neuro: Denies symptoms other than stated above. Skin: Denies symptoms other than stated above.     Current Medications:    Current Outpatient Medications:     sulfamethoxazole-trimethoprim (BACTRIM DS;SEPTRA DS) 800-160 MG per tablet, Take 1 tablet by mouth 2 times daily for 10 days, Disp: 20 tablet, Rfl: 0    furosemide (LASIX) 40 MG tablet, 20 mg , Disp: , Rfl:     potassium chloride (KLOR-CON M) 20 MEQ TBCR extended release tablet, , Disp: , Rfl:     Calcium Alginate (ALGISITE M 6\"X8\" EX), Apply topically daily, Disp: , Rfl:     Folinic Acid-Vit B6-Vit B12 (FOLINIC-PLUS) 4-50-2 MG TABS, Take 4-50 mg by mouth 2 times daily, Disp: 90 tablet, Rfl: 2    Indacaterol-Glycopyrrolate (UTIBRON NEOHALER) 27.5-15.6 MCG CAPS, Inhale 2 puffs into the lungs 2 times daily, Disp: 180 capsule, Rfl: 3    sodium hypochlorite (DAKINS) 0.25 % SOLN, Irrigate with 2 mLs as directed daily, Disp: 473 mL, Rfl: 0    albuterol (ACCUNEB) 0.63 MG/3ML nebulizer solution, Take 3 mLs by nebulization every 6 hours as needed for Wheezing, Disp: 270 mL, Rfl: 3    vitamin C (ASCORBIC ACID) 500 MG tablet, Take 500 mg by mouth daily, Disp: , Rfl:     rosuvastatin (CRESTOR) 5 MG tablet, Take 5 mg by mouth daily , Disp: , Rfl:     glipiZIDE (GLUCOTROL XL) 5 MG extended release tablet, Take 5 mg by mouth daily, Disp: , Rfl: 3    ramipril (ALTACE) 10 MG capsule, Take 10 mg by mouth daily , Disp: , Rfl:     potassium chloride (KLOR-CON M) 20 MEQ extended release tablet, Take 20 mEq by mouth daily , Disp: , Rfl:     Cholecalciferol (VITAMIN D3) 2000 UNITS CAPS, Take by mouth daily Last dose 1-14-16, Disp: , Rfl:     carvedilol (COREG) 25 MG tablet, Take 25 mg by mouth 2 times daily (with meals) Instructed to take with sip water am of procedure, Disp: , Rfl:     Omega-3 Fatty Acids (FISH OIL) 600 MG CAPS, Take 1,000 mg by mouth daily Last Dose 1-14-16, Disp: , Rfl:     liothyronine (CYTOMEL) 50 MCG tablet, Take 100 mcg by mouth daily , Disp: , Rfl:     aspirin 81 MG EC tablet, Take 81 mg by mouth daily Last dose 1-14-16, Disp: , Rfl:     Allergies: Allergies   Allergen Reactions    Clindamycin/Lincomycin      Extreme SOB     Other Hives and Shortness Of Breath     \"allergic to Potatoe white. \"    Doxycycline Hyclate Other (See Comments)     tinnitus    Singulair [Montelukast Sodium]     Adaptic Non-Adhering Dressing [Wound Dressings]      [place]        Vitals:    01/13/21 1029   Weight: (!) 360 lb (163.3 kg)   Height: 5' 7\" (1.702 m)       Exam:  Neurovascular status unchanged. Ulcerative areas are stable to both anterior/lateral regions lower leg areas. No undermining of the wound edges noted bilaterally. Edema is stable bilateral lower extremities with current compression dressings. No signs of infection noted to both lower extremities. No tenderness noted to the posterior calf regions bilaterally with active palpation and range of motion ankle and subtalar joints.       Diagnostic Studies:     Ct Pelvis Wo Contrast Additional Contrast? None    Result Date: 12/22/2020  EXAMINATION: CT OF THE PELVIS WITHOUT CONTRAST 12/22/2020 1:22 pm TECHNIQUE: CT of the pelvis was performed without the administration of intravenous contrast.  Multiplanar reformatted images are provided for review. Dose modulation, iterative reconstruction, and/or weight based adjustment of the mA/kV was utilized to reduce the radiation dose to as low as reasonably achievable. Contrast is none. Dose was not provided. COMPARISON: CT abdomen and pelvis without contrast 09/10/2013. HISTORY: ORDERING SYSTEM PROVIDED HISTORY: Pelvic and perineal pain TECHNOLOGIST PROVIDED HISTORY: Additional Contrast?->None FINDINGS: There is no acute fracture of the pelvis. No acute fracture or dislocation at the hips. Minimal bilateral hip degeneration. Tiny spurs and subchondral cysts at the superolateral acetabular margins right more than left. The anterolateral right femoral neck shows a small, 4 mm subchondral cyst indicating chronic femoroacetabular impaction syndrome. Minimal changes. Femoral heads have normal rounded contour. Tiny calcification in the right hip labrum suggesting degeneration or old tear. No hip joint effusion. Negative for iliopsoas tendonitis. No obvious new gluteal tendinitis. No obvious hip joint effusion. Chronic moderate spurring and mild synovial thickening at the pubic symphysis. Bilateral sacroiliac joints show mild vacuum degeneration and mild marginal sclerosis. Minimal SI joint spurs. Degenerative changes in the lower lumbar spine. At L4/L5, there is mild vacuum disc degeneration and mild disc bulge. Vacuum degeneration in bilateral facet joints. Mild to moderate central stenosis. At L5/S1 there is minimal facet degeneration and no stenosis. Note is made of chronic fat in bilateral inguinal canals, moderate on the left and mild on the right. Right scrotal varicocele. Some vascular calcifications are also present. No herniated bowel. A normal appendix in the right lower quadrant inferior to the cecum. No ascites.   Visualized portions of the bowel do not show any acute inflammation or obstruction. Small bowel loops are not dilated. Chronic prostate enlargement with the prostate measuring at least 6.1 cm transverse by 5.8 cm AP x 6.5 cm in length. The bladder floor is elevated above the level of the pubic symphysis. Moderate posterior and right-sided prostate calcifications mostly along the transitional zone. The urinary bladder was mildly distended at the time of exam with minimal wall thickening likely related to incomplete distension. No evidence for a bladder calculus. Distal ureters are not dilated. Mild left pelvic lymphadenopathy. A lymph node just above the left inguinal ligament measures 2.9 cm x 1.2 cm, was previously 2.6 cm x 0.7 cm. Lateral mid left pelvic obturator lymph node measures 1.9 cm, was previously 1.6 cm. A few small lymph nodes around the aortic bifurcation measuring in the 1 cm size range. No ascites. 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 The visualized portions of the ischial rectal fat are clear, no obvious inflammatory changes. The visualized portions of the anise do not show obvious inflammation. The inferior aspect is not fully included. 1. Moderate prostate enlargement. 2. Right varicocele and chronic fat in bilateral inguinal canals. 3. Minimally increased mild left pelvic lymphadenopathy. 4. Chronic degeneration in the lower lumbar spine, SI joints and hips without a significant change. 5. No acute fracture in the pelvis or hips. 6. No pelvic hematomas or ascites. Procedures:    None    Plan Per Assessment  Sosa Rust was seen today for wound check.     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 Evaluation and management  2. We did recommend continued use of the topical Maxorb alginate dressings to the wound sites, followed by dry compressive dressings to be changed daily. 3. We did recommend diet restrictions, and continued elevation of both lower extremities to reduce edematous issues. Patient is to continue taking his oral antibiotics until finished. 4. Patient will be followed up at a later date for continued wound evaluation and management. Seen By:    Graham De La Cruz DPM    Electronically signed by Graham De La Cruz DPM on 1/13/2021 at 1:56 PM    This note was created using voice recognition software. The note was reviewed however may contain grammatical errors.

## 2021-02-01 ENCOUNTER — NURSE ONLY (OUTPATIENT)
Dept: NON INVASIVE DIAGNOSTICS | Age: 71
End: 2021-02-01
Payer: MEDICARE

## 2021-02-01 DIAGNOSIS — I44.2 COMPLETE HEART BLOCK (HCC): ICD-10-CM

## 2021-02-01 DIAGNOSIS — Z95.0 PACEMAKER: Primary | ICD-10-CM

## 2021-02-01 PROCEDURE — 93296 REM INTERROG EVL PM/IDS: CPT | Performed by: INTERNAL MEDICINE

## 2021-02-01 PROCEDURE — 93294 REM INTERROG EVL PM/LDLS PM: CPT | Performed by: INTERNAL MEDICINE

## 2021-02-05 ENCOUNTER — TELEPHONE (OUTPATIENT)
Dept: PODIATRY | Age: 71
End: 2021-02-05

## 2021-02-05 NOTE — TELEPHONE ENCOUNTER
Pt was last seen 01/13/2021. Has had two appointments scheduled since for bilateral wound check and has cancelled. Pt is in so much pain he cannot make an AM appointment. Your earliest [facility] time is February 15th for him to be seen. Is there anything in the mean time you would like him to do due to not seeing/ treating for a month.

## 2021-02-22 ENCOUNTER — PROCEDURE VISIT (OUTPATIENT)
Dept: PODIATRY | Age: 71
End: 2021-02-22
Payer: MEDICARE

## 2021-02-22 VITALS — HEIGHT: 67 IN | WEIGHT: 315 LBS | BODY MASS INDEX: 49.44 KG/M2

## 2021-02-22 DIAGNOSIS — I83.022 VARICOSE VEINS OF LEFT LOWER EXTREMITY WITH ULCER OF CALF WITH FAT LAYER EXPOSED (HCC): ICD-10-CM

## 2021-02-22 DIAGNOSIS — M79.674 PAIN OF TOE OF RIGHT FOOT: ICD-10-CM

## 2021-02-22 DIAGNOSIS — L97.922 NON-PRESSURE CHRONIC ULCER OF LEFT LOWER LEG WITH FAT LAYER EXPOSED (HCC): ICD-10-CM

## 2021-02-22 DIAGNOSIS — E11.51 TYPE II DIABETES MELLITUS WITH PERIPHERAL CIRCULATORY DISORDER (HCC): ICD-10-CM

## 2021-02-22 DIAGNOSIS — L97.922 NON-PRESSURE CHRONIC ULCER OF LEFT LOWER LEG WITH FAT LAYER EXPOSED (HCC): Primary | ICD-10-CM

## 2021-02-22 DIAGNOSIS — L97.222 VARICOSE VEINS OF LEFT LOWER EXTREMITY WITH ULCER OF CALF WITH FAT LAYER EXPOSED (HCC): ICD-10-CM

## 2021-02-22 DIAGNOSIS — M79.675 PAIN OF TOE OF LEFT FOOT: ICD-10-CM

## 2021-02-22 DIAGNOSIS — B35.1 ONYCHOMYCOSIS: ICD-10-CM

## 2021-02-22 DIAGNOSIS — I89.0 LYMPHEDEMA: ICD-10-CM

## 2021-02-22 DIAGNOSIS — R26.2 DIFFICULTY WALKING: ICD-10-CM

## 2021-02-22 PROCEDURE — 99213 OFFICE O/P EST LOW 20 MIN: CPT | Performed by: PODIATRIST

## 2021-02-22 PROCEDURE — 11721 DEBRIDE NAIL 6 OR MORE: CPT | Performed by: PODIATRIST

## 2021-02-22 NOTE — PROGRESS NOTES
Patient is here for bilateral wound check. Patient has multiple concerns. Patient states that the beginning of last week a wound to his left lower leg got worse. Patient stopped all of his vitamins stating that they were not doing anything. He is unsure if the area is infected and would need antibiotics. Patient is also inquiring about a boot for circulation help.       Electronically signed by Maribell Andrew LPN on 3/70/5266 at 9:20 PM

## 2021-02-23 NOTE — PROGRESS NOTES
See [employer] report. Remote monitoring reviewed over a 90 day period. End of 90 day monitoring period date of service 2-1-2021.

## 2021-02-23 NOTE — PROGRESS NOTES
21     Soraida Quiroz    : 1950  Sex: male  Age: 65 y.o. Subjective: The patient is seen today for evaluation regarding diabetic foot evaluation, mycotic nail care, and ulcer evaluation left lower extremity. No other complaints noted.     Chief Complaint   Patient presents with    Wound Check       Current Medications:    Current Outpatient Medications:     furosemide (LASIX) 40 MG tablet, 20 mg , Disp: , Rfl:     potassium chloride (KLOR-CON M) 20 MEQ TBCR extended release tablet, , Disp: , Rfl:     Calcium Alginate (ALGISITE M 6\"X8\" EX), Apply topically daily, Disp: , Rfl:     Folinic Acid-Vit B6-Vit B12 (FOLINIC-PLUS) 4-50-2 MG TABS, Take 4-50 mg by mouth 2 times daily, Disp: 90 tablet, Rfl: 2    Indacaterol-Glycopyrrolate (UTIBRON NEOHALER) 27.5-15.6 MCG CAPS, Inhale 2 puffs into the lungs 2 times daily, Disp: 180 capsule, Rfl: 3    sodium hypochlorite (DAKINS) 0.25 % SOLN, Irrigate with 2 mLs as directed daily, Disp: 473 mL, Rfl: 0    albuterol (ACCUNEB) 0.63 MG/3ML nebulizer solution, Take 3 mLs by nebulization every 6 hours as needed for Wheezing, Disp: 270 mL, Rfl: 3    vitamin C (ASCORBIC ACID) 500 MG tablet, Take 500 mg by mouth daily, Disp: , Rfl:     rosuvastatin (CRESTOR) 5 MG tablet, Take 5 mg by mouth daily , Disp: , Rfl:     glipiZIDE (GLUCOTROL XL) 5 MG extended release tablet, Take 5 mg by mouth daily, Disp: , Rfl: 3    ramipril (ALTACE) 10 MG capsule, Take 10 mg by mouth daily , Disp: , Rfl:     potassium chloride (KLOR-CON M) 20 MEQ extended release tablet, Take 20 mEq by mouth daily , Disp: , Rfl:     Cholecalciferol (VITAMIN D3) 2000 UNITS CAPS, Take by mouth daily Last dose 16, Disp: , Rfl:     carvedilol (COREG) 25 MG tablet, Take 25 mg by mouth 2 times daily (with meals) Instructed to take with sip water am of procedure, Disp: , Rfl:     Omega-3 Fatty Acids (FISH OIL) 600 MG CAPS, Take 1,000 mg by mouth daily Last Dose 16, Disp: , Rfl:    liothyronine (CYTOMEL) 50 MCG tablet, Take 100 mcg by mouth daily , Disp: , Rfl:     aspirin 81 MG EC tablet, Take 81 mg by mouth daily Last dose 1-14-16, Disp: , Rfl:     Allergies: Allergies   Allergen Reactions    Clindamycin/Lincomycin      Extreme SOB     Other Hives and Shortness Of Breath     \"allergic to Potatoe white. \"    Doxycycline Hyclate Other (See Comments)     tinnitus    Singulair [Montelukast Sodium]     Adaptic Non-Adhering Dressing [Wound Dressings]      [place]        Past Surgical History:   Procedure Laterality Date    CARDIAC PACEMAKER PLACEMENT  04/09/10    [employer]    CHOLECYSTECTOMY      COLONOSCOPY  1/20/2016    JOINT REPLACEMENT Bilateral     Left TJAK    PACEMAKER PLACEMENT  07/23/2020    DUAL PPM GR    ([employer])     DR. MARTIN     TRANSESOPHAGEAL ECHOCARDIOGRAM  06/19/2020    With      Past Medical History:   Diagnosis Date    CAD (coronary artery disease)     Cataracts, bilateral 2019    Cellulitis 1-2016    Left leg    Complete heart block (HCC)     h/o    Diabetes mellitus [facility]     Encounter for aspirin therapy     patient uses for heart health    H/O asbestosis     History of blood transfusion     Hx of blood clots     Hyperlipidemia     Hypertension     Hyperthyroidism     Lung disorder     weak lungs    Myocardial infarct ([facility] [address] 2010    Neuropathy     feet    Obesity     Obstructive sleep apnea     on cpap    Onychomycosis     Osteoarthritis     Pacemaker     [employer]    Post-thrombotic syndrome 9/11/2017    Screening 1-20-16    for Colonoscopy    Stasis dermatitis     h/o on bilateral legs with leg edema    Varicose veins        Vitals:    02/22/21 1536   Weight: (!) 360 lb (163.3 kg)   Height: 5' 7\" (1.702 m)       Exam:  Neurovascular status unchanged. At this time the nail/s 1, 2, 5 right foot and nail/s 1, 2, 5 left foot are noted to be thickened, dystrophic and discolored with subungual debris present.   Tenderness noted to palpation. Diminished hair growth is noted to both lower extremities. Edema noted to both lower extremities with varicosities and stasis skin changes present bilaterally. Coolness is noted to the digital regions to palpation. Capillary fill time delayed digital areas bilateral foot. No heel fissuring or macerations of the web spaces. No plantar calluses and/or ulcerative areas are noted. Ulcerative area stable lateral aspect left lower extremity. No surrounding erythema, skin crepitation, or any ascending cellulitic issues noted left lower extremity. Patient is having difficulty with gait/walking. Plan Per Assessment  Veronica Sheriff was seen today for wound check. 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 Evaluation and Management  2. Manual and electrical debridement of the mycotic nails was performed for thickness and length to prevent injection and/or ulceration. 3. I recommended antifungal cream to the nails daily. 4. It was discussed in detail with the patient proper caring for the vascular compromised foot. The fact that they have compromised blood flow put the patient at risk for infection/gangrene/amputation. The patient should not walk barefoot. Shoe gear should fit properly and socks should be worn with shoes. Exercise is very important to prevent worsening of the disease process but before performing an exercise program should check with their family physician first.  If any skin lesions are noted, they are instructed to contact the office immediately.     5. Soft tissue cultures taken for C&S, will treat accordingly when results are obtained. 6. We will see the patient back at a later date for continued podiatric management and care. Patient was advised to call the office with any questions or concerns prior to their next appointment if needed. Seen By:    Natalie Craft DPM    Electronically signed by Natalie Craft DPM on 2/22/2021 at 9:06 PM      This note was created using voice recognition software. The note was reviewed however may contain grammatical errors.

## 2021-02-26 ENCOUNTER — TELEPHONE (OUTPATIENT)
Dept: PODIATRY | Age: 71
End: 2021-02-26

## 2021-02-26 DIAGNOSIS — I83.022 VARICOSE VEINS OF LEFT LOWER EXTREMITY WITH ULCER OF CALF WITH FAT LAYER EXPOSED (HCC): ICD-10-CM

## 2021-02-26 DIAGNOSIS — L97.922 NON-PRESSURE CHRONIC ULCER OF LEFT LOWER LEG WITH FAT LAYER EXPOSED (HCC): Primary | ICD-10-CM

## 2021-02-26 DIAGNOSIS — L97.222 VARICOSE VEINS OF LEFT LOWER EXTREMITY WITH ULCER OF CALF WITH FAT LAYER EXPOSED (HCC): ICD-10-CM

## 2021-02-26 RX ORDER — LEVOFLOXACIN 500 MG/1
500 TABLET, FILM COATED ORAL DAILY
Qty: 10 TABLET | Refills: 0 | Status: SHIPPED | OUTPATIENT
Start: 2021-02-26 | End: 2021-03-08

## 2021-03-01 ENCOUNTER — IMMUNIZATION (OUTPATIENT)
Dept: PRIMARY CARE CLINIC | Age: 71
End: 2021-03-01
Payer: MEDICARE

## 2021-03-01 PROCEDURE — 91300 COVID-19, PFIZER VACCINE 30MCG/0.3ML DOSE: CPT | Performed by: CLINICAL NURSE SPECIALIST

## 2021-03-01 PROCEDURE — 0001A COVID-19, PFIZER VACCINE 30MCG/0.3ML DOSE: CPT | Performed by: CLINICAL NURSE SPECIALIST

## 2021-03-05 DIAGNOSIS — I83.022 VARICOSE VEINS OF LEFT LOWER EXTREMITY WITH ULCER OF CALF WITH FAT LAYER EXPOSED (HCC): Primary | ICD-10-CM

## 2021-03-05 DIAGNOSIS — L97.922 NON-PRESSURE CHRONIC ULCER OF LEFT LOWER LEG WITH FAT LAYER EXPOSED (HCC): ICD-10-CM

## 2021-03-05 DIAGNOSIS — L97.222 VARICOSE VEINS OF LEFT LOWER EXTREMITY WITH ULCER OF CALF WITH FAT LAYER EXPOSED (HCC): Primary | ICD-10-CM

## 2021-03-05 RX ORDER — SULFAMETHOXAZOLE AND TRIMETHOPRIM 800; 160 MG/1; MG/1
1 TABLET ORAL 2 TIMES DAILY
Qty: 28 TABLET | Refills: 0 | Status: SHIPPED | OUTPATIENT
Start: 2021-03-05 | End: 2021-03-19

## 2021-03-08 ENCOUNTER — OFFICE VISIT (OUTPATIENT)
Dept: PODIATRY | Age: 71
End: 2021-03-08
Payer: MEDICARE

## 2021-03-08 VITALS — BODY MASS INDEX: 49.44 KG/M2 | WEIGHT: 315 LBS | HEIGHT: 67 IN

## 2021-03-08 DIAGNOSIS — L97.222 VARICOSE VEINS OF LEFT LOWER EXTREMITY WITH ULCER OF CALF WITH FAT LAYER EXPOSED (HCC): Primary | ICD-10-CM

## 2021-03-08 DIAGNOSIS — I89.0 LYMPHEDEMA: ICD-10-CM

## 2021-03-08 DIAGNOSIS — I83.022 VARICOSE VEINS OF LEFT LOWER EXTREMITY WITH ULCER OF CALF WITH FAT LAYER EXPOSED (HCC): Primary | ICD-10-CM

## 2021-03-08 DIAGNOSIS — L97.922 NON-PRESSURE CHRONIC ULCER OF LEFT LOWER LEG WITH FAT LAYER EXPOSED (HCC): ICD-10-CM

## 2021-03-08 PROCEDURE — 99213 OFFICE O/P EST LOW 20 MIN: CPT | Performed by: PODIATRIST

## 2021-03-08 PROCEDURE — 29580 STRAPPING UNNA BOOT: CPT | Performed by: PODIATRIST

## 2021-03-08 NOTE — PROGRESS NOTES
Patient is in today for 2 week follow up of left wound on lower leg. Patient has had an increase in pain, discoloration, and redness since last visit. Patient is currently taking two antibiotics.  pcp is Mauricio Sagastume MD

## 2021-03-08 NOTE — PROGRESS NOTES
3/8/21     Juve Yoder    : 1950   Sex: male    Age: 65 y.o. Patient's PCP/Provider is:  Gala Eugene MD    Subjective:  Patient is seen today for follow-up regarding continued evaluation regarding stasis ulceration to his left lower extremity. Patient denies any additional issues at this time. Patient has been taking the oral antibiotics as prescribed. Patient's wife has noticed increase in wound size and patient is complaining of some increased discomfort to the left lower leg. No recent changes in activities or any other abnormalities noted at this time. Chief Complaint   Patient presents with    Wound Check     left lower leg       ROS:  Const: Positives and pertinent negatives as per HPI. Musculo: Denies symptoms other than stated above. Neuro: Denies symptoms other than stated above. Skin: Denies symptoms other than stated above.     Current Medications:    Current Outpatient Medications:     sulfamethoxazole-trimethoprim (BACTRIM DS;SEPTRA DS) 800-160 MG per tablet, Take 1 tablet by mouth 2 times daily for 14 days, Disp: 28 tablet, Rfl: 0    levoFLOXacin (LEVAQUIN) 500 MG tablet, Take 1 tablet by mouth daily for 10 days, Disp: 10 tablet, Rfl: 0    furosemide (LASIX) 40 MG tablet, 20 mg , Disp: , Rfl:     potassium chloride (KLOR-CON M) 20 MEQ TBCR extended release tablet, , Disp: , Rfl:     Calcium Alginate (ALGISITE M 6\"X8\" EX), Apply topically daily, Disp: , Rfl:     Folinic Acid-Vit B6-Vit B12 (FOLINIC-PLUS) 4-50-2 MG TABS, Take 4-50 mg by mouth 2 times daily, Disp: 90 tablet, Rfl: 2    Indacaterol-Glycopyrrolate (UTIBRON NEOHALER) 27.5-15.6 MCG CAPS, Inhale 2 puffs into the lungs 2 times daily, Disp: 180 capsule, Rfl: 3    sodium hypochlorite (DAKINS) 0.25 % SOLN, Irrigate with 2 mLs as directed daily, Disp: 473 mL, Rfl: 0    albuterol (ACCUNEB) 0.63 MG/3ML nebulizer solution, Take 3 mLs by nebulization every 6 hours as needed for Wheezing, Disp: 270 mL, Rfl: 3    vitamin C (ASCORBIC ACID) 500 MG tablet, Take 500 mg by mouth daily, Disp: , Rfl:     rosuvastatin (CRESTOR) 5 MG tablet, Take 5 mg by mouth daily , Disp: , Rfl:     glipiZIDE (GLUCOTROL XL) 5 MG extended release tablet, Take 5 mg by mouth daily, Disp: , Rfl: 3    ramipril (ALTACE) 10 MG capsule, Take 10 mg by mouth daily , Disp: , Rfl:     potassium chloride (KLOR-CON M) 20 MEQ extended release tablet, Take 20 mEq by mouth daily , Disp: , Rfl:     Cholecalciferol (VITAMIN D3) 2000 UNITS CAPS, Take by mouth daily Last dose 1-14-16, Disp: , Rfl:     carvedilol (COREG) 25 MG tablet, Take 25 mg by mouth 2 times daily (with meals) Instructed to take with sip water am of procedure, Disp: , Rfl:     Omega-3 Fatty Acids (FISH OIL) 600 MG CAPS, Take 1,000 mg by mouth daily Last Dose 1-14-16, Disp: , Rfl:     liothyronine (CYTOMEL) 50 MCG tablet, Take 100 mcg by mouth daily , Disp: , Rfl:     aspirin 81 MG EC tablet, Take 81 mg by mouth daily Last dose 1-14-16, Disp: , Rfl:     Allergies: Allergies   Allergen Reactions    Clindamycin/Lincomycin      Extreme SOB     Other Hives and Shortness Of Breath     \"allergic to Potatoe white. \"    Doxycycline Hyclate Other (See Comments)     tinnitus    Singulair [Montelukast Sodium]     Adaptic Non-Adhering Dressing [Wound Dressings]      [place]        Vitals:    03/08/21 1335   Weight: (!) 360 lb (163.3 kg)   Height: 5' 7\" (1.702 m)       Exam:  Neurovascular status unchanged. Ulcerative area slightly enlarged to the posterior lateral aspect left lower extremity. No purulence, odor, erythema or any signs of infection noted left lower extremity. Moderate edematous issues noted into the left lower extremity. Mild erythema noted into the wound periphery, without abscess or ascending cellulitic issues noted. Diagnostic Studies:     No results found. Procedures: An unna boot  compressive wrap was applied to the left lower extremity.  It's purpose is to decrease  the amount of edema present, and to allow proper healing of the soft tissues. The patient was instructed to keep the unna boot clean, dry and intact until the next follow up visit. The patient was instructed to look for signs of redness, irritation, blistering and pain. If these or any other symptoms were to develop, they were advised to contact the office immediately for reevaluation. Plan Per Assessment  Jane Savage was seen today for wound check. 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 Evaluation and management  2. Compression dressing was applied to the symptomatic left lower extremity as described above. 3. Patient was advised to continue with his current oral antibiotic course as prescribed. 4. Patient will be followed up in 1 week's time for continued wound evaluation and management. Due to the longstanding ulceration and chronic lymphedema issues, we did recommend obtaining authorization for lymphedema compression pumps. This would greatly aid in his everyday compression efforts. He was advised to call the office with any questions or concerns in the interim. Seen By:    Win Ho DPM    Electronically signed by Win Ho DPM on 3/8/2021 at 1:57 PM    This note was created using voice recognition software. The note was reviewed however may contain grammatical errors.

## 2021-03-12 ENCOUNTER — OFFICE VISIT (OUTPATIENT)
Dept: PODIATRY | Age: 71
End: 2021-03-12
Payer: MEDICARE

## 2021-03-12 VITALS — BODY MASS INDEX: 49.44 KG/M2 | WEIGHT: 315 LBS | HEIGHT: 67 IN

## 2021-03-12 DIAGNOSIS — I83.022 VARICOSE VEINS OF LEFT LOWER EXTREMITY WITH ULCER OF CALF WITH FAT LAYER EXPOSED (HCC): Primary | ICD-10-CM

## 2021-03-12 DIAGNOSIS — L97.922 NON-PRESSURE CHRONIC ULCER OF LEFT LOWER LEG WITH FAT LAYER EXPOSED (HCC): ICD-10-CM

## 2021-03-12 DIAGNOSIS — L97.222 VARICOSE VEINS OF LEFT LOWER EXTREMITY WITH ULCER OF CALF WITH FAT LAYER EXPOSED (HCC): Primary | ICD-10-CM

## 2021-03-12 DIAGNOSIS — I89.0 LYMPHEDEMA: ICD-10-CM

## 2021-03-12 DIAGNOSIS — L85.3 XEROSIS CUTIS: ICD-10-CM

## 2021-03-12 PROCEDURE — 99213 OFFICE O/P EST LOW 20 MIN: CPT | Performed by: PODIATRIST

## 2021-03-12 PROCEDURE — 29580 STRAPPING UNNA BOOT: CPT | Performed by: PODIATRIST

## 2021-03-12 RX ORDER — AMMONIUM LACTATE 12 G/100G
LOTION TOPICAL
Qty: 222 ML | Refills: 5 | Status: SHIPPED
Start: 2021-03-12 | End: 2022-04-21

## 2021-03-12 NOTE — PROGRESS NOTES
Patient is here f/u left leg unna boot. Patient states the wound has changed but not sure if it is for the better or worse. Stephanie Dixon MD   Patient needs a wound culture of left leg on 4/5/2021. Then faxed: [phone] to [facility]. For injection into patients back for appointment on 4/12/21.

## 2021-03-13 NOTE — PROGRESS NOTES
3/13/21     Abhijeet Narrow    : 1950   Sex: male    Age: 65 y.o. Patient's PCP/Provider is:  Jj Hartman MD    Subjective:  Patient is seen today for follow-up regarding continued evaluation regarding chronic stasis ulceration left lower extremity. Overall patient tolerated the compression dressing without issues. No other additional abnormalities noted at this time. Chief Complaint   Patient presents with    Wound Check     left        ROS:  Const: Positives and pertinent negatives as per HPI. Musculo: Denies symptoms other than stated above. Neuro: Denies symptoms other than stated above. Skin: Denies symptoms other than stated above. Current Medications:    Current Outpatient Medications:     ammonium lactate (LAC-HYDRIN) 12 % lotion, Apply topically daily. , Disp: 222 mL, Rfl: 5    sulfamethoxazole-trimethoprim (BACTRIM DS;SEPTRA DS) 800-160 MG per tablet, Take 1 tablet by mouth 2 times daily for 14 days, Disp: 28 tablet, Rfl: 0    furosemide (LASIX) 40 MG tablet, 20 mg , Disp: , Rfl:     potassium chloride (KLOR-CON M) 20 MEQ TBCR extended release tablet, , Disp: , Rfl:     Calcium Alginate (ALGISITE M 6\"X8\" EX), Apply topically daily, Disp: , Rfl:     Folinic Acid-Vit B6-Vit B12 (FOLINIC-PLUS) 4-50-2 MG TABS, Take 4-50 mg by mouth 2 times daily, Disp: 90 tablet, Rfl: 2    Indacaterol-Glycopyrrolate (UTIBRON NEOHALER) 27.5-15.6 MCG CAPS, Inhale 2 puffs into the lungs 2 times daily, Disp: 180 capsule, Rfl: 3    sodium hypochlorite (DAKINS) 0.25 % SOLN, Irrigate with 2 mLs as directed daily, Disp: 473 mL, Rfl: 0    albuterol (ACCUNEB) 0.63 MG/3ML nebulizer solution, Take 3 mLs by nebulization every 6 hours as needed for Wheezing, Disp: 270 mL, Rfl: 3    vitamin C (ASCORBIC ACID) 500 MG tablet, Take 500 mg by mouth daily, Disp: , Rfl:     rosuvastatin (CRESTOR) 5 MG tablet, Take 5 mg by mouth daily , Disp: , Rfl:     glipiZIDE (GLUCOTROL XL) 5 MG extended release tablet, Take 5 mg by mouth daily, Disp: , Rfl: 3    ramipril (ALTACE) 10 MG capsule, Take 10 mg by mouth daily , Disp: , Rfl:     potassium chloride (KLOR-CON M) 20 MEQ extended release tablet, Take 20 mEq by mouth daily , Disp: , Rfl:     Cholecalciferol (VITAMIN D3) 2000 UNITS CAPS, Take by mouth daily Last dose 1-14-16, Disp: , Rfl:     carvedilol (COREG) 25 MG tablet, Take 25 mg by mouth 2 times daily (with meals) Instructed to take with sip water am of procedure, Disp: , Rfl:     Omega-3 Fatty Acids (FISH OIL) 600 MG CAPS, Take 1,000 mg by mouth daily Last Dose 1-14-16, Disp: , Rfl:     liothyronine (CYTOMEL) 50 MCG tablet, Take 100 mcg by mouth daily , Disp: , Rfl:     aspirin 81 MG EC tablet, Take 81 mg by mouth daily Last dose 1-14-16, Disp: , Rfl:     Allergies: Allergies   Allergen Reactions    Clindamycin/Lincomycin      Extreme SOB     Other Hives and Shortness Of Breath     \"allergic to Potatoe white. \"    Doxycycline Hyclate Other (See Comments)     tinnitus    Singulair [Montelukast Sodium]     Adaptic Non-Adhering Dressing [Wound Dressings]      [place]        Vitals:    03/12/21 1147   Weight: (!) 360 lb (163.3 kg)   Height: 5' 7\" (1.702 m)       Exam:  Neurovascular status unchanged. Ulcerative area improved lateral aspect left lower extremity. Edematous changes are improved left lower extremity. No signs of infection noted left lower extremity. No tenderness noted into the posterior left calf or lower leg region with palpation and active range of motion. Diagnostic Studies:     No results found. Procedures: An unna boot  compressive wrap was applied to the left lower extremity. It's purpose is to  decrease  the amount of edema present, and to allow proper healing of the soft tissues. The patient was instructed to keep the unna boot clean, dry and intact until the next follow up visit.  The patient was instructed to look for signs of redness, irritation, blistering and pain.  If these or any other symptoms were to develop, they were advised to contact the office immediately for reevaluation. Plan Per Assessment  Jordana Couch was seen today for wound check. 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 Lymphedema      1. Evaluation and management  2. Compression dressing was applied to the symptomatic left lower extremity as described above. Prescription medication Lachydrin lotion given with exact instructions on usage. I discussed the potential side effects that the patient may experience with the medication and the need to call if they experience any. Prescription was given due to the periwound xerosis present. Patient will be followed up in 1 week's time for continued evaluation and management. Seen By:    Khris Angeles DPM    Electronically signed by Khris Angeles DPM on 3/13/2021 at 9:18 AM    This note was created using voice recognition software. The note was reviewed however may contain grammatical errors.

## 2021-03-19 ENCOUNTER — OFFICE VISIT (OUTPATIENT)
Dept: PODIATRY | Age: 71
End: 2021-03-19
Payer: MEDICARE

## 2021-03-19 VITALS — HEIGHT: 67 IN | BODY MASS INDEX: 49.44 KG/M2 | WEIGHT: 315 LBS

## 2021-03-19 DIAGNOSIS — I89.0 LYMPHEDEMA: ICD-10-CM

## 2021-03-19 DIAGNOSIS — R26.2 DIFFICULTY WALKING: ICD-10-CM

## 2021-03-19 DIAGNOSIS — I83.022 VARICOSE VEINS OF LEFT LOWER EXTREMITY WITH ULCER OF CALF WITH FAT LAYER EXPOSED (HCC): Primary | ICD-10-CM

## 2021-03-19 DIAGNOSIS — L97.222 VARICOSE VEINS OF LEFT LOWER EXTREMITY WITH ULCER OF CALF WITH FAT LAYER EXPOSED (HCC): Primary | ICD-10-CM

## 2021-03-19 DIAGNOSIS — L97.922 NON-PRESSURE CHRONIC ULCER OF LEFT LOWER LEG WITH FAT LAYER EXPOSED (HCC): ICD-10-CM

## 2021-03-19 PROCEDURE — 29580 STRAPPING UNNA BOOT: CPT | Performed by: PODIATRIST

## 2021-03-19 PROCEDURE — 99213 OFFICE O/P EST LOW 20 MIN: CPT | Performed by: PODIATRIST

## 2021-03-19 NOTE — PROGRESS NOTES
3/19/21     Jackelin Angel    : 1950   Sex: male    Age: 65 y.o. Patient's PCP/Provider is:  Joe Rhoades MD    Subjective:  Patient is seen today for follow-up regarding continued evaluation regarding chronic stasis ulceration left lower extremity. We have been applying the compression dressings over the last several weeks with modest improvement in symptoms. Patient was complaining of some additional skin irritative changes due to the continued care. Patient is in no acute distress. He denies any nausea, vomiting, fever, chills. Have been dealing with the lymphedema issues over the last 1 to 2 years. He denies any additional issues at this time. Chief Complaint   Patient presents with    Wound Check     left lower ext        ROS:  Const: Positives and pertinent negatives as per HPI. Musculo: Denies symptoms other than stated above. Neuro: Denies symptoms other than stated above. Skin: Denies symptoms other than stated above. Current Medications:    Current Outpatient Medications:     ammonium lactate (LAC-HYDRIN) 12 % lotion, Apply topically daily. , Disp: 222 mL, Rfl: 5    sulfamethoxazole-trimethoprim (BACTRIM DS;SEPTRA DS) 800-160 MG per tablet, Take 1 tablet by mouth 2 times daily for 14 days, Disp: 28 tablet, Rfl: 0    furosemide (LASIX) 40 MG tablet, 20 mg , Disp: , Rfl:     potassium chloride (KLOR-CON M) 20 MEQ TBCR extended release tablet, , Disp: , Rfl:     Calcium Alginate (ALGISITE M 6\"X8\" EX), Apply topically daily, Disp: , Rfl:     Folinic Acid-Vit B6-Vit B12 (FOLINIC-PLUS) 4-50-2 MG TABS, Take 4-50 mg by mouth 2 times daily, Disp: 90 tablet, Rfl: 2    Indacaterol-Glycopyrrolate (UTIBRON NEOHALER) 27.5-15.6 MCG CAPS, Inhale 2 puffs into the lungs 2 times daily, Disp: 180 capsule, Rfl: 3    sodium hypochlorite (DAKINS) 0.25 % SOLN, Irrigate with 2 mLs as directed daily, Disp: 473 mL, Rfl: 0    albuterol (ACCUNEB) 0.63 MG/3ML nebulizer solution, Take 3 mLs by nebulization every 6 hours as needed for Wheezing, Disp: 270 mL, Rfl: 3    vitamin C (ASCORBIC ACID) 500 MG tablet, Take 500 mg by mouth daily, Disp: , Rfl:     rosuvastatin (CRESTOR) 5 MG tablet, Take 5 mg by mouth daily , Disp: , Rfl:     glipiZIDE (GLUCOTROL XL) 5 MG extended release tablet, Take 5 mg by mouth daily, Disp: , Rfl: 3    ramipril (ALTACE) 10 MG capsule, Take 10 mg by mouth daily , Disp: , Rfl:     potassium chloride (KLOR-CON M) 20 MEQ extended release tablet, Take 20 mEq by mouth daily , Disp: , Rfl:     Cholecalciferol (VITAMIN D3) 2000 UNITS CAPS, Take by mouth daily Last dose 1-14-16, Disp: , Rfl:     carvedilol (COREG) 25 MG tablet, Take 25 mg by mouth 2 times daily (with meals) Instructed to take with sip water am of procedure, Disp: , Rfl:     Omega-3 Fatty Acids (FISH OIL) 600 MG CAPS, Take 1,000 mg by mouth daily Last Dose 1-14-16, Disp: , Rfl:     liothyronine (CYTOMEL) 50 MCG tablet, Take 100 mcg by mouth daily , Disp: , Rfl:     aspirin 81 MG EC tablet, Take 81 mg by mouth daily Last dose 1-14-16, Disp: , Rfl:     Allergies: Allergies   Allergen Reactions    Clindamycin/Lincomycin      Extreme SOB     Other Hives and Shortness Of Breath     \"allergic to Potatoe white. \"    Doxycycline Hyclate Other (See Comments)     tinnitus    Singulair [Montelukast Sodium]     Adaptic Non-Adhering Dressing [Wound Dressings]      [place]        Vitals:    03/19/21 1140   Weight: (!) 360 lb (163.3 kg)   Height: 5' 7\" (1.702 m)       Exam:  VASCULAR: Pulses palpable left foot. NEUROLOGICAL: Epicritic sensations intact left lower extremity  DERMATOLOGICAL: Generalized edematous issues noted with brawny edema and hyperpigmentation noted in the left lower extremity. Ulceration noted left lateral lower leg with granular tissue noted centrally. Ulceration measures approximately 3.5 x 3.2 x 0.2 cm. No undermining of the wound edges noted.   No signs of infection noted left lower extremity. MUSCULOSKELETAL: Limited range of motion left ankle and subtalar joint secondary to edematous changes present. Diagnostic Studies:     No results found. Procedures: An unna boot  compressive wrap was applied to the left lower extremity. It's purpose is to  decrease  the amount of edema present, and to allow proper healing of the soft tissues. The patient was instructed to keep the unna boot clean, dry and intact until the next follow up visit. The patient was instructed to look for signs of redness, irritation, blistering and pain. If these or any other symptoms were to develop, they were advised to contact the office immediately for reevaluation. Plan Per Assessment  Sosa Rust was seen today for wound check. 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 Evaluation and management  2. Again we have tried multiple compression devices with the Unna boot, graduated compression stockings, Tubigrip stockings over the last 1 to 2 years. We have also recommended strict elevation and exercising to reduce edematous changes. All previous treatments have failed to alleviate the lymphedema issues and slowed healing of the ulceration present. 3. We did recommend trying to get evaluated for lymphedema pump to aid in ulcer healing and to control his chronic edematous issues to both lower extremities. 4. Patient will be followed up at a later date for continued wound evaluation and management. He was advised to call the office with any questions or concerns in the interim. Seen By:    Janice Leo DPM    Electronically signed by Janice Leo DPM on 3/19/2021 at 12:26 PM    This note was created using voice recognition software.   The note was reviewed however may contain grammatical errors.

## 2021-03-19 NOTE — PROGRESS NOTES
Patient is in today for 1 week left leg wound. Patient has a large amount of colored discharge from the area.  pcp is Tc Eubanks MD last ov 1/15/2021

## 2021-03-24 ENCOUNTER — IMMUNIZATION (OUTPATIENT)
Dept: PRIMARY CARE CLINIC | Age: 71
End: 2021-03-24
Payer: MEDICARE

## 2021-03-24 ENCOUNTER — OFFICE VISIT (OUTPATIENT)
Dept: PODIATRY | Age: 71
End: 2021-03-24
Payer: MEDICARE

## 2021-03-24 VITALS — WEIGHT: 315 LBS | HEIGHT: 67 IN | BODY MASS INDEX: 49.44 KG/M2

## 2021-03-24 DIAGNOSIS — L97.922 NON-PRESSURE CHRONIC ULCER OF LEFT LOWER LEG WITH FAT LAYER EXPOSED (HCC): ICD-10-CM

## 2021-03-24 DIAGNOSIS — I89.0 LYMPHEDEMA: ICD-10-CM

## 2021-03-24 DIAGNOSIS — L97.222 VARICOSE VEINS OF LEFT LOWER EXTREMITY WITH ULCER OF CALF WITH FAT LAYER EXPOSED (HCC): ICD-10-CM

## 2021-03-24 DIAGNOSIS — I83.022 VARICOSE VEINS OF LEFT LOWER EXTREMITY WITH ULCER OF CALF WITH FAT LAYER EXPOSED (HCC): Primary | ICD-10-CM

## 2021-03-24 DIAGNOSIS — L97.222 VARICOSE VEINS OF LEFT LOWER EXTREMITY WITH ULCER OF CALF WITH FAT LAYER EXPOSED (HCC): Primary | ICD-10-CM

## 2021-03-24 DIAGNOSIS — I83.022 VARICOSE VEINS OF LEFT LOWER EXTREMITY WITH ULCER OF CALF WITH FAT LAYER EXPOSED (HCC): ICD-10-CM

## 2021-03-24 PROCEDURE — 29580 STRAPPING UNNA BOOT: CPT | Performed by: PODIATRIST

## 2021-03-24 PROCEDURE — 91300 COVID-19, PFIZER VACCINE 30MCG/0.3ML DOSE: CPT | Performed by: INTERNAL MEDICINE

## 2021-03-24 PROCEDURE — 0002A COVID-19, PFIZER VACCINE 30MCG/0.3ML DOSE: CPT | Performed by: INTERNAL MEDICINE

## 2021-03-24 NOTE — PROGRESS NOTES
3/24/21     Judith Fat    : 1950   Sex: male    Age: 65 y.o. Patient's PCP/Provider is:  Lisa Pacheco MD    Subjective:  Patient is seen today for follow-up regarding continued evaluation regarding stasis ulceration left lower extremity. Patient and his wife did notice some increased discomfort at the wound site over the last few days. Patient's wife did notice some increased serous drainage from the wound site over the last 1 to 2 days as well. Patient is in no acute distress. He denies any nausea, vomiting, fever, chills. No other additional abnormalities noted. Chief Complaint   Patient presents with    Wound Check     left       ROS:  Const: Positives and pertinent negatives as per HPI. Musculo: Denies symptoms other than stated above. Neuro: Denies symptoms other than stated above. Skin: Denies symptoms other than stated above. Current Medications:    Current Outpatient Medications:     ammonium lactate (LAC-HYDRIN) 12 % lotion, Apply topically daily. , Disp: 222 mL, Rfl: 5    furosemide (LASIX) 40 MG tablet, 20 mg , Disp: , Rfl:     potassium chloride (KLOR-CON M) 20 MEQ TBCR extended release tablet, , Disp: , Rfl:     Calcium Alginate (ALGISITE M 6\"X8\" EX), Apply topically daily, Disp: , Rfl:     Folinic Acid-Vit B6-Vit B12 (FOLINIC-PLUS) 4-50-2 MG TABS, Take 4-50 mg by mouth 2 times daily, Disp: 90 tablet, Rfl: 2    Indacaterol-Glycopyrrolate (Caroline Tish) 27.5-15.6 MCG CAPS, Inhale 2 puffs into the lungs 2 times daily, Disp: 180 capsule, Rfl: 3    sodium hypochlorite (DAKINS) 0.25 % SOLN, Irrigate with 2 mLs as directed daily, Disp: 473 mL, Rfl: 0    albuterol (ACCUNEB) 0.63 MG/3ML nebulizer solution, Take 3 mLs by nebulization every 6 hours as needed for Wheezing, Disp: 270 mL, Rfl: 3    vitamin C (ASCORBIC ACID) 500 MG tablet, Take 500 mg by mouth daily, Disp: , Rfl:     rosuvastatin (CRESTOR) 5 MG tablet, Take 5 mg by mouth daily , Disp: , Rfl: for signs of redness, irritation, blistering and pain. If these or any other symptoms were to develop, they were advised to contact the office immediately for reevaluation. Plan Per Assessment  Radhames Dukes was seen today for wound check. 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 Evaluation and management  2. Compression dressing was applied to the ulcerative region left lower extremity as described above. Patient was advised to elevate the left lower extremity throughout the day to reduce dependent edematous changes. 3. We did take a culture of the wound site today due to some increased pain and drainage from the site over the last several days. Once results are obtained we will treat accordingly. 4. We are still awaiting approval for the lymphedema pump system for care. 5. Patient will be followed up in 1 week's time or sooner if needed for reevaluation. They were advised to call the office with any questions or concerns in the interim. Seen By:    Ari Dunn DPM    Electronically signed by Ari Dunn DPM on 3/24/2021 at 7:57 PM    This note was created using voice recognition software. The note was reviewed however may contain grammatical errors.

## 2021-03-25 ENCOUNTER — TELEPHONE (OUTPATIENT)
Dept: PODIATRY | Age: 71
End: 2021-03-25

## 2021-03-25 DIAGNOSIS — L97.922 NON-PRESSURE CHRONIC ULCER OF LEFT LOWER LEG WITH FAT LAYER EXPOSED (HCC): Primary | ICD-10-CM

## 2021-03-25 DIAGNOSIS — I83.022 VARICOSE VEINS OF LEFT LOWER EXTREMITY WITH ULCER OF CALF WITH FAT LAYER EXPOSED (HCC): ICD-10-CM

## 2021-03-25 DIAGNOSIS — L97.222 VARICOSE VEINS OF LEFT LOWER EXTREMITY WITH ULCER OF CALF WITH FAT LAYER EXPOSED (HCC): ICD-10-CM

## 2021-03-25 RX ORDER — AMOXICILLIN AND CLAVULANATE POTASSIUM 875; 125 MG/1; MG/1
1 TABLET, FILM COATED ORAL 2 TIMES DAILY
Qty: 28 TABLET | Refills: 0 | Status: SHIPPED
Start: 2021-03-25 | End: 2021-04-06 | Stop reason: ALTCHOICE

## 2021-03-25 NOTE — TELEPHONE ENCOUNTER
Patients wife called, stated that she had to cut off unna boot at 3 am due to severe pain and rinsed the wound. Patient walked around for about 1 hour and then rinsed the wound again. After the rinse the pain started again. The wound is bright red. Patients wife would like to know how to proceed with treatment? Or if there was a way to get cultures back faster to see if there is an infection.

## 2021-03-29 DIAGNOSIS — L97.222 VARICOSE VEINS OF LEFT LOWER EXTREMITY WITH ULCER OF CALF WITH FAT LAYER EXPOSED (HCC): Primary | ICD-10-CM

## 2021-03-29 DIAGNOSIS — L97.922 NON-PRESSURE CHRONIC ULCER OF LEFT LOWER LEG WITH FAT LAYER EXPOSED (HCC): ICD-10-CM

## 2021-03-29 DIAGNOSIS — I83.022 VARICOSE VEINS OF LEFT LOWER EXTREMITY WITH ULCER OF CALF WITH FAT LAYER EXPOSED (HCC): Primary | ICD-10-CM

## 2021-03-29 RX ORDER — GENTAMICIN SULFATE 1 MG/G
CREAM TOPICAL
Qty: 30 G | Refills: 5 | Status: SHIPPED
Start: 2021-03-29 | End: 2022-04-21

## 2021-03-31 ENCOUNTER — OFFICE VISIT (OUTPATIENT)
Dept: PODIATRY | Age: 71
End: 2021-03-31
Payer: MEDICARE

## 2021-03-31 VITALS — WEIGHT: 315 LBS | BODY MASS INDEX: 49.44 KG/M2 | HEIGHT: 67 IN

## 2021-03-31 DIAGNOSIS — I83.022 VARICOSE VEINS OF LEFT LOWER EXTREMITY WITH ULCER OF CALF WITH FAT LAYER EXPOSED (HCC): Primary | ICD-10-CM

## 2021-03-31 DIAGNOSIS — L97.222 VARICOSE VEINS OF LEFT LOWER EXTREMITY WITH ULCER OF CALF WITH FAT LAYER EXPOSED (HCC): Primary | ICD-10-CM

## 2021-03-31 DIAGNOSIS — L97.922 NON-PRESSURE CHRONIC ULCER OF LEFT LOWER LEG WITH FAT LAYER EXPOSED (HCC): ICD-10-CM

## 2021-03-31 DIAGNOSIS — I89.0 LYMPHEDEMA: ICD-10-CM

## 2021-03-31 PROCEDURE — 99213 OFFICE O/P EST LOW 20 MIN: CPT | Performed by: PODIATRIST

## 2021-03-31 NOTE — PROGRESS NOTES
Patient is here 1 week wound check.  Niocl Gomez MD  Electronically signed by Jorge Isaac LPN on 9/88/4757 at 68:70 AM

## 2021-04-01 NOTE — PROGRESS NOTES
[facility] Cardiac Electrophysiology Outpatient Progress Note      Farhana Ibrahim  1950  Date of Service: 4/6/21   PCP: Dr. Barbara Merino,   Cardiologist: Dr. 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 ([address] 12/28/2020    Lymphedema 12/21/2020    Non-pressure chronic ulcer of other part of right lower leg with fat layer exposed ([address] 12/07/2020    Stasis ulcer ([address] 12/07/2020    Blister of right leg 11/30/2020    Encounter for pacemaker at end of battery life 07/23/2020    Community acquired pneumonia due to Streptococcus pneumoniae ([address] 06/24/2020    Pneumonia 06/13/2020    Diabetic polyneuropathy associated with type 2 diabetes mellitus ([address] 02/19/2020    Pain of toe of right foot 09/30/2019    Pain of toe of left foot 09/30/2019    Simple chronic bronchitis ([address] 09/24/2019    Onychomycosis 07/01/2019    PVD (peripheral vascular disease) ([address] 07/01/2019    Type II diabetes mellitus with peripheral circulatory disorder ([address] 07/01/2019    Difficulty walking 07/01/2019    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 ([address] 05/10/2019    Non-pressure chronic ulcer of left lower leg with fat layer exposed ([address] 03/22/2019    Venous insufficiency (chronic) (peripheral) 03/22/2019    Complete heart block ([address] 02/12/2019    Post-thrombotic syndrome 09/11/2017    Pulmonary emboli ([address] 08/23/2016    Chest pain 08/22/2016    Abdominal pain 08/22/2016    SIRS (systemic inflammatory response syndrome) ([address] 10/26/2012    Shortness of breath 10/26/2012    Pain in lower limb 10/26/2012    Cellulitis 07/06/2012    Arrhythmia 07/05/2011     Overview Note:     A. H/O complete heart block      Pacemaker 07/05/2011     Overview Note:     A.  Dual chamber pacemaker implant on 4/9/10: [address], serial [id number]  B. Right atrial lead: [employer]: 1888TC, serial # [id number]  C. Right ventricular lead: Medtronic [id number], serial # [id number]      HTN (hypertension) 07/05/2011    Hyperthyroidism 07/05/2011    DEMETRIA (obstructive sleep apnea) 07/05/2011     Overview Note:     A. Current use CPAP         Current Outpatient Medications   Medication Sig Dispense Refill    amoxicillin (AMOXIL) 500 MG capsule Take 1 capsule by mouth 2 times daily 60 capsule 0    gentamicin (GARAMYCIN) 0.1 % cream Apply topically 1 times daily with dressing changes. 30 g 5    amoxicillin-clavulanate (AUGMENTIN) 875-125 MG per tablet Take 1 tablet by mouth 2 times daily for 14 days 28 tablet 0    ammonium lactate (LAC-HYDRIN) 12 % lotion Apply topically daily.  222 mL 5    furosemide (LASIX) 40 MG tablet 20 mg       potassium chloride (KLOR-CON M) 20 MEQ TBCR extended release tablet       Calcium Alginate (ALGISITE M 6\"X8\" EX) Apply topically daily      Folinic Acid-Vit B6-Vit B12 (FOLINIC-PLUS) 4-50-2 MG TABS Take 4-50 mg by mouth 2 times daily 90 tablet 2    Indacaterol-Glycopyrrolate (Lynne Gallop) 27.5-15.6 MCG CAPS Inhale 2 puffs into the lungs 2 times daily 180 capsule 3    sodium hypochlorite (DAKINS) 0.25 % SOLN Irrigate with 2 mLs as directed daily 473 mL 0    albuterol (ACCUNEB) 0.63 MG/3ML nebulizer solution Take 3 mLs by nebulization every 6 hours as needed for Wheezing 270 mL 3    vitamin C (ASCORBIC ACID) 500 MG tablet Take 500 mg by mouth daily      rosuvastatin (CRESTOR) 5 MG tablet Take 5 mg by mouth daily       glipiZIDE (GLUCOTROL XL) 5 MG extended release tablet Take 5 mg by mouth daily  3    ramipril (ALTACE) 10 MG capsule Take 10 mg by mouth daily       potassium chloride (KLOR-CON M) 20 MEQ extended release tablet Take 20 mEq by mouth daily       Cholecalciferol (VITAMIN D3) 2000 UNITS CAPS Take by mouth daily Last dose 1-14-16      carvedilol (COREG) 25 MG tablet Take 25 mg by mouth 2 times daily (with meals) Instructed to take with sip water am of procedure      Omega-3 Fatty Acids (FISH OIL) 600 MG CAPS Take 1,000 mg by mouth daily Last Dose 1-14-16      liothyronine (CYTOMEL) 50 MCG tablet Take 100 mcg by mouth daily       aspirin 81 MG EC tablet Take 81 mg by mouth daily Last dose 1-14-16       No current facility-administered medications for this visit. Allergies   Allergen Reactions    Clindamycin/Lincomycin      Extreme SOB     Other Hives and Shortness Of Breath     \"allergic to Potatoe white. \"    Doxycycline Hyclate Other (See Comments)     tinnitus    Singulair [Montelukast Sodium]     Adaptic Non-Adhering Dressing [Wound Dressings]      [place]        9/6/19: Merline Silverio presents to the office today with the chief complaint: CHB and pacemaker in situ. He is accompanied by his wife. Since his last office visit Mr. Lakeshia Roberts states that he is feeling fine. His device site looks well healed and free from infection or erosion. He offers no complaints from a device POV. He did report that he had a stress test yesterday at [facility]. He denies any angina, syncope, dyspnea on exertion, paroxysmal nocturnal dyspnea and palpitations. 05/18/20--VV: Merline Silverio gives verbal consent for a telephone visit from his home via my office. Since his last OV he offers no complaints from an EP or cardiac perspective. In review of his transmission from below, he is approaching GURPREET from a battery perspective. He is pacemaker dependent. 04/06.2021 Merline Silverio presents for office follow-up and pacemaker check. His pacemaker is followed through our device clinic demonstrating normal function. A TTE prior to pacemaker generator change showed normal LV function. He follows with Dr Black William for chronic left stasis ulcer. From an EP perspective he has been feeling well.  His device function is normal with no arrhythmia noted. His is pacemaker dependent. Review of Systems   Respiratory: Negative. Cardiovascular: Negative. Neurological: Negative. All other systems reviewed and are negative. PHYSICAL EXAM:  Vitals:    04/06/21 1409   Weight: (!) 376 lb 12.8 oz (170.9 kg)   Height: 5' 7\" (1.702 m)     Constitutional: Oriented to person, place, and time. Well-developed and cooperative. Head: Normocephalic and atraumatic. Eyes: Conjunctivae are normal.   Cardiovascular: S1 normal, S2 normal and intact distal pulses. A regular rhythm present. PMI is not displaced. Pulmonary/Chest: Effort normal and breath sounds normal. No respiratory distress. Abdominal: Soft. No tenderness. Musculoskeletal: Normal range of motion of all extremities, no muscle weakness. Neurological: Alert and oriented to person, place, and time. Skin: Skin is warm and dry. No bruising, no ecchymosis and no rash noted. Extremity: left leg ulcer  Psychiatric: Normal mood and affect. Thought content normal.  Pacemaker site: stable, well healed, no evidence of erosion    Remote Device Transmission--5/6/20:  Make/Model Unsilo Marbella ORANTES (dependent)   DOI: 4/9/10  Mode DDDR 60/120 ppm  P wave: >2.8 mV  Impedance: 458 ohms   Threshold: 0.75 V @ 0.4 ms  RV R wave: paced mV  Impedance: 520 ohms   Threshold: 0.5 V @ 0.4 ms  Pacing: A: 24.7%  RV: 99.9%     Battery Voltage/Longevity: 2 mths (<1-11 mths)14 months   -longevity 6 mths 3/21/20  Arrhythmias: 5 beats NSVT (3 sec)     Device Interrogation/Reprogramming (dependent) 04/06/21  Make/Model ROCIO Aure XT  MRI.  DOI 7/23/2020  Mode DDDR 60/120 ppm  P wave: 2 mV  Impedance: 380 ohms   Threshold: 0.75 V @ 0.4 ms  RV R wave: pqced mV  Impedance: 380 ohms   Threshold: 0.75 V @ 0.4 ms  Pacing: A: 51.3%  RV: 100%   Battery Voltage/Longevity:  10.5 years    Arrhythmias: none    Overall device function is normal  All device programmable settings were evaluated per above and in the scanned document, along with iterative adjustments (capture thresholds) to assess and select the most appropriate final programming to provide for consistent delivery of the appropriate therapy and to verify function of the device. I have independently reviewed all of the ECGs and rhythm strips per above. I have personally reviewed the laboratory, cardiac diagnostic and radiographic testing as outlined above. I have reviewed previous records [facility] [address] Impressions:    1. CHB    2. Dual chamber PPM in situ  A. Dual chamber pacemaker implant on 4/9/10: Medtronic Adapta [id number], serial [id number]  B. Right atrial lead: [employer]: 1888TC, serial # [id number]  C. Right ventricular lead: Medtronic [id number], serial # [id number]  D. Generator change 7/23/2020 (MDT Aure STEPHENSON)    3. DEMETRIA  - CPAP    4. HTN    5. HLD    6. Morbid obesity  - Body mass index is 59.02 kg/m². 7. Venous Ulcer  - L leg  - undergoing wound care treatment  - follows with Dr José Lancaster:     1. Mr Vega's pacemaker function is stable and programmed accordingly based on the above interrogation. His device is followed remotely. There have been no arrhythmias. 2. No changes were made in his medications today. 3. Remote follow-up Q91 days x3 followed by a one-year office visit. 4. He was asked to call the office with EP concerns prior to follow-up. Thank you for allowing me to participate in their care. I have spent a total of 25 minutes with the patient  reviewing the above stated recommendations.  And a total of >50% of that time involved face-to-face time providing counseling and or coordination of care with the other providers    SUSANNA Gurrola-MARY ANNE, [address] [place] [address] Physicians      CC: Dr Cristian Fernandez

## 2021-04-01 NOTE — PROGRESS NOTES
Wheezing, Disp: 270 mL, Rfl: 3    vitamin C (ASCORBIC ACID) 500 MG tablet, Take 500 mg by mouth daily, Disp: , Rfl:     rosuvastatin (CRESTOR) 5 MG tablet, Take 5 mg by mouth daily , Disp: , Rfl:     glipiZIDE (GLUCOTROL XL) 5 MG extended release tablet, Take 5 mg by mouth daily, Disp: , Rfl: 3    ramipril (ALTACE) 10 MG capsule, Take 10 mg by mouth daily , Disp: , Rfl:     potassium chloride (KLOR-CON M) 20 MEQ extended release tablet, Take 20 mEq by mouth daily , Disp: , Rfl:     Cholecalciferol (VITAMIN D3) 2000 UNITS CAPS, Take by mouth daily Last dose 1-14-16, Disp: , Rfl:     carvedilol (COREG) 25 MG tablet, Take 25 mg by mouth 2 times daily (with meals) Instructed to take with sip water am of procedure, Disp: , Rfl:     Omega-3 Fatty Acids (FISH OIL) 600 MG CAPS, Take 1,000 mg by mouth daily Last Dose 1-14-16, Disp: , Rfl:     liothyronine (CYTOMEL) 50 MCG tablet, Take 100 mcg by mouth daily , Disp: , Rfl:     aspirin 81 MG EC tablet, Take 81 mg by mouth daily Last dose 1-14-16, Disp: , Rfl:     Allergies: Allergies   Allergen Reactions    Clindamycin/Lincomycin      Extreme SOB     Other Hives and Shortness Of Breath     \"allergic to Potatoe white. \"    Doxycycline Hyclate Other (See Comments)     tinnitus    Singulair [Montelukast Sodium]     Adaptic Non-Adhering Dressing [Wound Dressings]      [place]        Vitals:    03/31/21 1134   Weight: (!) 360 lb (163.3 kg)   Height: 5' 7\" (1.702 m)       Exam:  Neurovascular status unchanged. Ulcerative area stable lateral aspect left lower extremity. Edematous issues stable left lower extremity. No purulence, odor, erythema or any signs of infection noted left lower extremity. No skin crepitation or any ascending cellulitic issues noted left lower extremity. Diagnostic Studies:     No results found. Procedures:    None    Plan Per Assessment  Abilio Liriano was seen today for wound check.     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 Evaluation and management  2. We did discuss continued wound care options to the ulcerative area lateral aspect left lower leg to be changed daily with patient and his wife today. 3. Patient was getting properly fitted for his lymphedema pump system by the representative in office today. He was advised on proper usage as instructed. 4. Patient will be followed up in 1 week's time for continued evaluation and management. Seen By:    Beartiz Arechiga DPM    Electronically signed by Beatriz Arechiga DPM on 4/1/2021 at 11:54 AM    This note was created using voice recognition software. The note was reviewed however may contain grammatical errors.

## 2021-04-05 ENCOUNTER — OFFICE VISIT (OUTPATIENT)
Dept: PODIATRY | Age: 71
End: 2021-04-05
Payer: MEDICARE

## 2021-04-05 VITALS — WEIGHT: 315 LBS | HEIGHT: 67 IN | BODY MASS INDEX: 49.44 KG/M2

## 2021-04-05 DIAGNOSIS — L97.222 VARICOSE VEINS OF LEFT LOWER EXTREMITY WITH ULCER OF CALF WITH FAT LAYER EXPOSED (HCC): ICD-10-CM

## 2021-04-05 DIAGNOSIS — L97.812 NON-PRESSURE CHRONIC ULCER OF OTHER PART OF RIGHT LOWER LEG WITH FAT LAYER EXPOSED (HCC): ICD-10-CM

## 2021-04-05 DIAGNOSIS — L97.922 NON-PRESSURE CHRONIC ULCER OF LEFT LOWER LEG WITH FAT LAYER EXPOSED (HCC): ICD-10-CM

## 2021-04-05 DIAGNOSIS — L97.922 NON-PRESSURE CHRONIC ULCER OF LEFT LOWER LEG WITH FAT LAYER EXPOSED (HCC): Primary | ICD-10-CM

## 2021-04-05 DIAGNOSIS — I83.022 VARICOSE VEINS OF LEFT LOWER EXTREMITY WITH ULCER OF CALF WITH FAT LAYER EXPOSED (HCC): ICD-10-CM

## 2021-04-05 DIAGNOSIS — I89.0 LYMPHEDEMA: ICD-10-CM

## 2021-04-05 DIAGNOSIS — E11.51 TYPE II DIABETES MELLITUS WITH PERIPHERAL CIRCULATORY DISORDER (HCC): ICD-10-CM

## 2021-04-05 DIAGNOSIS — L97.812 VENOUS STASIS ULCER OF OTHER PART OF RIGHT LOWER LEG WITH FAT LAYER EXPOSED WITH VARICOSE VEINS (HCC): Primary | ICD-10-CM

## 2021-04-05 DIAGNOSIS — I83.018 VENOUS STASIS ULCER OF OTHER PART OF RIGHT LOWER LEG WITH FAT LAYER EXPOSED WITH VARICOSE VEINS (HCC): Primary | ICD-10-CM

## 2021-04-05 PROCEDURE — 99213 OFFICE O/P EST LOW 20 MIN: CPT | Performed by: PODIATRIST

## 2021-04-05 RX ORDER — AMOXICILLIN 500 MG/1
500 CAPSULE ORAL 2 TIMES DAILY
Qty: 60 CAPSULE | Refills: 0 | Status: SHIPPED
Start: 2021-04-05 | End: 2021-04-06 | Stop reason: ALTCHOICE

## 2021-04-05 NOTE — PROGRESS NOTES
Patient is here for left leg wound. Patient is taking AB and using cream. Patient thinks there is no change.  Cate Ken MD 1/12/2021  Electronically signed by Michaela Diop LPN on 3/7/2413 at 5:00 PM'

## 2021-04-06 ENCOUNTER — OFFICE VISIT (OUTPATIENT)
Dept: NON INVASIVE DIAGNOSTICS | Age: 71
End: 2021-04-06
Payer: MEDICARE

## 2021-04-06 VITALS
OXYGEN SATURATION: 98 % | WEIGHT: 315 LBS | SYSTOLIC BLOOD PRESSURE: 122 MMHG | RESPIRATION RATE: 16 BRPM | HEART RATE: 92 BPM | DIASTOLIC BLOOD PRESSURE: 88 MMHG | BODY MASS INDEX: 49.44 KG/M2 | HEIGHT: 67 IN

## 2021-04-06 DIAGNOSIS — Z95.0 CARDIAC PACEMAKER IN SITU: Primary | ICD-10-CM

## 2021-04-06 DIAGNOSIS — I44.2 COMPLETE HEART BLOCK (HCC): ICD-10-CM

## 2021-04-06 PROCEDURE — 93280 PM DEVICE PROGR EVAL DUAL: CPT | Performed by: NURSE PRACTITIONER

## 2021-04-06 PROCEDURE — 99213 OFFICE O/P EST LOW 20 MIN: CPT | Performed by: NURSE PRACTITIONER

## 2021-04-06 RX ORDER — LEVOFLOXACIN 5 MG/ML
500 INJECTION, SOLUTION INTRAVENOUS
COMMUNITY
End: 2022-04-21 | Stop reason: ALTCHOICE

## 2021-04-06 RX ORDER — TRAMADOL HYDROCHLORIDE 50 MG/1
TABLET ORAL
COMMUNITY
Start: 2021-03-10 | End: 2022-04-21 | Stop reason: ALTCHOICE

## 2021-04-06 ASSESSMENT — ENCOUNTER SYMPTOMS: RESPIRATORY NEGATIVE: 1

## 2021-04-06 NOTE — PROGRESS NOTES
21     Lin Florentino    : 1950   Sex: male    Age: 65 y.o. Patient's PCP/Provider is:  Ranjana Ferguson MD    Subjective:  Patient is seen today for follow-up regarding continued evaluation regarding chronic stasis ulceration left lower extremity. Patient is supposedly getting his lymphedema pump system tomorrow. Patient is continuing to take his oral antibiotics as recommended. We are going to take an additional culture today prior to his proposed epidural injections next week. Patient denies any nausea, vomiting, fever, chills. No other additional abnormalities noted at this time. Chief Complaint   Patient presents with    Wound Check     left leg       ROS:  Const: Positives and pertinent negatives as per HPI. Musculo: Denies symptoms other than stated above. Neuro: Denies symptoms other than stated above. Skin: Denies symptoms other than stated above. Current Medications:    Current Outpatient Medications:     amoxicillin (AMOXIL) 500 MG capsule, Take 1 capsule by mouth 2 times daily, Disp: 60 capsule, Rfl: 0    gentamicin (GARAMYCIN) 0.1 % cream, Apply topically 1 times daily with dressing changes. , Disp: 30 g, Rfl: 5    amoxicillin-clavulanate (AUGMENTIN) 875-125 MG per tablet, Take 1 tablet by mouth 2 times daily for 14 days, Disp: 28 tablet, Rfl: 0    ammonium lactate (LAC-HYDRIN) 12 % lotion, Apply topically daily. , Disp: 222 mL, Rfl: 5    furosemide (LASIX) 40 MG tablet, 20 mg , Disp: , Rfl:     potassium chloride (KLOR-CON M) 20 MEQ TBCR extended release tablet, , Disp: , Rfl:     Calcium Alginate (ALGISITE M 6\"X8\" EX), Apply topically daily, Disp: , Rfl:     Folinic Acid-Vit B6-Vit B12 (FOLINIC-PLUS) 4-50-2 MG TABS, Take 4-50 mg by mouth 2 times daily, Disp: 90 tablet, Rfl: 2    Indacaterol-Glycopyrrolate (UTIBRON NEOHALER) 27.5-15.6 MCG CAPS, Inhale 2 puffs into the lungs 2 times daily, Disp: 180 capsule, Rfl: 3    sodium hypochlorite (DAKINS) 0.25 % SOLN, Irrigate with 2 mLs as directed daily, Disp: 473 mL, Rfl: 0    albuterol (ACCUNEB) 0.63 MG/3ML nebulizer solution, Take 3 mLs by nebulization every 6 hours as needed for Wheezing, Disp: 270 mL, Rfl: 3    vitamin C (ASCORBIC ACID) 500 MG tablet, Take 500 mg by mouth daily, Disp: , Rfl:     rosuvastatin (CRESTOR) 5 MG tablet, Take 5 mg by mouth daily , Disp: , Rfl:     glipiZIDE (GLUCOTROL XL) 5 MG extended release tablet, Take 5 mg by mouth daily, Disp: , Rfl: 3    ramipril (ALTACE) 10 MG capsule, Take 10 mg by mouth daily , Disp: , Rfl:     potassium chloride (KLOR-CON M) 20 MEQ extended release tablet, Take 20 mEq by mouth daily , Disp: , Rfl:     Cholecalciferol (VITAMIN D3) 2000 UNITS CAPS, Take by mouth daily Last dose 1-14-16, Disp: , Rfl:     carvedilol (COREG) 25 MG tablet, Take 25 mg by mouth 2 times daily (with meals) Instructed to take with sip water am of procedure, Disp: , Rfl:     Omega-3 Fatty Acids (FISH OIL) 600 MG CAPS, Take 1,000 mg by mouth daily Last Dose 1-14-16, Disp: , Rfl:     liothyronine (CYTOMEL) 50 MCG tablet, Take 100 mcg by mouth daily , Disp: , Rfl:     aspirin 81 MG EC tablet, Take 81 mg by mouth daily Last dose 1-14-16, Disp: , Rfl:     Allergies: Allergies   Allergen Reactions    Clindamycin/Lincomycin      Extreme SOB     Other Hives and Shortness Of Breath     \"allergic to Potatoe white. \"    Doxycycline Hyclate Other (See Comments)     tinnitus    Singulair [Montelukast Sodium]     Adaptic Non-Adhering Dressing [Wound Dressings]      [place]        Vitals:    04/05/21 1343   Weight: (!) 360 lb (163.3 kg)   Height: 5' 7\" (1.702 m)       Exam:  Neurovascular status unchanged. Ulcerative area stable lateral aspect left lower extremity. No undermining of the wound edges noted. No purulence, odor, erythema or any signs of infection noted left lower extremity. Edematous changes are stable left lower extremity.         Diagnostic Studies:     No results found.      Procedures:    None    Plan Per Assessment  Rosalba Daily was seen today for wound check. 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 Evaluation and management  2. We will continue patient on oral antibiotic regimen at this time until ulceration is healed. Prescription for Amoxil was given today for patient to take twice daily. 3. We did recommend patient utilizing the lymphedema pump system once available. Patient is to continue with his graduated compression garments in the interim as well. 4. Patient will be followed up in 1 week's time or sooner if needed for continued evaluation and management. He was advised to call the office with any questions or concerns in the interim. Seen By:    Gema Ambriz DPM    Electronically signed by Gema Ambriz DPM on 4/5/2021 at 8:22 PM    This note was created using voice recognition software. The note was reviewed however may contain grammatical errors.

## 2021-04-16 ENCOUNTER — OFFICE VISIT (OUTPATIENT)
Dept: PODIATRY | Age: 71
End: 2021-04-16
Payer: MEDICARE

## 2021-04-16 VITALS — BODY MASS INDEX: 49.44 KG/M2 | WEIGHT: 315 LBS | HEIGHT: 67 IN

## 2021-04-16 DIAGNOSIS — I83.022 VARICOSE VEINS OF LEFT LOWER EXTREMITY WITH ULCER OF CALF WITH FAT LAYER EXPOSED (HCC): Primary | ICD-10-CM

## 2021-04-16 DIAGNOSIS — L97.922 NON-PRESSURE CHRONIC ULCER OF LEFT LOWER LEG WITH FAT LAYER EXPOSED (HCC): ICD-10-CM

## 2021-04-16 DIAGNOSIS — I89.0 LYMPHEDEMA: ICD-10-CM

## 2021-04-16 DIAGNOSIS — L97.222 VARICOSE VEINS OF LEFT LOWER EXTREMITY WITH ULCER OF CALF WITH FAT LAYER EXPOSED (HCC): Primary | ICD-10-CM

## 2021-04-16 PROBLEM — L97.812 NON-PRESSURE CHRONIC ULCER OF OTHER PART OF RIGHT LOWER LEG WITH FAT LAYER EXPOSED (HCC): Status: RESOLVED | Noted: 2020-12-07 | Resolved: 2021-04-16

## 2021-04-16 PROCEDURE — 99213 OFFICE O/P EST LOW 20 MIN: CPT | Performed by: PODIATRIST

## 2021-04-17 NOTE — PROGRESS NOTES
Patient is in today for 1 week left leg wound. Patient has no additional concerns at this time.  pcp is Magdalena Nieves MD  Last ov 4/16/21
glipiZIDE (GLUCOTROL XL) 5 MG extended release tablet, Take 5 mg by mouth daily, Disp: , Rfl: 3    ramipril (ALTACE) 10 MG capsule, Take 10 mg by mouth daily , Disp: , Rfl:     potassium chloride (KLOR-CON M) 20 MEQ extended release tablet, Take 20 mEq by mouth daily , Disp: , Rfl:     Cholecalciferol (VITAMIN D3) 2000 UNITS CAPS, Take by mouth daily Last dose 1-14-16, Disp: , Rfl:     carvedilol (COREG) 25 MG tablet, Take 25 mg by mouth 2 times daily (with meals) Instructed to take with sip water am of procedure, Disp: , Rfl:     Omega-3 Fatty Acids (FISH OIL) 600 MG CAPS, Take 1,000 mg by mouth daily Last Dose 1-14-16, Disp: , Rfl:     liothyronine (CYTOMEL) 50 MCG tablet, Take 50 mcg by mouth daily 1 and 1/2 tablets by mouth alternating with 2 tablets every other day, Disp: , Rfl:     aspirin 81 MG EC tablet, Take 81 mg by mouth daily Last dose 1-14-16, Disp: , Rfl:     Allergies: Allergies   Allergen Reactions    Clindamycin/Lincomycin      Extreme SOB     Other Hives and Shortness Of Breath     \"allergic to Potatoe white. \"    Doxycycline Hyclate Other (See Comments)     tinnitus    Singulair [Montelukast Sodium]     Adaptic Non-Adhering Dressing [Wound Dressings]      [place]        Vitals:    04/16/21 1109   Weight: (!) 376 lb (170.6 kg)   Height: 5' 7\" (1.702 m)       Exam:  Neurovascular status unchanged. Ulceration improved lateral aspect left lower extremity. No signs of infection noted left lower extremity. Edematous issues are improved left lower extremity. No tenderness noted to palpation to the left lateral leg. Diagnostic Studies:     No results found. Procedures:    None    Plan Per Assessment  Geoff Danielson was seen today for wound check. 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  Evaluation and

## 2021-04-30 ENCOUNTER — OFFICE VISIT (OUTPATIENT)
Dept: PODIATRY | Age: 71
End: 2021-04-30
Payer: MEDICARE

## 2021-04-30 VITALS — WEIGHT: 315 LBS | BODY MASS INDEX: 49.44 KG/M2 | HEIGHT: 67 IN

## 2021-04-30 DIAGNOSIS — L97.922 NON-PRESSURE CHRONIC ULCER OF LEFT LOWER LEG WITH FAT LAYER EXPOSED (HCC): ICD-10-CM

## 2021-04-30 DIAGNOSIS — L97.222 VARICOSE VEINS OF LEFT LOWER EXTREMITY WITH ULCER OF CALF WITH FAT LAYER EXPOSED (HCC): Primary | ICD-10-CM

## 2021-04-30 DIAGNOSIS — I83.022 VARICOSE VEINS OF LEFT LOWER EXTREMITY WITH ULCER OF CALF WITH FAT LAYER EXPOSED (HCC): Primary | ICD-10-CM

## 2021-04-30 DIAGNOSIS — I89.0 LYMPHEDEMA: ICD-10-CM

## 2021-04-30 PROCEDURE — 99213 OFFICE O/P EST LOW 20 MIN: CPT | Performed by: PODIATRIST

## 2021-04-30 NOTE — PROGRESS NOTES
Patients wife states that after he is done using lymphoedema pump for 2 hours his legs are numbs. Patients wife contacted [employer] the rep to ask. He mentioned maybe his back pain. Patient followed up with back doctor, doctor said it is not from the back. Patient wife called Teresita Kenney and had is having the do 1 hour in the morning and 1 hour in the evening and starting next week to lower the pump 5 psi.  Yoanna Lu MD  Electronically signed by Concha Hernandez LPN on 9/91/4666 at 23:64 AM

## 2021-05-01 NOTE — PROGRESS NOTES
21     Winifred Messer    : 1950   Sex: male    Age: 65 y.o. Patient's PCP/Provider is:  Shaq Lynn MD    Subjective:  Patient is seen today for follow-up regarding continued evaluation regarding left lower extremity ulceration. Patient has been adjusting the settings on the lymphedema pumps due to some paresthesias he has been feeling after his therapies. Patient is in no acute distress. Patient denies any nausea, vomiting, fever, chills. Chief Complaint   Patient presents with    Wound Check     right leg        ROS:  Const: Positives and pertinent negatives as per HPI. Musculo: Denies symptoms other than stated above. Neuro: Denies symptoms other than stated above. Skin: Denies symptoms other than stated above. Current Medications:    Current Outpatient Medications:     traMADol (ULTRAM) 50 MG tablet, TAKE 1 TABLET BY MOUTH EVERY 6 8 HOURS AS NEEDED FOR PAIN, Disp: , Rfl:     levoFLOXacin (LEVAQUIN) 500 MG/100ML SOLN, Infuse 500 mg intravenously every 24 hours, Disp: , Rfl:     gentamicin (GARAMYCIN) 0.1 % cream, Apply topically 1 times daily with dressing changes. , Disp: 30 g, Rfl: 5    ammonium lactate (LAC-HYDRIN) 12 % lotion, Apply topically daily. , Disp: 222 mL, Rfl: 5    furosemide (LASIX) 40 MG tablet, 20 mg , Disp: , Rfl:     Calcium Alginate (ALGISITE M 6\"X8\" EX), Apply topically daily, Disp: , Rfl:     Folinic Acid-Vit B6-Vit B12 (FOLINIC-PLUS) 4-50-2 MG TABS, Take 4-50 mg by mouth 2 times daily, Disp: 90 tablet, Rfl: 2    Indacaterol-Glycopyrrolate (Ian Sayres) 27.5-15.6 MCG CAPS, Inhale 2 puffs into the lungs 2 times daily, Disp: 180 capsule, Rfl: 3    albuterol (ACCUNEB) 0.63 MG/3ML nebulizer solution, Take 3 mLs by nebulization every 6 hours as needed for Wheezing, Disp: 270 mL, Rfl: 3    vitamin C (ASCORBIC ACID) 500 MG tablet, Take 500 mg by mouth daily, Disp: , Rfl:     rosuvastatin (CRESTOR) 5 MG tablet, Take 5 mg by mouth daily , Disp: , Rfl:     glipiZIDE (GLUCOTROL XL) 5 MG extended release tablet, Take 5 mg by mouth daily, Disp: , Rfl: 3    ramipril (ALTACE) 10 MG capsule, Take 10 mg by mouth daily , Disp: , Rfl:     potassium chloride (KLOR-CON M) 20 MEQ extended release tablet, Take 20 mEq by mouth daily , Disp: , Rfl:     Cholecalciferol (VITAMIN D3) 2000 UNITS CAPS, Take by mouth daily Last dose 1-14-16, Disp: , Rfl:     carvedilol (COREG) 25 MG tablet, Take 25 mg by mouth 2 times daily (with meals) Instructed to take with sip water am of procedure, Disp: , Rfl:     Omega-3 Fatty Acids (FISH OIL) 600 MG CAPS, Take 1,000 mg by mouth daily Last Dose 1-14-16, Disp: , Rfl:     liothyronine (CYTOMEL) 50 MCG tablet, Take 50 mcg by mouth daily 1 and 1/2 tablets by mouth alternating with 2 tablets every other day, Disp: , Rfl:     aspirin 81 MG EC tablet, Take 81 mg by mouth daily Last dose 1-14-16, Disp: , Rfl:     Allergies: Allergies   Allergen Reactions    Clindamycin/Lincomycin      Extreme SOB     Other Hives and Shortness Of Breath     \"allergic to Potatoe white. \"    Doxycycline Hyclate Other (See Comments)     tinnitus    Singulair [Montelukast Sodium]     Adaptic Non-Adhering Dressing [Wound Dressings]      [place]        Vitals:    04/30/21 1133   Weight: (!) 376 lb (170.6 kg)   Height: 5' 7\" (1.702 m)       Exam:  Neurovascular status unchanged. Ulceration stable left lower extremity. No signs of infection noted left lower extremity. Edematous changes are improved left lower extremity. Diagnostic Studies:     No results found. Procedures:    None    Plan Per Assessment  Eli Coats was seen today for wound check. 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 Evaluation and management  2.  We did discuss appropriate care techniques and continued use of the lymphedema pump system for ulcer care. 3. We will continue with elevation and diet modifications to allow for continued improvement in ulceration. 4. Patient will be followed up in 2 weeks time or sooner if needed for reevaluation. Seen By:    Bassem Robison DPM    Electronically signed by Bassem Robison DPM on 5/1/2021 at 11:25 AM    This note was created using voice recognition software. The note was reviewed however may contain grammatical errors.

## 2021-05-03 ENCOUNTER — NURSE ONLY (OUTPATIENT)
Dept: NON INVASIVE DIAGNOSTICS | Age: 71
End: 2021-05-03
Payer: MEDICARE

## 2021-05-03 DIAGNOSIS — Z95.0 CARDIAC PACEMAKER IN SITU: Primary | ICD-10-CM

## 2021-05-03 DIAGNOSIS — I44.2 COMPLETE HEART BLOCK (HCC): ICD-10-CM

## 2021-05-17 ENCOUNTER — OFFICE VISIT (OUTPATIENT)
Dept: PODIATRY | Age: 71
End: 2021-05-17
Payer: MEDICARE

## 2021-05-17 VITALS — WEIGHT: 315 LBS | HEIGHT: 67 IN | BODY MASS INDEX: 49.44 KG/M2

## 2021-05-17 DIAGNOSIS — L97.222 VARICOSE VEINS OF LEFT LOWER EXTREMITY WITH ULCER OF CALF WITH FAT LAYER EXPOSED (HCC): Primary | ICD-10-CM

## 2021-05-17 DIAGNOSIS — I89.0 LYMPHEDEMA: ICD-10-CM

## 2021-05-17 DIAGNOSIS — L97.922 NON-PRESSURE CHRONIC ULCER OF LEFT LOWER LEG WITH FAT LAYER EXPOSED (HCC): ICD-10-CM

## 2021-05-17 DIAGNOSIS — I83.022 VARICOSE VEINS OF LEFT LOWER EXTREMITY WITH ULCER OF CALF WITH FAT LAYER EXPOSED (HCC): Primary | ICD-10-CM

## 2021-05-17 PROCEDURE — 99213 OFFICE O/P EST LOW 20 MIN: CPT | Performed by: PODIATRIST

## 2021-05-17 RX ORDER — ACETAMINOPHEN 80 MG/1
80 TABLET, CHEWABLE ORAL EVERY 4 HOURS PRN
COMMUNITY
End: 2022-07-25

## 2021-05-17 RX ORDER — DICLOFENAC SODIUM 1 MG/ML
1 SOLUTION/ DROPS OPHTHALMIC 2 TIMES DAILY
COMMUNITY

## 2021-05-18 NOTE — PROGRESS NOTES
21     Ilan Donohue    : 1950   Sex: male    Age: 65 y.o. Patient's PCP/Provider is:  Burt Cosme MD    Subjective:  Patient is seen today for follow-up regarding continued evaluation stasis ulceration left lower extremity. Patient is continuing to utilize his compression and the lymphedema pump system to reduce edematous issues left lower extremity. Patient is tolerating current dressings without issues. He denies any nausea, vomiting, fever, chills. Chief Complaint   Patient presents with    Wound Check     left        ROS:  Const: Positives and pertinent negatives as per HPI. Musculo: Denies symptoms other than stated above. Neuro: Denies symptoms other than stated above. Skin: Denies symptoms other than stated above. Current Medications:    Current Outpatient Medications:     diclofenac (VOLTAREN) 0.1 % ophthalmic solution, 1 drop 2 times daily, Disp: , Rfl:     acetaminophen (TYLENOL) 80 MG chewable tablet, Take 80 mg by mouth every 4 hours as needed for Pain, Disp: , Rfl:     traMADol (ULTRAM) 50 MG tablet, TAKE 1 TABLET BY MOUTH EVERY 6 8 HOURS AS NEEDED FOR PAIN, Disp: , Rfl:     levoFLOXacin (LEVAQUIN) 500 MG/100ML SOLN, Infuse 500 mg intravenously every 24 hours, Disp: , Rfl:     gentamicin (GARAMYCIN) 0.1 % cream, Apply topically 1 times daily with dressing changes. , Disp: 30 g, Rfl: 5    ammonium lactate (LAC-HYDRIN) 12 % lotion, Apply topically daily. , Disp: 222 mL, Rfl: 5    furosemide (LASIX) 40 MG tablet, 20 mg , Disp: , Rfl:     Calcium Alginate (ALGISITE M 6\"X8\" EX), Apply topically daily, Disp: , Rfl:     Folinic Acid-Vit B6-Vit B12 (FOLINIC-PLUS) 4-50-2 MG TABS, Take 4-50 mg by mouth 2 times daily, Disp: 90 tablet, Rfl: 2    Indacaterol-Glycopyrrolate (UTIBRON NEOHALER) 27.5-15.6 MCG CAPS, Inhale 2 puffs into the lungs 2 times daily, Disp: 180 capsule, Rfl: 3    albuterol (ACCUNEB) 0.63 MG/3ML nebulizer solution, Take 3 mLs by nebulization every 6 hours as needed for Wheezing, Disp: 270 mL, Rfl: 3    vitamin C (ASCORBIC ACID) 500 MG tablet, Take 500 mg by mouth daily, Disp: , Rfl:     rosuvastatin (CRESTOR) 5 MG tablet, Take 5 mg by mouth daily , Disp: , Rfl:     glipiZIDE (GLUCOTROL XL) 5 MG extended release tablet, Take 5 mg by mouth daily, Disp: , Rfl: 3    ramipril (ALTACE) 10 MG capsule, Take 10 mg by mouth daily , Disp: , Rfl:     potassium chloride (KLOR-CON M) 20 MEQ extended release tablet, Take 20 mEq by mouth daily , Disp: , Rfl:     Cholecalciferol (VITAMIN D3) 2000 UNITS CAPS, Take by mouth daily Last dose 1-14-16, Disp: , Rfl:     carvedilol (COREG) 25 MG tablet, Take 25 mg by mouth 2 times daily (with meals) Instructed to take with sip water am of procedure, Disp: , Rfl:     Omega-3 Fatty Acids (FISH OIL) 600 MG CAPS, Take 1,000 mg by mouth daily Last Dose 1-14-16, Disp: , Rfl:     liothyronine (CYTOMEL) 50 MCG tablet, Take 50 mcg by mouth daily 1 and 1/2 tablets by mouth alternating with 2 tablets every other day, Disp: , Rfl:     aspirin 81 MG EC tablet, Take 81 mg by mouth daily Last dose 1-14-16, Disp: , Rfl:     Allergies: Allergies   Allergen Reactions    Clindamycin/Lincomycin      Extreme SOB     Other Hives and Shortness Of Breath     \"allergic to Potatoe white. \"    Doxycycline Hyclate Other (See Comments)     tinnitus    Singulair [Montelukast Sodium]     Adaptic Non-Adhering Dressing [Wound Dressings]      [place]        Vitals:    05/17/21 1123   Weight: (!) 376 lb (170.6 kg)   Height: 5' 7\" (1.702 m)       Exam:  Neurovascular status unchanged. Ulcerative area continues to improve lateral aspect left lower extremity. Edematous issues improved left lower extremity. No other additional abnormalities noted. Diagnostic Studies:     No results found. Procedures:    None    Plan Per Assessment  Kailee Newsome was seen today for wound check.     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 Evaluation and management  2. We did discuss continued use of the lymphedema pump system to reduce edematous issues to both lower extremities. 3. We did discuss appropriate wound care techniques as well to allow for improvement. We will continue with current wound dressings to be changed on a daily basis. 4. Patient will be followed up in 2 weeks time or sooner if needed for reevaluation. He was advised to call the office with any questions or concerns in the interim. Seen By:    Mumtaz Simms DPM    Electronically signed by Mumtaz Simms DPM on 5/17/2021 at 8:04 PM    This note was created using voice recognition software. The note was reviewed however may contain grammatical errors.

## 2021-05-21 ENCOUNTER — HOSPITAL ENCOUNTER (OUTPATIENT)
Age: 71
Discharge: HOME | End: 2021-05-23
Payer: MEDICARE

## 2021-05-21 ENCOUNTER — HOSPITAL ENCOUNTER (OUTPATIENT)
Dept: CT IMAGING | Age: 71
Discharge: HOME | End: 2021-05-23
Payer: MEDICARE

## 2021-05-21 DIAGNOSIS — R10.9 ABDOMINAL PAIN, UNSPECIFIED ABDOMINAL LOCATION: ICD-10-CM

## 2021-05-21 DIAGNOSIS — R31.9 HEMATURIA, UNSPECIFIED TYPE: ICD-10-CM

## 2021-05-21 PROCEDURE — 74176 CT ABD & PELVIS W/O CONTRAST: CPT

## 2021-06-02 ENCOUNTER — OFFICE VISIT (OUTPATIENT)
Dept: PODIATRY | Age: 71
End: 2021-06-02
Payer: MEDICARE

## 2021-06-02 VITALS — BODY MASS INDEX: 49.44 KG/M2 | WEIGHT: 315 LBS | HEIGHT: 67 IN

## 2021-06-02 DIAGNOSIS — I83.022 VARICOSE VEINS OF LEFT LOWER EXTREMITY WITH ULCER OF CALF WITH FAT LAYER EXPOSED (HCC): Primary | ICD-10-CM

## 2021-06-02 DIAGNOSIS — L97.222 VARICOSE VEINS OF LEFT LOWER EXTREMITY WITH ULCER OF CALF WITH FAT LAYER EXPOSED (HCC): Primary | ICD-10-CM

## 2021-06-02 DIAGNOSIS — I89.0 LYMPHEDEMA: ICD-10-CM

## 2021-06-02 DIAGNOSIS — L97.922 NON-PRESSURE CHRONIC ULCER OF LEFT LOWER LEG WITH FAT LAYER EXPOSED (HCC): ICD-10-CM

## 2021-06-02 PROCEDURE — 99213 OFFICE O/P EST LOW 20 MIN: CPT | Performed by: PODIATRIST

## 2021-06-02 NOTE — PROGRESS NOTES
21     Salomon Larson    : 1950   Sex: male    Age: 65 y.o. Patient's PCP/Provider is:  Sarah Aguilar MD    Subjective:  Patient is seen today for follow-up regarding continued evaluation regarding stasis ulceration left lower extremity. Overall patient is doing well at this time without any additional issues noted. Patient has been continuing to use the lymphedema pump system as instructed. He denies any nausea, vomiting, fever, chills. Chief Complaint   Patient presents with    Wound Check     left leg wound       ROS:  Const: Positives and pertinent negatives as per HPI. Musculo: Denies symptoms other than stated above. Neuro: Denies symptoms other than stated above. Skin: Denies symptoms other than stated above. Current Medications:    Current Outpatient Medications:     diclofenac (VOLTAREN) 0.1 % ophthalmic solution, 1 drop 2 times daily, Disp: , Rfl:     acetaminophen (TYLENOL) 80 MG chewable tablet, Take 80 mg by mouth every 4 hours as needed for Pain, Disp: , Rfl:     traMADol (ULTRAM) 50 MG tablet, TAKE 1 TABLET BY MOUTH EVERY 6 8 HOURS AS NEEDED FOR PAIN, Disp: , Rfl:     levoFLOXacin (LEVAQUIN) 500 MG/100ML SOLN, Infuse 500 mg intravenously every 24 hours, Disp: , Rfl:     gentamicin (GARAMYCIN) 0.1 % cream, Apply topically 1 times daily with dressing changes. , Disp: 30 g, Rfl: 5    ammonium lactate (LAC-HYDRIN) 12 % lotion, Apply topically daily. , Disp: 222 mL, Rfl: 5    furosemide (LASIX) 40 MG tablet, 20 mg , Disp: , Rfl:     Calcium Alginate (ALGISITE M 6\"X8\" EX), Apply topically daily, Disp: , Rfl:     Folinic Acid-Vit B6-Vit B12 (FOLINIC-PLUS) 4-50-2 MG TABS, Take 4-50 mg by mouth 2 times daily, Disp: 90 tablet, Rfl: 2    Indacaterol-Glycopyrrolate (UTIBRON NEOHALER) 27.5-15.6 MCG CAPS, Inhale 2 puffs into the lungs 2 times daily, Disp: 180 capsule, Rfl: 3    albuterol (ACCUNEB) 0.63 MG/3ML nebulizer solution, Take 3 mLs by nebulization every 6 hours as needed for Wheezing, Disp: 270 mL, Rfl: 3    vitamin C (ASCORBIC ACID) 500 MG tablet, Take 500 mg by mouth daily, Disp: , Rfl:     rosuvastatin (CRESTOR) 5 MG tablet, Take 5 mg by mouth daily , Disp: , Rfl:     glipiZIDE (GLUCOTROL XL) 5 MG extended release tablet, Take 5 mg by mouth daily, Disp: , Rfl: 3    ramipril (ALTACE) 10 MG capsule, Take 10 mg by mouth daily , Disp: , Rfl:     potassium chloride (KLOR-CON M) 20 MEQ extended release tablet, Take 20 mEq by mouth daily , Disp: , Rfl:     Cholecalciferol (VITAMIN D3) 2000 UNITS CAPS, Take by mouth daily Last dose 1-14-16, Disp: , Rfl:     carvedilol (COREG) 25 MG tablet, Take 25 mg by mouth 2 times daily (with meals) Instructed to take with sip water am of procedure, Disp: , Rfl:     Omega-3 Fatty Acids (FISH OIL) 600 MG CAPS, Take 1,000 mg by mouth daily Last Dose 1-14-16, Disp: , Rfl:     liothyronine (CYTOMEL) 50 MCG tablet, Take 50 mcg by mouth daily 1 and 1/2 tablets by mouth alternating with 2 tablets every other day, Disp: , Rfl:     aspirin 81 MG EC tablet, Take 81 mg by mouth daily Last dose 1-14-16, Disp: , Rfl:     Allergies: Allergies   Allergen Reactions    Clindamycin/Lincomycin      Extreme SOB     Other Hives and Shortness Of Breath     \"allergic to Potatoe white. \"    Doxycycline Hyclate Other (See Comments)     tinnitus    Singulair [Montelukast Sodium]     Adaptic Non-Adhering Dressing [Wound Dressings]      [place]        Vitals:    06/02/21 1130   Weight: (!) 376 lb (170.6 kg)   Height: 5' 7\" (1.702 m)       Exam:  Neurovascular status unchanged. Ulcerative area improved lateral aspect left lower extremity measuring approximately 3.8 x 2.1 x 0.2 cm. Edematous issues are improved with current compression therapy. No signs of infection noted left lower extremity.       Diagnostic Studies:     CT ABDOMEN PELVIS WO CONTRAST Additional Contrast? None    Result Date: 5/21/2021  EXAMINATION: CT OF THE ABDOMEN AND PELVIS WITHOUT CONTRAST 5/21/2021 12:36 pm TECHNIQUE: CT of the abdomen and pelvis was performed without the administration of intravenous contrast. Multiplanar reformatted images are provided for review. Dose modulation, iterative reconstruction, and/or weight based adjustment of the mA/kV was utilized to reduce the radiation dose to as low as reasonably achievable. COMPARISON: None. HISTORY: ORDERING SYSTEM PROVIDED HISTORY: Hematuria, unspecified type TECHNOLOGIST PROVIDED HISTORY: Additional Contrast?->None FINDINGS: Lower Chest: The visualized portions of the lungs, heart and pericardium are unremarkable. Organs: The liver, spleen, adrenal glands, left kidney, pancreas are unremarkable. Evidence for cholecystectomy. Normal common bile duct. 2 mm nonobstructing calculi mid zone right kidney. GI/Bowel: Diffuse colonic fecal retention. Normal appendix and small bowel. Pelvis: Prostatomegaly with diffuse urinary bladder wall thickening. Peritoneum/Retroperitoneum: No free air or free fluid. Bones/Soft Tissues: Mild degenerative changes thoracolumbar spine. 2 mm nonobstructing calculi mid zone right kidney. No hydronephrosis. The left kidney is normal.  Normal renal parenchyma. Prostatomegaly with diffuse urinary bladder wall thickening. Cholecystectomy. Procedures:    None    Plan Per Assessment  Bibiana Valdez was seen today for wound check. 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 Evaluation and management  2. We did discuss continued use of the lymphedema pump system to improve circulation and reduce ulceration. 3. Patient is to continue with the topical medication currently being utilized for wound care daily.   4. Patient was advised continued elevation of both lower extremities throughout the day to reduce dependent

## 2021-06-03 PROCEDURE — 93296 REM INTERROG EVL PM/IDS: CPT | Performed by: INTERNAL MEDICINE

## 2021-06-03 PROCEDURE — 93294 REM INTERROG EVL PM/LDLS PM: CPT | Performed by: INTERNAL MEDICINE

## 2021-06-09 ENCOUNTER — OFFICE VISIT (OUTPATIENT)
Dept: PODIATRY | Age: 71
End: 2021-06-09
Payer: MEDICARE

## 2021-06-09 VITALS — BODY MASS INDEX: 49.44 KG/M2 | HEIGHT: 67 IN | WEIGHT: 315 LBS

## 2021-06-09 DIAGNOSIS — L97.922 NON-PRESSURE CHRONIC ULCER OF LEFT LOWER LEG WITH FAT LAYER EXPOSED (HCC): ICD-10-CM

## 2021-06-09 DIAGNOSIS — I83.022 VARICOSE VEINS OF LEFT LOWER EXTREMITY WITH ULCER OF CALF WITH FAT LAYER EXPOSED (HCC): ICD-10-CM

## 2021-06-09 DIAGNOSIS — I89.0 LYMPHEDEMA: ICD-10-CM

## 2021-06-09 DIAGNOSIS — I83.022 VARICOSE VEINS OF LEFT LOWER EXTREMITY WITH ULCER OF CALF WITH FAT LAYER EXPOSED (HCC): Primary | ICD-10-CM

## 2021-06-09 DIAGNOSIS — L97.222 VARICOSE VEINS OF LEFT LOWER EXTREMITY WITH ULCER OF CALF WITH FAT LAYER EXPOSED (HCC): Primary | ICD-10-CM

## 2021-06-09 DIAGNOSIS — L97.222 VARICOSE VEINS OF LEFT LOWER EXTREMITY WITH ULCER OF CALF WITH FAT LAYER EXPOSED (HCC): ICD-10-CM

## 2021-06-09 PROCEDURE — 99213 OFFICE O/P EST LOW 20 MIN: CPT | Performed by: PODIATRIST

## 2021-06-09 RX ORDER — AMOXICILLIN 500 MG/1
500 CAPSULE ORAL 2 TIMES DAILY
Qty: 20 CAPSULE | Refills: 0 | Status: SHIPPED | OUTPATIENT
Start: 2021-06-09 | End: 2021-06-19

## 2021-06-09 NOTE — PROGRESS NOTES
Patient is here for burning, pain in left leg wound. Patient would like it cultured.  Stephie Brennan MD 5/26/2021  Electronically signed by Song Yi LPN on 5/4/9013 at 28:42 AM

## 2021-06-10 NOTE — PROGRESS NOTES
6/10/21     [employer]    : 1950   Sex: male    Age: 65 y.o. Patient's PCP/Provider is:  Joanna Morales MD    Subjective:  Patient is seen today for follow-up regarding continued care stasis ulceration left lower extremity. Overall patient is doing well at this time without any additional issues noted. Patient did have some increased burning and drainage from the site a few days ago. Patient has been utilizing the lymphedema pump system as previously recommended without issues. Patient is in no acute distress. He denies any nausea, vomiting, fever, chills. Chief Complaint   Patient presents with    Wound Check     left leg pain and burning       ROS:  Const: Positives and pertinent negatives as per HPI. Musculo: Denies symptoms other than stated above. Neuro: Denies symptoms other than stated above. Skin: Denies symptoms other than stated above. Current Medications:    Current Outpatient Medications:     amoxicillin (AMOXIL) 500 MG capsule, Take 1 capsule by mouth 2 times daily for 10 days, Disp: 20 capsule, Rfl: 0    diclofenac (VOLTAREN) 0.1 % ophthalmic solution, 1 drop 2 times daily, Disp: , Rfl:     acetaminophen (TYLENOL) 80 MG chewable tablet, Take 80 mg by mouth every 4 hours as needed for Pain, Disp: , Rfl:     traMADol (ULTRAM) 50 MG tablet, TAKE 1 TABLET BY MOUTH EVERY 6 8 HOURS AS NEEDED FOR PAIN, Disp: , Rfl:     levoFLOXacin (LEVAQUIN) 500 MG/100ML SOLN, Infuse 500 mg intravenously every 24 hours, Disp: , Rfl:     gentamicin (GARAMYCIN) 0.1 % cream, Apply topically 1 times daily with dressing changes. , Disp: 30 g, Rfl: 5    ammonium lactate (LAC-HYDRIN) 12 % lotion, Apply topically daily. , Disp: 222 mL, Rfl: 5    furosemide (LASIX) 40 MG tablet, 20 mg , Disp: , Rfl:     Calcium Alginate (ALGISITE M 6\"X8\" EX), Apply topically daily, Disp: , Rfl:     Folinic Acid-Vit B6-Vit B12 (FOLINIC-PLUS) 4-50-2 MG TABS, Take 4-50 mg by mouth 2 times daily, Disp: 90 tablet, Rfl: 2    Indacaterol-Glycopyrrolate ([place]) 27.5-15.6 MCG CAPS, Inhale 2 puffs into the lungs 2 times daily, Disp: 180 capsule, Rfl: 3    albuterol (ACCUNEB) 0.63 MG/3ML nebulizer solution, Take 3 mLs by nebulization every 6 hours as needed for Wheezing, Disp: 270 mL, Rfl: 3    vitamin C (ASCORBIC ACID) 500 MG tablet, Take 500 mg by mouth daily, Disp: , Rfl:     rosuvastatin (CRESTOR) 5 MG tablet, Take 5 mg by mouth daily , Disp: , Rfl:     glipiZIDE (GLUCOTROL XL) 5 MG extended release tablet, Take 5 mg by mouth daily, Disp: , Rfl: 3    ramipril (ALTACE) 10 MG capsule, Take 10 mg by mouth daily , Disp: , Rfl:     potassium chloride (KLOR-CON M) 20 MEQ extended release tablet, Take 20 mEq by mouth daily , Disp: , Rfl:     Cholecalciferol (VITAMIN D3) 2000 UNITS CAPS, Take by mouth daily Last dose 1-14-16, Disp: , Rfl:     carvedilol (COREG) 25 MG tablet, Take 25 mg by mouth 2 times daily (with meals) Instructed to take with sip water am of procedure, Disp: , Rfl:     Omega-3 Fatty Acids (FISH OIL) 600 MG CAPS, Take 1,000 mg by mouth daily Last Dose 1-14-16, Disp: , Rfl:     liothyronine (CYTOMEL) 50 MCG tablet, Take 50 mcg by mouth daily 1 and 1/2 tablets by mouth alternating with 2 tablets every other day, Disp: , Rfl:     aspirin 81 MG EC tablet, Take 81 mg by mouth daily Last dose 1-14-16, Disp: , Rfl:     Allergies: Allergies   Allergen Reactions    Clindamycin/Lincomycin      Extreme SOB     Other Hives and Shortness Of Breath     \"allergic to Potatoe white. \"    Doxycycline Hyclate Other (See Comments)     tinnitus    Singulair [Montelukast Sodium]     Adaptic Non-Adhering Dressing [Wound Dressings]      [place]        Vitals:    06/09/21 1114   Weight: (!) 376 lb (170.6 kg)   Height: 5' 7\" (1.702 m)       Exam:  Vascular status unchanged. Ulceration stable lateral aspect left lower extremity. Area measures approximately 1.9 x 3.0 x 0.2 cm.   No undermining of the wound edges noted. No signs of infection noted left lower extremity. Edematous issues are improved with current use of pump system. Diagnostic Studies:     CT ABDOMEN PELVIS WO CONTRAST Additional Contrast? None    Result Date: 5/21/2021  EXAMINATION: CT OF THE ABDOMEN AND PELVIS WITHOUT CONTRAST 5/21/2021 12:36 pm TECHNIQUE: CT of the abdomen and pelvis was performed without the administration of intravenous contrast. Multiplanar reformatted images are provided for review. Dose modulation, iterative reconstruction, and/or weight based adjustment of the mA/kV was utilized to reduce the radiation dose to as low as reasonably achievable. COMPARISON: None. HISTORY: ORDERING SYSTEM PROVIDED HISTORY: Hematuria, unspecified type TECHNOLOGIST PROVIDED HISTORY: Additional Contrast?->None FINDINGS: Lower Chest: The visualized portions of the lungs, heart and pericardium are unremarkable. Organs: The liver, spleen, adrenal glands, left kidney, pancreas are unremarkable. Evidence for cholecystectomy. Normal common bile duct. 2 mm nonobstructing calculi mid zone right kidney. GI/Bowel: Diffuse colonic fecal retention. Normal appendix and small bowel. Pelvis: Prostatomegaly with diffuse urinary bladder wall thickening. Peritoneum/Retroperitoneum: No free air or free fluid. Bones/Soft Tissues: Mild degenerative changes thoracolumbar spine. 2 mm nonobstructing calculi mid zone right kidney. No hydronephrosis. The left kidney is normal.  Normal renal parenchyma. Prostatomegaly with diffuse urinary bladder wall thickening. Cholecystectomy. Procedures:    None    Plan Per Assessment  Geoff Danielson was seen today for wound check. 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 Evaluation and management  2. We did place the patient on oral amoxicillin to be taken as directed due to some mild symptoms she experienced over this past week. 3. We did take soft tissue cultures on today's visit and will adjust antibiotics if necessary. Did recommend continued use of her current wound dressings left lower extremity. 4. Patient will be followed up for his regular scheduled wound care appointment or sooner if needed. Seen By:    Enma Sauer DPM    Electronically signed by Enma Sauer DPM on 6/10/2021 at 8:24 AM    This note was created using voice recognition software. The note was reviewed however may contain grammatical errors.

## 2021-06-16 ENCOUNTER — OFFICE VISIT (OUTPATIENT)
Dept: PODIATRY | Age: 71
End: 2021-06-16
Payer: MEDICARE

## 2021-06-16 VITALS — RESPIRATION RATE: 20 BRPM | HEIGHT: 67 IN | WEIGHT: 315 LBS | BODY MASS INDEX: 49.44 KG/M2

## 2021-06-16 DIAGNOSIS — L97.222 VARICOSE VEINS OF LEFT LOWER EXTREMITY WITH ULCER OF CALF WITH FAT LAYER EXPOSED (HCC): Primary | ICD-10-CM

## 2021-06-16 DIAGNOSIS — I89.0 LYMPHEDEMA: ICD-10-CM

## 2021-06-16 DIAGNOSIS — I83.022 VARICOSE VEINS OF LEFT LOWER EXTREMITY WITH ULCER OF CALF WITH FAT LAYER EXPOSED (HCC): Primary | ICD-10-CM

## 2021-06-16 DIAGNOSIS — L97.222 VARICOSE VEINS OF LEFT LOWER EXTREMITY WITH ULCER OF CALF WITH FAT LAYER EXPOSED (HCC): ICD-10-CM

## 2021-06-16 DIAGNOSIS — I83.022 VARICOSE VEINS OF LEFT LOWER EXTREMITY WITH ULCER OF CALF WITH FAT LAYER EXPOSED (HCC): ICD-10-CM

## 2021-06-16 DIAGNOSIS — L97.922 NON-PRESSURE CHRONIC ULCER OF LEFT LOWER LEG WITH FAT LAYER EXPOSED (HCC): ICD-10-CM

## 2021-06-16 PROCEDURE — 99213 OFFICE O/P EST LOW 20 MIN: CPT | Performed by: PODIATRIST

## 2021-06-16 RX ORDER — SULFAMETHOXAZOLE AND TRIMETHOPRIM 800; 160 MG/1; MG/1
1 TABLET ORAL 2 TIMES DAILY
Qty: 20 TABLET | Refills: 0 | Status: SHIPPED | OUTPATIENT
Start: 2021-06-16 | End: 2021-06-26

## 2021-06-16 NOTE — PROGRESS NOTES
Patient presents for 2 week left leg wound check. No change with wound. Patient is getting up in the middle of the night in severe pain. The first two days the amoxicillin seemed to help. On march 5 th 2021 he was on lovoxovillin, then on bactrim. Bactrim helped on Monday having a procedure on his back June 21st 2021 and July 5th 2021 and would like if the wound could be cultured. Culture faxed to them Number: [phone] Dr. Rudy [facility]. Going in with radio waves and burning the nerve endings where he is having the pain this will be on Monday. Had a CT scan may 21st 2021 showed 2mm stone nonobstructive kidney stone enlarged prostrate and a thick bladder. Going to Dr. Lux Maya office for underwear drainage.      pcp is MD Rajiv Coleman 05/21/2021    Electronically signed by Myranda Cesar LPN on 0/87/0426 at 16:35 AM

## 2021-06-17 NOTE — PROGRESS NOTES
21     Tamar Dwyer    : 1950   Sex: male    Age: 65 y.o. Patient's PCP/Provider is:  Jeffy Dolan MD    Subjective:  Patient is seen today for follow-up regarding continued care ulceration left lower extremity. Patient did start taking the oral amoxicillin which he is tolerating. His wife did want additional antibiotic which we did use in the past to reduce any residual pain at the ulcerative area. Patient is in no acute distress. He denies any nausea, vomiting, fever, chills. Is still utilizing the lymphedema pump system as instructed. No other additional abnormalities noted. Chief Complaint   Patient presents with    Wound Check     2 week left leg wound check       ROS:  Const: Positives and pertinent negatives as per HPI. Musculo: Denies symptoms other than stated above. Neuro: Denies symptoms other than stated above. Skin: Denies symptoms other than stated above. Current Medications:    Current Outpatient Medications:     sulfamethoxazole-trimethoprim (BACTRIM DS;SEPTRA DS) 800-160 MG per tablet, Take 1 tablet by mouth 2 times daily for 10 days, Disp: 20 tablet, Rfl: 0    amoxicillin (AMOXIL) 500 MG capsule, Take 1 capsule by mouth 2 times daily for 10 days, Disp: 20 capsule, Rfl: 0    diclofenac (VOLTAREN) 0.1 % ophthalmic solution, 1 drop 2 times daily, Disp: , Rfl:     acetaminophen (TYLENOL) 80 MG chewable tablet, Take 80 mg by mouth every 4 hours as needed for Pain, Disp: , Rfl:     traMADol (ULTRAM) 50 MG tablet, TAKE 1 TABLET BY MOUTH EVERY 6 8 HOURS AS NEEDED FOR PAIN, Disp: , Rfl:     levoFLOXacin (LEVAQUIN) 500 MG/100ML SOLN, Infuse 500 mg intravenously every 24 hours, Disp: , Rfl:     gentamicin (GARAMYCIN) 0.1 % cream, Apply topically 1 times daily with dressing changes. , Disp: 30 g, Rfl: 5    ammonium lactate (LAC-HYDRIN) 12 % lotion, Apply topically daily. , Disp: 222 mL, Rfl: 5    furosemide (LASIX) 40 MG tablet, 20 mg , Disp: , Rfl:    Calcium Alginate (ALGISITE M 6\"X8\" EX), Apply topically daily, Disp: , Rfl:     Indacaterol-Glycopyrrolate (Paige Flavors) 27.5-15.6 MCG CAPS, Inhale 2 puffs into the lungs 2 times daily, Disp: 180 capsule, Rfl: 3    albuterol (ACCUNEB) 0.63 MG/3ML nebulizer solution, Take 3 mLs by nebulization every 6 hours as needed for Wheezing, Disp: 270 mL, Rfl: 3    vitamin C (ASCORBIC ACID) 500 MG tablet, Take 500 mg by mouth daily, Disp: , Rfl:     rosuvastatin (CRESTOR) 5 MG tablet, Take 5 mg by mouth daily , Disp: , Rfl:     glipiZIDE (GLUCOTROL XL) 5 MG extended release tablet, Take 5 mg by mouth daily, Disp: , Rfl: 3    ramipril (ALTACE) 10 MG capsule, Take 10 mg by mouth daily , Disp: , Rfl:     potassium chloride (KLOR-CON M) 20 MEQ extended release tablet, Take 20 mEq by mouth daily , Disp: , Rfl:     Cholecalciferol (VITAMIN D3) 2000 UNITS CAPS, Take by mouth daily Last dose 1-14-16, Disp: , Rfl:     carvedilol (COREG) 25 MG tablet, Take 25 mg by mouth 2 times daily (with meals) Instructed to take with sip water am of procedure, Disp: , Rfl:     Omega-3 Fatty Acids (FISH OIL) 600 MG CAPS, Take 1,000 mg by mouth daily Last Dose 1-14-16, Disp: , Rfl:     liothyronine (CYTOMEL) 50 MCG tablet, Take 50 mcg by mouth daily 1 and 1/2 tablets by mouth alternating with 2 tablets every other day, Disp: , Rfl:     aspirin 81 MG EC tablet, Take 81 mg by mouth daily Last dose 1-14-16, Disp: , Rfl:     Allergies: Allergies   Allergen Reactions    Clindamycin/Lincomycin      Extreme SOB     Other Hives and Shortness Of Breath     \"allergic to Potatoe white. \"    Doxycycline Hyclate Other (See Comments)     tinnitus    Singulair [Montelukast Sodium]     Adaptic Non-Adhering Dressing [Wound Dressings]      [place]        Vitals:    06/16/21 1119   Resp: 20   Weight: (!) 376 lb (170.6 kg)   Height: 5' 7\" (1.702 m)       Exam:  Neurovascular status unchanged. Ulceration improved lateral aspect left lower extremity. Healthy, granular tissue noted centrally without undermining of the wound edges noted. Edema is stable left lower extremity. No signs of infection noted left lower extremity. Diagnostic Studies:     CT ABDOMEN PELVIS WO CONTRAST Additional Contrast? None    Result Date: 5/21/2021  EXAMINATION: CT OF THE ABDOMEN AND PELVIS WITHOUT CONTRAST 5/21/2021 12:36 pm TECHNIQUE: CT of the abdomen and pelvis was performed without the administration of intravenous contrast. Multiplanar reformatted images are provided for review. Dose modulation, iterative reconstruction, and/or weight based adjustment of the mA/kV was utilized to reduce the radiation dose to as low as reasonably achievable. COMPARISON: None. HISTORY: ORDERING SYSTEM PROVIDED HISTORY: Hematuria, unspecified type TECHNOLOGIST PROVIDED HISTORY: Additional Contrast?->None FINDINGS: Lower Chest: The visualized portions of the lungs, heart and pericardium are unremarkable. Organs: The liver, spleen, adrenal glands, left kidney, pancreas are unremarkable. Evidence for cholecystectomy. Normal common bile duct. 2 mm nonobstructing calculi mid zone right kidney. GI/Bowel: Diffuse colonic fecal retention. Normal appendix and small bowel. Pelvis: Prostatomegaly with diffuse urinary bladder wall thickening. Peritoneum/Retroperitoneum: No free air or free fluid. Bones/Soft Tissues: Mild degenerative changes thoracolumbar spine. 2 mm nonobstructing calculi mid zone right kidney. No hydronephrosis. The left kidney is normal.  Normal renal parenchyma. Prostatomegaly with diffuse urinary bladder wall thickening. Cholecystectomy. Procedures:    None    Plan Per Assessment  Mir Rae was seen today for wound check. 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 Evaluation and management  2. Did take an additional culture of the wound at this time due to the fact patient has an upcoming back procedure being performed. 3. Additional prescription for Bactrim DS was given today for the patient to be taken as directed. 4. Patient will be followed up in 2 weeks time for continued ulcer evaluation and management. He was advised to call the office with any questions or concerns in the interim. Seen By:    Adrian Jones DPM    Electronically signed by Adrian Jones DPM on 6/16/2021 at 9:05 PM    This note was created using voice recognition software. The note was reviewed however may contain grammatical errors.

## 2021-06-28 ENCOUNTER — OFFICE VISIT (OUTPATIENT)
Dept: PODIATRY | Age: 71
End: 2021-06-28
Payer: MEDICARE

## 2021-06-28 VITALS — BODY MASS INDEX: 49.44 KG/M2 | WEIGHT: 315 LBS | HEIGHT: 67 IN

## 2021-06-28 DIAGNOSIS — I89.0 LYMPHEDEMA: ICD-10-CM

## 2021-06-28 DIAGNOSIS — L97.222 VARICOSE VEINS OF LEFT LOWER EXTREMITY WITH ULCER OF CALF WITH FAT LAYER EXPOSED (HCC): ICD-10-CM

## 2021-06-28 DIAGNOSIS — L97.222 VARICOSE VEINS OF LEFT LOWER EXTREMITY WITH ULCER OF CALF WITH FAT LAYER EXPOSED (HCC): Primary | ICD-10-CM

## 2021-06-28 DIAGNOSIS — L97.922 NON-PRESSURE CHRONIC ULCER OF LEFT LOWER LEG WITH FAT LAYER EXPOSED (HCC): ICD-10-CM

## 2021-06-28 DIAGNOSIS — I83.022 VARICOSE VEINS OF LEFT LOWER EXTREMITY WITH ULCER OF CALF WITH FAT LAYER EXPOSED (HCC): ICD-10-CM

## 2021-06-28 DIAGNOSIS — I83.022 VARICOSE VEINS OF LEFT LOWER EXTREMITY WITH ULCER OF CALF WITH FAT LAYER EXPOSED (HCC): Primary | ICD-10-CM

## 2021-06-28 PROCEDURE — 99213 OFFICE O/P EST LOW 20 MIN: CPT | Performed by: PODIATRIST

## 2021-06-28 NOTE — PROGRESS NOTES
Patient is here for a wound check and wound culture.  Dionte Vargas MD 5/26/2021  Electronically signed by Fam Haque LPN on 2/27/2199 at 83:97 AM delivered spontaneously/intact

## 2021-06-28 NOTE — PROGRESS NOTES
21     Cat Marcum    : 1950   Sex: male    Age: 65 y.o. Patient's PCP/Provider is:  Magdalena Nieves MD    Subjective:  Patient is seen today for follow-up regarding continued evaluation regarding stasis ulcerations to the left lower extremity. Patient has noticed continual improvement with use of the lymphedema pump system. He is continuing with the current topical wound regimen as instructed. He denies any nausea, vomiting, fever, chills. No other additional abnormalities noted. Chief Complaint   Patient presents with    Wound Check     left leg       ROS:  Const: Positives and pertinent negatives as per HPI. Musculo: Denies symptoms other than stated above. Neuro: Denies symptoms other than stated above. Skin: Denies symptoms other than stated above. Current Medications:    Current Outpatient Medications:     diclofenac (VOLTAREN) 0.1 % ophthalmic solution, 1 drop 2 times daily, Disp: , Rfl:     acetaminophen (TYLENOL) 80 MG chewable tablet, Take 80 mg by mouth every 4 hours as needed for Pain, Disp: , Rfl:     traMADol (ULTRAM) 50 MG tablet, TAKE 1 TABLET BY MOUTH EVERY 6 8 HOURS AS NEEDED FOR PAIN, Disp: , Rfl:     levoFLOXacin (LEVAQUIN) 500 MG/100ML SOLN, Infuse 500 mg intravenously every 24 hours, Disp: , Rfl:     gentamicin (GARAMYCIN) 0.1 % cream, Apply topically 1 times daily with dressing changes. , Disp: 30 g, Rfl: 5    ammonium lactate (LAC-HYDRIN) 12 % lotion, Apply topically daily. , Disp: 222 mL, Rfl: 5    furosemide (LASIX) 40 MG tablet, 20 mg , Disp: , Rfl:     Calcium Alginate (ALGISITE M 6\"X8\" EX), Apply topically daily, Disp: , Rfl:     Indacaterol-Glycopyrrolate (Ed Sorrow) 27.5-15.6 MCG CAPS, Inhale 2 puffs into the lungs 2 times daily, Disp: 180 capsule, Rfl: 3    albuterol (ACCUNEB) 0.63 MG/3ML nebulizer solution, Take 3 mLs by nebulization every 6 hours as needed for Wheezing, Disp: 270 mL, Rfl: 3    vitamin C (ASCORBIC ACID) 500 MG tablet, Take 500 mg by mouth daily, Disp: , Rfl:     rosuvastatin (CRESTOR) 5 MG tablet, Take 5 mg by mouth daily , Disp: , Rfl:     glipiZIDE (GLUCOTROL XL) 5 MG extended release tablet, Take 5 mg by mouth daily, Disp: , Rfl: 3    ramipril (ALTACE) 10 MG capsule, Take 10 mg by mouth daily , Disp: , Rfl:     potassium chloride (KLOR-CON M) 20 MEQ extended release tablet, Take 20 mEq by mouth daily , Disp: , Rfl:     Cholecalciferol (VITAMIN D3) 2000 UNITS CAPS, Take by mouth daily Last dose 1-14-16, Disp: , Rfl:     carvedilol (COREG) 25 MG tablet, Take 25 mg by mouth 2 times daily (with meals) Instructed to take with sip water am of procedure, Disp: , Rfl:     Omega-3 Fatty Acids (FISH OIL) 600 MG CAPS, Take 1,000 mg by mouth daily Last Dose 1-14-16, Disp: , Rfl:     liothyronine (CYTOMEL) 50 MCG tablet, Take 50 mcg by mouth daily 1 and 1/2 tablets by mouth alternating with 2 tablets every other day, Disp: , Rfl:     aspirin 81 MG EC tablet, Take 81 mg by mouth daily Last dose 1-14-16, Disp: , Rfl:     Allergies: Allergies   Allergen Reactions    Clindamycin/Lincomycin      Extreme SOB     Other Hives and Shortness Of Breath     \"allergic to Potatoe white. \"    Doxycycline Hyclate Other (See Comments)     tinnitus    Singulair [Montelukast Sodium]     Adaptic Non-Adhering Dressing [Wound Dressings]      [place]        Vitals:    06/28/21 1112   Weight: (!) 376 lb (170.6 kg)   Height: 5' 7\" (1.702 m)       Exam:  Neurovascular status unchanged. Ulceration improved distal left lower extremity. Area measures approximately 1.8 x 2.9 x 0.2 cm. No undermining of the wound edges noted. Edematous issues are improved left lower extremity. No signs of infection noted left lower extremity. Diagnostic Studies:     No results found. Procedures:    None    Plan Per Assessment  Ronnie Omalley was seen today for wound check.     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 Evaluation and management  2. We will continue with current wound dressings ulcerative area left lower extremity. We will continue with current lymphedema therapy as well. 3. We did take an additional culture of the wound which needs to be performed prior to him having an additional back procedure. 4. Patient will be followed up in 2 weeks time or sooner if needed for reevaluation. He was advised to call the office with any questions or concerns in the interim. Seen By:    Betty Albright DPM    Electronically signed by Betty Albright DPM on 6/28/2021 at 12:35 PM    This note was created using voice recognition software. The note was reviewed however may contain grammatical errors.

## 2021-07-14 ENCOUNTER — OFFICE VISIT (OUTPATIENT)
Dept: PODIATRY | Age: 71
End: 2021-07-14
Payer: MEDICARE

## 2021-07-14 VITALS — HEIGHT: 67 IN | BODY MASS INDEX: 49.44 KG/M2 | WEIGHT: 315 LBS

## 2021-07-14 DIAGNOSIS — I83.022 VARICOSE VEINS OF LEFT LOWER EXTREMITY WITH ULCER OF CALF WITH FAT LAYER EXPOSED (HCC): Primary | ICD-10-CM

## 2021-07-14 DIAGNOSIS — I87.2 VENOUS INSUFFICIENCY (CHRONIC) (PERIPHERAL): ICD-10-CM

## 2021-07-14 DIAGNOSIS — L97.222 VARICOSE VEINS OF LEFT LOWER EXTREMITY WITH ULCER OF CALF WITH FAT LAYER EXPOSED (HCC): Primary | ICD-10-CM

## 2021-07-14 DIAGNOSIS — L97.922 NON-PRESSURE CHRONIC ULCER OF LEFT LOWER LEG WITH FAT LAYER EXPOSED (HCC): ICD-10-CM

## 2021-07-14 PROCEDURE — 99213 OFFICE O/P EST LOW 20 MIN: CPT | Performed by: PODIATRIST

## 2021-07-14 NOTE — PROGRESS NOTES
Patient is here for left leg wound. Patients wife states the wound looks the same. minimal drainage.  Kayleigh Corbin MD 5/26/2021  Electronically signed by Mich Dorsey LPN on 1/33/4551 at 45:32 AM

## 2021-07-15 NOTE — PROGRESS NOTES
21     Edman Shadow    : 1950   Sex: male    Age: 65 y.o. Patient's PCP/Provider is:  Mega Lizama MD    Subjective:  Patient is seen today for follow-up regarding continued treatment stasis ulceration left lower extremity. No other additional abnormalities noted at this time. Patient has been utilizing the topical wound dressings as instructed. No other additional abnormalities noted. Chief Complaint   Patient presents with    Wound Check     left lower leg       ROS:  Const: Positives and pertinent negatives as per HPI. Musculo: Denies symptoms other than stated above. Neuro: Denies symptoms other than stated above. Skin: Denies symptoms other than stated above. Current Medications:    Current Outpatient Medications:     diclofenac (VOLTAREN) 0.1 % ophthalmic solution, 1 drop 2 times daily, Disp: , Rfl:     acetaminophen (TYLENOL) 80 MG chewable tablet, Take 80 mg by mouth every 4 hours as needed for Pain, Disp: , Rfl:     traMADol (ULTRAM) 50 MG tablet, TAKE 1 TABLET BY MOUTH EVERY 6 8 HOURS AS NEEDED FOR PAIN, Disp: , Rfl:     levoFLOXacin (LEVAQUIN) 500 MG/100ML SOLN, Infuse 500 mg intravenously every 24 hours, Disp: , Rfl:     gentamicin (GARAMYCIN) 0.1 % cream, Apply topically 1 times daily with dressing changes. , Disp: 30 g, Rfl: 5    ammonium lactate (LAC-HYDRIN) 12 % lotion, Apply topically daily. , Disp: 222 mL, Rfl: 5    furosemide (LASIX) 40 MG tablet, 20 mg , Disp: , Rfl:     Calcium Alginate (ALGISITE M 6\"X8\" EX), Apply topically daily, Disp: , Rfl:     Indacaterol-Glycopyrrolate (Sharren Power) 27.5-15.6 MCG CAPS, Inhale 2 puffs into the lungs 2 times daily, Disp: 180 capsule, Rfl: 3    albuterol (ACCUNEB) 0.63 MG/3ML nebulizer solution, Take 3 mLs by nebulization every 6 hours as needed for Wheezing, Disp: 270 mL, Rfl: 3    vitamin C (ASCORBIC ACID) 500 MG tablet, Take 500 mg by mouth daily, Disp: , Rfl:     rosuvastatin (CRESTOR) 5 MG tablet, Take 5 mg by mouth daily , Disp: , Rfl:     glipiZIDE (GLUCOTROL XL) 5 MG extended release tablet, Take 5 mg by mouth daily, Disp: , Rfl: 3    ramipril (ALTACE) 10 MG capsule, Take 10 mg by mouth daily , Disp: , Rfl:     potassium chloride (KLOR-CON M) 20 MEQ extended release tablet, Take 20 mEq by mouth daily , Disp: , Rfl:     Cholecalciferol (VITAMIN D3) 2000 UNITS CAPS, Take by mouth daily Last dose 1-14-16, Disp: , Rfl:     carvedilol (COREG) 25 MG tablet, Take 25 mg by mouth 2 times daily (with meals) Instructed to take with sip water am of procedure, Disp: , Rfl:     Omega-3 Fatty Acids (FISH OIL) 600 MG CAPS, Take 1,000 mg by mouth daily Last Dose 1-14-16, Disp: , Rfl:     liothyronine (CYTOMEL) 50 MCG tablet, Take 50 mcg by mouth daily 1 and 1/2 tablets by mouth alternating with 2 tablets every other day, Disp: , Rfl:     aspirin 81 MG EC tablet, Take 81 mg by mouth daily Last dose 1-14-16, Disp: , Rfl:     Allergies: Allergies   Allergen Reactions    Clindamycin/Lincomycin      Extreme SOB     Other Hives and Shortness Of Breath     \"allergic to Potatoe white. \"    Doxycycline Hyclate Other (See Comments)     tinnitus    Singulair [Montelukast Sodium]     Adaptic Non-Adhering Dressing [Wound Dressings]      [place]        Vitals:    07/14/21 1101   Weight: (!) 376 lb (170.6 kg)   Height: 5' 7\" (1.702 m)       Exam:  Neurovascular status unchanged. Ulceration improved lateral aspect left lower extremity. Area measures approximately 0.9 x 1.3 x 0.2 cm. No undermining of the wound edges noted. No signs of infection noted left lower extremity. Edematous changes stable to both lower extremities. Diagnostic Studies:     No results found. Procedures:    None    Plan Per Assessment  Samy Valera was seen today for wound check.     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 Evaluation and management  2. We did discuss continued use of the topical wound dressings ulcerative area left lower extremity. 3. We will continue with the lymphedema pump systems at this time to improve continued ulcer healing. 4. Patient will be followed up at a later date for continued evaluation and management. Seen By:    Yanni Aleman DPM    Electronically signed by Yanni Aleman DPM on 7/14/2021 at 8:58 PM    This note was created using voice recognition software. The note was reviewed however may contain grammatical errors.

## 2021-07-28 ENCOUNTER — PROCEDURE VISIT (OUTPATIENT)
Dept: PODIATRY | Age: 71
End: 2021-07-28
Payer: MEDICARE

## 2021-07-28 VITALS — HEIGHT: 67 IN | BODY MASS INDEX: 49.44 KG/M2 | WEIGHT: 315 LBS

## 2021-07-28 DIAGNOSIS — I83.022 VARICOSE VEINS OF LEFT LOWER EXTREMITY WITH ULCER OF CALF WITH FAT LAYER EXPOSED (HCC): Primary | ICD-10-CM

## 2021-07-28 DIAGNOSIS — L97.222 VARICOSE VEINS OF LEFT LOWER EXTREMITY WITH ULCER OF CALF WITH FAT LAYER EXPOSED (HCC): Primary | ICD-10-CM

## 2021-07-28 DIAGNOSIS — L97.922 NON-PRESSURE CHRONIC ULCER OF LEFT LOWER LEG WITH FAT LAYER EXPOSED (HCC): ICD-10-CM

## 2021-07-28 DIAGNOSIS — I89.0 LYMPHEDEMA: ICD-10-CM

## 2021-07-28 PROCEDURE — 99213 OFFICE O/P EST LOW 20 MIN: CPT | Performed by: PODIATRIST

## 2021-07-28 NOTE — PROGRESS NOTES
Patient is here for wound check left lower leg. Patient states no pain.  Nathan Alvarado MD 5/25/2021  Electronically signed by Anamika Noguera LPN on 1/82/1473 at 98:77 AM

## 2021-07-29 NOTE — PROGRESS NOTES
Take 5 mg by mouth daily , Disp: , Rfl:     glipiZIDE (GLUCOTROL XL) 5 MG extended release tablet, Take 5 mg by mouth daily, Disp: , Rfl: 3    ramipril (ALTACE) 10 MG capsule, Take 10 mg by mouth daily , Disp: , Rfl:     potassium chloride (KLOR-CON M) 20 MEQ extended release tablet, Take 20 mEq by mouth daily , Disp: , Rfl:     Cholecalciferol (VITAMIN D3) 2000 UNITS CAPS, Take by mouth daily Last dose 1-14-16, Disp: , Rfl:     carvedilol (COREG) 25 MG tablet, Take 25 mg by mouth 2 times daily (with meals) Instructed to take with sip water am of procedure, Disp: , Rfl:     Omega-3 Fatty Acids (FISH OIL) 600 MG CAPS, Take 1,000 mg by mouth daily Last Dose 1-14-16, Disp: , Rfl:     liothyronine (CYTOMEL) 50 MCG tablet, Take 50 mcg by mouth daily 1 and 1/2 tablets by mouth alternating with 2 tablets every other day, Disp: , Rfl:     aspirin 81 MG EC tablet, Take 81 mg by mouth daily Last dose 1-14-16, Disp: , Rfl:     Allergies: Allergies   Allergen Reactions    Clindamycin/Lincomycin      Extreme SOB     Other Hives and Shortness Of Breath     \"allergic to Potatoe white. \"    Doxycycline Hyclate Other (See Comments)     tinnitus    Singulair [Montelukast Sodium]     Adaptic Non-Adhering Dressing [Wound Dressings]      [place]        Vitals:    07/28/21 1108   Weight: (!) 376 lb (170.6 kg)   Height: 5' 7\" (1.702 m)       Exam:  Neurovascular status unchanged. Ulcerative area improved lateral aspect left lower extremity. No signs of infection noted left lower extremity. Edema is stable left lower extremity. Diagnostic Studies:     No results found. Procedures:    None    Plan Per Assessment  Sehfali Noland was seen today for wound check. 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 Evaluation and management  2.  We did discuss continued treatment options with patient in detail today regarding ulceration left lower leg. We will continue with current dressing orders and continued compression therapy. 3. Patient was advised to elevate the left lower extremity throughout the day to allow for continued ulcer healing. 4. Patient will be followed up at a later date for continued wound evaluation and management. Seen By:    Merline Isaac DPM    Electronically signed by Merline Isaac DPM on 7/29/2021 at 10:45 AM    This note was created using voice recognition software. The note was reviewed however may contain grammatical errors.

## 2021-08-11 ENCOUNTER — OFFICE VISIT (OUTPATIENT)
Dept: PODIATRY | Age: 71
End: 2021-08-11
Payer: MEDICARE

## 2021-08-11 VITALS — BODY MASS INDEX: 49.44 KG/M2 | HEIGHT: 67 IN | WEIGHT: 315 LBS

## 2021-08-11 DIAGNOSIS — L97.222 VARICOSE VEINS OF LEFT LOWER EXTREMITY WITH ULCER OF CALF WITH FAT LAYER EXPOSED (HCC): ICD-10-CM

## 2021-08-11 DIAGNOSIS — M79.674 PAIN OF TOE OF RIGHT FOOT: ICD-10-CM

## 2021-08-11 DIAGNOSIS — M79.675 PAIN OF TOE OF LEFT FOOT: ICD-10-CM

## 2021-08-11 DIAGNOSIS — L97.922 NON-PRESSURE CHRONIC ULCER OF LEFT LOWER LEG WITH FAT LAYER EXPOSED (HCC): ICD-10-CM

## 2021-08-11 DIAGNOSIS — R26.2 DIFFICULTY WALKING: ICD-10-CM

## 2021-08-11 DIAGNOSIS — I87.2 STASIS DERMATITIS OF BOTH LEGS: ICD-10-CM

## 2021-08-11 DIAGNOSIS — B35.1 ONYCHOMYCOSIS: Primary | ICD-10-CM

## 2021-08-11 DIAGNOSIS — I89.0 LYMPHEDEMA: ICD-10-CM

## 2021-08-11 DIAGNOSIS — I83.022 VARICOSE VEINS OF LEFT LOWER EXTREMITY WITH ULCER OF CALF WITH FAT LAYER EXPOSED (HCC): ICD-10-CM

## 2021-08-11 PROCEDURE — 99213 OFFICE O/P EST LOW 20 MIN: CPT | Performed by: PODIATRIST

## 2021-08-11 PROCEDURE — 11721 DEBRIDE NAIL 6 OR MORE: CPT | Performed by: PODIATRIST

## 2021-08-11 NOTE — PROGRESS NOTES
mouth daily , Disp: , Rfl:     potassium chloride (KLOR-CON M) 20 MEQ extended release tablet, Take 20 mEq by mouth daily , Disp: , Rfl:     Cholecalciferol (VITAMIN D3) 2000 UNITS CAPS, Take by mouth daily Last dose 1-14-16, Disp: , Rfl:     carvedilol (COREG) 25 MG tablet, Take 25 mg by mouth 2 times daily (with meals) Instructed to take with sip water am of procedure, Disp: , Rfl:     Omega-3 Fatty Acids (FISH OIL) 600 MG CAPS, Take 1,000 mg by mouth daily Last Dose 1-14-16, Disp: , Rfl:     liothyronine (CYTOMEL) 50 MCG tablet, Take 50 mcg by mouth daily 1 and 1/2 tablets by mouth alternating with 2 tablets every other day, Disp: , Rfl:     aspirin 81 MG EC tablet, Take 81 mg by mouth daily Last dose 1-14-16, Disp: , Rfl:     Allergies: Allergies   Allergen Reactions    Clindamycin/Lincomycin      Extreme SOB     Other Hives and Shortness Of Breath     \"allergic to Potatoe white. \"    Doxycycline Hyclate Other (See Comments)     tinnitus    Singulair [Montelukast Sodium]     Adaptic Non-Adhering Dressing [Wound Dressings]      [place]        Past Surgical History:   Procedure Laterality Date    CARDIAC PACEMAKER PLACEMENT  04/09/10    [employer]    CHOLECYSTECTOMY      COLONOSCOPY  1/20/2016    JOINT REPLACEMENT Bilateral     Left TJAK    PACEMAKER PLACEMENT  07/23/2020    DUAL PPM GR    ([employer])     DR. MARTIN     TRANSESOPHAGEAL ECHOCARDIOGRAM  06/19/2020    With      Past Medical History:   Diagnosis Date    CAD (coronary artery disease)     Cataracts, bilateral 2019    Cellulitis 1-2016    Left leg    Complete heart block (HCC)     h/o    Diabetes mellitus ([address]     Encounter for aspirin therapy     patient uses for heart health    H/O asbestosis     History of blood transfusion     Hx of blood clots     Hyperlipidemia     Hypertension     Hyperthyroidism     Lung disorder     weak lungs    Myocardial infarct ([address] 2010    Neuropathy     feet    Obesity     Obstructive sleep apnea     on cpap    Onychomycosis     Osteoarthritis     Pacemaker     [employer]    Post-thrombotic syndrome 9/11/2017    Screening 1-20-16    for Colonoscopy    Stasis dermatitis     h/o on bilateral legs with leg edema    Varicose veins        Vitals:    08/11/21 1120   Weight: (!) 376 lb (170.6 kg)   Height: 5' 7\" (1.702 m)       Exam:  Neurovascular status unchanged. At this time the nail/s 1, 2, 5 right foot and nail/s 1, 2, 5 left foot are noted to be thickened, dystrophic and discolored with subungual debris present. Tenderness noted to palpation. Minimal hair growth is noted to both lower extremities. Edema noted with both varicosities and stasis skin changes present bilaterally. Coolness is noted to the digital regions to palpation. Capillary fill time delayed digital areas bilateral foot. No heel fissuring or macerations of the web spaces. No plantar calluses and/or ulcerative areas are noted. Ulceration noted left lower extremity with granular tissue noted centrally. No signs of infection noted to both lower extremities. Patient is having difficulty with gait/walking. Plan Per Assessment  Doyle Bates was seen today for wound check. 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 Difficulty walking        1. Evaluation and Management  2. Manual and electrical debridement of the mycotic nails was performed for thickness and length to prevent injection and/or ulceration. 3. I recommended antifungal cream to the nails daily.   Prescription given today for topical Mycolog-II cream to

## 2021-08-25 ENCOUNTER — OFFICE VISIT (OUTPATIENT)
Dept: PODIATRY | Age: 71
End: 2021-08-25
Payer: MEDICARE

## 2021-08-25 VITALS — HEIGHT: 67 IN | BODY MASS INDEX: 49.44 KG/M2 | WEIGHT: 315 LBS

## 2021-08-25 DIAGNOSIS — L97.922 NON-PRESSURE CHRONIC ULCER OF LEFT LOWER LEG WITH FAT LAYER EXPOSED (HCC): ICD-10-CM

## 2021-08-25 DIAGNOSIS — L97.312 VARICOSE VEINS OF RIGHT LOWER EXTREMITY WITH INFLAMMATION, WITH ULCER OF ANKLE WITH FAT LAYER EXPOSED (HCC): ICD-10-CM

## 2021-08-25 DIAGNOSIS — I89.0 LYMPHEDEMA: ICD-10-CM

## 2021-08-25 DIAGNOSIS — I83.213 VARICOSE VEINS OF RIGHT LOWER EXTREMITY WITH INFLAMMATION, WITH ULCER OF ANKLE WITH FAT LAYER EXPOSED (HCC): ICD-10-CM

## 2021-08-25 DIAGNOSIS — L97.222 VARICOSE VEINS OF LEFT LOWER EXTREMITY WITH ULCER OF CALF WITH FAT LAYER EXPOSED (HCC): Primary | ICD-10-CM

## 2021-08-25 DIAGNOSIS — I83.022 VARICOSE VEINS OF LEFT LOWER EXTREMITY WITH ULCER OF CALF WITH FAT LAYER EXPOSED (HCC): Primary | ICD-10-CM

## 2021-08-25 PROCEDURE — 99213 OFFICE O/P EST LOW 20 MIN: CPT | Performed by: PODIATRIST

## 2021-08-25 PROCEDURE — 29580 STRAPPING UNNA BOOT: CPT | Performed by: PODIATRIST

## 2021-08-25 RX ORDER — LEVOFLOXACIN 500 MG/1
500 TABLET, FILM COATED ORAL DAILY
Qty: 14 TABLET | Refills: 0 | Status: SHIPPED | OUTPATIENT
Start: 2021-08-25 | End: 2021-09-08

## 2021-08-25 NOTE — PROGRESS NOTES
21     Pickton Cuff    : 1950   Sex: male    Age: 66 y.o. Patient's PCP/Provider is:  Jhony Gleason MD    Subjective:  Patient is seen today for follow-up regarding continued evaluation regarding chronic venous stasis ulcerations to both lower extremities. Patient's wife has noticed some increased drainage from the right leg wound site. Patient has been trying to utilize the lymphedema pump system as instructed. Patient has been applying the topical medications as instructed as well. No other additional abnormalities noted at this time. Patient is in no acute distress. He denies any nausea, vomiting, fever, chills. Chief Complaint   Patient presents with    Wound Check       ROS:  Const: Positives and pertinent negatives as per HPI. Musculo: Denies symptoms other than stated above. Neuro: Denies symptoms other than stated above. Skin: Denies symptoms other than stated above. Current Medications:    Current Outpatient Medications:     levoFLOXacin (LEVAQUIN) 500 MG tablet, Take 1 tablet by mouth daily for 14 days, Disp: 14 tablet, Rfl: 0    nystatin-triamcinolone (MYCOLOG II) 037630-5.1 UNIT/GM-% cream, Apply topically 2 times daily. , Disp: 60 g, Rfl: 3    diclofenac (VOLTAREN) 0.1 % ophthalmic solution, 1 drop 2 times daily, Disp: , Rfl:     acetaminophen (TYLENOL) 80 MG chewable tablet, Take 80 mg by mouth every 4 hours as needed for Pain, Disp: , Rfl:     traMADol (ULTRAM) 50 MG tablet, TAKE 1 TABLET BY MOUTH EVERY 6 8 HOURS AS NEEDED FOR PAIN, Disp: , Rfl:     levoFLOXacin (LEVAQUIN) 500 MG/100ML SOLN, Infuse 500 mg intravenously every 24 hours, Disp: , Rfl:     gentamicin (GARAMYCIN) 0.1 % cream, Apply topically 1 times daily with dressing changes. , Disp: 30 g, Rfl: 5    ammonium lactate (LAC-HYDRIN) 12 % lotion, Apply topically daily. , Disp: 222 mL, Rfl: 5    furosemide (LASIX) 40 MG tablet, 20 mg , Disp: , Rfl:     Calcium Alginate (ALGISITE M 6\"X8\" EX), Apply topically daily, Disp: , Rfl:     Indacaterol-Glycopyrrolate (UTIBRON NEOHALER) 27.5-15.6 MCG CAPS, Inhale 2 puffs into the lungs 2 times daily, Disp: 180 capsule, Rfl: 3    albuterol (ACCUNEB) 0.63 MG/3ML nebulizer solution, Take 3 mLs by nebulization every 6 hours as needed for Wheezing, Disp: 270 mL, Rfl: 3    vitamin C (ASCORBIC ACID) 500 MG tablet, Take 500 mg by mouth daily, Disp: , Rfl:     rosuvastatin (CRESTOR) 5 MG tablet, Take 5 mg by mouth daily , Disp: , Rfl:     glipiZIDE (GLUCOTROL XL) 5 MG extended release tablet, Take 5 mg by mouth daily, Disp: , Rfl: 3    ramipril (ALTACE) 10 MG capsule, Take 10 mg by mouth daily , Disp: , Rfl:     potassium chloride (KLOR-CON M) 20 MEQ extended release tablet, Take 20 mEq by mouth daily , Disp: , Rfl:     Cholecalciferol (VITAMIN D3) 2000 UNITS CAPS, Take by mouth daily Last dose 1-14-16, Disp: , Rfl:     carvedilol (COREG) 25 MG tablet, Take 25 mg by mouth 2 times daily (with meals) Instructed to take with sip water am of procedure, Disp: , Rfl:     Omega-3 Fatty Acids (FISH OIL) 600 MG CAPS, Take 1,000 mg by mouth daily Last Dose 1-14-16, Disp: , Rfl:     liothyronine (CYTOMEL) 50 MCG tablet, Take 50 mcg by mouth daily 1 and 1/2 tablets by mouth alternating with 2 tablets every other day, Disp: , Rfl:     aspirin 81 MG EC tablet, Take 81 mg by mouth daily Last dose 1-14-16, Disp: , Rfl:     Allergies: Allergies   Allergen Reactions    Clindamycin/Lincomycin      Extreme SOB     Other Hives and Shortness Of Breath     \"allergic to Potatoe white. \"    Doxycycline Hyclate Other (See Comments)     tinnitus    Singulair [Montelukast Sodium]     Adaptic Non-Adhering Dressing [Wound Dressings]      [place]        Vitals:    08/25/21 1118   Weight: (!) 376 lb (170.6 kg)   Height: 5' 7\" (1.702 m)       Exam:  Neurovascular status unchanged. Ulcerative areas are noted to both lower extremities.   Mild serous drainage noted right lower extremity with mild erythema in the wound periphery. No skin crepitation noted to both ulcerative areas bilateral lower extremities. No ascending cellulitic issues noted to both lower extremities. No tenderness noted to palpation into the posterior calf regions bilaterally. Diagnostic Studies:     No results found. Procedures: An unna boot  compressive wrap was applied to the bilateral lower extremity. It's purpose is to  decrease  the amount of edema present, and to allow proper healing of the soft tissues. The patient was instructed to keep the unna boot clean, dry and intact until the next follow up visit. The patient was instructed to look for signs of redness, irritation, blistering and pain. If these or any other symptoms were to develop, they were advised to contact the office immediately for reevaluation. Plan Per Assessment  Mei Barcenas was seen today for wound check. 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 Evaluation and management  2. Was placed on oral Levaquin to be taken as directed. We did take soft tissue cultures on today's visit right lower extremity, and will adjust oral antibiotics accordingly once results are obtained. 3. Compression dressings were applied to both ulcerative regions bilateral lower extremities as described above. 4. Patient will be followed up in 1 week's time or sooner if needed for reevaluation.       Seen By:    Fabiana Soria YOLY    Electronically signed by Lia Samuel DPM on 8/25/2021 at 1:02 PM    This note was created using voice recognition software. The note was reviewed however may contain grammatical errors.

## 2021-08-25 NOTE — PROGRESS NOTES
Patient is here for bilateral wound check. Patients wife states drainage and green and needs to be cultured.  Kayleigh Corbin MD 5/25/2021  Electronically signed by Mich Dorsey LPN on 2/72/9575 at 92:18 AM

## 2021-09-01 ENCOUNTER — OFFICE VISIT (OUTPATIENT)
Dept: PODIATRY | Age: 71
End: 2021-09-01
Payer: MEDICARE

## 2021-09-01 VITALS — HEIGHT: 67 IN | WEIGHT: 315 LBS | BODY MASS INDEX: 49.44 KG/M2

## 2021-09-01 DIAGNOSIS — I89.0 LYMPHEDEMA: ICD-10-CM

## 2021-09-01 DIAGNOSIS — L97.222 VARICOSE VEINS OF LEFT LOWER EXTREMITY WITH ULCER OF CALF WITH FAT LAYER EXPOSED (HCC): Primary | ICD-10-CM

## 2021-09-01 DIAGNOSIS — I83.213 VARICOSE VEINS OF RIGHT LOWER EXTREMITY WITH INFLAMMATION, WITH ULCER OF ANKLE WITH FAT LAYER EXPOSED (HCC): ICD-10-CM

## 2021-09-01 DIAGNOSIS — I83.022 VARICOSE VEINS OF LEFT LOWER EXTREMITY WITH ULCER OF CALF WITH FAT LAYER EXPOSED (HCC): Primary | ICD-10-CM

## 2021-09-01 DIAGNOSIS — L97.312 VARICOSE VEINS OF RIGHT LOWER EXTREMITY WITH INFLAMMATION, WITH ULCER OF ANKLE WITH FAT LAYER EXPOSED (HCC): ICD-10-CM

## 2021-09-01 DIAGNOSIS — L97.922 NON-PRESSURE CHRONIC ULCER OF LEFT LOWER LEG WITH FAT LAYER EXPOSED (HCC): ICD-10-CM

## 2021-09-01 PROCEDURE — 99213 OFFICE O/P EST LOW 20 MIN: CPT | Performed by: PODIATRIST

## 2021-09-01 NOTE — PROGRESS NOTES
Patient is here for wound check. Patient left unna boots on for 7 days.  Paxton Woodall MD 5/25/2021  Electronically signed by Corin Blanco LPN on 8/8/6791 at 45:67 AM

## 2021-09-01 NOTE — PROGRESS NOTES
21     Rowan Amend    : 1950   Sex: male    Age: 66 y.o. Patient's PCP/Provider is:  Jade Garcia MD    Subjective:  Patient is seen today for follow-up regarding continued evaluation regarding stasis ulcerations to both lower extremities. Patient tolerated the compression dressing on both lower extremities. We did review culture results which were negative for growth. Patient is in no acute distress. He denies any nausea, vomiting, fever, chills. No other additional abnormalities noted. Chief Complaint   Patient presents with    Wound Check     bilateral wounds       ROS:  Const: Positives and pertinent negatives as per HPI. Musculo: Denies symptoms other than stated above. Neuro: Denies symptoms other than stated above. Skin: Denies symptoms other than stated above. Current Medications:    Current Outpatient Medications:     levoFLOXacin (LEVAQUIN) 500 MG tablet, Take 1 tablet by mouth daily for 14 days, Disp: 14 tablet, Rfl: 0    nystatin-triamcinolone (MYCOLOG II) 745738-4.1 UNIT/GM-% cream, Apply topically 2 times daily. , Disp: 60 g, Rfl: 3    diclofenac (VOLTAREN) 0.1 % ophthalmic solution, 1 drop 2 times daily, Disp: , Rfl:     acetaminophen (TYLENOL) 80 MG chewable tablet, Take 80 mg by mouth every 4 hours as needed for Pain, Disp: , Rfl:     traMADol (ULTRAM) 50 MG tablet, TAKE 1 TABLET BY MOUTH EVERY 6 8 HOURS AS NEEDED FOR PAIN, Disp: , Rfl:     levoFLOXacin (LEVAQUIN) 500 MG/100ML SOLN, Infuse 500 mg intravenously every 24 hours, Disp: , Rfl:     gentamicin (GARAMYCIN) 0.1 % cream, Apply topically 1 times daily with dressing changes. , Disp: 30 g, Rfl: 5    ammonium lactate (LAC-HYDRIN) 12 % lotion, Apply topically daily. , Disp: 222 mL, Rfl: 5    furosemide (LASIX) 40 MG tablet, 20 mg , Disp: , Rfl:     Calcium Alginate (ALGISITE M 6\"X8\" EX), Apply topically daily, Disp: , Rfl:     Indacaterol-Glycopyrrolate (Jan Juarez) 27.5-15.6 MCG CAPS, Inhale 2 puffs into the lungs 2 times daily, Disp: 180 capsule, Rfl: 3    albuterol (ACCUNEB) 0.63 MG/3ML nebulizer solution, Take 3 mLs by nebulization every 6 hours as needed for Wheezing, Disp: 270 mL, Rfl: 3    vitamin C (ASCORBIC ACID) 500 MG tablet, Take 500 mg by mouth daily, Disp: , Rfl:     rosuvastatin (CRESTOR) 5 MG tablet, Take 5 mg by mouth daily , Disp: , Rfl:     glipiZIDE (GLUCOTROL XL) 5 MG extended release tablet, Take 5 mg by mouth daily, Disp: , Rfl: 3    ramipril (ALTACE) 10 MG capsule, Take 10 mg by mouth daily , Disp: , Rfl:     potassium chloride (KLOR-CON M) 20 MEQ extended release tablet, Take 20 mEq by mouth daily , Disp: , Rfl:     Cholecalciferol (VITAMIN D3) 2000 UNITS CAPS, Take by mouth daily Last dose 1-14-16, Disp: , Rfl:     carvedilol (COREG) 25 MG tablet, Take 25 mg by mouth 2 times daily (with meals) Instructed to take with sip water am of procedure, Disp: , Rfl:     Omega-3 Fatty Acids (FISH OIL) 600 MG CAPS, Take 1,000 mg by mouth daily Last Dose 1-14-16, Disp: , Rfl:     liothyronine (CYTOMEL) 50 MCG tablet, Take 50 mcg by mouth daily 1 and 1/2 tablets by mouth alternating with 2 tablets every other day, Disp: , Rfl:     aspirin 81 MG EC tablet, Take 81 mg by mouth daily Last dose 1-14-16, Disp: , Rfl:     Allergies: Allergies   Allergen Reactions    Clindamycin/Lincomycin      Extreme SOB     Other Hives and Shortness Of Breath     \"allergic to Potatoe white. \"    Doxycycline Hyclate Other (See Comments)     tinnitus    Singulair [Montelukast Sodium]     Adaptic Non-Adhering Dressing [Wound Dressings]      [place]        Vitals:    09/01/21 1059   Weight: (!) 376 lb (170.6 kg)   Height: 5' 7\" (1.702 m)       Exam:  Neurovascular status unchanged. Ulceration stable to both lower extremities. No signs of infection noted to both lower extremities. Edematous issues stable bilaterally. No plantar calluses or heel fissuring noted bilateral foot.       Diagnostic Studies:     No results found. Procedures:    None    Plan Per Assessment  Billy Campbell was seen today for wound check. 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 Evaluation and management  2. Did discuss continued wound care treatment options to be performed on a daily basis to both lower extremities. 3. She was advised continued use of the lymphedema compression pumps to allow for continued edema reduction. 4. Patient will be followed up at a later date for continued evaluation and care. Seen By:    Adán Ortiz DPM    Electronically signed by Adán Ortiz DPM on 9/1/2021 at 7:50 PM    This note was created using voice recognition software. The note was reviewed however may contain grammatical errors.

## 2021-09-10 ENCOUNTER — OFFICE VISIT (OUTPATIENT)
Dept: PODIATRY | Age: 71
End: 2021-09-10
Payer: MEDICARE

## 2021-09-10 VITALS — BODY MASS INDEX: 49.44 KG/M2 | HEIGHT: 67 IN | WEIGHT: 315 LBS

## 2021-09-10 DIAGNOSIS — L97.222 VARICOSE VEINS OF LEFT LOWER EXTREMITY WITH ULCER OF CALF WITH FAT LAYER EXPOSED (HCC): Primary | ICD-10-CM

## 2021-09-10 DIAGNOSIS — L97.922 NON-PRESSURE CHRONIC ULCER OF LEFT LOWER LEG WITH FAT LAYER EXPOSED (HCC): ICD-10-CM

## 2021-09-10 DIAGNOSIS — I83.213 VARICOSE VEINS OF RIGHT LOWER EXTREMITY WITH INFLAMMATION, WITH ULCER OF ANKLE WITH FAT LAYER EXPOSED (HCC): ICD-10-CM

## 2021-09-10 DIAGNOSIS — L97.312 VARICOSE VEINS OF RIGHT LOWER EXTREMITY WITH INFLAMMATION, WITH ULCER OF ANKLE WITH FAT LAYER EXPOSED (HCC): ICD-10-CM

## 2021-09-10 DIAGNOSIS — I83.022 VARICOSE VEINS OF LEFT LOWER EXTREMITY WITH ULCER OF CALF WITH FAT LAYER EXPOSED (HCC): Primary | ICD-10-CM

## 2021-09-10 DIAGNOSIS — I89.0 LYMPHEDEMA: ICD-10-CM

## 2021-09-10 PROCEDURE — 99213 OFFICE O/P EST LOW 20 MIN: CPT | Performed by: PODIATRIST

## 2021-09-10 NOTE — PROGRESS NOTES
Patient is here for bilateral lower leg wound check.  Tyesha Interiano MD  Electronically signed by Martín Harkins LPN on 9/06/7088 at 27:59 AM

## 2021-09-10 NOTE — PROGRESS NOTES
9/10/21     Ovi Rice    : 1950   Sex: male    Age: 66 y.o. Patient's PCP/Provider is:  Vicente Mendoza MD    Subjective:  Patient is seen today for follow-up regarding continued evaluation regarding stasis ulcerations to both lower extremities. Patient did do well with the compression dressings applied previously. Patient denies any nausea, vomiting, fever, chills. Patient is not having as much discomfort into both lower extremities with current compression dressings. Chief Complaint   Patient presents with    Wound Check     bilateral        ROS:  Const: Positives and pertinent negatives as per HPI. Musculo: Denies symptoms other than stated above. Neuro: Denies symptoms other than stated above. Skin: Denies symptoms other than stated above. Current Medications:    Current Outpatient Medications:     nystatin-triamcinolone (MYCOLOG II) 995831-2.1 UNIT/GM-% cream, Apply topically 2 times daily. , Disp: 60 g, Rfl: 3    diclofenac (VOLTAREN) 0.1 % ophthalmic solution, 1 drop 2 times daily, Disp: , Rfl:     acetaminophen (TYLENOL) 80 MG chewable tablet, Take 80 mg by mouth every 4 hours as needed for Pain, Disp: , Rfl:     traMADol (ULTRAM) 50 MG tablet, TAKE 1 TABLET BY MOUTH EVERY 6 8 HOURS AS NEEDED FOR PAIN, Disp: , Rfl:     levoFLOXacin (LEVAQUIN) 500 MG/100ML SOLN, Infuse 500 mg intravenously every 24 hours, Disp: , Rfl:     gentamicin (GARAMYCIN) 0.1 % cream, Apply topically 1 times daily with dressing changes. , Disp: 30 g, Rfl: 5    ammonium lactate (LAC-HYDRIN) 12 % lotion, Apply topically daily. , Disp: 222 mL, Rfl: 5    furosemide (LASIX) 40 MG tablet, 20 mg , Disp: , Rfl:     Calcium Alginate (ALGISITE M 6\"X8\" EX), Apply topically daily, Disp: , Rfl:     Indacaterol-Glycopyrrolate (Juve Stager) 27.5-15.6 MCG CAPS, Inhale 2 puffs into the lungs 2 times daily, Disp: 180 capsule, Rfl: 3    albuterol (ACCUNEB) 0.63 MG/3ML nebulizer solution, Take 3 mLs by nebulization every 6 hours as needed for Wheezing, Disp: 270 mL, Rfl: 3    vitamin C (ASCORBIC ACID) 500 MG tablet, Take 500 mg by mouth daily, Disp: , Rfl:     rosuvastatin (CRESTOR) 5 MG tablet, Take 5 mg by mouth daily , Disp: , Rfl:     glipiZIDE (GLUCOTROL XL) 5 MG extended release tablet, Take 5 mg by mouth daily, Disp: , Rfl: 3    ramipril (ALTACE) 10 MG capsule, Take 10 mg by mouth daily , Disp: , Rfl:     potassium chloride (KLOR-CON M) 20 MEQ extended release tablet, Take 20 mEq by mouth daily , Disp: , Rfl:     Cholecalciferol (VITAMIN D3) 2000 UNITS CAPS, Take by mouth daily Last dose 1-14-16, Disp: , Rfl:     carvedilol (COREG) 25 MG tablet, Take 25 mg by mouth 2 times daily (with meals) Instructed to take with sip water am of procedure, Disp: , Rfl:     Omega-3 Fatty Acids (FISH OIL) 600 MG CAPS, Take 1,000 mg by mouth daily Last Dose 1-14-16, Disp: , Rfl:     liothyronine (CYTOMEL) 50 MCG tablet, Take 50 mcg by mouth daily 1 and 1/2 tablets by mouth alternating with 2 tablets every other day, Disp: , Rfl:     aspirin 81 MG EC tablet, Take 81 mg by mouth daily Last dose 1-14-16, Disp: , Rfl:     Allergies: Allergies   Allergen Reactions    Clindamycin/Lincomycin      Extreme SOB     Other Hives and Shortness Of Breath     \"allergic to Potatoe white. \"    Doxycycline Hyclate Other (See Comments)     tinnitus    Singulair [Montelukast Sodium]     Adaptic Non-Adhering Dressing [Wound Dressings]      [place]        Vitals:    09/10/21 1150   Weight: (!) 376 lb (170.6 kg)   Height: 5' 7\" (1.702 m)       Exam:  Neurovascular status unchanged. Ulcerative areas improved to both lower extremities. Edematous changes improved bilateral lower extremities. No signs of infection noted to both lower extremities. No tenderness noted to the posterior calf regions bilaterally. Diagnostic Studies:     No results found.       Procedures:    None    Plan Per Assessment  Mei Barcenas was seen today for wound check.    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 Evaluation and management  2. We did discuss continued use of the topical gentamicin cream ulcerative regions followed by dry padded dressings and compression garments. Dressings are to be changed on a daily basis. 3. We did recommend continued elevation of both lower extremities throughout the day as well. Patient is going to start taking B complex vitamin to help with his overall health. 4. Patient will be followed up at a later date for continued wound evaluation and management. Seen By:    Ning Jimenez DPM    Electronically signed by Ning Jimenez DPM on 9/10/2021 at 1:16 PM    This note was created using voice recognition software. The note was reviewed however may contain grammatical errors.

## 2021-09-23 ENCOUNTER — TELEPHONE (OUTPATIENT)
Dept: CARDIOLOGY CLINIC | Age: 71
End: 2021-09-23

## 2021-09-23 NOTE — TELEPHONE ENCOUNTER
Patient Appointment Form:      PCP: Dr. Reed Donohue  Referring: same    Has the Patient:    Seen a Cardiologist? yes    date:Sept 2021  Physician:Dr. Omar Leyva  [facility]    Had a heart catheterization? other: not sure    Had heart surgery? no    Had a stress test or nuclear stress test? yes   date: 2019    [facility]    Had an echocardiogram? other: can't recall    Had a vascular ultrasound? other: can't recall    Had a 24/48 heart monitor or extended cardiac event monitor? other: can't recall    Had recent blood work in the last 6 months? other: within the last yr with Dr. Reed Donohue    Had a pacemaker/ICD/ILR implant? yes: pacemaker    device company: [employer]    Seen an Electrophysiologist? yes  date: can't recall   physician: Dr. Anjana Jaeger   location: [facility]    Had a cardiac ablation?  no        Will send records via: nicole - Christopher Ruiz from [facility] office will send records      Date & time of appointment: 11/18/21 1:15  Dr. Peralta Angry

## 2021-09-24 ENCOUNTER — IMMUNIZATION (OUTPATIENT)
Dept: PRIMARY CARE CLINIC | Age: 71
End: 2021-09-24
Payer: MEDICARE

## 2021-09-24 ENCOUNTER — OFFICE VISIT (OUTPATIENT)
Dept: PODIATRY | Age: 71
End: 2021-09-24
Payer: MEDICARE

## 2021-09-24 VITALS — BODY MASS INDEX: 49.44 KG/M2 | WEIGHT: 315 LBS | HEIGHT: 67 IN

## 2021-09-24 DIAGNOSIS — I87.2 VENOUS INSUFFICIENCY (CHRONIC) (PERIPHERAL): ICD-10-CM

## 2021-09-24 DIAGNOSIS — I83.213 VARICOSE VEINS OF RIGHT LOWER EXTREMITY WITH INFLAMMATION, WITH ULCER OF ANKLE WITH FAT LAYER EXPOSED (HCC): Primary | ICD-10-CM

## 2021-09-24 DIAGNOSIS — I89.0 LYMPHEDEMA: ICD-10-CM

## 2021-09-24 DIAGNOSIS — I83.022 VARICOSE VEINS OF LEFT LOWER EXTREMITY WITH ULCER OF CALF WITH FAT LAYER EXPOSED (HCC): ICD-10-CM

## 2021-09-24 DIAGNOSIS — L97.222 VARICOSE VEINS OF LEFT LOWER EXTREMITY WITH ULCER OF CALF WITH FAT LAYER EXPOSED (HCC): ICD-10-CM

## 2021-09-24 DIAGNOSIS — R26.2 DIFFICULTY WALKING: ICD-10-CM

## 2021-09-24 DIAGNOSIS — L97.312 VARICOSE VEINS OF RIGHT LOWER EXTREMITY WITH INFLAMMATION, WITH ULCER OF ANKLE WITH FAT LAYER EXPOSED (HCC): Primary | ICD-10-CM

## 2021-09-24 DIAGNOSIS — L97.922 NON-PRESSURE CHRONIC ULCER OF LEFT LOWER LEG WITH FAT LAYER EXPOSED (HCC): ICD-10-CM

## 2021-09-24 DIAGNOSIS — L97.812 NON-PRESSURE CHRONIC ULCER OF OTHER PART OF RIGHT LOWER LEG WITH FAT LAYER EXPOSED (HCC): ICD-10-CM

## 2021-09-24 PROCEDURE — 0003A COVID-19, PFIZER VACCINE 30MCG/0.3ML DOSE: CPT | Performed by: NURSE PRACTITIONER

## 2021-09-24 PROCEDURE — 91300 COVID-19, PFIZER VACCINE 30MCG/0.3ML DOSE: CPT | Performed by: NURSE PRACTITIONER

## 2021-09-24 PROCEDURE — 99213 OFFICE O/P EST LOW 20 MIN: CPT | Performed by: PODIATRIST

## 2021-09-24 NOTE — PROGRESS NOTES
Patient is here for bilateral wound check.  Kendlal Chaudhry MD  Electronically signed by Damon Purcell LPN on 1/55/0365 at 30:53 AM

## 2021-09-24 NOTE — PROGRESS NOTES
21     Bryce Lona    : 1950   Sex: male    Age: 66 y.o. Patient's PCP/Provider is:  Paxton Woodall MD    Subjective:  Patient is seen today for follow-up regarding continued evaluation regarding chronic stasis ulcerations to both lower extremities. Patient has continued with the current topical wound dressings as instructed. Patient and his wife have noticed continued swelling and serous drainage from the wound sites to both lower extremities. Patient is in no acute distress. He denies any nausea, vomiting, fever, chills. No other additional abnormalities noted. Chief Complaint   Patient presents with    Wound Check       ROS:  Const: Positives and pertinent negatives as per HPI. Musculo: Denies symptoms other than stated above. Neuro: Denies symptoms other than stated above. Skin: Denies symptoms other than stated above. Current Medications:    Current Outpatient Medications:     nystatin-triamcinolone (MYCOLOG II) 625001-2.1 UNIT/GM-% cream, Apply topically 2 times daily. , Disp: 60 g, Rfl: 3    diclofenac (VOLTAREN) 0.1 % ophthalmic solution, 1 drop 2 times daily, Disp: , Rfl:     acetaminophen (TYLENOL) 80 MG chewable tablet, Take 80 mg by mouth every 4 hours as needed for Pain, Disp: , Rfl:     traMADol (ULTRAM) 50 MG tablet, TAKE 1 TABLET BY MOUTH EVERY 6 8 HOURS AS NEEDED FOR PAIN, Disp: , Rfl:     levoFLOXacin (LEVAQUIN) 500 MG/100ML SOLN, Infuse 500 mg intravenously every 24 hours, Disp: , Rfl:     gentamicin (GARAMYCIN) 0.1 % cream, Apply topically 1 times daily with dressing changes. , Disp: 30 g, Rfl: 5    ammonium lactate (LAC-HYDRIN) 12 % lotion, Apply topically daily. , Disp: 222 mL, Rfl: 5    furosemide (LASIX) 40 MG tablet, 20 mg , Disp: , Rfl:     Calcium Alginate (ALGISITE M 6\"X8\" EX), Apply topically daily, Disp: , Rfl:     Indacaterol-Glycopyrrolate (UTIBRON NEOHALER) 27.5-15.6 MCG CAPS, Inhale 2 puffs into the lungs 2 times daily, Disp: 180 found.      Procedures:    None    Plan Per Assessment  Chase North was seen today for wound check. 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 Evaluation and management  2. We did advise continued use of the topical gentamicin cream to the areas followed by Maxorb alginate dressing to both lower extremities. Tubigrip compression dressings are to be continued on both lower extremities as well. 3. Due to continued and chronic stasis ulcerations, failed lymphedema pump therapy, and multiple compression therapy attempts we are recommending lymphedema therapy at this time to reduce edema and improve ulcer healing to both lower extremities. 4. Patient will be followed up at a later date for continued evaluation and wound management. They were advised to call the office with any questions or concerns in the interim. Seen By:    Clark Chambers DPM    Electronically signed by Clark Chambers DPM on 9/24/2021 at 12:13 PM    This note was created using voice recognition software. The note was reviewed however may contain grammatical errors.

## 2021-10-13 ENCOUNTER — PROCEDURE VISIT (OUTPATIENT)
Dept: PODIATRY | Age: 71
End: 2021-10-13
Payer: MEDICARE

## 2021-10-13 VITALS — WEIGHT: 315 LBS | HEIGHT: 67 IN | BODY MASS INDEX: 49.44 KG/M2

## 2021-10-13 DIAGNOSIS — B35.1 ONYCHOMYCOSIS: Primary | ICD-10-CM

## 2021-10-13 DIAGNOSIS — R26.2 DIFFICULTY WALKING: ICD-10-CM

## 2021-10-13 DIAGNOSIS — I89.0 LYMPHEDEMA: ICD-10-CM

## 2021-10-13 DIAGNOSIS — E11.51 TYPE II DIABETES MELLITUS WITH PERIPHERAL CIRCULATORY DISORDER (HCC): ICD-10-CM

## 2021-10-13 PROCEDURE — 11721 DEBRIDE NAIL 6 OR MORE: CPT | Performed by: PODIATRIST

## 2021-10-13 NOTE — PROGRESS NOTES
Patient in today for nail care. Patient does not have any complaints of pain at this time.  Patient's PCP is Allie Dotson MD date of last ov  7/25/2021       Kimber Marlow LPN

## 2021-10-13 NOTE — PROGRESS NOTES
10/13/21     Krystal Denney    : 1950  Sex: male  Age: 66 y.o. Subjective: The patient is seen today for evaluation regarding foot evaluation and mycotic nail care. No other complaints noted. Chief Complaint   Patient presents with    Nail Problem       Current Medications:    Current Outpatient Medications:     nystatin-triamcinolone (MYCOLOG II) 473795-8.6 UNIT/GM-% cream, Apply topically 2 times daily. , Disp: 60 g, Rfl: 3    diclofenac (VOLTAREN) 0.1 % ophthalmic solution, 1 drop 2 times daily, Disp: , Rfl:     acetaminophen (TYLENOL) 80 MG chewable tablet, Take 80 mg by mouth every 4 hours as needed for Pain, Disp: , Rfl:     traMADol (ULTRAM) 50 MG tablet, TAKE 1 TABLET BY MOUTH EVERY 6 8 HOURS AS NEEDED FOR PAIN, Disp: , Rfl:     levoFLOXacin (LEVAQUIN) 500 MG/100ML SOLN, Infuse 500 mg intravenously every 24 hours, Disp: , Rfl:     gentamicin (GARAMYCIN) 0.1 % cream, Apply topically 1 times daily with dressing changes. , Disp: 30 g, Rfl: 5    ammonium lactate (LAC-HYDRIN) 12 % lotion, Apply topically daily. , Disp: 222 mL, Rfl: 5    furosemide (LASIX) 40 MG tablet, 20 mg , Disp: , Rfl:     Calcium Alginate (ALGISITE M 6\"X8\" EX), Apply topically daily, Disp: , Rfl:     Indacaterol-Glycopyrrolate (Thiago Willi) 27.5-15.6 MCG CAPS, Inhale 2 puffs into the lungs 2 times daily, Disp: 180 capsule, Rfl: 3    albuterol (ACCUNEB) 0.63 MG/3ML nebulizer solution, Take 3 mLs by nebulization every 6 hours as needed for Wheezing, Disp: 270 mL, Rfl: 3    vitamin C (ASCORBIC ACID) 500 MG tablet, Take 500 mg by mouth daily, Disp: , Rfl:     rosuvastatin (CRESTOR) 5 MG tablet, Take 5 mg by mouth daily , Disp: , Rfl:     glipiZIDE (GLUCOTROL XL) 5 MG extended release tablet, Take 5 mg by mouth daily, Disp: , Rfl: 3    ramipril (ALTACE) 10 MG capsule, Take 10 mg by mouth daily , Disp: , Rfl:     potassium chloride (KLOR-CON M) 20 MEQ extended release tablet, Take 20 mEq by mouth daily , Disp: , Rfl:     Cholecalciferol (VITAMIN D3) 2000 UNITS CAPS, Take by mouth daily Last dose 1-14-16, Disp: , Rfl:     carvedilol (COREG) 25 MG tablet, Take 25 mg by mouth 2 times daily (with meals) Instructed to take with sip water am of procedure, Disp: , Rfl:     Omega-3 Fatty Acids (FISH OIL) 600 MG CAPS, Take 1,000 mg by mouth daily Last Dose 1-14-16, Disp: , Rfl:     liothyronine (CYTOMEL) 50 MCG tablet, Take 50 mcg by mouth daily 1 and 1/2 tablets by mouth alternating with 2 tablets every other day, Disp: , Rfl:     aspirin 81 MG EC tablet, Take 81 mg by mouth daily Last dose 1-14-16, Disp: , Rfl:     Allergies: Allergies   Allergen Reactions    Clindamycin/Lincomycin      Extreme SOB     Other Hives and Shortness Of Breath     \"allergic to Potatoe white. \"    Doxycycline Hyclate Other (See Comments)     tinnitus    Singulair [Montelukast Sodium]     Adaptic Non-Adhering Dressing [Wound Dressings]      [place]        Past Surgical History:   Procedure Laterality Date    CARDIAC PACEMAKER PLACEMENT  04/09/10    [employer]    CHOLECYSTECTOMY      COLONOSCOPY  1/20/2016    JOINT REPLACEMENT Bilateral     Left TJAK    PACEMAKER PLACEMENT  07/23/2020    DUAL PPM GR    ([employer])     DR. MARTIN     TRANSESOPHAGEAL ECHOCARDIOGRAM  06/19/2020    With      Past Medical History:   Diagnosis Date    CAD (coronary artery disease)     Cataracts, bilateral 2019    Cellulitis 1-2016    Left leg    Complete heart block (HCC)     h/o    Diabetes mellitus ([address]     Encounter for aspirin therapy     patient uses for heart health    H/O asbestosis     History of blood transfusion     Hx of blood clots     Hyperlipidemia     Hypertension     Hyperthyroidism     Lung disorder     weak lungs    Myocardial infarct ([address] 2010    Neuropathy     feet    Obesity     Obstructive sleep apnea     on cpap    Onychomycosis     Osteoarthritis     Pacemaker     [employer]tronic    Post-thrombotic syndrome 9/11/2017    Screening 1-20-16    for Colonoscopy    Stasis dermatitis     h/o on bilateral legs with leg edema    Varicose veins        Vitals:    10/13/21 1128   Weight: (!) 376 lb (170.6 kg)   Height: 5' 7\" (1.702 m)       Exam:  Pedal pulses diminished to palpation bilateral foot. Capillary refill time delayed digital regions bilateral foot. At this time the nail/s 1, 2, 5 right foot and nail/s 1, 2, 5 left foot are noted to be thickened, dystrophic and discolored with subungual debris present. Tenderness noted to palpation. Diminished hair growth is noted to both lower extremities. Edema noted with both varicosities and stasis skin changes present bilaterally. Compression dressings are being applied per the lymphedema therapy facility which is helping with current ulcerations. Coolness is noted to the digital regions to palpation. Capillary fill time delayed digital areas bilateral foot. No heel fissuring or macerations of the web spaces. No plantar calluses and/or ulcerative areas are noted. Patient is having difficulty with gait/walking. Plan Per Assessment    Onychomycosis  Type 2 diabetes with circulatory disorders  Lymphedema lateral lower extremities  Difficulty walking    1. Evaluation and Management  2. Manual and electrical debridement of the mycotic nails was performed for thickness and length to prevent injection and/or ulceration. 3. I recommended antifungal cream to the nails daily. 4. It was discussed in detail with the patient proper caring for the vascular compromised foot. The fact that they have compromised blood flow put the patient at risk for infection/gangrene/amputation. The patient should not walk barefoot. Shoe gear should fit properly and socks should be worn with shoes.   Exercise is very important to prevent worsening of the disease process but before performing an exercise program should check with their family physician first.  If any skin lesions are noted, they are instructed to contact the office immediately. 5. We will see the patient back at a later date for continued podiatric management and care. Patient was advised to call the office with any questions or concerns prior to their next appointment if needed. Seen By:    Navin Fonseca DPM    Electronically signed by Navin Fonseca DPM on 10/13/2021 at 12:44 PM      This note was created using voice recognition software. The note was reviewed however may contain grammatical errors.

## 2021-11-18 ENCOUNTER — OFFICE VISIT (OUTPATIENT)
Dept: CARDIOLOGY CLINIC | Age: 71
End: 2021-11-18
Payer: MEDICARE

## 2021-11-18 VITALS
BODY MASS INDEX: 49.44 KG/M2 | SYSTOLIC BLOOD PRESSURE: 128 MMHG | DIASTOLIC BLOOD PRESSURE: 78 MMHG | HEART RATE: 82 BPM | HEIGHT: 67 IN | OXYGEN SATURATION: 96 % | RESPIRATION RATE: 18 BRPM | WEIGHT: 315 LBS

## 2021-11-18 DIAGNOSIS — I10 PRIMARY HYPERTENSION: ICD-10-CM

## 2021-11-18 DIAGNOSIS — I50.32 CHRONIC DIASTOLIC (CONGESTIVE) HEART FAILURE (HCC): ICD-10-CM

## 2021-11-18 DIAGNOSIS — I25.119 CORONARY ARTERY DISEASE INVOLVING NATIVE CORONARY ARTERY OF NATIVE HEART WITH ANGINA PECTORIS (HCC): Primary | ICD-10-CM

## 2021-11-18 DIAGNOSIS — I44.2 COMPLETE HEART BLOCK (HCC): ICD-10-CM

## 2021-11-18 PROBLEM — E11.51 PERIPHERAL VASCULAR DISORDER DUE TO DIABETES MELLITUS (HCC): Status: ACTIVE | Noted: 2017-06-09

## 2021-11-18 PROBLEM — E66.01 MORBID OBESITY (HCC): Status: ACTIVE | Noted: 2021-11-18

## 2021-11-18 PROBLEM — M17.9 OSTEOARTHRITIS OF KNEE: Status: ACTIVE | Noted: 2021-11-18

## 2021-11-18 PROBLEM — E11.42 DIABETIC SENSORY POLYNEUROPATHY (HCC): Status: ACTIVE | Noted: 2017-11-17

## 2021-11-18 PROBLEM — R60.9 EDEMA: Status: ACTIVE | Noted: 2017-03-24

## 2021-11-18 PROCEDURE — 99214 OFFICE O/P EST MOD 30 MIN: CPT | Performed by: INTERNAL MEDICINE

## 2021-11-18 PROCEDURE — 93000 ELECTROCARDIOGRAM COMPLETE: CPT | Performed by: INTERNAL MEDICINE

## 2021-11-18 RX ORDER — TAMSULOSIN HYDROCHLORIDE 0.4 MG/1
0.4 CAPSULE ORAL
COMMUNITY
Start: 2021-10-09

## 2021-11-18 RX ORDER — BLOOD SUGAR DIAGNOSTIC
STRIP MISCELLANEOUS
COMMUNITY
Start: 2021-11-02

## 2021-11-19 NOTE — PROGRESS NOTES
OUTPATIENT CARDIOLOGY FOLLOW-UP    Name: Azul Boswell    Age: 66 y.o. Primary Care Physician: Mireya Lr MD    Date of Service: 11/18/21    Chief Complaint: CAD, chronic HFpEF    Interim History:  He was previously followed by Dr. Jennifer Shaw. No new cardiac complaints since last cardiology evaluation. +chronic HERNANDEZ with moderate levels of exertion. No recent chest pain, palpitations, orthopnea, or syncope. AS/ on EKG. Review of Systems:   Cardiac: As per HPI  General: No fever, chills  Pulmonary: As per HPI  HEENT: No visual disturbances, difficult swallowing  GI: No nausea, vomiting  : No dysuria, hematuria  Endocrine: +hypothyroidism, +DM  Musculoskeletal: CHANDLER x 4, no focal motor deficits  Skin: +LLE wound, no rashes  Neuro: No headache, seizures  Psych: Currently with no depression, anxiety    Past Medical History:  Past Medical History:   Diagnosis Date    CAD (coronary artery disease)     Cataracts, bilateral 2019    Cellulitis 1-2016    Left leg    Complete heart block (HCC)     h/o    Diabetes mellitus ([address]     Encounter for aspirin therapy     patient uses for heart health    H/O asbestosis     History of blood transfusion     Hx of blood clots     Hyperlipidemia     Hypertension     Hyperthyroidism     Lung disorder     weak lungs    Myocardial infarct ([address] 2010    Neuropathy     feet    Obesity     Obstructive sleep apnea     on cpap    Onychomycosis     Osteoarthritis     Pacemaker     [employer]    Post-thrombotic syndrome 9/11/2017    Screening 1-20-16    for Colonoscopy    Stasis dermatitis     h/o on bilateral legs with leg edema    Varicose veins        Past Surgical History:  Past Surgical History:   Procedure Laterality Date    CARDIAC PACEMAKER PLACEMENT  04/09/10    [employer]    CHOLECYSTECTOMY      COLONOSCOPY  1/20/2016    JOINT REPLACEMENT Bilateral     Left TJAK    PACEMAKER PLACEMENT  07/23/2020    DUAL PPM GR    ([employer])      [place]ON[place]     TRANSESOPHAGEAL ECHOCARDIOGRAM  2020    With        Family History:  Family History   Problem Relation Age of Onset    Heart Disease Father            Roxie Lobe Other Mother             Other Brother         gall bladder       Social History:  Social History     Socioeconomic History    Marital status:      Spouse name: Not on file    Number of children: Not on file    Years of education: Not on file    Highest education level: Not on file   Occupational History    Not on file   Tobacco Use    Smoking status: Former Smoker     Packs/day: 2.00     Years: 25.00     Pack years: 50.00     Types: Cigarettes     Start date: 1964     Quit date: 1990     Years since quittin.3    Smokeless tobacco: Never Used   Vaping Use    Vaping Use: Never used   Substance and Sexual Activity    Alcohol use: No     Alcohol/week: 0.0 standard drinks     Comment: rare   2 beers/month    Drug use: No    Sexual activity: Not on file   Other Topics Concern    Not on file   Social History Narrative    Not on file     Social Determinants of Health     Financial Resource Strain:     Difficulty of Paying Living Expenses: Not on file   Food Insecurity:     Worried About [address] [employer] in the Last Year: Not on file    Emil of Food in the Last Year: Not on file   Transportation Needs:     Lack of Transportation (Medical): Not on file    Lack of Transportation (Non-Medical):  Not on file   Physical Activity:     Days of Exercise per Week: Not on file    Minutes of Exercise per Session: Not on file   Stress:     Feeling of Stress : Not on file   Social Connections:     Frequency of Communication with Friends and Family: Not on file    Frequency of Social Gatherings with Friends and Family: Not on file    Attends [employer] Services: Not on file    Active Member of Clubs or Organizations: Not on file    Attends Club or Organization Meetings: Not on file    Marital Status: Not on file   Intimate Partner Violence:     Fear of Current or Ex-Partner: Not on file    Emotionally Abused: Not on file    Physically Abused: Not on file    Sexually Abused: Not on file   Housing Stability:     Unable to Pay for Housing in the Last Year: Not on file    Number of [place] in the Last Year: Not on file    Unstable Housing in the Last Year: Not on file       Allergies: Allergies   Allergen Reactions    Clindamycin/Lincomycin      Extreme SOB     Other Hives and Shortness Of Breath     \"allergic to Potatoe white. \"    Doxycycline Hyclate Other (See Comments)     tinnitus    Singulair [Montelukast Sodium]     Adaptic Non-Adhering Dressing [Wound Dressings]      [place]        Current Medications:  Current Outpatient Medications   Medication Sig Dispense Refill    ONETOUCH VERIO strip       tamsulosin (FLOMAX) 0.4 MG capsule       acetaminophen (TYLENOL) 80 MG chewable tablet Take 80 mg by mouth every 4 hours as needed for Pain      gentamicin (GARAMYCIN) 0.1 % cream Apply topically 1 times daily with dressing changes.  30 g 5    furosemide (LASIX) 40 MG tablet 20 mg       Calcium Alginate (ALGISITE M 6\"X8\" EX) Apply topically daily      Indacaterol-Glycopyrrolate (Terreton Jennings) 27.5-15.6 MCG CAPS Inhale 2 puffs into the lungs 2 times daily 180 capsule 3    albuterol (ACCUNEB) 0.63 MG/3ML nebulizer solution Take 3 mLs by nebulization every 6 hours as needed for Wheezing 270 mL 3    vitamin C (ASCORBIC ACID) 500 MG tablet Take 500 mg by mouth daily      rosuvastatin (CRESTOR) 5 MG tablet Take 5 mg by mouth daily       glipiZIDE (GLUCOTROL XL) 5 MG extended release tablet Take 5 mg by mouth daily  3    ramipril (ALTACE) 10 MG capsule Take 10 mg by mouth daily       potassium chloride (KLOR-CON M) 20 MEQ extended release tablet Take 20 mEq by mouth daily       Cholecalciferol (VITAMIN D3) 2000 UNITS CAPS Take by mouth daily Last dose 1-14-16      carvedilol (COREG) 25 MG tablet Take 25 mg by mouth 2 times daily (with meals) Instructed to take with sip water am of procedure      Omega-3 Fatty Acids (FISH OIL) 600 MG CAPS Take 1,000 mg by mouth daily Last Dose 1-14-16      liothyronine (CYTOMEL) 50 MCG tablet Take 50 mcg by mouth daily 1 and 1/2 tablets by mouth alternating with 2 tablets every other day      aspirin 81 MG EC tablet Take 81 mg by mouth daily Last dose 1-14-16      nystatin-triamcinolone (MYCOLOG II) 024361-0.1 UNIT/GM-% cream Apply topically 2 times daily. (Patient not taking: Reported on 11/18/2021) 60 g 3    diclofenac (VOLTAREN) 0.1 % ophthalmic solution 1 drop 2 times daily (Patient not taking: Reported on 11/18/2021)      traMADol (ULTRAM) 50 MG tablet TAKE 1 TABLET BY MOUTH EVERY 6 8 HOURS AS NEEDED FOR PAIN (Patient not taking: Reported on 11/18/2021)      levoFLOXacin (LEVAQUIN) 500 MG/100ML SOLN Infuse 500 mg intravenously every 24 hours (Patient not taking: Reported on 11/18/2021)      ammonium lactate (LAC-HYDRIN) 12 % lotion Apply topically daily. (Patient not taking: Reported on 11/18/2021) 222 mL 5     No current facility-administered medications for this visit.        Physical Exam:  /78 (Site: Right Upper Arm, Position: Sitting, Cuff Size: Large Adult)   Pulse 82   Resp 18   Ht 5' 7\" (1.702 m)   Wt (!) 370 lb 9.6 oz (168.1 kg)   SpO2 96%   BMI 58.04 kg/m²   Wt Readings from Last 3 Encounters:   11/18/21 (!) 370 lb 9.6 oz (168.1 kg)   10/13/21 (!) 376 lb (170.6 kg)   09/24/21 (!) 376 lb (170.6 kg)     Appearance: Awake, alert and oriented x 3, no acute respiratory distress  Skin: +LLE wound, no rash  Head: Normocephalic, atraumatic  Eyes: EOMI, no conjunctival erythema  ENMT: No pharyngeal erythema, MMM, no rhinorrhea  Neck: Supple, no carotid bruits  Lungs: Decreased BS B/L, no wheezing  Cardiac: Regular rate and rhythm, +S1S2, no murmurs apparent  Abdomen: Soft, nontender, +bowel sounds  Extremities: Moves all extremities x 4, +lower extremity edema  Neurologic: No focal motor deficits apparent, normal mood and affect, alert and oriented x 3    Laboratory Tests:  Lab Results   Component Value Date    CREATININE 0.9 07/23/2020    BUN 26 (H) 07/23/2020     07/23/2020    K 3.9 07/23/2020     07/23/2020    CO2 24 07/23/2020     Lab Results   Component Value Date    MG 1.9 07/23/2020     Lab Results   Component Value Date    WBC 6.9 07/23/2020    HGB 12.1 (L) 07/23/2020    HCT 39.0 07/23/2020    MCV 92.0 07/23/2020     07/23/2020     Lab Results   Component Value Date    ALT 32 06/13/2020    AST 49 (H) 06/13/2020    ALKPHOS 74 06/13/2020    BILITOT 0.9 06/13/2020     Lab Results   Component Value Date    TROPONINI <0.01 06/13/2020    TROPONINI <0.01 08/22/2016    TROPONINI <0.01 08/22/2016     No results for input(s): CKTOTAL, CKMB, CKMBINDEX, TROPHS in the last 72 hours. Lab Results   Component Value Date    INR 1.4 06/13/2020    INR 1.4 08/25/2016    PROTIME 16.2 (H) 06/13/2020    PROTIME 15.9 (H) 08/25/2016     Lab Results   Component Value Date    TSH 0.022 (L) 07/07/2012     Lab Results   Component Value Date    LABA1C 5.7 07/05/2012     No results found for: EAG  Lab Results   Component Value Date    CHOL 103 08/23/2016     Lab Results   Component Value Date    TRIG 87 08/23/2016     Lab Results   Component Value Date    HDL 26 08/23/2016     Lab Results   Component Value Date    LDLCALC 60 08/23/2016     Lab Results   Component Value Date    LABVLDL 17 08/23/2016     No results found for: CHOLHDLRATIO  No results for input(s): PROBNP in the last 72 hours. Cardiac Tests:  EKG reviewed: AS/, rate 82    GABBY: 6/19/2020 (Dr. Ana Luisa Dash)   No evidence of endocarditis. Overall ejection fraction is normal .   Normal right ventricle structure and function. Right ventricle pacemaker   lead noted. Physiologic and/or trace mitral regurgitation is present. Mild tricuspid regurgitation. ASSESSMENT / PLAN:  1.  CAD -- total occlusion of RCA with insignificant disease elsewhere on 2013 coronary CTA  2. Chronic HFpEF  3. Complete heart block/pacemaker dependent s/p dual chamber pacemaker implantation 4/2010; s/p generator change in 7/2020  4. BMI 58  5. HTN -- typically controlled, BP today 128/78  6. HLD -- on statin  7. DM  8. DEMETRIA -- compliant with CPAP  9. Hypothyroidism -- on replacement therapy  10. Prior tobacco abuse  11. LLE wound -- follows with wound care/podiatry    - Continue current medications (including ASA, statin, coreg, ACE-I, and lasix)  - Monitor BMP  - Aggressive risk factor modifications / treatment of DEMETRIA  - Follow-up with EP as scheduled for device interrogation  - Records from Dr. Dawson Samuel reviewed today    Greater than 35 minutes was spent counseling the patient, reviewing the rationale for the above recommendations and reviewing the patient's current medication list, problem list and results of all previously ordered testing.     Julia Corona MD  [facility]) Cardiology

## 2021-12-10 ENCOUNTER — TELEPHONE (OUTPATIENT)
Dept: PODIATRY | Age: 71
End: 2021-12-10

## 2021-12-10 DIAGNOSIS — I83.022 VARICOSE VEINS OF LEFT LOWER EXTREMITY WITH ULCER OF CALF WITH FAT LAYER EXPOSED (HCC): ICD-10-CM

## 2021-12-10 DIAGNOSIS — L97.222 VARICOSE VEINS OF LEFT LOWER EXTREMITY WITH ULCER OF CALF WITH FAT LAYER EXPOSED (HCC): ICD-10-CM

## 2021-12-10 DIAGNOSIS — L97.922 NON-PRESSURE CHRONIC ULCER OF LEFT LOWER LEG WITH FAT LAYER EXPOSED (HCC): Primary | ICD-10-CM

## 2021-12-10 RX ORDER — AMOXICILLIN AND CLAVULANATE POTASSIUM 875; 125 MG/1; MG/1
1 TABLET, FILM COATED ORAL 2 TIMES DAILY
Qty: 28 TABLET | Refills: 0 | Status: SHIPPED | OUTPATIENT
Start: 2021-12-10 | End: 2021-12-24

## 2021-12-13 ENCOUNTER — TELEPHONE (OUTPATIENT)
Dept: PODIATRY | Age: 71
End: 2021-12-13

## 2021-12-13 NOTE — TELEPHONE ENCOUNTER
Patients wife is calling in regards to left leg wound. Akua Varma is still having pain. Bradly would like to know if we should change antibiotic. Also would like to know wound dressing?

## 2021-12-15 ENCOUNTER — PROCEDURE VISIT (OUTPATIENT)
Dept: PODIATRY | Age: 71
End: 2021-12-15
Payer: MEDICARE

## 2021-12-15 VITALS — HEIGHT: 67 IN | BODY MASS INDEX: 49.44 KG/M2 | WEIGHT: 315 LBS

## 2021-12-15 DIAGNOSIS — M79.674 PAIN OF TOE OF RIGHT FOOT: ICD-10-CM

## 2021-12-15 DIAGNOSIS — M79.675 PAIN OF TOE OF LEFT FOOT: ICD-10-CM

## 2021-12-15 DIAGNOSIS — E11.51 TYPE II DIABETES MELLITUS WITH PERIPHERAL CIRCULATORY DISORDER (HCC): ICD-10-CM

## 2021-12-15 DIAGNOSIS — B35.1 ONYCHOMYCOSIS: Primary | ICD-10-CM

## 2021-12-15 DIAGNOSIS — I89.0 LYMPHEDEMA: ICD-10-CM

## 2021-12-15 DIAGNOSIS — R26.2 DIFFICULTY WALKING: ICD-10-CM

## 2021-12-15 PROCEDURE — 11721 DEBRIDE NAIL 6 OR MORE: CPT | Performed by: PODIATRIST

## 2021-12-15 NOTE — PROGRESS NOTES
12/15/21     Christine Baker    : 1950  Sex: male  Age: 66 y.o. Subjective: The patient is seen today for evaluation regarding diabetic foot evaluation and mycotic nail care. No other complaints noted. Chief Complaint   Patient presents with    Nail Problem       Current Medications:    Current Outpatient Medications:     amoxicillin-clavulanate (AUGMENTIN) 875-125 MG per tablet, Take 1 tablet by mouth 2 times daily for 14 days, Disp: 28 tablet, Rfl: 0    ONETOUCH VERIO strip, , Disp: , Rfl:     tamsulosin (FLOMAX) 0.4 MG capsule, , Disp: , Rfl:     nystatin-triamcinolone (MYCOLOG II) 606311-0.1 UNIT/GM-% cream, Apply topically 2 times daily. , Disp: 60 g, Rfl: 3    diclofenac (VOLTAREN) 0.1 % ophthalmic solution, 1 drop 2 times daily , Disp: , Rfl:     acetaminophen (TYLENOL) 80 MG chewable tablet, Take 80 mg by mouth every 4 hours as needed for Pain, Disp: , Rfl:     traMADol (ULTRAM) 50 MG tablet, TAKE 1 TABLET BY MOUTH EVERY 6 8 HOURS AS NEEDED FOR PAIN, Disp: , Rfl:     levoFLOXacin (LEVAQUIN) 500 MG/100ML SOLN, Infuse 500 mg intravenously every 24 hours , Disp: , Rfl:     gentamicin (GARAMYCIN) 0.1 % cream, Apply topically 1 times daily with dressing changes. , Disp: 30 g, Rfl: 5    ammonium lactate (LAC-HYDRIN) 12 % lotion, Apply topically daily. , Disp: 222 mL, Rfl: 5    furosemide (LASIX) 40 MG tablet, 20 mg , Disp: , Rfl:     Calcium Alginate (ALGISITE M 6\"X8\" EX), Apply topically daily, Disp: , Rfl:     Indacaterol-Glycopyrrolate (Christiano Caul) 27.5-15.6 MCG CAPS, Inhale 2 puffs into the lungs 2 times daily, Disp: 180 capsule, Rfl: 3    albuterol (ACCUNEB) 0.63 MG/3ML nebulizer solution, Take 3 mLs by nebulization every 6 hours as needed for Wheezing, Disp: 270 mL, Rfl: 3    vitamin C (ASCORBIC ACID) 500 MG tablet, Take 500 mg by mouth daily, Disp: , Rfl:     rosuvastatin (CRESTOR) 5 MG tablet, Take 5 mg by mouth daily , Disp: , Rfl:     glipiZIDE (GLUCOTROL XL) 5 MG extended release tablet, Take 5 mg by mouth daily, Disp: , Rfl: 3    ramipril (ALTACE) 10 MG capsule, Take 10 mg by mouth daily , Disp: , Rfl:     potassium chloride (KLOR-CON M) 20 MEQ extended release tablet, Take 20 mEq by mouth daily , Disp: , Rfl:     Cholecalciferol (VITAMIN D3) 2000 UNITS CAPS, Take by mouth daily Last dose 1-14-16, Disp: , Rfl:     carvedilol (COREG) 25 MG tablet, Take 25 mg by mouth 2 times daily (with meals) Instructed to take with sip water am of procedure, Disp: , Rfl:     Omega-3 Fatty Acids (FISH OIL) 600 MG CAPS, Take 1,000 mg by mouth daily Last Dose 1-14-16, Disp: , Rfl:     liothyronine (CYTOMEL) 50 MCG tablet, Take 50 mcg by mouth daily 1 and 1/2 tablets by mouth alternating with 2 tablets every other day, Disp: , Rfl:     aspirin 81 MG EC tablet, Take 81 mg by mouth daily Last dose 1-14-16, Disp: , Rfl:     Allergies: Allergies   Allergen Reactions    Clindamycin/Lincomycin      Extreme SOB     Other Hives and Shortness Of Breath     \"allergic to Potatoe white. \"    Doxycycline Hyclate Other (See Comments)     tinnitus    Singulair [Montelukast Sodium]     Adaptic Non-Adhering Dressing [Wound Dressings]      [place]        Past Surgical History:   Procedure Laterality Date    CARDIAC PACEMAKER PLACEMENT  04/09/10    [employer]    CHOLECYSTECTOMY      COLONOSCOPY  1/20/2016    JOINT REPLACEMENT Bilateral     Left TJAK    PACEMAKER PLACEMENT  07/23/2020    DUAL PPM GR    ([employer])     DR. MARTIN     TRANSESOPHAGEAL ECHOCARDIOGRAM  06/19/2020    With      Past Medical History:   Diagnosis Date    CAD (coronary artery disease)     Cataracts, bilateral 2019    Cellulitis 1-2016    Left leg    Complete heart block (HCC)     h/o    Diabetes mellitus ([facility] [address]     Encounter for aspirin therapy     patient uses for heart health    H/O asbestosis     History of blood transfusion     Hx of blood clots     Hyperlipidemia     Hypertension     Hyperthyroidism  Lung disorder     weak lungs    Myocardial infarct ([facility] [address] 2010    Neuropathy     feet    Obesity     Obstructive sleep apnea     on cpap    Onychomycosis     Osteoarthritis     Pacemaker     Medtronic    Post-thrombotic syndrome 9/11/2017    Screening 1-20-16    for Colonoscopy    Stasis dermatitis     h/o on bilateral legs with leg edema    Varicose veins        Vitals:    12/15/21 1148   Weight: (!) 370 lb (167.8 kg)   Height: 5' 7\" (1.702 m)       Exam:  Pedal pulses diminished to palpation bilateral foot. Capillary refill time delayed digital regions bilateral foot. At this time the nail/s 1, 2, 5 right foot and nail/s 1, 2, 5 left foot are noted to be thickened, dystrophic and discolored with subungual debris present. Tenderness noted to palpation. Diminished hair growth is noted to both lower extremities. Edema noted with both varicosities and stasis skin changes present bilaterally. Coolness is noted to the digital regions to palpation. Capillary fill time delayed digital areas bilateral foot. No heel fissuring or macerations of the web spaces. No plantar calluses and/or ulcerative areas are noted. Patient is having difficulty with gait/walking. Plan Per Assessment  Theodore Ruiz was seen today for nail problem. Diagnoses and all orders for this visit:    Onychomycosis    Pain of toe of right foot    Pain of toe of left foot    Type II diabetes mellitus with peripheral circulatory disorder (HCC)    Lymphedema    Difficulty walking        1. Evaluation and Management  2. Manual and electrical debridement of the mycotic nails was performed for thickness and length to prevent injection and/or ulceration. 3. I recommended antifungal cream to the nails daily. 4. It was discussed in detail with the patient proper hygiene for the diabetic foot. They are to get in the habit of looking at their feet or have someone look at them.    If they are unable to do daily, they are to look for any signs of redness, blistering, cracking, swelling, drainage, open lesions, etc.  They are to dry in between the toes after each bath or shower gently. The water should be tested with the elbow to prevent burns. The patient is to refrain from soaking their feet unless instructed by myself to do otherwise. They are to refrain from going barefoot. Shoe gear should be inspected for any foreign objects. Shoes should have a deep wide toe box. With any type of shoe, the feet should be inspected for any signs of pressure, i.e. redness, blistering, or open sores. Further instructional guidelines were dispensed to the patient. 5. We will see the patient back at a later date for continued podiatric management and care. Patient was advised to call the office with any questions or concerns prior to their next appointment if needed. Seen By:    Ervin Elias DPM    Electronically signed by Ervin Elias DPM on 12/15/2021 at 2:25 PM      This note was created using voice recognition software. The note was reviewed however may contain grammatical errors.

## 2021-12-15 NOTE — PROGRESS NOTES
Patient in today for nail care. Patient does not have any complaints of pain at this time.  Patient's PCP is Allie Dotson MD date of last ov  10/15/2021       Kimber Marlow LPN

## 2022-01-05 RX ORDER — POTASSIUM CHLORIDE 20 MEQ/1
20 TABLET, EXTENDED RELEASE ORAL DAILY
Qty: 90 TABLET | Refills: 3 | Status: SHIPPED | OUTPATIENT
Start: 2022-01-05

## 2022-02-23 ENCOUNTER — PROCEDURE VISIT (OUTPATIENT)
Dept: PODIATRY | Age: 72
End: 2022-02-23
Payer: MEDICARE

## 2022-02-23 VITALS — WEIGHT: 315 LBS | HEIGHT: 67 IN | BODY MASS INDEX: 49.44 KG/M2

## 2022-02-23 DIAGNOSIS — M79.674 PAIN OF TOE OF RIGHT FOOT: ICD-10-CM

## 2022-02-23 DIAGNOSIS — R26.2 DIFFICULTY WALKING: ICD-10-CM

## 2022-02-23 DIAGNOSIS — M79.675 PAIN OF TOE OF LEFT FOOT: ICD-10-CM

## 2022-02-23 DIAGNOSIS — B35.1 ONYCHOMYCOSIS: Primary | ICD-10-CM

## 2022-02-23 DIAGNOSIS — I89.0 LYMPHEDEMA: ICD-10-CM

## 2022-02-23 DIAGNOSIS — I73.9 PVD (PERIPHERAL VASCULAR DISEASE) (HCC): ICD-10-CM

## 2022-02-23 PROCEDURE — 11721 DEBRIDE NAIL 6 OR MORE: CPT | Performed by: PODIATRIST

## 2022-02-23 NOTE — PROGRESS NOTES
22     Salomon Larson    : 1950  Sex: male  Age: 66 y.o. Subjective: The patient is seen today for evaluation regarding foot evaluation and mycotic nail care. No other complaints noted. Chief Complaint   Patient presents with    Nail Problem       Current Medications:    Current Outpatient Medications:     potassium chloride (KLOR-CON M) 20 MEQ extended release tablet, Take 1 tablet by mouth daily, Disp: 90 tablet, Rfl: 3    ONETOUCH VERIO strip, , Disp: , Rfl:     tamsulosin (FLOMAX) 0.4 MG capsule, , Disp: , Rfl:     nystatin-triamcinolone (MYCOLOG II) 967232-1.1 UNIT/GM-% cream, Apply topically 2 times daily. , Disp: 60 g, Rfl: 3    diclofenac (VOLTAREN) 0.1 % ophthalmic solution, 1 drop 2 times daily , Disp: , Rfl:     acetaminophen (TYLENOL) 80 MG chewable tablet, Take 80 mg by mouth every 4 hours as needed for Pain, Disp: , Rfl:     traMADol (ULTRAM) 50 MG tablet, TAKE 1 TABLET BY MOUTH EVERY 6 8 HOURS AS NEEDED FOR PAIN, Disp: , Rfl:     levoFLOXacin (LEVAQUIN) 500 MG/100ML SOLN, Infuse 500 mg intravenously every 24 hours , Disp: , Rfl:     gentamicin (GARAMYCIN) 0.1 % cream, Apply topically 1 times daily with dressing changes. , Disp: 30 g, Rfl: 5    ammonium lactate (LAC-HYDRIN) 12 % lotion, Apply topically daily. , Disp: 222 mL, Rfl: 5    furosemide (LASIX) 40 MG tablet, 20 mg , Disp: , Rfl:     Calcium Alginate (ALGISITE M 6\"X8\" EX), Apply topically daily, Disp: , Rfl:     Indacaterol-Glycopyrrolate (Eve Rivera) 27.5-15.6 MCG CAPS, Inhale 2 puffs into the lungs 2 times daily, Disp: 180 capsule, Rfl: 3    albuterol (ACCUNEB) 0.63 MG/3ML nebulizer solution, Take 3 mLs by nebulization every 6 hours as needed for Wheezing, Disp: 270 mL, Rfl: 3    vitamin C (ASCORBIC ACID) 500 MG tablet, Take 500 mg by mouth daily, Disp: , Rfl:     rosuvastatin (CRESTOR) 5 MG tablet, Take 5 mg by mouth daily , Disp: , Rfl:     glipiZIDE (GLUCOTROL XL) 5 MG extended release tablet, Take 5 mg by mouth daily, Disp: , Rfl: 3    ramipril (ALTACE) 10 MG capsule, Take 10 mg by mouth daily , Disp: , Rfl:     Cholecalciferol (VITAMIN D3) 2000 UNITS CAPS, Take by mouth daily Last dose 1-14-16, Disp: , Rfl:     carvedilol (COREG) 25 MG tablet, Take 25 mg by mouth 2 times daily (with meals) Instructed to take with sip water am of procedure, Disp: , Rfl:     Omega-3 Fatty Acids (FISH OIL) 600 MG CAPS, Take 1,000 mg by mouth daily Last Dose 1-14-16, Disp: , Rfl:     liothyronine (CYTOMEL) 50 MCG tablet, Take 50 mcg by mouth daily 1 and 1/2 tablets by mouth alternating with 2 tablets every other day, Disp: , Rfl:     aspirin 81 MG EC tablet, Take 81 mg by mouth daily Last dose 1-14-16, Disp: , Rfl:     Allergies: Allergies   Allergen Reactions    Clindamycin/Lincomycin      Extreme SOB     Other Hives and Shortness Of Breath     \"allergic to Pot[place]oe white. \"    Doxycycline Hyclate Other (See Comments)     tinnitus    Singulair [Montelukast Sodium]     Adaptic Non-Adhering Dressing [Wound Dressings]      [place]        Past Surgical History:   Procedure Laterality Date    CARDIAC PACEMAKER PLACEMENT  04/09/10    [employer]    CHOLECYSTECTOMY      COLONOSCOPY  1/20/2016    JOINT REPLACEMENT Bilateral     Left TJAK    PACEMAKER PLACEMENT  07/23/2020    DUAL PPM GR    ([employer])     DR. MARTIN     TRANSESOPHAGEAL ECHOCARDIOGRAM  06/19/2020    With      Past Medical History:   Diagnosis Date    CAD (coronary artery disease)     Cataracts, bilateral 2019    Cellulitis 1-2016    Left leg    Complete heart block (HCC)     h/o    Diabetes mellitus ([address]     Encounter for aspirin therapy     patient uses for heart health    H/O asbestosis     History of blood transfusion     Hx of blood clots     Hyperlipidemia     Hypertension     Hyperthyroidism     Lung disorder     weak lungs    Myocardial infarct ([address] 2010    Neuropathy     feet    Obesity     Obstructive sleep apnea on cpap    Onychomycosis     Osteoarthritis     Pacemaker     Medtronic    Post-thrombotic syndrome 9/11/2017    Screening 1-20-16    for Colonoscopy    Stasis dermatitis     h/o on bilateral legs with leg edema    Varicose veins        Vitals:    02/23/22 1135   Weight: (!) 370 lb (167.8 kg)   Height: 5' 7\" (1.702 m)       Exam:  Pedal pulses diminished to palpation bilateral foot. Capillary refill time delayed digital regions bilateral foot. At this time the nail/s 1, 2, 5 right foot and nail/s 1, 2, 5 left foot are noted to be thickened, dystrophic and discolored with subungual debris present. Tenderness noted to palpation. Minimal hair growth is noted to both lower extremities. Edema noted with both varicosities and stasis skin changes present bilaterally. Coolness is noted to the digital regions to palpation. Capillary fill time delayed digital areas bilateral foot. No heel fissuring or macerations of the web spaces. No plantar calluses and/or ulcerative areas are noted. Patient is having difficulty with gait/walking. Plan Per Assessment  Cleve Pizarro was seen today for nail problem. Diagnoses and all orders for this visit:    Onychomycosis    Pain of toe of right foot    Pain of toe of left foot    PVD (peripheral vascular disease) ([address]    Lymphedema    Difficulty walking        1. Evaluation and Management  2. Manual and electrical debridement of the mycotic nails was performed for thickness and length to prevent injection and/or ulceration. 3. I recommended antifungal cream to the nails daily. Patient is to continue with his lymphedema therapy sessions. 4. It was discussed in detail with the patient proper caring for the vascular compromised foot. The fact that they have compromised blood flow put the patient at risk for infection/gangrene/amputation. The patient should not walk barefoot. Shoe gear should fit properly and socks should be worn with shoes.   Exercise is very important to prevent worsening of the disease process but before performing an exercise program should check with their family physician first.  If any skin lesions are noted, they are instructed to contact the office immediately. 5. We will see the patient back at a later date for continued podiatric management and care. Patient was advised to call the office with any questions or concerns prior to their next appointment if needed. Seen By:    Berta Delgado DPM    Electronically signed by Berta Delgado DPM on 2/23/2022 at 1:08 PM      This note was created using voice recognition software. The note was reviewed however may contain grammatical errors.

## 2022-02-23 NOTE — PROGRESS NOTES
Patient in today for nail care. Patient does not have any complaints of pain at this time.  Patient's PCP is Viktoriya Cartwright MD date of last ov 12/22/2021        Joshua Zambrano LPN

## 2022-03-03 ENCOUNTER — TELEPHONE (OUTPATIENT)
Dept: PODIATRY | Age: 72
End: 2022-03-03

## 2022-03-03 DIAGNOSIS — I83.022 VARICOSE VEINS OF LEFT LOWER EXTREMITY WITH ULCER OF CALF WITH FAT LAYER EXPOSED (HCC): ICD-10-CM

## 2022-03-03 DIAGNOSIS — L97.922 NON-PRESSURE CHRONIC ULCER OF LEFT LOWER LEG WITH FAT LAYER EXPOSED (HCC): Primary | ICD-10-CM

## 2022-03-03 DIAGNOSIS — L97.222 VARICOSE VEINS OF LEFT LOWER EXTREMITY WITH ULCER OF CALF WITH FAT LAYER EXPOSED (HCC): ICD-10-CM

## 2022-03-03 RX ORDER — AMOXICILLIN 500 MG/1
500 CAPSULE ORAL 2 TIMES DAILY
Qty: 28 CAPSULE | Refills: 0 | Status: SHIPPED | OUTPATIENT
Start: 2022-03-03 | End: 2022-03-17

## 2022-03-03 NOTE — TELEPHONE ENCOUNTER
Patient is calling from [facility]. Vladimir Rasheed is recommending for an antibiotic to be sent. Please advise.

## 2022-03-14 ENCOUNTER — TELEPHONE (OUTPATIENT)
Dept: ADMINISTRATIVE | Age: 72
End: 2022-03-14

## 2022-03-14 NOTE — TELEPHONE ENCOUNTER
Patient's wife notified of Dr. Garrison Magallanes risk assessment for surgery. Note faxed to Dr. Cristina Garcia [phone].

## 2022-03-14 NOTE — TELEPHONE ENCOUNTER
Patient needs cardiac clearance for cataract surgery by Dr. Ray Mack. Spoke to patient. Patient denies any chest pain  or palpitations since last visit in November 2021. Patient admits to dyspnea on exertion however he states this is normal for him due to lung issues. Patient is able to complete all activities of daily living, including; climbing one flight of stairs and walking one block without cardiac symptoms. Patient on ASA. Please advise.

## 2022-04-21 RX ORDER — LIOTHYRONINE SODIUM 50 UG/1
75 TABLET ORAL DAILY
COMMUNITY

## 2022-04-21 RX ORDER — LEVOTHYROXINE SODIUM 0.03 MG/1
25 TABLET ORAL DAILY
COMMUNITY

## 2022-04-21 NOTE — PROGRESS NOTES

## 2022-04-21 NOTE — PROGRESS NOTES
[facility] PRE-ADMISSION TESTING INSTRUCTIONS    The Preadmission Testing patient is instructed accordingly using the following criteria (check applicable):    ARRIVAL INSTRUCTIONS:  [x] Parking the day of Surgery is located in the Main Entrance lot. Upon entering the door, make an immediate right to the surgery reception desk    [x] Bring photo ID and insurance card    [x] Bring in a copy of Living will or Durable Power of  papers. [x] Please be sure to arrange for responsible adult to provide transportation to and from the hospital    [x] Please arrange for responsible adult to be with you for the 24 hour period post procedure due to having anesthesia      GENERAL INSTRUCTIONS:    [x] Nothing by mouth after midnight, including gum, candy, mints or water    [x] You may brush your teeth, but do not swallow any water    [x] Take medications as instructed with 1-2 oz of water    [x] Stop herbal supplements and vitamins 5 days prior to procedure    [x] Follow preop dosing of blood thinners per physician instructions    [] Take 1/2 dose of evening insulin, but no insulin after midnight    [x] No oral diabetic medications after midnight    [x] If diabetic and have low blood sugar or feel symptomatic, take 1-2oz apple juice only    [] Bring inhalers day of surgery    [] Bring C-PAP/ Bi-Pap day of surgery    [] Bring urine specimen day of surgery    [x] Shower or bath with soap, lather and rinse well, AM of Surgery, no lotion, powders or creams to surgical site    [] Follow bowel prep as instructed per surgeon    [x] No tobacco products within 24 hours of surgery     [x] No alcohol or illegal drug use within 24 hours of surgery.     [x] Jewelry, body piercing's, eyeglasses, contact lenses and dentures are not permitted into surgery (bring cases)      [] Please do not wear any nail polish, make up or hair products on the day of surgery    [x] You can expect a call the business day prior to procedure to notify you if your arrival time changes    [x] If you receive a survey after surgery we would greatly appreciate your comments    [] Parent/guardian of a minor must accompany their child and remain on the premises  the entire time they are under our care     [] Pediatric patients may bring favorite toy, blanket or comfort item with them    [] A caregiver or family member must remain with the patient during their stay if they are mentally handicapped, have dementia, disoriented or unable to use a call light or would be a safety concern if left unattended    [x] Please notify surgeon if you develop any illness between now and time of surgery (cold, cough, sore throat, fever, nausea, vomiting) or any signs of infections  including skin, wounds, and dental.    [x]  The Outpatient Pharmacy is available to fill your prescription here on your day of surgery, ask your preop nurse for details    [] Other instructions    EDUCATIONAL MATERIALS PROVIDED:    [] PAT Preoperative Education Packet/Booklet     [] Medication List    [] Transfusion bracelet applied with instructions    [] Shower with soap, lather and rinse well, and use CHG wipes provided the evening before surgery as instructed    [] Incentive spirometer with instructions

## 2022-04-26 ENCOUNTER — ANESTHESIA EVENT (OUTPATIENT)
Dept: OPERATING ROOM | Age: 72
End: 2022-04-26
Payer: MEDICARE

## 2022-04-27 ENCOUNTER — ANESTHESIA (OUTPATIENT)
Dept: OPERATING ROOM | Age: 72
End: 2022-04-27
Payer: MEDICARE

## 2022-04-27 ENCOUNTER — HOSPITAL ENCOUNTER (OUTPATIENT)
Age: 72
Setting detail: OUTPATIENT SURGERY
Discharge: HOME | End: 2022-04-27
Attending: OPHTHALMOLOGY | Admitting: OPHTHALMOLOGY
Payer: MEDICARE

## 2022-04-27 VITALS
BODY MASS INDEX: 47.74 KG/M2 | SYSTOLIC BLOOD PRESSURE: 142 MMHG | RESPIRATION RATE: 16 BRPM | DIASTOLIC BLOOD PRESSURE: 68 MMHG | HEIGHT: 68 IN | HEART RATE: 64 BPM | OXYGEN SATURATION: 96 % | TEMPERATURE: 96.1 F | WEIGHT: 315 LBS

## 2022-04-27 VITALS — SYSTOLIC BLOOD PRESSURE: 139 MMHG | DIASTOLIC BLOOD PRESSURE: 73 MMHG | OXYGEN SATURATION: 96 %

## 2022-04-27 LAB
CHP ED QC CHECK: NORMAL
GLUCOSE BLD-MCNC: 135 MG/DL
INR BLD: 1.3
METER GLUCOSE: 135 MG/DL (ref 74–99)
PROTHROMBIN TIME: 13.7 SEC (ref 9.3–12.4)

## 2022-04-27 PROCEDURE — 36415 COLL VENOUS BLD VENIPUNCTURE: CPT

## 2022-04-27 PROCEDURE — 7100000011 HC PHASE II RECOVERY - ADDTL 15 MIN: Performed by: OPHTHALMOLOGY

## 2022-04-27 PROCEDURE — 6370000000 HC RX 637 (ALT 250 FOR IP): Performed by: OPHTHALMOLOGY

## 2022-04-27 PROCEDURE — 82962 GLUCOSE BLOOD TEST: CPT

## 2022-04-27 PROCEDURE — 2500000003 HC RX 250 WO HCPCS: Performed by: OPHTHALMOLOGY

## 2022-04-27 PROCEDURE — 3600000002 HC SURGERY LEVEL 2 BASE: Performed by: OPHTHALMOLOGY

## 2022-04-27 PROCEDURE — 2580000003 HC RX 258: Performed by: OPHTHALMOLOGY

## 2022-04-27 PROCEDURE — 6360000002 HC RX W HCPCS: Performed by: NURSE ANESTHETIST, CERTIFIED REGISTERED

## 2022-04-27 PROCEDURE — 2709999900 HC NON-CHARGEABLE SUPPLY: Performed by: OPHTHALMOLOGY

## 2022-04-27 PROCEDURE — 3700000001 HC ADD 15 MINUTES (ANESTHESIA): Performed by: OPHTHALMOLOGY

## 2022-04-27 PROCEDURE — V2632 POST CHMBR INTRAOCULAR LENS: HCPCS | Performed by: OPHTHALMOLOGY

## 2022-04-27 PROCEDURE — 3600000012 HC SURGERY LEVEL 2 ADDTL 15MIN: Performed by: OPHTHALMOLOGY

## 2022-04-27 PROCEDURE — 2580000003 HC RX 258: Performed by: NURSE ANESTHETIST, CERTIFIED REGISTERED

## 2022-04-27 PROCEDURE — 6370000000 HC RX 637 (ALT 250 FOR IP)

## 2022-04-27 PROCEDURE — 7100000010 HC PHASE II RECOVERY - FIRST 15 MIN: Performed by: OPHTHALMOLOGY

## 2022-04-27 PROCEDURE — 3700000000 HC ANESTHESIA ATTENDED CARE: Performed by: OPHTHALMOLOGY

## 2022-04-27 PROCEDURE — 6360000002 HC RX W HCPCS: Performed by: OPHTHALMOLOGY

## 2022-04-27 PROCEDURE — 85610 PROTHROMBIN TIME: CPT

## 2022-04-27 DEVICE — ACRYSOF(R) IQ ASPHERIC NATURAL IOL, SINGLE-PIECE ACRYLIC FOLDABLE PCL, UV WITH BLUE LIGHTFILTER, 13.0MM LENGTH, 6.0MM ANTERIORASYMMETRIC BICONVEX OPTIC, PLANAR HAPTICS.
Type: IMPLANTABLE DEVICE | Site: EYE | Status: FUNCTIONAL
Brand: ACRYSOF®

## 2022-04-27 RX ORDER — PROPOFOL 10 MG/ML
INJECTION, EMULSION INTRAVENOUS PRN
Status: DISCONTINUED | OUTPATIENT
Start: 2022-04-27 | End: 2022-04-27 | Stop reason: SDUPTHER

## 2022-04-27 RX ORDER — TROPICAMIDE 10 MG/ML
1 SOLUTION/ DROPS OPHTHALMIC EVERY 10 MIN PRN
Status: COMPLETED | OUTPATIENT
Start: 2022-04-27 | End: 2022-04-27

## 2022-04-27 RX ORDER — TETRACAINE HYDROCHLORIDE 5 MG/ML
1 SOLUTION OPHTHALMIC EVERY 10 MIN PRN
Status: COMPLETED | OUTPATIENT
Start: 2022-04-27 | End: 2022-04-27

## 2022-04-27 RX ORDER — PREDNISOLONE ACETATE 10 MG/ML
SUSPENSION/ DROPS OPHTHALMIC PRN
Status: DISCONTINUED | OUTPATIENT
Start: 2022-04-27 | End: 2022-04-27 | Stop reason: ALTCHOICE

## 2022-04-27 RX ORDER — MIDAZOLAM HYDROCHLORIDE 1 MG/ML
INJECTION INTRAMUSCULAR; INTRAVENOUS PRN
Status: DISCONTINUED | OUTPATIENT
Start: 2022-04-27 | End: 2022-04-27 | Stop reason: SDUPTHER

## 2022-04-27 RX ORDER — LIDOCAINE HYDROCHLORIDE 40 MG/ML
INJECTION, SOLUTION RETROBULBAR; TOPICAL PRN
Status: DISCONTINUED | OUTPATIENT
Start: 2022-04-27 | End: 2022-04-27 | Stop reason: ALTCHOICE

## 2022-04-27 RX ORDER — SODIUM CHLORIDE 9 MG/ML
INJECTION, SOLUTION INTRAVENOUS CONTINUOUS
Status: DISCONTINUED | OUTPATIENT
Start: 2022-04-27 | End: 2022-04-27 | Stop reason: HOSPADM

## 2022-04-27 RX ORDER — SODIUM CHLORIDE 9 MG/ML
INJECTION, SOLUTION INTRAVENOUS CONTINUOUS PRN
Status: DISCONTINUED | OUTPATIENT
Start: 2022-04-27 | End: 2022-04-27 | Stop reason: SDUPTHER

## 2022-04-27 RX ORDER — EPINEPHRINE 1 MG/ML
INJECTION, SOLUTION, CONCENTRATE INTRAVENOUS PRN
Status: DISCONTINUED | OUTPATIENT
Start: 2022-04-27 | End: 2022-04-27 | Stop reason: ALTCHOICE

## 2022-04-27 RX ORDER — CIPROFLOXACIN HYDROCHLORIDE 3.5 MG/ML
SOLUTION/ DROPS TOPICAL PRN
Status: DISCONTINUED | OUTPATIENT
Start: 2022-04-27 | End: 2022-04-27 | Stop reason: ALTCHOICE

## 2022-04-27 RX ORDER — TOBRAMYCIN 3 MG/ML
1 SOLUTION/ DROPS OPHTHALMIC EVERY 5 MIN PRN
Status: COMPLETED | OUTPATIENT
Start: 2022-04-27 | End: 2022-04-27

## 2022-04-27 RX ORDER — KETOROLAC TROMETHAMINE 5 MG/ML
1 SOLUTION OPHTHALMIC ONCE
Status: DISCONTINUED | OUTPATIENT
Start: 2022-04-27 | End: 2022-04-27 | Stop reason: HOSPADM

## 2022-04-27 RX ORDER — PHENYLEPHRINE HCL 2.5 %
1 DROPS OPHTHALMIC (EYE) EVERY 10 MIN PRN
Status: COMPLETED | OUTPATIENT
Start: 2022-04-27 | End: 2022-04-27

## 2022-04-27 RX ORDER — FENTANYL CITRATE 50 UG/ML
INJECTION, SOLUTION INTRAMUSCULAR; INTRAVENOUS PRN
Status: DISCONTINUED | OUTPATIENT
Start: 2022-04-27 | End: 2022-04-27 | Stop reason: SDUPTHER

## 2022-04-27 RX ADMIN — PHENYLEPHRINE HYDROCHLORIDE 1 DROP: 25 SOLUTION/ DROPS OPHTHALMIC at 13:09

## 2022-04-27 RX ADMIN — TETRACAINE HYDROCHLORIDE 1 DROP: 5 SOLUTION OPHTHALMIC at 13:09

## 2022-04-27 RX ADMIN — Medication 1 DROP: at 13:09

## 2022-04-27 RX ADMIN — Medication 1 DROP: at 13:12

## 2022-04-27 RX ADMIN — FENTANYL CITRATE 50 MCG: 50 INJECTION, SOLUTION INTRAMUSCULAR; INTRAVENOUS at 15:37

## 2022-04-27 RX ADMIN — PHENYLEPHRINE HYDROCHLORIDE 1 DROP: 25 SOLUTION/ DROPS OPHTHALMIC at 13:17

## 2022-04-27 RX ADMIN — MIDAZOLAM 2 MG: 1 INJECTION INTRAMUSCULAR; INTRAVENOUS at 15:30

## 2022-04-27 RX ADMIN — TETRACAINE HYDROCHLORIDE 1 DROP: 5 SOLUTION OPHTHALMIC at 13:17

## 2022-04-27 RX ADMIN — Medication 1 DROP: at 13:06

## 2022-04-27 RX ADMIN — TETRACAINE HYDROCHLORIDE 1 DROP: 5 SOLUTION OPHTHALMIC at 13:00

## 2022-04-27 RX ADMIN — PHENYLEPHRINE HYDROCHLORIDE 1 DROP: 25 SOLUTION/ DROPS OPHTHALMIC at 13:00

## 2022-04-27 RX ADMIN — Medication 1 DROP: at 13:17

## 2022-04-27 RX ADMIN — PROPOFOL 70 MG: 10 INJECTION, EMULSION INTRAVENOUS at 15:35

## 2022-04-27 RX ADMIN — Medication 1 DROP: at 13:00

## 2022-04-27 RX ADMIN — SODIUM CHLORIDE: 9 INJECTION, SOLUTION INTRAVENOUS at 15:22

## 2022-04-27 ASSESSMENT — PAIN - FUNCTIONAL ASSESSMENT: PAIN_FUNCTIONAL_ASSESSMENT: NONE - DENIES PAIN

## 2022-04-27 ASSESSMENT — PULMONARY FUNCTION TESTS
PIF_VALUE: 0
PIF_VALUE: 1
PIF_VALUE: 0

## 2022-04-27 ASSESSMENT — ENCOUNTER SYMPTOMS: SHORTNESS OF BREATH: 1

## 2022-04-27 ASSESSMENT — LIFESTYLE VARIABLES: SMOKING_STATUS: 0

## 2022-04-27 NOTE — ANESTHESIA PRE PROCEDURE
Department of Anesthesiology  Preprocedure Note       Name:  Layne Nguyễn   Age:  66 y.o.  :  1950                                          MRN:  [id number]         Date:  2022      Surgeon: Dana Romero    Procedure:   PGR w TVP  Medications prior to admission:   Prior to Admission medications    Medication Sig Start Date End Date Taking?  Authorizing Provider   liothyronine (CYTOMEL) 50 MCG tablet Take 75 mcg by mouth daily    Historical Provider, MD   levothyroxine (SYNTHROID) 25 MCG tablet Take 25 mcg by mouth Daily    Historical Provider, MD   SUPER B COMPLEX/C PO Take by mouth daily    Historical Provider, MD   potassium chloride (KLOR-CON M) 20 MEQ extended release tablet Take 1 tablet by mouth daily 22   Jennifer Conner MD   [facility] VERIO strip  21   Historical Provider, MD   tamsulosin (FLOMAX) 0.4 MG capsule Take 0.4 mg by mouth  10/9/21   Historical Provider, MD   diclofenac (VOLTAREN) 0.1 % ophthalmic solution 1 drop 2 times daily     Historical Provider, MD   acetaminophen (TYLENOL) 80 MG chewable tablet Take 80 mg by mouth every 4 hours as needed for Pain    Historical Provider, MD   furosemide (LASIX) 40 MG tablet Take 40 mg by mouth daily  20   Historical Provider, MD   albuterol (ACCUNEB) 0.63 MG/3ML nebulizer solution Take 3 mLs by nebulization every 6 hours as needed for Wheezing 19   Leif Armendariz MD   rosuvastatin (CRESTOR) 5 MG tablet Take 5 mg by mouth daily  18   Historical Provider, MD   glipiZIDE (GLUCOTROL XL) 5 MG extended release tablet Take 5 mg by mouth daily 17   Historical Provider, MD   ramipril (ALTACE) 10 MG capsule Take 5 mg by mouth at bedtime  3/4/17   Historical Provider, MD   Cholecalciferol (VITAMIN D3) 2000 UNITS CAPS Take by mouth daily     Historical Provider, MD   carvedilol (COREG) 25 MG tablet Take 25 mg by mouth 2 times daily (with meals)     Historical Provider, MD   Omega-3 Fatty Acids (FISH OIL) 600 MG CAPS Take 1,000 mg by mouth daily     Historical Provider, MD   aspirin 81 MG EC tablet Take 81 mg by mouth daily     Historical Provider, MD       Current medications:    No current facility-administered medications for this visit. No current outpatient medications on file. Facility-Administered Medications Ordered in Other Visits   Medication Dose Route Frequency Provider Last Rate Last Admin    0.9 % sodium chloride infusion   IntraVENous Continuous Corona Collins MD        tetracaine (TETRAVISC) 0.5 % ophthalmic solution 1 drop  1 drop Right Eye See Admin Instructions Corona Collins MD        tropicamide (MYDRIACYL) 1 % ophthalmic solution 1 drop  1 drop Right Eye Once Corona Collins MD        phenylephrine (MYDFRIN) 2.5 % ophthalmic solution 1 drop  1 drop Right Eye Once Corona Collins MD        tobramycin (TOBREX) 0.3 % ophthalmic solution 1 drop  1 drop Right Eye 6 times per day Croona Collins MD        sodium hyaluronate ophthalmic injection 9 mg  9 mg IntrOCUlar Once Corona Collins MD           Allergies: Allergies   Allergen Reactions    Clindamycin/Lincomycin Shortness Of Breath     Extreme SOB     Other Hives and Shortness Of Breath     \"allergic to Potatoe white. \"    Doxycycline Hyclate Other (See Comments)     tinnitus    Singulair [Montelukast Sodium]     Adaptic Non-Adhering Dressing [Wound Dressings]      [place]        Problem List:    Patient Active Problem List   Diagnosis Code    Arrhythmia I49.9    Pacemaker Z95.0    HTN (hypertension) I10    Hyperthyroidism E05.90    DEMETRIA (obstructive sleep apnea) G47.33    Cellulitis L03.90    SIRS (systemic inflammatory response syndrome) ([facility]) R65.10    Shortness of breath R06.02    Pain in lower limb M79.606    Chest pain R07.9    Abdominal pain R10.9    Pulmonary emboli ([facility]) I26.99    Post-thrombotic syndrome I87.009    Complete heart block ([facility]) I44.2    Non-pressure chronic ulcer of left lower leg with fat layer exposed [facility]) S23.762    Venous insufficiency (chronic) (peripheral) I87.2    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 ([address] I83.022, V82.866    Onychomycosis B35.1    PVD (peripheral vascular disease) ([facility]) I73.9    Type II diabetes mellitus with peripheral circulatory disorder ([facility]) E11.51    Difficulty walking R26.2    Simple chronic bronchitis ([facility]) J41.0    Pain of toe of right foot M79.674    Pain of toe of left foot M79.675    Diabetic polyneuropathy associated with type 2 diabetes mellitus ([facility]) E11.42    Pneumonia J18.9    Community acquired pneumonia due to Streptococcus pneumoniae ([address] J13    Encounter for pacemaker at end of battery life Z45.010    Blister of right leg S80.821A    Stasis ulcer ([address] I83.009, L97.909    Lymphedema I89.0    Varicose veins of right lower extremity with inflammation, with ulcer of ankle with fat layer exposed ([address] I83.213, L97.312    Xerosis cutis L85.3    Non-pressure chronic ulcer of other part of right lower leg with fat layer exposed ([address] L97.812    Diabetic sensory polyneuropathy ([address] E11.42    Edema R60.9    History of artificial joint Z96.60    Morbid obesity ([facility]) E66.01    Osteoarthritis of knee M17.10    Peripheral vascular disorder due to diabetes mellitus ([address] E11.51       Past Medical History:        Diagnosis Date    CAD (coronary artery disease)     Cataract, right eye     Complete heart block ([address]     h/o    Diabetes mellitus ([address]     Encounter for aspirin therapy     patient uses for heart health    History of blood transfusion     Hx of blood clots     Hyperlipidemia     Hypertension     Hyperthyroidism     Myocardial infarct ([address] 2010    Neuropathy     feet    Obesity     Obstructive sleep apnea     on cpap    Onychomycosis     DEMETRIA on CPAP     Osteoarthritis     Pacemaker     Medtronic    Stasis dermatitis     h/o on bilateral legs with leg edema    Varicose veins Past Surgical History:        Procedure Laterality Date    CARDIAC PACEMAKER PLACEMENT  04/09/10    Medtronic    CHOLECYSTECTOMY      COLONOSCOPY  2016    JOINT REPLACEMENT Bilateral     PACEMAKER PLACEMENT  2020    DUAL PPM GR    ([employer])     DR. MARTIN     TRANSESOPHAGEAL ECHOCARDIOGRAM  2020    With        Social History:    Social History     Tobacco Use    Smoking status: Former Smoker     Packs/day: 2.00     Years: 25.00     Pack years: 50.00     Types: Cigarettes     Start date: 1964     Quit date: 1990     Years since quittin.8    Smokeless tobacco: Never Used   Substance Use Topics    Alcohol use: No     Alcohol/week: 0.0 standard drinks     Comment: rare   2 beers/month                                Counseling given: Not Answered      Vital Signs (Current): There were no vitals filed for this visit.                                            BP Readings from Last 3 Encounters:   21 128/78   21 122/88   20 122/60       NPO Status:  >12 hrs                                                                               BMI:   Wt Readings from Last 3 Encounters:   22 (!) 360 lb (163.3 kg)   22 (!) 370 lb (167.8 kg)   12/15/21 (!) 370 lb (167.8 kg)     There is no height or weight on file to calculate BMI.    CBC:   Lab Results   Component Value Date    WBC 6.9 2020    RBC 4.24 2020    HGB 12.1 2020    HCT 39.0 2020    MCV 92.0 2020    RDW 15.8 2020     2020       CMP:   Lab Results   Component Value Date     2020    K 3.9 2020    K 5.2 2020     2020    CO2 24 2020    BUN 26 2020    CREATININE 0.9 2020    GFRAA >60 2020    LABGLOM >60 2020    GLUCOSE 162 2020    PROT 7.1 2020    CALCIUM 9.1 2020    BILITOT 0.9 2020    ALKPHOS 74 2020    AST 49 2020    ALT 32 2020 POC Tests: No results for input(s): POCGLU, POCNA, POCK, POCCL, POCBUN, POCHEMO, POCHCT in the last 72 hours. Coags:   Lab Results   Component Value Date    PROTIME 16.2 06/13/2020    INR 1.4 06/13/2020       HCG (If Applicable): No results found for: PREGTESTUR, PREGSERUM, HCG, HCGQUANT     ABGs: No results found for: PHART, PO2ART, SLJ9EIA, KOI3NXO, BEART, F2ONZYTB     Type & Screen (If Applicable):  No results found for: LABABO, LABRH    Drug/Infectious Status (If Applicable):  No results found for: HIV, HEPCAB    COVID-19 Screening (If Applicable):   Lab Results   Component Value Date    COVID19 Not Detected 07/17/2020         Anesthesia Evaluation  Patient summary reviewed and Nursing notes reviewed no history of anesthetic complications:   Airway: Mallampati: III  TM distance: <3 FB   Neck ROM: full  Mouth opening: > = 3 FB Dental:    (+) partials      Pulmonary: breath sounds clear to auscultation  (+) pneumonia (s/p 3 wks ago): resolved,  COPD:  shortness of breath:  sleep apnea:      (-) not a current smoker (quit 33 yrs ago)                          ROS comment: asbestosis   Cardiovascular:  Exercise tolerance: poor (<4 METS),   (+) hypertension:, pacemaker: pacemaker, past MI: > 6 months, CAD:, dysrhythmias (CHB):, hyperlipidemia        Rhythm: regular  Rate: normal      Cleared by cardiology             PE comment: LA   Neuro/Psych:   (+) neuromuscular disease (Diabetic sensory polyneuropathy ([address])):,              ROS comment: Chronic LE ulcers  Venous insufficiency  Varicose veins LLE GI/Hepatic/Renal:   (+) morbid obesity          Endo/Other:    (+) DiabetesType II DM, well controlled, , hyperthyroidism: arthritis (B TKR): OA., . Pt had no PAT visit        ROS comment: SIRS Abdominal:             Vascular:   + PVD, aortic or cerebral, PE. Other Findings:        -CoV-2 Not Detected Not Detected Final 07/17/2020  9:14 AM    Summary   No evidence of endocarditis.       Overall

## 2022-04-27 NOTE — BRIEF OP NOTE
Brief Postoperative Note      Patient: Miriam Florentino  YOB: 1950  MRN: [id number]    Date of Procedure: 4/27/2022    Pre-Op Diagnosis: RIGHT EYE CATARACT GLAUCOMA    Post-Op Diagnosis: Same       Procedure(s):  RIGHT EYE CATARACT EXTRACTION IOL IMPLANT AND GONIOTOMY    Surgeon(s):  Tea Craig MD    Assistant:  * No surgical staff found *    Anesthesia: Monitor Anesthesia Care    Estimated Blood Loss (mL): Minimal    Complications: None    Specimens:   * No specimens in log *    Implants:  * No implants in log *      Drains:   NG/OG/NJ/NE Tube Nasoenteric feeding tube 12 fr Left nostril (Active)       Urethral Catheter (Active)       External Urinary Catheter (Active)       Findings: none    Electronically signed by Tea Craig MD on 4/27/2022 at 4:05 PM

## 2022-04-27 NOTE — ANESTHESIA POSTPROCEDURE EVALUATION
Department of Anesthesiology  Postprocedure Note    Patient: Bakari Miller  MRN: [id number]  [place]: 1950  Date of evaluation: 4/27/2022  Time:  6:02 PM     Procedure Summary     Date: 04/27/22 Room / Location: [facility] OR [address] [place] [address]    Anesthesia Start: 4258 Anesthesia Stop: 0462    Procedure: RIGHT EYE CATARACT EXTRACTION IOL IMPLANT AND GONIOTOMY (Right Eye) Diagnosis: (RIGHT EYE CATARACT GLAUCOMA)    Surgeons: Merna Ross MD Responsible Provider: Lauren Rogers MD    Anesthesia Type: MAC, other ASA Status: 4          Anesthesia Type: MAC, other    Jordy Phase I: Jordy Score: 10    Jordy Phase II: Jordy Score: 10    Last vitals: Reviewed and per EMR flowsheets.        Anesthesia Post Evaluation    Patient location during evaluation: PACU  Patient participation: complete - patient participated  Level of consciousness: awake and alert  Airway patency: patent  Nausea & Vomiting: no nausea and no vomiting  Complications: no  Cardiovascular status: hemodynamically stable  Respiratory status: acceptable  Hydration status: euvolemic

## 2022-04-28 NOTE — OP NOTE
[address] [facility]                  [address]                                OPERATIVE REPORT    PATIENT NAME: Fabian Mota                 :        1950  MED REC NO:   [id number]                            ROOM:  ACCOUNT NO:   [de-identified]                           ADMIT DATE: 2022  PROVIDER:     Jake Leach MD    DATE OF PROCEDURE:  2022    PREOPERATIVE DIAGNOSES:  1. Dense nuclear sclerotic cataract, right eye.  2.  Glaucoma, right eye. POSTOPERATIVE DIAGNOSES:  1. Dense nuclear sclerotic cataract, right eye.  2.  Glaucoma, right eye. PROCEDURE PERFORMED:  1. Goniotomy, right eye.  2.  Phacoemulsification of lens, posterior chamber intraocular lens  implant, right eye. ANESTHESIA:  Local MAC. COMPLICATIONS:  None. SURGEON:  Jake Leach MD    INDICATIONS FOR PROCEDURE:  The patient is a very pleasant 75-year-old  gentleman noting decline in vision in his right eye. His pressures are  also a little higher than desired due to his glaucoma and with risks,  benefits, and alternatives discussed with him, he wished to proceed with  the above procedures in order to improve his vision as well as his  pressure control. DESCRIPTION OF OPERATION:  The patient was brought to the operating room  and placed in the supine position on the operating table. He was  administered IV sedation and while under it, he was given a 3 mL  retrobulbar nerve block to the right eye consisting of a 50:50 mixture  of 0.75% Marcaine and 2% lidocaine. He was prepped and draped in the  usual sterile fashion for intraocular surgery. Attention was directed  to the right eye, where a lid speculum was placed and the operating  microscope brought into view. Paracentesis was made with a 15-degree Danilo knife at about 11:30 at the  limbus and the eye was entered temporally with a 2.4 mm keratome.    Viscoat was used to inflate the anterior chamber and a capsulorhexis was  fashioned in the usual manner giving an opening of about 5 mm. The lens  was hydrodissected and hydrodelineated and removed in a phaco-chop  technique. The remaining cortical material was removed with the I/A  handpiece and the posterior capsule was polished with a Roshan scratcher. The capsular bag was inflated with Provisc. An Danilo AcrySof lens,  [id number] model was injected into the capsular bag. It centered well. The  remaining viscoelastic was removed from behind the lens. The anterior  chamber was reinflated again with Healon and the patient's head was then  turned away from the surgeon. Healon was placed on the surface of the  cornea and a gonioprism was introduced in order to give a view of the  trabecular meshwork and angle. Using a Riccardo's, the trabecular mesh was excised nasally for  approximately 3 to 4 o'clock hours. The [facility] was removed and hydration of the incisions occurred  with the anterior chamber appeared to be watertight after removal of the  Provisc with I/A handpiece. Lid speculum and drapes were removed. The patient received Pred Forte  and ciprofloxacin drops, and ketorolac drops as well as TobraDex  ointment to the eye. A pressure patch and a [facility] shield were taped in  place and the patient was then discharged to the recovery room from the  operating room in excellent condition having tolerated the procedure  well.         Mane Denny MD    D: 04/27/2022 16:15:50       T: 04/27/2022 16:18:53     [email or web address]  Job#: [id number]     Doc#: [id number]    CC:

## 2022-05-11 ENCOUNTER — PROCEDURE VISIT (OUTPATIENT)
Dept: PODIATRY | Age: 72
End: 2022-05-11
Payer: MEDICARE

## 2022-05-11 VITALS — WEIGHT: 315 LBS | HEIGHT: 68 IN | BODY MASS INDEX: 47.74 KG/M2

## 2022-05-11 DIAGNOSIS — E11.51 TYPE II DIABETES MELLITUS WITH PERIPHERAL CIRCULATORY DISORDER (HCC): ICD-10-CM

## 2022-05-11 DIAGNOSIS — M79.675 PAIN OF TOE OF LEFT FOOT: ICD-10-CM

## 2022-05-11 DIAGNOSIS — R26.2 DIFFICULTY WALKING: ICD-10-CM

## 2022-05-11 DIAGNOSIS — I89.0 LYMPHEDEMA: ICD-10-CM

## 2022-05-11 DIAGNOSIS — M79.674 PAIN OF TOE OF RIGHT FOOT: ICD-10-CM

## 2022-05-11 DIAGNOSIS — B35.1 ONYCHOMYCOSIS: Primary | ICD-10-CM

## 2022-05-11 PROCEDURE — 11721 DEBRIDE NAIL 6 OR MORE: CPT | Performed by: PODIATRIST

## 2022-05-11 NOTE — PROGRESS NOTES
Patient in today for Dm foot exam and nail care. Patient does not have any complaints of pain at this time.  Patient's PCP is Michelle Sevilla MD date of last ov 4/15/2022        Indra Balderrama LPN

## 2022-05-11 NOTE — PROGRESS NOTES
22     Miriam Florentino    : 1950  Sex: male  Age: 66 y.o. Subjective: The patient is seen today for evaluation regarding diabetic foot evaluation and mycotic nail care. No other complaints noted. Chief Complaint   Patient presents with    Nail Problem    Diabetes       Current Medications:    Current Outpatient Medications:     liothyronine (CYTOMEL) 50 MCG tablet, Take 75 mcg by mouth daily, Disp: , Rfl:     levothyroxine (SYNTHROID) 25 MCG tablet, Take 25 mcg by mouth Daily, Disp: , Rfl:     SUPER B COMPLEX/C PO, Take by mouth daily, Disp: , Rfl:     potassium chloride (KLOR-CON M) 20 MEQ extended release tablet, Take 1 tablet by mouth daily, Disp: 90 tablet, Rfl: 3    ONETOUCH VERIO strip, , Disp: , Rfl:     tamsulosin (FLOMAX) 0.4 MG capsule, Take 0.4 mg by mouth , Disp: , Rfl:     diclofenac (VOLTAREN) 0.1 % ophthalmic solution, 1 drop 2 times daily , Disp: , Rfl:     acetaminophen (TYLENOL) 80 MG chewable tablet, Take 80 mg by mouth every 4 hours as needed for Pain, Disp: , Rfl:     furosemide (LASIX) 40 MG tablet, Take 40 mg by mouth daily , Disp: , Rfl:     albuterol (ACCUNEB) 0.63 MG/3ML nebulizer solution, Take 3 mLs by nebulization every 6 hours as needed for Wheezing, Disp: 270 mL, Rfl: 3    rosuvastatin (CRESTOR) 5 MG tablet, Take 5 mg by mouth daily , Disp: , Rfl:     glipiZIDE (GLUCOTROL XL) 5 MG extended release tablet, Take 5 mg by mouth daily, Disp: , Rfl: 3    ramipril (ALTACE) 10 MG capsule, Take 5 mg by mouth at bedtime , Disp: , Rfl:     Cholecalciferol (VITAMIN D3) 2000 UNITS CAPS, Take by mouth daily , Disp: , Rfl:     carvedilol (COREG) 25 MG tablet, Take 25 mg by mouth 2 times daily (with meals) , Disp: , Rfl:     Omega-3 Fatty Acids (FISH OIL) 600 MG CAPS, Take 1,000 mg by mouth daily , Disp: , Rfl:     aspirin 81 MG EC tablet, Take 81 mg by mouth daily , Disp: , Rfl:     Allergies:   Allergies   Allergen Reactions    Clindamycin/Lincomycin Shortness Of Breath     Extreme SOB     Other Hives and Shortness Of Breath     \"allergic to Potatoe white. \"    Doxycycline Hyclate Other (See Comments)     tinnitus    Singulair [Montelukast Sodium]     Adaptic Non-Adhering Dressing [Wound Dressings]      [place]        Past Surgical History:   Procedure Laterality Date    CARDIAC PACEMAKER PLACEMENT  04/09/10    Medtronic    CHOLECYSTECTOMY      COLONOSCOPY  1/20/2016    INTRACAPSULAR CATARACT EXTRACTION Right 4/27/2022    RIGHT EYE CATARACT EXTRACTION IOL IMPLANT AND GONIOTOMY [facility] [address] [facility]     PACEMAKER PLACEMENT  07/23/2020    DUAL PPM GR    ([employer])     DR. MARTIN     TRANSESOPHAGEAL ECHOCARDIOGRAM  06/19/2020    With      Past Medical History:   Diagnosis Date    CAD (coronary artery disease)     Cataract, right eye     Complete heart block (HCC)     h/o    [facility]     Encounter for aspirin therapy     patient uses for heart health    History of blood transfusion     Hx of blood clots     Hyperlipidemia     Hypertension     Hyperthyroidism     Myocardial infarct ([facility] 2010    Neuropathy     feet    Obesity     Obstructive sleep apnea     on cpap    Onychomycosis     DEMETRIA on CPAP     Osteoarthritis     Pacemaker     Medtronic    Stasis dermatitis     h/o on bilateral legs with leg edema    Varicose veins        Vitals:    05/11/22 1121   Weight: (!) 360 lb (163.3 kg)   Height: 5' 8\" (1.727 m)       Exam:  Pedal pulses diminished to palpation bilateral foot. Capillary refill time delayed digital regions bilateral foot. At this time the nail/s 1, 2, 5 right foot and nail/s 1, 2, 5 left foot are noted to be thickened, dystrophic and discolored with subungual debris present. Tenderness noted to palpation. Minimal hair growth is noted to both lower extremities. Edema issues noted with both varicosities and stasis skin changes present bilaterally. Coolness is noted to the digital regions to palpation. Capillary fill time delayed digital areas bilateral foot. No heel fissuring or macerations of the web spaces. No plantar calluses and/or ulcerative areas are noted. Patient is having difficulty with gait/walking. Plan Per Assessment  Kenneth Medley was seen today for nail problem and diabetes. Diagnoses and all orders for this visit:    Onychomycosis    Pain of toe of right foot    Pain of toe of left foot    Type II diabetes mellitus with peripheral circulatory disorder (HCC)    Lymphedema    Difficulty walking        1. Evaluation and Management  2. Manual and electrical debridement of the mycotic nails was performed for thickness and length to prevent injection and/or ulceration. 3. Discussed additional diabetic lower extremity care techniques with patient today. 4. It was discussed in detail with the patient proper caring for the vascular compromised foot. The fact that they have compromised blood flow put the patient at risk for infection/gangrene/amputation. The patient should not walk barefoot. Shoe gear should fit properly and socks should be worn with shoes. If any skin lesions are noted, they are instructed to contact the office immediately. 5. We will see the patient back at a later date for continued podiatric management and care. Patient was advised to call the office with any questions or concerns prior to their next appointment if needed. Seen By:    Ning Jimenez DPM    Electronically signed by Ning Jimenez DPM on 5/11/2022 at 1:01 PM      This note was created using voice recognition software. The note was reviewed however may contain grammatical errors.

## 2022-05-23 ENCOUNTER — OFFICE VISIT (OUTPATIENT)
Dept: CARDIOLOGY CLINIC | Age: 72
End: 2022-05-23
Payer: MEDICARE

## 2022-05-23 VITALS
HEART RATE: 72 BPM | WEIGHT: 315 LBS | RESPIRATION RATE: 20 BRPM | DIASTOLIC BLOOD PRESSURE: 78 MMHG | BODY MASS INDEX: 49.44 KG/M2 | SYSTOLIC BLOOD PRESSURE: 132 MMHG | HEIGHT: 67 IN

## 2022-05-23 DIAGNOSIS — I25.119 CORONARY ARTERY DISEASE INVOLVING NATIVE CORONARY ARTERY OF NATIVE HEART WITH ANGINA PECTORIS (HCC): Primary | ICD-10-CM

## 2022-05-23 DIAGNOSIS — I50.32 CHRONIC DIASTOLIC (CONGESTIVE) HEART FAILURE (HCC): ICD-10-CM

## 2022-05-23 DIAGNOSIS — I44.2 COMPLETE HEART BLOCK (HCC): ICD-10-CM

## 2022-05-23 DIAGNOSIS — I10 PRIMARY HYPERTENSION: ICD-10-CM

## 2022-05-23 PROCEDURE — 99214 OFFICE O/P EST MOD 30 MIN: CPT | Performed by: INTERNAL MEDICINE

## 2022-05-23 PROCEDURE — 93000 ELECTROCARDIOGRAM COMPLETE: CPT | Performed by: INTERNAL MEDICINE

## 2022-05-23 RX ORDER — TOBRAMYCIN 3 MG/ML
SOLUTION/ DROPS OPHTHALMIC
COMMUNITY
Start: 2022-05-04

## 2022-05-23 RX ORDER — RAMIPRIL 5 MG/1
5 CAPSULE ORAL NIGHTLY
COMMUNITY
Start: 2022-05-05

## 2022-05-23 RX ORDER — LATANOPROST 50 UG/ML
SOLUTION/ DROPS OPHTHALMIC
COMMUNITY
Start: 2022-05-21

## 2022-05-23 NOTE — PROGRESS NOTES
OUTPATIENT CARDIOLOGY FOLLOW-UP    Name: Declan Marie    Age: 66 y.o. Primary Care Physician: Marlen Williamson MD    Date of Service: 5/23/2022    Chief Complaint: CAD, chronic HFpEF    Interim History:  He was previously followed by Dr. Bob Alvarez -- he established care with me in 11/2021. No new cardiac complaints since last cardiology evaluation. +chronic HERNANDEZ with moderate levels of exertion. No recent chest pain, palpitations, orthopnea, or syncope.  on EKG.     Review of Systems:   Cardiac: As per HPI  General: No fever, chills  Pulmonary: As per HPI  HEENT: No visual disturbances, difficult swallowing  GI: No nausea, vomiting  : No dysuria, hematuria  Endocrine: +hypothyroidism, +DM  Musculoskeletal: CHANDLER x 4, no focal motor deficits  Skin: +LLE wound, no rashes  Neuro: No headache, seizures  Psych: Currently with no depression, anxiety    Past Medical History:  Past Medical History:   Diagnosis Date    CAD (coronary artery disease)     Cataract, right eye     Complete heart block (HCC)     h/o    Diabetes mellitus ([address]     Encounter for aspirin therapy     patient uses for heart health    History of blood transfusion     Hx of blood clots     Hyperlipidemia     Hypertension     Hyperthyroidism     Myocardial infarct ([address] 2010    Neuropathy     feet    Obesity     Obstructive sleep apnea     on cpap    Onychomycosis     DEMETRIA on CPAP     Osteoarthritis     Pacemaker     Medtronic    Stasis dermatitis     h/o on bilateral legs with leg edema    Varicose veins        Past Surgical History:  Past Surgical History:   Procedure Laterality Date    CARDIAC PACEMAKER PLACEMENT  04/09/10    Medtronic    CHOLECYSTECTOMY      COLONOSCOPY  1/20/2016    INTRACAPSULAR CATARACT EXTRACTION Right 4/27/2022    RIGHT EYE CATARACT EXTRACTION IOL IMPLANT AND GONIOTOMY performed by Arnaud Menjivar MD at [facility] OR    JOINT REPLACEMENT Bilateral     PACEMAKER PLACEMENT  07/23/2020    DUAL PPM GR    ([employer])     DR. MARTIN     TRANSESOPHAGEAL ECHOCARDIOGRAM  2020    With        Family History:  Family History   Problem Relation Age of Onset    Heart Disease Father            Leoncio Kaufman Other Mother             Other Brother         gall bladder       Social History:  Social History     Socioeconomic History    Marital status:      Spouse name: Not on file    Number of children: Not on file    Years of education: Not on file    Highest education level: Not on file   Occupational History    Not on file   Tobacco Use    Smoking status: Former Smoker     Packs/day: 2.00     Years: 25.00     Pack years: 50.00     Types: Cigarettes     Start date: 1964     Quit date: 1990     Years since quittin.9    Smokeless tobacco: Never Used   Vaping Use    Vaping Use: Never used   Substance and Sexual Activity    Alcohol use: No     Alcohol/week: 0.0 standard drinks     Comment: rare   2 beers/month    Drug use: No    Sexual activity: Not on file   Other Topics Concern    Not on file   Social History Narrative    Not on file     Social Determinants of Health     Financial Resource Strain:     Difficulty of Paying Living Expenses: Not on file   Food Insecurity:     Worried About Running Out of Food in the Last Year: Not on file    Emil of Food in the Last Year: Not on file   Transportation Needs:     Lack of Transportation (Medical): Not on file    Lack of Transportation (Non-Medical):  Not on file   Physical Activity:     Days of Exercise per Week: Not on file    Minutes of Exercise per Session: Not on file   Stress:     Feeling of Stress : Not on file   Social Connections:     Frequency of Communication with Friends and Family: Not on file    Frequency of Social Gatherings with Friends and Family: Not on file    Attends Anabaptist Services: Not on file    Active Member of Clubs or Organizations: Not on file    Attends Club or Organization Meetings: Not on file    Marital Status: Not on file   Intimate Partner Violence:     Fear of Current or Ex-Partner: Not on file    Emotionally Abused: Not on file    Physically Abused: Not on file    Sexually Abused: Not on file   Housing Stability:     Unable to Pay for Housing in the Last Year: Not on file    Number of Joseph in the Last Year: Not on file    Unstable Housing in the Last Year: Not on file       Allergies: Allergies   Allergen Reactions    Clindamycin/Lincomycin Shortness Of Breath     Extreme SOB     Other Hives and Shortness Of Breath     \"allergic to Potatoe white. \"    Doxycycline Hyclate Other (See Comments)     tinnitus    Singulair [Montelukast Sodium]     Adaptic Non-Adhering Dressing [Wound Dressings]      [place]        Current Medications:  Current Outpatient Medications   Medication Sig Dispense Refill    tobramycin (TOBREX) 0.3 % ophthalmic solution INSTILL 1 DROP INTO THE LEFT EYE 3 TIMES DAILY STARTING 3 DAYS PRE-OP      latanoprost (XALATAN) 0.005 % ophthalmic solution       ramipril (ALTACE) 5 MG capsule TAKE 1 CAPSULE BY MOUTH EVERY DAY      liothyronine (CYTOMEL) 50 MCG tablet Take 75 mcg by mouth daily      levothyroxine (SYNTHROID) 25 MCG tablet Take 25 mcg by mouth Daily      SUPER B COMPLEX/C PO Take by mouth daily      potassium chloride (KLOR-CON M) 20 MEQ extended release tablet Take 1 tablet by mouth daily 90 tablet 3    ONETOUCH VERIO strip       tamsulosin (FLOMAX) 0.4 MG capsule Take 0.4 mg by mouth       diclofenac (VOLTAREN) 0.1 % ophthalmic solution 1 drop 2 times daily       acetaminophen (TYLENOL) 80 MG chewable tablet Take 80 mg by mouth every 4 hours as needed for Pain      furosemide (LASIX) 40 MG tablet Take 40 mg by mouth daily       albuterol (ACCUNEB) 0.63 MG/3ML nebulizer solution Take 3 mLs by nebulization every 6 hours as needed for Wheezing 270 mL 3    rosuvastatin (CRESTOR) 5 MG tablet Take 5 mg by mouth daily       glipiZIDE (GLUCOTROL XL) 5 MG extended release tablet Take 5 mg by mouth daily  3    Cholecalciferol (VITAMIN D3) 2000 UNITS CAPS Take by mouth daily       carvedilol (COREG) 25 MG tablet Take 25 mg by mouth 2 times daily (with meals)       Omega-3 Fatty Acids (FISH OIL) 600 MG CAPS Take 1,000 mg by mouth daily       aspirin 81 MG EC tablet Take 81 mg by mouth daily        No current facility-administered medications for this visit.        Physical Exam:  /78   Pulse 72   Resp 20   Ht 5' 7\" (1.702 m)   Wt (!) 370 lb (167.8 kg)   BMI 57.95 kg/m²   Wt Readings from Last 3 Encounters:   05/23/22 (!) 370 lb (167.8 kg)   05/11/22 (!) 360 lb (163.3 kg)   04/27/22 (!) 360 lb (163.3 kg)     Appearance: Awake, alert and oriented x 3, no acute respiratory distress  Skin: +LLE wound, no rash  Head: Normocephalic, atraumatic  Eyes: EOMI, no conjunctival erythema  ENMT: No pharyngeal erythema, MMM, no rhinorrhea  Neck: Supple, no carotid bruits  Lungs: Decreased BS B/L, no wheezing  Cardiac: Regular rate and rhythm, +S1S2, no murmurs apparent  Abdomen: Soft, nontender, +bowel sounds  Extremities: Moves all extremities x 4, +lower extremity edema  Neurologic: No focal motor deficits apparent, normal mood and affect, alert and oriented x 3    Laboratory Tests:  Lab Results   Component Value Date    CREATININE 0.9 07/23/2020    BUN 26 (H) 07/23/2020     07/23/2020    K 3.9 07/23/2020     07/23/2020    CO2 24 07/23/2020     Lab Results   Component Value Date    MG 1.9 07/23/2020     Lab Results   Component Value Date    WBC 6.9 07/23/2020    HGB 12.1 (L) 07/23/2020    HCT 39.0 07/23/2020    MCV 92.0 07/23/2020     07/23/2020     Lab Results   Component Value Date    ALT 32 06/13/2020    AST 49 (H) 06/13/2020    ALKPHOS 74 06/13/2020    BILITOT 0.9 06/13/2020     Lab Results   Component Value Date    TROPONINI <0.01 06/13/2020    TROPONINI <0.01 08/22/2016    TROPONINI <0.01 08/22/2016     No results for input(s): CKTOTAL, CKMB, CKMBINDEX, TROPHS in the last 72 hours. Lab Results   Component Value Date    INR 1.3 04/27/2022    INR 1.4 06/13/2020    INR 1.4 08/25/2016    PROTIME 13.7 (H) 04/27/2022    PROTIME 16.2 (H) 06/13/2020    PROTIME 15.9 (H) 08/25/2016     Lab Results   Component Value Date    TSH 0.022 (L) 07/07/2012     Lab Results   Component Value Date    LABA1C 5.7 07/05/2012     No results found for: EAG  Lab Results   Component Value Date    CHOL 103 08/23/2016     Lab Results   Component Value Date    TRIG 87 08/23/2016     Lab Results   Component Value Date    HDL 26 08/23/2016     Lab Results   Component Value Date    LDLCALC 60 08/23/2016     Lab Results   Component Value Date    LABVLDL 17 08/23/2016     No results found for: CHOLHDLRATIO  No results for input(s): PROBNP in the last 72 hours. Cardiac Tests:  EKG personally reviewed: , rate 72, QTc 477 msec    GABBY: 6/19/2020 (Dr. Monique Cordero)   No evidence of endocarditis. Overall ejection fraction is normal .   Normal right ventricle structure and function. Right ventricle pacemaker   lead noted. Physiologic and/or trace mitral regurgitation is present. Mild tricuspid regurgitation. ASSESSMENT / PLAN:  1. CAD -- total occlusion of RCA with insignificant disease elsewhere on 2013 coronary CTA  2. Chronic HFpEF  3. Complete heart block/pacemaker dependent s/p dual chamber pacemaker implantation 4/2010; s/p generator change in 7/2020  4. BMI 58  5. HTN -- typically controlled, BP today 132/78; BP at last office visit 128/78  6. HLD -- on statin  7. DM  8. DEMETRIA -- compliant with CPAP  9. Hypothyroidism -- on replacement therapy  10. Prior tobacco abuse  11.  LLE wound -- follows with wound care/podiatry    - Continue current medications (including ASA, statin, coreg, ACE-I, and lasix)  - Monitor BMP  - Aggressive risk factor modifications / treatment of DEMETRIA  - 5/2/2022 device interrogation personally reviewed today and results reviewed with the patient --> AP 55.7%,  99.9%, no atrial fibrillation, battery 9.5 years, 2 NSVT episodes since 1/2022 (longest episode 9 seconds), normal device function  - Follow-up with EP as scheduled  - Records from Dr. Oxana Parish previously reviewed (pertinent findings outlined above)    Greater than 35 minutes was spent counseling the patient, reviewing the rationale for the above recommendations and reviewing the patient's current medication list, problem list and results of all previously ordered testing.     Gagan Betts MD  [facility]) Cardiology

## 2022-07-20 ENCOUNTER — PROCEDURE VISIT (OUTPATIENT)
Dept: PODIATRY | Age: 72
End: 2022-07-20
Payer: MEDICARE

## 2022-07-20 VITALS — WEIGHT: 315 LBS | HEIGHT: 68 IN | BODY MASS INDEX: 47.74 KG/M2

## 2022-07-20 DIAGNOSIS — M79.674 PAIN OF TOE OF RIGHT FOOT: ICD-10-CM

## 2022-07-20 DIAGNOSIS — R26.2 DIFFICULTY WALKING: ICD-10-CM

## 2022-07-20 DIAGNOSIS — I89.0 LYMPHEDEMA: ICD-10-CM

## 2022-07-20 DIAGNOSIS — M79.675 PAIN OF TOE OF LEFT FOOT: ICD-10-CM

## 2022-07-20 DIAGNOSIS — B35.1 ONYCHOMYCOSIS: Primary | ICD-10-CM

## 2022-07-20 DIAGNOSIS — E11.51 TYPE II DIABETES MELLITUS WITH PERIPHERAL CIRCULATORY DISORDER (HCC): ICD-10-CM

## 2022-07-20 PROCEDURE — 11721 DEBRIDE NAIL 6 OR MORE: CPT | Performed by: PODIATRIST

## 2022-07-20 NOTE — PROGRESS NOTES
22     Chrissy Carroll    : 1950  Sex: male  Age: 66 y.o. Subjective: The patient is seen today for evaluation regarding diabetic foot evaluation and mycotic nail care. No other complaints noted.     Chief Complaint   Patient presents with    Nail Problem    Diabetes       Current Medications:    Current Outpatient Medications:     tobramycin (TOBREX) 0.3 % ophthalmic solution, INSTILL 1 DROP INTO THE LEFT EYE 3 TIMES DAILY STARTING 3 DAYS PRE-OP, Disp: , Rfl:     latanoprost (XALATAN) 0.005 % ophthalmic solution, , Disp: , Rfl:     ramipril (ALTACE) 5 MG capsule, TAKE 1 CAPSULE BY MOUTH EVERY DAY, Disp: , Rfl:     liothyronine (CYTOMEL) 50 MCG tablet, Take 75 mcg by mouth daily, Disp: , Rfl:     levothyroxine (SYNTHROID) 25 MCG tablet, Take 25 mcg by mouth Daily, Disp: , Rfl:     SUPER B COMPLEX/C PO, Take by mouth daily, Disp: , Rfl:     potassium chloride (KLOR-CON M) 20 MEQ extended release tablet, Take 1 tablet by mouth daily, Disp: 90 tablet, Rfl: 3    ONETOUCH VERIO strip, , Disp: , Rfl:     tamsulosin (FLOMAX) 0.4 MG capsule, Take 0.4 mg by mouth , Disp: , Rfl:     diclofenac (VOLTAREN) 0.1 % ophthalmic solution, 1 drop 2 times daily , Disp: , Rfl:     acetaminophen (TYLENOL) 80 MG chewable tablet, Take 80 mg by mouth every 4 hours as needed for Pain, Disp: , Rfl:     furosemide (LASIX) 40 MG tablet, Take 40 mg by mouth daily , Disp: , Rfl:     albuterol (ACCUNEB) 0.63 MG/3ML nebulizer solution, Take 3 mLs by nebulization every 6 hours as needed for Wheezing, Disp: 270 mL, Rfl: 3    rosuvastatin (CRESTOR) 5 MG tablet, Take 5 mg by mouth daily , Disp: , Rfl:     glipiZIDE (GLUCOTROL XL) 5 MG extended release tablet, Take 5 mg by mouth daily, Disp: , Rfl: 3    Cholecalciferol (VITAMIN D3) 2000 UNITS CAPS, Take by mouth daily , Disp: , Rfl:     carvedilol (COREG) 25 MG tablet, Take 25 mg by mouth 2 times daily (with meals) , Disp: , Rfl:     Omega-3 Fatty Acids (FISH OIL) 600 MG CAPS, Take 1,000 mg by mouth daily , Disp: , Rfl:     aspirin 81 MG EC tablet, Take 81 mg by mouth daily , Disp: , Rfl:     Allergies: Allergies   Allergen Reactions    Clindamycin/Lincomycin Shortness Of Breath     Extreme SOB     Other Hives and Shortness Of Breath     \"allergic to Potatoe white. \"    Doxycycline Hyclate Other (See Comments)     tinnitus    Singulair [Montelukast Sodium]     Adaptic Non-Adhering Dressing [Wound Dressings]      [place]        Past Surgical History:   Procedure Laterality Date    CARDIAC PACEMAKER PLACEMENT  04/09/10    Medtronic    CHOLECYSTECTOMY      COLONOSCOPY  1/20/2016    INTRACAPSULAR CATARACT EXTRACTION Right 4/27/2022    RIGHT EYE CATARACT EXTRACTION IOL IMPLANT AND GONIOTOMY performed by Alcon Stevens MD at [facility] Bilateral     PACEMAKER PLACEMENT  07/23/2020    DUAL PPM GR    ([employer])     DR. MARTIN     TRANSESOPHAGEAL ECHOCARDIOGRAM  06/19/2020    With      Past Medical History:   Diagnosis Date    CAD (coronary artery disease)     Cataract, right eye     Complete heart block ([address]     h/o    Diabetes mellitus ([address]     Encounter for aspirin therapy     patient uses for heart health    History of blood transfusion     Hx of blood clots     Hyperlipidemia     Hypertension     Hyperthyroidism     Myocardial infarct ([address] 2010    Neuropathy     feet    Obesity     Obstructive sleep apnea     on cpap    Onychomycosis     DEMETRIA on CPAP     Osteoarthritis     Pacemaker     Medtronic    Stasis dermatitis     h/o on bilateral legs with leg edema    Varicose veins        Vitals:    07/20/22 1040   Weight: (!) 370 lb (167.8 kg)   Height: 5' 8\" (1.727 m)       Exam:  Pedal pulses diminished to palpation bilateral foot. Capillary refill time delayed digital regions bilateral foot. At this time the nail/s 1, 2, 5 right foot and nail/s 1, 2, 5 left foot are noted to be thickened, dystrophic and discolored with subungual debris present.   Tenderness noted to

## 2022-07-20 NOTE — PROGRESS NOTES
Patient in today for nail care. Patient does not have any complaints of pain at this time.  Patient's PCP is Gema Valadez MD date of last ov 07/19/2022        Brenda Cruz LPN

## 2022-07-25 RX ORDER — ACETAMINOPHEN 500 MG
500 TABLET ORAL EVERY 6 HOURS PRN
COMMUNITY

## 2022-07-25 NOTE — PROGRESS NOTES
[facility] PRE-ADMISSION TESTING INSTRUCTIONS    The Preadmission Testing patient is instructed accordingly using the following criteria (check applicable):    ARRIVAL INSTRUCTIONS:  [x] Parking the day of Surgery is located in the Main Entrance lot. Upon entering the door, make an immediate right to the surgery reception desk    [x] Bring photo ID and insurance card    [x] Bring in a copy of Living will or Durable Power of  papers. [x] Please be sure to arrange for responsible adult to provide transportation to and from the hospital    [x] Please arrange for responsible adult to be with you for the 24 hour period post procedure due to having anesthesia    [x] If you awake am of surgery not feeling well or have temperature >100 please call [phone]    GENERAL INSTRUCTIONS:    [x] Nothing by mouth after midnight, including gum, candy, mints or water    [x] You may brush your teeth, but do not swallow any water    [x] Take medications as instructed with 1-2 oz of water    [] Stop herbal supplements and vitamins 5 days prior to procedure    [] Follow preop dosing of blood thinners per physician instructions    [] Take 1/2 dose of evening insulin, but no insulin after midnight    [x] No oral diabetic medications after midnight    [x] If diabetic and have low blood sugar or feel symptomatic, take 1-2oz apple juice only    [] Bring inhalers day of surgery    [] Bring C-PAP/ Bi-Pap day of surgery    [] Bring urine specimen day of surgery    [x] Shower or bath with soap, lather and rinse well, AM of Surgery, no lotion, powders or creams     [] Follow bowel prep as instructed per surgeon    [x] No tobacco products within 24 hours of surgery     [x] No alcohol or illegal drug use within 24 hours of surgery.     [x] Jewelry, body piercing's, eyeglasses, contact lenses and dentures are not permitted into surgery (bring cases)      [] Please do not wear any nail polish, make up or hair products on the day of surgery    [x] You can expect a call the business day prior to procedure to notify you if your arrival time changes    [x] If you receive a survey after surgery we would greatly appreciate your comments    [] Parent/guardian of a minor must accompany their child and remain on the premises  the entire time they are under our care     [] Pediatric patients may bring favorite toy, blanket or comfort item with them    [] A caregiver or family member must remain with the patient during their stay if they are mentally handicapped, have dementia, disoriented or unable to use a call light or would be a safety concern if left unattended    [x] Please notify surgeon if you develop any illness between now and time of surgery (cold, cough, sore throat, fever, nausea, vomiting) or any signs of infections  including skin, wounds, and dental.    [x]  The Outpatient Pharmacy is available to fill your prescription here on your day of surgery, ask your preop nurse for details    [x] Other instructions: wear loose, comfortable clothing    EDUCATIONAL MATERIALS PROVIDED:    [] PAT Preoperative Education Packet/Booklet     [] Medication List    [] Transfusion bracelet applied with instructions    [] Shower with soap, lather and rinse well, and use CHG wipes provided the evening before surgery as instructed    [] Incentive spirometer with instructions

## 2022-07-26 NOTE — PROGRESS NOTES
Spoke with nurse at Dr. Hilda Peters office re: H&P.  THey will resend hand written, updated H&P today

## 2022-07-27 ENCOUNTER — HOSPITAL ENCOUNTER (OUTPATIENT)
Age: 72
Setting detail: OUTPATIENT SURGERY
Discharge: HOME | End: 2022-07-27
Attending: OPHTHALMOLOGY | Admitting: OPHTHALMOLOGY
Payer: MEDICARE

## 2022-07-27 ENCOUNTER — ANESTHESIA (OUTPATIENT)
Dept: OPERATING ROOM | Age: 72
End: 2022-07-27
Payer: MEDICARE

## 2022-07-27 ENCOUNTER — ANESTHESIA EVENT (OUTPATIENT)
Dept: OPERATING ROOM | Age: 72
End: 2022-07-27
Payer: MEDICARE

## 2022-07-27 VITALS
RESPIRATION RATE: 16 BRPM | OXYGEN SATURATION: 98 % | HEART RATE: 60 BPM | DIASTOLIC BLOOD PRESSURE: 65 MMHG | BODY MASS INDEX: 49.44 KG/M2 | TEMPERATURE: 96.9 F | HEIGHT: 67 IN | WEIGHT: 315 LBS | SYSTOLIC BLOOD PRESSURE: 120 MMHG

## 2022-07-27 LAB — METER GLUCOSE: 178 MG/DL (ref 74–99)

## 2022-07-27 PROCEDURE — 82962 GLUCOSE BLOOD TEST: CPT

## 2022-07-27 PROCEDURE — 6370000000 HC RX 637 (ALT 250 FOR IP): Performed by: OPHTHALMOLOGY

## 2022-07-27 PROCEDURE — 3600000002 HC SURGERY LEVEL 2 BASE: Performed by: OPHTHALMOLOGY

## 2022-07-27 PROCEDURE — 7100000011 HC PHASE II RECOVERY - ADDTL 15 MIN: Performed by: OPHTHALMOLOGY

## 2022-07-27 PROCEDURE — 6370000000 HC RX 637 (ALT 250 FOR IP)

## 2022-07-27 PROCEDURE — 3700000001 HC ADD 15 MINUTES (ANESTHESIA): Performed by: OPHTHALMOLOGY

## 2022-07-27 PROCEDURE — 2580000003 HC RX 258: Performed by: NURSE ANESTHETIST, CERTIFIED REGISTERED

## 2022-07-27 PROCEDURE — 6360000002 HC RX W HCPCS: Performed by: NURSE ANESTHETIST, CERTIFIED REGISTERED

## 2022-07-27 PROCEDURE — 2580000003 HC RX 258: Performed by: OPHTHALMOLOGY

## 2022-07-27 PROCEDURE — 6360000002 HC RX W HCPCS: Performed by: OPHTHALMOLOGY

## 2022-07-27 PROCEDURE — 3700000000 HC ANESTHESIA ATTENDED CARE: Performed by: OPHTHALMOLOGY

## 2022-07-27 PROCEDURE — 7100000010 HC PHASE II RECOVERY - FIRST 15 MIN: Performed by: OPHTHALMOLOGY

## 2022-07-27 PROCEDURE — 2500000003 HC RX 250 WO HCPCS: Performed by: OPHTHALMOLOGY

## 2022-07-27 PROCEDURE — V2632 POST CHMBR INTRAOCULAR LENS: HCPCS | Performed by: OPHTHALMOLOGY

## 2022-07-27 PROCEDURE — 2709999900 HC NON-CHARGEABLE SUPPLY: Performed by: OPHTHALMOLOGY

## 2022-07-27 PROCEDURE — 3600000012 HC SURGERY LEVEL 2 ADDTL 15MIN: Performed by: OPHTHALMOLOGY

## 2022-07-27 DEVICE — ACRYSOF(R) IQ ASPHERIC NATURAL IOL, SINGLE-PIECE ACRYLIC FOLDABLE PCL, UV WITH BLUE LIGHTFILTER, 13.0MM LENGTH, 6.0MM ANTERIORASYMMETRIC BICONVEX OPTIC, PLANAR HAPTICS.
Type: IMPLANTABLE DEVICE | Site: EYE | Status: FUNCTIONAL
Brand: ACRYSOF®

## 2022-07-27 RX ORDER — MIDAZOLAM HYDROCHLORIDE 1 MG/ML
INJECTION INTRAMUSCULAR; INTRAVENOUS PRN
Status: DISCONTINUED | OUTPATIENT
Start: 2022-07-27 | End: 2022-07-27 | Stop reason: SDUPTHER

## 2022-07-27 RX ORDER — SODIUM CHLORIDE 0.9 % (FLUSH) 0.9 %
5-40 SYRINGE (ML) INJECTION EVERY 12 HOURS SCHEDULED
Status: DISCONTINUED | OUTPATIENT
Start: 2022-07-27 | End: 2022-07-27 | Stop reason: HOSPADM

## 2022-07-27 RX ORDER — SODIUM CHLORIDE 9 MG/ML
INJECTION, SOLUTION INTRAVENOUS PRN
Status: DISCONTINUED | OUTPATIENT
Start: 2022-07-27 | End: 2022-07-27 | Stop reason: HOSPADM

## 2022-07-27 RX ORDER — TROPICAMIDE 10 MG/ML
1 SOLUTION/ DROPS OPHTHALMIC
Status: COMPLETED | OUTPATIENT
Start: 2022-07-27 | End: 2022-07-27

## 2022-07-27 RX ORDER — TOBRAMYCIN 3 MG/ML
1 SOLUTION/ DROPS OPHTHALMIC EVERY 5 MIN PRN
Status: COMPLETED | OUTPATIENT
Start: 2022-07-27 | End: 2022-07-27

## 2022-07-27 RX ORDER — FENTANYL CITRATE 50 UG/ML
INJECTION, SOLUTION INTRAMUSCULAR; INTRAVENOUS PRN
Status: DISCONTINUED | OUTPATIENT
Start: 2022-07-27 | End: 2022-07-27 | Stop reason: SDUPTHER

## 2022-07-27 RX ORDER — CIPROFLOXACIN HYDROCHLORIDE 3.5 MG/ML
SOLUTION/ DROPS TOPICAL PRN
Status: DISCONTINUED | OUTPATIENT
Start: 2022-07-27 | End: 2022-07-27 | Stop reason: ALTCHOICE

## 2022-07-27 RX ORDER — PHENYLEPHRINE HCL 2.5 %
1 DROPS OPHTHALMIC (EYE)
Status: COMPLETED | OUTPATIENT
Start: 2022-07-27 | End: 2022-07-27

## 2022-07-27 RX ORDER — BALANCED SALT SOLUTION ENRICHED WITH BICARBONATE, DEXTROSE, AND GLUTATHIONE
KIT INTRAOCULAR PRN
Status: DISCONTINUED | OUTPATIENT
Start: 2022-07-27 | End: 2022-07-27 | Stop reason: ALTCHOICE

## 2022-07-27 RX ORDER — SODIUM CHLORIDE 9 MG/ML
INJECTION, SOLUTION INTRAVENOUS CONTINUOUS PRN
Status: DISCONTINUED | OUTPATIENT
Start: 2022-07-27 | End: 2022-07-27 | Stop reason: SDUPTHER

## 2022-07-27 RX ORDER — SODIUM CHLORIDE 0.9 % (FLUSH) 0.9 %
5-40 SYRINGE (ML) INJECTION PRN
Status: DISCONTINUED | OUTPATIENT
Start: 2022-07-27 | End: 2022-07-27 | Stop reason: HOSPADM

## 2022-07-27 RX ORDER — SODIUM CHLORIDE 9 MG/ML
INJECTION, SOLUTION INTRAVENOUS CONTINUOUS
Status: DISCONTINUED | OUTPATIENT
Start: 2022-07-27 | End: 2022-07-27 | Stop reason: HOSPADM

## 2022-07-27 RX ORDER — TETRACAINE HYDROCHLORIDE 5 MG/ML
1 SOLUTION OPHTHALMIC EVERY 10 MIN PRN
Status: COMPLETED | OUTPATIENT
Start: 2022-07-27 | End: 2022-07-27

## 2022-07-27 RX ADMIN — Medication 1 DROP: at 11:06

## 2022-07-27 RX ADMIN — Medication 1 DROP: at 10:50

## 2022-07-27 RX ADMIN — PHENYLEPHRINE HYDROCHLORIDE 1 DROP: 25 SOLUTION/ DROPS OPHTHALMIC at 10:38

## 2022-07-27 RX ADMIN — TETRACAINE HYDROCHLORIDE 1 DROP: 5 SOLUTION OPHTHALMIC at 10:51

## 2022-07-27 RX ADMIN — FENTANYL CITRATE 25 MCG: 50 INJECTION, SOLUTION INTRAMUSCULAR; INTRAVENOUS at 12:23

## 2022-07-27 RX ADMIN — MIDAZOLAM 0.5 MG: 1 INJECTION INTRAMUSCULAR; INTRAVENOUS at 12:33

## 2022-07-27 RX ADMIN — MIDAZOLAM 0.5 MG: 1 INJECTION INTRAMUSCULAR; INTRAVENOUS at 12:23

## 2022-07-27 RX ADMIN — PHENYLEPHRINE HYDROCHLORIDE 1 DROP: 25 SOLUTION/ DROPS OPHTHALMIC at 11:07

## 2022-07-27 RX ADMIN — FENTANYL CITRATE 25 MCG: 50 INJECTION, SOLUTION INTRAMUSCULAR; INTRAVENOUS at 12:33

## 2022-07-27 RX ADMIN — PHENYLEPHRINE HYDROCHLORIDE 1 DROP: 25 SOLUTION/ DROPS OPHTHALMIC at 10:50

## 2022-07-27 RX ADMIN — Medication 1 DROP: at 10:38

## 2022-07-27 RX ADMIN — MIDAZOLAM 1 MG: 1 INJECTION INTRAMUSCULAR; INTRAVENOUS at 12:07

## 2022-07-27 RX ADMIN — TETRACAINE HYDROCHLORIDE 1 DROP: 5 SOLUTION OPHTHALMIC at 11:07

## 2022-07-27 RX ADMIN — Medication 1 DROP: at 11:07

## 2022-07-27 RX ADMIN — SODIUM CHLORIDE: 9 INJECTION, SOLUTION INTRAVENOUS at 11:59

## 2022-07-27 RX ADMIN — FENTANYL CITRATE 50 MCG: 50 INJECTION, SOLUTION INTRAMUSCULAR; INTRAVENOUS at 12:07

## 2022-07-27 RX ADMIN — TETRACAINE HYDROCHLORIDE 1 DROP: 5 SOLUTION OPHTHALMIC at 10:38

## 2022-07-27 ASSESSMENT — PAIN SCALES - GENERAL
PAINLEVEL_OUTOF10: 0

## 2022-07-27 ASSESSMENT — PAIN - FUNCTIONAL ASSESSMENT: PAIN_FUNCTIONAL_ASSESSMENT: 0-10

## 2022-07-27 ASSESSMENT — LIFESTYLE VARIABLES: SMOKING_STATUS: 0

## 2022-07-27 ASSESSMENT — ENCOUNTER SYMPTOMS: SHORTNESS OF BREATH: 1

## 2022-07-27 NOTE — ANESTHESIA PRE PROCEDURE
Department of Anesthesiology  Preprocedure Note       Name:  Lily Gonzalez   Age:  74 y.o.  :  1950                                          MRN:  [id number]         Date:  2022      Surgeon: Roderick Batista M.D. Procedure:   IOL  Left eye cataract extraction  Medications prior to admission:   Prior to Admission medications    Medication Sig Start Date End Date Taking? Authorizing Provider   acetaminophen (TYLENOL) 500 MG tablet Take 500 mg by mouth every 6 hours as needed for Pain    Historical Provider, MD   tobramycin (TOBREX) 0.3 % ophthalmic solution INSTILL 1 DROP INTO THE LEFT EYE 3 TIMES DAILY STARTING 3 DAYS PRE-OP 22   Historical Provider, MD   latanoprost (XALATAN) 0.005 % ophthalmic solution  22   Historical Provider, MD   ramipril (ALTACE) 5 MG capsule Take 5 mg by mouth at bedtime Takes nighty 22   Historical Provider, MD   liothyronine (CYTOMEL) 50 MCG tablet Take 75 mcg by mouth in the morning. Takes 25mcg. Historical Provider, MD   levothyroxine (SYNTHROID) 25 MCG tablet Take 25 mcg by mouth in the morning. Takes 100mcg.     Historical Provider, MD   SUPER B COMPLEX/C PO Take by mouth daily    Historical Provider, MD   potassium chloride (KLOR-CON M) 20 MEQ extended release tablet Take 1 tablet by mouth daily 22   Mahesh Tarango MD   [facility] VERIO strip  21   Historical Provider, MD   tamsulosin (FLOMAX) 0.4 MG capsule Take 0.4 mg by mouth Takes nighlty 10/9/21   Historical Provider, MD   diclofenac (VOLTAREN) 0.1 % ophthalmic solution 1 drop 2 times daily   Patient not taking: Reported on 2022    Historical Provider, MD   furosemide (LASIX) 40 MG tablet Take 40 mg by mouth daily  20   Historical Provider, MD   albuterol (ACCUNEB) 0.63 MG/3ML nebulizer solution Take 3 mLs by nebulization every 6 hours as needed for Wheezing 19   Brooklyn Schultz MD   rosuvastatin (CRESTOR) 5 MG tablet Take 5 mg by mouth daily  18   Historical Provider, MD glipiZIDE (GLUCOTROL XL) 5 MG extended release tablet Take 5 mg by mouth daily 9/1/17   Historical Provider, MD   Cholecalciferol (VITAMIN D3) 2000 UNITS CAPS Take by mouth daily     Historical Provider, MD   carvedilol (COREG) 25 MG tablet Take 25 mg by mouth 2 times daily (with meals)     Historical Provider, MD   Omega-3 Fatty Acids (FISH OIL) 600 MG CAPS Take 1,000 mg by mouth daily     Historical Provider, MD   aspirin 81 MG EC tablet Take 81 mg by mouth daily     Historical Provider, MD       Current medications:    No current facility-administered medications for this visit. No current outpatient medications on file. Facility-Administered Medications Ordered in Other Visits   Medication Dose Route Frequency Provider Last Rate Last Admin    0.9 % sodium chloride infusion   IntraVENous Continuous Anjelica Danielle MD        sodium chloride flush 0.9 % injection 5-40 mL  5-40 mL IntraVENous 2 times per day Anjelica Danielle MD        sodium chloride flush 0.9 % injection 5-40 mL  5-40 mL IntraVENous PRN Anjelica Danielle MD        0.9 % sodium chloride infusion   IntraVENous PRN Anjelica Danielle MD        tetracaine (TETRAVISC) 0.5 % ophthalmic solution 1 drop  1 drop Left Eye Q10 Min PRN Anjelica Danielle MD   1 drop at 07/27/22 1051    tropicamide (MYDRIACYL) 1 % ophthalmic solution 1 drop  1 drop Left Eye Q15 Min PRN Anjelica Danielle MD   1 drop at 07/27/22 1050    phenylephrine (MYDFRIN) 2.5 % ophthalmic solution 1 drop  1 drop Left Eye Q15 Min PRN Anjelica Danielle MD   1 drop at 07/27/22 1050    tobramycin (TOBREX) 0.3 % ophthalmic solution 1 drop  1 drop Left Eye Q5 Min PRN Anjelica Danielle MD   1 drop at 07/27/22 1050    sodium hyaluronate (HEALON GV) intraocular injection 8.4 mg  8.4 mg IntrOCUlar Once PRN Anjelica Danielle MD        cefUROXime (ZINACEF) 50 mg, sodium chloride 0.9 % 5 mL   Ophthalmic Once Anjelica Danielle MD           Allergies:     Allergies   Allergen Reactions  Clindamycin/Lincomycin Shortness Of Breath     Extreme SOB     Other Hives and Shortness Of Breath     \"allergic to [place] white. \"    Doxycycline Hyclate Other (See Comments)     tinnitus    Singulair [Montelukast Sodium]     Adaptic Non-Adhering Dressing [Wound Dressings]      [place]        Problem List:    Patient Active Problem List   Diagnosis Code    Arrhythmia I49.9    Pacemaker Z95.0    HTN (hypertension) I10    Hyperthyroidism E05.90    DEMETRIA (obstructive sleep apnea) G47.33    Cellulitis L03.90    SIRS (systemic inflammatory response syndrome) ([facility]) R65.10    Shortness of breath R06.02    Pain in lower limb M79.606    Chest pain R07.9    Abdominal pain R10.9    Pulmonary emboli ([facility]) I26.99    Post-thrombotic syndrome I87.009    Complete heart block ([facility]) I44.2    Non-pressure chronic ulcer of left lower leg with fat layer exposed ([address] L97.922    Venous insufficiency (chronic) (peripheral) I87.2    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 ([facility]) I83.022, W26.022    Onychomycosis B35.1    PVD (peripheral vascular disease) ([facility]) I73.9    Type II diabetes mellitus with peripheral circulatory disorder ([facility]) E11.51    Difficulty walking R26.2    Simple chronic bronchitis ([facility]) J41.0    Pain of toe of right foot M79.674    Pain of toe of left foot M79.675    Diabetic polyneuropathy associated with type 2 diabetes mellitus ([facility]) E11.42    Pneumonia J18.9    Community acquired pneumonia due to Streptococcus pneumoniae ([address] J13    Encounter for pacemaker at end of battery life Z45.010    Blister of right leg S80.821A    Stasis ulcer ([address] I83.009, L97.909    Lymphedema I89.0    Varicose veins of right lower extremity with inflammation, with ulcer of ankle with fat layer exposed ([address] I83.213, L97.312    Xerosis cutis L85.3    Non-pressure chronic ulcer of other part of right lower leg with fat layer exposed ([address] R15.692    Diabetic sensory polyneuropathy ([facility] E11.42    Edema R60.9    History of artificial joint Z96.60    Morbid obesity (HCC) E66.01    Osteoarthritis of knee M17.10    Peripheral vascular disorder due to diabetes mellitus ([facility] E11.51       Past Medical History:        Diagnosis Date    CAD (coronary artery disease)     Cataract, right eye     Complete heart block ([facility]     h/o    Diabetes mellitus ([facility]     Encounter for aspirin therapy     patient uses for heart health    History of blood transfusion     Hx of blood clots     Hyperlipidemia     Hypertension     Hyperthyroidism     Myocardial infarct ([facility] 2010    Neuropathy     feet    Obesity     Obstructive sleep apnea     on cpap    Onychomycosis     DEMETRIA on CPAP     Osteoarthritis     Pacemaker     Medtronic    Stasis dermatitis     h/o on bilateral legs with leg edema    Varicose veins        Past Surgical History:        Procedure Laterality Date    CARDIAC PACEMAKER PLACEMENT  2010    Medtronic    CHOLECYSTECTOMY      COLONOSCOPY  2016    INTRACAPSULAR CATARACT EXTRACTION Right 2022    RIGHT EYE CATARACT EXTRACTION IOL IMPLANT AND GONIOTOMY [facility]     knees    PACEMAKER PLACEMENT  2020    DUAL PPM GR    ([employer])     DR. MARTIN     TRANSESOPHAGEAL ECHOCARDIOGRAM  2020    With        Social History:    Social History     Tobacco Use    Smoking status: Former     Packs/day: 2.00     Years: 25.00     Pack years: 50.00     Types: Cigarettes     Start date: 1964     Quit date: 1990     Years since quittin.0    Smokeless tobacco: Never   Substance Use Topics    Alcohol use: Yes     Comment: rare   2 beers/month                                Counseling given: Not Answered      Vital Signs (Current): There were no vitals filed for this visit.                                            BP Readings from Last 3 Encounters: 07/27/22 130/78   05/23/22 132/78   04/27/22 139/73       NPO Status:  >12 hrs                                                                               BMI:   Wt Readings from Last 3 Encounters:   07/27/22 (!) 387 lb (175.5 kg)   07/20/22 (!) 370 lb (167.8 kg)   05/23/22 (!) 370 lb (167.8 kg)     There is no height or weight on file to calculate BMI.    CBC:   Lab Results   Component Value Date/Time    WBC 6.9 07/23/2020 07:30 AM    RBC 4.24 07/23/2020 07:30 AM    HGB 12.1 07/23/2020 07:30 AM    HCT 39.0 07/23/2020 07:30 AM    MCV 92.0 07/23/2020 07:30 AM    RDW 15.8 07/23/2020 07:30 AM     07/23/2020 07:30 AM       CMP:   Lab Results   Component Value Date/Time     07/23/2020 07:30 AM    K 3.9 07/23/2020 07:30 AM    K 5.2 06/13/2020 12:03 PM     07/23/2020 07:30 AM    CO2 24 07/23/2020 07:30 AM    BUN 26 07/23/2020 07:30 AM    CREATININE 0.9 07/23/2020 07:30 AM    GFRAA >60 07/23/2020 07:30 AM    LABGLOM >60 07/23/2020 07:30 AM    GLUCOSE 135 04/27/2022 01:07 PM    PROT 7.1 06/13/2020 12:03 PM    CALCIUM 9.1 07/23/2020 07:30 AM    BILITOT 0.9 06/13/2020 12:03 PM    ALKPHOS 74 06/13/2020 12:03 PM    AST 49 06/13/2020 12:03 PM    ALT 32 06/13/2020 12:03 PM       POC Tests: No results for input(s): POCGLU, POCNA, POCK, POCCL, POCBUN, POCHEMO, POCHCT in the last 72 hours.     Coags:   Lab Results   Component Value Date/Time    PROTIME 13.7 04/27/2022 12:56 PM    INR 1.3 04/27/2022 12:56 PM       HCG (If Applicable): No results found for: PREGTESTUR, PREGSERUM, HCG, HCGQUANT     ABGs: No results found for: PHART, PO2ART, PMM3MLU, QUD4NXS, BEART, A7QPRPZF     Type & Screen (If Applicable):  No results found for: LABABO, LABRH    Drug/Infectious Status (If Applicable):  No results found for: HIV, HEPCAB    COVID-19 Screening (If Applicable):   Lab Results   Component Value Date/Time    COVID19 Not Detected 07/17/2020 09:14 AM         Anesthesia Evaluation  Patient summary reviewed and Nursing notes reviewed no history of anesthetic complications:   Airway: Mallampati: III  TM distance: <3 FB   Neck ROM: full  Mouth opening: > = 3 FB   Dental:    (+) partials      Pulmonary: breath sounds clear to auscultation  (+) pneumonia (s/p 3 wks ago): resolved,  COPD:  shortness of breath:  sleep apnea:      (-) not a current smoker (quit 33 yrs ago)                          ROS comment: asbestosis   Cardiovascular:  Exercise tolerance: poor (<4 METS),   (+) hypertension:, pacemaker: pacemaker, past MI: > 6 months, CAD:, dysrhythmias (CHB):, hyperlipidemia        Rhythm: regular  Rate: normal      Cleared by cardiology             PE comment: UT   Neuro/Psych:   (+) neuromuscular disease (Diabetic sensory polyneuropathy ([address])):,              ROS comment: Chronic LE ulcers  Venous insufficiency  Varicose veins LLE GI/Hepatic/Renal:   (+) morbid obesity          Endo/Other:    (+) DiabetesType II DM, well controlled, , hyperthyroidism: arthritis (B TKR): OA., . Pt had no PAT visit        ROS comment: SIRS Abdominal:             Vascular:   + PVD, aortic or cerebral, PE. Other Findings:      -CoV-2 Not Detected Not Detected Final 07/17/2020  9:14 AM    Summary   No evidence of endocarditis. Overall ejection fraction is normal .   Normal right ventricle structure and function. Right ventricle pacemaker   lead noted. Physiologic and/or trace mitral regurgitation is present. Mild tricuspid regurgitation. DOS STAFF ADDENDUM:    Patient seen and examined, physical exam updated as needed, chart reviewed. NPO status confirmed. Anesthetic options and risks discussed with patient/legal guardian. Patient/legal guardian verbalized understanding and agrees to proceed. SUSANNA Mayfield - CRNA  Staff CRNA  July 27, 2022  12:18 PM                       Anesthesia Plan      MAC     ASA 4       Induction: intravenous. Anesthetic plan and risks discussed with patient.       Plan discussed with CRNA.                     Lesia Givens MD   7/27/2022

## 2022-07-27 NOTE — DISCHARGE INSTRUCTIONS
Follow-up tomorrow afternoon at [facility] as scheduled    Eye drops to add to current regimen (all left eye one drop 3X daily):    Tobradex  Ketorolac  Prednisolone acetate 1%      Tobradex ointment at bedtime to left eye

## 2022-07-27 NOTE — ANESTHESIA POSTPROCEDURE EVALUATION
Department of Anesthesiology  Postprocedure Note    Patient: Rick Garcia  MRN: [id number]  YOB: 1950  Date of evaluation: 7/27/2022      Procedure Summary     Date: 07/27/22 Room / Location: [facility] OR 03 / [facility]    Anesthesia Start: 9008 Anesthesia Stop: [phone]    Procedure: LEFT EYE CATARACT EXTRACTION IOL IMPLANT (Left: Eye) Diagnosis: (LEFT EYE CATARACT, GLAUCOMA)    Surgeons: Petrina Cushing, MD Responsible Provider: Thomas Chen MD    Anesthesia Type: MAC ASA Status: 4          Anesthesia Type: MAC    Jordy Phase I: Jordy Score: 10    Jordy Phase II:        Anesthesia Post Evaluation    Patient location during evaluation: PACU  Patient participation: complete - patient participated  Level of consciousness: sleepy but conscious  Pain score: 0  Airway patency: patent  Nausea & Vomiting: no nausea and no vomiting  Complications: no  Cardiovascular status: blood pressure returned to baseline  Respiratory status: acceptable  Hydration status: euvolemic

## 2022-07-27 NOTE — BRIEF OP NOTE
Brief Postoperative Note      Patient: Otilia Yu  YOB: 1950  MRN: [id number]    Date of Procedure: 7/27/2022    Pre-Op Diagnosis: LEFT EYE CATARACT, GLAUCOMA    Post-Op Diagnosis: Same       Procedure(s):  LEFT EYE CATARACT EXTRACTION IOL IMPLANT    Surgeon(s):  Bree Bear MD    Assistant:  * No surgical staff found *    Anesthesia: Monitor Anesthesia Care    Estimated Blood Loss (mL): Minimal    Complications: None    Specimens:   * No specimens in log *    Implants:  Implant Name Type Inv.  Item Serial No.  Lot No. LRB No. Used Action   LENS INTOCU 6.0 TO 30.0 DIOPT 118.7 A CONSTANT 0DEG ANG - [id number]  LENS INTOCU 6.0 TO 30.0 DIOPT 118.7 A CONSTANT 0DEG ANG [id number] [employer]-  Left 1 Implanted         Drains: * No LDAs found *    Findings: none    Electronically signed by Bree Bear MD on 7/27/2022 at 12:42 PM

## 2022-07-27 NOTE — ANESTHESIA POSTPROCEDURE EVALUATION
Department of Anesthesiology  Postprocedure Note    Patient: Jana Thomas  MRN: [id number]  YOB: 1950  Date of evaluation: 7/27/2022      Procedure Summary     Date: 07/27/22 Room / Location: [facility] OR 03 / [facility]    Anesthesia Start: 0461 Anesthesia Stop: [phone]    Procedure: LEFT EYE CATARACT EXTRACTION IOL IMPLANT (Left: Eye) Diagnosis: (LEFT EYE CATARACT, GLAUCOMA)    Surgeons: Yasemin Castillo MD Responsible Provider: Twin Crain MD    Anesthesia Type: MAC ASA Status: 4          Anesthesia Type: MAC    Jordy Phase I: Jordy Score: 10    Jordy Phase II: Jordy Score: 10      Anesthesia Post Evaluation    Patient location during evaluation: PACU  Patient participation: complete - patient participated  Level of consciousness: awake and alert  Pain score: 2  Airway patency: patent  Nausea & Vomiting: no nausea and no vomiting  Complications: no  Cardiovascular status: blood pressure returned to baseline  Respiratory status: acceptable  Hydration status: euvolemic

## 2022-07-28 NOTE — OP NOTE
[address] [facility]                                OPERATIVE REPORT    PATIENT NAME: Gorge Paris                 :        1950  MED REC NO:   [id number]                            ROOM:  ACCOUNT NO:   [de-identified]                           ADMIT DATE: 2022  PROVIDER:     Antonia Adorno MD    DATE OF PROCEDURE:  2022    PREOPERATIVE DIAGNOSIS:  Dense nuclear sclerotic cataract, left eye. POSTOPERATIVE DIAGNOSIS:  Dense nuclear sclerotic cataract, left eye. PROCEDURE PERFORMED:  Phacoemulsification of lens and posterior chamber  intraocular lens implant. ANESTHESIA:  Local MAC. COMPLICATIONS:  None. SURGEON:  Antonia Adorno MD.    INDICATIONS FOR PROCEDURE:  The patient is a very pleasant 75-year-old  gentleman noting decline in vision in his left eye. With risks,  benefits, and alternatives discussed with him, he wished to proceed with  the above procedure. DESCRIPTION OF OPERATION:  The patient was brought to the operating room  and placed in the supine position on the operating table. He had  previously been administered topical lidocaine drops. He was prepped  and draped in the usual sterile fashion for intraocular surgery. Attention was directed to the left eye, where a lid speculum was placed  and the operating microscope brought into view. Paracentesis was made at the 5 o'clock position and the eye entered  temporally with a 2.4-mm metal keratome. The anterior chamber was  inflated with Viscoat and a 5-mm capsulorrhexis was fashioned in the  usual manner with the [employer]trata forceps. The lens was hydrodissected and hydrodelineated and removed in a  phaco-chop technique. The remaining cortical material was removed with  the I/A handpiece and the capsular bag was inflated with Provisc.   An  Danilo AcrySof lens SN60WF model and 18.5 diopters power with serial  number of [id number] was then injected into the capsular bag. It  centered well. Remaining viscoelastic was removed with the I/A  handpiece after hydration of the incisions. Cefuroxime was injected in  the anterior chamber and the patient was then discharged to the recovery  room from the operating room. He will follow up in my clinic tomorrow.         Lopez Pickard MD    D: 07/27/2022 13:47:22       T: 07/27/2022 22:53:12     [email or web address]  Job#: [id number]     Doc#: [id number]    CC:

## 2022-09-06 ENCOUNTER — TELEPHONE (OUTPATIENT)
Dept: PODIATRY | Age: 72
End: 2022-09-06

## 2022-09-06 ENCOUNTER — TELEPHONE (OUTPATIENT)
Dept: ADMINISTRATIVE | Age: 72
End: 2022-09-06

## 2022-09-06 DIAGNOSIS — I83.022 VARICOSE VEINS OF LEFT LOWER EXTREMITY WITH ULCER OF CALF WITH FAT LAYER EXPOSED (HCC): Primary | ICD-10-CM

## 2022-09-06 DIAGNOSIS — L97.222 VARICOSE VEINS OF LEFT LOWER EXTREMITY WITH ULCER OF CALF WITH FAT LAYER EXPOSED (HCC): Primary | ICD-10-CM

## 2022-09-06 RX ORDER — AMOXICILLIN AND CLAVULANATE POTASSIUM 875; 125 MG/1; MG/1
1 TABLET, FILM COATED ORAL 2 TIMES DAILY
Qty: 28 TABLET | Refills: 0 | Status: SHIPPED | OUTPATIENT
Start: 2022-09-06 | End: 2022-09-20

## 2022-09-06 NOTE — TELEPHONE ENCOUNTER
An encounter has been sent over to Sharla Barragan is aware and will call her back once I receive an answer.

## 2022-09-06 NOTE — TELEPHONE ENCOUNTER
Patient wife called regarding wound on right lower leg. She states wound has a smell. Please advise.

## 2022-09-06 NOTE — TELEPHONE ENCOUNTER
Pt's wife called and said she thinks pt's lt leg wound is infected. When she was changing the bandage, it has an odor and green discharge. Staff unavailable due to pt care. Please contact Barbara Heller.

## 2022-09-23 ENCOUNTER — TELEPHONE (OUTPATIENT)
Dept: PODIATRY | Age: 72
End: 2022-09-23

## 2022-09-23 DIAGNOSIS — I83.022 VARICOSE VEINS OF LEFT LOWER EXTREMITY WITH ULCER OF CALF WITH FAT LAYER EXPOSED (HCC): Primary | ICD-10-CM

## 2022-09-23 DIAGNOSIS — L97.222 VARICOSE VEINS OF LEFT LOWER EXTREMITY WITH ULCER OF CALF WITH FAT LAYER EXPOSED (HCC): Primary | ICD-10-CM

## 2022-09-23 RX ORDER — AMOXICILLIN AND CLAVULANATE POTASSIUM 875; 125 MG/1; MG/1
1 TABLET, FILM COATED ORAL 2 TIMES DAILY
Qty: 28 TABLET | Refills: 0 | Status: SHIPPED
Start: 2022-09-23 | End: 2022-09-27

## 2022-09-23 NOTE — PROGRESS NOTES
[facility] CARDIOLOGY  CARDIAC ELECTROPHYSIOLOGY DEPARTMENT/DIVISION OF CARDIOLOGY  Outpatient Progress Report  PATIENT: Tariq Nelson RECORD NUMBER: [id number]  DATE OF SERVICE:  9/27/2022  ATTENDING ELECTROPHYSIOLOGIST:  Haresh Dyer D.O.  REFERRING PHYSICIAN: No ref. provider found and Kyle Oliver MD  CHIEF COMPLAINT: PPM insitu    HPI: Suresh Cannon is a 74 y.o. male with a history of 3* AVB sp MDT dc PPM (DOI: 2010; generator change: 7/23/20), CAD, HFpEF, HTN, PE, super morbid obesity (BMI > 60), DEMETRIA on CPAP, PVD, DM, OA sp b/l TKR, lymphedema, and varicose veins/venous ulcer. He is managed by Dr Jodi Ruby with aspirin 81 mg daily, coreg 25 mg BID, lasix 40 mg daily, ramipril 5 mg daily, and crestor 5 mg daily. In 2010, patient was diagnosed with 3* AVB, which was treated with MDT dc PPM reportedly by Dr Eden Trejo. However, RA lead is [facility] and RV lead is MDT, so not MRI conditional. In 2013, he was diagnosed with  of RCA on coronary CTA. In 2020, he had generator change of PPM. TTE at that time reported LVEF \"normal\", but very limited by body habitus. He presents today, 9/27/2022 ; to transfer EP care to my office. His device is enrolled in remote monitoring, which most recently reported stable device function and episodes of NSVT (longest: 4 seconds). He denies any complaints at this time. Prior cardiac testing:   GABBY (6/19/20): LVEF = \"normal\", moderate LAE, no LA/JAHAIRA thrombus, no PFO/ASD, mild TR, and no evidence of endocarditis. TTE (6/17/20): very technically limited study, LVEF = \"normal\",   Pharm Nuc Stress (9/10/19): LVEF = 62% with inferolateral hypokinesis, large moderately severe area of infarct in the RCA, but poor study quality due to body habitus.   Coronary CTA (2013):  RCA, no significant CAD in other vessels    Past Medical History:   Diagnosis Date    CAD (coronary artery disease)     Cataract, right eye     Complete heart block ([address] h/o    Diabetes mellitus ([facility] [address]     Encounter for aspirin therapy     patient uses for heart health    History of blood transfusion     Hx of blood clots     Hyperlipidemia     Hypertension     Hyperthyroidism     Myocardial infarct ([facility] [address]     Neuropathy     feet    Obesity     Obstructive sleep apnea     on cpap    Onychomycosis     DEMETRIA on CPAP     Osteoarthritis     Pacemaker     Medtronic    Stasis dermatitis     h/o on bilateral legs with leg edema    Varicose veins      Past Surgical History:   Procedure Laterality Date    CARDIAC PACEMAKER PLACEMENT  2010    Medtronic    CHOLECYSTECTOMY      COLONOSCOPY  2016    INTRACAPSULAR CATARACT EXTRACTION Right 2022    RIGHT EYE CATARACT EXTRACTION IOL IMPLANT AND GONIOTOMY performed by Carol Thomson MD at [address] [facility] Left 2022    LEFT EYE CATARACT EXTRACTION IOL IMPLANT performed by Carol Thomson MD at [address] [facility] Bilateral     knees    PACEMAKER PLACEMENT  2020    DUAL PPM GR    ([employer]TRONIC)     DR. MARTIN     TRANSESOPHAGEAL ECHOCARDIOGRAM  2020    With       Family History   Problem Relation Age of Onset    Heart Disease Father             Other Mother             Other Brother         gall bladder     There is no family history of sudden cardiac arrest    Social History     Tobacco Use    Smoking status: Former     Packs/day: 2.00     Years: 25.00     Pack years: 50.00     Types: Cigarettes     Start date: 1964     Quit date: 1990     Years since quittin.2    Smokeless tobacco: Never   Substance Use Topics    Alcohol use: Yes     Comment: rare   2 beers/month       Current Outpatient Medications   Medication Sig Dispense Refill    ONE TOUCH ULTRASOFT LANCETS MISC USE TO TEST ONCE DAILY      acetaminophen (TYLENOL) 500 MG tablet Take 500 mg by mouth every 6 hours as needed for Pain      tobramycin (TOBREX) 0.3 % ophthalmic solution INSTILL 1 DROP INTO THE LEFT EYE 3 TIMES DAILY STARTING 3 DAYS PRE-OP      latanoprost (XALATAN) 0.005 % ophthalmic solution       ramipril (ALTACE) 5 MG capsule Take 5 mg by mouth at bedtime Takes nighty      liothyronine (CYTOMEL) 50 MCG tablet Take 75 mcg by mouth in the morning. Takes 25mcg. levothyroxine (SYNTHROID) 25 MCG tablet Take 25 mcg by mouth in the morning. Takes 100mcg. SUPER B COMPLEX/C PO Take by mouth daily      potassium chloride (KLOR-CON M) 20 MEQ extended release tablet Take 1 tablet by mouth daily 90 tablet 3    ONETOUCH VERIO strip       diclofenac (VOLTAREN) 0.1 % ophthalmic solution 1 drop 2 times daily      furosemide (LASIX) 40 MG tablet Take 40 mg by mouth daily       albuterol (ACCUNEB) 0.63 MG/3ML nebulizer solution Take 3 mLs by nebulization every 6 hours as needed for Wheezing 270 mL 3    rosuvastatin (CRESTOR) 5 MG tablet Take 5 mg by mouth daily       glipiZIDE (GLUCOTROL XL) 5 MG extended release tablet Take 5 mg by mouth daily  3    Cholecalciferol (VITAMIN D3) 2000 UNITS CAPS Take by mouth daily       carvedilol (COREG) 25 MG tablet Take 25 mg by mouth 2 times daily (with meals)       Omega-3 Fatty Acids (FISH OIL) 600 MG CAPS Take 1,000 mg by mouth daily       aspirin 81 MG EC tablet Take 81 mg by mouth daily       tamsulosin (FLOMAX) 0.4 MG capsule Take 0.4 mg by mouth Takes nighlty (Patient not taking: Reported on 9/27/2022)       No current facility-administered medications for this visit. Allergies   Allergen Reactions    Clindamycin/Lincomycin Shortness Of Breath     Extreme SOB     Other Hives and Shortness Of Breath     \"allergic to Potatoe white. \"    Doxycycline Hyclate Other (See Comments)     tinnitus    Singulair [Montelukast Sodium]     Adaptic Non-Adhering Dressing [Wound Dressings]      Boland        ROS:   Constitutional: Negative for fever, activity change and appetite change. HENT: Negative for epistaxis.    Eyes: Negative for diploplia, blurred vision. Respiratory: Negative for cough, chest tightness, shortness of breath and wheezing. Cardiovascular: pertinent positives in HPI  Gastrointestinal: Negative for abdominal pain and blood in stool. Genitourinary: Negative for hematuria and difficulty urinating. Musculoskeletal: Negative for myalgias and gait problem. Skin: Negative for color change and rash. Neurological: Negative for syncope and light-headedness. Psychiatric/Behavioral: Negative for confusion and agitation. The patient is not nervous/anxious. Heme: no bleeding disorders, no melena or hematochezia  All other review of systems are negative     PHYSICAL EXAM:  Constitutional /86   Pulse 63   Ht 5' 7\" (1.702 m)   Wt (!) 387 lb (175.5 kg)   BMI 60.61 kg/m²   Well-developed, no acute distress, well groomed, super morbid obesity  Eyes: conjunctivae normal, no xanthelasma   Ears, Nose, Throat: oral mucosa moist, no cyanosis   Neck: supple, no JVD, no bruits, no thyromegaly   CV: normal rate, regular rhythm,  no murmurs, rubs, or gallops.  PMI is nondisplaced, Peripheral pulses normal including carotid auscultation, no noted aortic bruit, bilateral femoral and pedal pulses are normal in quality  Lungs: clear to auscultation bilaterally, normal respiratory effort without used of accessory muscles, no wheezes  Abdomen: soft, non-tender, bowel sounds present, no masses or hepatomegaly   Extremities: no digital clubbing, no edema   Skin: warm, no rashes   Neuro/Psych: A&O x 3, normal mood and affect  PPM site: c/d/I, no erythema, edema, or drainage    Cardiac testing done today:   EC22: SR- at 63 bpm  Device Interrogation/Reprogramming 2022   Make/Model: ROCIO Looney XT  DOI: 20  Battery: 9.1 yeas  Jorge L therapy: DDDR  ppm, AV delays 180 msec sense and 200 msec paced  Pacing %: RA = 49.4%, RV = 100%  Lead function:  RA lead: sensing = 2.3 mV, impedance = 418 ohms, threshold = 0.75 V @ 0.4 msec  RV lead: results to the patient/family/caregiver. Counseling/educating the patient/family/caregiver. Documenting clinical information in the patients electronic record. Coordination of care for the patient. Performing a medical appropriate exam and/or evaluation. Thank you for allowing me to participate in your patient's care. Please call me if there are any questions. Chelsea Hubbard D.O.   Cardiac Electrophysiology  [facility]  [facility]) Physicians    CC: Allie Dotson MD

## 2022-09-27 ENCOUNTER — OFFICE VISIT (OUTPATIENT)
Dept: NON INVASIVE DIAGNOSTICS | Age: 72
End: 2022-09-27
Payer: MEDICARE

## 2022-09-27 VITALS
HEART RATE: 63 BPM | SYSTOLIC BLOOD PRESSURE: 132 MMHG | HEIGHT: 67 IN | BODY MASS INDEX: 49.44 KG/M2 | WEIGHT: 315 LBS | DIASTOLIC BLOOD PRESSURE: 86 MMHG

## 2022-09-27 DIAGNOSIS — I44.2 COMPLETE HEART BLOCK (HCC): ICD-10-CM

## 2022-09-27 DIAGNOSIS — Z95.0 CARDIAC PACEMAKER IN SITU: Primary | ICD-10-CM

## 2022-09-27 PROCEDURE — 99214 OFFICE O/P EST MOD 30 MIN: CPT | Performed by: STUDENT IN AN ORGANIZED HEALTH CARE EDUCATION/TRAINING PROGRAM

## 2022-09-27 PROCEDURE — 1123F ACP DISCUSS/DSCN MKR DOCD: CPT | Performed by: STUDENT IN AN ORGANIZED HEALTH CARE EDUCATION/TRAINING PROGRAM

## 2022-09-27 RX ORDER — LANCETS
EACH MISCELLANEOUS
COMMUNITY
Start: 2022-09-08

## 2022-09-27 NOTE — PATIENT INSTRUCTIONS
No medication changes at this time. You will be contacted to schedule echocardiogram.  Continue remote monitoring of pacemaker every 91 days. Follow-up with this office in 1 year.

## 2022-10-10 ENCOUNTER — PROCEDURE VISIT (OUTPATIENT)
Dept: PODIATRY | Age: 72
End: 2022-10-10
Payer: MEDICARE

## 2022-10-10 VITALS — WEIGHT: 315 LBS | HEIGHT: 67 IN | BODY MASS INDEX: 49.44 KG/M2

## 2022-10-10 DIAGNOSIS — R26.2 DIFFICULTY WALKING: ICD-10-CM

## 2022-10-10 DIAGNOSIS — B35.1 ONYCHOMYCOSIS: Primary | ICD-10-CM

## 2022-10-10 DIAGNOSIS — E11.51 TYPE II DIABETES MELLITUS WITH PERIPHERAL CIRCULATORY DISORDER (HCC): ICD-10-CM

## 2022-10-10 DIAGNOSIS — M79.675 PAIN OF TOE OF LEFT FOOT: ICD-10-CM

## 2022-10-10 DIAGNOSIS — M79.674 PAIN OF TOE OF RIGHT FOOT: ICD-10-CM

## 2022-10-10 DIAGNOSIS — I89.0 LYMPHEDEMA: ICD-10-CM

## 2022-10-10 PROCEDURE — 11721 DEBRIDE NAIL 6 OR MORE: CPT | Performed by: PODIATRIST

## 2022-10-10 NOTE — PROGRESS NOTES
Patient in today for nail care. Patient does not have any complaints of pain at this time.  Patient's PCP is Chris Calix MD date of last ov 8/10/22        Nick Omalley LPN

## 2022-10-10 NOTE — PROGRESS NOTES
10/10/22     [facility]: 1950  Sex: male  Age: 74 y.o. Subjective: The patient is seen today for evaluation regarding diabetic foot evaluation and mycotic nail care. No other complaints noted. Chief Complaint   Patient presents with    Nail Problem     Nail care       Current Medications:    Current Outpatient Medications:     ONE TOUCH ULTRASOFT LANCETS MISC, USE TO TEST ONCE DAILY, Disp: , Rfl:     acetaminophen (TYLENOL) 500 MG tablet, Take 500 mg by mouth every 6 hours as needed for Pain, Disp: , Rfl:     tobramycin (TOBREX) 0.3 % ophthalmic solution, INSTILL 1 DROP INTO THE LEFT EYE 3 TIMES DAILY STARTING 3 DAYS PRE-OP, Disp: , Rfl:     latanoprost (XALATAN) 0.005 % ophthalmic solution, , Disp: , Rfl:     ramipril (ALTACE) 5 MG capsule, Take 5 mg by mouth at bedtime Takes nighty, Disp: , Rfl:     liothyronine (CYTOMEL) 50 MCG tablet, Take 75 mcg by mouth in the morning. Takes 25mcg., Disp: , Rfl:     levothyroxine (SYNTHROID) 25 MCG tablet, Take 25 mcg by mouth in the morning.  Takes 100mcg., Disp: , Rfl:     SUPER B COMPLEX/C PO, Take by mouth daily, Disp: , Rfl:     potassium chloride (KLOR-CON M) 20 MEQ extended release tablet, Take 1 tablet by mouth daily, Disp: 90 tablet, Rfl: 3    ONETOUCH VERIO strip, , Disp: , Rfl:     tamsulosin (FLOMAX) 0.4 MG capsule, Take 0.4 mg by mouth Takes nighlty, Disp: , Rfl:     diclofenac (VOLTAREN) 0.1 % ophthalmic solution, 1 drop 2 times daily, Disp: , Rfl:     furosemide (LASIX) 40 MG tablet, Take 40 mg by mouth daily , Disp: , Rfl:     albuterol (ACCUNEB) 0.63 MG/3ML nebulizer solution, Take 3 mLs by nebulization every 6 hours as needed for Wheezing, Disp: 270 mL, Rfl: 3    rosuvastatin (CRESTOR) 5 MG tablet, Take 5 mg by mouth daily , Disp: , Rfl:     glipiZIDE (GLUCOTROL XL) 5 MG extended release tablet, Take 5 mg by mouth daily, Disp: , Rfl: 3    Cholecalciferol (VITAMIN D3) 2000 UNITS CAPS, Take by mouth daily , Disp: , Rfl:     carvedilol (COREG) 25 MG tablet, Take 25 mg by mouth 2 times daily (with meals) , Disp: , Rfl:     Omega-3 Fatty Acids (FISH OIL) 600 MG CAPS, Take 1,000 mg by mouth daily , Disp: , Rfl:     aspirin 81 MG EC tablet, Take 81 mg by mouth daily , Disp: , Rfl:     Allergies: Allergies   Allergen Reactions    Clindamycin/Lincomycin Shortness Of Breath     Extreme SOB     Other Hives and Shortness Of Breath     \"allergic to Potatoe white. \"    Doxycycline Hyclate Other (See Comments)     tinnitus    Singulair [Montelukast Sodium]     Adaptic Non-Adhering Dressing [Wound Dressings]      Boland        Past Surgical History:   Procedure Laterality Date    CARDIAC PACEMAKER PLACEMENT  04/09/2010    Medtronic    CHOLECYSTECTOMY      COLONOSCOPY  01/20/2016    INTRACAPSULAR CATARACT EXTRACTION Right 04/27/2022    RIGHT EYE CATARACT EXTRACTION IOL IMPLANT AND GONIOTOMY performed by Ankit Hamilton MD at [address] [facility] Left 7/27/2022    LEFT EYE CATARACT EXTRACTION IOL IMPLANT performed by Ankit Hamilton MD at [address] [facility] Bilateral     knees    PACEMAKER PLACEMENT  07/23/2020    DUAL PPM GR    ([employer])     DR. MARTIN     TRANSESOPHAGEAL ECHOCARDIOGRAM  06/19/2020    With      Past Medical History:   Diagnosis Date    CAD (coronary artery disease)     Cataract, right eye     Complete heart block ([address]     h/o    Diabetes mellitus ([address]     Encounter for aspirin therapy     patient uses for heart health    History of blood transfusion     Hx of blood clots     Hyperlipidemia     Hypertension     Hyperthyroidism     Myocardial infarct ([address] 2010    Neuropathy     feet    Obesity     Obstructive sleep apnea     on cpap    Onychomycosis     DEMETRIA on CPAP     Osteoarthritis     Pacemaker     Medtronic    Stasis dermatitis     h/o on bilateral legs with leg edema    Varicose veins        Vitals:    10/10/22 0940   Weight: (!) 380 lb (172.4 kg)   Height: 5' 7\" (1.702 m)       Exam:  Pedal pulses diminished to palpation bilateral foot. At this time the nail/s 1, 2, 5 right foot and nail/s 1, 2, 5 left foot are noted to be thickened, dystrophic and discolored with subungual debris present. Tenderness noted to palpation. Minimal hair growth is noted to both lower extremities. Edema noted with both varicosities and stasis skin changes present bilaterally. Coolness is noted to the digital regions to palpation. Capillary fill time delayed digital areas bilateral foot. No heel fissuring or macerations of the web spaces. No plantar calluses and/or ulcerative areas are noted. Patient is having difficulty with gait/walking. Plan Per Assessment  Marcus Cr was seen today for nail problem. Diagnoses and all orders for this visit:    Onychomycosis    Pain of toe of right foot    Pain of toe of left foot    Type II diabetes mellitus with peripheral circulatory disorder (HCC)    Lymphedema    Difficulty walking        1. Evaluation and Management  2. Manual and electrical debridement of the mycotic nails was performed for thickness and length to prevent injection and/or ulceration. 3. Discussed additional diabetic lower extremity care techniques with patient today. 4. It was discussed in detail with the patient proper caring for the vascular compromised foot. The fact that they have compromised blood flow put the patient at risk for infection/gangrene/amputation. The patient should not walk barefoot. Shoe gear should fit properly and socks should be worn with shoes. If any skin lesions are noted, they are instructed to contact the office immediately. 5. We will see the patient back at a later date for continued podiatric management and care. Patient was advised to call the office with any questions or concerns prior to their next appointment if needed.         Seen By:    Camelia Christian DPM    Electronically signed by Camelia Christian DPM on 10/10/2022 at 10:08 AM      This note was created using voice recognition software. The note was reviewed however may contain grammatical errors.

## 2022-10-14 ENCOUNTER — TELEPHONE (OUTPATIENT)
Dept: ADMINISTRATIVE | Age: 72
End: 2022-10-14

## 2022-10-14 NOTE — TELEPHONE ENCOUNTER
Called and spoke with patient's wife. Prescription was sent in to [facility] pharmacy in [facility]. Patient notified.

## 2022-10-14 NOTE — TELEPHONE ENCOUNTER
Pts wife said that he went to [facility] today and they treated him for his wound on his leg and they said that the wound is still infected. It has green drainage and an foul smelling odor.

## 2022-10-18 ENCOUNTER — HOSPITAL ENCOUNTER (OUTPATIENT)
Dept: CARDIOLOGY | Age: 72
Discharge: HOME | End: 2022-10-18
Payer: MEDICARE

## 2022-10-18 DIAGNOSIS — I44.2 COMPLETE HEART BLOCK (HCC): ICD-10-CM

## 2022-10-18 DIAGNOSIS — Z95.0 CARDIAC PACEMAKER IN SITU: ICD-10-CM

## 2022-10-18 PROCEDURE — 2580000003 HC RX 258: Performed by: STUDENT IN AN ORGANIZED HEALTH CARE EDUCATION/TRAINING PROGRAM

## 2022-10-18 PROCEDURE — 93306 TTE W/DOPPLER COMPLETE: CPT

## 2022-10-18 PROCEDURE — 6360000004 HC RX CONTRAST MEDICATION: Performed by: STUDENT IN AN ORGANIZED HEALTH CARE EDUCATION/TRAINING PROGRAM

## 2022-10-18 RX ORDER — SODIUM CHLORIDE 0.9 % (FLUSH) 0.9 %
5-40 SYRINGE (ML) INJECTION PRN
Status: DISCONTINUED | OUTPATIENT
Start: 2022-10-18 | End: 2022-10-19 | Stop reason: HOSPADM

## 2022-10-18 RX ADMIN — SODIUM CHLORIDE, PRESERVATIVE FREE 10 ML: 5 INJECTION INTRAVENOUS at 12:05

## 2022-10-18 RX ADMIN — PERFLUTREN 1.1 MG: 6.52 INJECTION, SUSPENSION INTRAVENOUS at 12:05

## 2022-10-20 ENCOUNTER — TELEPHONE (OUTPATIENT)
Dept: NON INVASIVE DIAGNOSTICS | Age: 72
End: 2022-10-20

## 2022-10-20 NOTE — TELEPHONE ENCOUNTER
----- Message from Judy Quintero DO sent at 10/20/2022 11:03 AM EDT -----  Regarding: echo  Echo reports LVEF is > 50%. No need for CRT upgrade at this time.     Continue to monitor with limited TTE every 1-2 years.    -Judy Quintero DO

## 2022-10-24 ENCOUNTER — TELEPHONE (OUTPATIENT)
Dept: PODIATRY | Age: 72
End: 2022-10-24

## 2022-10-24 DIAGNOSIS — I83.022 VARICOSE VEINS OF LEFT LOWER EXTREMITY WITH ULCER OF CALF WITH FAT LAYER EXPOSED (HCC): Primary | ICD-10-CM

## 2022-10-24 DIAGNOSIS — L97.222 VARICOSE VEINS OF LEFT LOWER EXTREMITY WITH ULCER OF CALF WITH FAT LAYER EXPOSED (HCC): Primary | ICD-10-CM

## 2022-10-24 RX ORDER — AMOXICILLIN AND CLAVULANATE POTASSIUM 875; 125 MG/1; MG/1
1 TABLET, FILM COATED ORAL 2 TIMES DAILY
Qty: 28 TABLET | Refills: 0 | Status: SHIPPED | OUTPATIENT
Start: 2022-10-24 | End: 2022-11-07

## 2022-10-24 NOTE — TELEPHONE ENCOUNTER
Patient wife called and would like and AB sent. Patient was at lymph therapy and drainage was green with an odor. Please advise.

## 2022-11-01 ENCOUNTER — TELEPHONE (OUTPATIENT)
Dept: ADMINISTRATIVE | Age: 72
End: 2022-11-01

## 2022-11-01 NOTE — TELEPHONE ENCOUNTER
Pts wife calling to schedule pts 6 mth OV with Dr. Gena Holcomb - he was due in Nov.  He does have a Device. Please call to schedule him when the Jan schedule is available. Thank you.

## 2022-11-16 ENCOUNTER — OFFICE VISIT (OUTPATIENT)
Dept: CARDIOLOGY CLINIC | Age: 72
End: 2022-11-16
Payer: MEDICARE

## 2022-11-16 VITALS
HEIGHT: 67 IN | BODY MASS INDEX: 49.44 KG/M2 | DIASTOLIC BLOOD PRESSURE: 76 MMHG | WEIGHT: 315 LBS | SYSTOLIC BLOOD PRESSURE: 118 MMHG | HEART RATE: 81 BPM

## 2022-11-16 DIAGNOSIS — I25.10 CORONARY ARTERY DISEASE INVOLVING NATIVE CORONARY ARTERY OF NATIVE HEART WITHOUT ANGINA PECTORIS: Primary | ICD-10-CM

## 2022-11-16 DIAGNOSIS — I50.32 CHRONIC DIASTOLIC (CONGESTIVE) HEART FAILURE (HCC): ICD-10-CM

## 2022-11-16 DIAGNOSIS — E78.2 MIXED HYPERLIPIDEMIA: ICD-10-CM

## 2022-11-16 DIAGNOSIS — G47.33 OSA (OBSTRUCTIVE SLEEP APNEA): ICD-10-CM

## 2022-11-16 DIAGNOSIS — I10 PRIMARY HYPERTENSION: ICD-10-CM

## 2022-11-16 PROCEDURE — 93000 ELECTROCARDIOGRAM COMPLETE: CPT | Performed by: INTERNAL MEDICINE

## 2022-11-16 PROCEDURE — 99214 OFFICE O/P EST MOD 30 MIN: CPT | Performed by: INTERNAL MEDICINE

## 2022-11-16 PROCEDURE — 1123F ACP DISCUSS/DSCN MKR DOCD: CPT | Performed by: INTERNAL MEDICINE

## 2022-11-16 PROCEDURE — 3078F DIAST BP <80 MM HG: CPT | Performed by: INTERNAL MEDICINE

## 2022-11-16 PROCEDURE — 3074F SYST BP LT 130 MM HG: CPT | Performed by: INTERNAL MEDICINE

## 2022-11-16 NOTE — PROGRESS NOTES
OUTPATIENT CARDIOLOGY FOLLOW-UP    Name: Araceli Esposito    Age: 74 y.o. Primary Care Physician: Eldon Reis MD    Date of Service: 11/16/2022    Chief Complaint: CAD, chronic HFpEF    Interim History:  He was previously followed by Dr. Krista Childs -- he established care with me in 11/2021. No new cardiac complaints since last cardiology evaluation. +chronic HERNANDEZ with moderate levels of exertion. No recent chest pain, palpitations, orthopnea, or syncope.  on EKG.     Review of Systems:   Cardiac: As per HPI  General: No fever, chills  Pulmonary: As per HPI  HEENT: No visual disturbances, difficult swallowing  GI: No nausea, vomiting  : No dysuria, hematuria  Endocrine: +hypothyroidism, +DM  Musculoskeletal: CHANDLER x 4, no focal motor deficits  Skin: +LLE wound, no rashes  Neuro: No headache, seizures  Psych: Currently with no depression, anxiety    Past Medical History:  Past Medical History:   Diagnosis Date    CAD (coronary artery disease)     Cataract, right eye     Complete heart block (HCC)     h/o    Diabetes mellitus ([address]     Encounter for aspirin therapy     patient uses for heart health    History of blood transfusion     Hx of blood clots     Hyperlipidemia     Hypertension     Hyperthyroidism     Myocardial infarct ([address] 2010    Neuropathy     feet    Obesity     Obstructive sleep apnea     on cpap    Onychomycosis     DEMETRIA on CPAP     Osteoarthritis     Pacemaker     Medtronic    Stasis dermatitis     h/o on bilateral legs with leg edema    Varicose veins        Past Surgical History:  Past Surgical History:   Procedure Laterality Date    CARDIAC PACEMAKER PLACEMENT  04/09/2010    Medtronic    CHOLECYSTECTOMY      COLONOSCOPY  01/20/2016    INTRACAPSULAR CATARACT EXTRACTION Right 04/27/2022    RIGHT EYE CATARACT EXTRACTION IOL IMPLANT AND GONIOTOMY performed by Susan Lawson MD at [place] [address] Left 7/27/2022    LEFT EYE CATARACT EXTRACTION IOL IMPLANT performed by Jose Eduardo Thorne MD at [address] Bilateral     knees    PACEMAKER PLACEMENT  2020    DUAL PPM GR    ([employer]TR[employer]IC)     DR. MARTIN     TRANSESOPHAGEAL ECHOCARDIOGRAM  2020    With        Family History:  Family History   Problem Relation Age of Onset    Heart Disease Father             Other Mother             Other Brother         gall bladder       Social History:  Social History     Socioeconomic History    Marital status:      Spouse name: Not on file    Number of children: Not on file    Years of education: Not on file    Highest education level: Not on file   Occupational History    Not on file   Tobacco Use    Smoking status: Former     Packs/day: 2.00     Years: 25.00     Pack years: 50.00     Types: Cigarettes     Start date: 1964     Quit date: 1990     Years since quittin.3    Smokeless tobacco: Never   Vaping Use    Vaping Use: Never used   Substance and Sexual Activity    Alcohol use: Yes     Comment: rare   2 beers/month    Drug use: Never    Sexual activity: Not on file   Other Topics Concern    Not on file   Social History Narrative    Not on file     Social Determinants of Health     Financial Resource Strain: Not on file   Food Insecurity: Not on file   Transportation Needs: Not on file   Physical Activity: Not on file   Stress: Not on file   Social Connections: Not on file   Intimate Partner Violence: Not on file   Housing Stability: Not on file       Allergies: Allergies   Allergen Reactions    Clindamycin/Lincomycin Shortness Of Breath     Extreme SOB     Other Hives and Shortness Of Breath     \"allergic to Potatoe white. \"    Doxycycline Hyclate Other (See Comments)     tinnitus    Singulair [Montelukast Sodium]     Adaptic Non-Adhering Dressing [Wound Dressings]      Boland        Current Medications:  Current Outpatient Medications   Medication Sig Dispense Refill    ONE TOUCH ULTRASOFT LANCETS MISC USE TO TEST ONCE DAILY      acetaminophen (TYLENOL) 500 MG tablet Take 500 mg by mouth every 6 hours as needed for Pain      ramipril (ALTACE) 5 MG capsule Take 5 mg by mouth at bedtime Takes nighty      liothyronine (CYTOMEL) 50 MCG tablet Take 75 mcg by mouth in the morning. Takes 25mcg. levothyroxine (SYNTHROID) 25 MCG tablet Take 25 mcg by mouth in the morning. Takes 100mcg. SUPER B COMPLEX/C PO Take by mouth daily      potassium chloride (KLOR-CON M) 20 MEQ extended release tablet Take 1 tablet by mouth daily 90 tablet 3    ONETOUCH VERIO strip       tamsulosin (FLOMAX) 0.4 MG capsule Take 0.4 mg by mouth Takes nighlty      diclofenac (VOLTAREN) 0.1 % ophthalmic solution 1 drop 2 times daily      furosemide (LASIX) 40 MG tablet Take 40 mg by mouth daily       albuterol (ACCUNEB) 0.63 MG/3ML nebulizer solution Take 3 mLs by nebulization every 6 hours as needed for Wheezing 270 mL 3    rosuvastatin (CRESTOR) 5 MG tablet Take 5 mg by mouth daily       glipiZIDE (GLUCOTROL XL) 5 MG extended release tablet Take 5 mg by mouth daily  3    Cholecalciferol (VITAMIN D3) 2000 UNITS CAPS Take by mouth daily       carvedilol (COREG) 25 MG tablet Take 25 mg by mouth 2 times daily (with meals)       Omega-3 Fatty Acids (FISH OIL) 600 MG CAPS Take 1,000 mg by mouth daily       aspirin 81 MG EC tablet Take 81 mg by mouth daily       tobramycin (TOBREX) 0.3 % ophthalmic solution INSTILL 1 DROP INTO THE LEFT EYE 3 TIMES DAILY STARTING 3 DAYS PRE-OP (Patient not taking: Reported on 11/16/2022)      latanoprost (XALATAN) 0.005 % ophthalmic solution  (Patient not taking: Reported on 11/16/2022)       No current facility-administered medications for this visit.        Physical Exam:  /76   Pulse 81   Ht 5' 7\" (1.702 m)   Wt (!) 374 lb 3.2 oz (169.7 kg)   BMI 58.61 kg/m²   Wt Readings from Last 3 Encounters:   11/16/22 (!) 374 lb 3.2 oz (169.7 kg)   10/10/22 (!) 380 lb (172.4 kg)   09/27/22 (!) 387 lb (175.5 kg) Appearance: Awake, alert and oriented x 3, no acute respiratory distress  Skin: +LLE wound, no rash  Head: Normocephalic, atraumatic  Eyes: EOMI, no conjunctival erythema  ENMT: No pharyngeal erythema, MMM, no rhinorrhea  Neck: Supple, no carotid bruits  Lungs: Decreased BS B/L, no wheezing  Cardiac: Regular rate and rhythm, +S1S2, no murmurs apparent  Abdomen: Soft, nontender, +bowel sounds  Extremities: Moves all extremities x 4, +lower extremity edema  Neurologic: No focal motor deficits apparent, normal mood and affect, alert and oriented x 3    Laboratory Tests:  Lab Results   Component Value Date    CREATININE 0.9 07/23/2020    BUN 26 (H) 07/23/2020     07/23/2020    K 3.9 07/23/2020     07/23/2020    CO2 24 07/23/2020     Lab Results   Component Value Date/Time    MG 1.9 07/23/2020 07:30 AM     Lab Results   Component Value Date    WBC 6.9 07/23/2020    HGB 12.1 (L) 07/23/2020    HCT 39.0 07/23/2020    MCV 92.0 07/23/2020     07/23/2020     Lab Results   Component Value Date    ALT 32 06/13/2020    AST 49 (H) 06/13/2020    ALKPHOS 74 06/13/2020    BILITOT 0.9 06/13/2020     Lab Results   Component Value Date    TROPONINI <0.01 06/13/2020    TROPONINI <0.01 08/22/2016    TROPONINI <0.01 08/22/2016     No results for input(s): CKTOTAL, CKMB, CKMBINDEX, TROPHS in the last 72 hours.   Lab Results   Component Value Date    INR 1.3 04/27/2022    INR 1.4 06/13/2020    INR 1.4 08/25/2016    PROTIME 13.7 (H) 04/27/2022    PROTIME 16.2 (H) 06/13/2020    PROTIME 15.9 (H) 08/25/2016     Lab Results   Component Value Date    TSH 0.022 (L) 07/07/2012     Lab Results   Component Value Date    LABA1C 5.7 07/05/2012     No results found for: EAG  Lab Results   Component Value Date    CHOL 103 08/23/2016     Lab Results   Component Value Date    TRIG 87 08/23/2016     Lab Results   Component Value Date    HDL 26 08/23/2016     Lab Results   Component Value Date    LDLCALC 60 08/23/2016     Lab Results Component Value Date    LABVLDL 17 08/23/2016     No results found for: CHOLHDLRATIO  No results for input(s): PROBNP in the last 72 hours. Cardiac Tests:  EKG personally reviewed: , rate 81, QTc 482 msec    GABBY: 6/19/2020 (Dr. Daniel Escalante)   No evidence of endocarditis. Overall ejection fraction is normal .   Normal right ventricle structure and function. Right ventricle pacemaker   lead noted. Physiologic and/or trace mitral regurgitation is present. Mild tricuspid regurgitation. Echocardiogram: 10/18/22 (Dr. Jack Kamara)   Very technically difficult study / poor visualization / Definity contrast used. Ejection fraction is visually estimated at > 50%. Normal right ventricular size and function (TAPSE 2.1 cm). Indeterminate diastolic function. Physiologic and/or trace tricuspid regurgitation. RV-RA gradient is estimated at 28 mmHg.     ASSESSMENT / PLAN:  CAD -- total occlusion of RCA with insignificant disease elsewhere on 2013 coronary CTA  Chronic HFpEF  Complete heart block/pacemaker dependent s/p dual chamber pacemaker implantation 4/2010; s/p generator change in 7/2020  BMI 58  HTN -- typically controlled, BP today 118/76; BP at prior office visits 128/78, 132/78  HLD -- on statin  DM  DEMETRIA -- compliant with CPAP  Hypothyroidism -- on replacement therapy  Prior tobacco abuse  LLE wound -- follows with wound care/podiatry    - Continue current medications (including ASA, statin, coreg, ACE-I, and lasix)  - Monitor BMP  - Aggressive risk factor modifications / treatment of DEMETRIA  - Most recent device interrogation personally reviewed today / follow-up with EP as scheduled  - Records from Dr. Kelle Elliott previously reviewed (pertinent findings outlined above)  - Results of 10/18/22 echocardiogram reviewed with the patient today    Greater than 35 minutes was spent counseling the patient, reviewing the rationale for the above recommendations and reviewing the patient's current medication list, problem list and results of all previously ordered testing.     Ramandeep Haque MD  [facility]) Cardiology

## 2022-12-12 ENCOUNTER — OFFICE VISIT (OUTPATIENT)
Dept: PODIATRY | Age: 72
End: 2022-12-12

## 2022-12-12 VITALS — WEIGHT: 315 LBS | BODY MASS INDEX: 49.44 KG/M2 | HEIGHT: 67 IN

## 2022-12-12 DIAGNOSIS — I89.0 LYMPHEDEMA: ICD-10-CM

## 2022-12-12 DIAGNOSIS — B35.1 ONYCHOMYCOSIS: Primary | ICD-10-CM

## 2022-12-12 DIAGNOSIS — M79.675 PAIN OF TOE OF LEFT FOOT: ICD-10-CM

## 2022-12-12 DIAGNOSIS — M79.674 PAIN OF TOE OF RIGHT FOOT: ICD-10-CM

## 2022-12-12 DIAGNOSIS — R26.2 DIFFICULTY WALKING: ICD-10-CM

## 2022-12-12 DIAGNOSIS — E11.51 TYPE II DIABETES MELLITUS WITH PERIPHERAL CIRCULATORY DISORDER (HCC): ICD-10-CM

## 2022-12-12 RX ORDER — ROSUVASTATIN CALCIUM 5 MG/1
5 TABLET, COATED ORAL DAILY
Qty: 90 TABLET | Refills: 3 | Status: SHIPPED | OUTPATIENT
Start: 2022-12-12

## 2022-12-12 NOTE — PROGRESS NOTES
Patient in today for nail care. Patient does not have any complaints of pain at this time.  Patient's PCP is Gloria Jenkins MD date of last ov 8/10/22        Sapphire Henley LPN

## 2022-12-12 NOTE — PROGRESS NOTES
22     Dory Dodd    : 1950  Sex: male  Age: 74 y.o. Subjective: The patient is seen today for evaluation regarding diabetic foot evaluation and mycotic nail care. No other complaints noted. Chief Complaint   Patient presents with    Nail Problem     Nail care       Current Medications:    Current Outpatient Medications:     rosuvastatin (CRESTOR) 5 MG tablet, Take 1 tablet by mouth daily, Disp: 90 tablet, Rfl: 3    ONE TOUCH ULTRASOFT LANCETS MISC, USE TO TEST ONCE DAILY, Disp: , Rfl:     acetaminophen (TYLENOL) 500 MG tablet, Take 500 mg by mouth every 6 hours as needed for Pain, Disp: , Rfl:     tobramycin (TOBREX) 0.3 % ophthalmic solution, , Disp: , Rfl:     latanoprost (XALATAN) 0.005 % ophthalmic solution, , Disp: , Rfl:     ramipril (ALTACE) 5 MG capsule, Take 5 mg by mouth at bedtime Takes nighty, Disp: , Rfl:     liothyronine (CYTOMEL) 50 MCG tablet, Take 75 mcg by mouth in the morning. Takes 25mcg., Disp: , Rfl:     levothyroxine (SYNTHROID) 25 MCG tablet, Take 25 mcg by mouth in the morning.  Takes 100mcg., Disp: , Rfl:     SUPER B COMPLEX/C PO, Take by mouth daily, Disp: , Rfl:     potassium chloride (KLOR-CON M) 20 MEQ extended release tablet, Take 1 tablet by mouth daily, Disp: 90 tablet, Rfl: 3    ONETOUCH VERIO strip, , Disp: , Rfl:     tamsulosin (FLOMAX) 0.4 MG capsule, Take 0.4 mg by mouth Takes nighlty, Disp: , Rfl:     diclofenac (VOLTAREN) 0.1 % ophthalmic solution, 1 drop 2 times daily, Disp: , Rfl:     furosemide (LASIX) 40 MG tablet, Take 40 mg by mouth daily , Disp: , Rfl:     albuterol (ACCUNEB) 0.63 MG/3ML nebulizer solution, Take 3 mLs by nebulization every 6 hours as needed for Wheezing, Disp: 270 mL, Rfl: 3    glipiZIDE (GLUCOTROL XL) 5 MG extended release tablet, Take 5 mg by mouth daily, Disp: , Rfl: 3    Cholecalciferol (VITAMIN D3) 2000 UNITS CAPS, Take by mouth daily , Disp: , Rfl:     carvedilol (COREG) 25 MG tablet, Take 25 mg by mouth 2 times daily (with meals) , Disp: , Rfl:     Omega-3 Fatty Acids (FISH OIL) 600 MG CAPS, Take 1,000 mg by mouth daily , Disp: , Rfl:     aspirin 81 MG EC tablet, Take 81 mg by mouth daily , Disp: , Rfl:     Allergies: Allergies   Allergen Reactions    Clindamycin/Lincomycin Shortness Of Breath     Extreme SOB     Other Hives and Shortness Of Breath     \"allergic to Potatoe white. \"    Doxycycline Hyclate Other (See Comments)     tinnitus    Singulair [Montelukast Sodium]     Adaptic Non-Adhering Dressing [Wound Dressings]      Boland        Past Surgical History:   Procedure Laterality Date    CARDIAC PACEMAKER PLACEMENT  04/09/2010    Medtronic    CHOLECYSTECTOMY      COLONOSCOPY  01/20/2016    INTRACAPSULAR CATARACT EXTRACTION Right 04/27/2022    RIGHT EYE CATARACT EXTRACTION IOL IMPLANT AND GONIOTOMY performed by Jose Eduardo Thorne MD at [address] [place] [address] Left 7/27/2022    LEFT EYE CATARACT EXTRACTION IOL IMPLANT performed by Jose Eduardo Thorne MD at [place] [address] Bilateral     knees    PACEMAKER PLACEMENT  07/23/2020    DUAL PPM GR    ([employer])     DR. MARTIN     TRANSESOPHAGEAL ECHOCARDIOGRAM  06/19/2020    With      Past Medical History:   Diagnosis Date    CAD (coronary artery disease)     Cataract, right eye     Complete heart block ([address]     h/o    Diabetes mellitus ([address]     Encounter for aspirin therapy     patient uses for heart health    History of blood transfusion     Hx of blood clots     Hyperlipidemia     Hypertension     Hyperthyroidism     Myocardial infarct ([address] 2010    Neuropathy     feet    Obesity     Obstructive sleep apnea     on cpap    Onychomycosis     DEMETRIA on CPAP     Osteoarthritis     Pacemaker     Medtronic    Stasis dermatitis     h/o on bilateral legs with leg edema    Varicose veins        Vitals:    12/12/22 0955   Weight: (!) 375 lb (170.1 kg)   Height: 5' 7\" (1.702 m)       Exam:  Pedal pulses diminished to palpation bilateral foot.   At this time the nail/s 1, 2, 5 right foot and nail/s 1, 2, 5 left foot are noted to be thickened, dystrophic and discolored with subungual debris present. Tenderness noted to palpation. Minimal hair growth is noted to both lower extremities. Edema noted with both varicosities and stasis skin changes present bilaterally. Coolness is noted to the digital regions to palpation. Capillary fill time delayed digital areas bilateral foot. No heel fissuring or macerations of the web spaces. No plantar calluses and/or ulcerative areas are noted. Patient is having difficulty with gait/walking. Plan Per Assessment  Derik Holder was seen today for nail problem. Diagnoses and all orders for this visit:    Onychomycosis    Pain of toe of right foot    Pain of toe of left foot    Type II diabetes mellitus with peripheral circulatory disorder (HCC)    Lymphedema    Difficulty walking        1. Evaluation and Management  2. Manual and electrical debridement of the mycotic nails was performed for thickness and length to prevent injection and/or ulceration. 3. Discussed additional diabetic lower extremity care techniques with patient today. 4. It was discussed in detail with the patient proper caring for the vascular compromised foot. The fact that they have compromised blood flow put the patient at risk for infection/gangrene/amputation. The patient should not walk barefoot. Shoe gear should fit properly and socks should be worn with shoes. If any skin lesions are noted, they are instructed to contact the office immediately. 5. We will see the patient back at a later date for continued podiatric management and care. Patient was advised to call the office with any questions or concerns prior to their next appointment if needed. Seen By:    Quynh Arshad DPM    Electronically signed by Quynh Arshad DPM on 12/12/2022 at 10:23 AM      This note was created using voice recognition software. The note was reviewed however may contain grammatical errors.

## 2022-12-27 ENCOUNTER — TELEPHONE (OUTPATIENT)
Dept: PODIATRY | Age: 72
End: 2022-12-27

## 2022-12-27 ENCOUNTER — NURSE TRIAGE (OUTPATIENT)
Dept: OTHER | Facility: CLINIC | Age: 72
End: 2022-12-27

## 2022-12-27 ENCOUNTER — TELEPHONE (OUTPATIENT)
Dept: ADMINISTRATIVE | Age: 72
End: 2022-12-27

## 2022-12-27 DIAGNOSIS — L97.222 VARICOSE VEINS OF LEFT LOWER EXTREMITY WITH ULCER OF CALF WITH FAT LAYER EXPOSED (HCC): Primary | ICD-10-CM

## 2022-12-27 DIAGNOSIS — I83.022 VARICOSE VEINS OF LEFT LOWER EXTREMITY WITH ULCER OF CALF WITH FAT LAYER EXPOSED (HCC): Primary | ICD-10-CM

## 2022-12-27 RX ORDER — LEVOFLOXACIN 500 MG/1
500 TABLET, FILM COATED ORAL DAILY
Qty: 10 TABLET | Refills: 0 | Status: SHIPPED | OUTPATIENT
Start: 2022-12-27 | End: 2023-01-06

## 2022-12-27 NOTE — TELEPHONE ENCOUNTER
Received call from nurse triage, pt has cellulitis, needs appt urgently, called office, no one responded.   Please contact pt for recomendations

## 2022-12-27 NOTE — TELEPHONE ENCOUNTER
Patient and his wife called in stating that he is at lymphedema therapy and they believe he is having a flare up with cellulitis in his leg. Patient is requesting antibiotic to be sent to [facility] on [address]. Thank you.

## 2022-12-27 NOTE — TELEPHONE ENCOUNTER
Location of patient: [place]    Received call from [facility] at [facility] with [employer]. Subjective: Caller states \"Im at therapy right now and they are thinking they are pretty sure I have cellulitis\"     Current Symptoms: left leg cellulitis,. Pain,Redness, swelling and drainage. Ankle to mid calf     Onset: 2 days ago; gradual, worsening    Associated Symptoms:     Pain Severity: 10/10; burning; constant    Temperature: no     What has been tried: Wrapping    LMP: NA Pregnant: NA    Recommended disposition: Go to Office Now    Care advice provided, patient verbalizes understanding; denies any other questions or concerns; instructed to call back for any new or worsening symptoms. Patient/Caller agrees with recommended disposition; writer provided warm transfer to [facility] at [facility] for appointment scheduling    Attention Provider: Thank you for allowing me to participate in the care of your patient. The patient was connected to triage in response to information provided to the ECC/PSC. Please do not respond through this encounter as the response is not directed to a shared pool. Reason for Disposition   Severe pain in the wound    Protocols used:  Wound Infection-ADULT-[place]

## 2023-01-23 ENCOUNTER — TELEPHONE (OUTPATIENT)
Dept: PODIATRY | Age: 73
End: 2023-01-23

## 2023-01-23 DIAGNOSIS — I83.022 VARICOSE VEINS OF LEFT LOWER EXTREMITY WITH ULCER OF CALF WITH FAT LAYER EXPOSED (HCC): Primary | ICD-10-CM

## 2023-01-23 DIAGNOSIS — L97.222 VARICOSE VEINS OF LEFT LOWER EXTREMITY WITH ULCER OF CALF WITH FAT LAYER EXPOSED (HCC): Primary | ICD-10-CM

## 2023-01-23 RX ORDER — LEVOFLOXACIN 500 MG/1
500 TABLET, FILM COATED ORAL DAILY
Qty: 14 TABLET | Refills: 0 | Status: SHIPPED | OUTPATIENT
Start: 2023-01-23 | End: 2023-02-06

## 2023-01-23 NOTE — TELEPHONE ENCOUNTER
Patients wife called stating that [facility] stated that his wound area has increased drainage and is becoming raw. Patient is requesting an antibiotic to be sent to [facility] in [facility]. Thank you.

## 2023-01-30 ENCOUNTER — APPOINTMENT (OUTPATIENT)
Dept: GENERAL RADIOLOGY | Age: 73
End: 2023-01-30
Payer: MEDICARE

## 2023-01-30 ENCOUNTER — HOSPITAL ENCOUNTER (INPATIENT)
Age: 73
LOS: 4 days | Discharge: HOME | End: 2023-02-03
Attending: EMERGENCY MEDICINE | Admitting: INTERNAL MEDICINE
Payer: MEDICARE

## 2023-01-30 ENCOUNTER — TELEPHONE (OUTPATIENT)
Dept: PODIATRY | Age: 73
End: 2023-01-30

## 2023-01-30 DIAGNOSIS — L03.90 CELLULITIS, UNSPECIFIED CELLULITIS SITE: Primary | ICD-10-CM

## 2023-01-30 PROBLEM — T14.8XXA WOUND INFECTION: Status: ACTIVE | Noted: 2023-01-30

## 2023-01-30 PROBLEM — L08.9 WOUND INFECTION: Status: ACTIVE | Noted: 2023-01-30

## 2023-01-30 LAB
ALBUMIN SERPL-MCNC: 3.6 G/DL (ref 3.5–5.2)
ALP BLD-CCNC: 74 U/L (ref 40–129)
ALT SERPL-CCNC: 13 U/L (ref 0–40)
ANION GAP SERPL CALCULATED.3IONS-SCNC: 12 MMOL/L (ref 7–16)
AST SERPL-CCNC: 12 U/L (ref 0–39)
BASOPHILS ABSOLUTE: 0.05 E9/L (ref 0–0.2)
BASOPHILS RELATIVE PERCENT: 0.7 % (ref 0–2)
BILIRUB SERPL-MCNC: 0.6 MG/DL (ref 0–1.2)
BUN BLDV-MCNC: 22 MG/DL (ref 6–23)
CALCIUM SERPL-MCNC: 9.5 MG/DL (ref 8.6–10.2)
CHLORIDE BLD-SCNC: 101 MMOL/L (ref 98–107)
CO2: 25 MMOL/L (ref 22–29)
CREAT SERPL-MCNC: 1 MG/DL (ref 0.7–1.2)
EOSINOPHILS ABSOLUTE: 0.22 E9/L (ref 0.05–0.5)
EOSINOPHILS RELATIVE PERCENT: 3 % (ref 0–6)
GFR SERPL CREATININE-BSD FRML MDRD: >60 ML/MIN/1.73
GLUCOSE BLD-MCNC: 219 MG/DL (ref 74–99)
HBA1C MFR BLD: 8.2 % (ref 4–5.6)
HCT VFR BLD CALC: 41.9 % (ref 37–54)
HEMOGLOBIN: 13.8 G/DL (ref 12.5–16.5)
IMMATURE GRANULOCYTES #: 0.02 E9/L
IMMATURE GRANULOCYTES %: 0.3 % (ref 0–5)
LACTIC ACID: 1.2 MMOL/L (ref 0.5–2.2)
LYMPHOCYTES ABSOLUTE: 1.38 E9/L (ref 1.5–4)
LYMPHOCYTES RELATIVE PERCENT: 18.9 % (ref 20–42)
MCH RBC QN AUTO: 28.9 PG (ref 26–35)
MCHC RBC AUTO-ENTMCNC: 32.9 % (ref 32–34.5)
MCV RBC AUTO: 87.8 FL (ref 80–99.9)
METER GLUCOSE: 176 MG/DL (ref 74–99)
METER GLUCOSE: 244 MG/DL (ref 74–99)
MONOCYTES ABSOLUTE: 0.6 E9/L (ref 0.1–0.95)
MONOCYTES RELATIVE PERCENT: 8.2 % (ref 2–12)
NEUTROPHILS ABSOLUTE: 5.02 E9/L (ref 1.8–7.3)
NEUTROPHILS RELATIVE PERCENT: 68.9 % (ref 43–80)
PDW BLD-RTO: 13.9 FL (ref 11.5–15)
PLATELET # BLD: 163 E9/L (ref 130–450)
PMV BLD AUTO: 9.4 FL (ref 7–12)
POTASSIUM SERPL-SCNC: 4.2 MMOL/L (ref 3.5–5)
RBC # BLD: 4.77 E12/L (ref 3.8–5.8)
SODIUM BLD-SCNC: 138 MMOL/L (ref 132–146)
TOTAL PROTEIN: 7.6 G/DL (ref 6.4–8.3)
WBC # BLD: 7.3 E9/L (ref 4.5–11.5)

## 2023-01-30 PROCEDURE — 2580000003 HC RX 258: Performed by: EMERGENCY MEDICINE

## 2023-01-30 PROCEDURE — 87040 BLOOD CULTURE FOR BACTERIA: CPT

## 2023-01-30 PROCEDURE — 2580000003 HC RX 258: Performed by: NURSE PRACTITIONER

## 2023-01-30 PROCEDURE — 80053 COMPREHEN METABOLIC PANEL: CPT

## 2023-01-30 PROCEDURE — 73590 X-RAY EXAM OF LOWER LEG: CPT

## 2023-01-30 PROCEDURE — 87077 CULTURE AEROBIC IDENTIFY: CPT

## 2023-01-30 PROCEDURE — 87186 SC STD MICRODIL/AGAR DIL: CPT

## 2023-01-30 PROCEDURE — 99285 EMERGENCY DEPT VISIT HI MDM: CPT

## 2023-01-30 PROCEDURE — 6370000000 HC RX 637 (ALT 250 FOR IP): Performed by: NURSE PRACTITIONER

## 2023-01-30 PROCEDURE — 87070 CULTURE OTHR SPECIMN AEROBIC: CPT

## 2023-01-30 PROCEDURE — 6360000002 HC RX W HCPCS: Performed by: EMERGENCY MEDICINE

## 2023-01-30 PROCEDURE — 85025 COMPLETE CBC W/AUTO DIFF WBC: CPT

## 2023-01-30 PROCEDURE — 87147 CULTURE TYPE IMMUNOLOGIC: CPT

## 2023-01-30 PROCEDURE — 96365 THER/PROPH/DIAG IV INF INIT: CPT

## 2023-01-30 PROCEDURE — 36415 COLL VENOUS BLD VENIPUNCTURE: CPT

## 2023-01-30 PROCEDURE — 6360000002 HC RX W HCPCS: Performed by: NURSE PRACTITIONER

## 2023-01-30 PROCEDURE — 83036 HEMOGLOBIN GLYCOSYLATED A1C: CPT

## 2023-01-30 PROCEDURE — 82962 GLUCOSE BLOOD TEST: CPT

## 2023-01-30 PROCEDURE — 1200000000 HC SEMI PRIVATE

## 2023-01-30 PROCEDURE — 83605 ASSAY OF LACTIC ACID: CPT

## 2023-01-30 RX ORDER — POTASSIUM CHLORIDE 20 MEQ/1
20 TABLET, EXTENDED RELEASE ORAL DAILY
Status: DISCONTINUED | OUTPATIENT
Start: 2023-01-30 | End: 2023-02-03 | Stop reason: HOSPADM

## 2023-01-30 RX ORDER — DEXTROSE MONOHYDRATE 100 MG/ML
INJECTION, SOLUTION INTRAVENOUS CONTINUOUS PRN
Status: DISCONTINUED | OUTPATIENT
Start: 2023-01-30 | End: 2023-02-03 | Stop reason: HOSPADM

## 2023-01-30 RX ORDER — RAMIPRIL 5 MG/1
5 CAPSULE ORAL NIGHTLY
Status: DISCONTINUED | OUTPATIENT
Start: 2023-01-30 | End: 2023-02-03 | Stop reason: HOSPADM

## 2023-01-30 RX ORDER — ACETAMINOPHEN 325 MG/1
650 TABLET ORAL EVERY 6 HOURS PRN
Status: DISCONTINUED | OUTPATIENT
Start: 2023-01-30 | End: 2023-02-03 | Stop reason: HOSPADM

## 2023-01-30 RX ORDER — LEVOFLOXACIN 500 MG/1
500 TABLET, FILM COATED ORAL DAILY
Status: ON HOLD | COMMUNITY
Start: 2023-01-23 | End: 2023-02-03 | Stop reason: HOSPADM

## 2023-01-30 RX ORDER — ASPIRIN 81 MG/1
81 TABLET ORAL DAILY
Status: DISCONTINUED | OUTPATIENT
Start: 2023-01-30 | End: 2023-02-03 | Stop reason: HOSPADM

## 2023-01-30 RX ORDER — SODIUM CHLORIDE 0.9 % (FLUSH) 0.9 %
10 SYRINGE (ML) INJECTION EVERY 12 HOURS SCHEDULED
Status: DISCONTINUED | OUTPATIENT
Start: 2023-01-30 | End: 2023-02-03 | Stop reason: HOSPADM

## 2023-01-30 RX ORDER — ONDANSETRON 4 MG/1
4 TABLET, ORALLY DISINTEGRATING ORAL EVERY 8 HOURS PRN
Status: DISCONTINUED | OUTPATIENT
Start: 2023-01-30 | End: 2023-02-03 | Stop reason: HOSPADM

## 2023-01-30 RX ORDER — FUROSEMIDE 40 MG/1
40 TABLET ORAL DAILY
Status: DISCONTINUED | OUTPATIENT
Start: 2023-01-31 | End: 2023-02-03 | Stop reason: HOSPADM

## 2023-01-30 RX ORDER — ENOXAPARIN SODIUM 100 MG/ML
40 INJECTION SUBCUTANEOUS 2 TIMES DAILY
Status: DISCONTINUED | OUTPATIENT
Start: 2023-01-30 | End: 2023-02-03 | Stop reason: HOSPADM

## 2023-01-30 RX ORDER — SODIUM CHLORIDE 0.9 % (FLUSH) 0.9 %
10 SYRINGE (ML) INJECTION PRN
Status: DISCONTINUED | OUTPATIENT
Start: 2023-01-30 | End: 2023-02-03 | Stop reason: HOSPADM

## 2023-01-30 RX ORDER — LEVOTHYROXINE SODIUM 0.05 MG/1
100 TABLET ORAL DAILY
Status: DISCONTINUED | OUTPATIENT
Start: 2023-01-31 | End: 2023-02-03 | Stop reason: HOSPADM

## 2023-01-30 RX ORDER — LATANOPROST 50 UG/ML
1 SOLUTION/ DROPS OPHTHALMIC NIGHTLY
Status: DISCONTINUED | OUTPATIENT
Start: 2023-01-30 | End: 2023-02-03 | Stop reason: HOSPADM

## 2023-01-30 RX ORDER — LIOTHYRONINE SODIUM 25 UG/1
25 TABLET ORAL DAILY
Status: DISCONTINUED | OUTPATIENT
Start: 2023-01-31 | End: 2023-02-03 | Stop reason: HOSPADM

## 2023-01-30 RX ORDER — GLIPIZIDE 5 MG/1
5 TABLET, FILM COATED, EXTENDED RELEASE ORAL NIGHTLY
COMMUNITY

## 2023-01-30 RX ORDER — ROSUVASTATIN CALCIUM 5 MG/1
5 TABLET, COATED ORAL DAILY
Status: DISCONTINUED | OUTPATIENT
Start: 2023-01-30 | End: 2023-02-03 | Stop reason: HOSPADM

## 2023-01-30 RX ORDER — TAMSULOSIN HYDROCHLORIDE 0.4 MG/1
0.4 CAPSULE ORAL NIGHTLY
Status: DISCONTINUED | OUTPATIENT
Start: 2023-01-30 | End: 2023-02-03 | Stop reason: HOSPADM

## 2023-01-30 RX ORDER — ACETAMINOPHEN 650 MG/1
650 SUPPOSITORY RECTAL EVERY 6 HOURS PRN
Status: DISCONTINUED | OUTPATIENT
Start: 2023-01-30 | End: 2023-02-03 | Stop reason: HOSPADM

## 2023-01-30 RX ORDER — ONDANSETRON 2 MG/ML
4 INJECTION INTRAMUSCULAR; INTRAVENOUS EVERY 6 HOURS PRN
Status: DISCONTINUED | OUTPATIENT
Start: 2023-01-30 | End: 2023-02-03 | Stop reason: HOSPADM

## 2023-01-30 RX ORDER — INSULIN LISPRO 100 [IU]/ML
0-4 INJECTION, SOLUTION INTRAVENOUS; SUBCUTANEOUS NIGHTLY
Status: DISCONTINUED | OUTPATIENT
Start: 2023-01-30 | End: 2023-02-03 | Stop reason: HOSPADM

## 2023-01-30 RX ORDER — INSULIN LISPRO 100 [IU]/ML
0-8 INJECTION, SOLUTION INTRAVENOUS; SUBCUTANEOUS
Status: DISCONTINUED | OUTPATIENT
Start: 2023-01-30 | End: 2023-02-03 | Stop reason: HOSPADM

## 2023-01-30 RX ORDER — SODIUM CHLORIDE 9 MG/ML
INJECTION, SOLUTION INTRAVENOUS PRN
Status: DISCONTINUED | OUTPATIENT
Start: 2023-01-30 | End: 2023-02-03 | Stop reason: HOSPADM

## 2023-01-30 RX ORDER — CARVEDILOL 25 MG/1
25 TABLET ORAL 2 TIMES DAILY WITH MEALS
Status: DISCONTINUED | OUTPATIENT
Start: 2023-01-30 | End: 2023-02-03 | Stop reason: HOSPADM

## 2023-01-30 RX ORDER — ALBUTEROL SULFATE 0.63 MG/3ML
1 SOLUTION RESPIRATORY (INHALATION) EVERY 6 HOURS PRN
Status: DISCONTINUED | OUTPATIENT
Start: 2023-01-30 | End: 2023-02-03 | Stop reason: HOSPADM

## 2023-01-30 RX ADMIN — ENOXAPARIN SODIUM 40 MG: 100 INJECTION SUBCUTANEOUS at 20:47

## 2023-01-30 RX ADMIN — POTASSIUM CHLORIDE 20 MEQ: 1500 TABLET, EXTENDED RELEASE ORAL at 17:35

## 2023-01-30 RX ADMIN — LATANOPROST 1 DROP: 50 SOLUTION OPHTHALMIC at 20:48

## 2023-01-30 RX ADMIN — ACETAMINOPHEN 650 MG: 325 TABLET ORAL at 20:48

## 2023-01-30 RX ADMIN — SODIUM CHLORIDE, PRESERVATIVE FREE 10 ML: 5 INJECTION INTRAVENOUS at 20:49

## 2023-01-30 RX ADMIN — ROSUVASTATIN 5 MG: 10 TABLET, FILM COATED ORAL at 17:35

## 2023-01-30 RX ADMIN — SODIUM CHLORIDE: 9 INJECTION, SOLUTION INTRAVENOUS at 22:14

## 2023-01-30 RX ADMIN — CARVEDILOL 25 MG: 25 TABLET, FILM COATED ORAL at 17:35

## 2023-01-30 RX ADMIN — VANCOMYCIN HYDROCHLORIDE 2000 MG: 10 INJECTION, POWDER, LYOPHILIZED, FOR SOLUTION INTRAVENOUS at 22:16

## 2023-01-30 RX ADMIN — RAMIPRIL 5 MG: 5 CAPSULE ORAL at 20:47

## 2023-01-30 RX ADMIN — TAMSULOSIN HYDROCHLORIDE 0.4 MG: 0.4 CAPSULE ORAL at 20:47

## 2023-01-30 RX ADMIN — CEFEPIME 2000 MG: 2 INJECTION, POWDER, FOR SOLUTION INTRAVENOUS at 13:41

## 2023-01-30 ASSESSMENT — ENCOUNTER SYMPTOMS
ABDOMINAL PAIN: 0
VOMITING: 0
COUGH: 0
SORE THROAT: 0
EYE PAIN: 0
EYE DISCHARGE: 0
SHORTNESS OF BREATH: 0
NAUSEA: 0
EYE REDNESS: 0
WHEEZING: 0
DIARRHEA: 0
BACK PAIN: 0
SINUS PRESSURE: 0

## 2023-01-30 ASSESSMENT — PAIN DESCRIPTION - ORIENTATION
ORIENTATION: LEFT;RIGHT
ORIENTATION: LEFT;RIGHT
ORIENTATION: LEFT

## 2023-01-30 ASSESSMENT — PAIN SCALES - GENERAL
PAINLEVEL_OUTOF10: 9
PAINLEVEL_OUTOF10: 7
PAINLEVEL_OUTOF10: 9

## 2023-01-30 ASSESSMENT — PAIN DESCRIPTION - LOCATION
LOCATION: LEG

## 2023-01-30 ASSESSMENT — PAIN - FUNCTIONAL ASSESSMENT: PAIN_FUNCTIONAL_ASSESSMENT: ACTIVITIES ARE NOT PREVENTED

## 2023-01-30 ASSESSMENT — PAIN DESCRIPTION - ONSET: ONSET: ON-GOING

## 2023-01-30 ASSESSMENT — PAIN DESCRIPTION - FREQUENCY: FREQUENCY: CONTINUOUS

## 2023-01-30 ASSESSMENT — PAIN DESCRIPTION - PAIN TYPE: TYPE: CHRONIC PAIN

## 2023-01-30 ASSESSMENT — PAIN DESCRIPTION - DESCRIPTORS
DESCRIPTORS: ACHING
DESCRIPTORS: ACHING

## 2023-01-30 NOTE — ED NOTES
Nurse to nurse given to [facility]. Patient placed in transport.      Sita File, RN  01/30/23 [id number]

## 2023-01-30 NOTE — PROGRESS NOTES
Pt was in x-ray at time of room assignment given in the ED. Spoke with x-ray and made them aware that pt would be going to room 14B when scans are complete.

## 2023-01-30 NOTE — ED NOTES
Department of Emergency Medicine  FIRST PROVIDER TRIAGE NOTE             Independent MLP           1/30/23  11:28 AM EST    Date of Encounter: 1/30/23   MRN: [id number]      HPI: Francisco Beal is a 74 y.o. male who presents to the ED for Wound Check (Left leg - on oral abx for 1 week not healing. Sent in by PCP. )     Patient is a 26-year-old presenting with a left lower leg wound that is been there for a a while. Patient sees the lymphedema center,patient saw his family doctor and was placed on oral antibiotics and is feeling stating that is getting worse. Patient has a history of septicemia with this wound. Patient is a diabetic. Patient has been on Levofloxacin 500 mg 1 tablet daily by mouth for the last 7 days nad is suppose to take it for 14 days    ROS: Negative for cp, sob, abd pain, back pain, or fever. PE: Gen Appearance/Constitutional: alert  HEENT: NC/NT. PERRLA,  Airway patent. Neck: supple     Initial Plan of Care: All treatment areas with department are currently occupied. Plan to order/Initiate the following while awaiting opening in ED: labs and imaging studies.   Initiate Treatment-Testing, Proceed toTreatment Area When Bed Available for ED Attending/MLP to Continue Care    Electronically signed by Vladimir Vogel PA-C   DD: 1/30/23       Vladimir Vogel PA-C  01/30/23 [id number]

## 2023-01-30 NOTE — ED NOTES
Patient at [facility], Patient wife in room.      Brian Cortez RN  01/30/23 0942

## 2023-01-30 NOTE — ED NOTES
Blood cultures obtained from L hand, per policy. Set one of two drawn at this time.                Peter cassiusAlexis, [place]  01/22/80 7209

## 2023-01-30 NOTE — ED PROVIDER NOTES
HPI     49-year-old male present emerged part complaint of wound to his left leg which she has had for 3 years. Was told that due to being on antibiotics and not improving with the drainage coming to the ER. Does follow with Dr. Zachery Duran.  Otherwise denies any fevers or chills denies any chest pain denies abdominal pain no other acute complaints. Review of Systems   Constitutional:  Negative for chills and fever. HENT:  Negative for ear pain, sinus pressure and sore throat. Eyes:  Negative for pain, discharge and redness. Respiratory:  Negative for cough, shortness of breath and wheezing. Cardiovascular:  Negative for chest pain. Gastrointestinal:  Negative for abdominal pain, diarrhea, nausea and vomiting. Genitourinary:  Negative for dysuria and frequency. Musculoskeletal:  Negative for arthralgias and back pain. Chronic left lower leg wound   Skin:  Negative for rash and wound. Neurological:  Negative for weakness and headaches. Hematological:  Negative for adenopathy. All other systems reviewed and are negative. Physical Exam  Vitals and nursing note reviewed. Constitutional:       Appearance: He is well-developed. HENT:      Head: Normocephalic and atraumatic. Eyes:      Conjunctiva/sclera: Conjunctivae normal.   Cardiovascular:      Rate and Rhythm: Normal rate and regular rhythm. Heart sounds: Normal heart sounds. No murmur heard. Pulmonary:      Effort: Pulmonary effort is normal. No respiratory distress. Breath sounds: Normal breath sounds. No wheezing or rales. Abdominal:      General: Bowel sounds are normal.      Palpations: Abdomen is soft. Tenderness: There is no abdominal tenderness. There is no guarding or rebound. Musculoskeletal:         General: No tenderness or deformity. Cervical back: Normal range of motion and neck supple. Skin:     General: Skin is warm and dry.       Comments: Wound to the left lower leg, does have some bandages with drainage no active drainage noted on examination no tenderness to palpation does have chronic skin changes noted. Neurological:      Mental Status: He is alert and oriented to person, place, and time. Cranial Nerves: No cranial nerve deficit. Coordination: Coordination normal.   Psychiatric:         Mood and Affect: Mood normal.         Thought Content: Thought content normal.        Procedures     MDM     Amount and/or Complexity of Data Reviewed  Clinical lab tests: reviewed  Tests in the radiology section of CPT®: reviewed         Diagnostic results    LABS:    Labs Reviewed   CBC WITH AUTO DIFFERENTIAL - Abnormal; Notable for the following components:       Result Value    Lymphocytes % 18.9 (*)     Lymphocytes Absolute 1.38 (*)     All other components within normal limits   COMPREHENSIVE METABOLIC PANEL - Abnormal; Notable for the following components:    Glucose 219 (*)     All other components within normal limits   CULTURE, WOUND   CULTURE, BLOOD 1   CULTURE, BLOOD 2   LACTIC ACID   C-REACTIVE PROTEIN   SEDIMENTATION RATE       As interpreted by me, the above displayed labs are abnormal. All other labs obtained during this visit were within normal range or not returned as of this dictation. EKG Interpretation  Interpreted by emergency department physician, Yordy Almendarez MD      none        RADIOLOGY:   Non-plain film images such as CT, Ultrasound and MRI are read by the radiologist. Pam Common radiographic images are visualized and preliminarily interpreted by the ED Provider with the below findings:    No acute fracture noted on the x-ray of the left tibia-fibula otherwise agree with radiologist   interpretation per the Radiologist below, if available at the time of this note:    XR TIBIA FIBULA LEFT (2 VIEWS)   Final Result   No acute bony abnormality.            XR TIBIA FIBULA LEFT (2 VIEWS)    Result Date: 1/30/2023  EXAMINATION: 2 XRAY VIEWS OF THE LEFT TIBIA AND FIBULA 1/30/2023 12:26 pm COMPARISON: None. HISTORY: ORDERING SYSTEM PROVIDED HISTORY: wound check TECHNOLOGIST PROVIDED HISTORY: Reason for exam:->wound check What reading provider will be dictating this exam?->CRC FINDINGS: AP and lateral views of the left tibia and fibula were obtained on 4 images. There is left total knee prosthesis. No acute fracture or dislocation. No significant periprosthetic lucency to suggest loosening. There is soft tissue swelling about the lower leg and ankle. There is plantar calcaneal spur. No acute bony abnormality. No results found. MDM    History From: the patient    CONSULTS: (Who and What was discussed)  IP CONSULT TO HOSPITALIST  IP CONSULT TO INTERNAL MEDICINE  IP CONSULT TO INFECTIOUS DISEASES      Social Determinants of Health : None    Chronic Conditions affecting care:    has a past medical history of CAD (coronary artery disease), Cataract, right eye, Complete heart block ([address] Diabetes mellitus ([address] Encounter for aspirin therapy, History of blood transfusion, blood clots, Hyperlipidemia, Hypertension, Hyperthyroidism, Myocardial infarct ([address] (2010), Neuropathy, Obesity, Obstructive sleep apnea, Onychomycosis, DEMETRIA on CPAP, Osteoarthritis, Pacemaker, Stasis dermatitis, and Varicose veins. Records Reviewed( Source) previous cultures were reviewed for antibiotic selection did consult with pharmacist about this. CC/HPI Summary, DDx, ED Course, and Reassessment:   Differential diagnosis including but not limited to cellulitis, sepsis  33-year-old male present emergency department complaint of chronic wound to his left lower leg. Patient is currently on outpatient oral antibiotics however is not for 1 week. Infection does appear to be worse, red cellulitic as well as foul-smelling. CBC showed no elevation of his white count lactic acid is not elevated kidney function electrolytes within stable limits. X-ray showing no acute fracture or gas.   Patient will be admitted for IV antibiotics. Did review previous cultures and consult with pharmacy about antibiotic selection and started patient on Vanco and cefepime IV. Patient will be admitted to hospital otherwise he medically stable. Disposition Considerations (Tests not ordered but considered, Shared Decision Making, Pt Expectation of Test or Tx.): Due to patient failing outpatient antibiotic treatment he will be admitted due to cellulitis and needed IV antibiotics he is here this plan otherwise agree with radiologist.  Did consult with hospitalist for admission. Medications   vancomycin (VANCOCIN) 2,000 mg in sodium chloride 0.9 % 500 mL IVPB (has no administration in time range)   cefepime (MAXIPIME) 2,000 mg in sodium chloride 0.9 % 50 mL IVPB ([id number]) (2,000 mg IntraVENous New Bag 1/30/23 1341)         I am the primary provider of record      ED Course as of 01/30/23 1502   Mon Jan 30, 2023   1324 Patient will be placed on broad-spectrum antibiotics with vancomycin and cefepime due to his chronic wound infection with previous blood cultures and wound cultures were reviewed. [CB]   [id number] Addison np agree with admission   [CB]      ED Course User Index  [CB] Delta Kong MD         ED Course as of 01/30/23 1502   Mon Jan 30, 2023   1324 Patient will be placed on broad-spectrum antibiotics with vancomycin and cefepime due to his chronic wound infection with previous blood cultures and wound cultures were reviewed.    [CB]   7196 Addison np agree with admission   [CB]      ED Course User Index  [CB] Delta Kong MD       --------------------------------------------- PAST HISTORY ---------------------------------------------  Past Medical History:  has a past medical history of CAD (coronary artery disease), Cataract, right eye, Complete heart block ([address] Diabetes mellitus ([facility] Encounter for aspirin therapy, History of blood transfusion, Hx of blood clots, Hyperlipidemia, Hypertension, Hyperthyroidism, Myocardial infarct ([facility] Neuropathy, Obesity, Obstructive sleep apnea, Onychomycosis, DEMETRIA on CPAP, Osteoarthritis, Pacemaker, Stasis dermatitis, and Varicose veins. Past Surgical History:  has a past surgical history that includes Cholecystectomy; Cardiac pacemaker placement (04/09/2010); Colonoscopy (01/20/2016); transesophageal echocardiogram (06/19/2020); pacemaker placement (07/23/2020); joint replacement (Bilateral); Intracapsular cataract extraction (Right, 04/27/2022); and Intracapsular cataract extraction (Left, 7/27/2022). Social History:  reports that he quit smoking about 32 years ago. His smoking use included cigarettes. He started smoking about 58 years ago. He has a 50.00 pack-year smoking history. He has never used smokeless tobacco. He reports current alcohol use. He reports that he does not use drugs. Family History: family history includes Heart Disease in his father; Other in his brother and mother. The patients home medications have been reviewed.     Allergies: Clindamycin/lincomycin, Other, Doxycycline hyclate, Singulair [montelukast sodium], and Adaptic non-adhering dressing [wound dressings]    -------------------------------------------------- RESULTS -------------------------------------------------  Labs:  Results for orders placed or performed during the hospital encounter of 01/30/23   CBC with Auto Differential   Result Value Ref Range    WBC 7.3 4.5 - 11.5 E9/L    RBC 4.77 3.80 - 5.80 E12/L    Hemoglobin 13.8 12.5 - 16.5 g/dL    Hematocrit 41.9 37.0 - 54.0 %    MCV 87.8 80.0 - 99.9 fL    MCH 28.9 26.0 - 35.0 pg    MCHC 32.9 32.0 - 34.5 %    RDW 13.9 11.5 - 15.0 fL    Platelets 802 329 - 282 E9/L    MPV 9.4 7.0 - 12.0 fL    Neutrophils % 68.9 43.0 - 80.0 %    Immature Granulocytes % 0.3 0.0 - 5.0 %    Lymphocytes % 18.9 (L) 20.0 - 42.0 %    Monocytes % 8.2 2.0 - 12.0 %    Eosinophils % 3.0 0.0 - 6.0 %    Basophils % 0.7 0.0 - 2.0 %    Neutrophils Absolute 5.02 1.80 - 7.30 E9/L    Immature Granulocytes # 0.02 E9/L    Lymphocytes Absolute 1.38 (L) 1.50 - 4.00 E9/L    Monocytes Absolute 0.60 0.10 - 0.95 E9/L    Eosinophils Absolute 0.22 0.05 - 0.50 E9/L    Basophils Absolute 0.05 0.00 - 0.20 E9/L   Comprehensive Metabolic Panel   Result Value Ref Range    Sodium 138 132 - 146 mmol/L    Potassium 4.2 3.5 - 5.0 mmol/L    Chloride 101 98 - 107 mmol/L    CO2 25 22 - 29 mmol/L    Anion Gap 12 7 - 16 mmol/L    Glucose 219 (H) 74 - 99 mg/dL    BUN 22 6 - 23 mg/dL    Creatinine 1.0 0.7 - 1.2 mg/dL    Est, Glom Filt Rate >60 >=60 mL/min/1.73    Calcium 9.5 8.6 - 10.2 mg/dL    Total Protein 7.6 6.4 - 8.3 g/dL    Albumin 3.6 3.5 - 5.2 g/dL    Total Bilirubin 0.6 0.0 - 1.2 mg/dL    Alkaline Phosphatase 74 40 - 129 U/L    ALT 13 0 - 40 U/L    AST 12 0 - 39 U/L   Lactic Acid   Result Value Ref Range    Lactic Acid 1.2 0.5 - 2.2 mmol/L       Radiology:  XR TIBIA FIBULA LEFT (2 VIEWS)   Final Result   No acute bony abnormality. ED Course as of 01/30/23 1502   Mon Jan 30, 2023   1324 Patient will be placed on broad-spectrum antibiotics with vancomycin and cefepime due to his chronic wound infection with previous blood cultures and wound cultures were reviewed. [CB]   6645 Addison np agree with admission   [CB]      ED Course User Index  [CB] Candelaria Maurice MD       --------------------------------------------- PAST HISTORY ---------------------------------------------  Past Medical History:  has a past medical history of CAD (coronary artery disease), Cataract, right eye, Complete heart block ([facility] Diabetes mellitus ([facility] Encounter for aspirin therapy, History of blood transfusion, Hx of blood clots, Hyperlipidemia, Hypertension, Hyperthyroidism, Myocardial infarct ([facility] Neuropathy, Obesity, Obstructive sleep apnea, Onychomycosis, DEMETRIA on CPAP, Osteoarthritis, Pacemaker, Stasis dermatitis, and Varicose veins.     Past Surgical History:  has a past surgical history that includes Cholecystectomy; Cardiac pacemaker placement (04/09/2010); Colonoscopy (01/20/2016); transesophageal echocardiogram (06/19/2020); pacemaker placement (07/23/2020); joint replacement (Bilateral); Intracapsular cataract extraction (Right, 04/27/2022); and Intracapsular cataract extraction (Left, 7/27/2022). Social History:  reports that he quit smoking about 32 years ago. His smoking use included cigarettes. He started smoking about 58 years ago. He has a 50.00 pack-year smoking history. He has never used smokeless tobacco. He reports current alcohol use. He reports that he does not use drugs. Family History: family history includes Heart Disease in his father; Other in his brother and mother. The patients home medications have been reviewed.     Allergies: Clindamycin/lincomycin, Other, Doxycycline hyclate, Singulair [montelukast sodium], and Adaptic non-adhering dressing [wound dressings]    -------------------------------------------------- RESULTS -------------------------------------------------    LABS:  Results for orders placed or performed during the hospital encounter of 01/30/23   CBC with Auto Differential   Result Value Ref Range    WBC 7.3 4.5 - 11.5 E9/L    RBC 4.77 3.80 - 5.80 E12/L    Hemoglobin 13.8 12.5 - 16.5 g/dL    Hematocrit 41.9 37.0 - 54.0 %    MCV 87.8 80.0 - 99.9 fL    MCH 28.9 26.0 - 35.0 pg    MCHC 32.9 32.0 - 34.5 %    RDW 13.9 11.5 - 15.0 fL    Platelets 259 798 - 555 E9/L    MPV 9.4 7.0 - 12.0 fL    Neutrophils % 68.9 43.0 - 80.0 %    Immature Granulocytes % 0.3 0.0 - 5.0 %    Lymphocytes % 18.9 (L) 20.0 - 42.0 %    Monocytes % 8.2 2.0 - 12.0 %    Eosinophils % 3.0 0.0 - 6.0 %    Basophils % 0.7 0.0 - 2.0 %    Neutrophils Absolute 5.02 1.80 - 7.30 E9/L    Immature Granulocytes # 0.02 E9/L    Lymphocytes Absolute 1.38 (L) 1.50 - 4.00 E9/L    Monocytes Absolute 0.60 0.10 - 0.95 E9/L    Eosinophils Absolute 0.22 0.05 - 0.50 E9/L    Basophils Absolute 0.05 0.00 - 0.20 E9/L   Comprehensive Metabolic Panel   Result Value Ref Range    Sodium 138 132 - 146 mmol/L    Potassium 4.2 3.5 - 5.0 mmol/L    Chloride 101 98 - 107 mmol/L    CO2 25 22 - 29 mmol/L    Anion Gap 12 7 - 16 mmol/L    Glucose 219 (H) 74 - 99 mg/dL    BUN 22 6 - 23 mg/dL    Creatinine 1.0 0.7 - 1.2 mg/dL    Est, Glom Filt Rate >60 >=60 mL/min/1.73    Calcium 9.5 8.6 - 10.2 mg/dL    Total Protein 7.6 6.4 - 8.3 g/dL    Albumin 3.6 3.5 - 5.2 g/dL    Total Bilirubin 0.6 0.0 - 1.2 mg/dL    Alkaline Phosphatase 74 40 - 129 U/L    ALT 13 0 - 40 U/L    AST 12 0 - 39 U/L   Lactic Acid   Result Value Ref Range    Lactic Acid 1.2 0.5 - 2.2 mmol/L       RADIOLOGY:  XR TIBIA FIBULA LEFT (2 VIEWS)   Final Result   No acute bony abnormality.                 ------------------------- NURSING NOTES AND VITALS REVIEWED ---------------------------  Date / Time Roomed:  1/30/2023 11:50 AM  ED Bed Assignment:  14B/14B-14    The nursing notes within the ED encounter and vital signs as below have been reviewed. Patient Vitals for the past 24 hrs:   BP Temp Pulse Resp SpO2 Height Weight   01/30/23 1400 (!) 159/83 -- 61 19 96 % -- --   01/30/23 1345 (!) 147/74 -- 75 21 97 % -- --   01/30/23 1130 (!) 150/86 -- -- 18 -- -- --   01/30/23 1046 -- 97.5 °F (36.4 °C) 84 -- 97 % 5' 7\" (1.702 m) (!) 370 lb (167.8 kg)       Oxygen Saturation Interpretation: Normal    ------------------------------------------ PROGRESS NOTES ------------------------------------------  Re-evaluation(s):  Time: 1500  Patients symptoms show no change  Repeat physical examination is not changed    Counseling:  I have spoken with the patient and discussed todays results, in addition to providing specific details for the plan of care and counseling regarding the diagnosis and prognosis.   Their questions are answered at this time and they are agreeable with the plan of admission.    --------------------------------- ADDITIONAL PROVIDER NOTES ---------------------------------  Consultations:  Spoke with BRYAN NP. Discussed case. They will admit the patient. This patient's ED course included: a personal history and physicial examination, re-evaluation prior to disposition, multiple bedside re-evaluations, IV medications, cardiac monitoring, continuous pulse oximetry, and complex medical decision making and emergency management    This patient has remained hemodynamically stable during their ED course. Diagnosis:  1. Cellulitis, unspecified cellulitis site        Disposition:  Patient's disposition: Admit to telemetry  Patient's condition is stable.            Hira Juárez MD  Resident  01/30/23 2221

## 2023-01-30 NOTE — Clinical Note
Discharge Plan[de-identified] Other/[facility])   Telemetry/Cardiac Monitoring Required?: No

## 2023-01-30 NOTE — TELEPHONE ENCOUNTER
Patients wife called in stating that the antibiotic that was started last week has not helped and there is no improvement. Patient's wife was requesting patient to come in and be seen. Dr. Jorge Puri advised patient to go to the ER for evaluation, culture and possible IV antibiotics. Patient's wife was hesitant and unsure if they would go to ER. They would call our office if they needed anything further.

## 2023-01-31 LAB
ANION GAP SERPL CALCULATED.3IONS-SCNC: 10 MMOL/L (ref 7–16)
BASOPHILS ABSOLUTE: 0.03 E9/L (ref 0–0.2)
BASOPHILS RELATIVE PERCENT: 0.5 % (ref 0–2)
BUN BLDV-MCNC: 18 MG/DL (ref 6–23)
C-REACTIVE PROTEIN: 5.3 MG/DL (ref 0–0.4)
CALCIUM SERPL-MCNC: 8.8 MG/DL (ref 8.6–10.2)
CHLORIDE BLD-SCNC: 103 MMOL/L (ref 98–107)
CO2: 25 MMOL/L (ref 22–29)
CREAT SERPL-MCNC: 0.8 MG/DL (ref 0.7–1.2)
EOSINOPHILS ABSOLUTE: 0.23 E9/L (ref 0.05–0.5)
EOSINOPHILS RELATIVE PERCENT: 3.7 % (ref 0–6)
GFR SERPL CREATININE-BSD FRML MDRD: >60 ML/MIN/1.73
GLUCOSE BLD-MCNC: 198 MG/DL (ref 74–99)
HCT VFR BLD CALC: 37.6 % (ref 37–54)
HEMOGLOBIN: 12.3 G/DL (ref 12.5–16.5)
IMMATURE GRANULOCYTES #: 0.03 E9/L
IMMATURE GRANULOCYTES %: 0.5 % (ref 0–5)
LYMPHOCYTES ABSOLUTE: 1.27 E9/L (ref 1.5–4)
LYMPHOCYTES RELATIVE PERCENT: 20.5 % (ref 20–42)
MCH RBC QN AUTO: 28.6 PG (ref 26–35)
MCHC RBC AUTO-ENTMCNC: 32.7 % (ref 32–34.5)
MCV RBC AUTO: 87.4 FL (ref 80–99.9)
METER GLUCOSE: 178 MG/DL (ref 74–99)
METER GLUCOSE: 183 MG/DL (ref 74–99)
METER GLUCOSE: 213 MG/DL (ref 74–99)
METER GLUCOSE: 263 MG/DL (ref 74–99)
MONOCYTES ABSOLUTE: 0.69 E9/L (ref 0.1–0.95)
MONOCYTES RELATIVE PERCENT: 11.1 % (ref 2–12)
NEUTROPHILS ABSOLUTE: 3.94 E9/L (ref 1.8–7.3)
NEUTROPHILS RELATIVE PERCENT: 63.7 % (ref 43–80)
PDW BLD-RTO: 13.8 FL (ref 11.5–15)
PLATELET # BLD: 140 E9/L (ref 130–450)
PMV BLD AUTO: 9.3 FL (ref 7–12)
POTASSIUM REFLEX MAGNESIUM: 3.9 MMOL/L (ref 3.5–5)
RBC # BLD: 4.3 E12/L (ref 3.8–5.8)
SEDIMENTATION RATE, ERYTHROCYTE: 31 MM/HR (ref 0–15)
SODIUM BLD-SCNC: 138 MMOL/L (ref 132–146)
WBC # BLD: 6.2 E9/L (ref 4.5–11.5)

## 2023-01-31 PROCEDURE — 2580000003 HC RX 258: Performed by: NURSE PRACTITIONER

## 2023-01-31 PROCEDURE — 82962 GLUCOSE BLOOD TEST: CPT

## 2023-01-31 PROCEDURE — 1200000000 HC SEMI PRIVATE

## 2023-01-31 PROCEDURE — 2580000003 HC RX 258: Performed by: INTERNAL MEDICINE

## 2023-01-31 PROCEDURE — S5553 INSULIN LONG ACTING 5 U: HCPCS | Performed by: INTERNAL MEDICINE

## 2023-01-31 PROCEDURE — 85651 RBC SED RATE NONAUTOMATED: CPT

## 2023-01-31 PROCEDURE — 86140 C-REACTIVE PROTEIN: CPT

## 2023-01-31 PROCEDURE — 80048 BASIC METABOLIC PNL TOTAL CA: CPT

## 2023-01-31 PROCEDURE — 2700000000 HC OXYGEN THERAPY PER DAY

## 2023-01-31 PROCEDURE — 6360000002 HC RX W HCPCS: Performed by: NURSE PRACTITIONER

## 2023-01-31 PROCEDURE — 6360000002 HC RX W HCPCS: Performed by: INTERNAL MEDICINE

## 2023-01-31 PROCEDURE — 6370000000 HC RX 637 (ALT 250 FOR IP): Performed by: NURSE PRACTITIONER

## 2023-01-31 PROCEDURE — 6370000000 HC RX 637 (ALT 250 FOR IP): Performed by: INTERNAL MEDICINE

## 2023-01-31 PROCEDURE — 85025 COMPLETE CBC W/AUTO DIFF WBC: CPT

## 2023-01-31 PROCEDURE — 36415 COLL VENOUS BLD VENIPUNCTURE: CPT

## 2023-01-31 RX ORDER — INSULIN GLARGINE-YFGN 100 [IU]/ML
12 INJECTION, SOLUTION SUBCUTANEOUS 2 TIMES DAILY
Status: DISCONTINUED | OUTPATIENT
Start: 2023-01-31 | End: 2023-02-03 | Stop reason: HOSPADM

## 2023-01-31 RX ADMIN — CARVEDILOL 25 MG: 25 TABLET, FILM COATED ORAL at 08:32

## 2023-01-31 RX ADMIN — POTASSIUM CHLORIDE 20 MEQ: 1500 TABLET, EXTENDED RELEASE ORAL at 08:32

## 2023-01-31 RX ADMIN — INSULIN GLARGINE-YFGN 12 UNITS: 100 INJECTION, SOLUTION SUBCUTANEOUS at 20:15

## 2023-01-31 RX ADMIN — ENOXAPARIN SODIUM 40 MG: 100 INJECTION SUBCUTANEOUS at 08:32

## 2023-01-31 RX ADMIN — SODIUM CHLORIDE, PRESERVATIVE FREE 10 ML: 5 INJECTION INTRAVENOUS at 08:32

## 2023-01-31 RX ADMIN — ACETAMINOPHEN 650 MG: 325 TABLET ORAL at 16:49

## 2023-01-31 RX ADMIN — LEVOTHYROXINE SODIUM 100 MCG: 0.05 TABLET ORAL at 05:17

## 2023-01-31 RX ADMIN — ROSUVASTATIN 5 MG: 10 TABLET, FILM COATED ORAL at 08:33

## 2023-01-31 RX ADMIN — VANCOMYCIN HYDROCHLORIDE 1250 MG: 10 INJECTION, POWDER, LYOPHILIZED, FOR SOLUTION INTRAVENOUS at 12:29

## 2023-01-31 RX ADMIN — SODIUM CHLORIDE, PRESERVATIVE FREE 10 ML: 5 INJECTION INTRAVENOUS at 14:07

## 2023-01-31 RX ADMIN — VANCOMYCIN HYDROCHLORIDE 1250 MG: 10 INJECTION, POWDER, LYOPHILIZED, FOR SOLUTION INTRAVENOUS at 23:24

## 2023-01-31 RX ADMIN — CARVEDILOL 25 MG: 25 TABLET, FILM COATED ORAL at 17:00

## 2023-01-31 RX ADMIN — ENOXAPARIN SODIUM 40 MG: 100 INJECTION SUBCUTANEOUS at 20:15

## 2023-01-31 RX ADMIN — SODIUM CHLORIDE, PRESERVATIVE FREE 10 ML: 5 INJECTION INTRAVENOUS at 16:54

## 2023-01-31 RX ADMIN — TAMSULOSIN HYDROCHLORIDE 0.4 MG: 0.4 CAPSULE ORAL at 20:15

## 2023-01-31 RX ADMIN — MEROPENEM 1000 MG: 1 INJECTION, POWDER, FOR SOLUTION INTRAVENOUS at 14:07

## 2023-01-31 RX ADMIN — RAMIPRIL 5 MG: 5 CAPSULE ORAL at 20:15

## 2023-01-31 RX ADMIN — ASPIRIN 81 MG: 81 TABLET, COATED ORAL at 08:32

## 2023-01-31 RX ADMIN — LIOTHYRONINE SODIUM 25 MCG: 25 TABLET ORAL at 08:32

## 2023-01-31 RX ADMIN — FUROSEMIDE 40 MG: 40 TABLET ORAL at 08:31

## 2023-01-31 RX ADMIN — INSULIN LISPRO 4 UNITS: 100 INJECTION, SOLUTION INTRAVENOUS; SUBCUTANEOUS at 12:55

## 2023-01-31 RX ADMIN — INSULIN GLARGINE-YFGN 12 UNITS: 100 INJECTION, SOLUTION SUBCUTANEOUS at 10:42

## 2023-01-31 RX ADMIN — LATANOPROST 1 DROP: 50 SOLUTION OPHTHALMIC at 20:17

## 2023-01-31 ASSESSMENT — PAIN DESCRIPTION - ORIENTATION: ORIENTATION: RIGHT

## 2023-01-31 ASSESSMENT — PAIN DESCRIPTION - DESCRIPTORS: DESCRIPTORS: BURNING

## 2023-01-31 ASSESSMENT — PAIN SCALES - GENERAL
PAINLEVEL_OUTOF10: 10
PAINLEVEL_OUTOF10: 3

## 2023-01-31 ASSESSMENT — PAIN DESCRIPTION - LOCATION: LOCATION: LEG

## 2023-01-31 NOTE — H&P
Hospital Medicine History & Physical      PCP: Talha Saldana MD    Date of Admission: 1/30/2023    Date of Service: . JAN 31, 2023    Chief Complaint:   LLE INFECTION      History Of Present Illness:     74 y.o. male presented with LLE INFECTION, FOUND TO HAVE CELLULITIS, ONSET 2 WEEKS AGO, BEGAN TO HAVE YELLOW TO GREEN DRAINAGE ON LEG, NO FEVER NO CHILLS, BUT EDEMA AND ERYTHEMA     Past Medical History:          Diagnosis Date    CAD (coronary artery disease)     Cataract, right eye     Complete heart block (HCC)     h/o    Diabetes mellitus ([facility]     Encounter for aspirin therapy     patient uses for heart health    History of blood transfusion     Hx of blood clots     Hyperlipidemia     Hypertension     Hyperthyroidism     Myocardial infarct ([facility] 2010    Neuropathy     feet    Obesity     Obstructive sleep apnea     on cpap    Onychomycosis     DEMETRIA on CPAP     Osteoarthritis     Pacemaker     [employer]    Stasis dermatitis     h/o on bilateral legs with leg edema    Varicose veins        Past Surgical History:          Procedure Laterality Date    CARDIAC PACEMAKER PLACEMENT  04/09/2010    [employer]tr[employer]    CHOLECYSTECTOMY      COLONOSCOPY  01/20/2016    INTRACAPSULAR CATARACT EXTRACTION Right 04/27/2022    RIGHT EYE CATARACT EXTRACTION IOL IMPLANT AND GONIOTOMY performed by Torie Vela MD at [facility] Left 7/27/2022    LEFT EYE CATARACT EXTRACTION IOL IMPLANT performed by Torie Vela MD at [address] Bilateral     knees    PACEMAKER PLACEMENT  07/23/2020    DUAL PPM GR    ([employer])     DR. MARTIN     TRANSESOPHAGEAL ECHOCARDIOGRAM  06/19/2020    With        Medications Prior to Admission:      Prior to Admission medications    Medication Sig Start Date End Date Taking?  Authorizing Provider   glipiZIDE (GLUCOTROL XL) 5 MG extended release tablet Take 5 mg by mouth at bedtime   Yes Historical Provider, MD   levoFLOXacin (LEVAQUIN) 500 MG tablet Take 500 mg by mouth daily 1/23/23 2/6/23 Yes Historical Provider, MD   rosuvastatin (CRESTOR) 5 MG tablet Take 1 tablet by mouth daily 12/12/22   Antwon Ribera DO   acetaminophen (TYLENOL) 500 MG tablet Take 1,000 mg by mouth every 6 hours as needed for Pain    Historical Provider, MD   latanoprost (XALATAN) 0.005 % ophthalmic solution Place 1 drop into both eyes nightly 5/21/22   Historical Provider, MD   ramipril (ALTACE) 5 MG capsule Take 5 mg by mouth at bedtime 5/5/22   Historical Provider, MD   liothyronine (CYTOMEL) 25 MCG tablet Take 25 mcg by mouth daily    Historical Provider, MD   levothyroxine (SYNTHROID) 100 MCG tablet Take 100 mcg by mouth Daily    Historical Provider, MD   SUPER B COMPLEX/C PO Take 1 tablet by mouth daily    Historical Provider, MD   potassium chloride (KLOR-CON M) 20 MEQ extended release tablet Take 1 tablet by mouth daily 1/5/22   Carrie Stoddard MD   tamsulosin (FLOMAX) 0.4 MG capsule Take 0.4 mg by mouth at bedtime Takes nighlty 10/9/21   Historical Provider, MD   furosemide (LASIX) 40 MG tablet Take 40 mg by mouth daily  7/14/20   Historical Provider, MD   albuterol (ACCUNEB) 0.63 MG/3ML nebulizer solution Take 3 mLs by nebulization every 6 hours as needed for Wheezing 9/24/19   Ac Adams MD   glipiZIDE (GLUCOTROL XL) 5 MG extended release tablet Take 10 mg by mouth every morning 9/1/17   Historical Provider, MD   Cholecalciferol (VITAMIN D3) 50 MCG (2000 UT) TABS Take 2,000 Units by mouth daily    Historical Provider, MD   carvedilol (COREG) 25 MG tablet Take 25 mg by mouth 2 times daily (with meals)     Historical Provider, MD   aspirin 81 MG EC tablet Take 81 mg by mouth daily     Historical Provider, MD       Allergies:  Clindamycin/lincomycin, Other, Doxycycline hyclate, Singulair [montelukast sodium], and Adaptic non-adhering dressing [wound dressings]    Social History:      The patient currently lives WIFE    TOBACCO:   reports that he quit smoking about 32 years ago. His smoking use included cigarettes. He started smoking about 58 years ago. He has a 50.00 pack-year smoking history. He has never used smokeless tobacco.  ETOH:   reports current alcohol use. Family History:       Reviewed in detail and negative for DM, CAD, Cancer, CVA. Positive as follows:        Problem Relation Age of Onset    Heart Disease Father             Other Mother             Other Brother         gall bladder       REVIEW OF SYSTEMS:   Pertinent positives as noted in the HPI. All other systems reviewed and negative. PHYSICAL EXAM:    BP (!) 144/73   Pulse 62   Temp (!) 96.6 °F (35.9 °C) (Temporal)   Resp 20   Ht 5' 7\" (1.702 m)   Wt (!) 370 lb (167.8 kg)   SpO2 99%   BMI 57.95 kg/m²     General appearance:  No apparent distress, appears stated age. MORBIDLY OBESE  HEENT:  Normal cephalic, atraumatic without obvious deformity. Pupils equal, round, and reactive to light. Extra ocular muscles intact. Conjunctivae/corneas clear. Neck: Supple, with full range of motion. No jugular venous distention. Trachea midline. Respiratory:  Normal respiratory effort. Clear to auscultation,   Cardiovascular:  RRR  Abdomen: Soft, non-tender, non-distended with normal bowel sounds. Musculoskeletal:  No clubbing, cyanosis oPOS edema bilaterally.   DSD INTACT LLE  Skin: smooth and dry   Neurologic:   Cranial nerves: II-XII intact,   Psychiatric:  Alert and oriented x 3        Labs:     Recent Labs     23  1136 23  0410   WBC 7.3 6.2   HGB 13.8 12.3*   HCT 41.9 37.6    140     Recent Labs     23  1136 23  0410    138   K 4.2 3.9    103   CO2 25 25   BUN 22 18   CREATININE 1.0 0.8   CALCIUM 9.5 8.8     Recent Labs     23  1136   AST 12   ALT 13   BILITOT 0.6   ALKPHOS 74     No results for input(s): INR in the last 72 hours. No results for input(s): Agnes Thakur in the last 72 hours. Urinalysis:    No results found for: Caden Gilmore, BACTERIA, RBCUA, BLOODU, SPECGRAV, Nimisha [address]    Radiology:           XR TIBIA FIBULA LEFT (2 VIEWS)   Final Result   No acute bony abnormality. ASSESSMENT:    Active Hospital Problems    Diagnosis Date Noted    Wound infection [T14. 8XXA, L08.9] 01/30/2023     Priority: Medium    Cellulitis [L03.90] 07/06/2012   MORBID OBESITY   PACEMAKER   II DM   HTN   HYPOTHYROID   BPH      PLAN:  ID   SYNTHROID   ALTACE   VANC   FLOMAX   SSI         DVT Prophylaxis:  LOVENOX   Diet: ADULT DIET; Regular; 4 carb choices (60 gm/meal)  Code Status: Full Code    PT/OT Eval Status:  ORDERED    Dispo - HOME    Electronically signed by Cate Freeman DO on 1/31/2023 at 4:25 PM [facility]       Thank you Tong Yip MD for the opportunity to be involved in this patient's care.  If you have any questions or concerns please feel free to contact me at [phone]

## 2023-01-31 NOTE — CARE COORDINATION
1/31. Met with the pt at the bedside to discuss transition of care. The pt lives with his wife and is independent. He plans to return home when medically stable. Michelle Asher RN    Case Management Assessment  Initial Evaluation    Date/Time of Evaluation: 1/31/2023 1:36 PM  Assessment Completed by: Michelle Asher RN    If patient is discharged prior to next notation, then this note serves as note for discharge by case management. Patient Name: Dnaii James                   YOB: 1950  Diagnosis: Cellulitis [L03.90]  Wound infection [T14. 8XXA, L08.9]  Cellulitis, unspecified cellulitis site [L03.90]                   Date / Time: 1/30/2023 11:50 AM    Patient Admission Status: Inpatient   Readmission Risk (Low < 19, Mod (19-27), High > 27): Readmission Risk Score: 6.8    Current PCP: Crow Locke MD  PCP verified by CM? Yes (Dr Jesusita Chatman)    Chart Reviewed: Yes      History Provided by: Patient  Patient Orientation: Alert and Oriented    Patient Cognition: Alert    Hospitalization in the last 30 days (Readmission):  No    If yes, Readmission Assessment in CM Navigator will be completed. Advance Directives:      Code Status: Full Code   Patient's Primary Decision Maker is: Patient Declined (Legal Next of Kin Remains as Decision Maker)      Discharge Planning:    Patient lives with: Spouse/Significant Other Type of Home: House  Primary Care Giver: Self  Patient Support Systems include: Spouse/Significant Other   Current Financial resources:    Current community resources:    Current services prior to admission: None            Current DME:              Type of Home Care services:  None    ADLS  Prior functional level: Independent in ADLs/IADLs  Current functional level: Independent in ADLs/IADLs    PT AM-PAC:   /24  OT AM-PAC:   /24    Family can provide assistance at DC:  Yes  Would you like Case Management to discuss the discharge plan with any other family members/significant others, and if so, who? No  Plans to Return to Present Housing: Yes  Other Identified Issues/Barriers to RETURNING to current housing: NA  Potential Assistance needed at discharge: N/A            Potential DME:    Patient expects to discharge to: [address] [place] [address] for transportation at discharge:      Financial    Payor: Cora Arce Meagan / Plan: Cabrera 1978 / Product Type: *No Product type* /     Does insurance require precert for SNF: Yes    Potential assistance Purchasing Medications:    Meds-to-Beds request: Yes      [id number], [place] - [address]  [address]  Phone: [phone] Fax: [phone] [phone]  [address] [place] [address] [address]  Phone: [phone] Fax: [phone] [address] [phone] - f [phone]  [address] [place] [address]'S [facility] [address]  Phone: [phone] Fax: [phone]      Notes:    Factors facilitating achievement of predicted outcomes: NA    Barriers to discharge: Additional Case Management Notes: see above    The Plan for Transition of Care is related to the following treatment goals of Cellulitis [L03.90]  Wound infection [T14. 8XXA, L08.9]  Cellulitis, unspecified cellulitis site [I52.90]    IF APPLICABLE: The Patient and/or patient representative German Knight and his family were provided with a choice of provider and agrees with the discharge plan. Freedom of choice list with basic dialogue that supports the patient's individualized plan of care/goals and shares the quality data associated with the providers was provided to:     Patient Representative Name:       The Patient and/or Patient Representative Agree with the Discharge Plan?       Tesfaye Espinoza RN  Case Management Department  Ph: [phone] Fax: [phone]

## 2023-01-31 NOTE — PLAN OF CARE
Problem: Chronic Conditions and Co-morbidities  Goal: Patient's chronic conditions and co-morbidity symptoms are monitored and maintained or improved  1/31/2023 0003 by Eugenia Eubanks RN  Outcome: Progressing     Problem: Discharge Planning  Goal: Discharge to home or other facility with appropriate resources  1/31/2023 0003 by Eugenia Eubanks RN  Outcome: Progressing  1/30/2023 1709 by Tierra Murdock RN  Outcome: Progressing     Problem: Pain  Goal: Verbalizes/displays adequate comfort level or baseline comfort level  1/31/2023 0003 by Eugenia Eubanks RN  Outcome: Progressing  1/30/2023 1709 by Tierra Murdock RN  Outcome: Progressing

## 2023-01-31 NOTE — CONSULTS
Department of Internal Medicine  Infectious Diseases   Consult Note      Reason for Consult: Cellulitis left leg       Requesting Physician:  Dr Beronica Hurst:       This is a 74 yrs old male with morbid obesity , DEMETRIA,  CAD , chronic venous insufficiency presented to the ER with left leg pain, redness, drainage for several weeks . He was placed on oral abx without improvement . He denied fever and chills   WBC was 7.3K   X ray - no fracture   Pt was given vancomycin and cefepime     Past Medical History:    Past Medical History:   Diagnosis Date    CAD (coronary artery disease)     Cataract, right eye     Complete heart block (HCC)     h/o    Diabetes mellitus ([facility]     Encounter for aspirin therapy     patient uses for heart health    History of blood transfusion     Hx of blood clots     Hyperlipidemia     Hypertension     Hyperthyroidism     Myocardial infarct ([facility] 2010    Neuropathy     feet    Obesity     Obstructive sleep apnea     on cpap    Onychomycosis     DEMETRIA on CPAP     Osteoarthritis     Pacemaker     Medtronic    Stasis dermatitis     h/o on bilateral legs with leg edema    Varicose veins          Past Surgical History:    Past Surgical History:   Procedure Laterality Date    CARDIAC PACEMAKER PLACEMENT  04/09/2010    Medtronic    CHOLECYSTECTOMY      COLONOSCOPY  01/20/2016    INTRACAPSULAR CATARACT EXTRACTION Right 04/27/2022    RIGHT EYE CATARACT EXTRACTION IOL IMPLANT AND GONIOTOMY performed by Sd Sheth MD at [facility] Left 7/27/2022    LEFT EYE CATARACT EXTRACTION IOL IMPLANT performed by Sd Sheth MD at [address] [place] [address] Bilateral     knees    PACEMAKER PLACEMENT  07/23/2020    DUAL PPM GR    ([employer])     DR. MARTIN     TRANSESOPHAGEAL ECHOCARDIOGRAM  06/19/2020    With      '  Current Medications:      Current Facility-Administered Medications   Medication Dose Route Frequency Provider Last Rate Last Admin    insulin glargine-yfgn (SEMGLEE-YFGN) injection vial 12 Units  12 Units SubCUTAneous BID Ricky Panchal DO        albuterol (ACCUNEB) nebulizer solution 0.63 mg  1 ampule Nebulization Q6H PRN Elizebeth Vivian, APRN - CNP        aspirin EC tablet 81 mg  81 mg Oral Daily Elizebeth Vivian, APRN - CNP   81 mg at 01/31/23 4093    carvedilol (COREG) tablet 25 mg  25 mg Oral BID WC Elizebeth Vivian, APRN - CNP   25 mg at 01/31/23 1447    furosemide (LASIX) tablet 40 mg  40 mg Oral Daily Elizebeth Vivian, APRN - CNP   40 mg at 01/31/23 0831    latanoprost (XALATAN) 0.005 % ophthalmic solution 1 drop  1 drop Both Eyes Nightly Elizebeth Vivian, APRN - CNP   1 drop at 01/30/23 2048    levothyroxine (SYNTHROID) tablet 100 mcg  100 mcg Oral Daily Elizebeth Vivian, APRN - CNP   100 mcg at 01/31/23 0517    liothyronine (CYTOMEL) tablet 25 mcg  25 mcg Oral Daily Elizebeth Vivian, APRN - CNP   25 mcg at 01/31/23 7858    potassium chloride (KLOR-CON M) extended release tablet 20 mEq  20 mEq Oral Daily Elizebeth Vivian, APRN - CNP   20 mEq at 01/31/23 2078    ramipril (ALTACE) capsule 5 mg  5 mg Oral Nightly Elizebeth Vivian, APRN - CNP   5 mg at 01/30/23 2047    rosuvastatin (CRESTOR) tablet 5 mg  5 mg Oral Daily Elizebeth Vivian, APRN - CNP   5 mg at 01/31/23 5682    tamsulosin (FLOMAX) capsule 0.4 mg  0.4 mg Oral Nightly Elizebeth Vivian, APRN - CNP   0.4 mg at 01/30/23 2047    sodium chloride flush 0.9 % injection 10 mL  10 mL IntraVENous 2 times per day Elizebeth Vivian, APRN - CNP   10 mL at 01/31/23 5450    sodium chloride flush 0.9 % injection 10 mL  10 mL IntraVENous PRN Elizebeth Vivian, APRN - CNP        0.9 % sodium chloride infusion   IntraVENous PRN Pardeep Park APRN - CNP 50 mL/hr at 01/30/23 2214 New Bag at 01/30/23 2214    enoxaparin (LOVENOX) injection 40 mg  40 mg SubCUTAneous BID Pardeep Park APRN - CNP   40 mg at 01/31/23 0832    ondansetron (ZOFRAN-ODT) disintegrating tablet 4 mg  4 mg Oral Q8H PRN SUSANNA Islas CNP        Or    ondansetron (ZOFRAN) injection 4 mg  4 mg IntraVENous Q6H PRN SUSANNA Islas CNP        magnesium hydroxide (MILK OF MAGNESIA) 400 MG/5ML suspension 30 mL  30 mL Oral Daily PRN SUSANNA Islas CNP        acetaminophen (TYLENOL) tablet 650 mg  650 mg Oral Q6H PRN SUSANNA Islas CNP   650 mg at 23    Or    acetaminophen (TYLENOL) suppository 650 mg  650 mg Rectal Q6H PRN SUSANNA Islas CNP        glucose chewable tablet 16 g  4 tablet Oral PRN SUSANNA Islas CNP        dextrose bolus 10% 125 mL  125 mL IntraVENous PRN SUSANNA Islas CNP        Or    dextrose bolus 10% 250 mL  250 mL IntraVENous PRN SUSANNA Islas CNP        glucagon injection 1 mg  1 mg SubCUTAneous PRN SUSANNA Islas CNP        dextrose 10 % infusion   IntraVENous Continuous PRSUSANNA Martines CNP        insulin lispro (HUMALOG) injection vial 0-8 Units  0-8 Units SubCUTAneous TID SUSANNA Casas CNP        insulin lispro (HUMALOG) injection vial 0-4 Units  0-4 Units SubCUTAneous Nightly SUSANNA Islas CNP           Allergies:  Clindamycin/lincomycin, Other, Doxycycline hyclate, Singulair [montelukast sodium], and Adaptic non-adhering dressing [wound dressings]    Social History:      Social History     Socioeconomic History    Marital status:      Spouse name: Not on file    Number of children: Not on file    Years of education: Not on file    Highest education level: Not on file   Occupational History    Not on file   Tobacco Use    Smoking status: Former     Packs/day: 2.00     Years: 25.00     Pack years: 50.00     Types: Cigarettes     Start date: 1964     Quit date: 1990     Years since quittin.5    Smokeless tobacco: Never   Vaping Use    Vaping Use: Never used   Substance and Sexual Activity    Alcohol use: Yes     Comment: rare   2 beers/month    Drug use: Never    Sexual activity: Not on file   Other Topics Concern    Not on file   Social History Narrative    Not on file     Social Determinants of Health     Financial Resource Strain: Not on file   Food Insecurity: Not on file   Transportation Needs: Not on file   Physical Activity: Not on file   Stress: Not on file   Social Connections: Not on file   Intimate Partner Violence: Not on file   Housing Stability: Not on file       Family History:     Family History   Problem Relation Age of Onset    Heart Disease Father             Other Mother             Other Brother         gall bladder       REVIEW OF SYSTEMS:    CONSTITUTIONAL:  Denies fever, chill or rigors. HEENT: denies blurring of vision or double vision, denies hearing problem  RESPIRATORY: denies cough, shortness of breath, sputum expectoration. CARDIOVASCULAR:  Denies palpitation or chest pain   GASTROINTESTINAL:  Denies abdomen pain, diarrhea or constipation,, nausea or vomiting. GENITOURINARY:  Denies burning urination or frequency of urination  INTEGUMENT: left leg redness , pain   HEMATOLOGIC/LYMPHATIC:  Denies lymph node swelling, gum bleeding or easy bruising. MUSCULOSKELETAL:  Bilateral chronic leg swelling   NEUROLOGICAL:  Denies light headed, dizziness, loss of consciousness, weakness of lower extremities, bowel or bladder incontinence. PHYSICAL EXAM:      Vitals:     Vitals:    23 0809   BP: (!) 144/73   Pulse: 62   Resp: 20   Temp: (!) 96.6 °F (35.9 °C)   SpO2: 99%       General Appearance:    Awake, alert , no acute distress, morbidly obese . Head:    Normocephalic, atraumatic   Eyes:    No pallor, no icterus,   Ears:    No obvious deformity or drainage.    Nose:   No nasal drainage   Throat:   Mucosa moist, no oral thrush   Neck:   Supple, no lymphadenopathy   Back:     no CVA tenderness   Lungs:     Clear to auscultation bilaterally   Heart:    Regular rate and rhythm, no murmur   Abdomen:     Soft, non-tender, bowel sounds present    Extremities:    Bilateral pitting edema, erythema left leg, drainage'   Pulses:   Dorsalis pedis palpable    Skin:   Erythema left leg, desquamation      CBC with Differential:      Lab Results   Component Value Date/Time    WBC 6.2 01/31/2023 04:10 AM    RBC 4.30 01/31/2023 04:10 AM    HGB 12.3 01/31/2023 04:10 AM    HCT 37.6 01/31/2023 04:10 AM     01/31/2023 04:10 AM    MCV 87.4 01/31/2023 04:10 AM    MCH 28.6 01/31/2023 04:10 AM    MCHC 32.7 01/31/2023 04:10 AM    RDW 13.8 01/31/2023 04:10 AM    SEGSPCT 69 07/07/2012 03:50 AM    LYMPHOPCT 20.5 01/31/2023 04:10 AM    MONOPCT 11.1 01/31/2023 04:10 AM    BASOPCT 0.5 01/31/2023 04:10 AM    MONOSABS 0.69 01/31/2023 04:10 AM    LYMPHSABS 1.27 01/31/2023 04:10 AM    EOSABS 0.23 01/31/2023 04:10 AM    BASOSABS 0.03 01/31/2023 04:10 AM       CMP     Lab Results   Component Value Date/Time     01/31/2023 04:10 AM    K 3.9 01/31/2023 04:10 AM     01/31/2023 04:10 AM    CO2 25 01/31/2023 04:10 AM    BUN 18 01/31/2023 04:10 AM    CREATININE 0.8 01/31/2023 04:10 AM    GFRAA >60 07/23/2020 07:30 AM    LABGLOM >60 01/31/2023 04:10 AM    GLUCOSE 198 01/31/2023 04:10 AM    PROT 7.6 01/30/2023 11:36 AM    LABALBU 3.6 01/30/2023 11:36 AM    CALCIUM 8.8 01/31/2023 04:10 AM    BILITOT 0.6 01/30/2023 11:36 AM    ALKPHOS 74 01/30/2023 11:36 AM    AST 12 01/30/2023 11:36 AM    ALT 13 01/30/2023 11:36 AM         Hepatic Function Panel:    Lab Results   Component Value Date/Time    ALKPHOS 74 01/30/2023 11:36 AM    ALT 13 01/30/2023 11:36 AM    AST 12 01/30/2023 11:36 AM    PROT 7.6 01/30/2023 11:36 AM    BILITOT 0.6 01/30/2023 11:36 AM    LABALBU 3.6 01/30/2023 11:36 AM       PT/INR:    Lab Results   Component Value Date/Time    PROTIME 13.7 04/27/2022 12:56 PM    INR 1.3 04/27/2022 12:56 PM       TSH:    Lab Results   Component Value Date/Time    TSH 0.022 07/07/2012 11:16 AM       U/A:  No results found for: NITRITE, COLORU, PHUR, LABCAST, Sadaf Estrada, MUCUS, TRICHOMONAS, YEAST, BACTERIA, CLARITYU, SPECGRAV, LEUKOCYTESUR, UROBILINOGEN, BILIRUBINUR, BLOODU, GLUCOSEU, AMORPHOUS    ABG:  No results found for: YPQ2ENU, BEART, O5SQPTEW, PHART, THGBART, TZG0NJI, PO2ART, GGL7AII    MICROBIOLOGY:    Blood culture - neg       Wound Culture -pending       IMPRESSION:     Morbid obesity, chronic venous insufficiency , cellulitis left leg     RECOMMENDATIONS:     Vancomycin 1250 mg IV q 12 hrs and meropenem 1 gram IV q 8 hrs ( hx of Acinetobacter, Serratia, Staph )   Leg elevation, diuretics       Thank you Dr Mirna Tejada for the consult

## 2023-02-01 LAB
ANION GAP SERPL CALCULATED.3IONS-SCNC: 11 MMOL/L (ref 7–16)
BUN BLDV-MCNC: 17 MG/DL (ref 6–23)
CALCIUM SERPL-MCNC: 9.5 MG/DL (ref 8.6–10.2)
CHLORIDE BLD-SCNC: 104 MMOL/L (ref 98–107)
CO2: 27 MMOL/L (ref 22–29)
CREAT SERPL-MCNC: 1 MG/DL (ref 0.7–1.2)
GFR SERPL CREATININE-BSD FRML MDRD: >60 ML/MIN/1.73
GLUCOSE BLD-MCNC: 211 MG/DL (ref 74–99)
METER GLUCOSE: 189 MG/DL (ref 74–99)
METER GLUCOSE: 203 MG/DL (ref 74–99)
METER GLUCOSE: 220 MG/DL (ref 74–99)
POTASSIUM SERPL-SCNC: 3.9 MMOL/L (ref 3.5–5)
SODIUM BLD-SCNC: 142 MMOL/L (ref 132–146)

## 2023-02-01 PROCEDURE — 82962 GLUCOSE BLOOD TEST: CPT

## 2023-02-01 PROCEDURE — 1200000000 HC SEMI PRIVATE

## 2023-02-01 PROCEDURE — 6360000002 HC RX W HCPCS: Performed by: NURSE PRACTITIONER

## 2023-02-01 PROCEDURE — 2580000003 HC RX 258: Performed by: NURSE PRACTITIONER

## 2023-02-01 PROCEDURE — 36415 COLL VENOUS BLD VENIPUNCTURE: CPT

## 2023-02-01 PROCEDURE — 6360000002 HC RX W HCPCS: Performed by: INTERNAL MEDICINE

## 2023-02-01 PROCEDURE — 80048 BASIC METABOLIC PNL TOTAL CA: CPT

## 2023-02-01 PROCEDURE — 2700000000 HC OXYGEN THERAPY PER DAY

## 2023-02-01 PROCEDURE — 6370000000 HC RX 637 (ALT 250 FOR IP): Performed by: INTERNAL MEDICINE

## 2023-02-01 PROCEDURE — S5553 INSULIN LONG ACTING 5 U: HCPCS | Performed by: INTERNAL MEDICINE

## 2023-02-01 PROCEDURE — 2580000003 HC RX 258: Performed by: INTERNAL MEDICINE

## 2023-02-01 PROCEDURE — 6370000000 HC RX 637 (ALT 250 FOR IP): Performed by: NURSE PRACTITIONER

## 2023-02-01 RX ADMIN — CARVEDILOL 25 MG: 25 TABLET, FILM COATED ORAL at 08:23

## 2023-02-01 RX ADMIN — ENOXAPARIN SODIUM 40 MG: 100 INJECTION SUBCUTANEOUS at 08:22

## 2023-02-01 RX ADMIN — MEROPENEM 1000 MG: 1 INJECTION, POWDER, FOR SOLUTION INTRAVENOUS at 14:46

## 2023-02-01 RX ADMIN — LEVOTHYROXINE SODIUM 100 MCG: 0.05 TABLET ORAL at 06:17

## 2023-02-01 RX ADMIN — POTASSIUM CHLORIDE 20 MEQ: 1500 TABLET, EXTENDED RELEASE ORAL at 08:23

## 2023-02-01 RX ADMIN — CARVEDILOL 25 MG: 25 TABLET, FILM COATED ORAL at 17:46

## 2023-02-01 RX ADMIN — INSULIN GLARGINE-YFGN 12 UNITS: 100 INJECTION, SOLUTION SUBCUTANEOUS at 08:22

## 2023-02-01 RX ADMIN — ASPIRIN 81 MG: 81 TABLET, COATED ORAL at 08:23

## 2023-02-01 RX ADMIN — SODIUM CHLORIDE, PRESERVATIVE FREE 10 ML: 5 INJECTION INTRAVENOUS at 08:27

## 2023-02-01 RX ADMIN — ROSUVASTATIN 5 MG: 10 TABLET, FILM COATED ORAL at 08:23

## 2023-02-01 RX ADMIN — FUROSEMIDE 40 MG: 40 TABLET ORAL at 08:22

## 2023-02-01 RX ADMIN — INSULIN LISPRO 1 UNITS: 100 INJECTION, SOLUTION INTRAVENOUS; SUBCUTANEOUS at 11:54

## 2023-02-01 RX ADMIN — LIOTHYRONINE SODIUM 25 MCG: 25 TABLET ORAL at 08:22

## 2023-02-01 RX ADMIN — INSULIN LISPRO 2 UNITS: 100 INJECTION, SOLUTION INTRAVENOUS; SUBCUTANEOUS at 17:46

## 2023-02-01 RX ADMIN — VANCOMYCIN HYDROCHLORIDE 1250 MG: 10 INJECTION, POWDER, LYOPHILIZED, FOR SOLUTION INTRAVENOUS at 11:56

## 2023-02-01 NOTE — PROGRESS NOTES
Physician Progress Note      PATIENT:               Ani Mcqueen  CSN #:                  [id number]  :                       1950  ADMIT DATE:       2023 11:50 AM  DISCH DATE:  Corin Ferguson  PROVIDER #:        Idris Nair DO          QUERY TEXT:    Pt admitted with Left lower leg cellulitis. Pt noted to have DM type 2. If   possible, please document in progress notes and discharge summary the   relationship, if any, between cellulitis and DM. The medical record reflects the following:  Risk Factors: Diabetes type 2; chronic venous insufficiency; Morbid obesity;  Clinical Indicators:  23: Per H&P: Wound infection; Cellulitis; DM II;  23: Per ID: left leg pain, redness and drainage for several weeks, he was   placed on oral AXB without improvement  23: Hemoglobin A1C 8.2;  POCT glucose: 176-244  23: POCT glucose 183-263;  SED rate 31; CRP 5.3  23: POCT glucose 189  Treatment: Infectious Disease; Vancomycin IV; Meropenem IV:; POCT QID with   SSI; hemoglobin A1C    Thank You,  Raya ABRAHAMN, R.N.  Clinical Documentation Improvement  [phone]  Options provided:  -- Left lower leg cellulitis associated with Diabetes  -- Left lower leg cellulitis unrelated to Diabetes  -- Other - I will add my own diagnosis  -- Disagree - Not applicable / Not valid  -- Disagree - Clinically unable to determine / Unknown  -- Refer to Clinical Documentation Reviewer    PROVIDER RESPONSE TEXT:    Left lower leg cellulitis associated with Diabetes.     Query created by: Vanessa Thomas on 2023 6:47 AM      Electronically signed by:  Idris Nair DO 2023 8:06 AM

## 2023-02-01 NOTE — PLAN OF CARE
Problem: Discharge Planning  Goal: Discharge to home or other facility with appropriate resources  1/31/2023 2330 by Zully Peters RN  Outcome: Progressing     Problem: Pain  Goal: Verbalizes/displays adequate comfort level or baseline comfort level  1/31/2023 2330 by Zully Peters RN  Outcome: Progressing     Problem: Skin/Tissue Integrity  Goal: Absence of new skin breakdown  Description: 1. Monitor for areas of redness and/or skin breakdown  2. Assess vascular access sites hourly  3. Every 4-6 hours minimum:  Change oxygen saturation probe site  4. Every 4-6 hours:  If on nasal continuous positive airway pressure, respiratory therapy assess nares and determine need for appliance change or resting period. 1/31/2023 2330 by Zully Peters RN  Outcome: Progressing     Problem: Skin/Tissue Integrity  Goal: Absence of new skin breakdown  Description: 1. Monitor for areas of redness and/or skin breakdown  2. Assess vascular access sites hourly  3. Every 4-6 hours minimum:  Change oxygen saturation probe site  4. Every 4-6 hours:  If on nasal continuous positive airway pressure, respiratory therapy assess nares and determine need for appliance change or resting period.   1/31/2023 2330 by Zully Peters RN  Outcome: Progressing     Problem: Safety - Adult  Goal: Free from fall injury  1/31/2023 2330 by Zully Peters RN  Outcome: Progressing     Problem: Safety - Adult  Goal: Free from fall injury  1/31/2023 2330 by Zully Peters RN  Outcome: Progressing     Problem: ABCDS Injury Assessment  Goal: Absence of physical injury  1/31/2023 2330 by Zully Peters RN  Outcome: Progressing

## 2023-02-01 NOTE — PLAN OF CARE
Problem: Chronic Conditions and Co-morbidities  Goal: Patient's chronic conditions and co-morbidity symptoms are monitored and maintained or improved  Outcome: Progressing     Problem: Discharge Planning  Goal: Discharge to home or other facility with appropriate resources  2/1/2023 0752 by Lucio Ta RN  Outcome: Progressing  1/31/2023 2330 by Maribeth Jain RN  Outcome: Progressing     Problem: Pain  Goal: Verbalizes/displays adequate comfort level or baseline comfort level  2/1/2023 0752 by Lucio Ta RN  Outcome: Progressing  1/31/2023 2330 by Maribeth Jain RN  Outcome: Progressing     Problem: Skin/Tissue Integrity  Goal: Absence of new skin breakdown  Description: 1. Monitor for areas of redness and/or skin breakdown  2. Assess vascular access sites hourly  3. Every 4-6 hours minimum:  Change oxygen saturation probe site  4. Every 4-6 hours:  If on nasal continuous positive airway pressure, respiratory therapy assess nares and determine need for appliance change or resting period.   2/1/2023 0752 by Lucio Ta RN  Outcome: Progressing  1/31/2023 2330 by Maribeth Jain RN  Outcome: Progressing     Problem: Safety - Adult  Goal: Free from fall injury  2/1/2023 0752 by Lucio Ta RN  Outcome: Progressing  1/31/2023 2330 by Maribeth Jain RN  Outcome: Progressing     Problem: ABCDS Injury Assessment  Goal: Absence of physical injury  2/1/2023 0752 by Lucio Ta RN  Outcome: Progressing  1/31/2023 2330 by Maribeth Jain RN  Outcome: Progressing

## 2023-02-01 NOTE — PROGRESS NOTES
Hospitalist Progress Note      PCP: Zully Hernandez MD    Date of Admission: 1/30/2023        Hospital Course:  74 y.o. male presented with LLE INFECTION, FOUND TO HAVE CELLULITIS, ONSET 2 WEEKS AGO, BEGAN TO HAVE YELLOW TO GREEN DRAINAGE ON LEG, NO FEVER NO CHILLS, BUT EDEMA AND ERYTHEMA         Subjective:  WANTS TO KNOW WHY I AM HERE           Medications:  Reviewed    Infusion Medications    sodium chloride 50 mL/hr at 01/30/23 2214    dextrose       Scheduled Medications    insulin glargine  12 Units SubCUTAneous BID    vancomycin  1,250 mg IntraVENous Q12H    meropenem  1,000 mg IntraVENous Q24H    aspirin  81 mg Oral Daily    carvedilol  25 mg Oral BID WC    furosemide  40 mg Oral Daily    latanoprost  1 drop Both Eyes Nightly    levothyroxine  100 mcg Oral Daily    liothyronine  25 mcg Oral Daily    potassium chloride  20 mEq Oral Daily    ramipril  5 mg Oral Nightly    rosuvastatin  5 mg Oral Daily    tamsulosin  0.4 mg Oral Nightly    sodium chloride flush  10 mL IntraVENous 2 times per day    enoxaparin  40 mg SubCUTAneous BID    insulin lispro  0-8 Units SubCUTAneous TID WC    insulin lispro  0-4 Units SubCUTAneous Nightly     PRN Meds: albuterol, sodium chloride flush, sodium chloride, ondansetron **OR** ondansetron, magnesium hydroxide, acetaminophen **OR** acetaminophen, glucose, dextrose bolus **OR** dextrose bolus, glucagon (rDNA), dextrose      Intake/Output Summary (Last 24 hours) at 2/1/2023 1615  Last data filed at 1/31/2023 2325  Gross per 24 hour   Intake 450 ml   Output --   Net 450 ml       Exam:    BP (!) 141/74   Pulse 71   Temp 97 °F (36.1 °C) (Temporal)   Resp 18   Ht 5' 7\" (1.702 m)   Wt (!) 370 lb (167.8 kg)   SpO2 96%   BMI 57.95 kg/m²       General appearance:  No apparent distress, appears stated age. MORBIDLY OBESE  HEENT:  Normal cephalic,   Neck: Supple, with full range of motion. Trachea midline. Respiratory:  Normal respiratory effort.  Clear to auscultation, Cardiovascular:  RRR  Abdomen: Soft, non-tender, non-distended with normal bowel sounds. Musculoskeletal:  No clubbing, cyanosis oPOS edema bilaterally. DSD INTACT LLE  Skin: smooth and dry   Neurologic:   Cranial nerves: II-XII intact,   Psychiatric:  Alert and oriented x 3             Labs:   Recent Labs     01/30/23  1136 01/31/23  0410   WBC 7.3 6.2   HGB 13.8 12.3*   HCT 41.9 37.6    140     Recent Labs     01/30/23  1136 01/31/23  0410 02/01/23  0432    138 142   K 4.2 3.9 3.9    103 104   CO2 25 25 27   BUN 22 18 17   CREATININE 1.0 0.8 1.0   CALCIUM 9.5 8.8 9.5     Recent Labs     01/30/23  1136   AST 12   ALT 13   BILITOT 0.6   ALKPHOS 74     No results for input(s): INR in the last 72 hours. No results for input(s): Randene Holes in the last 72 hours. Recent Labs     01/30/23  1136   AST 12   ALT 13   BILITOT 0.6   ALKPHOS 74     Recent Labs     01/30/23  1136   LACTA 1.2     No results found for: Sheila Kam  No results found for: AMMONIA    Assessment:    Active Hospital Problems    Diagnosis Date Noted    Wound infection [T14. 8XXA, L08.9] 01/30/2023     Priority: Medium    Cellulitis [L03.90] 07/06/2012      Cellulitis [L03.90] 07/06/2012   MORBID OBESITY   PACEMAKER   II DM   HTN   HYPOTHYROID   BPH        PLAN:  ID(VANC AND MERREM)   SYNTHROID   ALTACE   VANC   FLOMAX   SSI           DVT Prophylaxis:  LOVENOX   Diet: ADULT DIET;  Regular; 4 carb choices (60 gm/meal)  Code Status: Full Code     PT/OT Eval Status:  ORDERED     Dispo - HOME    Electronically signed by Jose Altamirano DO on 2/1/2023 at [address] [place] [address] Drive

## 2023-02-01 NOTE — PROGRESS NOTES
Department of Internal Medicine  Infectious Diseases  Progress  Note      C/C : Cellulitis left leg     Denies fever or chills  Reports leg pain,swelling, drainage   Afebrile       Current Facility-Administered Medications   Medication Dose Route Frequency Provider Last Rate Last Admin    insulin glargine-yfgn (SEMGLEE-YFGN) injection vial 12 Units  12 Units SubCUTAneous BID Cheryl Bautista DO   12 Units at 02/01/23 9635    vancomycin (VANCOCIN) 1,250 mg in sodium chloride 0.9 % 250 mL IVPB  1,250 mg IntraVENous Q12H Dexter Pugh MD   Stopped at 02/01/23 0054    meropenem (MERREM) 1,000 mg in sodium chloride 0.9 % 100 mL IVPB (mini-bag)  1,000 mg IntraVENous Q24H Dexter Pugh MD   Stopped at 01/31/23 1655    albuterol (ACCUNEB) nebulizer solution 0.63 mg  1 ampule Nebulization Q6H PRN Ronel Zamorano APRN - CNP        aspirin EC tablet 81 mg  81 mg Oral Daily Ronel Zamorano APRN - CNP   81 mg at 02/01/23 0823    carvedilol (COREG) tablet 25 mg  25 mg Oral BID WC Ronel Zamorano, APRN - CNP   25 mg at 02/01/23 0823    furosemide (LASIX) tablet 40 mg  40 mg Oral Daily SUSANNA Bermudez - CNP   40 mg at 02/01/23 0822    latanoprost (XALATAN) 0.005 % ophthalmic solution 1 drop  1 drop Both Eyes Nightly Ronel Zamorano APRN - CNP   1 drop at 01/31/23 2017    levothyroxine (SYNTHROID) tablet 100 mcg  100 mcg Oral Daily Ronel Zamorano APRN - CNP   100 mcg at 02/01/23 0617    liothyronine (CYTOMEL) tablet 25 mcg  25 mcg Oral Daily Ronel Zamorano, APRN - CNP   25 mcg at 02/01/23 5878    potassium chloride (KLOR-CON M) extended release tablet 20 mEq  20 mEq Oral Daily SUSANNA Bermudez - CNP   20 mEq at 02/01/23 0823    ramipril (ALTACE) capsule 5 mg  5 mg Oral Nightly Ronel Zamorano APRN - CNP   5 mg at 01/31/23 2015    rosuvastatin (CRESTOR) tablet 5 mg  5 mg Oral Daily [place] Lona, APRN - CNP   5 mg at 02/01/23 0823    tamsulosin (FLOMAX) capsule 0.4 mg  0.4 mg Oral Nightly Ronel Zamorano APRN - CNP   0.4 mg at 01/31/23 2015    sodium chloride flush 0.9 % injection 10 mL  10 mL IntraVENous 2 times per day Serene Trent, APRN - CNP   10 mL at 02/01/23 0827    sodium chloride flush 0.9 % injection 10 mL  10 mL IntraVENous PRN Serene Mikua, APRN - CNP   10 mL at 01/31/23 1654    0.9 % sodium chloride infusion   IntraVENous PRN Serclair Newman, APRN - CNP 50 mL/hr at 01/30/23 2214 New Bag at 01/30/23 2214    enoxaparin (LOVENOX) injection 40 mg  40 mg SubCUTAneous BID Serclair Newman, APRN - CNP   40 mg at 02/01/23 0822    ondansetron (ZOFRAN-ODT) disintegrating tablet 4 mg  4 mg Oral Q8H PRN Serene Trent, APRN - CNP        Or    ondansetron (ZOFRAN) injection 4 mg  4 mg IntraVENous Q6H PRN Serclair Newman, APRN - CNP        magnesium hydroxide (MILK OF MAGNESIA) 400 MG/5ML suspension 30 mL  30 mL Oral Daily PRN Serene Trent, APRN - CNP        acetaminophen (TYLENOL) tablet 650 mg  650 mg Oral Q6H PRN Serene Trent, APRN - CNP   650 mg at 01/31/23 1649    Or    acetaminophen (TYLENOL) suppository 650 mg  650 mg Rectal Q6H PRN Serene Trent, APRN - CNP        glucose chewable tablet 16 g  4 tablet Oral PRN Serene Trent, APRN - CNP        dextrose bolus 10% 125 mL  125 mL IntraVENous PRN Serene Mikua, APRN - CNP        Or    dextrose bolus 10% 250 mL  250 mL IntraVENous PRN Serene Trent, APRN - CNP        glucagon injection 1 mg  1 mg SubCUTAneous PRN Serene Trent, APRN - CNP        dextrose 10 % infusion   IntraVENous Continuous PRN Serene Trent, APRN - CNP        insulin lispro (HUMALOG) injection vial 0-8 Units  0-8 Units SubCUTAneous TID WC Serene Trent, APRN - CNP   4 Units at 01/31/23 1255    insulin lispro (HUMALOG) injection vial 0-4 Units  0-4 Units SubCUTAneous Nightly Serene [place]ty[place], APRN - CNP           REVIEW OF SYSTEMS:    CONSTITUTIONAL:  Denies fever, chill or rigors.   HEENT: denies blurring of vision or double vision, denies hearing problem  RESPIRATORY: denies cough, shortness of breath, sputum expectoration. CARDIOVASCULAR:  Denies palpitation or chest pain   GASTROINTESTINAL:  Denies abdomen pain, diarrhea or constipation,, nausea or vomiting. GENITOURINARY:  Denies burning urination or frequency of urination  INTEGUMENT: left leg redness , pain   HEMATOLOGIC/LYMPHATIC:  Denies lymph node swelling, gum bleeding or easy bruising. MUSCULOSKELETAL:  Bilateral chronic leg swelling   NEUROLOGICAL:  Denies light headed, dizziness, loss of consciousness, weakness of lower extremities, bowel or bladder incontinence. PHYSICAL EXAM:      Vitals:     Vitals:    02/01/23 0806   BP: (!) 141/74   Pulse: 71   Resp: 18   Temp: 97 °F (36.1 °C)   SpO2: 96%       General Appearance:    Awake, alert , no acute distress, morbidly obese . Head:    Normocephalic, atraumatic   Eyes:    No pallor, no icterus,   Ears:    No obvious deformity or drainage.    Nose:   No nasal drainage   Throat:   Mucosa moist, no oral thrush   Neck:   Supple, no lymphadenopathy   Back:     no CVA tenderness   Lungs:     Clear to auscultation bilaterally   Heart:    Regular rate and rhythm, no murmur   Abdomen:     Soft, non-tender, bowel sounds present    Extremities:    Bilateral pitting edema, erythema left leg, drainage'   Pulses:   Dorsalis pedis palpable    Skin:   Erythema left leg, desquamation      CBC with Differential:      Lab Results   Component Value Date/Time    WBC 6.2 01/31/2023 04:10 AM    RBC 4.30 01/31/2023 04:10 AM    HGB 12.3 01/31/2023 04:10 AM    HCT 37.6 01/31/2023 04:10 AM     01/31/2023 04:10 AM    MCV 87.4 01/31/2023 04:10 AM    MCH 28.6 01/31/2023 04:10 AM    MCHC 32.7 01/31/2023 04:10 AM    RDW 13.8 01/31/2023 04:10 AM    SEGSPCT 69 07/07/2012 03:50 AM    LYMPHOPCT 20.5 01/31/2023 04:10 AM    MONOPCT 11.1 01/31/2023 04:10 AM    BASOPCT 0.5 01/31/2023 04:10 AM    MONOSABS 0.69 01/31/2023 04:10 AM    LYMPHSABS 1.27 01/31/2023 04:10 AM    EOSABS 0.23 01/31/2023 04:10 AM    BASOSABS 0.03 01/31/2023 04:10 AM       CMP     Lab Results   Component Value Date/Time     02/01/2023 04:32 AM    K 3.9 02/01/2023 04:32 AM    K 3.9 01/31/2023 04:10 AM     02/01/2023 04:32 AM    CO2 27 02/01/2023 04:32 AM    BUN 17 02/01/2023 04:32 AM    CREATININE 1.0 02/01/2023 04:32 AM    GFRAA >60 07/23/2020 07:30 AM    LABGLOM >60 02/01/2023 04:32 AM    GLUCOSE 211 02/01/2023 04:32 AM    PROT 7.6 01/30/2023 11:36 AM    LABALBU 3.6 01/30/2023 11:36 AM    CALCIUM 9.5 02/01/2023 04:32 AM    BILITOT 0.6 01/30/2023 11:36 AM    ALKPHOS 74 01/30/2023 11:36 AM    AST 12 01/30/2023 11:36 AM    ALT 13 01/30/2023 11:36 AM         Hepatic Function Panel:    Lab Results   Component Value Date/Time    ALKPHOS 74 01/30/2023 11:36 AM    ALT 13 01/30/2023 11:36 AM    AST 12 01/30/2023 11:36 AM    PROT 7.6 01/30/2023 11:36 AM    BILITOT 0.6 01/30/2023 11:36 AM    LABALBU 3.6 01/30/2023 11:36 AM       PT/INR:    Lab Results   Component Value Date/Time    PROTIME 13.7 04/27/2022 12:56 PM    INR 1.3 04/27/2022 12:56 PM       TSH:    Lab Results   Component Value Date/Time    TSH 0.022 07/07/2012 11:16 AM       U/A:  No results found for: NITRITE, COLORU, PHUR, LABCAST, WBCUA, RBCUA, MUCUS, TRICHOMONAS, YEAST, BACTERIA, CLARITYU, SPECGRAV, LEUKOCYTESUR, UROBILINOGEN, BILIRUBINUR, BLOODU, GLUCOSEU, AMORPHOUS    ABG:  No results found for: KZV0YZE, BEART, W8PVDHJW, PHART, THGBART, IWP7ZRO, PO2ART, ELD5LYR    MICROBIOLOGY:    Blood culture - neg       Wound Culture     WOUND/ABSCESS  Abnormal   Growth present, evaluating for:   Mixed Gram positive organisms   Gram negative rods   P      Organism Staphylococcus aureus Abnormal  P    WOUND/ABSCESS Heavy growth   Sensitivity to follow  P      Resulting Agency [address] [facility]           Narrative  Performed by: [address] [facility]  Source: SKIN       Site: Leg                Specimen Collected: 01/30/23 12:43 EST IMPRESSION:     Morbid obesity, chronic venous insufficiency , cellulitis left leg     RECOMMENDATIONS:     Vancomycin 1250 mg IV q 12 hrs and meropenem 1 gram IV q 8 hrs ( hx of Acinetobacter, Serratia, Staph )   Leg elevation, diuretics

## 2023-02-02 LAB
ANION GAP SERPL CALCULATED.3IONS-SCNC: 9 MMOL/L (ref 7–16)
BUN BLDV-MCNC: 19 MG/DL (ref 6–23)
CALCIUM SERPL-MCNC: 9.4 MG/DL (ref 8.6–10.2)
CHLORIDE BLD-SCNC: 105 MMOL/L (ref 98–107)
CO2: 27 MMOL/L (ref 22–29)
CREAT SERPL-MCNC: 0.9 MG/DL (ref 0.7–1.2)
GFR SERPL CREATININE-BSD FRML MDRD: >60 ML/MIN/1.73
GLUCOSE BLD-MCNC: 166 MG/DL (ref 74–99)
HCT VFR BLD CALC: 40.1 % (ref 37–54)
HEMOGLOBIN: 13 G/DL (ref 12.5–16.5)
MCH RBC QN AUTO: 28.6 PG (ref 26–35)
MCHC RBC AUTO-ENTMCNC: 32.4 % (ref 32–34.5)
MCV RBC AUTO: 88.1 FL (ref 80–99.9)
METER GLUCOSE: 162 MG/DL (ref 74–99)
METER GLUCOSE: 165 MG/DL (ref 74–99)
METER GLUCOSE: 187 MG/DL (ref 74–99)
METER GLUCOSE: 207 MG/DL (ref 74–99)
METER GLUCOSE: 254 MG/DL (ref 74–99)
ORGANISM: ABNORMAL
PDW BLD-RTO: 13.7 FL (ref 11.5–15)
PLATELET # BLD: 144 E9/L (ref 130–450)
PMV BLD AUTO: 9.3 FL (ref 7–12)
POTASSIUM SERPL-SCNC: 4 MMOL/L (ref 3.5–5)
RBC # BLD: 4.55 E12/L (ref 3.8–5.8)
SODIUM BLD-SCNC: 141 MMOL/L (ref 132–146)
WBC # BLD: 5.9 E9/L (ref 4.5–11.5)
WOUND/ABSCESS: ABNORMAL
WOUND/ABSCESS: ABNORMAL

## 2023-02-02 PROCEDURE — 6370000000 HC RX 637 (ALT 250 FOR IP): Performed by: INTERNAL MEDICINE

## 2023-02-02 PROCEDURE — 80048 BASIC METABOLIC PNL TOTAL CA: CPT

## 2023-02-02 PROCEDURE — 1200000000 HC SEMI PRIVATE

## 2023-02-02 PROCEDURE — 6360000002 HC RX W HCPCS: Performed by: INTERNAL MEDICINE

## 2023-02-02 PROCEDURE — 82962 GLUCOSE BLOOD TEST: CPT

## 2023-02-02 PROCEDURE — 2580000003 HC RX 258: Performed by: INTERNAL MEDICINE

## 2023-02-02 PROCEDURE — 2580000003 HC RX 258: Performed by: NURSE PRACTITIONER

## 2023-02-02 PROCEDURE — S5553 INSULIN LONG ACTING 5 U: HCPCS | Performed by: INTERNAL MEDICINE

## 2023-02-02 PROCEDURE — 36415 COLL VENOUS BLD VENIPUNCTURE: CPT

## 2023-02-02 PROCEDURE — 85027 COMPLETE CBC AUTOMATED: CPT

## 2023-02-02 PROCEDURE — 6370000000 HC RX 637 (ALT 250 FOR IP): Performed by: NURSE PRACTITIONER

## 2023-02-02 PROCEDURE — 6360000002 HC RX W HCPCS: Performed by: NURSE PRACTITIONER

## 2023-02-02 RX ADMIN — LIOTHYRONINE SODIUM 25 MCG: 25 TABLET ORAL at 08:26

## 2023-02-02 RX ADMIN — ROSUVASTATIN 5 MG: 10 TABLET, FILM COATED ORAL at 08:26

## 2023-02-02 RX ADMIN — MEROPENEM 1000 MG: 1 INJECTION, POWDER, FOR SOLUTION INTRAVENOUS at 14:26

## 2023-02-02 RX ADMIN — SODIUM CHLORIDE, PRESERVATIVE FREE 10 ML: 5 INJECTION INTRAVENOUS at 20:06

## 2023-02-02 RX ADMIN — ASPIRIN 81 MG: 81 TABLET, COATED ORAL at 08:26

## 2023-02-02 RX ADMIN — POTASSIUM CHLORIDE 20 MEQ: 1500 TABLET, EXTENDED RELEASE ORAL at 08:26

## 2023-02-02 RX ADMIN — FUROSEMIDE 40 MG: 40 TABLET ORAL at 08:26

## 2023-02-02 RX ADMIN — RAMIPRIL 5 MG: 5 CAPSULE ORAL at 00:25

## 2023-02-02 RX ADMIN — INSULIN LISPRO 4 UNITS: 100 INJECTION, SOLUTION INTRAVENOUS; SUBCUTANEOUS at 11:20

## 2023-02-02 RX ADMIN — ENOXAPARIN SODIUM 40 MG: 100 INJECTION SUBCUTANEOUS at 00:24

## 2023-02-02 RX ADMIN — SODIUM CHLORIDE, PRESERVATIVE FREE 10 ML: 5 INJECTION INTRAVENOUS at 08:28

## 2023-02-02 RX ADMIN — VANCOMYCIN HYDROCHLORIDE 1250 MG: 10 INJECTION, POWDER, LYOPHILIZED, FOR SOLUTION INTRAVENOUS at 11:19

## 2023-02-02 RX ADMIN — MEROPENEM 1000 MG: 1 INJECTION, POWDER, FOR SOLUTION INTRAVENOUS at 20:15

## 2023-02-02 RX ADMIN — TAMSULOSIN HYDROCHLORIDE 0.4 MG: 0.4 CAPSULE ORAL at 00:24

## 2023-02-02 RX ADMIN — TAMSULOSIN HYDROCHLORIDE 0.4 MG: 0.4 CAPSULE ORAL at 20:20

## 2023-02-02 RX ADMIN — INSULIN GLARGINE-YFGN 12 UNITS: 100 INJECTION, SOLUTION SUBCUTANEOUS at 00:26

## 2023-02-02 RX ADMIN — LATANOPROST 1 DROP: 50 SOLUTION OPHTHALMIC at 20:34

## 2023-02-02 RX ADMIN — SODIUM CHLORIDE, PRESERVATIVE FREE 10 ML: 5 INJECTION INTRAVENOUS at 00:25

## 2023-02-02 RX ADMIN — CARVEDILOL 25 MG: 25 TABLET, FILM COATED ORAL at 16:55

## 2023-02-02 RX ADMIN — ENOXAPARIN SODIUM 40 MG: 100 INJECTION SUBCUTANEOUS at 20:20

## 2023-02-02 RX ADMIN — LEVOTHYROXINE SODIUM 100 MCG: 0.05 TABLET ORAL at 06:52

## 2023-02-02 RX ADMIN — VANCOMYCIN HYDROCHLORIDE 1250 MG: 10 INJECTION, POWDER, LYOPHILIZED, FOR SOLUTION INTRAVENOUS at 00:25

## 2023-02-02 RX ADMIN — VANCOMYCIN HYDROCHLORIDE 1250 MG: 10 INJECTION, POWDER, LYOPHILIZED, FOR SOLUTION INTRAVENOUS at 23:30

## 2023-02-02 RX ADMIN — INSULIN GLARGINE-YFGN 12 UNITS: 100 INJECTION, SOLUTION SUBCUTANEOUS at 20:35

## 2023-02-02 RX ADMIN — RAMIPRIL 5 MG: 5 CAPSULE ORAL at 20:20

## 2023-02-02 RX ADMIN — CARVEDILOL 25 MG: 25 TABLET, FILM COATED ORAL at 08:26

## 2023-02-02 RX ADMIN — ENOXAPARIN SODIUM 40 MG: 100 INJECTION SUBCUTANEOUS at 08:27

## 2023-02-02 RX ADMIN — LATANOPROST 1 DROP: 50 SOLUTION OPHTHALMIC at 00:25

## 2023-02-02 RX ADMIN — INSULIN GLARGINE-YFGN 12 UNITS: 100 INJECTION, SOLUTION SUBCUTANEOUS at 11:20

## 2023-02-02 ASSESSMENT — PAIN DESCRIPTION - DESCRIPTORS: DESCRIPTORS: SORE;TENDER;DISCOMFORT

## 2023-02-02 ASSESSMENT — PAIN - FUNCTIONAL ASSESSMENT: PAIN_FUNCTIONAL_ASSESSMENT: ACTIVITIES ARE NOT PREVENTED

## 2023-02-02 ASSESSMENT — PAIN DESCRIPTION - ORIENTATION: ORIENTATION: LEFT

## 2023-02-02 ASSESSMENT — PAIN SCALES - GENERAL
PAINLEVEL_OUTOF10: 4
PAINLEVEL_OUTOF10: 0

## 2023-02-02 ASSESSMENT — PAIN DESCRIPTION - LOCATION: LOCATION: ANKLE

## 2023-02-02 ASSESSMENT — PAIN DESCRIPTION - PAIN TYPE: TYPE: CHRONIC PAIN

## 2023-02-02 ASSESSMENT — PAIN DESCRIPTION - FREQUENCY: FREQUENCY: INTERMITTENT

## 2023-02-02 ASSESSMENT — PAIN DESCRIPTION - ONSET: ONSET: GRADUAL

## 2023-02-02 NOTE — PROGRESS NOTES
Hospitalist Progress Note      PCP: Kayden Au MD    Date of Admission: 1/30/2023        Hospital Course:  74 y.o. male presented with LLE INFECTION, FOUND TO HAVE CELLULITIS, ONSET 2 WEEKS AGO, BEGAN TO HAVE YELLOW TO GREEN DRAINAGE ON LEG, NO FEVER NO CHILLS, BUT EDEMA AND ERYTHEMA         Subjective:  Lying in bed with cpap on  Denies chest pain and sob  RLE swollen and draining          Medications:  Reviewed    Infusion Medications    sodium chloride 50 mL/hr at 01/30/23 2214    dextrose       Scheduled Medications    meropenem  1,000 mg IntraVENous Q8H    insulin glargine  12 Units SubCUTAneous BID    vancomycin  1,250 mg IntraVENous Q12H    aspirin  81 mg Oral Daily    carvedilol  25 mg Oral BID WC    furosemide  40 mg Oral Daily    latanoprost  1 drop Both Eyes Nightly    levothyroxine  100 mcg Oral Daily    liothyronine  25 mcg Oral Daily    potassium chloride  20 mEq Oral Daily    ramipril  5 mg Oral Nightly    rosuvastatin  5 mg Oral Daily    tamsulosin  0.4 mg Oral Nightly    sodium chloride flush  10 mL IntraVENous 2 times per day    enoxaparin  40 mg SubCUTAneous BID    insulin lispro  0-8 Units SubCUTAneous TID WC    insulin lispro  0-4 Units SubCUTAneous Nightly     PRN Meds: white petrolatum, albuterol, sodium chloride flush, sodium chloride, ondansetron **OR** ondansetron, magnesium hydroxide, acetaminophen **OR** acetaminophen, glucose, dextrose bolus **OR** dextrose bolus, glucagon (rDNA), dextrose      Intake/Output Summary (Last 24 hours) at 2/2/2023 1607  Last data filed at 2/2/2023 1420  Gross per 24 hour   Intake 490 ml   Output --   Net 490 ml       Exam:    /65   Pulse 74   Temp 96.8 °F (36 °C) (Temporal)   Resp 19   Ht 5' 7\" (1.702 m)   Wt (!) 370 lb (167.8 kg)   SpO2 93%   BMI 57.95 kg/m²       General appearance:  No apparent distress, appears stated age. MORBIDLY OBESE  HEENT:  Normal cephalic,   Neck: Supple, with full range of motion.  Trachea midline. Respiratory:  Normal respiratory effort. Clear to auscultation,   Cardiovascular:  RRR  Abdomen: Soft, non-tender, non-distended with normal bowel sounds. Musculoskeletal:  No clubbing, cyanosis oPOS edema bilaterally. DSD INTACT LLE  Skin: smooth and dry   Neurologic:   Cranial nerves: II-XII intact,   Psychiatric:  Alert and oriented x 3             Labs:   Recent Labs     01/31/23  0410 02/02/23  1048   WBC 6.2 5.9   HGB 12.3* 13.0   HCT 37.6 40.1    144     Recent Labs     01/31/23  0410 02/01/23  0432 02/02/23  0414    142 141   K 3.9 3.9 4.0    104 105   CO2 25 27 27   BUN 18 17 19   CREATININE 0.8 1.0 0.9   CALCIUM 8.8 9.5 9.4     No results for input(s): AST, ALT, BILIDIR, BILITOT, ALKPHOS in the last 72 hours. No results for input(s): INR in the last 72 hours. No results for input(s): Niki Earnest in the last 72 hours. No results for input(s): AST, ALT, ALB, BILIDIR, BILITOT, ALKPHOS in the last 72 hours. No results for input(s): LACTA in the last 72 hours. No results found for: Ariana Bounds  No results found for: AMMONIA    Assessment:    Active Hospital Problems    Diagnosis Date Noted    Wound infection [T14. 8XXA, L08.9] 01/30/2023     Priority: Medium    Cellulitis [L03.90] 07/06/2012      Cellulitis [L03.90] 07/06/2012   MORBID OBESITY   PACEMAKER   II DM   HTN   HYPOTHYROID   BPH        PLAN:  ID(VANC AND MERREM)   SYNTHROID   ALTACE   VANC   FLOMAX   SSI    2/2/2023  Continue IV antibiotics  Diuresis   Wound care  Consult wound nurse  Labs and vitals stable  Hopeful for discharge in the next 24/48 hours        DVT Prophylaxis:  LOVENOX   Diet: ADULT DIET;  Regular; 4 carb choices (60 gm/meal)  Code Status: Full Code     PT/OT Eval Status:  ORDERED     Dispo - HOME    Electronically signed by SUSANNA Rand CNP on 2/2/2023 at 4:07 PM

## 2023-02-02 NOTE — PROGRESS NOTES
Extended Infusion Policy     This patient is on medication that requires renal, weight, and/or indication dose adjustment. Date Body Weight IBW  Adjusted BW SCr  CrCl Dialysis status BMI   2/2/2023 (!) 370 lb (167.8 kg)   Ideal body weight: 66.1 kg (145 lb 11.6 oz)  Adjusted ideal body weight: 106.8 kg (235 lb 7 oz) Serum creatinine: 0.9 mg/dL 02/02/23 0414  Estimated creatinine clearance: 109 mL/min N/a Body mass index is 57.95 kg/m². Pharmacy has dose-adjusted the following medication(s):    Ordered Medication: Meropenem 1000 mg q24h      Order Changed/converted to: Meropenem 1000mg q8h    These changes were made per protocol according to the [facility]   [employer] Extended Infusion Dose Adjustment Policy. *Please note this dose may need readjusted if patient's condition changes. Please contact pharmacy with any questions regarding these changes.     Siri Ansari [facility]  2/2/2023  12:53 PM

## 2023-02-02 NOTE — PLAN OF CARE
Problem: Chronic Conditions and Co-morbidities  Goal: Patient's chronic conditions and co-morbidity symptoms are monitored and maintained or improved  Outcome: Progressing     Problem: Discharge Planning  Goal: Discharge to home or other facility with appropriate resources  Outcome: Progressing     Problem: Pain  Goal: Verbalizes/displays adequate comfort level or baseline comfort level  Outcome: Progressing     Problem: Skin/Tissue Integrity  Goal: Absence of new skin breakdown  Description: 1. Monitor for areas of redness and/or skin breakdown  2. Assess vascular access sites hourly  3. Every 4-6 hours minimum:  Change oxygen saturation probe site  4. Every 4-6 hours:  If on nasal continuous positive airway pressure, respiratory therapy assess nares and determine need for appliance change or resting period.   Outcome: Progressing     Problem: Safety - Adult  Goal: Free from fall injury  Outcome: Progressing     Problem: ABCDS Injury Assessment  Goal: Absence of physical injury  Outcome: Progressing     Problem: Skin/Tissue Integrity - Adult  Goal: Skin integrity remains intact  Outcome: Progressing  Goal: Incisions, wounds, or drain sites healing without S/S of infection  Outcome: Progressing     Problem: Infection - Adult  Goal: Absence of infection at discharge  Outcome: Progressing

## 2023-02-02 NOTE — PLAN OF CARE
Problem: Chronic Conditions and Co-morbidities  Goal: Patient's chronic conditions and co-morbidity symptoms are monitored and maintained or improved  2/2/2023 0959 by Blaine Marinelli RN  Outcome: Progressing  2/2/2023 0409 by Juventino Alvarez RN  Outcome: Progressing

## 2023-02-02 NOTE — PROGRESS NOTES
Department of Internal Medicine  Infectious Diseases  Progress  Note      C/C : Cellulitis left leg     Denies fever or chills  Reports leg pain,swelling, drainage   Afebrile       Current Facility-Administered Medications   Medication Dose Route Frequency Provider Last Rate Last Admin    insulin glargine-yfgn (SEMGLEE-YFGN) injection vial 12 Units  12 Units SubCUTAneous BID Pooja Rudd DO   12 Units at 02/02/23 0026    vancomycin (VANCOCIN) 1,250 mg in sodium chloride 0.9 % 250 mL IVPB  1,250 mg IntraVENous Q12H Dexter Pugh MD   Stopped at 02/02/23 0155    meropenem (MERREM) 1,000 mg in sodium chloride 0.9 % 100 mL IVPB (mini-bag)  1,000 mg IntraVENous Q24H Dexter Pugh MD   Stopped at 02/01/23 1749    albuterol (ACCUNEB) nebulizer solution 0.63 mg  1 ampule Nebulization Q6H PRN SUSANNA Rucker - CNP        aspirin EC tablet 81 mg  81 mg Oral Daily SUSANNA Rucker - CNP   81 mg at 02/02/23 0826    carvedilol (COREG) tablet 25 mg  25 mg Oral BID WC Jerrell Quinonez APRN - CNP   25 mg at 02/02/23 0826    furosemide (LASIX) tablet 40 mg  40 mg Oral Daily SUSANNA Rucker - CNP   40 mg at 02/02/23 0826    latanoprost (XALATAN) 0.005 % ophthalmic solution 1 drop  1 drop Both Eyes Nightly SUSANNA Rucker - CNP   1 drop at 02/02/23 0025    levothyroxine (SYNTHROID) tablet 100 mcg  100 mcg Oral Daily Jrerell Quinonez APRN - CNP   100 mcg at 02/02/23 3710    liothyronine (CYTOMEL) tablet 25 mcg  25 mcg Oral Daily Jerrell Quinonez APRN - CNP   25 mcg at 02/02/23 0826    potassium chloride (KLOR-CON M) extended release tablet 20 mEq  20 mEq Oral Daily SUSANNA Rucker - CNP   20 mEq at 02/02/23 0826    ramipril (ALTACE) capsule 5 mg  5 mg Oral Nightly Jerrell Quinonez APRN - CNP   5 mg at 02/02/23 0025    rosuvastatin (CRESTOR) tablet 5 mg  5 mg Oral Daily Jerrell Quinonez APRN - CNP   5 mg at 02/02/23 0826    tamsulosin (FLOMAX) capsule 0.4 mg  0.4 mg Oral Nightly Jerrell Quinonez APRN - CNP   0.4 mg at 02/02/23 0024    sodium chloride flush 0.9 % injection 10 mL  10 mL IntraVENous 2 times per day Geovanna Other, APRN - CNP   10 mL at 02/02/23 0828    sodium chloride flush 0.9 % injection 10 mL  10 mL IntraVENous PRN Geovanna Other, APRN - CNP   10 mL at 01/31/23 1654    0.9 % sodium chloride infusion   IntraVENous PRN Geovanna Other, APRN - CNP 50 mL/hr at 01/30/23 2214 New Bag at 01/30/23 2214    enoxaparin (LOVENOX) injection 40 mg  40 mg SubCUTAneous BID Geovanna Other, APRN - CNP   40 mg at 02/02/23 0827    ondansetron (ZOFRAN-ODT) disintegrating tablet 4 mg  4 mg Oral Q8H PRN Geovanna Other, APRN - CNP        Or    ondansetron (ZOFRAN) injection 4 mg  4 mg IntraVENous Q6H PRN Geovanna Other, APRN - CNP        magnesium hydroxide (MILK OF MAGNESIA) 400 MG/5ML suspension 30 mL  30 mL Oral Daily PRN Geovanna Other, APRN - CNP        acetaminophen (TYLENOL) tablet 650 mg  650 mg Oral Q6H PRN Geovanna Other, APRN - CNP   650 mg at 01/31/23 1649    Or    acetaminophen (TYLENOL) suppository 650 mg  650 mg Rectal Q6H PRN Geovanna Other, APRN - CNP        glucose chewable tablet 16 g  4 tablet Oral PRN Geovanna Other, APRN - CNP        dextrose bolus 10% 125 mL  125 mL IntraVENous PRN Geovanna Other, APRN - CNP        Or    dextrose bolus 10% 250 mL  250 mL IntraVENous PRN Geovanna Other, APRN - CNP        glucagon injection 1 mg  1 mg SubCUTAneous PRN Geovanna Other, APRN - CNP        dextrose 10 % infusion   IntraVENous Continuous PRN Geovanna Other, APRN - CNP        insulin lispro (HUMALOG) injection vial 0-8 Units  0-8 Units SubCUTAneous TID WC Geovanna Other, APRN - CNP   2 Units at 02/01/23 1746    insulin lispro (HUMALOG) injection vial 0-4 Units  0-4 Units SubCUTAneous Nightly Geovanna Other, APRN - CNP           REVIEW OF SYSTEMS:    CONSTITUTIONAL:  Denies fever, chill or rigors.   HEENT: denies blurring of vision or double vision, denies hearing problem  RESPIRATORY: denies cough, shortness of breath, sputum expectoration. CARDIOVASCULAR:  Denies palpitation or chest pain   GASTROINTESTINAL:  Denies abdomen pain, diarrhea or constipation,, nausea or vomiting. GENITOURINARY:  Denies burning urination or frequency of urination  INTEGUMENT: left leg redness , pain   HEMATOLOGIC/LYMPHATIC:  Denies lymph node swelling, gum bleeding or easy bruising. MUSCULOSKELETAL:  Bilateral chronic leg swelling   NEUROLOGICAL:  Denies light headed, dizziness, loss of consciousness, weakness of lower extremities, bowel or bladder incontinence. PHYSICAL EXAM:      Vitals:     Vitals:    02/02/23 0830   BP: 120/65   Pulse: 74   Resp: 19   Temp: 96.8 °F (36 °C)   SpO2: 93%       General Appearance:    Awake, alert , no acute distress, morbidly obese . Head:    Normocephalic, atraumatic   Eyes:    No pallor, no icterus,   Ears:    No obvious deformity or drainage.    Nose:   No nasal drainage   Throat:   Mucosa moist, no oral thrush   Neck:   Supple, no lymphadenopathy   Back:     no CVA tenderness   Lungs:     Clear to auscultation bilaterally   Heart:    Regular rate and rhythm, no murmur   Abdomen:     Soft, non-tender, bowel sounds present    Extremities:    Bilateral pitting edema, erythema left leg, drainage'   Pulses:   Dorsalis pedis palpable    Skin:   Erythema left leg, desquamation      CBC with Differential:      Lab Results   Component Value Date/Time    WBC 6.2 01/31/2023 04:10 AM    RBC 4.30 01/31/2023 04:10 AM    HGB 12.3 01/31/2023 04:10 AM    HCT 37.6 01/31/2023 04:10 AM     01/31/2023 04:10 AM    MCV 87.4 01/31/2023 04:10 AM    MCH 28.6 01/31/2023 04:10 AM    MCHC 32.7 01/31/2023 04:10 AM    RDW 13.8 01/31/2023 04:10 AM    SEGSPCT 69 07/07/2012 03:50 AM    LYMPHOPCT 20.5 01/31/2023 04:10 AM    MONOPCT 11.1 01/31/2023 04:10 AM    BASOPCT 0.5 01/31/2023 04:10 AM    MONOSABS 0.69 01/31/2023 04:10 AM    LYMPHSABS 1.27 01/31/2023 04:10 AM    EOSABS 0.23 01/31/2023 04:10 AM BASOSABS 0.03 01/31/2023 04:10 AM       CMP     Lab Results   Component Value Date/Time     02/02/2023 04:14 AM    K 4.0 02/02/2023 04:14 AM    K 3.9 01/31/2023 04:10 AM     02/02/2023 04:14 AM    CO2 27 02/02/2023 04:14 AM    BUN 19 02/02/2023 04:14 AM    CREATININE 0.9 02/02/2023 04:14 AM    GFRAA >60 07/23/2020 07:30 AM    LABGLOM >60 02/02/2023 04:14 AM    GLUCOSE 166 02/02/2023 04:14 AM    PROT 7.6 01/30/2023 11:36 AM    LABALBU 3.6 01/30/2023 11:36 AM    CALCIUM 9.4 02/02/2023 04:14 AM    BILITOT 0.6 01/30/2023 11:36 AM    ALKPHOS 74 01/30/2023 11:36 AM    AST 12 01/30/2023 11:36 AM    ALT 13 01/30/2023 11:36 AM         Hepatic Function Panel:    Lab Results   Component Value Date/Time    ALKPHOS 74 01/30/2023 11:36 AM    ALT 13 01/30/2023 11:36 AM    AST 12 01/30/2023 11:36 AM    PROT 7.6 01/30/2023 11:36 AM    BILITOT 0.6 01/30/2023 11:36 AM    LABALBU 3.6 01/30/2023 11:36 AM       PT/INR:    Lab Results   Component Value Date/Time    PROTIME 13.7 04/27/2022 12:56 PM    INR 1.3 04/27/2022 12:56 PM       TSH:    Lab Results   Component Value Date/Time    TSH 0.022 07/07/2012 11:16 AM       U/A:  No results found for: NITRITE, COLORU, PHUR, LABCAST, WBCUA, RBCUA, MUCUS, TRICHOMONAS, YEAST, BACTERIA, CLARITYU, SPECGRAV, LEUKOCYTESUR, UROBILINOGEN, BILIRUBINUR, BLOODU, GLUCOSEU, AMORPHOUS    ABG:  No results found for: [id number], BEART, [id number], PHART, THGBART, MII1YMZ, PO2ART, KHH4MXG    MICROBIOLOGY:    Blood culture - neg       Wound Culture    Mixed javy isolated. Further workup and sensitivity testing   is not routinely indicated and will not be performed. Mixed javy isolated includes:   Gram negative rods   Beta hemolytic Strep species (Group G)   Corynebacteria    Abnormal     Organism Staphylococcus aureus Abnormal     WOUND/ABSCESS --    Heavy growth   Methicillin resistant Staph aureus isolated.  Most Methicillin   resistant Staphylococcus are usually resistant to multiple antibiotics including other B-Lactams, Aminoglycosides,   Macrolides, Clindamycin and Tetracycline. Contact isolation   is indicated. This isolate is presumed to be resistant based on the   detection of inducible Clindamycin resistance. Clindamycin   may still be effective in some patients.     Narrative:         IMPRESSION:     Morbid obesity, chronic venous insufficiency , cellulitis left leg     RECOMMENDATIONS:     Vancomycin 1250 mg IV q 12 hrs and meropenem 1 gram IV q 8 hrs (MRSA, GNR  )   Leg elevation, diuretics   Aquaphor to legs   D/C on oral abx

## 2023-02-03 VITALS
DIASTOLIC BLOOD PRESSURE: 94 MMHG | BODY MASS INDEX: 49.44 KG/M2 | HEART RATE: 69 BPM | OXYGEN SATURATION: 97 % | WEIGHT: 315 LBS | SYSTOLIC BLOOD PRESSURE: 128 MMHG | TEMPERATURE: 97 F | RESPIRATION RATE: 20 BRPM | HEIGHT: 67 IN

## 2023-02-03 LAB
ANION GAP SERPL CALCULATED.3IONS-SCNC: 10 MMOL/L (ref 7–16)
BUN BLDV-MCNC: 18 MG/DL (ref 6–23)
CALCIUM SERPL-MCNC: 9 MG/DL (ref 8.6–10.2)
CHLORIDE BLD-SCNC: 103 MMOL/L (ref 98–107)
CO2: 29 MMOL/L (ref 22–29)
CREAT SERPL-MCNC: 1 MG/DL (ref 0.7–1.2)
GFR SERPL CREATININE-BSD FRML MDRD: >60 ML/MIN/1.73
GLUCOSE BLD-MCNC: 186 MG/DL (ref 74–99)
HCT VFR BLD CALC: 38.2 % (ref 37–54)
HEMOGLOBIN: 12.5 G/DL (ref 12.5–16.5)
MCH RBC QN AUTO: 28.5 PG (ref 26–35)
MCHC RBC AUTO-ENTMCNC: 32.7 % (ref 32–34.5)
MCV RBC AUTO: 87 FL (ref 80–99.9)
METER GLUCOSE: 162 MG/DL (ref 74–99)
METER GLUCOSE: 247 MG/DL (ref 74–99)
PDW BLD-RTO: 13.7 FL (ref 11.5–15)
PLATELET # BLD: 139 E9/L (ref 130–450)
PMV BLD AUTO: 9.2 FL (ref 7–12)
POTASSIUM SERPL-SCNC: 4.5 MMOL/L (ref 3.5–5)
RBC # BLD: 4.39 E12/L (ref 3.8–5.8)
SODIUM BLD-SCNC: 142 MMOL/L (ref 132–146)
WBC # BLD: 5.6 E9/L (ref 4.5–11.5)

## 2023-02-03 PROCEDURE — 6370000000 HC RX 637 (ALT 250 FOR IP): Performed by: INTERNAL MEDICINE

## 2023-02-03 PROCEDURE — 36415 COLL VENOUS BLD VENIPUNCTURE: CPT

## 2023-02-03 PROCEDURE — 6360000002 HC RX W HCPCS: Performed by: INTERNAL MEDICINE

## 2023-02-03 PROCEDURE — 2580000003 HC RX 258: Performed by: INTERNAL MEDICINE

## 2023-02-03 PROCEDURE — 6370000000 HC RX 637 (ALT 250 FOR IP): Performed by: NURSE PRACTITIONER

## 2023-02-03 PROCEDURE — 85027 COMPLETE CBC AUTOMATED: CPT

## 2023-02-03 PROCEDURE — 6360000002 HC RX W HCPCS: Performed by: NURSE PRACTITIONER

## 2023-02-03 PROCEDURE — 80048 BASIC METABOLIC PNL TOTAL CA: CPT

## 2023-02-03 PROCEDURE — S5553 INSULIN LONG ACTING 5 U: HCPCS | Performed by: INTERNAL MEDICINE

## 2023-02-03 PROCEDURE — 82962 GLUCOSE BLOOD TEST: CPT

## 2023-02-03 RX ORDER — LINEZOLID 600 MG/1
600 TABLET, FILM COATED ORAL EVERY 12 HOURS SCHEDULED
Status: DISCONTINUED | OUTPATIENT
Start: 2023-02-03 | End: 2023-02-03 | Stop reason: HOSPADM

## 2023-02-03 RX ORDER — LEVOFLOXACIN 500 MG/1
500 TABLET, FILM COATED ORAL DAILY
Status: DISCONTINUED | OUTPATIENT
Start: 2023-02-03 | End: 2023-02-03 | Stop reason: HOSPADM

## 2023-02-03 RX ORDER — LINEZOLID 600 MG/1
600 TABLET, FILM COATED ORAL 2 TIMES DAILY
Qty: 20 TABLET | Refills: 0 | Status: SHIPPED | OUTPATIENT
Start: 2023-02-03 | End: 2023-02-13

## 2023-02-03 RX ORDER — LEVOFLOXACIN 500 MG/1
500 TABLET, FILM COATED ORAL DAILY
Qty: 7 TABLET | Refills: 0 | Status: SHIPPED | OUTPATIENT
Start: 2023-02-03 | End: 2023-02-13

## 2023-02-03 RX ADMIN — MEROPENEM 1000 MG: 1 INJECTION, POWDER, FOR SOLUTION INTRAVENOUS at 06:44

## 2023-02-03 RX ADMIN — LINEZOLID 600 MG: 600 TABLET, FILM COATED ORAL at 12:50

## 2023-02-03 RX ADMIN — LIOTHYRONINE SODIUM 25 MCG: 25 TABLET ORAL at 07:46

## 2023-02-03 RX ADMIN — CARVEDILOL 25 MG: 25 TABLET, FILM COATED ORAL at 07:45

## 2023-02-03 RX ADMIN — LEVOFLOXACIN 500 MG: 500 TABLET, FILM COATED ORAL at 12:50

## 2023-02-03 RX ADMIN — POTASSIUM CHLORIDE 20 MEQ: 1500 TABLET, EXTENDED RELEASE ORAL at 07:45

## 2023-02-03 RX ADMIN — INSULIN GLARGINE-YFGN 12 UNITS: 100 INJECTION, SOLUTION SUBCUTANEOUS at 07:48

## 2023-02-03 RX ADMIN — LEVOTHYROXINE SODIUM 100 MCG: 0.05 TABLET ORAL at 06:35

## 2023-02-03 RX ADMIN — ASPIRIN 81 MG: 81 TABLET, COATED ORAL at 07:45

## 2023-02-03 RX ADMIN — ROSUVASTATIN 5 MG: 10 TABLET, FILM COATED ORAL at 07:46

## 2023-02-03 RX ADMIN — ENOXAPARIN SODIUM 40 MG: 100 INJECTION SUBCUTANEOUS at 07:45

## 2023-02-03 RX ADMIN — INSULIN LISPRO 2 UNITS: 100 INJECTION, SOLUTION INTRAVENOUS; SUBCUTANEOUS at 12:51

## 2023-02-03 RX ADMIN — FUROSEMIDE 40 MG: 40 TABLET ORAL at 07:45

## 2023-02-03 ASSESSMENT — PAIN SCALES - GENERAL: PAINLEVEL_OUTOF10: 0

## 2023-02-03 NOTE — DISCHARGE SUMMARY
[facility]   Discharge summary   Patient ID:  Quique Valentin  [id number]  74 y.o.  1950    Admit date: 1/30/2023    Discharge date and time: 2/3/2023    Admission Diagnoses:   Patient Active Problem List   Diagnosis    Arrhythmia    Pacemaker    HTN (hypertension)    Hyperthyroidism    DEMETRIA (obstructive sleep apnea)    Cellulitis    SIRS (systemic inflammatory response syndrome) (HCC)    Shortness of breath    Pain in lower limb    Chest pain    Abdominal pain    Pulmonary emboli (HCC)    Post-thrombotic syndrome    Complete heart block (HCC)    Non-pressure chronic ulcer of left lower leg with fat layer exposed ([address]    Venous insufficiency (chronic) (peripheral)    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 ([address]    Onychomycosis    PVD (peripheral vascular disease) ([facility])    Type II diabetes mellitus with peripheral circulatory disorder (HCC)    Difficulty walking    Simple chronic bronchitis (HCC)    Pain of toe of right foot    Pain of toe of left foot    Diabetic polyneuropathy associated with type 2 diabetes mellitus ([address]    Pneumonia    Community acquired pneumonia due to Streptococcus pneumoniae ([address]    Encounter for pacemaker at end of battery life    Blister of right leg    Stasis ulcer ([address]    Lymphedema    Varicose veins of right lower extremity with inflammation, with ulcer of ankle with fat layer exposed ([address]    Xerosis cutis    Non-pressure chronic ulcer of other part of right lower leg with fat layer exposed ([address]    Diabetic sensory polyneuropathy ([address]    Edema    History of artificial joint    Morbid obesity ([address]    Osteoarthritis of knee    Peripheral vascular disorder due to diabetes mellitus ([address]    Wound infection       Discharge Diagnoses: ***    Consults: {consultation:43470}    Procedures: ***    Hospital Course:  The patient is a 74 y.o. male of Pio Kumar MD with significant past medical history of *** who presents with ***    Recent Labs     02/02/23  1048 02/03/23  0543   WBC 5.9 5.6   HGB 13.0 12.5   HCT 40.1 38.2    139       Recent Labs     02/01/23  0432 02/02/23  0414 02/03/23  0543    141 142   K 3.9 4.0 4.5    105 103   CO2 27 27 29   BUN 17 19 18   CREATININE 1.0 0.9 1.0   CALCIUM 9.5 9.4 9.0       XR TIBIA FIBULA LEFT (2 VIEWS)    Result Date: 1/30/2023  EXAMINATION: 2 XRAY VIEWS OF THE LEFT TIBIA AND FIBULA 1/30/2023 12:26 pm COMPARISON: None. HISTORY: ORDERING SYSTEM PROVIDED HISTORY: wound check TECHNOLOGIST PROVIDED HISTORY: Reason for exam:->wound check What reading provider will be dictating this exam?->CRC FINDINGS: AP and lateral views of the left tibia and fibula were obtained on 4 images. There is left total knee prosthesis. No acute fracture or dislocation. No significant periprosthetic lucency to suggest loosening. There is soft tissue swelling about the lower leg and ankle. There is plantar calcaneal spur. No acute bony abnormality. Discharge Exam:    HEENT: NCAT,  PERRLA, No JVD  Heart:  RRR, no murmurs, gallops, or rubs.   Lungs:  CTA bilaterally, no wheeze, rales or rhonchi  Abd: bowel sounds present, nontender, nondistended, no masses  Extrem:  No clubbing, cyanosis, or edema    Disposition: {disposition:21979}     Patient Condition at Discharge: ***    Patient Instructions:      Medication List        START taking these medications      linezolid 600 MG tablet  Commonly known as: ZYVOX  Take 1 tablet by mouth 2 times daily for 10 days            CONTINUE taking these medications      acetaminophen 500 MG tablet  Commonly known as: TYLENOL     albuterol 0.63 MG/3ML nebulizer solution  Commonly known as: ACCUNEB  Take 3 mLs by nebulization every 6 hours as needed for Wheezing     aspirin 81 MG EC tablet     carvedilol 25 MG tablet  Commonly known as: COREG     furosemide 40 MG tablet  Commonly known as: LASIX     * glipiZIDE 5 MG extended release tablet  Commonly known as: GLUCOTROL XL     * glipiZIDE 5 MG extended release tablet  Commonly known as: GLUCOTROL XL     latanoprost 0.005 % ophthalmic solution  Commonly known as: XALATAN     levoFLOXacin 500 MG tablet  Commonly known as: LEVAQUIN  Take 1 tablet by mouth daily for 10 days     levothyroxine 100 MCG tablet  Commonly known as: SYNTHROID     liothyronine 25 MCG tablet  Commonly known as: CYTOMEL     potassium chloride 20 MEQ extended release tablet  Commonly known as: KLOR-CON M  Take 1 tablet by mouth daily     ramipril 5 MG capsule  Commonly known as: ALTACE     rosuvastatin 5 MG tablet  Commonly known as: CRESTOR  Take 1 tablet by mouth daily     SUPER B COMPLEX/C PO     tamsulosin 0.4 MG capsule  Commonly known as: FLOMAX     Vitamin D3 50 MCG (2000 UT) Tabs           * This list has 2 medication(s) that are the same as other medications prescribed for you. Read the directions carefully, and ask your doctor or other care provider to review them with you. Where to Get Your Medications        You can get these medications from any pharmacy    Bring a paper prescription for each of these medications  levoFLOXacin 500 MG tablet  linezolid 600 MG tablet       Activity: {discharge activity:00801}  Diet: {diet:47754}    Pt has been advised to: Follow-up with Riki Humphreys MD in 1 week.   Follow-up with consultants as recommended by them    Note that over 30 minutes was spent in preparing discharge papers, discussing discharge with patient, medication review, etc.    Signed:  SUSANNA Robb CNP  2/3/2023  1:44 PM CNP  2/3/2023  1:44 PM     Above note edited to reflect my thoughts     I personally saw, examined and provided care for the patient. Radiographs, labs and medication list were reviewed by me independently. The case was discussed in detail and plans for care were established. Review of Lincoln Collet, APRN-CNP   , documentation was conducted and revisions were made as appropriate directly by me. I agree with the above documented exam, problem list, and plan of care.      Juan M Leon MD  2/3/23

## 2023-02-03 NOTE — PROGRESS NOTES
Department of Internal Medicine  Infectious Diseases  Progress  Note      C/C : Cellulitis left leg     Denies fever or chills  Reports leg pain,swelling, drainage   Afebrile       Current Facility-Administered Medications   Medication Dose Route Frequency Provider Last Rate Last Admin    white petrolatum ointment   Topical BID PRN Dexter Pugh MD        meropenem (MERREM) 1,000 mg in sodium chloride 0.9 % 100 mL IVPB (mini-bag)  1,000 mg IntraVENous Q8H Dexter Pugh MD   Stopped at 02/03/23 0921    insulin glargine-yfgn (SEMGLEE-YFGN) injection vial 12 Units  12 Units SubCUTAneous BID Yue Bradshaw, DO   12 Units at 02/03/23 0748    vancomycin (VANCOCIN) 1,250 mg in sodium chloride 0.9 % 250 mL IVPB  1,250 mg IntraVENous Q12H Dexter Pugh MD   Stopped at 02/03/23 0519    albuterol (ACCUNEB) nebulizer solution 0.63 mg  1 ampule Nebulization Q6H PRN SUSANNA Barajas CNP        aspirin EC tablet 81 mg  81 mg Oral Daily SUSANNA Barajas - CNP   81 mg at 02/03/23 0745    carvedilol (COREG) tablet 25 mg  25 mg Oral BID WC SUSANNA Barajas - CNP   25 mg at 02/03/23 0745    furosemide (LASIX) tablet 40 mg  40 mg Oral Daily SUSANNA Barajas - CNP   40 mg at 02/03/23 0745    latanoprost (XALATAN) 0.005 % ophthalmic solution 1 drop  1 drop Both Eyes Nightly SUSANNA Barajas - CNP   1 drop at 02/02/23 2034    levothyroxine (SYNTHROID) tablet 100 mcg  100 mcg Oral Daily SUSANNA Barajas - CNP   100 mcg at 02/03/23 9924    liothyronine (CYTOMEL) tablet 25 mcg  25 mcg Oral Daily SUSANNA Barajas - CNP   25 mcg at 02/03/23 0746    potassium chloride (KLOR-CON M) extended release tablet 20 mEq  20 mEq Oral Daily SUSANNA Barajas - CNP   20 mEq at 02/03/23 0745    ramipril (ALTACE) capsule 5 mg  5 mg Oral Nightly SUSANNA Barajas - CNP   5 mg at 02/02/23 2020    rosuvastatin (CRESTOR) tablet 5 mg  5 mg Oral Daily SUSANNA Barajas CNP   5 mg at 02/03/23 5734 tamsulosin (FLOMAX) capsule 0.4 mg  0.4 mg Oral Nightly Ronel Zamorano, APRN - CNP   0.4 mg at 02/02/23 2020    sodium chloride flush 0.9 % injection 10 mL  10 mL IntraVENous 2 times per day Ronel Zamorano, APRN - CNP   10 mL at 02/02/23 2006    sodium chloride flush 0.9 % injection 10 mL  10 mL IntraVENous PRN Ronel Zamorano, APRN - CNP   10 mL at 01/31/23 1654    0.9 % sodium chloride infusion   IntraVENous PRN Ronel Zamorano, APRN - CNP 50 mL/hr at 01/30/23 2214 New Bag at 01/30/23 2214    enoxaparin (LOVENOX) injection 40 mg  40 mg SubCUTAneous BID Ronel Zamorano, APRN - CNP   40 mg at 02/03/23 0745    ondansetron (ZOFRAN-ODT) disintegrating tablet 4 mg  4 mg Oral Q8H PRN Ronel Zamorano, APRN - MARY ANNE        Or    ondansetron (ZOFRAN) injection 4 mg  4 mg IntraVENous Q6H PRN Ronel Zamorano, APRN - CNP        magnesium hydroxide (MILK OF MAGNESIA) 400 MG/5ML suspension 30 mL  30 mL Oral Daily PRN Ronel Zamorano, APRN - MARY ANNE        acetaminophen (TYLENOL) tablet 650 mg  650 mg Oral Q6H PRN Ronel Zamorano, APRN - CNP   650 mg at 01/31/23 1649    Or    acetaminophen (TYLENOL) suppository 650 mg  650 mg Rectal Q6H PRN Ronel Zamorano, APRN - CNP        glucose chewable tablet 16 g  4 tablet Oral PRN Ronel Zamorano, APRN - CNP        dextrose bolus 10% 125 mL  125 mL IntraVENous PRN Ronel Zamorano, APRN - CNP        Or    dextrose bolus 10% 250 mL  250 mL IntraVENous PRN Ronel Zamorano, APRN - CNP        glucagon injection 1 mg  1 mg SubCUTAneous PRN Ronel Zamorano, APRN - CNP        dextrose 10 % infusion   IntraVENous Continuous PRN Ronel Zamorano, APRN - CNP        insulin lispro (HUMALOG) injection vial 0-8 Units  0-8 Units SubCUTAneous TID WC Ronel Zamorano, APRN - CNP   4 Units at 02/02/23 1120    insulin lispro (HUMALOG) injection vial 0-4 Units  0-4 Units SubCUTAneous Nightly Ronel Zamorano, APRN - MARY ANNE           REVIEW OF SYSTEMS:    CONSTITUTIONAL:  Denies fever, chill or rigors.   HEENT: denies blurring of vision or double vision, denies hearing problem  RESPIRATORY: denies cough, shortness of breath, sputum expectoration. CARDIOVASCULAR:  Denies palpitation or chest pain   GASTROINTESTINAL:  Denies abdomen pain, diarrhea or constipation,, nausea or vomiting. GENITOURINARY:  Denies burning urination or frequency of urination  INTEGUMENT: left leg redness , pain   HEMATOLOGIC/LYMPHATIC:  Denies lymph node swelling, gum bleeding or easy bruising. MUSCULOSKELETAL:  Bilateral chronic leg swelling   NEUROLOGICAL:  Denies light headed, dizziness, loss of consciousness, weakness of lower extremities, bowel or bladder incontinence. PHYSICAL EXAM:      Vitals:     Vitals:    02/03/23 0849   BP: (!) 128/94   Pulse: 69   Resp: 20   Temp: 97 °F (36.1 °C)   SpO2: 97%       General Appearance:    Awake, alert , no acute distress, morbidly obese . Head:    Normocephalic, atraumatic   Eyes:    No pallor, no icterus,   Ears:    No obvious deformity or drainage.    Nose:   No nasal drainage   Throat:   Mucosa moist, no oral thrush   Neck:   Supple, no lymphadenopathy   Back:     no CVA tenderness   Lungs:     Clear to auscultation bilaterally   Heart:    Regular rate and rhythm, no murmur   Abdomen:     Soft, non-tender, bowel sounds present    Extremities:    Bilateral pitting edema, erythema left leg, drainage'   Pulses:   Dorsalis pedis palpable    Skin:   Erythema left leg, desquamation      CBC with Differential:      Lab Results   Component Value Date/Time    WBC 5.6 02/03/2023 05:43 AM    RBC 4.39 02/03/2023 05:43 AM    HGB 12.5 02/03/2023 05:43 AM    HCT 38.2 02/03/2023 05:43 AM     02/03/2023 05:43 AM    MCV 87.0 02/03/2023 05:43 AM    MCH 28.5 02/03/2023 05:43 AM    MCHC 32.7 02/03/2023 05:43 AM    RDW 13.7 02/03/2023 05:43 AM    SEGSPCT 69 07/07/2012 03:50 AM    LYMPHOPCT 20.5 01/31/2023 04:10 AM    MONOPCT 11.1 01/31/2023 04:10 AM    BASOPCT 0.5 01/31/2023 04:10 AM    MONOSABS 0.69 01/31/2023 04:10 AM    LYMPHSABS 1.27 01/31/2023 04:10 AM    EOSABS 0.23 01/31/2023 04:10 AM    BASOSABS 0.03 01/31/2023 04:10 AM       CMP     Lab Results   Component Value Date/Time     02/03/2023 05:43 AM    K 4.5 02/03/2023 05:43 AM    K 3.9 01/31/2023 04:10 AM     02/03/2023 05:43 AM    CO2 29 02/03/2023 05:43 AM    BUN 18 02/03/2023 05:43 AM    CREATININE 1.0 02/03/2023 05:43 AM    GFRAA >60 07/23/2020 07:30 AM    LABGLOM >60 02/03/2023 05:43 AM    GLUCOSE 186 02/03/2023 05:43 AM    PROT 7.6 01/30/2023 11:36 AM    LABALBU 3.6 01/30/2023 11:36 AM    CALCIUM 9.0 02/03/2023 05:43 AM    BILITOT 0.6 01/30/2023 11:36 AM    ALKPHOS 74 01/30/2023 11:36 AM    AST 12 01/30/2023 11:36 AM    ALT 13 01/30/2023 11:36 AM         Hepatic Function Panel:    Lab Results   Component Value Date/Time    ALKPHOS 74 01/30/2023 11:36 AM    ALT 13 01/30/2023 11:36 AM    AST 12 01/30/2023 11:36 AM    PROT 7.6 01/30/2023 11:36 AM    BILITOT 0.6 01/30/2023 11:36 AM    LABALBU 3.6 01/30/2023 11:36 AM       PT/INR:    Lab Results   Component Value Date/Time    PROTIME 13.7 04/27/2022 12:56 PM    INR 1.3 04/27/2022 12:56 PM       TSH:    Lab Results   Component Value Date/Time    TSH 0.022 07/07/2012 11:16 AM       U/A:  No results found for: NITRITE, COLORU, PHUR, LABCAST, WBCUA, RBCUA, MUCUS, TRICHOMONAS, YEAST, BACTERIA, CLARITYU, SPECGRAV, LEUKOCYTESUR, UROBILINOGEN, BILIRUBINUR, BLOODU, GLUCOSEU, AMORPHOUS    ABG:  No results found for: ONX8PQZ, BEART, M9KQYYDL, PHART, THGBART, NXS0KKK, PO2ART, UJS7WFQ    MICROBIOLOGY:    Blood culture - neg       Wound Culture    Mixed javy isolated. Further workup and sensitivity testing   is not routinely indicated and will not be performed. Mixed javy isolated includes:   Gram negative rods   Beta hemolytic Strep species (Group G)   Corynebacteria    Abnormal     Organism Staphylococcus aureus Abnormal     WOUND/ABSCESS --    Heavy growth   Methicillin resistant Staph aureus isolated.  Most Methicillin   resistant Staphylococcus are usually resistant to multiple   antibiotics including other B-Lactams, Aminoglycosides,   Macrolides, Clindamycin and Tetracycline. Contact isolation   is indicated. This isolate is presumed to be resistant based on the   detection of inducible Clindamycin resistance. Clindamycin   may still be effective in some patients.     Narrative:         IMPRESSION:     Morbid obesity, chronic venous insufficiency , cellulitis left leg     RECOMMENDATIONS:     Stop vancomycin and meropenem   Oral zyvox 600 mg po q 12 hrs and levaquin 500 mg PO q 24 hrs for next 10 days   OK to discharge from ID POV

## 2023-02-03 NOTE — FLOWSHEET NOTE
Inpatient Wound Care (Initial consult) 5402A    Admit Date: 1/30/2023 11:50 AM    Reason for consult:  Cellulitis    Significant history:  Per H&P    Chief Complaint:   LLE INFECTION        History Of Present Illness:      74 y.o. male presented with LLE INFECTION, FOUND TO HAVE CELLULITIS, ONSET 2 WEEKS AGO, BEGAN TO HAVE YELLOW TO GREEN DRAINAGE ON LEG, NO FEVER NO CHILLS, BUT EDEMA AND ERYTHEMA     Findings:     02/03/23 1049   Skin Integumentary    Skin Integrity   (dry flaky, redness)   Location Left lower leg   Skin Integrity Site 2   Skin Integrity Location 2 Excoriation   Location 2 abdominal folds   Wound 02/03/23 Leg Left;Lateral;Lower   Date First Assessed/Time First Assessed: 02/03/23 1048   Present on Hospital Admission: Yes  Location: Leg  Wound Location Orientation: Left;Lateral;Lower   Wound Image    Wound Etiology Venous   Dressing Status New dressing applied   Wound Cleansed Cleansed with saline   Dressing/Treatment ABD;Dry dressing;Roll gauze; Xeroform   Wound Length (cm) 1.2 cm   Wound Width (cm) 1.2 cm   Wound Depth (cm) 0.1 cm   Wound Surface Area (cm^2) 1.44 cm^2   Wound Volume (cm^3) 0.144 cm^3   Wound Assessment Pink/red  (yellow)   Drainage Amount None   Odor None   Maria Elena-wound Assessment Dry/flaky   Wound 02/03/23 Leg Left;Distal;Lateral;Lower   Date First Assessed/Time First Assessed: 02/03/23 1050   Present on Hospital Admission: Yes  Location: Leg  Wound Location Orientation: Left;Distal;Lateral;Lower   Wound Image   (see photo for left lateral lower leg)   Wound Etiology Venous   Dressing Status New dressing applied   Wound Cleansed Cleansed with saline   Dressing/Treatment ABD;Dry dressing;Roll gauze; Xeroform   Wound Length (cm) 0.8 cm   Wound Width (cm) 0.4 cm   Wound Depth (cm) 0.1 cm   Wound Surface Area (cm^2) 0.32 cm^2   Wound Volume (cm^3) 0.032 cm^3   Wound Assessment Pink/red  (yellow)   Drainage Amount None   Odor None   Maria Elena-wound Assessment Dry/flaky     **Informed Consent** The patient has given verbal consent to have photos taken of wounds and inserted into their chart as part of their permanent medical record for purposes of documentation, treatment management and/or medical review. All Images taken on 2/3/23 of patient name: Saskia Luevano were transmitted and stored on secured [employer] located within [facility] by a registered [employer] Mobile Application Device. Impression:  Left lateral lower leg: Venous    Plan: Xeroform, 4x4, abd pad, kerlix, ace wrap  Aquaphor  TAPS  Heel protectors  Patient follows at [address] [place] [address] for Lymphedema therapy  Declines home health for dressing change. Patient will need continued preventative care.       Duarte Rodriguez RN 2/3/2023 10:53 AM

## 2023-02-04 LAB
BLOOD CULTURE, ROUTINE: NORMAL
CULTURE, BLOOD 2: NORMAL

## 2023-02-06 ENCOUNTER — OFFICE VISIT (OUTPATIENT)
Dept: PODIATRY | Age: 73
End: 2023-02-06
Payer: MEDICARE

## 2023-02-06 VITALS — HEIGHT: 67 IN | WEIGHT: 315 LBS | BODY MASS INDEX: 49.44 KG/M2

## 2023-02-06 DIAGNOSIS — R26.2 DIFFICULTY WALKING: ICD-10-CM

## 2023-02-06 DIAGNOSIS — M79.674 PAIN OF TOE OF RIGHT FOOT: ICD-10-CM

## 2023-02-06 DIAGNOSIS — L97.922 NON-PRESSURE CHRONIC ULCER OF LEFT LOWER LEG WITH FAT LAYER EXPOSED (HCC): ICD-10-CM

## 2023-02-06 DIAGNOSIS — L97.222 VARICOSE VEINS OF LEFT LOWER EXTREMITY WITH ULCER OF CALF WITH FAT LAYER EXPOSED (HCC): Primary | ICD-10-CM

## 2023-02-06 DIAGNOSIS — I89.0 LYMPHEDEMA: ICD-10-CM

## 2023-02-06 DIAGNOSIS — I83.022 VARICOSE VEINS OF LEFT LOWER EXTREMITY WITH ULCER OF CALF WITH FAT LAYER EXPOSED (HCC): Primary | ICD-10-CM

## 2023-02-06 DIAGNOSIS — B35.1 ONYCHOMYCOSIS: ICD-10-CM

## 2023-02-06 DIAGNOSIS — M79.675 PAIN OF TOE OF LEFT FOOT: ICD-10-CM

## 2023-02-06 PROCEDURE — G8484 FLU IMMUNIZE NO ADMIN: HCPCS | Performed by: PODIATRIST

## 2023-02-06 PROCEDURE — 1111F DSCHRG MED/CURRENT MED MERGE: CPT | Performed by: PODIATRIST

## 2023-02-06 PROCEDURE — 99213 OFFICE O/P EST LOW 20 MIN: CPT | Performed by: PODIATRIST

## 2023-02-06 PROCEDURE — 1036F TOBACCO NON-USER: CPT | Performed by: PODIATRIST

## 2023-02-06 PROCEDURE — 1124F ACP DISCUSS-NO DSCNMKR DOCD: CPT | Performed by: PODIATRIST

## 2023-02-06 PROCEDURE — 3017F COLORECTAL CA SCREEN DOC REV: CPT | Performed by: PODIATRIST

## 2023-02-06 PROCEDURE — G8417 CALC BMI ABV UP PARAM F/U: HCPCS | Performed by: PODIATRIST

## 2023-02-06 PROCEDURE — 11721 DEBRIDE NAIL 6 OR MORE: CPT | Performed by: PODIATRIST

## 2023-02-06 PROCEDURE — G8427 DOCREV CUR MEDS BY ELIG CLIN: HCPCS | Performed by: PODIATRIST

## 2023-02-06 RX ORDER — TRIAMCINOLONE ACETONIDE 1 MG/G
CREAM TOPICAL
Qty: 90 G | Refills: 2 | Status: SHIPPED | OUTPATIENT
Start: 2023-02-06

## 2023-02-06 NOTE — PROGRESS NOTES
23     Capri Suresh    : 1950  Sex: male  Age: 74 y.o. Subjective: The patient is seen today for evaluation regarding continued diabetic foot evaluation, mycotic nail care, and evaluation regarding chronic lymphedema with some stasis dermatitis issues. Patient just recently was hospitalized due to some cellulitic issues left lower extremity. He is taking his current oral antibiotic regimen without issues noted. No other complaints noted.     Chief Complaint   Patient presents with    Wound Check     Left leg        Current Medications:    Current Outpatient Medications:     linezolid (ZYVOX) 600 MG tablet, Take 1 tablet by mouth 2 times daily for 10 days, Disp: 20 tablet, Rfl: 0    levoFLOXacin (LEVAQUIN) 500 MG tablet, Take 1 tablet by mouth daily for 10 days, Disp: 7 tablet, Rfl: 0    glipiZIDE (GLUCOTROL XL) 5 MG extended release tablet, Take 5 mg by mouth at bedtime, Disp: , Rfl:     rosuvastatin (CRESTOR) 5 MG tablet, Take 1 tablet by mouth daily, Disp: 90 tablet, Rfl: 3    acetaminophen (TYLENOL) 500 MG tablet, Take 1,000 mg by mouth every 6 hours as needed for Pain, Disp: , Rfl:     latanoprost (XALATAN) 0.005 % ophthalmic solution, Place 1 drop into both eyes nightly, Disp: , Rfl:     ramipril (ALTACE) 5 MG capsule, Take 5 mg by mouth at bedtime, Disp: , Rfl:     liothyronine (CYTOMEL) 25 MCG tablet, Take 25 mcg by mouth daily, Disp: , Rfl:     levothyroxine (SYNTHROID) 100 MCG tablet, Take 100 mcg by mouth Daily, Disp: , Rfl:     SUPER B COMPLEX/C PO, Take 1 tablet by mouth daily, Disp: , Rfl:     potassium chloride (KLOR-CON M) 20 MEQ extended release tablet, Take 1 tablet by mouth daily, Disp: 90 tablet, Rfl: 3    tamsulosin (FLOMAX) 0.4 MG capsule, Take 0.4 mg by mouth at bedtime Takes nighlty, Disp: , Rfl:     furosemide (LASIX) 40 MG tablet, Take 40 mg by mouth daily , Disp: , Rfl:     albuterol (ACCUNEB) 0.63 MG/3ML nebulizer solution, Take 3 mLs by nebulization every 6 hours as needed for Wheezing, Disp: 270 mL, Rfl: 3    glipiZIDE (GLUCOTROL XL) 5 MG extended release tablet, Take 10 mg by mouth every morning, Disp: , Rfl: 3    Cholecalciferol (VITAMIN D3) 50 MCG (2000 UT) TABS, Take 2,000 Units by mouth daily, Disp: , Rfl:     carvedilol (COREG) 25 MG tablet, Take 25 mg by mouth 2 times daily (with meals) , Disp: , Rfl:     aspirin 81 MG EC tablet, Take 81 mg by mouth daily , Disp: , Rfl:     Allergies: Allergies   Allergen Reactions    Clindamycin/Lincomycin Shortness Of Breath     Extreme SOB     Other Hives and Shortness Of Breath     \"allergic to [place] white. \"    Doxycycline Hyclate Other (See Comments)     tinnitus    Singulair [Montelukast Sodium]     Adaptic Non-Adhering Dressing [Wound Dressings]      [place]        Past Surgical History:   Procedure Laterality Date    CARDIAC PACEMAKER PLACEMENT  04/09/2010    [employer]    CHOLECYSTECTOMY      COLONOSCOPY  01/20/2016    INTRACAPSULAR CATARACT EXTRACTION Right 04/27/2022    RIGHT EYE CATARACT EXTRACTION IOL IMPLANT AND GONIOTOMY performed by Shayy Simpson MD at [place] [address] Left 7/27/2022    LEFT EYE CATARACT EXTRACTION IOL IMPLANT performed by Shayy Simpson MD at [address] [place] [address] Bilateral     knees    PACEMAKER PLACEMENT  07/23/2020    DUAL PPM GR    ([employer])     DR. MARTIN     TRANSESOPHAGEAL ECHOCARDIOGRAM  06/19/2020    With      Past Medical History:   Diagnosis Date    CAD (coronary artery disease)     Cataract, right eye     Complete heart block ([address]     h/o    Diabetes mellitus ([address]     Encounter for aspirin therapy     patient uses for heart health    History of blood transfusion     Hx of blood clots     Hyperlipidemia     Hypertension     Hyperthyroidism     Myocardial infarct ([address] 2010    Neuropathy     feet    Obesity     Obstructive sleep apnea     on cpap    Onychomycosis     DEMETRIA on CPAP     Osteoarthritis     Pacemaker     [employer]tr[employer]    Stasis dermatitis     h/o on bilateral legs with leg edema    Varicose veins        Vitals:    02/06/23 1305   Weight: (!) 370 lb (167.8 kg)   Height: 5' 7\" (1.702 m)       Exam:  Pedal pulses diminished to palpation bilateral foot. At this time the nail/s 1, 2, 5 right foot and nail/s 1, 2, 5 left foot are noted to be thickened, dystrophic and discolored with subungual debris present. Tenderness noted to palpation. Diminished hair growth is noted to both lower extremities. Edema noted with resolving cellulitic issues left lower extremity. Stasis dermatitis issues noted left lower extremity. Coolness is noted to the digital regions to palpation. Capillary fill time delayed digital areas bilateral foot. No heel fissuring or macerations of the web spaces. No plantar calluses and/or ulcerative areas are noted. Patient is having difficulty with gait/walking. Plan Per Assessment  Eugene Palma was seen today for wound check. 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 Evaluation and Management  2. Manual and electrical debridement of the mycotic nails was performed for thickness and length to prevent injection and/or ulceration. 3. Discussed additional diabetic lower extremity care techniques with patient today. Adaptic, dry dressing reapplied left lower extremity. New prescription will be sent out regarding reinstituting lymphedema therapy at this time. Discontinue his oral antibiotic regimen per infectious disease service. He is to continue elevating both lower extremities throughout the day to reduce edematous issues.   Scription given today for topical Triamcinolone cream to be applied with dressing changes left lower extremity. 4. It was discussed in detail with the patient proper caring for the vascular compromised foot. The fact that they have compromised blood flow put the patient at risk for infection/gangrene/amputation. The patient should not walk barefoot. Shoe gear should fit properly and socks should be worn with shoes. If any skin lesions are noted, they are instructed to contact the office immediately. 5. We will see the patient back at a later date for continued podiatric management and care. Patient was advised to call the office with any questions or concerns prior to their next appointment if needed. Seen By:    Domi Hewitt DPM    Electronically signed by Domi Hewitt DPM on 2/6/2023 at 1:17 PM      This note was created using voice recognition software. The note was reviewed however may contain grammatical errors.

## 2023-02-06 NOTE — PROGRESS NOTES
Patient is in today for evaluation of left leg wound. Patient was admitted in the hospital last week due to an infection in the wound.  Pcp is Brook Becker MD  Last ov 8/10/22

## 2023-02-08 RX ORDER — POTASSIUM CHLORIDE 20 MEQ/1
20 TABLET, EXTENDED RELEASE ORAL DAILY
Qty: 90 TABLET | Refills: 3 | Status: SHIPPED | OUTPATIENT
Start: 2023-02-08

## 2023-03-09 DIAGNOSIS — Z95.0 PACEMAKER: ICD-10-CM

## 2023-03-09 RX ORDER — CARVEDILOL 25 MG/1
25 TABLET ORAL 2 TIMES DAILY WITH MEALS
Qty: 180 TABLET | Refills: 3 | Status: SHIPPED | OUTPATIENT
Start: 2023-03-09

## 2023-05-08 ENCOUNTER — PROCEDURE VISIT (OUTPATIENT)
Dept: PODIATRY | Age: 73
End: 2023-05-08
Payer: MEDICARE

## 2023-05-08 VITALS — BODY MASS INDEX: 49.44 KG/M2 | HEIGHT: 67 IN | WEIGHT: 315 LBS

## 2023-05-08 DIAGNOSIS — E11.51 TYPE II DIABETES MELLITUS WITH PERIPHERAL CIRCULATORY DISORDER (HCC): ICD-10-CM

## 2023-05-08 DIAGNOSIS — R26.2 DIFFICULTY WALKING: ICD-10-CM

## 2023-05-08 DIAGNOSIS — I89.0 LYMPHEDEMA: ICD-10-CM

## 2023-05-08 DIAGNOSIS — M79.675 PAIN OF TOE OF LEFT FOOT: ICD-10-CM

## 2023-05-08 DIAGNOSIS — B35.1 ONYCHOMYCOSIS: Primary | ICD-10-CM

## 2023-05-08 DIAGNOSIS — M79.674 PAIN OF TOE OF RIGHT FOOT: ICD-10-CM

## 2023-05-08 PROCEDURE — 11721 DEBRIDE NAIL 6 OR MORE: CPT | Performed by: PODIATRIST

## 2023-05-08 NOTE — PROGRESS NOTES
Patient in today for nail care. Patient does not have any complaints of pain at this time. Patient's PCP is Aidan Armendariz MD date of last ov 01/15/2023.         Kevin Sevilla LPN

## 2023-05-08 NOTE — PROGRESS NOTES
23     Jon Cesar    : 1950  Sex: male  Age: 74 y.o. Subjective: The patient is seen today for evaluation regarding foot evaluation and mycotic nail. No other complaints noted. Chief Complaint   Patient presents with    Nail Problem     Routine nail care        Current Medications:    Current Outpatient Medications:     gentamicin (GARAMYCIN) 0.1 % cream, Apply topically 1 times daily with dressing changes. , Disp: 30 g, Rfl: 4    carvedilol (COREG) 25 MG tablet, Take 1 tablet by mouth 2 times daily (with meals), Disp: 180 tablet, Rfl: 3    potassium chloride (KLOR-CON M) 20 MEQ extended release tablet, Take 1 tablet by mouth daily, Disp: 90 tablet, Rfl: 3    triamcinolone (KENALOG) 0.1 % cream, Apply topically 2 times daily. , Disp: 90 g, Rfl: 2    glipiZIDE (GLUCOTROL XL) 5 MG extended release tablet, Take 1 tablet by mouth at bedtime, Disp: , Rfl:     rosuvastatin (CRESTOR) 5 MG tablet, Take 1 tablet by mouth daily, Disp: 90 tablet, Rfl: 3    acetaminophen (TYLENOL) 500 MG tablet, Take 2 tablets by mouth every 6 hours as needed for Pain, Disp: , Rfl:     latanoprost (XALATAN) 0.005 % ophthalmic solution, Place 1 drop into both eyes nightly, Disp: , Rfl:     ramipril (ALTACE) 5 MG capsule, Take 1 capsule by mouth at bedtime, Disp: , Rfl:     liothyronine (CYTOMEL) 25 MCG tablet, Take 1 tablet by mouth daily, Disp: , Rfl:     levothyroxine (SYNTHROID) 100 MCG tablet, Take 1 tablet by mouth Daily, Disp: , Rfl:     SUPER B COMPLEX/C PO, Take 1 tablet by mouth daily, Disp: , Rfl:     tamsulosin (FLOMAX) 0.4 MG capsule, Take 1 capsule by mouth at bedtime Takes nighlty, Disp: , Rfl:     furosemide (LASIX) 40 MG tablet, Take 1 tablet by mouth daily, Disp: , Rfl:     albuterol (ACCUNEB) 0.63 MG/3ML nebulizer solution, Take 3 mLs by nebulization every 6 hours as needed for Wheezing, Disp: 270 mL, Rfl: 3    glipiZIDE (GLUCOTROL XL) 5 MG extended release tablet, Take 2 tablets by mouth every

## 2023-07-14 ENCOUNTER — PROCEDURE VISIT (OUTPATIENT)
Dept: PODIATRY | Age: 73
End: 2023-07-14

## 2023-07-14 VITALS — WEIGHT: 315 LBS | BODY MASS INDEX: 49.44 KG/M2 | HEIGHT: 67 IN

## 2023-07-14 DIAGNOSIS — M79.675 PAIN OF TOE OF LEFT FOOT: ICD-10-CM

## 2023-07-14 DIAGNOSIS — I89.0 LYMPHEDEMA: ICD-10-CM

## 2023-07-14 DIAGNOSIS — R26.2 DIFFICULTY WALKING: ICD-10-CM

## 2023-07-14 DIAGNOSIS — B35.1 ONYCHOMYCOSIS: Primary | ICD-10-CM

## 2023-07-14 DIAGNOSIS — E11.51 TYPE II DIABETES MELLITUS WITH PERIPHERAL CIRCULATORY DISORDER (HCC): ICD-10-CM

## 2023-07-14 DIAGNOSIS — M79.674 PAIN OF TOE OF RIGHT FOOT: ICD-10-CM

## 2023-07-14 NOTE — PROGRESS NOTES
23     Behzad Augustin    : 1950  Sex: male  Age: 74 y.o. Subjective: The patient is seen today for evaluation regarding diabetic foot evaluation and mycotic nail care. No other complaints noted. Chief Complaint   Patient presents with    Nail Problem     2 month nail care    Sam Knox MD  3/21/23         Current Medications:    Current Outpatient Medications:     gentamicin (GARAMYCIN) 0.1 % cream, Apply topically 1 times daily with dressing changes. , Disp: 30 g, Rfl: 4    carvedilol (COREG) 25 MG tablet, Take 1 tablet by mouth 2 times daily (with meals), Disp: 180 tablet, Rfl: 3    potassium chloride (KLOR-CON M) 20 MEQ extended release tablet, Take 1 tablet by mouth daily, Disp: 90 tablet, Rfl: 3    triamcinolone (KENALOG) 0.1 % cream, Apply topically 2 times daily. , Disp: 90 g, Rfl: 2    glipiZIDE (GLUCOTROL XL) 5 MG extended release tablet, Take 1 tablet by mouth at bedtime, Disp: , Rfl:     rosuvastatin (CRESTOR) 5 MG tablet, Take 1 tablet by mouth daily, Disp: 90 tablet, Rfl: 3    acetaminophen (TYLENOL) 500 MG tablet, Take 2 tablets by mouth every 6 hours as needed for Pain, Disp: , Rfl:     latanoprost (XALATAN) 0.005 % ophthalmic solution, Place 1 drop into both eyes nightly, Disp: , Rfl:     ramipril (ALTACE) 5 MG capsule, Take 1 capsule by mouth at bedtime, Disp: , Rfl:     liothyronine (CYTOMEL) 25 MCG tablet, Take 1 tablet by mouth daily, Disp: , Rfl:     levothyroxine (SYNTHROID) 100 MCG tablet, Take 1 tablet by mouth Daily, Disp: , Rfl:     SUPER B COMPLEX/C PO, Take 1 tablet by mouth daily, Disp: , Rfl:     tamsulosin (FLOMAX) 0.4 MG capsule, Take 1 capsule by mouth at bedtime Takes nighlty, Disp: , Rfl:     furosemide (LASIX) 40 MG tablet, Take 1 tablet by mouth daily, Disp: , Rfl:     albuterol (ACCUNEB) 0.63 MG/3ML nebulizer solution, Take 3 mLs by nebulization every 6 hours as needed for Wheezing, Disp: 270 mL, Rfl: 3    glipiZIDE (GLUCOTROL XL) 5 MG extended

## 2023-08-18 ENCOUNTER — TELEPHONE (OUTPATIENT)
Dept: PODIATRY | Age: 73
End: 2023-08-18

## 2023-08-18 DIAGNOSIS — I83.022 VARICOSE VEINS OF LEFT LOWER EXTREMITY WITH ULCER OF CALF WITH FAT LAYER EXPOSED (HCC): Primary | ICD-10-CM

## 2023-08-18 DIAGNOSIS — L97.222 VARICOSE VEINS OF LEFT LOWER EXTREMITY WITH ULCER OF CALF WITH FAT LAYER EXPOSED (HCC): Primary | ICD-10-CM

## 2023-08-18 RX ORDER — AMOXICILLIN AND CLAVULANATE POTASSIUM 875; 125 MG/1; MG/1
1 TABLET, FILM COATED ORAL 2 TIMES DAILY
Qty: 28 TABLET | Refills: 0 | Status: SHIPPED | OUTPATIENT
Start: 2023-08-18 | End: 2023-09-01

## 2023-08-18 NOTE — TELEPHONE ENCOUNTER
Patient wife called and states patient has a strong odor from leg wound. Could an antibiotic be sent in to [facility]? Please advise.

## 2023-08-30 PROCEDURE — 93294 REM INTERROG EVL PM/LDLS PM: CPT | Performed by: STUDENT IN AN ORGANIZED HEALTH CARE EDUCATION/TRAINING PROGRAM

## 2023-08-30 PROCEDURE — 93296 REM INTERROG EVL PM/IDS: CPT | Performed by: STUDENT IN AN ORGANIZED HEALTH CARE EDUCATION/TRAINING PROGRAM

## 2023-09-22 ENCOUNTER — TELEPHONE (OUTPATIENT)
Dept: PODIATRY | Age: 73
End: 2023-09-22

## 2023-09-22 NOTE — TELEPHONE ENCOUNTER
Radha Sewell for [facility] called to get pt seen for wound on distal left leg. They have had pt on 3 different Antibiotics and it was healed and back again. Pt has an appt on 09/25 at 2:00 pm. I added this to his appt. If this is not ok Please contact pt.

## 2023-09-25 ENCOUNTER — PROCEDURE VISIT (OUTPATIENT)
Dept: PODIATRY | Age: 73
End: 2023-09-25
Payer: MEDICARE

## 2023-09-25 VITALS — HEIGHT: 67 IN | WEIGHT: 315 LBS | BODY MASS INDEX: 49.44 KG/M2

## 2023-09-25 DIAGNOSIS — L97.922 NON-PRESSURE CHRONIC ULCER OF LEFT LOWER LEG WITH FAT LAYER EXPOSED (HCC): ICD-10-CM

## 2023-09-25 DIAGNOSIS — R26.2 DIFFICULTY WALKING: ICD-10-CM

## 2023-09-25 DIAGNOSIS — B35.1 ONYCHOMYCOSIS: Primary | ICD-10-CM

## 2023-09-25 DIAGNOSIS — M79.674 PAIN OF TOE OF RIGHT FOOT: ICD-10-CM

## 2023-09-25 DIAGNOSIS — L97.222 VARICOSE VEINS OF LEFT LOWER EXTREMITY WITH ULCER OF CALF WITH FAT LAYER EXPOSED (HCC): ICD-10-CM

## 2023-09-25 DIAGNOSIS — M79.675 PAIN OF TOE OF LEFT FOOT: ICD-10-CM

## 2023-09-25 DIAGNOSIS — I83.022 VARICOSE VEINS OF LEFT LOWER EXTREMITY WITH ULCER OF CALF WITH FAT LAYER EXPOSED (HCC): ICD-10-CM

## 2023-09-25 DIAGNOSIS — I89.0 LYMPHEDEMA: ICD-10-CM

## 2023-09-25 PROCEDURE — 99213 OFFICE O/P EST LOW 20 MIN: CPT | Performed by: PODIATRIST

## 2023-09-25 PROCEDURE — 11721 DEBRIDE NAIL 6 OR MORE: CPT | Performed by: PODIATRIST

## 2023-09-25 PROCEDURE — G8427 DOCREV CUR MEDS BY ELIG CLIN: HCPCS | Performed by: PODIATRIST

## 2023-09-25 PROCEDURE — 1124F ACP DISCUSS-NO DSCNMKR DOCD: CPT | Performed by: PODIATRIST

## 2023-09-25 PROCEDURE — 1036F TOBACCO NON-USER: CPT | Performed by: PODIATRIST

## 2023-09-25 PROCEDURE — 3017F COLORECTAL CA SCREEN DOC REV: CPT | Performed by: PODIATRIST

## 2023-09-25 PROCEDURE — G8417 CALC BMI ABV UP PARAM F/U: HCPCS | Performed by: PODIATRIST

## 2023-09-25 NOTE — PROGRESS NOTES
Patient is in today for nail care follow up and evaluation of wound on the left leg.  Pcp is Mynor James MD  Last ov 3/21/23

## 2023-09-26 ENCOUNTER — APPOINTMENT (OUTPATIENT)
Dept: GENERAL RADIOLOGY | Age: 73
DRG: 264 | End: 2023-09-26
Payer: MEDICARE

## 2023-09-26 ENCOUNTER — HOSPITAL ENCOUNTER (INPATIENT)
Age: 73
LOS: 3 days | Discharge: HOME | DRG: 264 | End: 2023-09-29
Attending: EMERGENCY MEDICINE | Admitting: FAMILY MEDICINE
Payer: MEDICARE

## 2023-09-26 DIAGNOSIS — S81.802A WOUND OF LEFT LOWER EXTREMITY, INITIAL ENCOUNTER: Primary | ICD-10-CM

## 2023-09-26 DIAGNOSIS — T14.8XXA WOUND INFECTION: ICD-10-CM

## 2023-09-26 DIAGNOSIS — L08.9 WOUND INFECTION: ICD-10-CM

## 2023-09-26 LAB
ALBUMIN SERPL-MCNC: 3.2 G/DL (ref 3.5–5.2)
ALP SERPL-CCNC: 62 U/L (ref 40–129)
ALT SERPL-CCNC: 11 U/L (ref 0–40)
ANION GAP SERPL CALCULATED.3IONS-SCNC: 9 MMOL/L (ref 7–16)
AST SERPL-CCNC: 15 U/L (ref 0–39)
BASOPHILS # BLD: 0.02 K/UL (ref 0–0.2)
BASOPHILS NFR BLD: 0 % (ref 0–2)
BILIRUB SERPL-MCNC: 0.4 MG/DL (ref 0–1.2)
BUN SERPL-MCNC: 16 MG/DL (ref 6–23)
CALCIUM SERPL-MCNC: 8.8 MG/DL (ref 8.6–10.2)
CHLORIDE SERPL-SCNC: 109 MMOL/L (ref 98–107)
CO2 SERPL-SCNC: 26 MMOL/L (ref 22–29)
CREAT SERPL-MCNC: 1.1 MG/DL (ref 0.7–1.2)
CRP SERPL HS-MCNC: 89 MG/L (ref 0–5)
EOSINOPHIL # BLD: 0.08 K/UL (ref 0.05–0.5)
EOSINOPHILS RELATIVE PERCENT: 2 % (ref 0–6)
ERYTHROCYTE [DISTWIDTH] IN BLOOD BY AUTOMATED COUNT: 14.6 % (ref 11.5–15)
ERYTHROCYTE [SEDIMENTATION RATE] IN BLOOD BY WESTERGREN METHOD: 78 MM/HR (ref 0–15)
GFR SERPL CREATININE-BSD FRML MDRD: >60 ML/MIN/1.73M2
GLUCOSE BLD-MCNC: 145 MG/DL (ref 74–99)
GLUCOSE BLD-MCNC: 154 MG/DL (ref 74–99)
GLUCOSE SERPL-MCNC: 178 MG/DL (ref 74–99)
HBA1C MFR BLD: 6.9 % (ref 4–5.6)
HCT VFR BLD AUTO: 33.5 % (ref 37–54)
HGB BLD-MCNC: 10.4 G/DL (ref 12.5–16.5)
IMM GRANULOCYTES # BLD AUTO: 0.03 K/UL (ref 0–0.58)
IMM GRANULOCYTES NFR BLD: 1 % (ref 0–5)
LACTATE BLDV-SCNC: 1.6 MMOL/L (ref 0.5–1.9)
LYMPHOCYTES NFR BLD: 0.89 K/UL (ref 1.5–4)
LYMPHOCYTES RELATIVE PERCENT: 20 % (ref 20–42)
MCH RBC QN AUTO: 26.9 PG (ref 26–35)
MCHC RBC AUTO-ENTMCNC: 31 G/DL (ref 32–34.5)
MCV RBC AUTO: 86.6 FL (ref 80–99.9)
MONOCYTES NFR BLD: 0.46 K/UL (ref 0.1–0.95)
MONOCYTES NFR BLD: 10 % (ref 2–12)
NEUTROPHILS NFR BLD: 67 % (ref 43–80)
NEUTS SEG NFR BLD: 3.06 K/UL (ref 1.8–7.3)
PLATELET # BLD AUTO: 172 K/UL (ref 130–450)
PMV BLD AUTO: 8.4 FL (ref 7–12)
POTASSIUM SERPL-SCNC: 4.2 MMOL/L (ref 3.5–5)
PROT SERPL-MCNC: 6.6 G/DL (ref 6.4–8.3)
RBC # BLD AUTO: 3.87 M/UL (ref 3.8–5.8)
SODIUM SERPL-SCNC: 144 MMOL/L (ref 132–146)
WBC OTHER # BLD: 4.5 K/UL (ref 4.5–11.5)

## 2023-09-26 PROCEDURE — 83605 ASSAY OF LACTIC ACID: CPT

## 2023-09-26 PROCEDURE — 87040 BLOOD CULTURE FOR BACTERIA: CPT

## 2023-09-26 PROCEDURE — 2060000000 HC ICU INTERMEDIATE R&B

## 2023-09-26 PROCEDURE — 2580000003 HC RX 258: Performed by: NURSE PRACTITIONER

## 2023-09-26 PROCEDURE — 86403 PARTICLE AGGLUT ANTBDY SCRN: CPT

## 2023-09-26 PROCEDURE — 6370000000 HC RX 637 (ALT 250 FOR IP)

## 2023-09-26 PROCEDURE — 87077 CULTURE AEROBIC IDENTIFY: CPT

## 2023-09-26 PROCEDURE — 6370000000 HC RX 637 (ALT 250 FOR IP): Performed by: NURSE PRACTITIONER

## 2023-09-26 PROCEDURE — 86140 C-REACTIVE PROTEIN: CPT

## 2023-09-26 PROCEDURE — 87205 SMEAR GRAM STAIN: CPT

## 2023-09-26 PROCEDURE — 6360000002 HC RX W HCPCS: Performed by: NURSE PRACTITIONER

## 2023-09-26 PROCEDURE — 87070 CULTURE OTHR SPECIMN AEROBIC: CPT

## 2023-09-26 PROCEDURE — 85652 RBC SED RATE AUTOMATED: CPT

## 2023-09-26 PROCEDURE — 83036 HEMOGLOBIN GLYCOSYLATED A1C: CPT

## 2023-09-26 PROCEDURE — 73590 X-RAY EXAM OF LOWER LEG: CPT

## 2023-09-26 PROCEDURE — 99285 EMERGENCY DEPT VISIT HI MDM: CPT

## 2023-09-26 PROCEDURE — 80053 COMPREHEN METABOLIC PANEL: CPT

## 2023-09-26 PROCEDURE — 82962 GLUCOSE BLOOD TEST: CPT

## 2023-09-26 PROCEDURE — 85025 COMPLETE CBC W/AUTO DIFF WBC: CPT

## 2023-09-26 RX ORDER — CARVEDILOL 25 MG/1
25 TABLET ORAL 2 TIMES DAILY
COMMUNITY

## 2023-09-26 RX ORDER — SODIUM CHLORIDE 0.9 % (FLUSH) 0.9 %
10 SYRINGE (ML) INJECTION EVERY 12 HOURS SCHEDULED
Status: DISCONTINUED | OUTPATIENT
Start: 2023-09-26 | End: 2023-09-29 | Stop reason: HOSPADM

## 2023-09-26 RX ORDER — GABAPENTIN 100 MG/1
100 CAPSULE ORAL NIGHTLY
COMMUNITY

## 2023-09-26 RX ORDER — INSULIN LISPRO 100 [IU]/ML
0-4 INJECTION, SOLUTION INTRAVENOUS; SUBCUTANEOUS NIGHTLY
Status: DISCONTINUED | OUTPATIENT
Start: 2023-09-26 | End: 2023-09-29 | Stop reason: HOSPADM

## 2023-09-26 RX ORDER — DEXTROSE MONOHYDRATE 100 MG/ML
INJECTION, SOLUTION INTRAVENOUS CONTINUOUS PRN
Status: DISCONTINUED | OUTPATIENT
Start: 2023-09-26 | End: 2023-09-29 | Stop reason: HOSPADM

## 2023-09-26 RX ORDER — SODIUM CHLORIDE 9 MG/ML
INJECTION, SOLUTION INTRAVENOUS PRN
Status: DISCONTINUED | OUTPATIENT
Start: 2023-09-26 | End: 2023-09-29 | Stop reason: HOSPADM

## 2023-09-26 RX ORDER — GABAPENTIN 100 MG/1
100 CAPSULE ORAL NIGHTLY
Status: DISCONTINUED | OUTPATIENT
Start: 2023-09-26 | End: 2023-09-29 | Stop reason: HOSPADM

## 2023-09-26 RX ORDER — ACETAMINOPHEN 325 MG/1
650 TABLET ORAL EVERY 6 HOURS PRN
Status: DISCONTINUED | OUTPATIENT
Start: 2023-09-26 | End: 2023-09-29 | Stop reason: HOSPADM

## 2023-09-26 RX ORDER — LIOTHYRONINE SODIUM 5 UG/1
25 TABLET ORAL EVERY MORNING
Status: DISCONTINUED | OUTPATIENT
Start: 2023-09-27 | End: 2023-09-29 | Stop reason: HOSPADM

## 2023-09-26 RX ORDER — SODIUM CHLORIDE 0.9 % (FLUSH) 0.9 %
10 SYRINGE (ML) INJECTION PRN
Status: DISCONTINUED | OUTPATIENT
Start: 2023-09-26 | End: 2023-09-29 | Stop reason: HOSPADM

## 2023-09-26 RX ORDER — CARVEDILOL 25 MG/1
25 TABLET ORAL 2 TIMES DAILY
Status: DISCONTINUED | OUTPATIENT
Start: 2023-09-26 | End: 2023-09-29 | Stop reason: HOSPADM

## 2023-09-26 RX ORDER — ONDANSETRON 2 MG/ML
4 INJECTION INTRAMUSCULAR; INTRAVENOUS EVERY 6 HOURS PRN
Status: DISCONTINUED | OUTPATIENT
Start: 2023-09-26 | End: 2023-09-29 | Stop reason: HOSPADM

## 2023-09-26 RX ORDER — FUROSEMIDE 20 MG/1
20 TABLET ORAL EVERY MORNING
Status: DISCONTINUED | OUTPATIENT
Start: 2023-09-27 | End: 2023-09-29 | Stop reason: HOSPADM

## 2023-09-26 RX ORDER — ASPIRIN 81 MG/1
81 TABLET ORAL NIGHTLY
Status: DISCONTINUED | OUTPATIENT
Start: 2023-09-26 | End: 2023-09-29 | Stop reason: HOSPADM

## 2023-09-26 RX ORDER — TAMSULOSIN HYDROCHLORIDE 0.4 MG/1
0.4 CAPSULE ORAL NIGHTLY
Status: DISCONTINUED | OUTPATIENT
Start: 2023-09-26 | End: 2023-09-29 | Stop reason: HOSPADM

## 2023-09-26 RX ORDER — ONDANSETRON 4 MG/1
4 TABLET, ORALLY DISINTEGRATING ORAL EVERY 8 HOURS PRN
Status: DISCONTINUED | OUTPATIENT
Start: 2023-09-26 | End: 2023-09-29 | Stop reason: HOSPADM

## 2023-09-26 RX ORDER — ROSUVASTATIN CALCIUM 5 MG/1
5 TABLET, COATED ORAL EVERY MORNING
COMMUNITY

## 2023-09-26 RX ORDER — GABAPENTIN 100 MG/1
200 CAPSULE ORAL EVERY MORNING
Status: DISCONTINUED | OUTPATIENT
Start: 2023-09-27 | End: 2023-09-29 | Stop reason: HOSPADM

## 2023-09-26 RX ORDER — POTASSIUM CHLORIDE 20 MEQ/1
20 TABLET, EXTENDED RELEASE ORAL NIGHTLY
COMMUNITY

## 2023-09-26 RX ORDER — FOLIC ACID/MULTIVIT,IRON,MINER 0.4MG-18MG
1 TABLET ORAL NIGHTLY
COMMUNITY

## 2023-09-26 RX ORDER — INSULIN LISPRO 100 [IU]/ML
0-8 INJECTION, SOLUTION INTRAVENOUS; SUBCUTANEOUS
Status: DISCONTINUED | OUTPATIENT
Start: 2023-09-26 | End: 2023-09-29 | Stop reason: HOSPADM

## 2023-09-26 RX ORDER — LATANOPROST 50 UG/ML
1 SOLUTION/ DROPS OPHTHALMIC NIGHTLY
Status: DISCONTINUED | OUTPATIENT
Start: 2023-09-26 | End: 2023-09-29 | Stop reason: HOSPADM

## 2023-09-26 RX ORDER — ACETAMINOPHEN 650 MG/1
650 SUPPOSITORY RECTAL EVERY 6 HOURS PRN
Status: DISCONTINUED | OUTPATIENT
Start: 2023-09-26 | End: 2023-09-29 | Stop reason: HOSPADM

## 2023-09-26 RX ORDER — LISINOPRIL 20 MG/1
20 TABLET ORAL DAILY
Status: DISCONTINUED | OUTPATIENT
Start: 2023-09-26 | End: 2023-09-29 | Stop reason: HOSPADM

## 2023-09-26 RX ORDER — ROSUVASTATIN CALCIUM 5 MG/1
5 TABLET, COATED ORAL EVERY MORNING
Status: DISCONTINUED | OUTPATIENT
Start: 2023-09-27 | End: 2023-09-29 | Stop reason: HOSPADM

## 2023-09-26 RX ORDER — LEVOTHYROXINE SODIUM 0.1 MG/1
100 TABLET ORAL EVERY MORNING
Status: DISCONTINUED | OUTPATIENT
Start: 2023-09-27 | End: 2023-09-28

## 2023-09-26 RX ORDER — ENOXAPARIN SODIUM 100 MG/ML
40 INJECTION SUBCUTANEOUS 2 TIMES DAILY
Status: DISCONTINUED | OUTPATIENT
Start: 2023-09-26 | End: 2023-09-29 | Stop reason: HOSPADM

## 2023-09-26 RX ORDER — AMOXICILLIN AND CLAVULANATE POTASSIUM 875; 125 MG/1; MG/1
1 TABLET, FILM COATED ORAL 2 TIMES DAILY
Status: ON HOLD | COMMUNITY
End: 2023-09-29 | Stop reason: HOSPADM

## 2023-09-26 RX ORDER — GABAPENTIN 100 MG/1
200 CAPSULE ORAL EVERY MORNING
COMMUNITY

## 2023-09-26 RX ADMIN — SODIUM CHLORIDE, PRESERVATIVE FREE 10 ML: 5 INJECTION INTRAVENOUS at 20:17

## 2023-09-26 RX ADMIN — CARVEDILOL 25 MG: 25 TABLET, FILM COATED ORAL at 20:16

## 2023-09-26 RX ADMIN — LISINOPRIL 20 MG: 20 TABLET ORAL at 16:20

## 2023-09-26 RX ADMIN — TAMSULOSIN HYDROCHLORIDE 0.4 MG: 0.4 CAPSULE ORAL at 20:17

## 2023-09-26 RX ADMIN — ENOXAPARIN SODIUM 40 MG: 100 INJECTION SUBCUTANEOUS at 20:16

## 2023-09-26 RX ADMIN — LATANOPROST 1 DROP: 50 SOLUTION OPHTHALMIC at 22:27

## 2023-09-26 RX ADMIN — GABAPENTIN 100 MG: 100 CAPSULE ORAL at 20:17

## 2023-09-26 ASSESSMENT — PAIN - FUNCTIONAL ASSESSMENT
PAIN_FUNCTIONAL_ASSESSMENT: 0-10
PAIN_FUNCTIONAL_ASSESSMENT: NONE - DENIES PAIN

## 2023-09-26 ASSESSMENT — PAIN DESCRIPTION - LOCATION: LOCATION: LEG

## 2023-09-26 ASSESSMENT — PAIN DESCRIPTION - ORIENTATION: ORIENTATION: LEFT;LOWER

## 2023-09-26 ASSESSMENT — PAIN SCALES - GENERAL
PAINLEVEL_OUTOF10: 7
PAINLEVEL_OUTOF10: 0
PAINLEVEL_OUTOF10: 0

## 2023-09-26 NOTE — PROGRESS NOTES
Database initiated. Patient is A&O independent from home with wife. States he uses no assistive devices and is RA at baseline but wears a bi-pap at night. He has a large open area LLE that's red and weeping.

## 2023-09-26 NOTE — ED PROVIDER NOTES
[address]        Pt Name: Daryle Skipper  MRN: [id number]  [address] 1950  Date of evaluation: 9/26/2023  Provider: Kannan Oviedo MD  PCP: Mynor James MD  Note Started: 9:58 AM EDT 9/26/23    CHIEF COMPLAINT       Chief Complaint   Patient presents with    Wound Check     Left leg, sent in by wound care for possible infection. Has been on an antibiotic and states it isnt working. HISTORY OF PRESENT ILLNESS: 1 or more Elements   History From: Patient    Limitations to history : None    Daryle Skipper is a 74 y.o. male with past medical history of hypertension, hypothyroidism, DEMETRIA, venous insufficiency, peripheral vascular disease, type 2 diabetes mellitus, diabetic neuropathy, lymphedema, chronic wounds of the lower extremities, osteoarthritis who presents with chronic left lower extremity wound that has been going on for the past 3 years. Patient states he has been on numerous oral antibiotics at that time. Patient states he was recently seen by podiatry yesterday and told to come to ED for evaluation for concern of continued drainage and erythema. Patient denies any new trauma or falls to the lower extremity. Patient states drainage is serous in quality. Patient denies any chest pain, shortness of breath, nausea, vomiting, diarrhea, fevers, chills, abdominal pain, dizziness, lightheadedness, weakness, numbness or tingling. Patient denies any tobacco, EtOH, or illicit drug use.     Nursing Notes were all reviewed and agreed with or any disagreements were addressed in the HPI.    ROS:   Pertinent positives and negatives are stated within HPI, all other systems reviewed and are negative.    --------------------------------------------- PAST HISTORY ---------------------------------------------  Past Medical History:  has a past medical history of CAD (coronary artery disease), Cataract, right eye, Complete heart block ([address]), Reviewed : Outpatient Notes podiatry visit from 9/25/2023    CONSULTS: Wound care for evaluation of left lower extremity wound, podiatry for recommendations, infectious disease to see patient on the floor for possible IV antibiotics, hospitalist for admission    DISPOSITION:   Consultation with [place] from [facility] hospitalist service who accepted the patient for admission. The patient will be admitted for further treatment and evaluation for   1. Wound of left lower extremity, initial encounter      Re-Evaluations/Consultations:             ED Course as of 09/26/23 1550   Tue Sep 26, 2023   1241 Podiatry note from yesterday with Dr. Kendrick Fournier included the below notation:  Soft tissue cultures were taken on today's visit as well left lower extremity. We did recommend ER evaluation due to continued swelling, erythema, increased drainage, and being placed on multiple oral antibiotics over the last several weeks. We did stressed the importance of evaluation for potential admission and IV antibiotics to reduce current symptoms. Patient has been going through lymphedema therapy sessions for several months. [ZA]   4256 Patient is a 77-year-old gentleman history of non-insulin-dependent diabetes comes in the emergency department complaints of increased drainage from chronic wound to the left lower extremity. He follows with Dr. Omaira Taylor and follows with the lymphedema clinic. Has been going there for multiple years. Over the last month has had increasing drainage from the left lower extremity he was placed on Augmentin by his podiatrist Dr. Omaira Taylor.  He was seen for follow-up yesterday without any improvement and continued continued drainage and redness they recommended admission for IV antibiotics so he came in today for admission. Denies any fevers or chills. Denies any nausea or vomiting denies any numbness or weakness to the lower extremity. Patient is morbidly obese.  Denies iincreased pain to lower

## 2023-09-26 NOTE — PROGRESS NOTES
4 Eyes Skin Assessment     NAME:  Slava Steinberg OF BIRTH:  1950  MEDICAL RECORD NUMBER:  [id number]    The patient is being assessed for  Admission    I agree that at least one RN has performed a thorough Head to Toe Skin Assessment on the patient. ALL assessment sites listed below have been assessed. Areas assessed by both nurses:    Head, Face, Ears, Shoulders, Back, Chest, Arms, Elbows, Hands, Sacrum. Buttock, Coccyx, Ischium, Legs. Feet and Heels, Under Medical Devices , and Other LLE        Does the Patient have a Wound? Yes wound(s) were present on assessment.  LDA wound assessment was Initiated and completed by RN       Skyler Prevention initiated by RN: Yes  Wound Care Orders initiated by RN: Yes    Pressure Injury (Stage 3,4, Unstageable, DTI, NWPT, and Complex wounds) if present, place Wound referral order by RN under : Yes    New Ostomies, if present place, Ostomy referral order under : No     Nurse 1 eSignature: Electronically signed by Gage Hicks RN on 9/26/23 at 4:49 PM EDT    **SHARE this note so that the co-signing nurse can place an eSignature**    Nurse 2 eSignature: Electronically signed by Guzman Mendez RN on 9/26/23 at 7:15 PM EDT

## 2023-09-26 NOTE — PROGRESS NOTES
23     Mary Murcia    : 1950  Sex: male  Age: 74 y.o. Subjective: The patient is seen today for evaluation regarding mycotic nail care, evaluation regarding chronic lymphedema issues and stasis ulceration left lower extremity. Patient is currently taking oral Augmentin as recently prescribed. Patient has noticed increased drainage and swelling from the left lower extremity. Patient is in no acute distress. Patient denies any nausea, vomiting, fever, chills. No other complaints noted. Chief Complaint   Patient presents with    Nail Problem     Nail care    Wound Check     Left leg        Current Medications:    Current Outpatient Medications:     gentamicin (GARAMYCIN) 0.1 % cream, Apply topically 1 times daily with dressing changes. , Disp: 30 g, Rfl: 4    carvedilol (COREG) 25 MG tablet, Take 1 tablet by mouth 2 times daily (with meals), Disp: 180 tablet, Rfl: 3    potassium chloride (KLOR-CON M) 20 MEQ extended release tablet, Take 1 tablet by mouth daily, Disp: 90 tablet, Rfl: 3    triamcinolone (KENALOG) 0.1 % cream, Apply topically 2 times daily. , Disp: 90 g, Rfl: 2    glipiZIDE (GLUCOTROL XL) 5 MG extended release tablet, Take 1 tablet by mouth at bedtime, Disp: , Rfl:     rosuvastatin (CRESTOR) 5 MG tablet, Take 1 tablet by mouth daily, Disp: 90 tablet, Rfl: 3    acetaminophen (TYLENOL) 500 MG tablet, Take 2 tablets by mouth every 6 hours as needed for Pain, Disp: , Rfl:     latanoprost (XALATAN) 0.005 % ophthalmic solution, Place 1 drop into both eyes nightly, Disp: , Rfl:     ramipril (ALTACE) 5 MG capsule, Take 1 capsule by mouth at bedtime, Disp: , Rfl:     liothyronine (CYTOMEL) 25 MCG tablet, Take 1 tablet by mouth daily, Disp: , Rfl:     levothyroxine (SYNTHROID) 100 MCG tablet, Take 1 tablet by mouth Daily, Disp: , Rfl:     SUPER B COMPLEX/C PO, Take 1 tablet by mouth daily, Disp: , Rfl:     tamsulosin (FLOMAX) 0.4 MG capsule, Take 1 capsule by mouth at bedtime Takes

## 2023-09-27 ENCOUNTER — APPOINTMENT (OUTPATIENT)
Dept: ULTRASOUND IMAGING | Age: 73
DRG: 264 | End: 2023-09-27
Payer: MEDICARE

## 2023-09-27 ENCOUNTER — ANESTHESIA EVENT (OUTPATIENT)
Dept: OPERATING ROOM | Age: 73
End: 2023-09-27
Payer: MEDICARE

## 2023-09-27 LAB
ANION GAP SERPL CALCULATED.3IONS-SCNC: 10 MMOL/L (ref 7–16)
BASOPHILS # BLD: 0.02 K/UL (ref 0–0.2)
BASOPHILS NFR BLD: 0 % (ref 0–2)
BUN SERPL-MCNC: 15 MG/DL (ref 6–23)
CALCIUM SERPL-MCNC: 8.9 MG/DL (ref 8.6–10.2)
CHLORIDE SERPL-SCNC: 106 MMOL/L (ref 98–107)
CO2 SERPL-SCNC: 23 MMOL/L (ref 22–29)
CREAT SERPL-MCNC: 0.9 MG/DL (ref 0.7–1.2)
EOSINOPHIL # BLD: 0.09 K/UL (ref 0.05–0.5)
EOSINOPHILS RELATIVE PERCENT: 2 % (ref 0–6)
ERYTHROCYTE [DISTWIDTH] IN BLOOD BY AUTOMATED COUNT: 14.6 % (ref 11.5–15)
GFR SERPL CREATININE-BSD FRML MDRD: >60 ML/MIN/1.73M2
GLUCOSE BLD-MCNC: 155 MG/DL (ref 74–99)
GLUCOSE BLD-MCNC: 164 MG/DL (ref 74–99)
GLUCOSE BLD-MCNC: 170 MG/DL (ref 74–99)
GLUCOSE BLD-MCNC: 178 MG/DL (ref 74–99)
GLUCOSE SERPL-MCNC: 158 MG/DL (ref 74–99)
HCT VFR BLD AUTO: 32 % (ref 37–54)
HGB BLD-MCNC: 9.8 G/DL (ref 12.5–16.5)
IMM GRANULOCYTES # BLD AUTO: <0.03 K/UL (ref 0–0.58)
IMM GRANULOCYTES NFR BLD: 0 % (ref 0–5)
LYMPHOCYTES NFR BLD: 0.98 K/UL (ref 1.5–4)
LYMPHOCYTES RELATIVE PERCENT: 20 % (ref 20–42)
MCH RBC QN AUTO: 26.3 PG (ref 26–35)
MCHC RBC AUTO-ENTMCNC: 30.6 G/DL (ref 32–34.5)
MCV RBC AUTO: 86 FL (ref 80–99.9)
MONOCYTES NFR BLD: 0.56 K/UL (ref 0.1–0.95)
MONOCYTES NFR BLD: 12 % (ref 2–12)
NEUTROPHILS NFR BLD: 66 % (ref 43–80)
NEUTS SEG NFR BLD: 3.18 K/UL (ref 1.8–7.3)
PLATELET # BLD AUTO: 178 K/UL (ref 130–450)
PMV BLD AUTO: 8.4 FL (ref 7–12)
POTASSIUM SERPL-SCNC: 3.9 MMOL/L (ref 3.5–5)
RBC # BLD AUTO: 3.72 M/UL (ref 3.8–5.8)
SODIUM SERPL-SCNC: 139 MMOL/L (ref 132–146)
WBC OTHER # BLD: 4.9 K/UL (ref 4.5–11.5)

## 2023-09-27 PROCEDURE — 6360000002 HC RX W HCPCS: Performed by: FAMILY MEDICINE

## 2023-09-27 PROCEDURE — P9047 ALBUMIN (HUMAN), 25%, 50ML: HCPCS | Performed by: FAMILY MEDICINE

## 2023-09-27 PROCEDURE — 2060000000 HC ICU INTERMEDIATE R&B

## 2023-09-27 PROCEDURE — 6370000000 HC RX 637 (ALT 250 FOR IP): Performed by: NURSE PRACTITIONER

## 2023-09-27 PROCEDURE — 82962 GLUCOSE BLOOD TEST: CPT

## 2023-09-27 PROCEDURE — 85025 COMPLETE CBC W/AUTO DIFF WBC: CPT

## 2023-09-27 PROCEDURE — 93923 UPR/LXTR ART STDY 3+ LVLS: CPT

## 2023-09-27 PROCEDURE — 80048 BASIC METABOLIC PNL TOTAL CA: CPT

## 2023-09-27 PROCEDURE — 93970 EXTREMITY STUDY: CPT

## 2023-09-27 PROCEDURE — 36415 COLL VENOUS BLD VENIPUNCTURE: CPT

## 2023-09-27 PROCEDURE — 6360000002 HC RX W HCPCS: Performed by: NURSE PRACTITIONER

## 2023-09-27 PROCEDURE — 2580000003 HC RX 258: Performed by: NURSE PRACTITIONER

## 2023-09-27 PROCEDURE — 6370000000 HC RX 637 (ALT 250 FOR IP): Performed by: FAMILY MEDICINE

## 2023-09-27 RX ORDER — MORPHINE SULFATE 2 MG/ML
2 INJECTION, SOLUTION INTRAMUSCULAR; INTRAVENOUS EVERY 4 HOURS PRN
Status: DISCONTINUED | OUTPATIENT
Start: 2023-09-27 | End: 2023-09-29 | Stop reason: HOSPADM

## 2023-09-27 RX ORDER — OXYCODONE HYDROCHLORIDE AND ACETAMINOPHEN 5; 325 MG/1; MG/1
1 TABLET ORAL EVERY 4 HOURS PRN
Status: DISCONTINUED | OUTPATIENT
Start: 2023-09-27 | End: 2023-09-29 | Stop reason: HOSPADM

## 2023-09-27 RX ORDER — ALBUMIN (HUMAN) 12.5 G/50ML
25 SOLUTION INTRAVENOUS ONCE
Status: COMPLETED | OUTPATIENT
Start: 2023-09-27 | End: 2023-09-27

## 2023-09-27 RX ADMIN — LIOTHYRONINE SODIUM 25 MCG: 25 TABLET ORAL at 11:08

## 2023-09-27 RX ADMIN — GABAPENTIN 100 MG: 100 CAPSULE ORAL at 21:25

## 2023-09-27 RX ADMIN — OXYCODONE AND ACETAMINOPHEN 1 TABLET: 5; 325 TABLET ORAL at 18:05

## 2023-09-27 RX ADMIN — ALBUMIN (HUMAN) 25 G: 0.25 INJECTION, SOLUTION INTRAVENOUS at 11:06

## 2023-09-27 RX ADMIN — FUROSEMIDE 20 MG: 20 TABLET ORAL at 11:00

## 2023-09-27 RX ADMIN — ACETAMINOPHEN 650 MG: 325 TABLET ORAL at 03:37

## 2023-09-27 RX ADMIN — SODIUM CHLORIDE, PRESERVATIVE FREE 10 ML: 5 INJECTION INTRAVENOUS at 21:25

## 2023-09-27 RX ADMIN — ROSUVASTATIN CALCIUM 5 MG: 5 TABLET, FILM COATED ORAL at 11:00

## 2023-09-27 RX ADMIN — CARVEDILOL 25 MG: 25 TABLET, FILM COATED ORAL at 11:00

## 2023-09-27 RX ADMIN — TAMSULOSIN HYDROCHLORIDE 0.4 MG: 0.4 CAPSULE ORAL at 21:25

## 2023-09-27 RX ADMIN — OXYCODONE AND ACETAMINOPHEN 1 TABLET: 5; 325 TABLET ORAL at 12:14

## 2023-09-27 RX ADMIN — CARVEDILOL 25 MG: 25 TABLET, FILM COATED ORAL at 21:25

## 2023-09-27 RX ADMIN — SODIUM CHLORIDE, PRESERVATIVE FREE 10 ML: 5 INJECTION INTRAVENOUS at 12:14

## 2023-09-27 RX ADMIN — SODIUM CHLORIDE, PRESERVATIVE FREE 10 ML: 5 INJECTION INTRAVENOUS at 11:01

## 2023-09-27 RX ADMIN — LATANOPROST 1 DROP: 50 SOLUTION OPHTHALMIC at 21:25

## 2023-09-27 RX ADMIN — LISINOPRIL 20 MG: 20 TABLET ORAL at 11:00

## 2023-09-27 RX ADMIN — GABAPENTIN 200 MG: 100 CAPSULE ORAL at 11:00

## 2023-09-27 RX ADMIN — ENOXAPARIN SODIUM 40 MG: 100 INJECTION SUBCUTANEOUS at 11:00

## 2023-09-27 RX ADMIN — LEVOTHYROXINE SODIUM 100 MCG: 100 TABLET ORAL at 11:00

## 2023-09-27 ASSESSMENT — PAIN SCALES - GENERAL
PAINLEVEL_OUTOF10: 6
PAINLEVEL_OUTOF10: 5
PAINLEVEL_OUTOF10: 3
PAINLEVEL_OUTOF10: 10
PAINLEVEL_OUTOF10: 10

## 2023-09-27 ASSESSMENT — PAIN DESCRIPTION - ORIENTATION
ORIENTATION: LEFT

## 2023-09-27 ASSESSMENT — PAIN DESCRIPTION - LOCATION
LOCATION: LEG

## 2023-09-27 ASSESSMENT — PAIN DESCRIPTION - DESCRIPTORS
DESCRIPTORS: ACHING;DISCOMFORT
DESCRIPTORS: ACHING;THROBBING

## 2023-09-27 ASSESSMENT — LIFESTYLE VARIABLES: SMOKING_STATUS: 0

## 2023-09-27 ASSESSMENT — PAIN - FUNCTIONAL ASSESSMENT: PAIN_FUNCTIONAL_ASSESSMENT: ACTIVITIES ARE NOT PREVENTED

## 2023-09-27 NOTE — ACP (ADVANCE CARE PLANNING)
Advance Care Planning   Healthcare Decision Maker:    Primary Decision Maker: Deangelo Jin - [phone]    Secondary Decision Maker: Anh - [phone]      Today we documented Decision Maker(s) consistent with Legal Next of Kin hierarchy.

## 2023-09-27 NOTE — CONSULTS
Comprehensive Nutrition Assessment    Type and Reason for Visit:  Initial, Consult, Wound    Nutrition Recommendations/Plan:   Continue NPO, ADAT & add ONS w/ nutrition progression     Malnutrition Assessment:  Malnutrition Status: At risk for malnutrition (Comment) (09/27/23 1033)    Context:  Chronic Illness (chronic wound)     Findings of the 6 clinical characteristics of malnutrition:  Energy Intake:  Mild decrease in energy intake (Comment) (NPO)  Weight Loss:  Unable to assess (d/t fluid shifts (hx lymphedema))     Body Fat Loss:  No significant body fat loss     Muscle Mass Loss:  No significant muscle mass loss    Fluid Accumulation:  No significant fluid accumulation     Strength:  Not Performed    Nutrition Assessment:    Pt w/ chronic venous stasis ulceration left leg w/ plan for debridement- no evidence of cellulitis/infection per ID. Hx DM, lymphedema, PVD. Currently NPO, ADAT & add ONS w/ nutrition progression. Nutrition Related Findings:    A&O, I&Os WDL, generalized NP edema, abd WDL, A1C 6.9, Glu 158   Wound Type: Venous Stasis (BLE venous stasis dermatitis per podiatry)       Current Nutrition Intake & Therapies:    Average Meal Intake: NPO  Average Supplements Intake: NPO  ADULT DIET; Regular  Diet NPO Exceptions are: Sips of Water with Meds    Anthropometric Measures:  Height: 5' 7\" (170.2 cm)  Ideal Body Weight (IBW): 148 lbs (67 kg)       Current Body Weight: 357 lb (161.9 kg) (9/26), 241.2 % IBW.  Weight Source: Not Specified  Current BMI (kg/m2): 55.9  Usual Body Weight: 374 lb 3 oz (169.7 kg) (11/16/22 actual per EMR OV)  % Weight Change (Calculated): -4.6                    BMI Categories: Obese Class 3 (BMI 40.0 or greater)    Estimated Daily Nutrient Needs:  Energy Requirements Based On: Kcal/kg  Weight Used for Energy Requirements: Current  Energy (kcal/day):   Weight Used for Protein Requirements: Ideal  Protein (g/day): 120-135 (1.8-2)  Method Used for Fluid Requirements: 1 ml/kcal  Fluid (ml/day):     Nutrition Diagnosis:   Increased nutrient needs related to increase demand for energy/nutrients as evidenced by wounds    Nutrition Interventions:   Food and/or Nutrient Delivery: Continue NPO (ADAT)  Nutrition Education/Counseling: No recommendation at this time  Coordination of Nutrition Care: Continue to monitor while inpatient       Goals:     Goals: Initiate PO diet, by next RD assessment       Nutrition Monitoring and Evaluation:      Food/Nutrient Intake Outcomes: Diet Advancement/Tolerance  Physical Signs/Symptoms Outcomes: Biochemical Data, GI Status, Fluid Status or Edema, Nutrition Focused Physical Findings, Skin, Weight    Discharge Planning:     Too soon to determine     Seun Magdaleno RD, LD  Contact: [phone]

## 2023-09-27 NOTE — H&P
History and Physical      CHIEF COMPLAINT:  wound check, wound infection    History of Present Illness:Tuan Davidson Friend Negrita Presley is a 74 y.o. male with past medical history of hypertension, hypothyroidism, DEMETRIA, venous insufficiency, peripheral vascular disease, type 2 diabetes mellitus, diabetic neuropathy, lymphedema, chronic wounds of the lower extremities, osteoarthritis who presented to the ER 9/26/2023 with chronic left lower extremity wound that has been going on for the past 3 years. Patient states he has been on numerous oral antibiotics since that time. Patient states he was recently seen by podiatry 9/25/2023 and told to come to ED for evaluation for concern of continued drainage and erythema. Patient denies any new trauma or falls to the lower extremity. Patient states drainage is serous in quality. Patient denies any chest pain, shortness of breath, nausea, vomiting, diarrhea, fevers, chills, abdominal pain, dizziness, lightheadedness, weakness, numbness or tingling. Patient denies any tobacco, EtOH, or illicit drug use. W/u in Ed showed neg blood cultures at 24 hours. Hgb 10.4, hct 33.5. cl 109, glucose 178, albumin 3.2. sed rate 78. Crp 89. Lactic 1.6. xr tib fib left showed diffuse soft tissue swelling, no osseous abnormality. Vitals were stable. The patient was admitted with consult to podiatry and ID for further w/u, evaluation and management.         Past Medical History:   Diagnosis Date    CAD (coronary artery disease)     Cataract, right eye     Complete heart block ([address])     h/o    Diabetes mellitus ([address])     Encounter for aspirin therapy     patient uses for heart health    History of blood transfusion     Hx of blood clots     Hyperlipidemia     Hypertension     Hyperthyroidism     Myocardial infarct ([address]) 2010    Neuropathy     feet    Obesity     Obstructive sleep apnea     on cpap    Onychomycosis     DEMETRIA on CPAP     Osteoarthritis     Pacemaker     Medtronic    Stasis dermatitis     h/o Diagnosis    Arrhythmia    Pacemaker    HTN (hypertension)    Hyperthyroidism    DEMETRIA (obstructive sleep apnea)    Cellulitis    SIRS (systemic inflammatory response syndrome) (HCC)    Shortness of breath    Pain in lower limb    Chest pain    Abdominal pain    Pulmonary emboli (HCC)    Post-thrombotic syndrome    Complete heart block (HCC)    Non-pressure chronic ulcer of left lower leg with fat layer exposed ([address])    Venous insufficiency (chronic) (peripheral)    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 ([address])    Onychomycosis    PVD (peripheral vascular disease) (HCC)    Type II diabetes mellitus with peripheral circulatory disorder (HCC)    Difficulty walking    Simple chronic bronchitis (HCC)    Pain of toe of right foot    Pain of toe of left foot    Diabetic polyneuropathy associated with type 2 diabetes mellitus ([address])    Pneumonia    Community acquired pneumonia due to Streptococcus pneumoniae ([address])    Encounter for pacemaker at end of battery life    Blister of right leg    Stasis ulcer ([address])    Lymphedema    Varicose veins of right lower extremity with inflammation, with ulcer of ankle with fat layer exposed ([address])    Xerosis cutis    Non-pressure chronic ulcer of other part of right lower leg with fat layer exposed ([address])    Diabetic sensory polyneuropathy ([address])    Edema    History of artificial joint    Morbid obesity ([address])    Osteoarthritis of knee    Peripheral vascular disorder due to diabetes mellitus ([address])    Wound infection       PLAN:    Cellulitis w/ wound infection  --podiatry consulted and following  --ID for antimicrobials if appropriately, unsure if pt got any doses in the ER  -- ? Vascular studies ?  --replace albumin  --control pain  --diuresis for lymphedema  --wound care per podiatry  --npo today for possible debridement with podiatry  --dietary consulted for wound supplements  --elevated affected limb    Continue to manage stable chronic conditions

## 2023-09-27 NOTE — PROGRESS NOTES
Call placed to Victorina Lynn NP re: lovenox/OR tomorrow. Per Victorina Lynn NP check with podiatry. Call placed to Dr. Kendrick Owens answering service.

## 2023-09-27 NOTE — CARE COORDINATION
Pt admitted with a wound to his left lower extremity. ID following off antibiotics w/cultures pending. CM made in room visit to pt and explained role of CM. Pt states he resides w/his spouse in a one story home, one step to enter and is independent w/o the use of any assistive devices. Pt states he has a cane, walker and wc if needed. He is established w/Dr. Neal Primrose and uses [facility] on [address] [place] [address] & [address] No home O2 or nebulizer, but pt does have a cpap. Pt states he attends an outpt lymphedema clinic. No hx of HHC or SNF stay. Pt denies any needs at this time, will follow.   Mirna Pendleton, BSN, RN  [facility] Case Management  [phone]

## 2023-09-27 NOTE — CONSULTS
1100    rosuvastatin (CRESTOR) tablet 5 mg  5 mg Oral QAM Anice Arrow, APRN - CNP   5 mg at 09/27/23 1100    tamsulosin (FLOMAX) capsule 0.4 mg  0.4 mg Oral Nightly Anice Arrow, APRN - CNP   0.4 mg at 09/26/23 2017    dextrose bolus 10% 125 mL  125 mL IntraVENous PRN Anice Arrow, APRN - CNP        Or    dextrose bolus 10% 250 mL  250 mL IntraVENous PRN Anice Arrow, APRN - CNP        glucagon injection 1 mg  1 mg SubCUTAneous PRN Anice Arrow, APRN - CNP        dextrose 10 % infusion   IntraVENous Continuous PRN Anice Arrow, APRN - CNP        sodium chloride flush 0.9 % injection 10 mL  10 mL IntraVENous 2 times per day Anice Arrow, APRN - CNP   10 mL at 09/27/23 1101    sodium chloride flush 0.9 % injection 10 mL  10 mL IntraVENous PRN Anice Arrow, APRN - CNP        0.9 % sodium chloride infusion   IntraVENous PRN Anice Arrow, APRN - CNP        enoxaparin (LOVENOX) injection 40 mg  40 mg SubCUTAneous BID Anice Arrow, APRN - CNP   40 mg at 09/27/23 1100    ondansetron (ZOFRAN-ODT) disintegrating tablet 4 mg  4 mg Oral Q8H PRN Anice Arrow, APRN - CNP        Or    ondansetron (ZOFRAN) injection 4 mg  4 mg IntraVENous Q6H PRN Anice Arrow, APRN - CNP        magnesium hydroxide (MILK OF MAGNESIA) 400 MG/5ML suspension 30 mL  30 mL Oral Daily PRN Anice Arrow, APRN - CNP        acetaminophen (TYLENOL) tablet 650 mg  650 mg Oral Q6H PRN Anice Arrow, APRN - CNP   650 mg at 09/27/23 3714    Or    acetaminophen (TYLENOL) suppository 650 mg  650 mg Rectal Q6H PRN Anice Arrow, APRN - CNP        insulin lispro (HUMALOG) injection vial 0-8 Units  0-8 Units SubCUTAneous TID WC Anice Arrow, APRN - CNP        insulin lispro (HUMALOG) injection vial 0-4 Units  0-4 Units SubCUTAneous Nightly Anice Arrow, APRN - CNP        Glucose (TRUEPLUS) oral gel 15 g  15 g Oral PRN Anice Arrow, APRN - CNP        latanoprost (XALATAN) 0.005 % ophthalmic solution 1 drop  1 drop

## 2023-09-27 NOTE — PROGRESS NOTES
[facility] Quality Flow/Interdisciplinary Rounds Progress Note        Quality Flow Rounds held on September 27, 2023    Disciplines Attending:  Bedside Nurse, , , and Nursing Unit Leadership    Liliana Milner was admitted on 9/26/2023  9:51 AM    Anticipated Discharge Date:   tbd    Disposition: home    Skyler Score:  Skyler Scale Score: 19    Readmission Risk              Risk of Unplanned Readmission:  13           Discussed patient goal for the day, patient clinical progression, and barriers to discharge.   The following Goal(s) of the Day/Commitment(s) have been identified:   wound nurse, await abx plan  from [address] [place] [address]  September 27, 2023

## 2023-09-27 NOTE — CONSULTS
[facility]  [facility]  Consultation Note     Admit Date: 9/26/2023  9:51 AM    Reason for Consult:   left lower leg wound; multiple oral antibiotics    Attending Physician:  Stephanie Mcnulty DO    HISTORY OF PRESENT ILLNESS:             The history is obtained from extensive review of available past medical records. The patient is a 74 y.o. male who is previously known to the ID service. He was seen in January of this year for a similar problem. Wound cultures during the admission grew MRSA, Corynebactera, GNR, and Group G strep. He was discharged on Zyvox and Levofloxacin. He presented to the ED at [facility] on 9/26 from podiatrist for left leg wound, with drainage and erythema. He has been dealing with lymphedema and venous stasis dermatitis for the last 3 years. He denies any injury to the left leg. He also denies any fevers, chills, nausea, vomiting, diarrhea. He had been taking Augmentin (prescribed August 18, 2023) followed by Amoxicillin (prescribed September 1, 2023). He says he wears compression daily, he takes diuretics but also drinks \"gallons of water\". His leg left shows no evidence of cellulitis. He has venous stasis dermatitis. When he presented to the ED labs showed WBC 4.5, CRP 89, ESR 78. He has been afebrile, vital signs were stable. Xray of the tib/fib showed no abnormalities. Blood cultures were taken and are negative to date. A wound culture was taken of his leg and is showing rare GPC and gram variable rods. He was not given any antibiotics in the ED. ID was asked to the see today. Past Medical History:      January 2023: Admitted to [facility] and seen by Dr. Ronald Vang. He presented to the ED with left leg pain, redness, and drainage for several weeks. He had been on oral antibiotics as an outpatient. He was given vancomycin and cefepime in the ER.  Antibiotics were changed to Vancomycin and Merrem as the patient had a history of \"CULTRESP\" in the last 72 hours. No results for input(s): \"PROCAL\" in the last 72 hours. Assessment:  Left leg venous stasis dermatitis. There is no evidence of cellulitis or active infection. Plan:    Hold off on antibiotics at this point. He has no systemic signs of infection, no evidence of active infection in the leg and has been on multiple courses of oral antibiotics over the last month. Check cultures  Monitor labs  Will follow with you    Thank you for having us see this patient in consultation. I will be discussing this case with the treating physicians. SUSANNA Castillo - CNP  10:15 AM  9/27/2023     Patient seen and examined. I had a face to face encounter with the patient. Agree with exam.  Assessment and plan as outlined above and directed by me. Addition and corrections were done as deemed appropriate. My exam and plan include: The patient has a longstanding history of venous stasis dermatitis of the left leg associated to chronic lymphedema. He does wear compressions and follows a specific diet low on salt. He does, however, drink a lot of fluids at the insistence of his wife. He has had a chronic wound that has not healed for the last few months. He says that it does improve when he is treated with antibiotics. He was last treated by Dr. Ronald Vang in January of this year with Linezolid and Levofloxacin. He had been on Augmentin by his podiatrist for the last month without any improvement so he was sent to the hospital for IV antibiotics. It does not look like he has an infection at this point in time. There is no cellulitis. The wounds are seeping. There is a mild odor. There is no doubt that he is heavily colonized with organisms. The question is whether he needs IV antibiotics or not and I do not think he would warrant them at this time. A wound culture done as an outpatient is growing mixed gram-negative rods, including ox + and group C streptococcus.   We will await for the

## 2023-09-27 NOTE — PROGRESS NOTES
Consult for cellulitis LLE  Podiatry following. For debridement tomorrow  Bruce Lcuas.  Ellis Mitchell, CNS, Wound Care

## 2023-09-28 ENCOUNTER — ANESTHESIA (OUTPATIENT)
Dept: OPERATING ROOM | Age: 73
End: 2023-09-28
Payer: MEDICARE

## 2023-09-28 LAB
ALBUMIN SERPL-MCNC: 2.3 G/DL (ref 3.5–5.2)
ALP SERPL-CCNC: 44 U/L (ref 40–129)
ALT SERPL-CCNC: 7 U/L (ref 0–40)
ANION GAP SERPL CALCULATED.3IONS-SCNC: 8 MMOL/L (ref 7–16)
AST SERPL-CCNC: 10 U/L (ref 0–39)
BASOPHILS # BLD: 0.03 K/UL (ref 0–0.2)
BASOPHILS NFR BLD: 1 % (ref 0–2)
BILIRUB SERPL-MCNC: 0.5 MG/DL (ref 0–1.2)
BUN SERPL-MCNC: 13 MG/DL (ref 6–23)
CALCIUM SERPL-MCNC: 6.6 MG/DL (ref 8.6–10.2)
CHLORIDE SERPL-SCNC: 115 MMOL/L (ref 98–107)
CO2 SERPL-SCNC: 20 MMOL/L (ref 22–29)
CREAT SERPL-MCNC: 0.7 MG/DL (ref 0.7–1.2)
EOSINOPHIL # BLD: 0.08 K/UL (ref 0.05–0.5)
EOSINOPHILS RELATIVE PERCENT: 2 % (ref 0–6)
ERYTHROCYTE [DISTWIDTH] IN BLOOD BY AUTOMATED COUNT: 14.9 % (ref 11.5–15)
GFR SERPL CREATININE-BSD FRML MDRD: >60 ML/MIN/1.73M2
GLUCOSE BLD-MCNC: 164 MG/DL (ref 74–99)
GLUCOSE BLD-MCNC: 184 MG/DL (ref 74–99)
GLUCOSE BLD-MCNC: 185 MG/DL (ref 74–99)
GLUCOSE BLD-MCNC: 186 MG/DL (ref 74–99)
GLUCOSE BLD-MCNC: 257 MG/DL (ref 74–99)
GLUCOSE SERPL-MCNC: 137 MG/DL (ref 74–99)
HCT VFR BLD AUTO: 28.1 % (ref 37–54)
HGB BLD-MCNC: 7.9 G/DL (ref 12.5–16.5)
IMM GRANULOCYTES # BLD AUTO: <0.03 K/UL (ref 0–0.58)
IMM GRANULOCYTES NFR BLD: 1 % (ref 0–5)
LYMPHOCYTES NFR BLD: 0.93 K/UL (ref 1.5–4)
LYMPHOCYTES RELATIVE PERCENT: 23 % (ref 20–42)
MCH RBC QN AUTO: 27.1 PG (ref 26–35)
MCHC RBC AUTO-ENTMCNC: 28.1 G/DL (ref 32–34.5)
MCV RBC AUTO: 96.2 FL (ref 80–99.9)
MONOCYTES NFR BLD: 0.6 K/UL (ref 0.1–0.95)
MONOCYTES NFR BLD: 15 % (ref 2–12)
NEUTROPHILS NFR BLD: 59 % (ref 43–80)
NEUTS SEG NFR BLD: 2.35 K/UL (ref 1.8–7.3)
PLATELET # BLD AUTO: 163 K/UL (ref 130–450)
PMV BLD AUTO: 8.9 FL (ref 7–12)
POTASSIUM SERPL-SCNC: 3.2 MMOL/L (ref 3.5–5)
PROT SERPL-MCNC: 4.5 G/DL (ref 6.4–8.3)
RBC # BLD AUTO: 2.92 M/UL (ref 3.8–5.8)
SODIUM SERPL-SCNC: 143 MMOL/L (ref 132–146)
WBC OTHER # BLD: 4 K/UL (ref 4.5–11.5)

## 2023-09-28 PROCEDURE — 3600000012 HC SURGERY LEVEL 2 ADDTL 15MIN: Performed by: PODIATRIST

## 2023-09-28 PROCEDURE — 3600000002 HC SURGERY LEVEL 2 BASE: Performed by: PODIATRIST

## 2023-09-28 PROCEDURE — 0H9LXZZ DRAINAGE OF LEFT LOWER LEG SKIN, EXTERNAL APPROACH: ICD-10-PCS | Performed by: PODIATRIST

## 2023-09-28 PROCEDURE — 7100000011 HC PHASE II RECOVERY - ADDTL 15 MIN: Performed by: PODIATRIST

## 2023-09-28 PROCEDURE — 87102 FUNGUS ISOLATION CULTURE: CPT

## 2023-09-28 PROCEDURE — 2500000003 HC RX 250 WO HCPCS: Performed by: NURSE ANESTHETIST, CERTIFIED REGISTERED

## 2023-09-28 PROCEDURE — 87075 CULTR BACTERIA EXCEPT BLOOD: CPT

## 2023-09-28 PROCEDURE — 3700000000 HC ANESTHESIA ATTENDED CARE: Performed by: PODIATRIST

## 2023-09-28 PROCEDURE — 80053 COMPREHEN METABOLIC PANEL: CPT

## 2023-09-28 PROCEDURE — 87206 SMEAR FLUORESCENT/ACID STAI: CPT

## 2023-09-28 PROCEDURE — 86403 PARTICLE AGGLUT ANTBDY SCRN: CPT

## 2023-09-28 PROCEDURE — 2580000003 HC RX 258: Performed by: NURSE PRACTITIONER

## 2023-09-28 PROCEDURE — 6370000000 HC RX 637 (ALT 250 FOR IP): Performed by: SPECIALIST

## 2023-09-28 PROCEDURE — P9047 ALBUMIN (HUMAN), 25%, 50ML: HCPCS | Performed by: FAMILY MEDICINE

## 2023-09-28 PROCEDURE — 87070 CULTURE OTHR SPECIMN AEROBIC: CPT

## 2023-09-28 PROCEDURE — 87040 BLOOD CULTURE FOR BACTERIA: CPT

## 2023-09-28 PROCEDURE — 6360000002 HC RX W HCPCS: Performed by: FAMILY MEDICINE

## 2023-09-28 PROCEDURE — 82962 GLUCOSE BLOOD TEST: CPT

## 2023-09-28 PROCEDURE — 85025 COMPLETE CBC W/AUTO DIFF WBC: CPT

## 2023-09-28 PROCEDURE — 87116 MYCOBACTERIA CULTURE: CPT

## 2023-09-28 PROCEDURE — 6360000002 HC RX W HCPCS: Performed by: NURSE PRACTITIONER

## 2023-09-28 PROCEDURE — 87015 SPECIMEN INFECT AGNT CONCNTJ: CPT

## 2023-09-28 PROCEDURE — 3700000001 HC ADD 15 MINUTES (ANESTHESIA): Performed by: PODIATRIST

## 2023-09-28 PROCEDURE — 6370000000 HC RX 637 (ALT 250 FOR IP): Performed by: NURSE PRACTITIONER

## 2023-09-28 PROCEDURE — 87205 SMEAR GRAM STAIN: CPT

## 2023-09-28 PROCEDURE — 2709999900 HC NON-CHARGEABLE SUPPLY: Performed by: PODIATRIST

## 2023-09-28 PROCEDURE — 2060000000 HC ICU INTERMEDIATE R&B

## 2023-09-28 PROCEDURE — 6370000000 HC RX 637 (ALT 250 FOR IP): Performed by: FAMILY MEDICINE

## 2023-09-28 PROCEDURE — 0JBP0ZZ EXCISION OF LEFT LOWER LEG SUBCUTANEOUS TISSUE AND FASCIA, OPEN APPROACH: ICD-10-PCS | Performed by: PODIATRIST

## 2023-09-28 PROCEDURE — 6360000002 HC RX W HCPCS: Performed by: NURSE ANESTHETIST, CERTIFIED REGISTERED

## 2023-09-28 PROCEDURE — 2580000003 HC RX 258: Performed by: NURSE ANESTHETIST, CERTIFIED REGISTERED

## 2023-09-28 PROCEDURE — 7100000010 HC PHASE II RECOVERY - FIRST 15 MIN: Performed by: PODIATRIST

## 2023-09-28 RX ORDER — SODIUM CHLORIDE 0.9 % (FLUSH) 0.9 %
5-40 SYRINGE (ML) INJECTION EVERY 12 HOURS SCHEDULED
Status: CANCELLED | OUTPATIENT
Start: 2023-09-28

## 2023-09-28 RX ORDER — SODIUM CHLORIDE 0.9 % (FLUSH) 0.9 %
5-40 SYRINGE (ML) INJECTION EVERY 12 HOURS SCHEDULED
OUTPATIENT
Start: 2023-09-28

## 2023-09-28 RX ORDER — SODIUM CHLORIDE 9 MG/ML
INJECTION, SOLUTION INTRAVENOUS PRN
OUTPATIENT
Start: 2023-09-28

## 2023-09-28 RX ORDER — ONDANSETRON 2 MG/ML
4 INJECTION INTRAMUSCULAR; INTRAVENOUS
OUTPATIENT
Start: 2023-09-28 | End: 2023-09-29

## 2023-09-28 RX ORDER — FENTANYL CITRATE 50 UG/ML
INJECTION, SOLUTION INTRAMUSCULAR; INTRAVENOUS PRN
Status: DISCONTINUED | OUTPATIENT
Start: 2023-09-28 | End: 2023-09-28 | Stop reason: SDUPTHER

## 2023-09-28 RX ORDER — SODIUM CHLORIDE 0.9 % (FLUSH) 0.9 %
5-40 SYRINGE (ML) INJECTION PRN
Status: CANCELLED | OUTPATIENT
Start: 2023-09-28

## 2023-09-28 RX ORDER — SODIUM CHLORIDE 0.9 % (FLUSH) 0.9 %
5-40 SYRINGE (ML) INJECTION PRN
OUTPATIENT
Start: 2023-09-28

## 2023-09-28 RX ORDER — DIPHENHYDRAMINE HYDROCHLORIDE 50 MG/ML
12.5 INJECTION INTRAMUSCULAR; INTRAVENOUS
OUTPATIENT
Start: 2023-09-28 | End: 2023-09-29

## 2023-09-28 RX ORDER — OXYCODONE HYDROCHLORIDE 5 MG/1
5 TABLET ORAL
OUTPATIENT
Start: 2023-09-28 | End: 2023-09-29

## 2023-09-28 RX ORDER — LINEZOLID 600 MG/1
600 TABLET, FILM COATED ORAL 2 TIMES DAILY
Status: DISCONTINUED | OUTPATIENT
Start: 2023-09-28 | End: 2023-09-29 | Stop reason: HOSPADM

## 2023-09-28 RX ORDER — LABETALOL HYDROCHLORIDE 5 MG/ML
10 INJECTION, SOLUTION INTRAVENOUS
OUTPATIENT
Start: 2023-09-28

## 2023-09-28 RX ORDER — DROPERIDOL 2.5 MG/ML
0.62 INJECTION, SOLUTION INTRAMUSCULAR; INTRAVENOUS
OUTPATIENT
Start: 2023-09-28 | End: 2023-09-29

## 2023-09-28 RX ORDER — SODIUM CHLORIDE, SODIUM LACTATE, POTASSIUM CHLORIDE, CALCIUM CHLORIDE 600; 310; 30; 20 MG/100ML; MG/100ML; MG/100ML; MG/100ML
INJECTION, SOLUTION INTRAVENOUS CONTINUOUS
OUTPATIENT
Start: 2023-09-28

## 2023-09-28 RX ORDER — LIDOCAINE HYDROCHLORIDE 20 MG/ML
INJECTION, SOLUTION EPIDURAL; INFILTRATION; INTRACAUDAL; PERINEURAL PRN
Status: DISCONTINUED | OUTPATIENT
Start: 2023-09-28 | End: 2023-09-28 | Stop reason: SDUPTHER

## 2023-09-28 RX ORDER — DEXTROSE MONOHYDRATE 100 MG/ML
INJECTION, SOLUTION INTRAVENOUS CONTINUOUS PRN
OUTPATIENT
Start: 2023-09-28

## 2023-09-28 RX ORDER — SODIUM CHLORIDE 9 MG/ML
INJECTION, SOLUTION INTRAVENOUS CONTINUOUS PRN
Status: DISCONTINUED | OUTPATIENT
Start: 2023-09-28 | End: 2023-09-28 | Stop reason: SDUPTHER

## 2023-09-28 RX ORDER — ALBUMIN (HUMAN) 12.5 G/50ML
50 SOLUTION INTRAVENOUS ONCE
Status: COMPLETED | OUTPATIENT
Start: 2023-09-28 | End: 2023-09-28

## 2023-09-28 RX ORDER — SODIUM CHLORIDE 9 MG/ML
INJECTION, SOLUTION INTRAVENOUS PRN
Status: CANCELLED | OUTPATIENT
Start: 2023-09-28

## 2023-09-28 RX ORDER — POTASSIUM CHLORIDE 20 MEQ/1
40 TABLET, EXTENDED RELEASE ORAL ONCE
Status: COMPLETED | OUTPATIENT
Start: 2023-09-28 | End: 2023-09-28

## 2023-09-28 RX ORDER — ACETAMINOPHEN 325 MG/1
650 TABLET ORAL
OUTPATIENT
Start: 2023-09-28 | End: 2023-09-29

## 2023-09-28 RX ORDER — PROPOFOL 10 MG/ML
INJECTION, EMULSION INTRAVENOUS CONTINUOUS PRN
Status: DISCONTINUED | OUTPATIENT
Start: 2023-09-28 | End: 2023-09-28 | Stop reason: SDUPTHER

## 2023-09-28 RX ORDER — HYDRALAZINE HYDROCHLORIDE 20 MG/ML
10 INJECTION INTRAMUSCULAR; INTRAVENOUS
OUTPATIENT
Start: 2023-09-28

## 2023-09-28 RX ORDER — LEVOTHYROXINE SODIUM 0.1 MG/1
100 TABLET ORAL
Status: DISCONTINUED | OUTPATIENT
Start: 2023-09-28 | End: 2023-09-29 | Stop reason: HOSPADM

## 2023-09-28 RX ADMIN — LINEZOLID 600 MG: 600 TABLET, FILM COATED ORAL at 13:16

## 2023-09-28 RX ADMIN — ROSUVASTATIN CALCIUM 5 MG: 5 TABLET, FILM COATED ORAL at 10:15

## 2023-09-28 RX ADMIN — PROPOFOL 80 MG: 10 INJECTION, EMULSION INTRAVENOUS at 07:49

## 2023-09-28 RX ADMIN — PROPOFOL 100 MCG/KG/MIN: 10 INJECTION, EMULSION INTRAVENOUS at 07:48

## 2023-09-28 RX ADMIN — LISINOPRIL 20 MG: 20 TABLET ORAL at 10:15

## 2023-09-28 RX ADMIN — SODIUM CHLORIDE, PRESERVATIVE FREE 10 ML: 5 INJECTION INTRAVENOUS at 10:12

## 2023-09-28 RX ADMIN — SODIUM CHLORIDE: 9 INJECTION, SOLUTION INTRAVENOUS at 07:43

## 2023-09-28 RX ADMIN — CARVEDILOL 25 MG: 25 TABLET, FILM COATED ORAL at 10:15

## 2023-09-28 RX ADMIN — LIDOCAINE HYDROCHLORIDE 40 MG: 20 INJECTION, SOLUTION EPIDURAL; INFILTRATION; INTRACAUDAL; PERINEURAL at 07:48

## 2023-09-28 RX ADMIN — LATANOPROST 1 DROP: 50 SOLUTION OPHTHALMIC at 20:53

## 2023-09-28 RX ADMIN — LEVOTHYROXINE SODIUM 100 MCG: 100 TABLET ORAL at 10:15

## 2023-09-28 RX ADMIN — LEVOFLOXACIN 750 MG: 500 TABLET, FILM COATED ORAL at 13:16

## 2023-09-28 RX ADMIN — POTASSIUM CHLORIDE 40 MEQ: 1500 TABLET, EXTENDED RELEASE ORAL at 19:36

## 2023-09-28 RX ADMIN — TAMSULOSIN HYDROCHLORIDE 0.4 MG: 0.4 CAPSULE ORAL at 20:19

## 2023-09-28 RX ADMIN — GABAPENTIN 200 MG: 100 CAPSULE ORAL at 10:15

## 2023-09-28 RX ADMIN — LINEZOLID 600 MG: 600 TABLET, FILM COATED ORAL at 20:20

## 2023-09-28 RX ADMIN — ALBUMIN (HUMAN) 50 G: 0.25 INJECTION, SOLUTION INTRAVENOUS at 10:27

## 2023-09-28 RX ADMIN — SODIUM CHLORIDE, PRESERVATIVE FREE 10 ML: 5 INJECTION INTRAVENOUS at 13:16

## 2023-09-28 RX ADMIN — FENTANYL CITRATE 50 MCG: 50 INJECTION, SOLUTION INTRAMUSCULAR; INTRAVENOUS at 07:48

## 2023-09-28 RX ADMIN — ENOXAPARIN SODIUM 40 MG: 100 INJECTION SUBCUTANEOUS at 10:15

## 2023-09-28 RX ADMIN — GABAPENTIN 100 MG: 100 CAPSULE ORAL at 20:19

## 2023-09-28 RX ADMIN — ACETAMINOPHEN 650 MG: 325 TABLET ORAL at 18:37

## 2023-09-28 RX ADMIN — FUROSEMIDE 20 MG: 20 TABLET ORAL at 10:15

## 2023-09-28 RX ADMIN — LIOTHYRONINE SODIUM 25 MCG: 25 TABLET ORAL at 10:15

## 2023-09-28 RX ADMIN — INSULIN LISPRO 4 UNITS: 100 INJECTION, SOLUTION INTRAVENOUS; SUBCUTANEOUS at 16:46

## 2023-09-28 RX ADMIN — SODIUM CHLORIDE, PRESERVATIVE FREE 10 ML: 5 INJECTION INTRAVENOUS at 20:20

## 2023-09-28 RX ADMIN — MORPHINE SULFATE 2 MG: 2 INJECTION, SOLUTION INTRAMUSCULAR; INTRAVENOUS at 10:10

## 2023-09-28 RX ADMIN — ENOXAPARIN SODIUM 40 MG: 100 INJECTION SUBCUTANEOUS at 20:19

## 2023-09-28 ASSESSMENT — PAIN - FUNCTIONAL ASSESSMENT
PAIN_FUNCTIONAL_ASSESSMENT: PREVENTS OR INTERFERES WITH MANY ACTIVE NOT PASSIVE ACTIVITIES
PAIN_FUNCTIONAL_ASSESSMENT: ACTIVITIES ARE NOT PREVENTED
PAIN_FUNCTIONAL_ASSESSMENT: 0-10

## 2023-09-28 ASSESSMENT — PAIN DESCRIPTION - ORIENTATION
ORIENTATION: LEFT
ORIENTATION: LEFT

## 2023-09-28 ASSESSMENT — PAIN DESCRIPTION - LOCATION
LOCATION: LEG
LOCATION: LEG

## 2023-09-28 ASSESSMENT — PAIN DESCRIPTION - ONSET: ONSET: ON-GOING

## 2023-09-28 ASSESSMENT — PAIN DESCRIPTION - FREQUENCY: FREQUENCY: CONTINUOUS

## 2023-09-28 ASSESSMENT — LIFESTYLE VARIABLES: SMOKING_STATUS: 0

## 2023-09-28 ASSESSMENT — PAIN SCALES - GENERAL
PAINLEVEL_OUTOF10: 2
PAINLEVEL_OUTOF10: 10
PAINLEVEL_OUTOF10: 0

## 2023-09-28 ASSESSMENT — PAIN DESCRIPTION - DESCRIPTORS
DESCRIPTORS: BURNING
DESCRIPTORS: ACHING

## 2023-09-28 NOTE — PLAN OF CARE
Problem: ABCDS Injury Assessment  Goal: Absence of physical injury  9/28/2023 1946 by Luis Groves RN  Outcome: Progressing  9/28/2023 0609 by Doyle Drake RN  Outcome: Progressing     Problem: Safety - Adult  Goal: Free from fall injury  9/28/2023 1946 by Luis Groves RN  Outcome: Progressing  9/28/2023 0609 by Doyle Drake RN  Outcome: Progressing     Problem: Chronic Conditions and Co-morbidities  Goal: Patient's chronic conditions and co-morbidity symptoms are monitored and maintained or improved  Outcome: Progressing     Problem: Nutrition Deficit:  Goal: Optimize nutritional status  Outcome: Progressing     Problem: Pain  Goal: Verbalizes/displays adequate comfort level or baseline comfort level  Outcome: Progressing

## 2023-09-28 NOTE — ANESTHESIA POSTPROCEDURE EVALUATION
Department of Anesthesiology  Postprocedure Note    Patient: Kaiden Smith  MRN: [id number]  YOB: 1950  Date of evaluation: 9/28/2023      Procedure Summary     Date: 09/28/23 Room / Location: [facility] 09 / [facility]    Anesthesia Start: 4490 Anesthesia Stop: 1919    Procedure: LEFT LEG DEBRIDEMENT (Left: Leg Lower) Diagnosis:       Wound infection      (Wound infection [T14. 8XXA, L08.9])    Surgeons: Eli Medrano DPM Responsible Provider: Candy Bernard MD    Anesthesia Type: MAC ASA Status: 4          Anesthesia Type: MAC    Jordy Phase I: Jordy Score: 10    Jordy Phase II: Jordy Score: 9      Anesthesia Post Evaluation    Patient location during evaluation: PACU  Patient participation: complete - patient participated  Level of consciousness: awake and alert  Airway patency: patent  Nausea & Vomiting: no nausea and no vomiting  Complications: no  Cardiovascular status: blood pressure returned to baseline  Respiratory status: acceptable  Hydration status: euvolemic  Pain management: adequate

## 2023-09-28 NOTE — BRIEF OP NOTE
Brief Postoperative Note      Patient: Carol Padilla  YOB: 1950  MRN: [id number]    Date of Procedure: 9/28/2023    Pre-Op Diagnosis Codes:     * Wound infection [T14. 8XXA, L08.9]    Post-Op Diagnosis: Same       Procedure(s):  LEFT LEG DEBRIDEMENT    Surgeon(s):  Edward Danielle DPM    Assistant:  Resident: Inez Mccracken DPM    Anesthesia: Monitor Anesthesia Care    Estimated Blood Loss (mL): Minimal    Complications: None    Specimens:   ID Type Source Tests Collected by Time Destination   1 : left leg tissue  Tissue Tissue CULTURE, ANAEROBIC, CULTURE, FUNGUS, GRAM STAIN, CULTURE, SURGICAL, CULTURE WITH SMEAR, ACID FAST BACILLIUS Edward Bedolla DPM 9/28/2023 0758        Implants:  * No implants in log *      Drains: * No LDAs found *    Findings: necrosis of soft tissue.       Electronically signed by Xin Salinas DPM on 9/28/2023 at 8:04 AM

## 2023-09-28 NOTE — PLAN OF CARE
Problem: ABCDS Injury Assessment  Goal: Absence of physical injury  9/28/2023 0609 by Akbar Maciel RN  Outcome: Progressing     Problem: Safety - Adult  Goal: Free from fall injury  9/28/2023 0609 by Akbar Maciel RN  Outcome: Progressing

## 2023-09-28 NOTE — OP NOTE
[address]                  [address]                                OPERATIVE REPORT    PATIENT NAME: Steve Leal                 :        1950  MED REC NO:   [id number]                            ROOM:       0261  ACCOUNT NO:   [de-identified]                           ADMIT DATE: 2023  PROVIDER:     Annie Santana DPM    DATE OF PROCEDURE:  2023    SURGEON:  Annie Santana DPM.    ASSISTANTS:  1. Justice Russell DPM.  2.  Shagufta Cai DPM.    PREOPERATIVE DIAGNOSES:  1. Left leg ulceration with fat exposure. 2.  Left leg cellulitis with abscess. 3.  Peripheral arterial disease. 4.  Insulin-dependent diabetes mellitus with neuropathy. POSTOPERATIVE DIAGNOSES:  1. Left leg ulceration with fat exposure. 2.  Left leg cellulitis with abscess. 3.  Peripheral arterial disease. 4.  Insulin-dependent diabetes mellitus with neuropathy. PROCEDURES PERFORMED:  1. Excisional wound debridement, left leg, to and through the level of  the subcutaneous tissue. Total measurement 30 cm x 12 cm x 0.2 cm in  dimension. 2.  Incision and drainage, left leg abscess. ANESTHESIA:  Monitored anesthesia care. INJECTABLES:  20 mL of 0.5% Marcaine plain. COMPLICATIONS:  None. HEMOSTASIS:  On the field. INTRAOPERATIVE FINDINGS:  Necrosis of the soft tissue, 2 mL seropurulent  drainage. INDICATIONS:  A 57-year-old male who presented today for left leg wound  debridement. I counseled the patient as to the nature of the problem,  proposed course of the procedure, and potential benefits, risks,  complications, and convalescence in detail. All questions were answered  to the patient's satisfaction. No guarantees were given as to the  outcome of the procedure.     DESCRIPTION OF PROCEDURE:  Under mild sedation, the patient was brought  to the operating room and placed on the operating room table in the  supine

## 2023-09-28 NOTE — ANESTHESIA PRE PROCEDURE
Department of Anesthesiology  Preprocedure Note       Name:  Tran Munoz   Age:  74 y.o.  :  1950                                          MRN:  [id number]         Date:  2023      Surgeon: Hiro Torres):  Edward Pierre DPM    Procedure: Procedure(s):  LEFT LEG DEBRIDEMENT WITH BIOPSY   ++CONTACT ISOLATION++    Medications prior to admission:   Prior to Admission medications    Medication Sig Start Date End Date Taking? Authorizing Provider   amoxicillin-clavulanate (AUGMENTIN) 875-125 MG per tablet Take 1 tablet by mouth 2 times daily   Yes Historical Provider, MD   carvedilol (COREG) 25 MG tablet Take 1 tablet by mouth 2 times daily   Yes Historical Provider, MD   potassium chloride (KLOR-CON M) 20 MEQ extended release tablet Take 1 tablet by mouth nightly   Yes Historical Provider, MD   rosuvastatin (CRESTOR) 5 MG tablet Take 1 tablet by mouth every morning   Yes Historical Provider, MD   BiPAP Machine MISC by Does not apply route nightly   Yes Historical Provider, MD   SITagliptin (JANUVIA) 50 MG tablet Take 1 tablet by mouth every morning   Yes Historical Provider, MD   Omega-3 Fatty Acids (OMEGA-3 FISH OIL) 1200 MG CAPS Take 1 capsule by mouth nightly FISH OIL:1200MG  OMEGA : 360MG   Yes Historical Provider, MD   gabapentin (NEURONTIN) 100 MG capsule Take 2 capsules by mouth every morning. **SEE OTHER ORDER**   Yes Historical Provider, MD   gabapentin (NEURONTIN) 100 MG capsule Take 1 capsule by mouth nightly.  **SEE OTHER ORDER**   Yes Historical Provider, MD   glipiZIDE (GLUCOTROL XL) 5 MG extended release tablet Take 1 tablet by mouth nightly **SEE OTHER ORDER**    Historical Provider, MD   acetaminophen (TYLENOL) 500 MG tablet Take 1 tablet by mouth 2 times daily    Historical Provider, MD   latanoprost (XALATAN) 0.005 % ophthalmic solution Place 1 drop into both eyes nightly 22   Historical Provider, MD   ramipril (ALTACE) 5 MG capsule Take 1 capsule by mouth nightly 22   Historical
Applicable):  No results found for: \"LABABO\", \"LABRH\"    Drug/Infectious Status (If Applicable):  No results found for: \"HIV\", \"HEPCAB\"    COVID-19 Screening (If Applicable):   Lab Results   Component Value Date/Time    COVID19 Not Detected 07/17/2020 09:14 AM         Anesthesia Evaluation  Patient summary reviewed and Nursing notes reviewed no history of anesthetic complications:   Airway: Mallampati: III  TM distance: <3 FB   Neck ROM: full  Mouth opening: > = 3 FB   Dental:    (+) partials      Pulmonary: breath sounds clear to auscultation  (+) pneumonia (s/p 3 wks ago): resolved,  COPD:  shortness of breath:  sleep apnea:      (-) not a current smoker (quit 33 yrs ago)                          ROS comment: asbestosis   Cardiovascular:  Exercise tolerance: poor (<4 METS),   (+) hypertension:, pacemaker: pacemaker, past MI: > 6 months, CAD:, dysrhythmias (CHB):, hyperlipidemia        Rhythm: regular  Rate: normal      Cleared by cardiology             PE comment: [place]   Neuro/Psych:   (+) neuromuscular disease (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 Pt had no PAT visit        ROS comment: SIRS Abdominal:             Vascular:   + PVD, aortic or cerebral, PE. Other Findings:      -CoV-2 Not Detected Not Detected Final 07/17/2020  9:14 AM    Summary   No evidence of endocarditis. Overall ejection fraction is normal .   Normal right ventricle structure and function. Right ventricle pacemaker   lead noted. Physiologic and/or trace mitral regurgitation is present. Mild tricuspid regurgitation. DOS STAFF ADDENDUM:    Patient seen and examined, physical exam updated as needed, chart reviewed. NPO status confirmed.     Anesthetic options and risks discussed

## 2023-09-29 ENCOUNTER — TELEPHONE (OUTPATIENT)
Dept: PODIATRY | Age: 73
End: 2023-09-29

## 2023-09-29 VITALS
TEMPERATURE: 98.2 F | RESPIRATION RATE: 18 BRPM | BODY MASS INDEX: 49.44 KG/M2 | DIASTOLIC BLOOD PRESSURE: 76 MMHG | WEIGHT: 315 LBS | HEART RATE: 80 BPM | OXYGEN SATURATION: 95 % | HEIGHT: 67 IN | SYSTOLIC BLOOD PRESSURE: 148 MMHG

## 2023-09-29 LAB
ALBUMIN SERPL-MCNC: 3.4 G/DL (ref 3.5–5.2)
ALP SERPL-CCNC: 55 U/L (ref 40–129)
ALT SERPL-CCNC: 10 U/L (ref 0–40)
ANION GAP SERPL CALCULATED.3IONS-SCNC: 11 MMOL/L (ref 7–16)
AST SERPL-CCNC: 11 U/L (ref 0–39)
BASOPHILS # BLD: 0.03 K/UL (ref 0–0.2)
BASOPHILS NFR BLD: 1 % (ref 0–2)
BILIRUB SERPL-MCNC: 0.7 MG/DL (ref 0–1.2)
BUN SERPL-MCNC: 18 MG/DL (ref 6–23)
CALCIUM SERPL-MCNC: 9.5 MG/DL (ref 8.6–10.2)
CHLORIDE SERPL-SCNC: 106 MMOL/L (ref 98–107)
CO2 SERPL-SCNC: 24 MMOL/L (ref 22–29)
CREAT SERPL-MCNC: 0.9 MG/DL (ref 0.7–1.2)
CULTURE: ABNORMAL
DIRECT EXAM: ABNORMAL
EOSINOPHIL # BLD: 0.11 K/UL (ref 0.05–0.5)
EOSINOPHILS RELATIVE PERCENT: 3 % (ref 0–6)
ERYTHROCYTE [DISTWIDTH] IN BLOOD BY AUTOMATED COUNT: 14.8 % (ref 11.5–15)
GFR SERPL CREATININE-BSD FRML MDRD: >60 ML/MIN/1.73M2
GLUCOSE BLD-MCNC: 170 MG/DL (ref 74–99)
GLUCOSE BLD-MCNC: 214 MG/DL (ref 74–99)
GLUCOSE BLD-MCNC: 234 MG/DL (ref 74–99)
GLUCOSE SERPL-MCNC: 168 MG/DL (ref 74–99)
HCT VFR BLD AUTO: 32.2 % (ref 37–54)
HGB BLD-MCNC: 10 G/DL (ref 12.5–16.5)
IMM GRANULOCYTES # BLD AUTO: <0.03 K/UL (ref 0–0.58)
IMM GRANULOCYTES NFR BLD: 1 % (ref 0–5)
LYMPHOCYTES NFR BLD: 1.05 K/UL (ref 1.5–4)
LYMPHOCYTES RELATIVE PERCENT: 24 % (ref 20–42)
MCH RBC QN AUTO: 27 PG (ref 26–35)
MCHC RBC AUTO-ENTMCNC: 31.1 G/DL (ref 32–34.5)
MCV RBC AUTO: 86.8 FL (ref 80–99.9)
MONOCYTES NFR BLD: 0.47 K/UL (ref 0.1–0.95)
MONOCYTES NFR BLD: 11 % (ref 2–12)
NEUTROPHILS NFR BLD: 61 % (ref 43–80)
NEUTS SEG NFR BLD: 2.64 K/UL (ref 1.8–7.3)
PLATELET # BLD AUTO: 172 K/UL (ref 130–450)
PMV BLD AUTO: 8.1 FL (ref 7–12)
POTASSIUM SERPL-SCNC: 4.3 MMOL/L (ref 3.5–5)
PROT SERPL-MCNC: 6.7 G/DL (ref 6.4–8.3)
RBC # BLD AUTO: 3.71 M/UL (ref 3.8–5.8)
SODIUM SERPL-SCNC: 141 MMOL/L (ref 132–146)
SPECIMEN DESCRIPTION: ABNORMAL
WBC OTHER # BLD: 4.3 K/UL (ref 4.5–11.5)

## 2023-09-29 PROCEDURE — 80053 COMPREHEN METABOLIC PANEL: CPT

## 2023-09-29 PROCEDURE — 85025 COMPLETE CBC W/AUTO DIFF WBC: CPT

## 2023-09-29 PROCEDURE — 82962 GLUCOSE BLOOD TEST: CPT

## 2023-09-29 PROCEDURE — 6360000002 HC RX W HCPCS: Performed by: NURSE PRACTITIONER

## 2023-09-29 PROCEDURE — 2580000003 HC RX 258: Performed by: NURSE PRACTITIONER

## 2023-09-29 PROCEDURE — 36415 COLL VENOUS BLD VENIPUNCTURE: CPT

## 2023-09-29 PROCEDURE — 6370000000 HC RX 637 (ALT 250 FOR IP): Performed by: NURSE PRACTITIONER

## 2023-09-29 PROCEDURE — 6370000000 HC RX 637 (ALT 250 FOR IP): Performed by: SPECIALIST

## 2023-09-29 PROCEDURE — 6370000000 HC RX 637 (ALT 250 FOR IP): Performed by: FAMILY MEDICINE

## 2023-09-29 RX ORDER — LINEZOLID 600 MG/1
600 TABLET, FILM COATED ORAL 2 TIMES DAILY
Qty: 16 TABLET | Refills: 0 | Status: SHIPPED | OUTPATIENT
Start: 2023-09-29 | End: 2023-10-07

## 2023-09-29 RX ORDER — OXYCODONE HYDROCHLORIDE AND ACETAMINOPHEN 5; 325 MG/1; MG/1
1 TABLET ORAL EVERY 4 HOURS PRN
Qty: 42 TABLET | Refills: 0 | Status: SHIPPED | OUTPATIENT
Start: 2023-09-29 | End: 2023-10-06

## 2023-09-29 RX ORDER — LEVOFLOXACIN 750 MG/1
750 TABLET ORAL DAILY
Qty: 8 TABLET | Refills: 0 | Status: SHIPPED | OUTPATIENT
Start: 2023-09-30 | End: 2023-10-08

## 2023-09-29 RX ADMIN — LEVOTHYROXINE SODIUM 100 MCG: 100 TABLET ORAL at 06:15

## 2023-09-29 RX ADMIN — LIOTHYRONINE SODIUM 25 MCG: 25 TABLET ORAL at 09:29

## 2023-09-29 RX ADMIN — FUROSEMIDE 20 MG: 20 TABLET ORAL at 09:29

## 2023-09-29 RX ADMIN — LINEZOLID 600 MG: 600 TABLET, FILM COATED ORAL at 09:28

## 2023-09-29 RX ADMIN — LISINOPRIL 20 MG: 20 TABLET ORAL at 09:29

## 2023-09-29 RX ADMIN — LEVOFLOXACIN 750 MG: 500 TABLET, FILM COATED ORAL at 09:28

## 2023-09-29 RX ADMIN — SODIUM CHLORIDE, PRESERVATIVE FREE 10 ML: 5 INJECTION INTRAVENOUS at 09:29

## 2023-09-29 RX ADMIN — ENOXAPARIN SODIUM 40 MG: 100 INJECTION SUBCUTANEOUS at 09:28

## 2023-09-29 RX ADMIN — ROSUVASTATIN CALCIUM 5 MG: 5 TABLET, FILM COATED ORAL at 09:29

## 2023-09-29 RX ADMIN — INSULIN LISPRO 2 UNITS: 100 INJECTION, SOLUTION INTRAVENOUS; SUBCUTANEOUS at 11:52

## 2023-09-29 RX ADMIN — ACETAMINOPHEN 650 MG: 325 TABLET ORAL at 09:29

## 2023-09-29 RX ADMIN — CARVEDILOL 25 MG: 25 TABLET, FILM COATED ORAL at 09:28

## 2023-09-29 RX ADMIN — GABAPENTIN 200 MG: 100 CAPSULE ORAL at 09:28

## 2023-09-29 ASSESSMENT — PAIN DESCRIPTION - ORIENTATION: ORIENTATION: LEFT

## 2023-09-29 ASSESSMENT — PAIN DESCRIPTION - LOCATION: LOCATION: LEG

## 2023-09-29 ASSESSMENT — PAIN DESCRIPTION - ONSET: ONSET: ON-GOING

## 2023-09-29 ASSESSMENT — PAIN - FUNCTIONAL ASSESSMENT: PAIN_FUNCTIONAL_ASSESSMENT: ACTIVITIES ARE NOT PREVENTED

## 2023-09-29 ASSESSMENT — PAIN SCALES - GENERAL: PAINLEVEL_OUTOF10: 3

## 2023-09-29 ASSESSMENT — PAIN DESCRIPTION - FREQUENCY: FREQUENCY: CONTINUOUS

## 2023-09-29 ASSESSMENT — PAIN DESCRIPTION - DESCRIPTORS: DESCRIPTORS: NUMBNESS

## 2023-09-29 NOTE — DISCHARGE INSTRUCTIONS
Per podiatry instructions, okay to resume compression stockings and lymphedema wraps after discharge. Dressings should be changed every other day until further instructions at follow-up appointment with podiatry. Cleanse with normal saline, apply Xeroform gauze to open areas, cover with dry sterile gauze, wrap with Kerlix, and then cover with 4 inch ACE wrap.

## 2023-09-29 NOTE — CARE COORDINATION
No prior auth needed for Linezolid, per [facility] medication will be $5.00. [facility] aware pt will discharge today.   Luis Gay, BSN, RN  [facility] Case Management  [phone]

## 2023-09-29 NOTE — PROGRESS NOTES
CLINICAL PHARMACY NOTE: MEDS TO BEDS    Total # of Prescriptions Filled: 2   The following medications were delivered to the patient:  Linezolid 600 mg   Levaquin 750 mg       Additional Documentation:

## 2023-09-29 NOTE — PROGRESS NOTES
Podiatry Progress Note  2023   Philipp Dumont       SUBJECTIVE: Patient being seen s/p wound debridement of left leg(DOS:). Resting comfortably in bed at time of encounter. Easily arousable. Dressing noted to be cdi to left lower extremity. Past Medical History:   Diagnosis Date    CAD (coronary artery disease)     Cataract, right eye     Complete heart block (HCC)     h/o    Diabetes mellitus ([address])     Encounter for aspirin therapy     patient uses for heart health    History of blood transfusion     Hx of blood clots     Hyperlipidemia     Hypertension     Hyperthyroidism     Myocardial infarct ([address])     Neuropathy     feet    Obesity     Obstructive sleep apnea     on cpap    Onychomycosis     DEMETRIA on CPAP     Osteoarthritis     Pacemaker     Medtronic    Stasis dermatitis     h/o on bilateral legs with leg edema    Varicose veins         Past Surgical History:   Procedure Laterality Date    CARDIAC PACEMAKER PLACEMENT  2010    [employer]tronic    CHOLECYSTECTOMY      COLONOSCOPY  2016    INTRACAPSULAR CATARACT EXTRACTION Right 2022    RIGHT EYE CATARACT EXTRACTION IOL IMPLANT AND GONIOTOMY performed by Siva Dumont MD at [facility] Left 2022    LEFT EYE CATARACT EXTRACTION IOL IMPLANT performed by Siva Dumont MD at [facility] Bilateral     knees    LEG SURGERY Left 2023    LEFT LEG DEBRIDEMENT performed by Anupama Mcneil DPM at [facility]  2020    DUAL PPM GR    ([employer])     DR. MARTIN     TRANSESOPHAGEAL ECHOCARDIOGRAM  2020    With          Family History   Problem Relation Age of Onset    Heart Disease Father             Other Mother             Other Brother         gall bladder        Social History     Tobacco Use    Smoking status: Former     Packs/day: 2.00     Years: 25.00     Additional pack years: 0.00     Total pack years: 50.00 wound debridement(DOS:9/28)  - Chronic venous stasis ulceration, left leg  - Venous stasis dermatitis, b/l lower extremity   - Cellulitis, left lower extremity   - Soft tissue edema, left lower extremity   - Type II DM      PLAN:  - Examined and evaluated  - All labs, imaging, and charts reviewed  - Surgical cx: results pending   - Recommend infectious disease consult. Input appreciated  - Preop left tib fib xrays: No acute osseous abnormality. Soft tissue swelling  - Venous US: No DVT  - Arterial studies: Normal ABIs and TBIs b/l.   - No further surgical intervention warranted. Will continue with local conservative wound care: Qod xeroform dsd. Intact today. Next change tomorrow   - Stable for dc from Podiatry standpoint once final abx recommendations made   - Will continue to monitor while in house    Discussed with   Ria Ruelas DPM [facility]  Fellowship-Trained Foot and Ankle Surgeon  Diplomate, American Board of Foot and Ankle Surgeons  [phone]     Thank you for involving podiatry in this patient's care. Please do not hesitate to call with any questions or concerns.         Alecia Maya DPM   9/29/2023   6:14 AM

## 2023-09-29 NOTE — PROGRESS NOTES
P Quality Flow/Interdisciplinary Rounds Progress Note        Quality Flow Rounds held on September 29, 2023    Disciplines Attending:  Bedside Nurse, , , and Nursing Unit Leadership    Carol Padilla was admitted on 9/26/2023  9:51 AM    Anticipated Discharge Date:  Expected Discharge Date: 09/30/23    Disposition:    Skyler Score:  Skyler Scale Score: 21    Readmission Risk              Risk of Unplanned Readmission:  15           Discussed patient goal for the day, patient clinical progression, and barriers to discharge.   The following Goal(s) of the Day/Commitment(s) have been identified:   Boy Tucker RN  September 29, 2023

## 2023-09-29 NOTE — PROGRESS NOTES
Physician Progress Note      PATIENT:               Cherelle Ansari  [facility] #:                  [id number]  :                       1950  ADMIT DATE:       2023 9:51 AM  DISCH DATE:  Arely Bowling  PROVIDER #:        Fabrice Pacheco DO          QUERY TEXT:    Dear Attending Physician,    Noted documentation of \" Left leg venous stasis dermatitis. There is no   evidence of cellulitis or active infection \"  by ordered ID consultant and   \"Cellulitis w/wound infection \" by PCP . If possible, please document in   progress notes and discharge summary:    The medical record reflects the following:  Risk Factors: chronic bilateral lower extremity wounds, DM, lymphedema, pvd  Clinical Indicators: Per ID ,\". Rosio Merchant Rosio Merchant Left leg venous stasis dermatitis. There   is no evidence of cellulitis or active infection. Rosio Merchant Rosio Merchant S/p debridement. Rosio Merchant Rosio Merchant Hold off   on antibiotics at this point. He has no systemic signs of infection, no   evidence of active infection in the leg. .. Levofloxacin for the ox + GNR. We   will also start Linezolid for possible MRSA in order to avoid PICC and IV   antibiotics upon discharge. Rosio Merchant Rosio Merchant \"  Per PCP ,\". Rosio Merchant Rosio Merchant Cellulitis w/wound   infection. Rosio Merchant Rosio Merchant \" Per Podiatry ,\" . .. Chronic venous stasis ulceration, left leg-   Venous stasis dermatitis, b/l lower extremity - Celluli  Treatment: debridement    Thank you,  Brody Pickett RN [facility]  Clinical Documentation Improvement Specialist  Phone# [phone]  Options provided:  -- Left leg venous stasis dermatitis. There is no evidence of cellulitis or   active infection  -- Defer to ID consultant documentation regarding Left leg venous stasis   dermatitis.  There is no evidence of cellulitis or active infection  -- Other - I will add my own diagnosis  -- Disagree - Not applicable / Not valid  -- Disagree - Clinically unable to determine / Unknown  -- Refer to Clinical Documentation Reviewer    PROVIDER RESPONSE TEXT:    I defer to ID consultant regarding documentation of Left

## 2023-09-29 NOTE — DISCHARGE SUMMARY
[facility]   Discharge summary   Patient ID:  Lilian Montiel  [id number]  74 y.o.  1950    Admit date: 9/26/2023    Discharge date and time: 9/29/2023    Admission Diagnoses:   Patient Active Problem List   Diagnosis    Arrhythmia    Pacemaker    HTN (hypertension)    Hyperthyroidism    DEMETRIA (obstructive sleep apnea)    Cellulitis    SIRS (systemic inflammatory response syndrome) (HCC)    Shortness of breath    Pain in lower limb    Chest pain    Abdominal pain    Pulmonary emboli (HCC)    Post-thrombotic syndrome    Complete heart block (HCC)    Non-pressure chronic ulcer of left lower leg with fat layer exposed ([address])    Venous insufficiency (chronic) (peripheral)    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 ([address])    Onychomycosis    PVD (peripheral vascular disease) ([facility])    Type II diabetes mellitus with peripheral circulatory disorder ([facility])    Difficulty walking    Simple chronic bronchitis (HCC)    Pain of toe of right foot    Pain of toe of left foot    Diabetic polyneuropathy associated with type 2 diabetes mellitus ([address])    Pneumonia    Community acquired pneumonia due to Streptococcus pneumoniae ([address])    Encounter for pacemaker at end of battery life    Blister of right leg    Stasis ulcer ([address])    Lymphedema    Varicose veins of right lower extremity with inflammation, with ulcer of ankle with fat layer exposed ([address])    Xerosis cutis    Non-pressure chronic ulcer of other part of right lower leg with fat layer exposed ([address])    Diabetic sensory polyneuropathy ([address])    Edema    History of artificial joint    Morbid obesity ([address])    Osteoarthritis of knee    Peripheral vascular disorder due to diabetes mellitus ([address])    Wound infection       Discharge Diagnoses: ***    Consults: {consultation:13119}    Procedures: ***    Hospital Course:  The patient is a 74 y.o. male of Shaan Newman MD with significant past medical history of *** who Where to Get Your Medications        These medications were sent to [address] Malvin Urbina [phone]  [address] [place] [address], [address] [place] [address]      Phone: [phone]   levoFLOXacin 750 MG tablet       You can get these medications from any pharmacy    Bring a paper prescription for each of these medications  linezolid 600 MG tablet       Activity: {discharge activity:39915}  Diet: {diet:78543}    Pt has been advised to: Follow-up with Varun Jimenez MD in 1 week.   Follow-up with consultants as recommended by them    Note that over 30 minutes was spent in preparing discharge papers, discussing discharge with patient, medication review, etc.    Signed:  Génesis Alicia DO  9/29/2023  2:43 PM

## 2023-09-29 NOTE — CARE COORDINATION
I&D and excisional wound debridement to left leg on 9/28. ID following, pt on levofloxacin po and linezolid. Will need script for linezolid to check if prior auth is required, if med is needed for discharge. Prior to admission pt states he resides w/his spouse in a one story home, one step to enter and is independent w/o the use of any assistive devices. Pt states he has a cane, walker and wc if needed. He is established w/Dr. Alissa Leigh and uses [facility] on [address] [place] [address] [facility] No home O2 or nebulizer, but pt does have a cpap. Pt states he attends an outpt lymphedema clinic. No hx of HHC or SNF stay. Pt denies any needs at this time, will follow.   Jelly Das, BSN, RN  [facility] Case Management  [phone]

## 2023-09-29 NOTE — PROGRESS NOTES
Discharge instructions explained to pt and copy given. Pt verbalized understanding and is in agreement with dc plan to home. Spoke with wife via phone re:  need to come to unit for teaching/dsg change. Wife states unable to leave car d/t back problems. Explained to wife how to adm dressings and wound care. Wife verbalized understanding/states she has been caring for pt's wounds and is capable of care. Dressing changed prior to discharge and supplies given to pt.

## 2023-09-29 NOTE — PLAN OF CARE
Problem: ABCDS Injury Assessment  Goal: Absence of physical injury  Outcome: Progressing     Problem: Safety - Adult  Goal: Free from fall injury  Outcome: Progressing     Problem: Chronic Conditions and Co-morbidities  Goal: Patient's chronic conditions and co-morbidity symptoms are monitored and maintained or improved  Outcome: Progressing     Problem: Nutrition Deficit:  Goal: Optimize nutritional status  Outcome: Progressing     Problem: Pain  Goal: Verbalizes/displays adequate comfort level or baseline comfort level  Outcome: Adequate for Discharge

## 2023-09-30 LAB
MICROORGANISM SPEC CULT: ABNORMAL
MICROORGANISM SPEC CULT: NORMAL
MICROORGANISM/AGENT SPEC: ABNORMAL
SPECIMEN DESCRIPTION: ABNORMAL
SPECIMEN DESCRIPTION: NORMAL

## 2023-10-01 LAB
MICROORGANISM SPEC CULT: ABNORMAL
MICROORGANISM SPEC CULT: NORMAL
MICROORGANISM SPEC CULT: NORMAL
MICROORGANISM/AGENT SPEC: ABNORMAL
SERVICE CMNT-IMP: NORMAL
SERVICE CMNT-IMP: NORMAL
SPECIMEN DESCRIPTION: ABNORMAL
SPECIMEN DESCRIPTION: NORMAL
SPECIMEN DESCRIPTION: NORMAL

## 2023-10-02 DIAGNOSIS — I83.022 VARICOSE VEINS OF LEFT LOWER EXTREMITY WITH ULCER OF CALF WITH FAT LAYER EXPOSED (HCC): ICD-10-CM

## 2023-10-02 DIAGNOSIS — E11.51 TYPE II DIABETES MELLITUS WITH PERIPHERAL CIRCULATORY DISORDER (HCC): ICD-10-CM

## 2023-10-02 DIAGNOSIS — L97.922 NON-PRESSURE CHRONIC ULCER OF LEFT LOWER LEG WITH FAT LAYER EXPOSED (HCC): Primary | ICD-10-CM

## 2023-10-02 DIAGNOSIS — R26.2 DIFFICULTY WALKING: ICD-10-CM

## 2023-10-02 DIAGNOSIS — L97.222 VARICOSE VEINS OF LEFT LOWER EXTREMITY WITH ULCER OF CALF WITH FAT LAYER EXPOSED (HCC): ICD-10-CM

## 2023-10-02 DIAGNOSIS — I89.0 LYMPHEDEMA: ICD-10-CM

## 2023-10-02 LAB
CULTURE: ABNORMAL
DIRECT EXAM: ABNORMAL
SPECIMEN DESCRIPTION: ABNORMAL

## 2023-10-03 LAB
MICROORGANISM SPEC CULT: NORMAL
MICROORGANISM SPEC CULT: NORMAL
MICROORGANISM/AGENT SPEC: NORMAL
MICROORGANISM/AGENT SPEC: NORMAL
SPECIMEN DESCRIPTION: NORMAL
SPECIMEN DESCRIPTION: NORMAL

## 2023-10-09 LAB
MICROORGANISM SPEC CULT: NORMAL
MICROORGANISM/AGENT SPEC: NORMAL
SPECIMEN DESCRIPTION: NORMAL

## 2023-10-11 ENCOUNTER — HOSPITAL ENCOUNTER (OUTPATIENT)
Dept: WOUND CARE | Age: 73
Discharge: HOME | End: 2023-10-11
Payer: MEDICARE

## 2023-10-11 VITALS
DIASTOLIC BLOOD PRESSURE: 51 MMHG | WEIGHT: 315 LBS | HEIGHT: 67 IN | BODY MASS INDEX: 49.44 KG/M2 | RESPIRATION RATE: 18 BRPM | HEART RATE: 77 BPM | SYSTOLIC BLOOD PRESSURE: 148 MMHG | TEMPERATURE: 97.3 F

## 2023-10-11 PROCEDURE — 86403 PARTICLE AGGLUT ANTBDY SCRN: CPT

## 2023-10-11 PROCEDURE — 87077 CULTURE AEROBIC IDENTIFY: CPT

## 2023-10-11 PROCEDURE — 6370000000 HC RX 637 (ALT 250 FOR IP): Performed by: PODIATRIST

## 2023-10-11 PROCEDURE — 11042 DBRDMT SUBQ TIS 1ST 20SQCM/<: CPT

## 2023-10-11 PROCEDURE — 11045 DBRDMT SUBQ TISS EACH ADDL: CPT

## 2023-10-11 PROCEDURE — 87205 SMEAR GRAM STAIN: CPT

## 2023-10-11 PROCEDURE — 99213 OFFICE O/P EST LOW 20 MIN: CPT

## 2023-10-11 PROCEDURE — 87070 CULTURE OTHR SPECIMN AEROBIC: CPT

## 2023-10-11 RX ORDER — LIDOCAINE HYDROCHLORIDE 40 MG/ML
SOLUTION TOPICAL ONCE
Status: COMPLETED | OUTPATIENT
Start: 2023-10-11 | End: 2023-10-11

## 2023-10-11 RX ADMIN — LIDOCAINE HYDROCHLORIDE: 40 SOLUTION TOPICAL at 11:04

## 2023-10-11 NOTE — PLAN OF CARE
Problem: Chronic Conditions and Co-morbidities  Goal: Patient's chronic conditions and co-morbidity symptoms are monitored and maintained or improved  Outcome: Progressing     Problem: Cognitive:  Goal: Knowledge of wound care  Description: Knowledge of wound care  Outcome: Progressing  Goal: Understands risk factors for wounds  Description: Understands risk factors for wounds  Outcome: Progressing     Problem: Wound:  Goal: Will show signs of wound healing; wound closure and no evidence of infection  Description: Will show signs of wound healing; wound closure and no evidence of infection  Outcome: Progressing     Problem: Arterial:  Goal: Optimize blood flow for wound healing  Description: Optimize blood flow for wound healing  Outcome: Progressing     Problem: Venous:  Goal: Signs of wound healing will improve  Description: Signs of wound healing will improve  Outcome: Progressing     Problem: Compression therapy:  Goal: Will be free from complications associated with compression therapy  Description: Will be free from complications associated with compression therapy  Outcome: Progressing     Problem: Venous:  Goal: Signs of wound healing will improve  Description: Signs of wound healing will improve  Outcome: Progressing     Problem: Weight control:  Goal: Ability to maintain an optimal weight for height and age will be supported  Description: Ability to maintain an optimal weight for height and age will be supported  Outcome: Progressing

## 2023-10-11 NOTE — PROGRESS NOTES
Wound Healing Center  History and Physical/Consultation  Podiatry    Referring Physician : Aixa Angulo MD  [address] [facility] RECORD NUMBER:  [id number]  AGE: 74 y.o. GENDER: male  : 1950  EPISODE DATE:  10/11/2023  Subjective:     Chief Complaint   Patient presents with    Wound Check     Left leg         HISTORY of PRESENT ILLNESS HPI     Malvin Schirmer is a 74 y.o. male who presents today for wound/ulcer evaluation. History of Wound Context:  The patient has had a wound of left leg which was first noted approximately months ago. This has been treated debridemnet. On their initial visit to the wound healing center, 10/11/23 ,  the patient has noted that the wound has not been improving. The patient has had similar previous wounds in the past.      Pt is on abx at time of initial visit.       Wound/Ulcer Pain Timing/Severity: constant  Quality of pain: aching  Severity:  4 / 10   Modifying Factors: Pain worsens with walking  Associated Signs/Symptoms: edema, erythema, and drainage    Ulcer Identification:  Ulcer Type: venous  Contributing Factors: edema, venous stasis, and lymphedema    Diabetic/Pressure/Non Pressure Ulcers onl y:  Ulcer: Non-Pressure ulcer, fat layer exposed    If patient has diabetic lower extremity wounds  Magaña Classification of diabetic lower extremity wounds:    Grade Description   []  0 No open wound   []  1 Superficial ulcer involving the full skin thickness   []  2 Deep ulcer involves ligament, tendon, joint capsule, or fascia  No bone involvement or abscess presence   []  3 Deep Ulcer with abcess formation and/or osteomyelitis   []  4 Localized gangrene   []  5 Extensive gangrene of the foot     Wound: N/A        PAST MEDICAL HISTORY      Diagnosis Date    CAD (coronary artery disease)     Cataract, right eye     Complete heart block (HCC)     h/o    Diabetes mellitus ([address])     Encounter for aspirin therapy     patient uses for heart health    Glaucoma

## 2023-10-15 LAB
MICROORGANISM SPEC CULT: ABNORMAL
MICROORGANISM SPEC CULT: ABNORMAL
MICROORGANISM SPEC CULT: NORMAL
MICROORGANISM/AGENT SPEC: ABNORMAL
MICROORGANISM/AGENT SPEC: NORMAL
SERVICE CMNT-IMP: ABNORMAL
SPECIMEN DESCRIPTION: ABNORMAL
SPECIMEN DESCRIPTION: NORMAL

## 2023-10-16 LAB
MICROORGANISM SPEC CULT: NORMAL
MICROORGANISM/AGENT SPEC: NORMAL
SPECIMEN DESCRIPTION: NORMAL

## 2023-10-18 ENCOUNTER — HOSPITAL ENCOUNTER (OUTPATIENT)
Dept: WOUND CARE | Age: 73
Discharge: HOME | End: 2023-10-18
Attending: PODIATRIST
Payer: MEDICARE

## 2023-10-18 VITALS
SYSTOLIC BLOOD PRESSURE: 130 MMHG | DIASTOLIC BLOOD PRESSURE: 76 MMHG | HEART RATE: 80 BPM | RESPIRATION RATE: 20 BRPM | TEMPERATURE: 96.5 F

## 2023-10-18 PROCEDURE — 11042 DBRDMT SUBQ TIS 1ST 20SQCM/<: CPT

## 2023-10-18 PROCEDURE — 11045 DBRDMT SUBQ TISS EACH ADDL: CPT

## 2023-10-18 RX ORDER — SULFAMETHOXAZOLE AND TRIMETHOPRIM 400; 80 MG/1; MG/1
2 TABLET ORAL DAILY
Qty: 20 TABLET | Refills: 0 | Status: SHIPPED | OUTPATIENT
Start: 2023-10-18 | End: 2023-10-28

## 2023-10-18 ASSESSMENT — PAIN DESCRIPTION - PAIN TYPE: TYPE: CHRONIC PAIN

## 2023-10-18 ASSESSMENT — PAIN - FUNCTIONAL ASSESSMENT: PAIN_FUNCTIONAL_ASSESSMENT: PREVENTS OR INTERFERES SOME ACTIVE ACTIVITIES AND ADLS

## 2023-10-18 ASSESSMENT — PAIN DESCRIPTION - FREQUENCY: FREQUENCY: INTERMITTENT

## 2023-10-18 ASSESSMENT — PAIN SCALES - GENERAL: PAINLEVEL_OUTOF10: 2

## 2023-10-18 ASSESSMENT — PAIN DESCRIPTION - DESCRIPTORS: DESCRIPTORS: BURNING;THROBBING

## 2023-10-18 ASSESSMENT — PAIN DESCRIPTION - LOCATION: LOCATION: LEG

## 2023-10-18 ASSESSMENT — PAIN DESCRIPTION - ONSET: ONSET: PROGRESSIVE

## 2023-10-18 ASSESSMENT — PAIN DESCRIPTION - ORIENTATION: ORIENTATION: LEFT

## 2023-10-18 NOTE — DISCHARGE INSTRUCTIONS
Visit Discharge/Physician Orders     Discharge condition: Stable     Assessment of pain at discharge:     Anesthetic used: 4% topical lidocaine     Discharge to: Home     Left via:Private automobile     Accompanied by: accompanied by spouse     ECF/HHA: REFERRAL TO HOME CARE     Dressing Orders: clean left leg with saline, apply aquacel ag, foam, coban 2, CHANGE 2X PER WEEK (FRIDAY AND MONDAY)   HOME CARE TO START      Treatment Orders: elevate legs above level of heart  RX ANTIBIOTIC BACTRIM DS       [address] [facility] followup visit _____________1 week________________  (Please note your next appointment above and if you are unable to keep, kindly give a 24 hour notice. Thank you.)     Physician signature:__________________________        If you experience any of the following, please call the [employer] during business hours:     * Increase in Pain  * Temperature over 101  * Increase in drainage from your wound  * Drainage with a foul odor  * Bleeding  * Increase in swelling  * Need for compression bandage changes due to slippage, breakthrough drainage. If you need medical attention outside of the business hours of the [employer] please contact your PCP or go to the nearest emergency room.

## 2023-10-18 NOTE — PLAN OF CARE
Problem: Chronic Conditions and Co-morbidities  Goal: Patient's chronic conditions and co-morbidity symptoms are monitored and maintained or improved  Outcome: Progressing     Problem: Pain  Goal: Verbalizes/displays adequate comfort level or baseline comfort level  Outcome: Progressing     Problem: Cognitive:  Goal: Knowledge of wound care  Description: Knowledge of wound care  Outcome: Progressing  Goal: Understands risk factors for wounds  Description: Understands risk factors for wounds  Outcome: Progressing     Problem: Wound:  Goal: Will show signs of wound healing; wound closure and no evidence of infection  Description: Will show signs of wound healing; wound closure and no evidence of infection  Outcome: Progressing     Problem: Arterial:  Goal: Optimize blood flow for wound healing  Description: Optimize blood flow for wound healing  Outcome: Progressing     Problem: Venous:  Goal: Signs of wound healing will improve  Description: Signs of wound healing will improve  Outcome: Progressing     Problem: Compression therapy:  Goal: Will be free from complications associated with compression therapy  Description: Will be free from complications associated with compression therapy  Outcome: Progressing     Problem: Weight control:  Goal: Ability to maintain an optimal weight for height and age will be supported  Description: Ability to maintain an optimal weight for height and age will be supported  Outcome: Progressing

## 2023-10-18 NOTE — PROGRESS NOTES
Wound Healing Center  History and Physical/Consultation  Podiatry    Referring Physician : Tania Stevenson MD  [address] [facility] RECORD NUMBER:  [id number]  AGE: 74 y.o. GENDER: male  : 1950  EPISODE DATE:  10/18/2023  Subjective:     Chief Complaint   Patient presents with    Wound Check     Wound leg         HISTORY of PRESENT ILLNESS ALY Santa is a 74 y.o. male who presents today for wound/ulcer evaluation. History of Wound Context:  The patient has had a wound of left leg which was first noted approximately months ago. This has been treated debridemnet. On their initial visit to the wound healing center, 10/11/23 ,  the patient has noted that the wound has not been improving. The patient has had similar previous wounds in the past.      Pt is on abx at time of initial visit.       Wound/Ulcer Pain Timing/Severity: constant  Quality of pain: aching  Severity:  4 / 10   Modifying Factors: Pain worsens with walking  Associated Signs/Symptoms: edema, erythema, and drainage    Ulcer Identification:  Ulcer Type: venous  Contributing Factors: edema, venous stasis, and lymphedema    Diabetic/Pressure/Non Pressure Ulcers onl y:  Ulcer: Non-Pressure ulcer, fat layer exposed    If patient has diabetic lower extremity wounds  Magaña Classification of diabetic lower extremity wounds:    Grade Description   []  0 No open wound   []  1 Superficial ulcer involving the full skin thickness   []  2 Deep ulcer involves ligament, tendon, joint capsule, or fascia  No bone involvement or abscess presence   []  3 Deep Ulcer with abcess formation and/or osteomyelitis   []  4 Localized gangrene   []  5 Extensive gangrene of the foot     Wound: N/A    10-18-23  Debrided, bactrim rx given        PAST MEDICAL HISTORY      Diagnosis Date    CAD (coronary artery disease)     Cataract, right eye     Complete heart block (HCC)     h/o    Diabetes mellitus ([address])     Encounter for aspirin therapy

## 2023-10-22 LAB
MICROORGANISM SPEC CULT: NORMAL
MICROORGANISM/AGENT SPEC: NORMAL
SPECIMEN DESCRIPTION: NORMAL

## 2023-10-23 LAB
MICROORGANISM SPEC CULT: NORMAL
MICROORGANISM/AGENT SPEC: NORMAL
SPECIMEN DESCRIPTION: NORMAL

## 2023-10-25 ENCOUNTER — HOSPITAL ENCOUNTER (OUTPATIENT)
Dept: WOUND CARE | Age: 73
Discharge: HOME | End: 2023-10-25
Attending: PODIATRIST
Payer: MEDICARE

## 2023-10-25 VITALS
DIASTOLIC BLOOD PRESSURE: 72 MMHG | TEMPERATURE: 97.3 F | RESPIRATION RATE: 22 BRPM | SYSTOLIC BLOOD PRESSURE: 138 MMHG | HEART RATE: 86 BPM

## 2023-10-25 DIAGNOSIS — L97.222 LOWER LIMB ULCER, CALF, LEFT, WITH FAT LAYER EXPOSED (HCC): ICD-10-CM

## 2023-10-25 DIAGNOSIS — I89.0 LYMPHEDEMA OF BOTH LOWER EXTREMITIES: ICD-10-CM

## 2023-10-25 PROBLEM — J13 COMMUNITY ACQUIRED PNEUMONIA DUE TO STREPTOCOCCUS PNEUMONIAE (HCC): Status: RESOLVED | Noted: 2020-06-24 | Resolved: 2023-10-25

## 2023-10-25 PROBLEM — M79.675 PAIN OF TOE OF LEFT FOOT: Status: RESOLVED | Noted: 2019-09-30 | Resolved: 2023-10-25

## 2023-10-25 PROBLEM — S80.821A BLISTER OF RIGHT LEG: Status: RESOLVED | Noted: 2020-11-30 | Resolved: 2023-10-25

## 2023-10-25 PROBLEM — M79.674 PAIN OF TOE OF RIGHT FOOT: Status: RESOLVED | Noted: 2019-09-30 | Resolved: 2023-10-25

## 2023-10-25 PROBLEM — T14.8XXA WOUND INFECTION: Status: RESOLVED | Noted: 2023-01-30 | Resolved: 2023-10-25

## 2023-10-25 PROBLEM — L08.9 WOUND INFECTION: Status: RESOLVED | Noted: 2023-01-30 | Resolved: 2023-10-25

## 2023-10-25 PROBLEM — J18.9 PNEUMONIA: Status: RESOLVED | Noted: 2020-06-13 | Resolved: 2023-10-25

## 2023-10-25 PROCEDURE — 11042 DBRDMT SUBQ TIS 1ST 20SQCM/<: CPT | Performed by: SURGERY

## 2023-10-25 PROCEDURE — 11042 DBRDMT SUBQ TIS 1ST 20SQCM/<: CPT

## 2023-10-25 PROCEDURE — 11045 DBRDMT SUBQ TISS EACH ADDL: CPT | Performed by: SURGERY

## 2023-10-25 PROCEDURE — 11045 DBRDMT SUBQ TISS EACH ADDL: CPT

## 2023-10-25 RX ORDER — LIDOCAINE HYDROCHLORIDE 40 MG/ML
SOLUTION TOPICAL ONCE
Status: DISCONTINUED | OUTPATIENT
Start: 2023-10-25 | End: 2023-10-26 | Stop reason: HOSPADM

## 2023-10-25 NOTE — PLAN OF CARE
Problem: Chronic Conditions and Co-morbidities  Goal: Patient's chronic conditions and co-morbidity symptoms are monitored and maintained or improved  Outcome: Progressing     Problem: Cognitive:  Goal: Knowledge of wound care  Description: Knowledge of wound care  Outcome: Progressing  Goal: Understands risk factors for wounds  Description: Understands risk factors for wounds  Outcome: Progressing     Problem: Wound:  Goal: Will show signs of wound healing; wound closure and no evidence of infection  Description: Will show signs of wound healing; wound closure and no evidence of infection  Outcome: Progressing     Problem: Arterial:  Goal: Optimize blood flow for wound healing  Description: Optimize blood flow for wound healing  Outcome: Progressing     Problem: Compression therapy:  Goal: Will be free from complications associated with compression therapy  Description: Will be free from complications associated with compression therapy  Outcome: Progressing     Problem: Weight control:  Goal: Ability to maintain an optimal weight for height and age will be supported  Description: Ability to maintain an optimal weight for height and age will be supported  Outcome: Progressing

## 2023-10-25 NOTE — DISCHARGE INSTRUCTIONS
Discharge Instructions            Visit Discharge/Physician Orders     Discharge condition: Stable     Assessment of pain at discharge:     Anesthetic used: 4% topical lidocaine     Discharge to: Home     Left via:Private automobile     Accompanied by: accompanied by spouse     ECF/HHA: REFERRAL TO HOME CARE     Dressing Orders: clean left leg with saline, apply aquacel ag, foam, coban 2, CHANGE 2X PER WEEK (FRIDAY AND MONDAY)   HOME CARE TO START      Treatment Orders: elevate legs above level of heart  RX ANTIBIOTIC BACTRIM DS       [address] [facility] followup visit _____________1 week________________  (Please note your next appointment above and if you are unable to keep, kindly give a 24 hour notice. Thank you.)     Physician signature:__________________________        If you experience any of the following, please call the [employer] during business hours:     * Increase in Pain  * Temperature over 101  * Increase in drainage from your wound  * Drainage with a foul odor  * Bleeding  * Increase in swelling  * Need for compression bandage changes due to slippage, breakthrough drainage. If you need medical attention outside of the business hours of the [employer] please contact your PCP or go to the nearest emergency room.

## 2023-10-25 NOTE — PROGRESS NOTES
Wound Healing Center  History and Physical/Consultation  Podiatry    Referring Physician : Viry Cross MD  [address] [facility] RECORD NUMBER:  [id number]  AGE: 74 y.o. GENDER: male  : 1950  EPISODE DATE:  10/25/2023  Subjective:     Chief Complaint   Patient presents with    Wound Check     Wound left leg         HISTORY of PRESENT ILLNESS HPI     Stalin Palma is a 74 y.o. male who presents today for wound/ulcer evaluation. History of Wound Context:  The patient has had a wound of left leg which was first noted approximately months ago. This has been treated debridemnet. On their initial visit to the wound healing center, 10/11/23 ,  the patient has noted that the wound has not been improving. The patient has had similar previous wounds in the past.      Pt is on abx at time of initial visit.       Wound/Ulcer Pain Timing/Severity: constant  Quality of pain: aching  Severity:  4 / 10   Modifying Factors: Pain worsens with walking  Associated Signs/Symptoms: edema, erythema, and drainage    Ulcer Identification:  Ulcer Type: venous  Contributing Factors: edema, venous stasis, and lymphedema    Diabetic/Pressure/Non Pressure Ulcers onl y:  Ulcer: Non-Pressure ulcer, fat layer exposed    If patient has diabetic lower extremity wounds  Magaña Classification of diabetic lower extremity wounds:    Grade Description   []  0 No open wound   []  1 Superficial ulcer involving the full skin thickness   []  2 Deep ulcer involves ligament, tendon, joint capsule, or fascia  No bone involvement or abscess presence   []  3 Deep Ulcer with abcess formation and/or osteomyelitis   []  4 Localized gangrene   []  5 Extensive gangrene of the foot     Wound: N/A    10-18-23  Debrided, bactrim rx given    10/25/2023  Extensive multiple wounds of the left lower extremity debrided, patient has been treated with antibiotics recently        PAST MEDICAL HISTORY      Diagnosis Date    CAD (coronary artery

## 2023-10-29 LAB
MICROORGANISM SPEC CULT: NORMAL
MICROORGANISM/AGENT SPEC: NORMAL
SPECIMEN DESCRIPTION: NORMAL

## 2023-11-01 ENCOUNTER — HOSPITAL ENCOUNTER (OUTPATIENT)
Dept: WOUND CARE | Age: 73
Discharge: HOME | End: 2023-11-01
Attending: PODIATRIST
Payer: MEDICARE

## 2023-11-01 VITALS
DIASTOLIC BLOOD PRESSURE: 64 MMHG | HEART RATE: 78 BPM | TEMPERATURE: 97.1 F | SYSTOLIC BLOOD PRESSURE: 151 MMHG | RESPIRATION RATE: 22 BRPM

## 2023-11-01 PROCEDURE — 87070 CULTURE OTHR SPECIMN AEROBIC: CPT

## 2023-11-01 PROCEDURE — 87205 SMEAR GRAM STAIN: CPT

## 2023-11-01 PROCEDURE — 11042 DBRDMT SUBQ TIS 1ST 20SQCM/<: CPT

## 2023-11-01 PROCEDURE — 86403 PARTICLE AGGLUT ANTBDY SCRN: CPT

## 2023-11-01 PROCEDURE — 6370000000 HC RX 637 (ALT 250 FOR IP): Performed by: PODIATRIST

## 2023-11-01 PROCEDURE — 11045 DBRDMT SUBQ TISS EACH ADDL: CPT

## 2023-11-01 RX ORDER — LIDOCAINE HYDROCHLORIDE 40 MG/ML
SOLUTION TOPICAL ONCE
Status: COMPLETED | OUTPATIENT
Start: 2023-11-01 | End: 2023-11-01

## 2023-11-01 RX ADMIN — LIDOCAINE HYDROCHLORIDE: 40 SOLUTION TOPICAL at 10:37

## 2023-11-01 NOTE — PROGRESS NOTES
Wound Healing Center  History and Physical/Consultation  Podiatry    Referring Physician : Ze Kunz MD  [address] [facility] RECORD NUMBER:  [id number]  AGE: 74 y.o. GENDER: male  : 1950  EPISODE DATE:  2023  Subjective:     Chief Complaint   Patient presents with    Wound Check         HISTORY of PRESENT ILLNESS HPI     Filiberto Haile is a 74 y.o. male who presents today for wound/ulcer evaluation. History of Wound Context:  The patient has had a wound of left leg which was first noted approximately months ago. This has been treated debridemnet. On their initial visit to the wound healing center, 10/11/23 ,  the patient has noted that the wound has not been improving. The patient has had similar previous wounds in the past.      Pt is on abx at time of initial visit.       Wound/Ulcer Pain Timing/Severity: constant  Quality of pain: aching  Severity:   10   Modifying Factors: Pain worsens with walking  Associated Signs/Symptoms: edema, erythema, and drainage    Ulcer Identification:  Ulcer Type: venous  Contributing Factors: edema, venous stasis, and lymphedema    Diabetic/Pressure/Non Pressure Ulcers onl y:  Ulcer: Non-Pressure ulcer, fat layer exposed    If patient has diabetic lower extremity wounds  Magaña Classification of diabetic lower extremity wounds:    Grade Description   []  0 No open wound   []  1 Superficial ulcer involving the full skin thickness   []  2 Deep ulcer involves ligament, tendon, joint capsule, or fascia  No bone involvement or abscess presence   []  3 Deep Ulcer with abcess formation and/or osteomyelitis   []  4 Localized gangrene   []  5 Extensive gangrene of the foot     Wound: N/A    10-18-23  Debrided, bactrim rx given    10/25/2023  Extensive multiple wounds of the left lower extremity debrided, patient has been treated with antibiotics recently    23  Coban 4 layer, debrided, culture taken        PAST MEDICAL HISTORY      Diagnosis

## 2023-11-01 NOTE — DISCHARGE INSTRUCTIONS
Discharge Instructions            Visit Discharge/Physician Orders     Discharge condition: Stable     Assessment of pain at discharge:     Anesthetic used: 4% topical lidocaine     Discharge to: Home     Left via:Private automobile     Accompanied by: accompanied by spouse     ECF/HHA: REFERRAL TO HOME CARE     Dressing Orders: clean left leg with saline, apply aquacel ag, foam or super absorber, 4 layer profore , CHANGE 2X PER WEEK (FRIDAY AND MONDAY)   HOME CARE TO START      Treatment Orders: elevate legs above level of heart  RX ANTIBIOTIC BACTRIM DS       [address] [place] [address] followup visit _____________1 week________________  (Please note your next appointment above and if you are unable to keep, kindly give a 24 hour notice. Thank you.)     Physician signature:__________________________        If you experience any of the following, please call the [facility] during business hours:     * Increase in Pain  * Temperature over 101  * Increase in drainage from your wound  * Drainage with a foul odor  * Bleeding  * Increase in swelling  * Need for compression bandage changes due to slippage, breakthrough drainage. If you need medical attention outside of the business hours of the [address] [place], [place] [address]i [place] please contact your PCP or go to the nearest emergency room.

## 2023-11-05 LAB
MICROORGANISM SPEC CULT: ABNORMAL
MICROORGANISM/AGENT SPEC: ABNORMAL
SPECIMEN DESCRIPTION: ABNORMAL

## 2023-11-06 LAB
MICROORGANISM SPEC CULT: ABNORMAL
MICROORGANISM SPEC CULT: NORMAL
MICROORGANISM/AGENT SPEC: ABNORMAL
MICROORGANISM/AGENT SPEC: NORMAL
SPECIMEN DESCRIPTION: ABNORMAL
SPECIMEN DESCRIPTION: NORMAL

## 2023-11-07 RX ORDER — ROSUVASTATIN CALCIUM 5 MG/1
5 TABLET, COATED ORAL DAILY
Qty: 90 TABLET | Refills: 0 | Status: SHIPPED | OUTPATIENT
Start: 2023-11-07

## 2023-11-08 ENCOUNTER — HOSPITAL ENCOUNTER (OUTPATIENT)
Dept: WOUND CARE | Age: 73
Discharge: HOME | End: 2023-11-08
Attending: PODIATRIST
Payer: MEDICARE

## 2023-11-08 VITALS
SYSTOLIC BLOOD PRESSURE: 149 MMHG | RESPIRATION RATE: 22 BRPM | HEIGHT: 67 IN | TEMPERATURE: 96.6 F | DIASTOLIC BLOOD PRESSURE: 60 MMHG | HEART RATE: 78 BPM | BODY MASS INDEX: 49.44 KG/M2 | WEIGHT: 315 LBS

## 2023-11-08 PROCEDURE — 6370000000 HC RX 637 (ALT 250 FOR IP): Performed by: PODIATRIST

## 2023-11-08 PROCEDURE — 11045 DBRDMT SUBQ TISS EACH ADDL: CPT

## 2023-11-08 PROCEDURE — 11042 DBRDMT SUBQ TIS 1ST 20SQCM/<: CPT

## 2023-11-08 RX ORDER — LIDOCAINE HYDROCHLORIDE 40 MG/ML
SOLUTION TOPICAL ONCE
Status: COMPLETED | OUTPATIENT
Start: 2023-11-08 | End: 2023-11-08

## 2023-11-08 RX ADMIN — LIDOCAINE HYDROCHLORIDE 10 ML: 40 SOLUTION TOPICAL at 12:03

## 2023-11-08 NOTE — DISCHARGE INSTRUCTIONS
Discharge Instructions            Visit Discharge/Physician Orders     Discharge condition: Stable     Assessment of pain at discharge:     Anesthetic used: 4% topical lidocaine     Discharge to: Home     Left via:Private automobile     Accompanied by: accompanied by spouse     ECF/HHA: REFERRAL TO HOME CARE     Dressing Orders: clean left leg with saline, apply aquacel ag, foam, coban 2, CHANGE 2X PER WEEK (FRIDAY AND MONDAY)   HOME CARE TO START      Treatment Orders: elevate legs above level of heart  RX ANTIBIOTIC BACTRIM DS       [address] [place] [address] followup visit _____________1 week_WITH DR. BERONICA MELVIN WITH DR LOVE____________  (Please note your next appointment above and if you are unable to keep, kindly give a 24 hour notice. Thank you.)     Physician signature:__________________________        If you experience any of the following, please call the [employer] during business hours:     * Increase in Pain  * Temperature over 101  * Increase in drainage from your wound  * Drainage with a foul odor  * Bleeding  * Increase in swelling  * Need for compression bandage changes due to slippage, breakthrough drainage. If you need medical attention outside of the business hours of the [employer] please contact your PCP or go to the nearest emergency room.

## 2023-11-08 NOTE — PLAN OF CARE
Problem: Chronic Conditions and Co-morbidities  Goal: Patient's chronic conditions and co-morbidity symptoms are monitored and maintained or improved  Outcome: Progressing     Problem: Cognitive:  Goal: Knowledge of wound care  Description: Knowledge of wound care  Outcome: Progressing  Goal: Understands risk factors for wounds  Description: Understands risk factors for wounds  Outcome: Progressing     Problem: Wound:  Goal: Will show signs of wound healing; wound closure and no evidence of infection  Description: Will show signs of wound healing; wound closure and no evidence of infection  Outcome: Progressing     Problem: Arterial:  Goal: Optimize blood flow for wound healing  Description: Optimize blood flow for wound healing  Outcome: Progressing     Problem: Venous:  Goal: Signs of wound healing will improve  Description: Signs of wound healing will improve  Outcome: Progressing     Problem: Weight control:  Goal: Ability to maintain an optimal weight for height and age will be supported  Description: Ability to maintain an optimal weight for height and age will be supported  Outcome: Progressing     Problem: Compression therapy:  Goal: Will be free from complications associated with compression therapy  Description: Will be free from complications associated with compression therapy  Outcome: Progressing

## 2023-11-08 NOTE — PROGRESS NOTES
Wound Healing Center  History and Physical/Consultation  Podiatry    Referring Physician : Solomon Eubanks MD  [address] [facility] RECORD NUMBER:  [id number]  AGE: 74 y.o. GENDER: male  : 1950  EPISODE DATE:  2023  Subjective:     Chief Complaint   Patient presents with    Wound Check     L LEG         HISTORY of PRESENT ILLNESS ALY Riddle is a 74 y.o. male who presents today for wound/ulcer evaluation. History of Wound Context:  The patient has had a wound of left leg which was first noted approximately months ago. This has been treated debridemnet. On their initial visit to the wound healing center, 10/11/23 ,  the patient has noted that the wound has not been improving. The patient has had similar previous wounds in the past.      Pt is on abx at time of initial visit.       Wound/Ulcer Pain Timing/Severity: constant  Quality of pain: aching  Severity:   10   Modifying Factors: Pain worsens with walking  Associated Signs/Symptoms: edema, erythema, and drainage    Ulcer Identification:  Ulcer Type: venous  Contributing Factors: edema, venous stasis, and lymphedema    Diabetic/Pressure/Non Pressure Ulcers onl y:  Ulcer: Non-Pressure ulcer, fat layer exposed    If patient has diabetic lower extremity wounds  Magaña Classification of diabetic lower extremity wounds:    Grade Description   []  0 No open wound   []  1 Superficial ulcer involving the full skin thickness   []  2 Deep ulcer involves ligament, tendon, joint capsule, or fascia  No bone involvement or abscess presence   []  3 Deep Ulcer with abcess formation and/or osteomyelitis   []  4 Localized gangrene   []  5 Extensive gangrene of the foot     Wound: N/A    10-18-23  Debrided, bactrim rx given    10/25/2023  Extensive multiple wounds of the left lower extremity debrided, patient has been treated with antibiotics recently    23  Coban 4 layer, debrided, culture taken    23  Debrided, needs id

## 2023-11-09 ENCOUNTER — HOSPITAL ENCOUNTER (OUTPATIENT)
Dept: WOUND CARE | Age: 73
Discharge: HOME | End: 2023-11-09
Payer: MEDICARE

## 2023-11-09 VITALS
TEMPERATURE: 96.3 F | DIASTOLIC BLOOD PRESSURE: 86 MMHG | HEART RATE: 83 BPM | SYSTOLIC BLOOD PRESSURE: 148 MMHG | RESPIRATION RATE: 22 BRPM

## 2023-11-09 PROCEDURE — 87070 CULTURE OTHR SPECIMN AEROBIC: CPT

## 2023-11-09 PROCEDURE — 86403 PARTICLE AGGLUT ANTBDY SCRN: CPT

## 2023-11-09 PROCEDURE — 99213 OFFICE O/P EST LOW 20 MIN: CPT

## 2023-11-09 PROCEDURE — 87205 SMEAR GRAM STAIN: CPT

## 2023-11-09 RX ORDER — MINOCYCLINE HYDROCHLORIDE 100 MG/1
100 TABLET ORAL 2 TIMES DAILY
Qty: 20 TABLET | Refills: 0 | Status: SHIPPED | OUTPATIENT
Start: 2023-11-09 | End: 2023-11-19

## 2023-11-09 RX ORDER — SULFAMETHOXAZOLE AND TRIMETHOPRIM 800; 160 MG/1; MG/1
1 TABLET ORAL 2 TIMES DAILY
Qty: 20 TABLET | Refills: 0 | Status: SHIPPED | OUTPATIENT
Start: 2023-11-09 | End: 2023-11-19

## 2023-11-09 NOTE — DISCHARGE INSTRUCTIONS
Discharge Instructions            Visit Discharge/Physician Orders     Discharge condition: Stable     Assessment of pain at discharge:     Anesthetic used: 4% topical lidocaine     Discharge to: Home     Left via:Private automobile     Accompanied by: accompanied by spouse     ECF/HHA: [facility]     Dressing Orders: clean left leg with saline, apply hydrofera blue when available until then continue alginate ag and foam , coban 2, CHANGE 2X PER WEEK (FRIDAY AND MONDAY)        Treatment Orders: elevate legs above level of heart  RX start antibiotics as directed minocycline and bactrim   If no improvement with wound with next wound care appt update dr. Will Valenzuela and she will start iv therapy     [address] [facility] followup visit _____________1 week_WITH DR. Jayce White [facility]__in december_________  (Please note your next appointment above and if you are unable to keep, kindly give a 24 hour notice. Thank you.)     Physician signature:__________________________        If you experience any of the following, please call the [employer] during business hours:     * Increase in Pain  * Temperature over 101  * Increase in drainage from your wound  * Drainage with a foul odor  * Bleeding  * Increase in swelling  * Need for compression bandage changes due to slippage, breakthrough drainage. If you need medical attention outside of the business hours of the [employer] please contact your PCP or go to the nearest emergency room.

## 2023-11-09 NOTE — PROGRESS NOTES
lidocaine     Discharge to: Home     Left via:Private automobile     Accompanied by: accompanied by spouse     ECF/HHA: [facility]     Dressing Orders: clean left leg with saline, apply hydrofera blue when available until then continue alginate ag and foam , coban 2, CHANGE 2X PER WEEK ([address])        Treatment Orders: elevate legs above level of heart  RX start antibiotics as directed minocycline and bactrim   If no improvement with wound with next wound care appt update dr. Bo Bryant and she will start iv therapy     [facility] followup visit _____________1 week_WITH DR. Rufus Gates [facility] IVAN__in december_________  (Please note your next appointment above and if you are unable to keep, kindly give a 24 hour notice. Thank you.)     Physician signature:__________________________        If you experience any of the following, please call the [address] [place] [address] during business hours:     * Increase in Pain  * Temperature over 101  * Increase in drainage from your wound  * Drainage with a foul odor  * Bleeding  * Increase in swelling  * Need for compression bandage changes due to slippage, breakthrough drainage. If you need medical attention outside of the business hours of the [address] [place] [address] please contact your PCP or go to the nearest emergency room.                        Electronically signed by Katie Arteaga MD on 11/9/2023 at 2:25 PM

## 2023-11-12 LAB
MICROORGANISM SPEC CULT: ABNORMAL
MICROORGANISM/AGENT SPEC: ABNORMAL
SERVICE CMNT-IMP: ABNORMAL
SPECIMEN DESCRIPTION: ABNORMAL

## 2023-11-13 LAB
MICROORGANISM SPEC CULT: NORMAL
MICROORGANISM/AGENT SPEC: NORMAL
SPECIMEN DESCRIPTION: NORMAL

## 2023-11-13 NOTE — DISCHARGE INSTRUCTIONS
Discharge Instructions            Visit Discharge/Physician Orders     Discharge condition: Stable     Assessment of pain at discharge:     Anesthetic used: 4% topical lidocaine     Discharge to: Home     Left via:Private automobile     Accompanied by: accompanied by spouse     ECF/HHA: [facility] please note: patient has 2 week follow up at [facility]. Please if able, make additional visit to patient on [facility] of next week. Dressing Orders: clean left leg with saline, apply hydrofera blue when available until then continue alginate ag and foam , coban 2, CHANGE 2X PER WEEK (FRIDAY AND MONDAY)         Treatment Orders: elevate legs above level of heart  RX start antibiotics as directed minocycline and bactrim   If no improvement with wound with next wound care appt update dr. Vito Parker and she will start iv therapy     [facility] followup visit _____________2 weekS_WITH DR. May Henriquez [facility] IVAN__in december_________  (Please note your next appointment above and if you are unable to keep, kindly give a 24 hour notice. Thank you.)     Physician signature:__________________________        If you experience any of the following, please call the [facility] during business hours:     * Increase in Pain  * Temperature over 101  * Increase in drainage from your wound  * Drainage with a foul odor  * Bleeding  * Increase in swelling  * Need for compression bandage changes due to slippage, breakthrough drainage. If you need medical attention outside of the business hours of the [facility] please contact your PCP or go to the nearest emergency room.

## 2023-11-15 ENCOUNTER — HOSPITAL ENCOUNTER (OUTPATIENT)
Dept: WOUND CARE | Age: 73
Discharge: HOME | End: 2023-11-15
Attending: PODIATRIST
Payer: MEDICARE

## 2023-11-15 VITALS
SYSTOLIC BLOOD PRESSURE: 111 MMHG | HEART RATE: 92 BPM | BODY MASS INDEX: 49.44 KG/M2 | DIASTOLIC BLOOD PRESSURE: 57 MMHG | TEMPERATURE: 97.1 F | HEIGHT: 67 IN | WEIGHT: 315 LBS | RESPIRATION RATE: 22 BRPM

## 2023-11-15 PROCEDURE — 11042 DBRDMT SUBQ TIS 1ST 20SQCM/<: CPT

## 2023-11-15 PROCEDURE — 11045 DBRDMT SUBQ TISS EACH ADDL: CPT

## 2023-11-15 NOTE — PROGRESS NOTES
Measurements:  Are located in the [place]  Documentation Flow Sheet  Post Debridement Measurements:  Wound/Ulcer Descriptions are Pre Debridement except measurements:     Wound 10/11/23 Leg Left; Anterior; Lateral #1 (Active)   Wound Image   10/11/23 1048   Dressing Status New dressing applied 11/08/23 1427   Wound Cleansed Cleansed with saline 11/08/23 1427   Dressing/Treatment Alginate with Ag;ABD;Dry dressing 11/08/23 1427   Wound Length (cm) 13.6 cm 11/15/23 1037   Wound Width (cm) 21 cm 11/15/23 1037   Wound Depth (cm) 0.2 cm 11/15/23 1037   Wound Surface Area (cm^2) 285.6 cm^2 11/15/23 1037   Change in Wound Size % (l*w) -20.78 11/15/23 1037   Wound Volume (cm^3) 57.12 cm^3 11/15/23 1037   Wound Healing % -21 11/15/23 1037   Post-Procedure Length (cm) 25 cm 11/08/23 1208   Post-Procedure Width (cm) 28 cm 11/08/23 1208   Post-Procedure Depth (cm) 0.3 cm 11/08/23 1208   Post-Procedure Surface Area (cm^2) 700 cm^2 11/08/23 1208   Post-Procedure Volume (cm^3) 210 cm^3 11/08/23 1208   Wound Assessment Fibrin;Pink/red;Slough 11/15/23 1037   Drainage Amount Copious (>75 % saturated) 11/15/23 1037   Drainage Description Yellow 11/15/23 1037   Odor Mild 11/15/23 1037   Maria Elena-wound Assessment Edematous; Excoriated;Fragile; Maceration 11/15/23 1037   Number of days: 35     Incision 04/27/22 Right (Active)   Number of days: 566       Procedure Note  Indications:  Based on my examination of this patient's wound(s)/ulcer(s) today, debridement is required to promote healing and evaluate the wound base. Performed by: Thuan Boggs DPM    Consent obtained:  Yes    Time out taken:  Yes    Pain Control: Anesthetic  Anesthetic: 4% Lidocaine Liquid Topical     Debridement:Excisional Debridement    Using #15 blade scalpel the wound(s)/ulcer(s) was/were sharply debrided down through and including the removal of subcutaneous tissue.         Devitalized Tissue Debrided:  fibrin, biofilm, and slough to stimulate bleeding to

## 2023-11-29 ENCOUNTER — HOSPITAL ENCOUNTER (OUTPATIENT)
Dept: WOUND CARE | Age: 73
Discharge: HOME | End: 2023-11-29
Attending: PODIATRIST
Payer: MEDICARE

## 2023-11-29 VITALS
DIASTOLIC BLOOD PRESSURE: 90 MMHG | HEART RATE: 91 BPM | SYSTOLIC BLOOD PRESSURE: 163 MMHG | WEIGHT: 315 LBS | BODY MASS INDEX: 49.44 KG/M2 | RESPIRATION RATE: 22 BRPM | HEIGHT: 67 IN | TEMPERATURE: 97.6 F

## 2023-11-29 PROCEDURE — 93294 REM INTERROG EVL PM/LDLS PM: CPT | Performed by: STUDENT IN AN ORGANIZED HEALTH CARE EDUCATION/TRAINING PROGRAM

## 2023-11-29 PROCEDURE — 11045 DBRDMT SUBQ TISS EACH ADDL: CPT

## 2023-11-29 PROCEDURE — 93296 REM INTERROG EVL PM/IDS: CPT | Performed by: STUDENT IN AN ORGANIZED HEALTH CARE EDUCATION/TRAINING PROGRAM

## 2023-11-29 PROCEDURE — 11042 DBRDMT SUBQ TIS 1ST 20SQCM/<: CPT

## 2023-11-29 NOTE — PROGRESS NOTES
Wound Healing Center  History and Physical/Consultation  Podiatry    Referring Physician : Sukhjinder Mast MD  [address] [facility] RECORD NUMBER:  [id number]  AGE: 74 y.o. GENDER: male  : 1950  EPISODE DATE:  2023  Subjective:     Chief Complaint   Patient presents with    Wound Check     LLE           HISTORY of PRESENT ILLNESS HPI     Wilver Adams is a 74 y.o. male who presents today for wound/ulcer evaluation. History of Wound Context:  The patient has had a wound of left leg which was first noted approximately months ago. This has been treated debridemnet. On their initial visit to the wound healing center, 10/11/23 ,  the patient has noted that the wound has not been improving. The patient has had similar previous wounds in the past.      Pt is on abx at time of initial visit.       Wound/Ulcer Pain Timing/Severity: constant  Quality of pain: aching  Severity:  4 / 10   Modifying Factors: Pain worsens with walking  Associated Signs/Symptoms: edema, erythema, and drainage    Ulcer Identification:  Ulcer Type: venous  Contributing Factors: edema, venous stasis, and lymphedema    Diabetic/Pressure/Non Pressure Ulcers onl y:  Ulcer: Non-Pressure ulcer, fat layer exposed    If patient has diabetic lower extremity wounds  [place] Classification of diabetic lower extremity wounds:    Grade Description   []  0 No open wound   []  1 Superficial ulcer involving the full skin thickness   []  2 Deep ulcer involves ligament, tendon, joint capsule, or fascia  No bone involvement or abscess presence   []  3 Deep Ulcer with abcess formation and/or osteomyelitis   []  4 Localized gangrene   []  5 Extensive gangrene of the foot     Wound: N/A    10-18-23  Debrided, bactrim rx given    10/25/2023  Extensive multiple wounds of the left lower extremity debrided, patient has been treated with antibiotics recently    23  Coban 4 layer, debrided, culture taken    23  Debrided, needs id

## 2023-12-04 ENCOUNTER — APPOINTMENT (OUTPATIENT)
Dept: CT IMAGING | Age: 73
DRG: 189 | End: 2023-12-04
Payer: MEDICARE

## 2023-12-04 ENCOUNTER — HOSPITAL ENCOUNTER (INPATIENT)
Age: 73
LOS: 3 days | Discharge: HOME HEALTH | DRG: 189 | End: 2023-12-08
Attending: EMERGENCY MEDICINE | Admitting: INTERNAL MEDICINE
Payer: MEDICARE

## 2023-12-04 ENCOUNTER — APPOINTMENT (OUTPATIENT)
Dept: GENERAL RADIOLOGY | Age: 73
DRG: 189 | End: 2023-12-04
Payer: MEDICARE

## 2023-12-04 DIAGNOSIS — L97.822 VENOUS STASIS ULCER OF OTHER PART OF LEFT LOWER LEG WITH FAT LAYER EXPOSED WITHOUT VARICOSE VEINS (HCC): ICD-10-CM

## 2023-12-04 DIAGNOSIS — J96.01 ACUTE RESPIRATORY FAILURE WITH HYPOXIA (HCC): Primary | ICD-10-CM

## 2023-12-04 DIAGNOSIS — I89.0 LYMPHEDEMA: ICD-10-CM

## 2023-12-04 DIAGNOSIS — I87.2 VENOUS STASIS ULCER OF OTHER PART OF LEFT LOWER LEG WITH FAT LAYER EXPOSED WITHOUT VARICOSE VEINS (HCC): ICD-10-CM

## 2023-12-04 DIAGNOSIS — J21.9 ACUTE BRONCHIOLITIS DUE TO UNSPECIFIED ORGANISM: ICD-10-CM

## 2023-12-04 LAB
ALBUMIN SERPL-MCNC: 3.4 G/DL (ref 3.5–5.2)
ALP SERPL-CCNC: 72 U/L (ref 40–129)
ALT SERPL-CCNC: 26 U/L (ref 0–40)
ANION GAP SERPL CALCULATED.3IONS-SCNC: 9 MMOL/L (ref 7–16)
AST SERPL-CCNC: 48 U/L (ref 0–39)
BASOPHILS # BLD: 0.01 K/UL (ref 0–0.2)
BASOPHILS NFR BLD: 0 % (ref 0–2)
BILIRUB SERPL-MCNC: 0.8 MG/DL (ref 0–1.2)
BILIRUB UR QL STRIP: NEGATIVE
BNP SERPL-MCNC: 1562 PG/ML (ref 0–450)
BUN SERPL-MCNC: 22 MG/DL (ref 6–23)
CALCIUM SERPL-MCNC: 8.5 MG/DL (ref 8.6–10.2)
CHLORIDE SERPL-SCNC: 101 MMOL/L (ref 98–107)
CLARITY UR: CLEAR
CO2 SERPL-SCNC: 25 MMOL/L (ref 22–29)
COLOR UR: YELLOW
CREAT SERPL-MCNC: 1.3 MG/DL (ref 0.7–1.2)
EOSINOPHIL # BLD: 0 K/UL (ref 0.05–0.5)
EOSINOPHILS RELATIVE PERCENT: 0 % (ref 0–6)
ERYTHROCYTE [DISTWIDTH] IN BLOOD BY AUTOMATED COUNT: 17 % (ref 11.5–15)
GFR SERPL CREATININE-BSD FRML MDRD: 59 ML/MIN/1.73M2
GLUCOSE SERPL-MCNC: 259 MG/DL (ref 74–99)
GLUCOSE UR STRIP-MCNC: NEGATIVE MG/DL
HCT VFR BLD AUTO: 34 % (ref 37–54)
HGB BLD-MCNC: 10.6 G/DL (ref 12.5–16.5)
HGB UR QL STRIP.AUTO: ABNORMAL
IMM GRANULOCYTES # BLD AUTO: 0.08 K/UL (ref 0–0.58)
IMM GRANULOCYTES NFR BLD: 1 % (ref 0–5)
INFLUENZA A BY PCR: NOT DETECTED
INFLUENZA B BY PCR: NOT DETECTED
KETONES UR STRIP-MCNC: NEGATIVE MG/DL
LACTATE BLDV-SCNC: 1.9 MMOL/L (ref 0.5–1.9)
LEUKOCYTE ESTERASE UR QL STRIP: NEGATIVE
LIPASE SERPL-CCNC: 7 U/L (ref 13–60)
LYMPHOCYTES NFR BLD: 0.3 K/UL (ref 1.5–4)
LYMPHOCYTES RELATIVE PERCENT: 3 % (ref 20–42)
MCH RBC QN AUTO: 27.2 PG (ref 26–35)
MCHC RBC AUTO-ENTMCNC: 31.2 G/DL (ref 32–34.5)
MCV RBC AUTO: 87.2 FL (ref 80–99.9)
MONOCYTES NFR BLD: 0.37 K/UL (ref 0.1–0.95)
MONOCYTES NFR BLD: 4 % (ref 2–12)
NEUTROPHILS NFR BLD: 92 % (ref 43–80)
NEUTS SEG NFR BLD: 8.17 K/UL (ref 1.8–7.3)
NITRITE UR QL STRIP: NEGATIVE
PH UR STRIP: 6 [PH] (ref 5–9)
PLATELET # BLD AUTO: 118 K/UL (ref 130–450)
PMV BLD AUTO: 8.7 FL (ref 7–12)
POTASSIUM SERPL-SCNC: 4.3 MMOL/L (ref 3.5–5)
PROT SERPL-MCNC: 6.8 G/DL (ref 6.4–8.3)
PROT UR STRIP-MCNC: 30 MG/DL
RBC # BLD AUTO: 3.9 M/UL (ref 3.8–5.8)
RBC # BLD: ABNORMAL 10*6/UL
RBC # BLD: ABNORMAL 10*6/UL
SARS-COV-2 RDRP RESP QL NAA+PROBE: NOT DETECTED
SODIUM SERPL-SCNC: 135 MMOL/L (ref 132–146)
SP GR UR STRIP: 1.02 (ref 1–1.03)
SPECIMEN DESCRIPTION: NORMAL
TROPONIN I SERPL HS-MCNC: 30 NG/L (ref 0–11)
TROPONIN I SERPL HS-MCNC: 30 NG/L (ref 0–11)
TSH SERPL DL<=0.05 MIU/L-ACNC: 8.75 UIU/ML (ref 0.27–4.2)
UROBILINOGEN UR STRIP-ACNC: 0.2 EU/DL (ref 0–1)
WBC OTHER # BLD: 8.9 K/UL (ref 4.5–11.5)

## 2023-12-04 PROCEDURE — 80053 COMPREHEN METABOLIC PANEL: CPT

## 2023-12-04 PROCEDURE — 87502 INFLUENZA DNA AMP PROBE: CPT

## 2023-12-04 PROCEDURE — 99285 EMERGENCY DEPT VISIT HI MDM: CPT

## 2023-12-04 PROCEDURE — 81001 URINALYSIS AUTO W/SCOPE: CPT

## 2023-12-04 PROCEDURE — 87635 SARS-COV-2 COVID-19 AMP PRB: CPT

## 2023-12-04 PROCEDURE — 83880 ASSAY OF NATRIURETIC PEPTIDE: CPT

## 2023-12-04 PROCEDURE — 85025 COMPLETE CBC W/AUTO DIFF WBC: CPT

## 2023-12-04 PROCEDURE — 87040 BLOOD CULTURE FOR BACTERIA: CPT

## 2023-12-04 PROCEDURE — 6360000002 HC RX W HCPCS: Performed by: EMERGENCY MEDICINE

## 2023-12-04 PROCEDURE — 84439 ASSAY OF FREE THYROXINE: CPT

## 2023-12-04 PROCEDURE — 71045 X-RAY EXAM CHEST 1 VIEW: CPT

## 2023-12-04 PROCEDURE — 83690 ASSAY OF LIPASE: CPT

## 2023-12-04 PROCEDURE — 84145 PROCALCITONIN (PCT): CPT

## 2023-12-04 PROCEDURE — 96374 THER/PROPH/DIAG INJ IV PUSH: CPT

## 2023-12-04 PROCEDURE — 96375 TX/PRO/DX INJ NEW DRUG ADDON: CPT

## 2023-12-04 PROCEDURE — 84443 ASSAY THYROID STIM HORMONE: CPT

## 2023-12-04 PROCEDURE — 71275 CT ANGIOGRAPHY CHEST: CPT

## 2023-12-04 PROCEDURE — 2580000003 HC RX 258: Performed by: EMERGENCY MEDICINE

## 2023-12-04 PROCEDURE — 84484 ASSAY OF TROPONIN QUANT: CPT

## 2023-12-04 PROCEDURE — 83605 ASSAY OF LACTIC ACID: CPT

## 2023-12-04 PROCEDURE — 93005 ELECTROCARDIOGRAM TRACING: CPT | Performed by: EMERGENCY MEDICINE

## 2023-12-04 PROCEDURE — 6360000004 HC RX CONTRAST MEDICATION: Performed by: RADIOLOGY

## 2023-12-04 RX ORDER — 0.9 % SODIUM CHLORIDE 0.9 %
1000 INTRAVENOUS SOLUTION INTRAVENOUS ONCE
Status: COMPLETED | OUTPATIENT
Start: 2023-12-04 | End: 2023-12-05

## 2023-12-04 RX ORDER — METHYLPREDNISOLONE SODIUM SUCCINATE 125 MG/2ML
125 INJECTION, POWDER, LYOPHILIZED, FOR SOLUTION INTRAMUSCULAR; INTRAVENOUS ONCE
Status: COMPLETED | OUTPATIENT
Start: 2023-12-04 | End: 2023-12-04

## 2023-12-04 RX ORDER — IPRATROPIUM BROMIDE AND ALBUTEROL SULFATE 2.5; .5 MG/3ML; MG/3ML
1 SOLUTION RESPIRATORY (INHALATION) ONCE
Status: COMPLETED | OUTPATIENT
Start: 2023-12-04 | End: 2023-12-05

## 2023-12-04 RX ADMIN — METHYLPREDNISOLONE SODIUM SUCCINATE 125 MG: 125 INJECTION INTRAMUSCULAR; INTRAVENOUS at 23:52

## 2023-12-04 RX ADMIN — IOPAMIDOL 75 ML: 755 INJECTION, SOLUTION INTRAVENOUS at 22:24

## 2023-12-04 RX ADMIN — SODIUM CHLORIDE 1000 ML: 9 INJECTION, SOLUTION INTRAVENOUS at 22:04

## 2023-12-05 PROBLEM — J96.90 RESPIRATORY FAILURE (HCC): Status: ACTIVE | Noted: 2023-12-05

## 2023-12-05 LAB
B PARAP IS1001 DNA NPH QL NAA+NON-PROBE: NOT DETECTED
B PERT DNA SPEC QL NAA+PROBE: NOT DETECTED
BACTERIA URNS QL MICRO: ABNORMAL
C PNEUM DNA NPH QL NAA+NON-PROBE: NOT DETECTED
EKG ATRIAL RATE: 74 BPM
EKG P-R INTERVAL: 228 MS
EKG Q-T INTERVAL: 432 MS
EKG QRS DURATION: 172 MS
EKG QTC CALCULATION (BAZETT): 479 MS
EKG R AXIS: -71 DEGREES
EKG T AXIS: 84 DEGREES
EKG VENTRICULAR RATE: 74 BPM
FLUAV RNA NPH QL NAA+NON-PROBE: NOT DETECTED
FLUBV RNA NPH QL NAA+NON-PROBE: NOT DETECTED
GLUCOSE BLD-MCNC: 300 MG/DL (ref 74–99)
GLUCOSE BLD-MCNC: 356 MG/DL (ref 74–99)
HADV DNA NPH QL NAA+NON-PROBE: NOT DETECTED
HCOV 229E RNA NPH QL NAA+NON-PROBE: NOT DETECTED
HCOV HKU1 RNA NPH QL NAA+NON-PROBE: NOT DETECTED
HCOV NL63 RNA NPH QL NAA+NON-PROBE: NOT DETECTED
HCOV OC43 RNA NPH QL NAA+NON-PROBE: NOT DETECTED
HMPV RNA NPH QL NAA+NON-PROBE: NOT DETECTED
HPIV1 RNA NPH QL NAA+NON-PROBE: NOT DETECTED
HPIV2 RNA NPH QL NAA+NON-PROBE: NOT DETECTED
HPIV3 RNA NPH QL NAA+NON-PROBE: NOT DETECTED
HPIV4 RNA NPH QL NAA+NON-PROBE: NOT DETECTED
M PNEUMO DNA NPH QL NAA+NON-PROBE: NOT DETECTED
PROCALCITONIN SERPL-MCNC: 1.96 NG/ML (ref 0–0.08)
RBC #/AREA URNS HPF: ABNORMAL /HPF
RSV RNA NPH QL NAA+NON-PROBE: NOT DETECTED
RV+EV RNA NPH QL NAA+NON-PROBE: NOT DETECTED
SARS-COV-2 RNA NPH QL NAA+NON-PROBE: NOT DETECTED
SPECIMEN DESCRIPTION: NORMAL
T4 FREE SERPL-MCNC: 0.7 NG/DL (ref 0.9–1.7)
WBC #/AREA URNS HPF: ABNORMAL /HPF

## 2023-12-05 PROCEDURE — 6360000002 HC RX W HCPCS

## 2023-12-05 PROCEDURE — 2580000003 HC RX 258

## 2023-12-05 PROCEDURE — 96365 THER/PROPH/DIAG IV INF INIT: CPT

## 2023-12-05 PROCEDURE — G0378 HOSPITAL OBSERVATION PER HR: HCPCS

## 2023-12-05 PROCEDURE — 0202U NFCT DS 22 TRGT SARS-COV-2: CPT

## 2023-12-05 PROCEDURE — 96366 THER/PROPH/DIAG IV INF ADDON: CPT

## 2023-12-05 PROCEDURE — 94640 AIRWAY INHALATION TREATMENT: CPT

## 2023-12-05 PROCEDURE — 6360000002 HC RX W HCPCS: Performed by: EMERGENCY MEDICINE

## 2023-12-05 PROCEDURE — 93010 ELECTROCARDIOGRAM REPORT: CPT | Performed by: INTERNAL MEDICINE

## 2023-12-05 PROCEDURE — 6370000000 HC RX 637 (ALT 250 FOR IP)

## 2023-12-05 PROCEDURE — 2580000003 HC RX 258: Performed by: EMERGENCY MEDICINE

## 2023-12-05 PROCEDURE — 96375 TX/PRO/DX INJ NEW DRUG ADDON: CPT

## 2023-12-05 PROCEDURE — 82962 GLUCOSE BLOOD TEST: CPT

## 2023-12-05 PROCEDURE — 6360000002 HC RX W HCPCS: Performed by: FAMILY MEDICINE

## 2023-12-05 PROCEDURE — 96376 TX/PRO/DX INJ SAME DRUG ADON: CPT

## 2023-12-05 PROCEDURE — 96372 THER/PROPH/DIAG INJ SC/IM: CPT

## 2023-12-05 PROCEDURE — 2060000000 HC ICU INTERMEDIATE R&B

## 2023-12-05 PROCEDURE — 6370000000 HC RX 637 (ALT 250 FOR IP): Performed by: EMERGENCY MEDICINE

## 2023-12-05 RX ORDER — INSULIN LISPRO 100 [IU]/ML
0-4 INJECTION, SOLUTION INTRAVENOUS; SUBCUTANEOUS NIGHTLY
Status: DISCONTINUED | OUTPATIENT
Start: 2023-12-05 | End: 2023-12-08 | Stop reason: HOSPADM

## 2023-12-05 RX ORDER — ONDANSETRON 2 MG/ML
4 INJECTION INTRAMUSCULAR; INTRAVENOUS EVERY 6 HOURS PRN
Status: DISCONTINUED | OUTPATIENT
Start: 2023-12-05 | End: 2023-12-08 | Stop reason: HOSPADM

## 2023-12-05 RX ORDER — LIOTHYRONINE SODIUM 25 UG/1
25 TABLET ORAL EVERY MORNING
Status: DISCONTINUED | OUTPATIENT
Start: 2023-12-06 | End: 2023-12-08 | Stop reason: HOSPADM

## 2023-12-05 RX ORDER — LISINOPRIL 20 MG/1
20 TABLET ORAL DAILY
Status: DISCONTINUED | OUTPATIENT
Start: 2023-12-05 | End: 2023-12-08 | Stop reason: HOSPADM

## 2023-12-05 RX ORDER — GABAPENTIN 100 MG/1
100 CAPSULE ORAL NIGHTLY
Status: DISCONTINUED | OUTPATIENT
Start: 2023-12-05 | End: 2023-12-08 | Stop reason: HOSPADM

## 2023-12-05 RX ORDER — FUROSEMIDE 10 MG/ML
20 INJECTION INTRAMUSCULAR; INTRAVENOUS DAILY
Status: DISCONTINUED | OUTPATIENT
Start: 2023-12-05 | End: 2023-12-08 | Stop reason: HOSPADM

## 2023-12-05 RX ORDER — SODIUM CHLORIDE 0.9 % (FLUSH) 0.9 %
5-40 SYRINGE (ML) INJECTION PRN
Status: DISCONTINUED | OUTPATIENT
Start: 2023-12-05 | End: 2023-12-08 | Stop reason: HOSPADM

## 2023-12-05 RX ORDER — ACETAMINOPHEN 650 MG/1
650 SUPPOSITORY RECTAL EVERY 6 HOURS PRN
Status: DISCONTINUED | OUTPATIENT
Start: 2023-12-05 | End: 2023-12-08 | Stop reason: HOSPADM

## 2023-12-05 RX ORDER — LEVOTHYROXINE SODIUM 0.1 MG/1
100 TABLET ORAL EVERY MORNING
Status: DISCONTINUED | OUTPATIENT
Start: 2023-12-06 | End: 2023-12-08 | Stop reason: HOSPADM

## 2023-12-05 RX ORDER — SODIUM CHLORIDE 9 MG/ML
INJECTION, SOLUTION INTRAVENOUS PRN
Status: DISCONTINUED | OUTPATIENT
Start: 2023-12-05 | End: 2023-12-08 | Stop reason: HOSPADM

## 2023-12-05 RX ORDER — SODIUM CHLORIDE 0.9 % (FLUSH) 0.9 %
5-40 SYRINGE (ML) INJECTION EVERY 12 HOURS SCHEDULED
Status: DISCONTINUED | OUTPATIENT
Start: 2023-12-05 | End: 2023-12-08 | Stop reason: HOSPADM

## 2023-12-05 RX ORDER — GABAPENTIN 100 MG/1
200 CAPSULE ORAL EVERY MORNING
Status: DISCONTINUED | OUTPATIENT
Start: 2023-12-06 | End: 2023-12-08 | Stop reason: HOSPADM

## 2023-12-05 RX ORDER — METHYLPREDNISOLONE SODIUM SUCCINATE 40 MG/ML
40 INJECTION, POWDER, LYOPHILIZED, FOR SOLUTION INTRAMUSCULAR; INTRAVENOUS EVERY 12 HOURS
Status: COMPLETED | OUTPATIENT
Start: 2023-12-05 | End: 2023-12-07

## 2023-12-05 RX ORDER — IPRATROPIUM BROMIDE AND ALBUTEROL SULFATE 2.5; .5 MG/3ML; MG/3ML
1 SOLUTION RESPIRATORY (INHALATION)
Status: DISCONTINUED | OUTPATIENT
Start: 2023-12-05 | End: 2023-12-08 | Stop reason: HOSPADM

## 2023-12-05 RX ORDER — VITAMIN B COMPLEX
5000 TABLET ORAL EVERY MORNING
Status: DISCONTINUED | OUTPATIENT
Start: 2023-12-06 | End: 2023-12-08 | Stop reason: HOSPADM

## 2023-12-05 RX ORDER — ENOXAPARIN SODIUM 100 MG/ML
40 INJECTION SUBCUTANEOUS DAILY
Status: DISCONTINUED | OUTPATIENT
Start: 2023-12-05 | End: 2023-12-06

## 2023-12-05 RX ORDER — ACETAMINOPHEN 325 MG/1
650 TABLET ORAL EVERY 6 HOURS PRN
Status: DISCONTINUED | OUTPATIENT
Start: 2023-12-05 | End: 2023-12-08 | Stop reason: HOSPADM

## 2023-12-05 RX ORDER — PREDNISONE 20 MG/1
20 TABLET ORAL 2 TIMES DAILY
Status: DISCONTINUED | OUTPATIENT
Start: 2023-12-07 | End: 2023-12-05 | Stop reason: CLARIF

## 2023-12-05 RX ORDER — INSULIN LISPRO 100 [IU]/ML
0-16 INJECTION, SOLUTION INTRAVENOUS; SUBCUTANEOUS
Status: DISCONTINUED | OUTPATIENT
Start: 2023-12-05 | End: 2023-12-08 | Stop reason: HOSPADM

## 2023-12-05 RX ORDER — ONDANSETRON 4 MG/1
4 TABLET, ORALLY DISINTEGRATING ORAL EVERY 8 HOURS PRN
Status: DISCONTINUED | OUTPATIENT
Start: 2023-12-05 | End: 2023-12-08 | Stop reason: HOSPADM

## 2023-12-05 RX ORDER — PREDNISONE 20 MG/1
20 TABLET ORAL 2 TIMES DAILY
Status: DISCONTINUED | OUTPATIENT
Start: 2023-12-07 | End: 2023-12-08 | Stop reason: HOSPADM

## 2023-12-05 RX ORDER — TAMSULOSIN HYDROCHLORIDE 0.4 MG/1
0.4 CAPSULE ORAL NIGHTLY
Status: DISCONTINUED | OUTPATIENT
Start: 2023-12-05 | End: 2023-12-08 | Stop reason: HOSPADM

## 2023-12-05 RX ORDER — ACETAMINOPHEN 500 MG
500 TABLET ORAL 2 TIMES DAILY
Status: DISCONTINUED | OUTPATIENT
Start: 2023-12-05 | End: 2023-12-08 | Stop reason: HOSPADM

## 2023-12-05 RX ORDER — CARVEDILOL 25 MG/1
25 TABLET ORAL 2 TIMES DAILY
Status: DISCONTINUED | OUTPATIENT
Start: 2023-12-05 | End: 2023-12-08 | Stop reason: HOSPADM

## 2023-12-05 RX ORDER — LATANOPROST 50 UG/ML
1 SOLUTION/ DROPS OPHTHALMIC NIGHTLY
Status: DISCONTINUED | OUTPATIENT
Start: 2023-12-05 | End: 2023-12-08 | Stop reason: HOSPADM

## 2023-12-05 RX ORDER — POTASSIUM CHLORIDE 20 MEQ/1
20 TABLET, EXTENDED RELEASE ORAL NIGHTLY
Status: DISCONTINUED | OUTPATIENT
Start: 2023-12-05 | End: 2023-12-08 | Stop reason: HOSPADM

## 2023-12-05 RX ORDER — FUROSEMIDE 20 MG/1
20 TABLET ORAL EVERY MORNING
Status: DISCONTINUED | OUTPATIENT
Start: 2023-12-06 | End: 2023-12-08 | Stop reason: HOSPADM

## 2023-12-05 RX ORDER — ROSUVASTATIN CALCIUM 5 MG/1
5 TABLET, COATED ORAL DAILY
Status: DISCONTINUED | OUTPATIENT
Start: 2023-12-05 | End: 2023-12-08 | Stop reason: HOSPADM

## 2023-12-05 RX ADMIN — AZITHROMYCIN MONOHYDRATE 500 MG: 500 INJECTION, POWDER, LYOPHILIZED, FOR SOLUTION INTRAVENOUS at 00:47

## 2023-12-05 RX ADMIN — ACETAMINOPHEN 500 MG: 325 TABLET ORAL at 20:44

## 2023-12-05 RX ADMIN — GABAPENTIN 100 MG: 100 CAPSULE ORAL at 20:43

## 2023-12-05 RX ADMIN — POTASSIUM CHLORIDE 20 MEQ: 1500 TABLET, EXTENDED RELEASE ORAL at 20:48

## 2023-12-05 RX ADMIN — CARVEDILOL 25 MG: 25 TABLET, FILM COATED ORAL at 20:43

## 2023-12-05 RX ADMIN — METHYLPREDNISOLONE SODIUM SUCCINATE 40 MG: 40 INJECTION INTRAMUSCULAR; INTRAVENOUS at 18:14

## 2023-12-05 RX ADMIN — FUROSEMIDE 20 MG: 10 INJECTION, SOLUTION INTRAMUSCULAR; INTRAVENOUS at 09:57

## 2023-12-05 RX ADMIN — SODIUM CHLORIDE, PRESERVATIVE FREE 10 ML: 5 INJECTION INTRAVENOUS at 20:45

## 2023-12-05 RX ADMIN — LATANOPROST 1 DROP: 50 SOLUTION/ DROPS OPHTHALMIC at 20:45

## 2023-12-05 RX ADMIN — TAMSULOSIN HYDROCHLORIDE 0.4 MG: 0.4 CAPSULE ORAL at 20:43

## 2023-12-05 RX ADMIN — IPRATROPIUM BROMIDE AND ALBUTEROL SULFATE 1 DOSE: .5; 2.5 SOLUTION RESPIRATORY (INHALATION) at 16:40

## 2023-12-05 RX ADMIN — ROSUVASTATIN CALCIUM 5 MG: 5 TABLET, FILM COATED ORAL at 18:14

## 2023-12-05 RX ADMIN — IPRATROPIUM BROMIDE AND ALBUTEROL SULFATE 1 DOSE: .5; 2.5 SOLUTION RESPIRATORY (INHALATION) at 19:37

## 2023-12-05 RX ADMIN — INSULIN LISPRO 16 UNITS: 100 INJECTION, SOLUTION INTRAVENOUS; SUBCUTANEOUS at 18:10

## 2023-12-05 RX ADMIN — ENOXAPARIN SODIUM 40 MG: 100 INJECTION SUBCUTANEOUS at 18:14

## 2023-12-05 RX ADMIN — INSULIN LISPRO 4 UNITS: 100 INJECTION, SOLUTION INTRAVENOUS; SUBCUTANEOUS at 20:49

## 2023-12-05 RX ADMIN — IPRATROPIUM BROMIDE AND ALBUTEROL SULFATE 1 DOSE: 2.5; .5 SOLUTION RESPIRATORY (INHALATION) at 00:21

## 2023-12-05 RX ADMIN — WATER 1000 MG: 1 INJECTION INTRAMUSCULAR; INTRAVENOUS; SUBCUTANEOUS at 00:20

## 2023-12-05 RX ADMIN — LISINOPRIL 20 MG: 20 TABLET ORAL at 18:14

## 2023-12-05 ASSESSMENT — PAIN SCALES - GENERAL
PAINLEVEL_OUTOF10: 0
PAINLEVEL_OUTOF10: 0

## 2023-12-05 ASSESSMENT — LIFESTYLE VARIABLES
HOW OFTEN DO YOU HAVE A DRINK CONTAINING ALCOHOL: NEVER
HOW MANY STANDARD DRINKS CONTAINING ALCOHOL DO YOU HAVE ON A TYPICAL DAY: PATIENT DOES NOT DRINK

## 2023-12-05 NOTE — H&P
[facility]  History and Physical      CHIEF COMPLAINT:    Chief Complaint   Patient presents with    Shortness of Breath     SOB- 90% on arrival. Placed on 2l NC went to 96%. Received a breathing tx. Patient of Donna White MD presents with:  Respiratory failure [facility])    History of Present Illness:   Patient is a 24-year-old male with known history of CAD, DM, HLD, HTN, DVTs, hypothyroidism who presents emergency room for shortness of breath. On arrival to [address] [place] [address] patient was 9% on room air and was placed on 2 L supplemental O2. Patient went a CTA pulmonary which revealed no evidence of acute embolus revealing a peribronchovascular nodularity right upper lobe. Labs revealed a moderately creatinine 1.3, hyperglycemia 259, elevated BNP of 1, 562, TSH of 8.75. Patient is currently utilizing 2 L of supplemental O2 to maintain adequate oxygenation. Patient did attempt to do further work-up and treatment. REVIEW OF SYSTEMS:  Pertinent negatives are above in HPI. 10 point ROS otherwise negative.       Past Medical History:   Diagnosis Date    CAD (coronary artery disease)     Cataract, right eye     Complete heart block ([address])     h/o    Diabetes mellitus ([address])     Encounter for aspirin therapy     patient uses for heart health    Glaucoma     History of blood transfusion     Hx of blood clots     Hyperlipidemia     Hypertension     Hyperthyroidism     Lower limb ulcer, calf, left, with fat layer exposed ([address]) 10/25/2023    Lymphedema of both lower extremities 10/25/2023    Myocardial infarct ([address]) 2010    Neuropathy     feet    Obesity     Obstructive sleep apnea     on cpap    Onychomycosis     DEMETRIA on CPAP     Osteoarthritis     Pacemaker     Medtronic    Stasis dermatitis     h/o on bilateral legs with leg edema    Varicose veins          Past Surgical History:   Procedure Laterality Date    CARDIAC PACEMAKER PLACEMENT  04/09/2010    [employer]tronic    CHOLECYSTECTOMY      COLONOSCOPY

## 2023-12-05 NOTE — PROGRESS NOTES
Database initiated pharmacy and medications verified with the patient. He is A&O comes in from home with wife. He uses a walker and is RA at baseline. He uses a c-pap at night. He has fallen recently. He has a wound to his LLE that [address] comes to change the dressings on.

## 2023-12-06 ENCOUNTER — HOSPITAL ENCOUNTER (OUTPATIENT)
Dept: WOUND CARE | Age: 73
Discharge: HOME | End: 2023-12-06
Attending: PODIATRIST

## 2023-12-06 ENCOUNTER — APPOINTMENT (OUTPATIENT)
Dept: GENERAL RADIOLOGY | Age: 73
DRG: 189 | End: 2023-12-06
Payer: MEDICARE

## 2023-12-06 LAB
ANION GAP SERPL CALCULATED.3IONS-SCNC: 12 MMOL/L (ref 7–16)
BASOPHILS # BLD: 0.01 K/UL (ref 0–0.2)
BASOPHILS NFR BLD: 0 % (ref 0–2)
BUN SERPL-MCNC: 32 MG/DL (ref 6–23)
CALCIUM SERPL-MCNC: 8.7 MG/DL (ref 8.6–10.2)
CHLORIDE SERPL-SCNC: 101 MMOL/L (ref 98–107)
CO2 SERPL-SCNC: 20 MMOL/L (ref 22–29)
CREAT SERPL-MCNC: 1 MG/DL (ref 0.7–1.2)
EOSINOPHIL # BLD: 0 K/UL (ref 0.05–0.5)
EOSINOPHILS RELATIVE PERCENT: 0 % (ref 0–6)
ERYTHROCYTE [DISTWIDTH] IN BLOOD BY AUTOMATED COUNT: 16.7 % (ref 11.5–15)
GFR SERPL CREATININE-BSD FRML MDRD: >60 ML/MIN/1.73M2
GLUCOSE BLD-MCNC: 290 MG/DL (ref 74–99)
GLUCOSE BLD-MCNC: 290 MG/DL (ref 74–99)
GLUCOSE BLD-MCNC: 297 MG/DL (ref 74–99)
GLUCOSE BLD-MCNC: 310 MG/DL (ref 74–99)
GLUCOSE SERPL-MCNC: 282 MG/DL (ref 74–99)
HCT VFR BLD AUTO: 34.2 % (ref 37–54)
HGB BLD-MCNC: 10.9 G/DL (ref 12.5–16.5)
IMM GRANULOCYTES # BLD AUTO: 0.05 K/UL (ref 0–0.58)
IMM GRANULOCYTES NFR BLD: 0 % (ref 0–5)
LYMPHOCYTES NFR BLD: 0.7 K/UL (ref 1.5–4)
LYMPHOCYTES RELATIVE PERCENT: 6 % (ref 20–42)
MCH RBC QN AUTO: 27 PG (ref 26–35)
MCHC RBC AUTO-ENTMCNC: 31.9 G/DL (ref 32–34.5)
MCV RBC AUTO: 84.7 FL (ref 80–99.9)
MONOCYTES NFR BLD: 0.51 K/UL (ref 0.1–0.95)
MONOCYTES NFR BLD: 4 % (ref 2–12)
NEUTROPHILS NFR BLD: 89 % (ref 43–80)
NEUTS SEG NFR BLD: 10.56 K/UL (ref 1.8–7.3)
PLATELET # BLD AUTO: 152 K/UL (ref 130–450)
PMV BLD AUTO: 9.1 FL (ref 7–12)
POTASSIUM SERPL-SCNC: 4.1 MMOL/L (ref 3.5–5)
RBC # BLD AUTO: 4.04 M/UL (ref 3.8–5.8)
SODIUM SERPL-SCNC: 133 MMOL/L (ref 132–146)
WBC OTHER # BLD: 11.8 K/UL (ref 4.5–11.5)

## 2023-12-06 PROCEDURE — 85025 COMPLETE CBC W/AUTO DIFF WBC: CPT

## 2023-12-06 PROCEDURE — 2580000003 HC RX 258

## 2023-12-06 PROCEDURE — 6370000000 HC RX 637 (ALT 250 FOR IP)

## 2023-12-06 PROCEDURE — 86403 PARTICLE AGGLUT ANTBDY SCRN: CPT

## 2023-12-06 PROCEDURE — 87070 CULTURE OTHR SPECIMN AEROBIC: CPT

## 2023-12-06 PROCEDURE — 82962 GLUCOSE BLOOD TEST: CPT

## 2023-12-06 PROCEDURE — 87205 SMEAR GRAM STAIN: CPT

## 2023-12-06 PROCEDURE — 6370000000 HC RX 637 (ALT 250 FOR IP): Performed by: FAMILY MEDICINE

## 2023-12-06 PROCEDURE — 2060000000 HC ICU INTERMEDIATE R&B

## 2023-12-06 PROCEDURE — G0378 HOSPITAL OBSERVATION PER HR: HCPCS

## 2023-12-06 PROCEDURE — 96376 TX/PRO/DX INJ SAME DRUG ADON: CPT

## 2023-12-06 PROCEDURE — 73590 X-RAY EXAM OF LOWER LEG: CPT

## 2023-12-06 PROCEDURE — 36415 COLL VENOUS BLD VENIPUNCTURE: CPT

## 2023-12-06 PROCEDURE — 97530 THERAPEUTIC ACTIVITIES: CPT

## 2023-12-06 PROCEDURE — 6360000002 HC RX W HCPCS

## 2023-12-06 PROCEDURE — 97161 PT EVAL LOW COMPLEX 20 MIN: CPT

## 2023-12-06 PROCEDURE — 97165 OT EVAL LOW COMPLEX 30 MIN: CPT

## 2023-12-06 PROCEDURE — 80048 BASIC METABOLIC PNL TOTAL CA: CPT

## 2023-12-06 PROCEDURE — 94640 AIRWAY INHALATION TREATMENT: CPT

## 2023-12-06 PROCEDURE — 96372 THER/PROPH/DIAG INJ SC/IM: CPT

## 2023-12-06 RX ORDER — DEXTROSE MONOHYDRATE 100 MG/ML
INJECTION, SOLUTION INTRAVENOUS CONTINUOUS PRN
Status: DISCONTINUED | OUTPATIENT
Start: 2023-12-06 | End: 2023-12-08 | Stop reason: HOSPADM

## 2023-12-06 RX ORDER — ENOXAPARIN SODIUM 100 MG/ML
40 INJECTION SUBCUTANEOUS 2 TIMES DAILY
Status: DISCONTINUED | OUTPATIENT
Start: 2023-12-06 | End: 2023-12-08 | Stop reason: HOSPADM

## 2023-12-06 RX ORDER — AZITHROMYCIN 250 MG/1
500 TABLET, FILM COATED ORAL DAILY
Status: COMPLETED | OUTPATIENT
Start: 2023-12-06 | End: 2023-12-08

## 2023-12-06 RX ORDER — CODEINE PHOSPHATE AND GUAIFENESIN 10; 100 MG/5ML; MG/5ML
5 SOLUTION ORAL EVERY 4 HOURS PRN
Status: DISCONTINUED | OUTPATIENT
Start: 2023-12-06 | End: 2023-12-08 | Stop reason: HOSPADM

## 2023-12-06 RX ORDER — INSULIN GLARGINE 100 [IU]/ML
20 INJECTION, SOLUTION SUBCUTANEOUS ONCE
Status: COMPLETED | OUTPATIENT
Start: 2023-12-06 | End: 2023-12-06

## 2023-12-06 RX ADMIN — LIOTHYRONINE SODIUM 25 MCG: 25 TABLET ORAL at 09:16

## 2023-12-06 RX ADMIN — ACETAMINOPHEN 500 MG: 325 TABLET ORAL at 21:08

## 2023-12-06 RX ADMIN — ACETAMINOPHEN 650 MG: 325 TABLET ORAL at 00:05

## 2023-12-06 RX ADMIN — INSULIN GLARGINE 20 UNITS: 100 INJECTION, SOLUTION SUBCUTANEOUS at 12:08

## 2023-12-06 RX ADMIN — METHYLPREDNISOLONE SODIUM SUCCINATE 40 MG: 40 INJECTION INTRAMUSCULAR; INTRAVENOUS at 05:57

## 2023-12-06 RX ADMIN — ENOXAPARIN SODIUM 40 MG: 100 INJECTION SUBCUTANEOUS at 21:07

## 2023-12-06 RX ADMIN — LEVOTHYROXINE SODIUM 100 MCG: 100 TABLET ORAL at 09:16

## 2023-12-06 RX ADMIN — IPRATROPIUM BROMIDE AND ALBUTEROL SULFATE 1 DOSE: .5; 2.5 SOLUTION RESPIRATORY (INHALATION) at 20:55

## 2023-12-06 RX ADMIN — ENOXAPARIN SODIUM 40 MG: 100 INJECTION SUBCUTANEOUS at 09:16

## 2023-12-06 RX ADMIN — CARVEDILOL 25 MG: 25 TABLET, FILM COATED ORAL at 21:08

## 2023-12-06 RX ADMIN — TAMSULOSIN HYDROCHLORIDE 0.4 MG: 0.4 CAPSULE ORAL at 21:08

## 2023-12-06 RX ADMIN — ROSUVASTATIN CALCIUM 5 MG: 5 TABLET, FILM COATED ORAL at 09:17

## 2023-12-06 RX ADMIN — AZITHROMYCIN 500 MG: 250 TABLET, FILM COATED ORAL at 11:30

## 2023-12-06 RX ADMIN — LISINOPRIL 20 MG: 20 TABLET ORAL at 09:16

## 2023-12-06 RX ADMIN — FUROSEMIDE 20 MG: 20 TABLET ORAL at 09:17

## 2023-12-06 RX ADMIN — INSULIN LISPRO 8 UNITS: 100 INJECTION, SOLUTION INTRAVENOUS; SUBCUTANEOUS at 11:28

## 2023-12-06 RX ADMIN — IPRATROPIUM BROMIDE AND ALBUTEROL SULFATE 1 DOSE: .5; 2.5 SOLUTION RESPIRATORY (INHALATION) at 12:44

## 2023-12-06 RX ADMIN — BENZOCAINE AND MENTHOL 1 LOZENGE: 15; 3.6 LOZENGE ORAL at 03:36

## 2023-12-06 RX ADMIN — SODIUM CHLORIDE, PRESERVATIVE FREE 10 ML: 5 INJECTION INTRAVENOUS at 09:16

## 2023-12-06 RX ADMIN — INSULIN LISPRO 12 UNITS: 100 INJECTION, SOLUTION INTRAVENOUS; SUBCUTANEOUS at 15:25

## 2023-12-06 RX ADMIN — METHYLPREDNISOLONE SODIUM SUCCINATE 40 MG: 40 INJECTION INTRAMUSCULAR; INTRAVENOUS at 17:35

## 2023-12-06 RX ADMIN — BENZOCAINE AND MENTHOL 1 LOZENGE: 15; 3.6 LOZENGE ORAL at 00:23

## 2023-12-06 RX ADMIN — IPRATROPIUM BROMIDE AND ALBUTEROL SULFATE 1 DOSE: .5; 2.5 SOLUTION RESPIRATORY (INHALATION) at 09:10

## 2023-12-06 RX ADMIN — GABAPENTIN 100 MG: 100 CAPSULE ORAL at 21:08

## 2023-12-06 RX ADMIN — INSULIN LISPRO 8 UNITS: 100 INJECTION, SOLUTION INTRAVENOUS; SUBCUTANEOUS at 06:00

## 2023-12-06 RX ADMIN — IPRATROPIUM BROMIDE AND ALBUTEROL SULFATE 1 DOSE: .5; 2.5 SOLUTION RESPIRATORY (INHALATION) at 17:23

## 2023-12-06 RX ADMIN — ACETAMINOPHEN 500 MG: 325 TABLET ORAL at 09:17

## 2023-12-06 RX ADMIN — LATANOPROST 1 DROP: 50 SOLUTION/ DROPS OPHTHALMIC at 21:11

## 2023-12-06 RX ADMIN — Medication 5000 UNITS: at 09:17

## 2023-12-06 RX ADMIN — GABAPENTIN 200 MG: 100 CAPSULE ORAL at 09:16

## 2023-12-06 RX ADMIN — CARVEDILOL 25 MG: 25 TABLET, FILM COATED ORAL at 09:17

## 2023-12-06 RX ADMIN — POTASSIUM CHLORIDE 20 MEQ: 1500 TABLET, EXTENDED RELEASE ORAL at 21:07

## 2023-12-06 ASSESSMENT — PAIN SCALES - GENERAL
PAINLEVEL_OUTOF10: 0
PAINLEVEL_OUTOF10: 0
PAINLEVEL_OUTOF10: 5
PAINLEVEL_OUTOF10: 2
PAINLEVEL_OUTOF10: 0

## 2023-12-06 ASSESSMENT — PAIN DESCRIPTION - DESCRIPTORS: DESCRIPTORS: ACHING;SORE

## 2023-12-06 ASSESSMENT — PAIN - FUNCTIONAL ASSESSMENT: PAIN_FUNCTIONAL_ASSESSMENT: ACTIVITIES ARE NOT PREVENTED

## 2023-12-06 ASSESSMENT — PAIN DESCRIPTION - LOCATION: LOCATION: THROAT

## 2023-12-06 ASSESSMENT — PAIN DESCRIPTION - ORIENTATION: ORIENTATION: MID

## 2023-12-06 NOTE — PLAN OF CARE
Problem: ABCDS Injury Assessment  Goal: Absence of physical injury  12/6/2023 0945 by Mey Cramer RN  Outcome: Progressing  12/6/2023 0533 by Judge Diego RN  Outcome: Progressing     Problem: Skin/Tissue Integrity  Goal: Absence of new skin breakdown  Description: 1. Monitor for areas of redness and/or skin breakdown  2. Assess vascular access sites hourly  3. Every 4-6 hours minimum:  Change oxygen saturation probe site  4. Every 4-6 hours:  If on nasal continuous positive airway pressure, respiratory therapy assess nares and determine need for appliance change or resting period.   12/6/2023 0945 by Mey Cramer RN  Outcome: Progressing  12/6/2023 0533 by Judge Diego RN  Outcome: Progressing

## 2023-12-06 NOTE — CONSULTS
leg. Wounds are largely granular in nature. No purulence. No crepitus. No fluctuance.   -Thin, dry atrophic skin. Erythema and edema to lower extremity     MSK: ROM testing deferred. Soft, compressible posterior muscle compartments. No pain to lower extremity.            Current Facility-Administered Medications   Medication Dose Route Frequency Provider Last Rate Last Admin    benzocaine-menthol (CEPACOL SORE THROAT) lozenge 1 lozenge  1 lozenge Oral Q2H PRN Rayshawn Kenny, APRN - CNP   1 lozenge at 12/06/23 0336    enoxaparin (LOVENOX) injection 40 mg  40 mg SubCUTAneous BID Génesis Troy, APRN - CNP   40 mg at 12/06/23 0916    acetaminophen (TYLENOL) tablet 500 mg  500 mg Oral BID Génesis Troy, APRN - CNP   500 mg at 12/06/23 0917    carvedilol (COREG) tablet 25 mg  25 mg Oral BID Génesis Troy, APRN - CNP   25 mg at 12/06/23 0917    Vitamin D (CHOLECALCIFEROL) tablet 5,000 Units  5,000 Units Oral QAM BraundRichardine Aliment, APRN - CNP   5,000 Units at 12/06/23 0917    furosemide (LASIX) tablet 20 mg  20 mg Oral QAM Gracia Jolly, APRN - CNP   20 mg at 12/06/23 0917    gabapentin (NEURONTIN) capsule 200 mg  200 mg Oral QAM BraundRichardine Aliment, APRN - CNP   200 mg at 12/06/23 0916    gabapentin (NEURONTIN) capsule 100 mg  100 mg Oral Nightly BraundRichardine Aliment, APRN - CNP   100 mg at 12/05/23 2043    latanoprost (XALATAN) 0.005 % ophthalmic solution 1 drop  1 drop Both Eyes Nightly Gracia Jolly, APRN - CNP   1 drop at 12/05/23 2045    levothyroxine (SYNTHROID) tablet 100 mcg  100 mcg Oral QAM Génesis Troy, APRN - CNP   100 mcg at 12/06/23 0916    liothyronine (CYTOMEL) tablet 25 mcg  25 mcg Oral QAM Génesis Troy, APRN - CNP   25 mcg at 12/06/23 0916    potassium chloride (KLOR-CON M) extended release tablet 20 mEq  20 mEq Oral Nightly Lizzeth Jolly APRN - CNP   20 mEq at 12/05/23 2048    lisinopril (PRINIVIL;ZESTRIL) tablet 20 mg  20 mg Oral Daily Gracia Jolly APRN - CNP   20 mg at 12/06/23 9143    rosuvastatin (CRESTOR) tablet 5 mg  5 mg Oral Daily Gracia Jolly APRN - CNP   5 mg at 12/06/23 0917    tamsulosin (FLOMAX) capsule 0.4 mg  0.4 mg Oral Nightly Gracia Jolly APRN - CNP   0.4 mg at 12/05/23 2043    sodium chloride flush 0.9 % injection 5-40 mL  5-40 mL IntraVENous 2 times per day Gracia Jolly APRN - CNP   10 mL at 12/06/23 0916    sodium chloride flush 0.9 % injection 5-40 mL  5-40 mL IntraVENous PRN Gracia Jolly, APRN - CNP        0.9 % sodium chloride infusion   IntraVENous PRN Gracia Jolly APRN - CNP        ondansetron (ZOFRAN-ODT) disintegrating tablet 4 mg  4 mg Oral Q8H PRN Gracia Jolly APRN - CNP        Or    ondansetron (ZOFRAN) injection 4 mg  4 mg IntraVENous Q6H PRN Gracia Jolly APRN - CNP        magnesium hydroxide (MILK OF MAGNESIA) 400 MG/5ML suspension 30 mL  30 mL Oral Daily PRN Tabitha Jollysa VALENTINE APRN - CNP        acetaminophen (TYLENOL) tablet 650 mg  650 mg Oral Q6H PRN Gracia Jolly APRN - CNP   650 mg at 12/06/23 0005    Or    acetaminophen (TYLENOL) suppository 650 mg  650 mg Rectal Q6H PRN Gracia Jolly APRN - CNP        ipratropium 0.5 mg-albuterol 2.5 mg (DUONEB) nebulizer solution 1 Dose  1 Dose Inhalation Q4H WA RT Gracia Jolly APRN - CNP   1 Dose at 12/06/23 0910    insulin lispro (HUMALOG) injection vial 0-16 Units  0-16 Units SubCUTAneous TID  Gracia Jolly APRN - CNP   8 Units at 12/06/23 0600    insulin lispro (HUMALOG) injection vial 0-4 Units  0-4 Units SubCUTAneous Nightly Claudene Robes, APRN - CNP   4 Units at 12/05/23 2049    furosemide (LASIX) injection 20 mg  20 mg IntraVENous Daily Jocelyne King DO   20 mg at 12/05/23 0957    methylPREDNISolone sodium succ (SOLU-MEDROL) injection 40 mg  40 mg IntraVENous Q12H Gracia Jolly APRN - CNP   40 mg at 12/06/23 0557    Followed by    Dimitry Ulrich ON 12/7/2023] predniSONE (DELTASONE) tablet 20 mg  20 mg Oral BID Gracia Jolly APRN - CNP            Lab

## 2023-12-06 NOTE — PLAN OF CARE

## 2023-12-06 NOTE — CARE COORDINATION
Introduced my self and provided explanation of CM role to patient. Patient is awake, alert, and aware of current diagnosis and treatment plan including podiatry consult, dressing changes to bilat lower extremities, IV diuresis. He voices he resides at home with his spouse and completes his adl's with independence. He is active with [facility]. Verified with Radha at agency. Will need ANGELITA order with any wound care modifications. Patient is established with a pcp and denies any issue with retail pharmaceutical coverage. He denies NEW dc planning needs. Explained ELOS of 24-48 hours; patient voiced understanding and agreement. Anai Hill.  Brianna, MSN RN  [facility] Case Management  [phone]

## 2023-12-06 NOTE — PROGRESS NOTES
Physical Therapy  Facility/Department: [address] INTERNAL MEDICINE 2  Physical Therapy Initial Assessment    Name: Maggie Salas  : 1950  MRN: [id number]  Date of Service: 2023        Patient Diagnosis(es): The primary encounter diagnosis was Acute respiratory failure with hypoxia ([address]). Diagnoses of Acute bronchiolitis due to unspecified organism and Lymphedema were also pertinent to this visit. Past Medical History:  has a past medical history of CAD (coronary artery disease), Cataract, right eye, Complete heart block ([address]), Diabetes mellitus ([address]), Encounter for aspirin therapy, Glaucoma, History of blood transfusion, Hx of blood clots, Hyperlipidemia, Hypertension, Hyperthyroidism, Lower limb ulcer, calf, left, with fat layer exposed ([address]), Lymphedema of both lower extremities, Myocardial infarct ([address]), Neuropathy, Obesity, Obstructive sleep apnea, Onychomycosis, DEMETRIA on CPAP, Osteoarthritis, Pacemaker, Stasis dermatitis, and Varicose veins. Past Surgical History:  has a past surgical history that includes Cholecystectomy; Cardiac pacemaker placement (2010); Colonoscopy (2016); transesophageal echocardiogram (2020); pacemaker placement (2020); joint replacement (Bilateral); Intracapsular cataract extraction (Right, 2022); Intracapsular cataract extraction (Left, 2022); and Leg Surgery (Left, 2023). Evaluating Therapist: Odilia Delacruz PT    Room #:  4484/0522-C  Diagnosis:  Respiratory failure ([address]) [J96.90]  Lymphedema [I89.0]  Acute respiratory failure with hypoxia ([address]) [J96.01]  Acute bronchiolitis due to unspecified organism [J21.9]  PMHx/PSHx:  CAD, DM, L LE ulcer  Precautions:  falls      Social:  Pt lives with wife in a 1 floor plan. Prior to admission independent, uses ww as needed     Initial Evaluation  Date: 23 Treatment      Short Term/ Long Term   Goals   Was pt agreeable to Eval/treatment? yes     Does pt have pain?  No c/o pain Bed Mobility   NT, pt sitting up edge of bed  independent   Transfers Sit to stand: independent  Stand to sit: independent  Stand pivot: independent  independent   Ambulation    75 feet with ww with supervision  150 feet with ww independent    Stair Negotiation  Ascended and descended  NT   4 steps with 1 rail independent   LE strength     4-/5    4/5   balance      Good with ww     AM-PAC Raw score               20/24         Pt is alert and Oriented   LE ROM: WFL  Sensation: intact  Edema: B LEs  Endurance: fair     ASSESSMENT:    Pt displays functional ability as noted in the objective portion of this evaluation. Comments:  SpO2 on room air after ambulation 97%. No loss of balance with ambulation    Conditions Requiring Skilled Therapeutic Intervention:    [x]Decreased strength     []Decreased ROM  [x]Decreased functional mobility  []Decreased balance   [x]Decreased endurance   []Decreased posture  []Decreased sensation  []Decreased coordination   []Decreased vision  []Decreased safety awareness   []Increased pain       Patient and or family understand(s) diagnosis, prognosis, and plan of care.     Prognosis is good for reaching above PT goals    PHYSICAL THERAPY PLAN OF CARE:    PT POC is established based on physician order and patient diagnosis     Referring provider/PT Order: Claudene Robes, APRN - CNP / PT eval and treat      Current Treatment Recommendations:     [x] Strengthening to improve independence with functional mobility   [] ROM to improve independence with functional mobility   [] Balance Training to improve static/dynamic balance and to reduce fall risk  [x] Endurance Training to improve activity tolerance during functional mobility   [] Transfer Training to improve safety and independence with all functional transfers   [x] Gait Training to improve gait mechanics, endurance and assess need for appropriate assistive device  [x] Stair Training in preparation for safe discharge home and/or

## 2023-12-06 NOTE — PROGRESS NOTES
Occupational Therapy  OCCUPATIONAL THERAPY INITIAL EVALUATION   Count includes the [address]  [address] [facility], [address] [facility], [facility]    Date: 2023     Patient Name: Molly Reynaga  MRN: [id number]  : 1950  Room: [address] [place] [address]    Evaluating OT: Tish Menjivar, OTR/L - [id number]    Referring Provider: SUSANNA Garzon CNP  Specific Provider Orders/Date: \"OT eval and treat\" - 2023    Diagnosis: Respiratory failure ([address]) [J96.90]  Lymphedema [I89.0]  Acute respiratory failure with hypoxia ([address]) [J96.01]  Acute bronchiolitis due to unspecified organism [J21.9]     Patient reported two recent incidents where he slowly sank to the floor at home and required assistance to get back up; patient reported experiencing weakness at home recently. Pertinent Medical History: CAD, DM, glaucoma, HTN, MI, neuropathy, DEMETRIA, obesity, OA     Precautions: fall risk, skin integrity - venous stasis ulceration L LE (per podiatry note)    Assessment of Current Deficits:    [x] Functional mobility   [x] ADLs  [x] Strength               [] Cognition   [x] Functional transfers   [x] IADLs         [x] Safety Awareness   [x] Endurance   [] Fine Motor Coordination  [x] Balance      [] Vision/Perception   [x] Sensation    [] Gross Motor Coordination [] ROM          [] Delirium                  [] Motor Control     OT PLAN OF CARE   OT POC is based on physician orders, patient diagnosis, and results of clinical assessment.   Frequency/Duration 2-5 days/week for 2 weeks PRN   Specific OT Treatment Interventions to Include:   * Instruction/training on adapted ADL techniques and AE recommendations to increase functional independence within precautions       * Training on energy conservation strategies, correct breathing pattern and techniques to improve independence/tolerance for self-care routine  * Functional transfer/mobility training/DME recommendations for increased independence, safety, quality of life.     Treatment: OT treatment provided this date included:   Instruction/training on safety and adapted techniques for completion of ADLs.    Instruction/training on safe functional mobility/transfer techniques.    Instruction/training on energy conservation/work simplification for completion of ADLs.      Patient education provided regardin) potential benefits of DME to maximize independence/safety with ADLs. Patient indicated understanding.    Further skilled OT treatment indicated to increase patient's safety and independence with completion of ADL/IADL tasks in order to maximize patient's functional independence and quality of life.    Rehab Potential: Good for established goals.  Patient / Family Goal: Patient anticipates returning home upon discharge.  Patient and/or family were instructed on functional diagnosis, prognosis/goals, and OT plan of care. Demonstrated Good understanding.    Eval Complexity: Low    Time In: 1623  Time Out: 1646  Total Treatment Time: 8 minutes      Minutes Units   OT Eval Low 53240 15 1   OT Eval Medium 20544     OT Eval High 88627     OT Re-Eval 30712     Therapeutic Ex 46320     Therapeutic Activities 94460 8 1   ADL/Self Care 71760     Orthotic Management 52009     Neuro Re-Ed 81672     Non-Billable Time N/A ---     Evaluation time includes thorough review of current medical information, gathering information on past medical history/social history and prior level of function, completion of standardized testing/informal observation of tasks, assessment of data, and education on plan of care and goals.    Saskia Blair OTR/L  License Number: [id number]

## 2023-12-07 LAB
ANION GAP SERPL CALCULATED.3IONS-SCNC: 11 MMOL/L (ref 7–16)
BASOPHILS # BLD: 0.01 K/UL (ref 0–0.2)
BASOPHILS NFR BLD: 0 % (ref 0–2)
BUN SERPL-MCNC: 35 MG/DL (ref 6–23)
CALCIUM SERPL-MCNC: 8.8 MG/DL (ref 8.6–10.2)
CHLORIDE SERPL-SCNC: 106 MMOL/L (ref 98–107)
CO2 SERPL-SCNC: 22 MMOL/L (ref 22–29)
CREAT SERPL-MCNC: 0.9 MG/DL (ref 0.7–1.2)
EOSINOPHIL # BLD: 0 K/UL (ref 0.05–0.5)
EOSINOPHILS RELATIVE PERCENT: 0 % (ref 0–6)
ERYTHROCYTE [DISTWIDTH] IN BLOOD BY AUTOMATED COUNT: 16.6 % (ref 11.5–15)
GFR SERPL CREATININE-BSD FRML MDRD: >60 ML/MIN/1.73M2
GLUCOSE BLD-MCNC: 253 MG/DL (ref 74–99)
GLUCOSE BLD-MCNC: 256 MG/DL (ref 74–99)
GLUCOSE BLD-MCNC: 310 MG/DL (ref 74–99)
GLUCOSE BLD-MCNC: 314 MG/DL (ref 74–99)
GLUCOSE BLD-MCNC: 354 MG/DL (ref 74–99)
GLUCOSE SERPL-MCNC: 266 MG/DL (ref 74–99)
HCT VFR BLD AUTO: 34.5 % (ref 37–54)
HGB BLD-MCNC: 11 G/DL (ref 12.5–16.5)
IMM GRANULOCYTES # BLD AUTO: 0.07 K/UL (ref 0–0.58)
IMM GRANULOCYTES NFR BLD: 1 % (ref 0–5)
LYMPHOCYTES NFR BLD: 0.6 K/UL (ref 1.5–4)
LYMPHOCYTES RELATIVE PERCENT: 5 % (ref 20–42)
MCH RBC QN AUTO: 27 PG (ref 26–35)
MCHC RBC AUTO-ENTMCNC: 31.9 G/DL (ref 32–34.5)
MCV RBC AUTO: 84.6 FL (ref 80–99.9)
MONOCYTES NFR BLD: 0.46 K/UL (ref 0.1–0.95)
MONOCYTES NFR BLD: 4 % (ref 2–12)
NEUTROPHILS NFR BLD: 91 % (ref 43–80)
NEUTS SEG NFR BLD: 11.18 K/UL (ref 1.8–7.3)
PLATELET # BLD AUTO: 157 K/UL (ref 130–450)
PMV BLD AUTO: 9.1 FL (ref 7–12)
POTASSIUM SERPL-SCNC: 4.4 MMOL/L (ref 3.5–5)
RBC # BLD AUTO: 4.08 M/UL (ref 3.8–5.8)
SODIUM SERPL-SCNC: 139 MMOL/L (ref 132–146)
WBC OTHER # BLD: 12.3 K/UL (ref 4.5–11.5)

## 2023-12-07 PROCEDURE — 97535 SELF CARE MNGMENT TRAINING: CPT

## 2023-12-07 PROCEDURE — 2060000000 HC ICU INTERMEDIATE R&B

## 2023-12-07 PROCEDURE — 94640 AIRWAY INHALATION TREATMENT: CPT

## 2023-12-07 PROCEDURE — G0378 HOSPITAL OBSERVATION PER HR: HCPCS

## 2023-12-07 PROCEDURE — 80048 BASIC METABOLIC PNL TOTAL CA: CPT

## 2023-12-07 PROCEDURE — 6360000002 HC RX W HCPCS: Performed by: FAMILY MEDICINE

## 2023-12-07 PROCEDURE — 2500000003 HC RX 250 WO HCPCS: Performed by: FAMILY MEDICINE

## 2023-12-07 PROCEDURE — 6370000000 HC RX 637 (ALT 250 FOR IP): Performed by: FAMILY MEDICINE

## 2023-12-07 PROCEDURE — 82962 GLUCOSE BLOOD TEST: CPT

## 2023-12-07 PROCEDURE — 6370000000 HC RX 637 (ALT 250 FOR IP)

## 2023-12-07 PROCEDURE — 6360000002 HC RX W HCPCS

## 2023-12-07 PROCEDURE — 96372 THER/PROPH/DIAG INJ SC/IM: CPT

## 2023-12-07 PROCEDURE — 96376 TX/PRO/DX INJ SAME DRUG ADON: CPT

## 2023-12-07 PROCEDURE — 85025 COMPLETE CBC W/AUTO DIFF WBC: CPT

## 2023-12-07 PROCEDURE — 2580000003 HC RX 258

## 2023-12-07 RX ADMIN — INSULIN LISPRO 4 UNITS: 100 INJECTION, SOLUTION INTRAVENOUS; SUBCUTANEOUS at 06:09

## 2023-12-07 RX ADMIN — GABAPENTIN 200 MG: 100 CAPSULE ORAL at 08:41

## 2023-12-07 RX ADMIN — CARVEDILOL 25 MG: 25 TABLET, FILM COATED ORAL at 20:50

## 2023-12-07 RX ADMIN — IPRATROPIUM BROMIDE AND ALBUTEROL SULFATE 1 DOSE: .5; 2.5 SOLUTION RESPIRATORY (INHALATION) at 12:20

## 2023-12-07 RX ADMIN — MICONAZOLE NITRATE: 20 POWDER TOPICAL at 08:38

## 2023-12-07 RX ADMIN — AZITHROMYCIN 500 MG: 250 TABLET, FILM COATED ORAL at 08:42

## 2023-12-07 RX ADMIN — PETROLATUM: 420 OINTMENT TOPICAL at 20:47

## 2023-12-07 RX ADMIN — ENOXAPARIN SODIUM 40 MG: 100 INJECTION SUBCUTANEOUS at 08:39

## 2023-12-07 RX ADMIN — ACETAMINOPHEN 500 MG: 325 TABLET ORAL at 08:41

## 2023-12-07 RX ADMIN — SODIUM CHLORIDE, PRESERVATIVE FREE 10 ML: 5 INJECTION INTRAVENOUS at 08:44

## 2023-12-07 RX ADMIN — INSULIN LISPRO 8 UNITS: 100 INJECTION, SOLUTION INTRAVENOUS; SUBCUTANEOUS at 11:40

## 2023-12-07 RX ADMIN — LATANOPROST 1 DROP: 50 SOLUTION/ DROPS OPHTHALMIC at 20:47

## 2023-12-07 RX ADMIN — FUROSEMIDE 20 MG: 10 INJECTION, SOLUTION INTRAMUSCULAR; INTRAVENOUS at 08:43

## 2023-12-07 RX ADMIN — POTASSIUM CHLORIDE 20 MEQ: 1500 TABLET, EXTENDED RELEASE ORAL at 20:51

## 2023-12-07 RX ADMIN — PREDNISONE 20 MG: 20 TABLET ORAL at 16:23

## 2023-12-07 RX ADMIN — ROSUVASTATIN CALCIUM 5 MG: 5 TABLET, FILM COATED ORAL at 08:42

## 2023-12-07 RX ADMIN — LEVOTHYROXINE SODIUM 100 MCG: 100 TABLET ORAL at 08:41

## 2023-12-07 RX ADMIN — CARVEDILOL 25 MG: 25 TABLET, FILM COATED ORAL at 08:40

## 2023-12-07 RX ADMIN — SODIUM CHLORIDE, PRESERVATIVE FREE 10 ML: 5 INJECTION INTRAVENOUS at 20:45

## 2023-12-07 RX ADMIN — LISINOPRIL 20 MG: 20 TABLET ORAL at 08:41

## 2023-12-07 RX ADMIN — TAMSULOSIN HYDROCHLORIDE 0.4 MG: 0.4 CAPSULE ORAL at 20:51

## 2023-12-07 RX ADMIN — INSULIN LISPRO 4 UNITS: 100 INJECTION, SOLUTION INTRAVENOUS; SUBCUTANEOUS at 20:50

## 2023-12-07 RX ADMIN — ENOXAPARIN SODIUM 40 MG: 100 INJECTION SUBCUTANEOUS at 20:47

## 2023-12-07 RX ADMIN — MICONAZOLE NITRATE: 20 POWDER TOPICAL at 20:47

## 2023-12-07 RX ADMIN — GABAPENTIN 100 MG: 100 CAPSULE ORAL at 20:50

## 2023-12-07 RX ADMIN — IPRATROPIUM BROMIDE AND ALBUTEROL SULFATE 1 DOSE: .5; 2.5 SOLUTION RESPIRATORY (INHALATION) at 07:50

## 2023-12-07 RX ADMIN — ACETAMINOPHEN 500 MG: 325 TABLET ORAL at 20:51

## 2023-12-07 RX ADMIN — METHYLPREDNISOLONE SODIUM SUCCINATE 40 MG: 40 INJECTION INTRAMUSCULAR; INTRAVENOUS at 06:07

## 2023-12-07 RX ADMIN — IPRATROPIUM BROMIDE AND ALBUTEROL SULFATE 1 DOSE: .5; 2.5 SOLUTION RESPIRATORY (INHALATION) at 16:02

## 2023-12-07 RX ADMIN — INSULIN LISPRO 12 UNITS: 100 INJECTION, SOLUTION INTRAVENOUS; SUBCUTANEOUS at 16:22

## 2023-12-07 RX ADMIN — IPRATROPIUM BROMIDE AND ALBUTEROL SULFATE 1 DOSE: .5; 2.5 SOLUTION RESPIRATORY (INHALATION) at 19:06

## 2023-12-07 RX ADMIN — BENZOCAINE AND MENTHOL 1 LOZENGE: 15; 3.6 LOZENGE ORAL at 06:13

## 2023-12-07 RX ADMIN — LIOTHYRONINE SODIUM 25 MCG: 25 TABLET ORAL at 08:42

## 2023-12-07 RX ADMIN — Medication 5000 UNITS: at 08:38

## 2023-12-07 NOTE — PROGRESS NOTES
[facility]   Progress note      Subjective:    The patient is awake and alert.  Sitting up in bed.   No acute events overnight.  Overall states is feeling better.    Objective:    BP (!) 122/58   Pulse 74   Temp 97.4 °F (36.3 °C)   Resp 22   Ht 1.702 m (5' 7\")   Wt (!) 168.5 kg (371 lb 7.6 oz)   SpO2 100%   BMI 58.18 kg/m²     In: 10 [I.V.:10]  Out: -   In: 10   Out: -     General appearance: NAD, conversant, morbidly obese  Eyes: Sclerae anicteric, PERRLA  HEENT: AT/NC, MMM, severely narrowed oropharynx  Neck: FROM, supple, no thyromegaly, increased neck circumference  Lymph: No cervical / supraclavicular lymphadenopathy  Lungs: diminished bl, on HFNC  CV: paced, RRR, no MRGs, chronic 1-2+ pitting edema bl le  Abdomen: Soft, non-tender; no masses or HSM, +BS  Extremities: FROM without synovitis.  No clubbing or cyanosis of the hands. Left lower extremity with dressing c/d/i  Skin: no rash, induration, lesions, or ulcers  Psych: Calm and cooperative.  Normal judgement and insight.  Normal mood and affect.  Neuro: Alert and interactive, face symmetric, speech fluent.     Recent Labs     12/04/23 2033 12/06/23  0633 12/07/23  0432   WBC 8.9 11.8* 12.3*   HGB 10.6* 10.9* 11.0*   HCT 34.0* 34.2* 34.5*   * 152 157       Recent Labs     12/04/23 2033 12/06/23  0633 12/07/23  0432    133 139   K 4.3 4.1 4.4    101 106   CO2 25 20* 22   BUN 22 32* 35*   CREATININE 1.3* 1.0 0.9   CALCIUM 8.5* 8.7 8.8       Assessment:    Principal Problem:    Respiratory failure (HCC)  Resolved Problems:    * No resolved hospital problems. *      Plan:    ASSESSMENT/PLAN:  Principal Problem:    Respiratory failure (HCC)  Resolved Problems:    * No resolved hospital problems. *     Patient is a 73 year old male who is admitted to [facility] for   Respiratory failure   -Monitor labs  -BNP 1,562  -Check respiratory failure   -IV solumedrol 40 BID  -Currently on 2L NC 95%      Hypertension  -Continue home medications      Hypothyroidism   -TSH 8.75  -T4 0.7  -increase/adjust levothyroxine upward once admitted, repeat tsh, t4 and t3 in 6-8 weeks        12/6/2023  --increase levothyroxine  --start azithromycin  --podiatry for wound care, appreciate input  --bph  --bronchodilators via nebs  --continue IV steroids  --glucose control in setting of steroid use  -vitals stable, back to room air  --niv qhs and during daytime naps  --diuresis with iv lasix, monitor I&O  --cough syrup    12/7/2023  --pt states would rather have thyroid med adjusted by his endo, so will leave alone at this time  --switch to po steroids  --on z ramon  --podiatry for wound care, appreciate input  --bph  --bronchodilators via nebs  --glucose control in setting of steroid use  -vitals stable, back to room air  --niv qhs and during daytime naps  --diuresis with iv lasix, monitor I&O  --cough syrup  --plan is for d/c to home, possibly today if ok w/ podiatry with resumption of home care    Continue to manage stable chronic conditions with at home medications as prescribed, where appropriate.     PT/OT  DVT/PE prophylaxis  Social work for d/c planning  Code Full        Sofia Jones,   9:19 AM  12/7/2023

## 2023-12-07 NOTE — PROGRESS NOTES
Podiatry Progress Note  2023   Monty Montes       SUBJECTIVE: Patient being seen for follow up of left leg venous ulceration. Dressing noted to be cdi. No excessive drainage. Will continue with conservative management of wound      Past Medical History:   Diagnosis Date    CAD (coronary artery disease)     Cataract, right eye     Complete heart block ([address])     h/o    Diabetes mellitus ([address])     Encounter for aspirin therapy     patient uses for heart health    Glaucoma     History of blood transfusion     Hx of blood clots     Hyperlipidemia     Hypertension     Hyperthyroidism     Lower limb ulcer, calf, left, with fat layer exposed ([address]) 10/25/2023    Lymphedema of both lower extremities 10/25/2023    Myocardial infarct ([address])     Neuropathy     feet    Obesity     Obstructive sleep apnea     on cpap    Onychomycosis     DEMETRIA on CPAP     Osteoarthritis     Pacemaker     Medtronic    Stasis dermatitis     h/o on bilateral legs with leg edema    Varicose veins         Past Surgical History:   Procedure Laterality Date    CARDIAC PACEMAKER PLACEMENT  2010    Medtronic    CHOLECYSTECTOMY      COLONOSCOPY  2016    INTRACAPSULAR CATARACT EXTRACTION Right 2022    RIGHT EYE CATARACT EXTRACTION IOL IMPLANT AND GONIOTOMY performed by Billie Saini MD at [facility] Left 2022    LEFT EYE CATARACT EXTRACTION IOL IMPLANT performed by Billie Saini MD at [address] Bilateral     knees    LEG SURGERY Left 2023    LEFT LEG DEBRIDEMENT performed by Elva Fagan DPM at [facility]  2020    DUAL PPM GR    ([employer]TRONIC)     DR. MARTIN     TRANSESOPHAGEAL ECHOCARDIOGRAM  2020    With          Family History   Problem Relation Age of Onset    Heart Disease Father             Other Mother             Other Brother         gall bladder        Social History     Tobacco Use

## 2023-12-07 NOTE — PLAN OF CARE
Problem: ABCDS Injury Assessment  Goal: Absence of physical injury  Outcome: Progressing     Problem: Skin/Tissue Integrity  Goal: Absence of new skin breakdown  Description: 1. Monitor for areas of redness and/or skin breakdown  2. Assess vascular access sites hourly  3. Every 4-6 hours minimum:  Change oxygen saturation probe site  4. Every 4-6 hours:  If on nasal continuous positive airway pressure, respiratory therapy assess nares and determine need for appliance change or resting period.   Outcome: Progressing     Problem: Discharge Planning  Goal: Discharge to home or other facility with appropriate resources  Outcome: Progressing     Problem: Pain  Goal: Verbalizes/displays adequate comfort level or baseline comfort level  Outcome: Progressing     Problem: Safety - Adult  Goal: Free from fall injury  Outcome: Progressing     Problem: Chronic Conditions and Co-morbidities  Goal: Patient's chronic conditions and co-morbidity symptoms are monitored and maintained or improved  Outcome: Progressing

## 2023-12-07 NOTE — PLAN OF CARE
Problem: ABCDS Injury Assessment  Goal: Absence of physical injury  12/7/2023 1021 by Ayde Gómez RN  Outcome: Progressing  12/7/2023 0511 by Arline Barcenas RN  Outcome: Progressing     Problem: Skin/Tissue Integrity  Goal: Absence of new skin breakdown  Description: 1. Monitor for areas of redness and/or skin breakdown  2. Assess vascular access sites hourly  3. Every 4-6 hours minimum:  Change oxygen saturation probe site  4. Every 4-6 hours:  If on nasal continuous positive airway pressure, respiratory therapy assess nares and determine need for appliance change or resting period.   12/7/2023 1021 by Ayde Gómez RN  Outcome: Progressing  12/7/2023 0511 by Arline Barcenas RN  Outcome: Progressing     Problem: Discharge Planning  Goal: Discharge to home or other facility with appropriate resources  12/7/2023 1021 by Ayde Gómez RN  Outcome: Progressing  12/7/2023 0511 by Arline Barcenas RN  Outcome: Progressing     Problem: Pain  Goal: Verbalizes/displays adequate comfort level or baseline comfort level  12/7/2023 1021 by Ayde Gómez RN  Outcome: Progressing  12/7/2023 0511 by Arline Barcenas RN  Outcome: Progressing     Problem: Safety - Adult  Goal: Free from fall injury  12/7/2023 1021 by Ayde Gómez RN  Outcome: Progressing  12/7/2023 0511 by Arline Barcenas RN  Outcome: Progressing     Problem: Chronic Conditions and Co-morbidities  Goal: Patient's chronic conditions and co-morbidity symptoms are monitored and maintained or improved  12/7/2023 1021 by Ayde Gómez RN  Outcome: Progressing  12/7/2023 0511 by Arline Barcenas RN  Outcome: Progressing

## 2023-12-07 NOTE — PROGRESS NOTES
Occupational Therapy  OT BEDSIDE TREATMENT NOTE      Date:2023  Patient Name: Valentina Lawson  MRN: [id number]  : 1950  Room: [address] [place], [place] [address]     Evaluating OT: Jordy Frost, OTR/L - [id number]     Referring Provider: SUSANNA Mendoza - CNP  Specific Provider Orders/Date: \"OT eval and treat\" - 2023     Diagnosis: Respiratory failure ([address]) [J96.90]  Lymphedema [I89.0]  Acute respiratory failure with hypoxia ([address]) [J96.01]  Acute bronchiolitis due to unspecified organism [J21.9]      Patient reported two recent incidents where he slowly sank to the floor at home and required assistance to get back up; patient reported experiencing weakness at home recently. Pertinent Medical History: CAD, DM, glaucoma, HTN, MI, neuropathy, DEMETRIA, obesity, OA      Precautions: fall risk, skin integrity - venous stasis ulceration L LE (per podiatry note)     Assessment of Current Deficits:    [x] Functional mobility             [x] ADLs          [x] Strength                  [] Cognition   [x] Functional transfers           [x] IADLs         [x] Safety Awareness   [x] Endurance   [] Fine Motor Coordination    [x] Balance      [] Vision/Perception   [x] Sensation    [] Gross Motor Coordination [] ROM          [] Delirium                  [] Motor Control      OT PLAN OF CARE   OT POC is based on physician orders, patient diagnosis, and results of clinical assessment.   Frequency/Duration 2-5 days/week for 2 weeks PRN   Specific OT Treatment Interventions to Include:   * Instruction/training on adapted ADL techniques and AE recommendations to increase functional independence within precautions       * Training on energy conservation strategies, correct breathing pattern and techniques to improve independence/tolerance for self-care routine  * Functional transfer/mobility training/DME recommendations for increased independence, safety, and fall prevention  * Patient/Family education to increase follow through with

## 2023-12-08 VITALS
TEMPERATURE: 98.1 F | DIASTOLIC BLOOD PRESSURE: 66 MMHG | HEART RATE: 65 BPM | RESPIRATION RATE: 18 BRPM | SYSTOLIC BLOOD PRESSURE: 122 MMHG | HEIGHT: 67 IN | OXYGEN SATURATION: 97 % | WEIGHT: 315 LBS | BODY MASS INDEX: 49.44 KG/M2

## 2023-12-08 LAB
ANION GAP SERPL CALCULATED.3IONS-SCNC: 11 MMOL/L (ref 7–16)
BASOPHILS # BLD: 0.01 K/UL (ref 0–0.2)
BASOPHILS NFR BLD: 0 % (ref 0–2)
BUN SERPL-MCNC: 33 MG/DL (ref 6–23)
CALCIUM SERPL-MCNC: 9.1 MG/DL (ref 8.6–10.2)
CHLORIDE SERPL-SCNC: 100 MMOL/L (ref 98–107)
CO2 SERPL-SCNC: 25 MMOL/L (ref 22–29)
CREAT SERPL-MCNC: 0.9 MG/DL (ref 0.7–1.2)
EOSINOPHIL # BLD: 0 K/UL (ref 0.05–0.5)
EOSINOPHILS RELATIVE PERCENT: 0 % (ref 0–6)
ERYTHROCYTE [DISTWIDTH] IN BLOOD BY AUTOMATED COUNT: 16.5 % (ref 11.5–15)
GFR SERPL CREATININE-BSD FRML MDRD: >60 ML/MIN/1.73M2
GLUCOSE BLD-MCNC: 213 MG/DL (ref 74–99)
GLUCOSE BLD-MCNC: 243 MG/DL (ref 74–99)
GLUCOSE SERPL-MCNC: 212 MG/DL (ref 74–99)
HCT VFR BLD AUTO: 38.5 % (ref 37–54)
HGB BLD-MCNC: 12 G/DL (ref 12.5–16.5)
IMM GRANULOCYTES # BLD AUTO: 0.1 K/UL (ref 0–0.58)
IMM GRANULOCYTES NFR BLD: 1 % (ref 0–5)
LYMPHOCYTES NFR BLD: 0.78 K/UL (ref 1.5–4)
LYMPHOCYTES RELATIVE PERCENT: 6 % (ref 20–42)
MCH RBC QN AUTO: 26.8 PG (ref 26–35)
MCHC RBC AUTO-ENTMCNC: 31.2 G/DL (ref 32–34.5)
MCV RBC AUTO: 85.9 FL (ref 80–99.9)
MICROORGANISM SPEC CULT: ABNORMAL
MICROORGANISM/AGENT SPEC: ABNORMAL
MONOCYTES NFR BLD: 0.72 K/UL (ref 0.1–0.95)
MONOCYTES NFR BLD: 6 % (ref 2–12)
NEUTROPHILS NFR BLD: 88 % (ref 43–80)
NEUTS SEG NFR BLD: 11.4 K/UL (ref 1.8–7.3)
PLATELET # BLD AUTO: 199 K/UL (ref 130–450)
PMV BLD AUTO: 9.4 FL (ref 7–12)
POTASSIUM SERPL-SCNC: 4.2 MMOL/L (ref 3.5–5)
RBC # BLD AUTO: 4.48 M/UL (ref 3.8–5.8)
SODIUM SERPL-SCNC: 136 MMOL/L (ref 132–146)
SPECIMEN DESCRIPTION: ABNORMAL
WBC OTHER # BLD: 13 K/UL (ref 4.5–11.5)

## 2023-12-08 PROCEDURE — 96376 TX/PRO/DX INJ SAME DRUG ADON: CPT

## 2023-12-08 PROCEDURE — 94640 AIRWAY INHALATION TREATMENT: CPT

## 2023-12-08 PROCEDURE — G0378 HOSPITAL OBSERVATION PER HR: HCPCS

## 2023-12-08 PROCEDURE — 6360000002 HC RX W HCPCS: Performed by: FAMILY MEDICINE

## 2023-12-08 PROCEDURE — 6360000002 HC RX W HCPCS

## 2023-12-08 PROCEDURE — 96372 THER/PROPH/DIAG INJ SC/IM: CPT

## 2023-12-08 PROCEDURE — 6370000000 HC RX 637 (ALT 250 FOR IP): Performed by: FAMILY MEDICINE

## 2023-12-08 PROCEDURE — 80048 BASIC METABOLIC PNL TOTAL CA: CPT

## 2023-12-08 PROCEDURE — 6370000000 HC RX 637 (ALT 250 FOR IP)

## 2023-12-08 PROCEDURE — 2580000003 HC RX 258

## 2023-12-08 PROCEDURE — 85025 COMPLETE CBC W/AUTO DIFF WBC: CPT

## 2023-12-08 PROCEDURE — 82962 GLUCOSE BLOOD TEST: CPT

## 2023-12-08 RX ORDER — IPRATROPIUM BROMIDE AND ALBUTEROL SULFATE 2.5; .5 MG/3ML; MG/3ML
3 SOLUTION RESPIRATORY (INHALATION) EVERY 4 HOURS PRN
Qty: 360 ML | Refills: 5 | Status: SHIPPED | OUTPATIENT
Start: 2023-12-08

## 2023-12-08 RX ORDER — PREDNISONE 10 MG/1
TABLET ORAL
Qty: 30 TABLET | Refills: 0 | Status: SHIPPED | OUTPATIENT
Start: 2023-12-08 | End: 2023-12-20

## 2023-12-08 RX ORDER — AZITHROMYCIN 250 MG/1
250 TABLET, FILM COATED ORAL SEE ADMIN INSTRUCTIONS
Qty: 6 TABLET | Refills: 0 | Status: SHIPPED | OUTPATIENT
Start: 2023-12-08 | End: 2023-12-13

## 2023-12-08 RX ADMIN — PETROLATUM: 420 OINTMENT TOPICAL at 08:44

## 2023-12-08 RX ADMIN — AZITHROMYCIN 500 MG: 250 TABLET, FILM COATED ORAL at 08:48

## 2023-12-08 RX ADMIN — LIOTHYRONINE SODIUM 25 MCG: 25 TABLET ORAL at 08:47

## 2023-12-08 RX ADMIN — ACETAMINOPHEN 500 MG: 325 TABLET ORAL at 08:45

## 2023-12-08 RX ADMIN — PREDNISONE 20 MG: 20 TABLET ORAL at 08:46

## 2023-12-08 RX ADMIN — IPRATROPIUM BROMIDE AND ALBUTEROL SULFATE 1 DOSE: .5; 2.5 SOLUTION RESPIRATORY (INHALATION) at 11:59

## 2023-12-08 RX ADMIN — Medication 5000 UNITS: at 08:45

## 2023-12-08 RX ADMIN — ROSUVASTATIN CALCIUM 5 MG: 5 TABLET, FILM COATED ORAL at 08:46

## 2023-12-08 RX ADMIN — MICONAZOLE NITRATE: 20 POWDER TOPICAL at 08:45

## 2023-12-08 RX ADMIN — LEVOTHYROXINE SODIUM 100 MCG: 100 TABLET ORAL at 08:47

## 2023-12-08 RX ADMIN — INSULIN LISPRO 4 UNITS: 100 INJECTION, SOLUTION INTRAVENOUS; SUBCUTANEOUS at 06:01

## 2023-12-08 RX ADMIN — INSULIN LISPRO 4 UNITS: 100 INJECTION, SOLUTION INTRAVENOUS; SUBCUTANEOUS at 11:45

## 2023-12-08 RX ADMIN — GABAPENTIN 200 MG: 100 CAPSULE ORAL at 08:47

## 2023-12-08 RX ADMIN — FUROSEMIDE 20 MG: 10 INJECTION, SOLUTION INTRAMUSCULAR; INTRAVENOUS at 08:48

## 2023-12-08 RX ADMIN — CARVEDILOL 25 MG: 25 TABLET, FILM COATED ORAL at 08:46

## 2023-12-08 RX ADMIN — IPRATROPIUM BROMIDE AND ALBUTEROL SULFATE 1 DOSE: .5; 2.5 SOLUTION RESPIRATORY (INHALATION) at 08:53

## 2023-12-08 RX ADMIN — ENOXAPARIN SODIUM 40 MG: 100 INJECTION SUBCUTANEOUS at 08:46

## 2023-12-08 RX ADMIN — SODIUM CHLORIDE, PRESERVATIVE FREE 10 ML: 5 INJECTION INTRAVENOUS at 08:49

## 2023-12-08 RX ADMIN — LISINOPRIL 20 MG: 20 TABLET ORAL at 08:47

## 2023-12-08 NOTE — DISCHARGE INSTR - DIET

## 2023-12-08 NOTE — PLAN OF CARE
Problem: ABCDS Injury Assessment  Goal: Absence of physical injury  12/8/2023 1258 by Di Diaz RN  Outcome: Adequate for Discharge  12/8/2023 0817 by Di Diaz RN  Outcome: Progressing     Problem: Skin/Tissue Integrity  Goal: Absence of new skin breakdown  Description: 1. Monitor for areas of redness and/or skin breakdown  2. Assess vascular access sites hourly  3. Every 4-6 hours minimum:  Change oxygen saturation probe site  4. Every 4-6 hours:  If on nasal continuous positive airway pressure, respiratory therapy assess nares and determine need for appliance change or resting period.   12/8/2023 1258 by Di Diaz RN  Outcome: Adequate for Discharge  12/8/2023 0817 by Di Diaz RN  Outcome: Progressing     Problem: Discharge Planning  Goal: Discharge to home or other facility with appropriate resources  12/8/2023 1258 by Di Diaz RN  Outcome: Adequate for Discharge  12/8/2023 0817 by Di Diaz RN  Outcome: Progressing     Problem: Pain  Goal: Verbalizes/displays adequate comfort level or baseline comfort level  12/8/2023 1258 by Di Diaz RN  Outcome: Adequate for Discharge  12/8/2023 0817 by Di Diaz RN  Outcome: Progressing     Problem: Safety - Adult  Goal: Free from fall injury  12/8/2023 1258 by Di Diaz RN  Outcome: Adequate for Discharge  12/8/2023 0817 by Di Diaz RN  Outcome: Progressing     Problem: Chronic Conditions and Co-morbidities  Goal: Patient's chronic conditions and co-morbidity symptoms are monitored and maintained or improved  12/8/2023 1258 by Di Diaz RN  Outcome: Adequate for Discharge  12/8/2023 0817 by Di Diaz RN  Outcome: Progressing

## 2023-12-10 LAB
MICROORGANISM SPEC CULT: NORMAL
MICROORGANISM SPEC CULT: NORMAL
SERVICE CMNT-IMP: NORMAL
SERVICE CMNT-IMP: NORMAL
SPECIMEN DESCRIPTION: NORMAL
SPECIMEN DESCRIPTION: NORMAL

## 2023-12-11 ENCOUNTER — OFFICE VISIT (OUTPATIENT)
Dept: PODIATRY | Age: 73
End: 2023-12-11
Payer: MEDICARE

## 2023-12-11 VITALS — WEIGHT: 315 LBS | BODY MASS INDEX: 49.44 KG/M2 | HEIGHT: 67 IN

## 2023-12-11 DIAGNOSIS — E11.51 TYPE II DIABETES MELLITUS WITH PERIPHERAL CIRCULATORY DISORDER (HCC): ICD-10-CM

## 2023-12-11 DIAGNOSIS — B35.1 ONYCHOMYCOSIS: Primary | ICD-10-CM

## 2023-12-11 DIAGNOSIS — R26.2 DIFFICULTY WALKING: ICD-10-CM

## 2023-12-11 DIAGNOSIS — M79.675 PAIN OF TOE OF LEFT FOOT: ICD-10-CM

## 2023-12-11 DIAGNOSIS — I89.0 LYMPHEDEMA: ICD-10-CM

## 2023-12-11 DIAGNOSIS — M79.674 PAIN OF TOE OF RIGHT FOOT: ICD-10-CM

## 2023-12-11 PROCEDURE — 99999 PR OFFICE/OUTPT VISIT,PROCEDURE ONLY: CPT | Performed by: PODIATRIST

## 2023-12-11 PROCEDURE — 11721 DEBRIDE NAIL 6 OR MORE: CPT | Performed by: PODIATRIST

## 2023-12-11 NOTE — PROGRESS NOTES
Patient in today for nail care. Patient does not have any complaints of pain at this time.  Patient's PCP is Rosalita Oppenheim, MD date of last ov 3/21/23          Zuleyma Noble LPN
walking        1. Evaluation and Management  2. Manual and electrical debridement of the mycotic nails was performed for thickness and length to prevent injection and/or ulceration. 3. Discussed additional diabetic lower extremity care techniques with patient today. 4. We did discuss importance of shoe gear and foot inspection to prevent potential issues. 5. We will see the patient back at a later date for continued podiatric management and care. Patient was advised to call the office with any questions or concerns prior to their next appointment if needed. Seen By:    Garrett Jain DPM    Electronically signed by Garrett Jain DPM on 12/11/2023 at 3:22 PM      This note was created using voice recognition software. The note was reviewed however may contain grammatical errors.

## 2023-12-27 ENCOUNTER — HOSPITAL ENCOUNTER (OUTPATIENT)
Dept: WOUND CARE | Age: 73
Discharge: HOME | End: 2023-12-27
Attending: PODIATRIST
Payer: MEDICARE

## 2023-12-27 VITALS
DIASTOLIC BLOOD PRESSURE: 100 MMHG | HEART RATE: 77 BPM | TEMPERATURE: 96.9 F | RESPIRATION RATE: 20 BRPM | SYSTOLIC BLOOD PRESSURE: 177 MMHG

## 2023-12-27 PROCEDURE — 11042 DBRDMT SUBQ TIS 1ST 20SQCM/<: CPT

## 2023-12-27 PROCEDURE — 11045 DBRDMT SUBQ TISS EACH ADDL: CPT

## 2023-12-27 NOTE — DISCHARGE INSTRUCTIONS
Discharge Instructions            Visit Discharge/Physician Orders     Discharge condition: Stable     Assessment of pain at discharge:     Anesthetic used: 4% topical lidocaine     Discharge to: Home     Left via:Private automobile     Accompanied by: accompanied by spouse     [facility]F/[facility]: [facility]     Dressing Orders: clean left leg with saline, apply hydrofera blue when available until then continue alginate ag and foam , coban 2, CHANGE 2X PER WEEK (FRIDAY AND MONDAY)         Treatment Orders: elevate legs above level of heart  RX start antibiotics as directed minocycline and bactrim   If no improvement with wound with next wound care appt update dr. Julio C Dill and she will start iv therapy     [facility] followup visit _____________1 week_WITH DR. Geoffrey [facility]__in december_________  (Please note your next appointment above and if you are unable to keep, kindly give a 24 hour notice. Thank you.)     Physician signature:__________________________        If you experience any of the following, please call the [facility] during business hours:     * Increase in Pain  * Temperature over 101  * Increase in drainage from your wound  * Drainage with a foul odor  * Bleeding  * Increase in swelling  * Need for compression bandage changes due to slippage, breakthrough drainage. If you need medical attention outside of the business hours of the [facility] please contact your PCP or go to the nearest emergency room.

## 2023-12-27 NOTE — PROGRESS NOTES
Wound Healing Center  History and Physical/Consultation  Podiatry    Referring Physician : Manuelito Dukes MD  [address] RECORD NUMBER:  [id number]  AGE: 74 y.o. GENDER: male  : 1950  EPISODE DATE:  2023  Subjective:     Chief Complaint   Patient presents with    Wound Check         HISTORY of PRESENT ILLNESS HPI     Taylor Marquis is a 74 y.o. male who presents today for wound/ulcer evaluation. History of Wound Context:  The patient has had a wound of left leg which was first noted approximately months ago. This has been treated debridemnet. On their initial visit to the wound healing center, 10/11/23 ,  the patient has noted that the wound has not been improving. The patient has had similar previous wounds in the past.      Pt is on abx at time of initial visit.       Wound/Ulcer Pain Timing/Severity: constant  Quality of pain: aching  Severity:   10   Modifying Factors: Pain worsens with walking  Associated Signs/Symptoms: edema, erythema, and drainage    Ulcer Identification:  Ulcer Type: venous  Contributing Factors: edema, venous stasis, and lymphedema    Diabetic/Pressure/Non Pressure Ulcers onl y:  Ulcer: Non-Pressure ulcer, fat layer exposed    If patient has diabetic lower extremity wounds  Maga[place] Classification of diabetic lower extremity wounds:    Grade Description   []  0 No open wound   []  1 Superficial ulcer involving the full skin thickness   []  2 Deep ulcer involves ligament, tendon, joint capsule, or fascia  No bone involvement or abscess presence   []  3 Deep Ulcer with abcess formation and/or osteomyelitis   []  4 Localized gangrene   []  5 Extensive gangrene of the foot     Wound: N/A    10-18-23  Debrided, bactrim rx given    10/25/2023  Extensive multiple wounds of the left lower extremity debrided, patient has been treated with antibiotics recently    23  Coban 4 layer, debrided, culture taken    23  Debrided, needs id consult

## 2024-01-03 ENCOUNTER — HOSPITAL ENCOUNTER (OUTPATIENT)
Dept: WOUND CARE | Age: 74
Discharge: HOME | End: 2024-01-03
Attending: PODIATRIST
Payer: MEDICARE

## 2024-01-03 VITALS
TEMPERATURE: 96.3 F | DIASTOLIC BLOOD PRESSURE: 84 MMHG | RESPIRATION RATE: 20 BRPM | SYSTOLIC BLOOD PRESSURE: 156 MMHG | HEART RATE: 80 BPM

## 2024-01-03 PROCEDURE — 11045 DBRDMT SUBQ TISS EACH ADDL: CPT

## 2024-01-03 PROCEDURE — 11042 DBRDMT SUBQ TIS 1ST 20SQCM/<: CPT

## 2024-01-03 NOTE — PROGRESS NOTES
Physician signature:__________________________        If you experience any of the following, please call the Wound Care Center during business hours:     * Increase in Pain  * Temperature over 101  * Increase in drainage from your wound  * Drainage with a foul odor  * Bleeding  * Increase in swelling  * Need for compression bandage changes due to slippage, breakthrough drainage.     If you need medical attention outside of the business hours of the Wound Care Centers please contact your PCP or go to the nearest emergency room.              Electronically signed by Prosper Mccarthy DPM on 1/3/2024 at 10:32 AM

## 2024-01-03 NOTE — PLAN OF CARE
Problem: Chronic Conditions and Co-morbidities  Goal: Patient's chronic conditions and co-morbidity symptoms are monitored and maintained or improved  Outcome: Progressing     Problem: Chronic Conditions and Co-morbidities  Goal: Patient's chronic conditions and co-morbidity symptoms are monitored and maintained or improved  Outcome: Progressing     Problem: Cognitive:  Goal: Knowledge of wound care  Description: Knowledge of wound care  Outcome: Progressing  Goal: Understands risk factors for wounds  Description: Understands risk factors for wounds  Outcome: Progressing     Problem: Wound:  Goal: Will show signs of wound healing; wound closure and no evidence of infection  Description: Will show signs of wound healing; wound closure and no evidence of infection  Outcome: Progressing

## 2024-01-03 NOTE — DISCHARGE INSTRUCTIONS
Discharge Instructions            Visit Discharge/Physician Orders     Discharge condition: Stable     Assessment of pain at discharge:     Anesthetic used: 4% topical lidocaine     Discharge to: Home     Left via:Private automobile     Accompanied by: accompanied by spouse     [facility]F/[facility]: [facility]     Dressing Orders: clean left leg with saline, apply hydrofera blue when available until then continue alginate ag and foam , coban 2, CHANGE 2X PER WEEK (FRIDAY AND MONDAY)         Treatment Orders: elevate legs above level of heart  RX start antibiotics as directed minocycline and bactrim   If no improvement with wound with next wound care appt update dr. Bermudez and she will start iv therapy     [facility] followup visit _____________1 week_WITH DR. LOCO , DR BERMUDEZ__in december_________  (Please note your next appointment above and if you are unable to keep, kindly give a 24 hour notice. Thank you.)     Physician signature:__________________________        If you experience any of the following, please call the Wound Care Center during business hours:     * Increase in Pain  * Temperature over 101  * Increase in drainage from your wound  * Drainage with a foul odor  * Bleeding  * Increase in swelling  * Need for compression bandage changes due to slippage, breakthrough drainage.     If you need medical attention outside of the business hours of the Wound Care Centers please contact your PCP or go to the nearest emergency room.

## 2024-01-10 ENCOUNTER — HOSPITAL ENCOUNTER (OUTPATIENT)
Dept: WOUND CARE | Age: 74
Discharge: HOME | End: 2024-01-10
Attending: PODIATRIST
Payer: MEDICARE

## 2024-01-10 VITALS
RESPIRATION RATE: 20 BRPM | WEIGHT: 315 LBS | SYSTOLIC BLOOD PRESSURE: 150 MMHG | BODY MASS INDEX: 49.44 KG/M2 | DIASTOLIC BLOOD PRESSURE: 90 MMHG | HEIGHT: 67 IN | TEMPERATURE: 95.9 F | HEART RATE: 78 BPM

## 2024-01-10 PROCEDURE — 11042 DBRDMT SUBQ TIS 1ST 20SQCM/<: CPT

## 2024-01-10 PROCEDURE — 11045 DBRDMT SUBQ TISS EACH ADDL: CPT

## 2024-01-10 RX ORDER — LIDOCAINE 50 MG/G
OINTMENT TOPICAL ONCE
OUTPATIENT
Start: 2024-01-10 | End: 2024-01-10

## 2024-01-10 RX ORDER — LIDOCAINE HYDROCHLORIDE 40 MG/ML
SOLUTION TOPICAL ONCE
Status: COMPLETED | OUTPATIENT
Start: 2024-01-10 | End: 2024-01-10

## 2024-01-10 RX ORDER — LIDOCAINE HYDROCHLORIDE 20 MG/ML
JELLY TOPICAL ONCE
OUTPATIENT
Start: 2024-01-10 | End: 2024-01-10

## 2024-01-10 RX ORDER — GENTAMICIN SULFATE 1 MG/G
OINTMENT TOPICAL ONCE
OUTPATIENT
Start: 2024-01-10 | End: 2024-01-10

## 2024-01-10 RX ORDER — LIDOCAINE HYDROCHLORIDE 40 MG/ML
SOLUTION TOPICAL ONCE
OUTPATIENT
Start: 2024-01-10 | End: 2024-01-10

## 2024-01-10 RX ORDER — TRIAMCINOLONE ACETONIDE 1 MG/G
OINTMENT TOPICAL ONCE
OUTPATIENT
Start: 2024-01-10 | End: 2024-01-10

## 2024-01-10 RX ORDER — BETAMETHASONE DIPROPIONATE 0.5 MG/G
CREAM TOPICAL ONCE
OUTPATIENT
Start: 2024-01-10 | End: 2024-01-10

## 2024-01-10 RX ORDER — BACITRACIN ZINC AND POLYMYXIN B SULFATE 500; 1000 [USP'U]/G; [USP'U]/G
OINTMENT TOPICAL ONCE
OUTPATIENT
Start: 2024-01-10 | End: 2024-01-10

## 2024-01-10 RX ORDER — LIDOCAINE 40 MG/G
CREAM TOPICAL ONCE
OUTPATIENT
Start: 2024-01-10 | End: 2024-01-10

## 2024-01-10 RX ORDER — IBUPROFEN 200 MG
TABLET ORAL ONCE
OUTPATIENT
Start: 2024-01-10 | End: 2024-01-10

## 2024-01-10 RX ORDER — CLOBETASOL PROPIONATE 0.5 MG/G
OINTMENT TOPICAL ONCE
OUTPATIENT
Start: 2024-01-10 | End: 2024-01-10

## 2024-01-10 RX ORDER — GINSENG 100 MG
CAPSULE ORAL ONCE
OUTPATIENT
Start: 2024-01-10 | End: 2024-01-10

## 2024-01-10 RX ORDER — SODIUM CHLOR/HYPOCHLOROUS ACID 0.033 %
SOLUTION, IRRIGATION IRRIGATION ONCE
OUTPATIENT
Start: 2024-01-10 | End: 2024-01-10

## 2024-01-10 RX ADMIN — LIDOCAINE HYDROCHLORIDE: 40 SOLUTION TOPICAL at 10:11

## 2024-01-10 NOTE — PLAN OF CARE
Problem: Chronic Conditions and Co-morbidities  Goal: Patient's chronic conditions and co-morbidity symptoms are monitored and maintained or improved  Outcome: Progressing     Problem: Cognitive:  Goal: Knowledge of wound care  Description: Knowledge of wound care  Outcome: Progressing  Goal: Understands risk factors for wounds  Description: Understands risk factors for wounds  Outcome: Progressing     Problem: Wound:  Goal: Will show signs of wound healing; wound closure and no evidence of infection  Description: Will show signs of wound healing; wound closure and no evidence of infection  Outcome: Progressing

## 2024-01-10 NOTE — PROGRESS NOTES
Wound Healing Center  History and Physical/Consultation  Podiatry    Referring Physician : Charles Foss MD  Tuan Vega  MEDICAL RECORD NUMBER:  [id number]  AGE: 73 y.o.   GENDER: male  : 1950  EPISODE DATE:  1/3/2024  Subjective:     Chief Complaint   Patient presents with    Wound Check     legs         HISTORY of PRESENT ILLNESS HPI     Tuan Vega is a 73 y.o. male who presents today for wound/ulcer evaluation.   History of Wound Context:  The patient has had a wound of left leg which was first noted approximately months ago.  This has been treated debridemnet. On their initial visit to the wound healing center, 10/11/23 ,  the patient has noted that the wound has not been improving.  The patient has had similar previous wounds in the past.      Pt is on abx at time of initial visit.      Wound/Ulcer Pain Timing/Severity: constant  Quality of pain: aching  Severity:  4 / 10   Modifying Factors: Pain worsens with walking  Associated Signs/Symptoms: edema, erythema, and drainage    Ulcer Identification:  Ulcer Type: venous  Contributing Factors: edema, venous stasis, and lymphedema    Diabetic/Pressure/Non Pressure Ulcers onl y:  Ulcer: Non-Pressure ulcer, fat layer exposed    If patient has diabetic lower extremity wounds  M[place] Classification of diabetic lower extremity wounds:    Grade Description   []  0 No open wound   []  1 Superficial ulcer involving the full skin thickness   []  2 Deep ulcer involves ligament, tendon, joint capsule, or fascia  No bone involvement or abscess presence   []  3 Deep Ulcer with abcess formation and/or osteomyelitis   []  4 Localized gangrene   []  5 Extensive gangrene of the foot     Wound: N/A    10-18-23  Debrided, bactrim rx given    10/25/2023  Extensive multiple wounds of the left lower extremity debrided, patient has been treated with antibiotics recently    23  Coban 4 layer, debrided, culture taken    23  Debrided, needs id

## 2024-01-10 NOTE — DISCHARGE INSTRUCTIONS
Discharge Instructions            Visit Discharge/Physician Orders     Discharge condition: Stable     Assessment of pain at discharge:     Anesthetic used: 4% topical lidocaine     Discharge to: Home     Left via:Private automobile     Accompanied by: accompanied by spouse     [facility]F/[facility]: [facility]     Dressing Orders: clean left leg with saline, apply hydrofera blue when available until then continue alginate ag and foam , coban 2, CHANGE 2X PER WEEK (FRIDAY AND MONDAY)         Treatment Orders: elevate legs above level of heart  RX start antibiotics as directed minocycline and bactrim   If no improvement with wound with next wound care appt update dr. Bermudez and she will start iv therapy     [facility] followup visit _____________1 week_WITH DR. LOCO ______  (Please note your next appointment above and if you are unable to keep, kindly give a 24 hour notice. Thank you.)     Physician signature:__________________________        If you experience any of the following, please call the Wound Care Center during business hours:     * Increase in Pain  * Temperature over 101  * Increase in drainage from your wound  * Drainage with a foul odor  * Bleeding  * Increase in swelling  * Need for compression bandage changes due to slippage, breakthrough drainage.     If you need medical attention outside of the business hours of the Wound Care Centers please contact your PCP or go to the nearest emergency room.

## 2024-01-15 RX ORDER — POTASSIUM CHLORIDE 20 MEQ/1
20 TABLET, EXTENDED RELEASE ORAL DAILY
Qty: 90 TABLET | Refills: 3 | Status: SHIPPED | OUTPATIENT
Start: 2024-01-15

## 2024-01-15 RX ORDER — ROSUVASTATIN CALCIUM 5 MG/1
5 TABLET, COATED ORAL DAILY
Qty: 90 TABLET | Refills: 3 | Status: SHIPPED | OUTPATIENT
Start: 2024-01-15

## 2024-01-17 ENCOUNTER — HOSPITAL ENCOUNTER (OUTPATIENT)
Dept: WOUND CARE | Age: 74
Discharge: HOME | End: 2024-01-17
Attending: PODIATRIST
Payer: MEDICARE

## 2024-01-17 VITALS
SYSTOLIC BLOOD PRESSURE: 174 MMHG | DIASTOLIC BLOOD PRESSURE: 90 MMHG | HEART RATE: 91 BPM | RESPIRATION RATE: 22 BRPM | TEMPERATURE: 97.3 F

## 2024-01-17 DIAGNOSIS — I83.022 VARICOSE VEINS OF LEFT LOWER EXTREMITY WITH ULCER OF CALF WITH FAT LAYER EXPOSED (HCC): Primary | ICD-10-CM

## 2024-01-17 DIAGNOSIS — L97.222 VARICOSE VEINS OF LEFT LOWER EXTREMITY WITH ULCER OF CALF WITH FAT LAYER EXPOSED (HCC): Primary | ICD-10-CM

## 2024-01-17 PROCEDURE — 11045 DBRDMT SUBQ TISS EACH ADDL: CPT

## 2024-01-17 PROCEDURE — 11042 DBRDMT SUBQ TIS 1ST 20SQCM/<: CPT

## 2024-01-17 RX ORDER — IBUPROFEN 200 MG
TABLET ORAL ONCE
OUTPATIENT
Start: 2024-01-17 | End: 2024-01-17

## 2024-01-17 RX ORDER — LIDOCAINE 50 MG/G
OINTMENT TOPICAL ONCE
OUTPATIENT
Start: 2024-01-17 | End: 2024-01-17

## 2024-01-17 RX ORDER — LIDOCAINE HYDROCHLORIDE 20 MG/ML
JELLY TOPICAL ONCE
OUTPATIENT
Start: 2024-01-17 | End: 2024-01-17

## 2024-01-17 RX ORDER — LIDOCAINE 40 MG/G
CREAM TOPICAL ONCE
OUTPATIENT
Start: 2024-01-17 | End: 2024-01-17

## 2024-01-17 RX ORDER — SODIUM CHLOR/HYPOCHLOROUS ACID 0.033 %
SOLUTION, IRRIGATION IRRIGATION ONCE
OUTPATIENT
Start: 2024-01-17 | End: 2024-01-17

## 2024-01-17 RX ORDER — LIDOCAINE HYDROCHLORIDE 40 MG/ML
SOLUTION TOPICAL ONCE
Status: COMPLETED | OUTPATIENT
Start: 2024-01-17 | End: 2024-01-17

## 2024-01-17 RX ORDER — BETAMETHASONE DIPROPIONATE 0.5 MG/G
CREAM TOPICAL ONCE
OUTPATIENT
Start: 2024-01-17 | End: 2024-01-17

## 2024-01-17 RX ORDER — TRIAMCINOLONE ACETONIDE 1 MG/G
OINTMENT TOPICAL ONCE
OUTPATIENT
Start: 2024-01-17 | End: 2024-01-17

## 2024-01-17 RX ORDER — LIDOCAINE HYDROCHLORIDE 40 MG/ML
SOLUTION TOPICAL ONCE
OUTPATIENT
Start: 2024-01-17 | End: 2024-01-17

## 2024-01-17 RX ORDER — GINSENG 100 MG
CAPSULE ORAL ONCE
OUTPATIENT
Start: 2024-01-17 | End: 2024-01-17

## 2024-01-17 RX ORDER — CLOBETASOL PROPIONATE 0.5 MG/G
OINTMENT TOPICAL ONCE
OUTPATIENT
Start: 2024-01-17 | End: 2024-01-17

## 2024-01-17 RX ORDER — BACITRACIN ZINC AND POLYMYXIN B SULFATE 500; 1000 [USP'U]/G; [USP'U]/G
OINTMENT TOPICAL ONCE
OUTPATIENT
Start: 2024-01-17 | End: 2024-01-17

## 2024-01-17 RX ORDER — GENTAMICIN SULFATE 1 MG/G
OINTMENT TOPICAL ONCE
OUTPATIENT
Start: 2024-01-17 | End: 2024-01-17

## 2024-01-17 RX ADMIN — LIDOCAINE HYDROCHLORIDE 20 ML: 40 SOLUTION TOPICAL at 09:45

## 2024-01-17 NOTE — DISCHARGE INSTRUCTIONS
Discharge Instructions            Visit Discharge/Physician Orders     Discharge condition: Stable     Assessment of pain at discharge:     Anesthetic used: 4% topical lidocaine     Discharge to: Home     Left via:Private automobile     Accompanied by: accompanied by spouse     [facility]F/[facility]: [facility]     Dressing Orders: clean left leg with saline, apply hydrofera blue when available until then continue alginate ag and foam , profore, change every twice weekly      Treatment Orders: elevate legs above level of heart  RX start antibiotics as directed minocycline and bactrim   If no improvement with wound with next wound care appt update dr. Bermudez and she will start iv therapy     [facility] followup visit _____________1 week_______  (Please note your next appointment above and if you are unable to keep, kindly give a 24 hour notice. Thank you.)     Physician signature:__________________________        If you experience any of the following, please call the Wound Care Center during business hours:     * Increase in Pain  * Temperature over 101  * Increase in drainage from your wound  * Drainage with a foul odor  * Bleeding  * Increase in swelling  * Need for compression bandage changes due to slippage, breakthrough drainage.     If you need medical attention outside of the business hours of the Wound Care Centers please contact your PCP or go to the nearest emergency room.

## 2024-01-19 NOTE — DISCHARGE INSTRUCTIONS
Discharge Instructions            Visit Discharge/Physician Orders     Discharge condition: Stable     Assessment of pain at discharge:     Anesthetic used: 4% topical lidocaine     Discharge to: Home     Left via:Private automobile     Accompanied by: accompanied by spouse     [facility]F/[facility]: [facility]     Dressing Orders: clean left leg with saline, apply hydrofera blue, kerramax super absorber, abd, coban 2, change every twice weekly       Treatment Orders: elevate legs above level of heart  RX start antibiotics as directed minocycline and bactrim   If no improvement with wound with next wound care appt update dr. Bermudez and she will start iv therapy     [facility] followup visit _____________1 week_______  (Please note your next appointment above and if you are unable to keep, kindly give a 24 hour notice. Thank you.)     Physician signature:__________________________        If you experience any of the following, please call the Wound Care Center during business hours:     * Increase in Pain  * Temperature over 101  * Increase in drainage from your wound  * Drainage with a foul odor  * Bleeding  * Increase in swelling  * Need for compression bandage changes due to slippage, breakthrough drainage.     If you need medical attention outside of the business hours of the Wound Care Centers please contact your PCP or go to the nearest emergency room.                     
Never

## 2024-01-24 ENCOUNTER — HOSPITAL ENCOUNTER (OUTPATIENT)
Dept: WOUND CARE | Age: 74
Discharge: HOME | End: 2024-01-24
Attending: PODIATRIST
Payer: MEDICARE

## 2024-01-24 VITALS
DIASTOLIC BLOOD PRESSURE: 87 MMHG | TEMPERATURE: 96.4 F | HEART RATE: 89 BPM | RESPIRATION RATE: 22 BRPM | SYSTOLIC BLOOD PRESSURE: 156 MMHG

## 2024-01-24 DIAGNOSIS — I83.022 VARICOSE VEINS OF LEFT LOWER EXTREMITY WITH ULCER OF CALF WITH FAT LAYER EXPOSED (HCC): Primary | ICD-10-CM

## 2024-01-24 DIAGNOSIS — L97.222 VARICOSE VEINS OF LEFT LOWER EXTREMITY WITH ULCER OF CALF WITH FAT LAYER EXPOSED (HCC): Primary | ICD-10-CM

## 2024-01-24 PROCEDURE — 11045 DBRDMT SUBQ TISS EACH ADDL: CPT

## 2024-01-24 PROCEDURE — 11042 DBRDMT SUBQ TIS 1ST 20SQCM/<: CPT

## 2024-01-24 RX ORDER — TRIAMCINOLONE ACETONIDE 1 MG/G
OINTMENT TOPICAL ONCE
OUTPATIENT
Start: 2024-01-24 | End: 2024-01-24

## 2024-01-24 RX ORDER — IBUPROFEN 200 MG
TABLET ORAL ONCE
OUTPATIENT
Start: 2024-01-24 | End: 2024-01-24

## 2024-01-24 RX ORDER — SODIUM CHLOR/HYPOCHLOROUS ACID 0.033 %
SOLUTION, IRRIGATION IRRIGATION ONCE
OUTPATIENT
Start: 2024-01-24 | End: 2024-01-24

## 2024-01-24 RX ORDER — LIDOCAINE HYDROCHLORIDE 40 MG/ML
SOLUTION TOPICAL ONCE
Status: COMPLETED | OUTPATIENT
Start: 2024-01-24 | End: 2024-01-24

## 2024-01-24 RX ORDER — LIDOCAINE HYDROCHLORIDE 20 MG/ML
JELLY TOPICAL ONCE
OUTPATIENT
Start: 2024-01-24 | End: 2024-01-24

## 2024-01-24 RX ORDER — LIDOCAINE HYDROCHLORIDE 40 MG/ML
SOLUTION TOPICAL ONCE
OUTPATIENT
Start: 2024-01-24 | End: 2024-01-24

## 2024-01-24 RX ORDER — GENTAMICIN SULFATE 1 MG/G
OINTMENT TOPICAL ONCE
OUTPATIENT
Start: 2024-01-24 | End: 2024-01-24

## 2024-01-24 RX ORDER — LIDOCAINE 50 MG/G
OINTMENT TOPICAL ONCE
OUTPATIENT
Start: 2024-01-24 | End: 2024-01-24

## 2024-01-24 RX ORDER — GINSENG 100 MG
CAPSULE ORAL ONCE
OUTPATIENT
Start: 2024-01-24 | End: 2024-01-24

## 2024-01-24 RX ORDER — BACITRACIN ZINC AND POLYMYXIN B SULFATE 500; 1000 [USP'U]/G; [USP'U]/G
OINTMENT TOPICAL ONCE
OUTPATIENT
Start: 2024-01-24 | End: 2024-01-24

## 2024-01-24 RX ORDER — LIDOCAINE 40 MG/G
CREAM TOPICAL ONCE
OUTPATIENT
Start: 2024-01-24 | End: 2024-01-24

## 2024-01-24 RX ORDER — BETAMETHASONE DIPROPIONATE 0.5 MG/G
CREAM TOPICAL ONCE
OUTPATIENT
Start: 2024-01-24 | End: 2024-01-24

## 2024-01-24 RX ORDER — CLOBETASOL PROPIONATE 0.5 MG/G
OINTMENT TOPICAL ONCE
OUTPATIENT
Start: 2024-01-24 | End: 2024-01-24

## 2024-01-24 RX ADMIN — LIDOCAINE HYDROCHLORIDE 10 ML: 40 SOLUTION TOPICAL at 09:45

## 2024-01-24 NOTE — PROGRESS NOTES
Debridement:Excisional Debridement    Using #15 blade scalpel the wound(s)/ulcer(s) was/were sharply debrided down through and including the removal of subcutaneous tissue.        Devitalized Tissue Debrided:  fibrin, biofilm, and slough to stimulate bleeding to promote healing, post debridement good bleeding base and wound edges noted    Wound/Ulcer #: 1    Percent of Wound/Ulcer Debrided: 30%    Total Surface Area Debrided:  232 sq cm     Estimated Blood Loss:  Minimal  Hemostasis Achieved:  by pressure    Procedural Pain:  2  / 10   Post Procedural Pain:  4 / 10     Response to treatment:  Well tolerated by patient.     A culture was done.      Plan:     Pt is not a smoker   - Discussed relationship of smoking and negative affects on wound healing   - Emphasized importance of tobacco avoidace/cessation   - \Script for nicotine patch given to patient   - Information regarding support groups for smoking cessation given    In my professional opinion and based off the information that is available at this time this patient has appropriate indication for HBO Therapy: Maybe    Treatment Note please see attached Discharge Instructions    Written patient dismissal instructions given to patient and signed by patient or POA.         Discharge Instructions             Discharge Instructions            Visit Discharge/Physician Orders     Discharge condition: Stable     Assessment of pain at discharge:     Anesthetic used: 4% topical lidocaine     Discharge to: Home     Left via:Private automobile     Accompanied by: accompanied by spouse     [facility]F/[facility]: [facility]     Dressing Orders: clean left leg with saline, apply hydrofera blue, kerramax super absorber, abd, coban 2, change every twice weekly       Treatment Orders: elevate legs above level of heart  RX start antibiotics as directed minocycline and bactrim   If no improvement with wound with next wound care appt update dr. Bermudez and she will start iv therapy     [facility]

## 2024-01-24 NOTE — PLAN OF CARE
Problem: Cognitive:  Goal: Knowledge of wound care  Description: Knowledge of wound care  Outcome: Progressing  Goal: Understands risk factors for wounds  Description: Understands risk factors for wounds  Outcome: Progressing     Problem: Wound:  Goal: Will show signs of wound healing; wound closure and no evidence of infection  Description: Will show signs of wound healing; wound closure and no evidence of infection  Outcome: Progressing

## 2024-01-25 RX ORDER — CARVEDILOL 25 MG/1
25 TABLET ORAL 2 TIMES DAILY WITH MEALS
Qty: 180 TABLET | Refills: 0 | Status: SHIPPED | OUTPATIENT
Start: 2024-01-25

## 2024-01-31 ENCOUNTER — HOSPITAL ENCOUNTER (OUTPATIENT)
Dept: WOUND CARE | Age: 74
Discharge: HOME | End: 2024-01-31
Attending: PODIATRIST
Payer: MEDICARE

## 2024-01-31 VITALS
HEART RATE: 82 BPM | DIASTOLIC BLOOD PRESSURE: 80 MMHG | TEMPERATURE: 97.7 F | RESPIRATION RATE: 20 BRPM | SYSTOLIC BLOOD PRESSURE: 146 MMHG

## 2024-01-31 DIAGNOSIS — I83.022 VARICOSE VEINS OF LEFT LOWER EXTREMITY WITH ULCER OF CALF WITH FAT LAYER EXPOSED (HCC): Primary | ICD-10-CM

## 2024-01-31 DIAGNOSIS — L97.222 VARICOSE VEINS OF LEFT LOWER EXTREMITY WITH ULCER OF CALF WITH FAT LAYER EXPOSED (HCC): Primary | ICD-10-CM

## 2024-01-31 PROCEDURE — 11042 DBRDMT SUBQ TIS 1ST 20SQCM/<: CPT

## 2024-01-31 PROCEDURE — 11045 DBRDMT SUBQ TISS EACH ADDL: CPT

## 2024-01-31 RX ORDER — LIDOCAINE 50 MG/G
OINTMENT TOPICAL ONCE
OUTPATIENT
Start: 2024-01-31 | End: 2024-01-31

## 2024-01-31 RX ORDER — CLOBETASOL PROPIONATE 0.5 MG/G
OINTMENT TOPICAL ONCE
OUTPATIENT
Start: 2024-01-31 | End: 2024-01-31

## 2024-01-31 RX ORDER — TRIAMCINOLONE ACETONIDE 1 MG/G
OINTMENT TOPICAL ONCE
OUTPATIENT
Start: 2024-01-31 | End: 2024-01-31

## 2024-01-31 RX ORDER — LIDOCAINE HYDROCHLORIDE 40 MG/ML
SOLUTION TOPICAL ONCE
OUTPATIENT
Start: 2024-01-31 | End: 2024-01-31

## 2024-01-31 RX ORDER — GINSENG 100 MG
CAPSULE ORAL ONCE
OUTPATIENT
Start: 2024-01-31 | End: 2024-01-31

## 2024-01-31 RX ORDER — IBUPROFEN 200 MG
TABLET ORAL ONCE
OUTPATIENT
Start: 2024-01-31 | End: 2024-01-31

## 2024-01-31 RX ORDER — LIDOCAINE 40 MG/G
CREAM TOPICAL ONCE
OUTPATIENT
Start: 2024-01-31 | End: 2024-01-31

## 2024-01-31 RX ORDER — BETAMETHASONE DIPROPIONATE 0.5 MG/G
CREAM TOPICAL ONCE
OUTPATIENT
Start: 2024-01-31 | End: 2024-01-31

## 2024-01-31 RX ORDER — LIDOCAINE HYDROCHLORIDE 20 MG/ML
JELLY TOPICAL ONCE
OUTPATIENT
Start: 2024-01-31 | End: 2024-01-31

## 2024-01-31 RX ORDER — SODIUM CHLOR/HYPOCHLOROUS ACID 0.033 %
SOLUTION, IRRIGATION IRRIGATION ONCE
OUTPATIENT
Start: 2024-01-31 | End: 2024-01-31

## 2024-01-31 RX ORDER — LIDOCAINE HYDROCHLORIDE 40 MG/ML
SOLUTION TOPICAL ONCE
Status: COMPLETED | OUTPATIENT
Start: 2024-01-31 | End: 2024-01-31

## 2024-01-31 RX ORDER — GENTAMICIN SULFATE 1 MG/G
OINTMENT TOPICAL ONCE
OUTPATIENT
Start: 2024-01-31 | End: 2024-01-31

## 2024-01-31 RX ORDER — BACITRACIN ZINC AND POLYMYXIN B SULFATE 500; 1000 [USP'U]/G; [USP'U]/G
OINTMENT TOPICAL ONCE
OUTPATIENT
Start: 2024-01-31 | End: 2024-01-31

## 2024-01-31 RX ADMIN — LIDOCAINE HYDROCHLORIDE 8 ML: 40 SOLUTION TOPICAL at 10:32

## 2024-01-31 NOTE — PLAN OF CARE
Problem: Chronic Conditions and Co-morbidities  Goal: Patient's chronic conditions and co-morbidity symptoms are monitored and maintained or improved  Outcome: Progressing     Problem: Wound:  Goal: Will show signs of wound healing; wound closure and no evidence of infection  Description: Will show signs of wound healing; wound closure and no evidence of infection  Outcome: Progressing     Problem: Cognitive:  Goal: Knowledge of wound care  Description: Knowledge of wound care  Outcome: Progressing  Goal: Understands risk factors for wounds  Description: Understands risk factors for wounds  Outcome: Progressing     Problem: Wound:  Goal: Will show signs of wound healing; wound closure and no evidence of infection  Description: Will show signs of wound healing; wound closure and no evidence of infection  Outcome: Progressing

## 2024-01-31 NOTE — DISCHARGE INSTRUCTIONS
Discharge Instructions            Visit Discharge/Physician Orders     Discharge condition: Stable     Assessment of pain at discharge:     Anesthetic used: 4% topical lidocaine     Discharge to: Home     Left via:Private automobile     Accompanied by: accompanied by spouse     [facility]F/[facility]: [facility]     Dressing Orders: clean left leg with saline, apply hydrofera blue, kerramax super absorber, abd, coban 2, change every twice weekly       Treatment Orders: elevate legs above level of heart  RX start antibiotics as directed minocycline and bactrim   If no improvement with wound with next wound care appt update dr. Bermudez and she will start iv therapy     [facility] followup visit _____________1 week_______  (Please note your next appointment above and if you are unable to keep, kindly give a 24 hour notice. Thank you.)     Physician signature:__________________________        If you experience any of the following, please call the Wound Care Center during business hours:     * Increase in Pain  * Temperature over 101  * Increase in drainage from your wound  * Drainage with a foul odor  * Bleeding  * Increase in swelling  * Need for compression bandage changes due to slippage, breakthrough drainage.     If you need medical attention outside of the business hours of the Wound Care Centers please contact your PCP or go to the nearest emergency room.

## 2024-01-31 NOTE — PROGRESS NOTES
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    Relevant Orders    Initiate Outpatient Wound Care Protocol         Pre Debridement Measurements:  Are located in the Wound/Ulcer Documentation Flow Sheet  Post Debridement Measurements:  Wound/Ulcer Descriptions are Pre Debridement except measurements:     Wound 10/11/23 Leg Left;Anterior;Lateral #1 (Active)   Wound Image    01/10/24 1005   Dressing Status New dressing applied 01/17/24 1056   Wound Cleansed Cleansed with saline 01/17/24 1056   Dressing/Treatment Alginate with Ag;Foam 01/17/24 1056   Wound Length (cm) 14.5 cm 01/31/24 1025   Wound Width (cm) 23 cm 01/31/24 1025   Wound Depth (cm) 0.3 cm 01/31/24 1025   Wound Surface Area (cm^2) 333.5 cm^2 01/31/24 1025   Change in Wound Size % (l*w) -41.03 01/31/24 1025   Wound Volume (cm^3) 100.05 cm^3 01/31/24 1025   Wound Healing % -112 01/31/24 1025   Post-Procedure Length (cm) 14.5 cm 01/31/24 1101   Post-Procedure Width (cm) 23 cm 01/31/24 1101   Post-Procedure Depth (cm) 0.4 cm 01/31/24 1101   Post-Procedure Surface Area (cm^2) 333.5 cm^2 01/31/24 1101   Post-Procedure Volume (cm^3) 133.4 cm^3 01/31/24 1101   Wound Assessment Fibrin;Pink/red;Devitalized tissue 01/31/24 1025   Drainage Amount Moderate (25-50%) 01/31/24 1025   Drainage Description Yellow 01/31/24 1025   Odor Mild 01/31/24 1025   Maria Elena-wound Assessment Fragile;Edematous;Maceration 01/31/24 1025   Number of days: 112            Procedure Note  Indications:  Based on my examination of this patient's wound(s)/ulcer(s) today, debridement is required to promote healing and evaluate the wound base.    Performed by: Prosper Mccarthy,

## 2024-02-07 ENCOUNTER — HOSPITAL ENCOUNTER (OUTPATIENT)
Dept: WOUND CARE | Age: 74
Discharge: HOME | End: 2024-02-07
Attending: PODIATRIST
Payer: MEDICARE

## 2024-02-07 DIAGNOSIS — L97.222 VARICOSE VEINS OF LEFT LOWER EXTREMITY WITH ULCER OF CALF WITH FAT LAYER EXPOSED (HCC): Primary | ICD-10-CM

## 2024-02-07 DIAGNOSIS — I83.022 VARICOSE VEINS OF LEFT LOWER EXTREMITY WITH ULCER OF CALF WITH FAT LAYER EXPOSED (HCC): Primary | ICD-10-CM

## 2024-02-07 PROCEDURE — 11042 DBRDMT SUBQ TIS 1ST 20SQCM/<: CPT

## 2024-02-07 PROCEDURE — 11045 DBRDMT SUBQ TISS EACH ADDL: CPT

## 2024-02-07 RX ORDER — SODIUM CHLOR/HYPOCHLOROUS ACID 0.033 %
SOLUTION, IRRIGATION IRRIGATION ONCE
OUTPATIENT
Start: 2024-02-07 | End: 2024-02-07

## 2024-02-07 RX ORDER — TRIAMCINOLONE ACETONIDE 1 MG/G
OINTMENT TOPICAL ONCE
OUTPATIENT
Start: 2024-02-07 | End: 2024-02-07

## 2024-02-07 RX ORDER — BETAMETHASONE DIPROPIONATE 0.5 MG/G
CREAM TOPICAL ONCE
OUTPATIENT
Start: 2024-02-07 | End: 2024-02-07

## 2024-02-07 RX ORDER — LIDOCAINE HYDROCHLORIDE 40 MG/ML
SOLUTION TOPICAL ONCE
Status: COMPLETED | OUTPATIENT
Start: 2024-02-07 | End: 2024-02-07

## 2024-02-07 RX ORDER — LIDOCAINE HYDROCHLORIDE 20 MG/ML
JELLY TOPICAL ONCE
OUTPATIENT
Start: 2024-02-07 | End: 2024-02-07

## 2024-02-07 RX ORDER — BACITRACIN ZINC AND POLYMYXIN B SULFATE 500; 1000 [USP'U]/G; [USP'U]/G
OINTMENT TOPICAL ONCE
OUTPATIENT
Start: 2024-02-07 | End: 2024-02-07

## 2024-02-07 RX ORDER — LIDOCAINE 40 MG/G
CREAM TOPICAL ONCE
OUTPATIENT
Start: 2024-02-07 | End: 2024-02-07

## 2024-02-07 RX ORDER — IBUPROFEN 200 MG
TABLET ORAL ONCE
OUTPATIENT
Start: 2024-02-07 | End: 2024-02-07

## 2024-02-07 RX ORDER — LIDOCAINE 50 MG/G
OINTMENT TOPICAL ONCE
OUTPATIENT
Start: 2024-02-07 | End: 2024-02-07

## 2024-02-07 RX ORDER — CLOBETASOL PROPIONATE 0.5 MG/G
OINTMENT TOPICAL ONCE
OUTPATIENT
Start: 2024-02-07 | End: 2024-02-07

## 2024-02-07 RX ORDER — GENTAMICIN SULFATE 1 MG/G
OINTMENT TOPICAL ONCE
OUTPATIENT
Start: 2024-02-07 | End: 2024-02-07

## 2024-02-07 RX ORDER — GINSENG 100 MG
CAPSULE ORAL ONCE
OUTPATIENT
Start: 2024-02-07 | End: 2024-02-07

## 2024-02-07 RX ORDER — LIDOCAINE HYDROCHLORIDE 40 MG/ML
SOLUTION TOPICAL ONCE
OUTPATIENT
Start: 2024-02-07 | End: 2024-02-07

## 2024-02-07 RX ADMIN — LIDOCAINE HYDROCHLORIDE: 40 SOLUTION TOPICAL at 10:07

## 2024-02-07 NOTE — PROGRESS NOTES
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    Relevant Orders    Initiate Outpatient Wound Care Protocol         Pre Debridement Measurements:  Are located in the Wound/Ulcer Documentation Flow Sheet  Post Debridement Measurements:  Wound/Ulcer Descriptions are Pre Debridement except measurements:     Wound 10/11/23 Leg Left;Anterior;Lateral #1 (Active)   Wound Image    01/10/24 1005   Dressing Status New dressing applied 01/17/24 1056   Wound Cleansed Cleansed with saline 01/17/24 1056   Dressing/Treatment Alginate with Ag;Foam 01/17/24 1056   Wound Length (cm) 14.5 cm 01/31/24 1025   Wound Width (cm) 23 cm 01/31/24 1025   Wound Depth (cm) 0.3 cm 01/31/24 1025   Wound Surface Area (cm^2) 333.5 cm^2 01/31/24 1025   Change in Wound Size % (l*w) -41.03 01/31/24 1025   Wound Volume (cm^3) 100.05 cm^3 01/31/24 1025   Wound Healing % -112 01/31/24 1025   Post-Procedure Length (cm) 14.5 cm 01/31/24 1101   Post-Procedure Width (cm) 23 cm 01/31/24 1101   Post-Procedure Depth (cm) 0.4 cm 01/31/24 1101   Post-Procedure Surface Area (cm^2) 333.5 cm^2 01/31/24 1101   Post-Procedure Volume (cm^3) 133.4 cm^3 01/31/24 1101   Wound Assessment Fibrin;Pink/red;Devitalized tissue 01/31/24 1025   Drainage Amount Moderate (25-50%) 01/31/24 1025   Drainage Description Yellow 01/31/24 1025   Odor Mild 01/31/24 1025   Maria Elena-wound Assessment Fragile;Edematous;Maceration 01/31/24 1025   Number of days: 112            Procedure Note  Indications:  Based on my examination of this patient's wound(s)/ulcer(s) today, debridement is required to promote healing and evaluate the wound

## 2024-02-07 NOTE — DISCHARGE INSTRUCTIONS
Discharge Instructions            Visit Discharge/Physician Orders     Discharge condition: Stable     Assessment of pain at discharge:     Anesthetic used: 4% topical lidocaine     Discharge to: Home     Left via:Private automobile     Accompanied by: accompanied by spouse     [facility]F/[facility]: [facility]     Dressing Orders: clean left leg with saline, apply hydrofera blue, kerramax super absorber, abd, coban 2, change every twice weekly    Right leg, clean with saline, apply HF blue and dry dressing. Change every other day      Treatment Orders: elevate legs above level of heart  RX start antibiotics as directed minocycline and bactrim   If no improvement with wound with next wound care appt update dr. Bermudez and she will start iv therapy     [facility] followup visit _____________1 week_______  (Please note your next appointment above and if you are unable to keep, kindly give a 24 hour notice. Thank you.)     Physician signature:__________________________        If you experience any of the following, please call the Wound Care Center during business hours:     * Increase in Pain  * Temperature over 101  * Increase in drainage from your wound  * Drainage with a foul odor  * Bleeding  * Increase in swelling  * Need for compression bandage changes due to slippage, breakthrough drainage.     If you need medical attention outside of the business hours of the Wound Care Centers please contact your PCP or go to the nearest emergency room.

## 2024-02-12 ENCOUNTER — OFFICE VISIT (OUTPATIENT)
Dept: PODIATRY | Age: 74
End: 2024-02-12
Payer: MEDICARE

## 2024-02-12 VITALS — HEIGHT: 67 IN | BODY MASS INDEX: 49.44 KG/M2 | WEIGHT: 315 LBS

## 2024-02-12 DIAGNOSIS — M79.675 PAIN OF TOE OF LEFT FOOT: ICD-10-CM

## 2024-02-12 DIAGNOSIS — M79.674 PAIN OF TOE OF RIGHT FOOT: ICD-10-CM

## 2024-02-12 DIAGNOSIS — I89.0 LYMPHEDEMA: ICD-10-CM

## 2024-02-12 DIAGNOSIS — B35.1 ONYCHOMYCOSIS: Primary | ICD-10-CM

## 2024-02-12 DIAGNOSIS — R26.2 DIFFICULTY WALKING: ICD-10-CM

## 2024-02-12 DIAGNOSIS — E11.51 TYPE II DIABETES MELLITUS WITH PERIPHERAL CIRCULATORY DISORDER (HCC): ICD-10-CM

## 2024-02-12 PROCEDURE — 99999 PR OFFICE/OUTPT VISIT,PROCEDURE ONLY: CPT | Performed by: PODIATRIST

## 2024-02-12 PROCEDURE — 11721 DEBRIDE NAIL 6 OR MORE: CPT | Performed by: PODIATRIST

## 2024-02-12 NOTE — PROGRESS NOTES
Patient in today for nail care. Patient does not have any complaints of pain at this time. Patient's PCP is Charles Foss MD date of last ov 3/21/23          Laura Salvador LPN

## 2024-02-12 NOTE — PROGRESS NOTES
24     Tuan Vega    : 1950  Sex: male  Age: 73 y.o.    Subjective:  The patient is seen today for evaluation regarding diabetic foot evaluation and mycotic nail care.  No other complaints noted.    Chief Complaint   Patient presents with    Nail Problem     Nail care       Current Medications:    Current Outpatient Medications:     carvedilol (COREG) 25 MG tablet, TAKE 1 TABLET BY MOUTH TWICE  DAILY WITH MEALS, Disp: 180 tablet, Rfl: 0    potassium chloride (KLOR-CON M) 20 MEQ extended release tablet, TAKE 1 TABLET BY MOUTH DAILY, Disp: 90 tablet, Rfl: 3    rosuvastatin (CRESTOR) 5 MG tablet, TAKE 1 TABLET BY MOUTH DAILY, Disp: 90 tablet, Rfl: 3    ipratropium 0.5 mg-albuterol 2.5 mg (DUONEB) 0.5-2.5 (3) MG/3ML SOLN nebulizer solution, Inhale 3 mLs into the lungs every 4 hours as needed for Shortness of Breath, Disp: 360 mL, Rfl: 5    BiPAP Machine MISC, by Does not apply route nightly, Disp: , Rfl:     SITagliptin (JANUVIA) 50 MG tablet, Take 1 tablet by mouth every morning, Disp: , Rfl:     Omega-3 Fatty Acids (OMEGA-3 FISH OIL) 1200 MG CAPS, Take 1 capsule by mouth nightly FISH OIL:1200MG OMEGA : 360MG, Disp: , Rfl:     gabapentin (NEURONTIN) 100 MG capsule, Take 2 capsules by mouth every morning. **SEE OTHER ORDER**, Disp: , Rfl:     gabapentin (NEURONTIN) 100 MG capsule, Take 1 capsule by mouth nightly. **SEE OTHER ORDER**, Disp: , Rfl:     glipiZIDE (GLUCOTROL XL) 5 MG extended release tablet, Take 1 tablet by mouth nightly **SEE OTHER ORDER**, Disp: , Rfl:     acetaminophen (TYLENOL) 500 MG tablet, Take 1 tablet by mouth 2 times daily, Disp: , Rfl:     latanoprost (XALATAN) 0.005 % ophthalmic solution, Place 1 drop into both eyes nightly, Disp: , Rfl:     ramipril (ALTACE) 5 MG capsule, Take 1 capsule by mouth nightly, Disp: , Rfl:     liothyronine (CYTOMEL) 25 MCG tablet, Take 1 tablet by mouth every morning, Disp: , Rfl:     levothyroxine (SYNTHROID) 100 MCG tablet, Take 1 tablet by

## 2024-02-14 ENCOUNTER — HOSPITAL ENCOUNTER (OUTPATIENT)
Dept: WOUND CARE | Age: 74
Discharge: HOME | End: 2024-02-14
Attending: PODIATRIST
Payer: MEDICARE

## 2024-02-14 VITALS — SYSTOLIC BLOOD PRESSURE: 136 MMHG | DIASTOLIC BLOOD PRESSURE: 80 MMHG

## 2024-02-14 DIAGNOSIS — L97.222 VARICOSE VEINS OF LEFT LOWER EXTREMITY WITH ULCER OF CALF WITH FAT LAYER EXPOSED (HCC): Primary | ICD-10-CM

## 2024-02-14 DIAGNOSIS — I83.022 VARICOSE VEINS OF LEFT LOWER EXTREMITY WITH ULCER OF CALF WITH FAT LAYER EXPOSED (HCC): Primary | ICD-10-CM

## 2024-02-14 PROCEDURE — 11042 DBRDMT SUBQ TIS 1ST 20SQCM/<: CPT

## 2024-02-14 PROCEDURE — 11045 DBRDMT SUBQ TISS EACH ADDL: CPT

## 2024-02-14 RX ORDER — LIDOCAINE 40 MG/G
CREAM TOPICAL ONCE
OUTPATIENT
Start: 2024-02-14 | End: 2024-02-14

## 2024-02-14 RX ORDER — GINSENG 100 MG
CAPSULE ORAL ONCE
OUTPATIENT
Start: 2024-02-14 | End: 2024-02-14

## 2024-02-14 RX ORDER — TRIAMCINOLONE ACETONIDE 1 MG/G
OINTMENT TOPICAL ONCE
OUTPATIENT
Start: 2024-02-14 | End: 2024-02-14

## 2024-02-14 RX ORDER — LIDOCAINE HYDROCHLORIDE 20 MG/ML
JELLY TOPICAL ONCE
OUTPATIENT
Start: 2024-02-14 | End: 2024-02-14

## 2024-02-14 RX ORDER — CLOBETASOL PROPIONATE 0.5 MG/G
OINTMENT TOPICAL ONCE
OUTPATIENT
Start: 2024-02-14 | End: 2024-02-14

## 2024-02-14 RX ORDER — SODIUM CHLOR/HYPOCHLOROUS ACID 0.033 %
SOLUTION, IRRIGATION IRRIGATION ONCE
OUTPATIENT
Start: 2024-02-14 | End: 2024-02-14

## 2024-02-14 RX ORDER — IBUPROFEN 200 MG
TABLET ORAL ONCE
OUTPATIENT
Start: 2024-02-14 | End: 2024-02-14

## 2024-02-14 RX ORDER — LIDOCAINE HYDROCHLORIDE 40 MG/ML
SOLUTION TOPICAL ONCE
OUTPATIENT
Start: 2024-02-14 | End: 2024-02-14

## 2024-02-14 RX ORDER — LIDOCAINE HYDROCHLORIDE 40 MG/ML
SOLUTION TOPICAL ONCE
Status: COMPLETED | OUTPATIENT
Start: 2024-02-14 | End: 2024-02-14

## 2024-02-14 RX ORDER — BACITRACIN ZINC AND POLYMYXIN B SULFATE 500; 1000 [USP'U]/G; [USP'U]/G
OINTMENT TOPICAL ONCE
OUTPATIENT
Start: 2024-02-14 | End: 2024-02-14

## 2024-02-14 RX ORDER — BETAMETHASONE DIPROPIONATE 0.5 MG/G
CREAM TOPICAL ONCE
OUTPATIENT
Start: 2024-02-14 | End: 2024-02-14

## 2024-02-14 RX ORDER — LIDOCAINE 50 MG/G
OINTMENT TOPICAL ONCE
OUTPATIENT
Start: 2024-02-14 | End: 2024-02-14

## 2024-02-14 RX ORDER — GENTAMICIN SULFATE 1 MG/G
OINTMENT TOPICAL ONCE
OUTPATIENT
Start: 2024-02-14 | End: 2024-02-14

## 2024-02-14 RX ADMIN — LIDOCAINE HYDROCHLORIDE: 40 SOLUTION TOPICAL at 10:03

## 2024-02-14 NOTE — DISCHARGE INSTRUCTIONS
Discharge Instructions            Visit Discharge/Physician Orders     Discharge condition: Stable     Assessment of pain at discharge:     Anesthetic used: 4% topical lidocaine     Discharge to: Home     Left via:Private automobile     Accompanied by: accompanied by spouse     [facility]F/[facility]: [facility]     Dressing Orders: clean left leg with saline, apply hydrofera blue, kerramax super absorber, abd, coban 2, change every twice weekly    To right leg apply GF blue and COBAN 2. CHANGE TWICE WEEKLY      Treatment Orders: elevate legs above level of heart  RX start antibiotics as directed minocycline and bactrim   If no improvement with wound with next wound care appt update dr. Bermudez and she will start iv therapy     [facility] followup visit _____________1 week_______  (Please note your next appointment above and if you are unable to keep, kindly give a 24 hour notice. Thank you.)     Physician signature:__________________________        If you experience any of the following, please call the Wound Care Center during business hours:     * Increase in Pain  * Temperature over 101  * Increase in drainage from your wound  * Drainage with a foul odor  * Bleeding  * Increase in swelling  * Need for compression bandage changes due to slippage, breakthrough drainage.     If you need medical attention outside of the business hours of the Wound Care Centers please contact your PCP or go to the nearest emergency room.

## 2024-02-14 NOTE — PROGRESS NOTES
Wound Healing Center  History and Physical/Consultation  Podiatry    Referring Physician : Charles Foss MD  Tuan Vega  MEDICAL RECORD NUMBER:  [id number]  AGE: 73 y.o.   GENDER: male  : 1950  EPISODE DATE:  2024  Subjective:     Chief Complaint   Patient presents with    Wound Check     Wound left leg         HISTORY of PRESENT ILLNESS HPI     Tuan Vega is a 73 y.o. male who presents today for wound/ulcer evaluation.   History of Wound Context:  The patient has had a wound of left leg which was first noted approximately months ago.  This has been treated debridemnet. On their initial visit to the wound healing center, 10/11/23 ,  the patient has noted that the wound has not been improving.  The patient has had similar previous wounds in the past.      Pt is on abx at time of initial visit.      Wound/Ulcer Pain Timing/Severity: constant  Quality of pain: aching  Severity:  4 / 10   Modifying Factors: Pain worsens with walking  Associated Signs/Symptoms: edema, erythema, and drainage    Ulcer Identification:  Ulcer Type: venous  Contributing Factors: edema, venous stasis, and lymphedema    Diabetic/Pressure/Non Pressure Ulcers onl y:  Ulcer: Non-Pressure ulcer, fat layer exposed    If patient has diabetic lower extremity wounds  [place] Classification of diabetic lower extremity wounds:    Grade Description   []  0 No open wound   []  1 Superficial ulcer involving the full skin thickness   []  2 Deep ulcer involves ligament, tendon, joint capsule, or fascia  No bone involvement or abscess presence   []  3 Deep Ulcer with abcess formation and/or osteomyelitis   []  4 Localized gangrene   []  5 Extensive gangrene of the foot     Wound: N/A    10-18-23  Debrided, bactrim rx given    10/25/2023  Extensive multiple wounds of the left lower extremity debrided, patient has been treated with antibiotics recently    23  Coban 4 layer, debrided, culture taken    23  Debrided,

## 2024-02-21 ENCOUNTER — HOSPITAL ENCOUNTER (OUTPATIENT)
Dept: WOUND CARE | Age: 74
Discharge: HOME | End: 2024-02-21
Attending: PODIATRIST
Payer: MEDICARE

## 2024-02-21 VITALS — HEART RATE: 91 BPM | DIASTOLIC BLOOD PRESSURE: 82 MMHG | SYSTOLIC BLOOD PRESSURE: 140 MMHG | RESPIRATION RATE: 20 BRPM

## 2024-02-21 DIAGNOSIS — I83.022 VARICOSE VEINS OF LEFT LOWER EXTREMITY WITH ULCER OF CALF WITH FAT LAYER EXPOSED (HCC): Primary | ICD-10-CM

## 2024-02-21 DIAGNOSIS — L97.222 VARICOSE VEINS OF LEFT LOWER EXTREMITY WITH ULCER OF CALF WITH FAT LAYER EXPOSED (HCC): Primary | ICD-10-CM

## 2024-02-21 PROCEDURE — 86403 PARTICLE AGGLUT ANTBDY SCRN: CPT

## 2024-02-21 PROCEDURE — 87070 CULTURE OTHR SPECIMN AEROBIC: CPT

## 2024-02-21 PROCEDURE — 87205 SMEAR GRAM STAIN: CPT

## 2024-02-21 PROCEDURE — 11042 DBRDMT SUBQ TIS 1ST 20SQCM/<: CPT

## 2024-02-21 PROCEDURE — 11045 DBRDMT SUBQ TISS EACH ADDL: CPT

## 2024-02-21 RX ORDER — GINSENG 100 MG
CAPSULE ORAL ONCE
OUTPATIENT
Start: 2024-02-21 | End: 2024-02-21

## 2024-02-21 RX ORDER — LIDOCAINE HYDROCHLORIDE 20 MG/ML
JELLY TOPICAL ONCE
OUTPATIENT
Start: 2024-02-21 | End: 2024-02-21

## 2024-02-21 RX ORDER — LIDOCAINE 40 MG/G
CREAM TOPICAL ONCE
OUTPATIENT
Start: 2024-02-21 | End: 2024-02-21

## 2024-02-21 RX ORDER — GENTAMICIN SULFATE 1 MG/G
OINTMENT TOPICAL ONCE
OUTPATIENT
Start: 2024-02-21 | End: 2024-02-21

## 2024-02-21 RX ORDER — IBUPROFEN 200 MG
TABLET ORAL ONCE
OUTPATIENT
Start: 2024-02-21 | End: 2024-02-21

## 2024-02-21 RX ORDER — LIDOCAINE HYDROCHLORIDE 40 MG/ML
SOLUTION TOPICAL ONCE
Status: COMPLETED | OUTPATIENT
Start: 2024-02-21 | End: 2024-02-21

## 2024-02-21 RX ORDER — LIDOCAINE HYDROCHLORIDE 40 MG/ML
SOLUTION TOPICAL ONCE
OUTPATIENT
Start: 2024-02-21 | End: 2024-02-21

## 2024-02-21 RX ORDER — TRIAMCINOLONE ACETONIDE 1 MG/G
OINTMENT TOPICAL ONCE
OUTPATIENT
Start: 2024-02-21 | End: 2024-02-21

## 2024-02-21 RX ORDER — BETAMETHASONE DIPROPIONATE 0.5 MG/G
CREAM TOPICAL ONCE
OUTPATIENT
Start: 2024-02-21 | End: 2024-02-21

## 2024-02-21 RX ORDER — SODIUM CHLOR/HYPOCHLOROUS ACID 0.033 %
SOLUTION, IRRIGATION IRRIGATION ONCE
OUTPATIENT
Start: 2024-02-21 | End: 2024-02-21

## 2024-02-21 RX ORDER — BACITRACIN ZINC AND POLYMYXIN B SULFATE 500; 1000 [USP'U]/G; [USP'U]/G
OINTMENT TOPICAL ONCE
OUTPATIENT
Start: 2024-02-21 | End: 2024-02-21

## 2024-02-21 RX ORDER — CLOBETASOL PROPIONATE 0.5 MG/G
OINTMENT TOPICAL ONCE
OUTPATIENT
Start: 2024-02-21 | End: 2024-02-21

## 2024-02-21 RX ORDER — LIDOCAINE 50 MG/G
OINTMENT TOPICAL ONCE
OUTPATIENT
Start: 2024-02-21 | End: 2024-02-21

## 2024-02-21 RX ADMIN — LIDOCAINE HYDROCHLORIDE: 40 SOLUTION TOPICAL at 10:21

## 2024-02-21 NOTE — PROGRESS NOTES
relationship of smoking and negative affects on wound healing   - Emphasized importance of tobacco avoidace/cessation   - \Script for nicotine patch given to patient   - Information regarding support groups for smoking cessation given    In my professional opinion and based off the information that is available at this time this patient has appropriate indication for HBO Therapy: Maybe    Treatment Note please see attached Discharge Instructions    Written patient dismissal instructions given to patient and signed by patient or POA.         Discharge Instructions         Discharge Instructions            Visit Discharge/Physician Orders     Discharge condition: Stable     Assessment of pain at discharge:     Anesthetic used: 4% topical lidocaine     Discharge to: Home     Left via:Private automobile     Accompanied by: accompanied by spouse     ECF/HHA: [facility]     Dressing Orders: clean left leg and right leg with saline, apply hydrofera blue, kerramax super absorber, abd, coban 2, change every twice weekly       Treatment Orders: elevate legs above level of heart  RX start antibiotics as directed minocycline and bactrim   If no improvement with wound with next wound care appt update dr. Bermudez and she will start iv therapy     [facility] followup visit _____________1 week_______  (Please note your next appointment above and if you are unable to keep, kindly give a 24 hour notice. Thank you.)     Physician signature:__________________________        If you experience any of the following, please call the Wound Care Center during business hours:     * Increase in Pain  * Temperature over 101  * Increase in drainage from your wound  * Drainage with a foul odor  * Bleeding  * Increase in swelling  * Need for compression bandage changes due to slippage, breakthrough drainage.     If you need medical attention outside of the business hours of the Wound Care Centers please contact your PCP or go to the nearest emergency room.

## 2024-02-21 NOTE — DISCHARGE INSTRUCTIONS
Discharge Instructions            Visit Discharge/Physician Orders     Discharge condition: Stable     Assessment of pain at discharge:     Anesthetic used: 4% topical lidocaine     Discharge to: Home     Left via:Private automobile     Accompanied by: accompanied by spouse     [facility]F/[facility]: [facility]     Dressing Orders: clean left leg and right leg with saline, apply hydrofera blue, kerramax super absorber, abd, coban 2, change every twice weekly       Treatment Orders: elevate legs above level of heart  RX start antibiotics as directed minocycline and bactrim   If no improvement with wound with next wound care appt update dr. Bermudez and she will start iv therapy     [facility] followup visit _____________1 week_______  (Please note your next appointment above and if you are unable to keep, kindly give a 24 hour notice. Thank you.)     Physician signature:__________________________        If you experience any of the following, please call the Wound Care Center during business hours:     * Increase in Pain  * Temperature over 101  * Increase in drainage from your wound  * Drainage with a foul odor  * Bleeding  * Increase in swelling  * Need for compression bandage changes due to slippage, breakthrough drainage.     If you need medical attention outside of the business hours of the Wound Care Centers please contact your PCP or go to the nearest emergency room.

## 2024-02-28 ENCOUNTER — HOSPITAL ENCOUNTER (OUTPATIENT)
Dept: WOUND CARE | Age: 74
Discharge: HOME | End: 2024-02-28
Attending: PODIATRIST
Payer: MEDICARE

## 2024-02-28 VITALS — DIASTOLIC BLOOD PRESSURE: 80 MMHG | HEART RATE: 84 BPM | RESPIRATION RATE: 20 BRPM | SYSTOLIC BLOOD PRESSURE: 150 MMHG

## 2024-02-28 DIAGNOSIS — I83.022 VARICOSE VEINS OF LEFT LOWER EXTREMITY WITH ULCER OF CALF WITH FAT LAYER EXPOSED (HCC): Primary | ICD-10-CM

## 2024-02-28 DIAGNOSIS — L97.222 VARICOSE VEINS OF LEFT LOWER EXTREMITY WITH ULCER OF CALF WITH FAT LAYER EXPOSED (HCC): Primary | ICD-10-CM

## 2024-02-28 PROCEDURE — 11045 DBRDMT SUBQ TISS EACH ADDL: CPT

## 2024-02-28 PROCEDURE — 11042 DBRDMT SUBQ TIS 1ST 20SQCM/<: CPT

## 2024-02-28 RX ORDER — LIDOCAINE 50 MG/G
OINTMENT TOPICAL ONCE
OUTPATIENT
Start: 2024-02-28 | End: 2024-02-28

## 2024-02-28 RX ORDER — LIDOCAINE HYDROCHLORIDE 20 MG/ML
JELLY TOPICAL ONCE
OUTPATIENT
Start: 2024-02-28 | End: 2024-02-28

## 2024-02-28 RX ORDER — DOXYCYCLINE HYCLATE 100 MG
100 TABLET ORAL 2 TIMES DAILY
Qty: 20 TABLET | Refills: 0 | Status: SHIPPED | OUTPATIENT
Start: 2024-02-28 | End: 2024-03-09

## 2024-02-28 RX ORDER — IBUPROFEN 200 MG
TABLET ORAL ONCE
OUTPATIENT
Start: 2024-02-28 | End: 2024-02-28

## 2024-02-28 RX ORDER — CLOBETASOL PROPIONATE 0.5 MG/G
OINTMENT TOPICAL ONCE
OUTPATIENT
Start: 2024-02-28 | End: 2024-02-28

## 2024-02-28 RX ORDER — GENTAMICIN SULFATE 1 MG/G
OINTMENT TOPICAL ONCE
OUTPATIENT
Start: 2024-02-28 | End: 2024-02-28

## 2024-02-28 RX ORDER — TRIAMCINOLONE ACETONIDE 1 MG/G
OINTMENT TOPICAL ONCE
OUTPATIENT
Start: 2024-02-28 | End: 2024-02-28

## 2024-02-28 RX ORDER — BACITRACIN ZINC AND POLYMYXIN B SULFATE 500; 1000 [USP'U]/G; [USP'U]/G
OINTMENT TOPICAL ONCE
OUTPATIENT
Start: 2024-02-28 | End: 2024-02-28

## 2024-02-28 RX ORDER — LIDOCAINE 40 MG/G
CREAM TOPICAL ONCE
OUTPATIENT
Start: 2024-02-28 | End: 2024-02-28

## 2024-02-28 RX ORDER — LIDOCAINE HYDROCHLORIDE 40 MG/ML
SOLUTION TOPICAL ONCE
Status: COMPLETED | OUTPATIENT
Start: 2024-02-28 | End: 2024-02-28

## 2024-02-28 RX ORDER — SODIUM CHLOR/HYPOCHLOROUS ACID 0.033 %
SOLUTION, IRRIGATION IRRIGATION ONCE
OUTPATIENT
Start: 2024-02-28 | End: 2024-02-28

## 2024-02-28 RX ORDER — BETAMETHASONE DIPROPIONATE 0.5 MG/G
CREAM TOPICAL ONCE
OUTPATIENT
Start: 2024-02-28 | End: 2024-02-28

## 2024-02-28 RX ORDER — LIDOCAINE HYDROCHLORIDE 40 MG/ML
SOLUTION TOPICAL ONCE
OUTPATIENT
Start: 2024-02-28 | End: 2024-02-28

## 2024-02-28 RX ORDER — GINSENG 100 MG
CAPSULE ORAL ONCE
OUTPATIENT
Start: 2024-02-28 | End: 2024-02-28

## 2024-02-28 RX ADMIN — LIDOCAINE HYDROCHLORIDE 8 ML: 40 SOLUTION TOPICAL at 10:29

## 2024-02-28 NOTE — DISCHARGE INSTRUCTIONS
Discharge Instructions            Visit Discharge/Physician Orders     Discharge condition: Stable     Assessment of pain at discharge:     Anesthetic used: 4% topical lidocaine     Discharge to: Home     Left via:Private automobile     Accompanied by: accompanied by spouse     [facility]F/[facility]: [facility]     Dressing Orders: clean left leg and right leg with saline, apply hydrofera blue, kerramax super absorber, abd, coban 2, change every twice weekly       Treatment Orders: elevate legs above level of heart  RX start antibiotics as directed minocycline and bactrim   If no improvement with wound with next wound care appt update dr. Bermudez and she will start iv therapy     [facility] followup visit _____________1 week_______  (Please note your next appointment above and if you are unable to keep, kindly give a 24 hour notice. Thank you.)     Physician signature:__________________________        If you experience any of the following, please call the Wound Care Center during business hours:     * Increase in Pain  * Temperature over 101  * Increase in drainage from your wound  * Drainage with a foul odor  * Bleeding  * Increase in swelling  * Need for compression bandage changes due to slippage, breakthrough drainage.     If you need medical attention outside of the business hours of the Wound Care Centers please contact your PCP or go to the nearest emergency room.

## 2024-02-28 NOTE — PROGRESS NOTES
to the nearest emergency room.                          Electronically signed by Prosper Mccarthy DPM on 2/28/2024 at 10:55 AM

## 2024-03-06 ENCOUNTER — HOSPITAL ENCOUNTER (OUTPATIENT)
Dept: WOUND CARE | Age: 74
Discharge: HOME | End: 2024-03-06
Attending: PODIATRIST
Payer: MEDICARE

## 2024-03-06 VITALS
RESPIRATION RATE: 24 BRPM | HEIGHT: 67 IN | DIASTOLIC BLOOD PRESSURE: 77 MMHG | TEMPERATURE: 96.9 F | BODY MASS INDEX: 49.44 KG/M2 | WEIGHT: 315 LBS | HEART RATE: 90 BPM | SYSTOLIC BLOOD PRESSURE: 165 MMHG

## 2024-03-06 DIAGNOSIS — L97.222 VARICOSE VEINS OF LEFT LOWER EXTREMITY WITH ULCER OF CALF WITH FAT LAYER EXPOSED (HCC): Primary | ICD-10-CM

## 2024-03-06 DIAGNOSIS — I83.022 VARICOSE VEINS OF LEFT LOWER EXTREMITY WITH ULCER OF CALF WITH FAT LAYER EXPOSED (HCC): Primary | ICD-10-CM

## 2024-03-06 PROCEDURE — 11042 DBRDMT SUBQ TIS 1ST 20SQCM/<: CPT

## 2024-03-06 PROCEDURE — 11045 DBRDMT SUBQ TISS EACH ADDL: CPT

## 2024-03-06 RX ORDER — LIDOCAINE HYDROCHLORIDE 20 MG/ML
JELLY TOPICAL ONCE
OUTPATIENT
Start: 2024-03-06 | End: 2024-03-06

## 2024-03-06 RX ORDER — SODIUM CHLOR/HYPOCHLOROUS ACID 0.033 %
SOLUTION, IRRIGATION IRRIGATION ONCE
OUTPATIENT
Start: 2024-03-06 | End: 2024-03-06

## 2024-03-06 RX ORDER — BETAMETHASONE DIPROPIONATE 0.5 MG/G
CREAM TOPICAL ONCE
OUTPATIENT
Start: 2024-03-06 | End: 2024-03-06

## 2024-03-06 RX ORDER — GENTAMICIN SULFATE 1 MG/G
OINTMENT TOPICAL ONCE
OUTPATIENT
Start: 2024-03-06 | End: 2024-03-06

## 2024-03-06 RX ORDER — LIDOCAINE 50 MG/G
OINTMENT TOPICAL ONCE
OUTPATIENT
Start: 2024-03-06 | End: 2024-03-06

## 2024-03-06 RX ORDER — TRIAMCINOLONE ACETONIDE 1 MG/G
OINTMENT TOPICAL ONCE
OUTPATIENT
Start: 2024-03-06 | End: 2024-03-06

## 2024-03-06 RX ORDER — IBUPROFEN 200 MG
TABLET ORAL ONCE
OUTPATIENT
Start: 2024-03-06 | End: 2024-03-06

## 2024-03-06 RX ORDER — LIDOCAINE HYDROCHLORIDE 40 MG/ML
SOLUTION TOPICAL ONCE
Status: COMPLETED | OUTPATIENT
Start: 2024-03-06 | End: 2024-03-06

## 2024-03-06 RX ORDER — LIDOCAINE HYDROCHLORIDE 40 MG/ML
SOLUTION TOPICAL ONCE
OUTPATIENT
Start: 2024-03-06 | End: 2024-03-06

## 2024-03-06 RX ORDER — BACITRACIN ZINC AND POLYMYXIN B SULFATE 500; 1000 [USP'U]/G; [USP'U]/G
OINTMENT TOPICAL ONCE
OUTPATIENT
Start: 2024-03-06 | End: 2024-03-06

## 2024-03-06 RX ORDER — GINSENG 100 MG
CAPSULE ORAL ONCE
OUTPATIENT
Start: 2024-03-06 | End: 2024-03-06

## 2024-03-06 RX ORDER — CLOBETASOL PROPIONATE 0.5 MG/G
OINTMENT TOPICAL ONCE
OUTPATIENT
Start: 2024-03-06 | End: 2024-03-06

## 2024-03-06 RX ORDER — LIDOCAINE 40 MG/G
CREAM TOPICAL ONCE
OUTPATIENT
Start: 2024-03-06 | End: 2024-03-06

## 2024-03-06 RX ADMIN — LIDOCAINE HYDROCHLORIDE: 40 SOLUTION TOPICAL at 10:16

## 2024-03-06 NOTE — DISCHARGE INSTRUCTIONS
Discharge Instructions            Discharge Instructions            Visit Discharge/Physician Orders     Discharge condition: Stable     Assessment of pain at discharge:     Anesthetic used: 4% topical lidocaine     Discharge to: Home     Left via:Private automobile     Accompanied by: accompanied by spouse     ECF/HHA: [facility]     Dressing Orders: clean left leg and right leg with saline, apply hydrofera blue, kerramax super absorber, abd, coban 2, change every twice weekly       Treatment Orders: elevate legs above level of heart  RX start antibiotics as directed minocycline and bactrim   If no improvement with wound with next wound care appt update dr. Bermudez and she will start iv therapy     [facility] followup visit _____________1 week_______  (Please note your next appointment above and if you are unable to keep, kindly give a 24 hour notice. Thank you.)     Physician signature:__________________________        If you experience any of the following, please call the Wound Care Center during business hours:     * Increase in Pain  * Temperature over 101  * Increase in drainage from your wound  * Drainage with a foul odor  * Bleeding  * Increase in swelling  * Need for compression bandage changes due to slippage, breakthrough drainage.     If you need medical attention outside of the business hours of the Wound Care Centers please contact your PCP or go to the nearest emergency room.

## 2024-03-06 NOTE — PROGRESS NOTES
Wound Healing Center  History and Physical/Consultation  Podiatry    Referring Physician : Charles Foss MD  Tuan Vega  MEDICAL RECORD NUMBER:  [id number]  AGE: 73 y.o.   GENDER: male  : 1950  EPISODE DATE:  2024  Subjective:     Chief Complaint   Patient presents with    Wound Check     Wounds bilateral legs         HISTORY of PRESENT ILLNESS HPI     Tuan Vega is a 73 y.o. male who presents today for wound/ulcer evaluation.   History of Wound Context:  The patient has had a wound of left leg which was first noted approximately months ago.  This has been treated debridemnet. On their initial visit to the wound healing center, 10/11/23 ,  the patient has noted that the wound has not been improving.  The patient has had similar previous wounds in the past.      Pt is on abx at time of initial visit.      Wound/Ulcer Pain Timing/Severity: constant  Quality of pain: aching  Severity:  4 / 10   Modifying Factors: Pain worsens with walking  Associated Signs/Symptoms: edema, erythema, and drainage    Ulcer Identification:  Ulcer Type: venous  Contributing Factors: edema, venous stasis, and lymphedema    Diabetic/Pressure/Non Pressure Ulcers onl y:  Ulcer: Non-Pressure ulcer, fat layer exposed    If patient has diabetic lower extremity wounds  Magaña Classification of diabetic lower extremity wounds:    Grade Description   []  0 No open wound   []  1 Superficial ulcer involving the full skin thickness   []  2 Deep ulcer involves ligament, tendon, joint capsule, or fascia  No bone involvement or abscess presence   []  3 Deep Ulcer with abcess formation and/or osteomyelitis   []  4 Localized gangrene   []  5 Extensive gangrene of the foot     Wound: N/A    10-18-23  Debrided, bactrim rx given    10/25/2023  Extensive multiple wounds of the left lower extremity debrided, patient has been treated with antibiotics recently    23  Coban 4 layer, debrided, culture

## 2024-03-08 RX ORDER — ROSUVASTATIN CALCIUM 5 MG/1
5 TABLET, COATED ORAL DAILY
Qty: 90 TABLET | Refills: 0 | Status: SHIPPED | OUTPATIENT
Start: 2024-03-08

## 2024-03-08 RX ORDER — CARVEDILOL 25 MG/1
25 TABLET ORAL 2 TIMES DAILY WITH MEALS
Qty: 180 TABLET | Refills: 0 | Status: SHIPPED | OUTPATIENT
Start: 2024-03-08

## 2024-03-13 ENCOUNTER — HOSPITAL ENCOUNTER (OUTPATIENT)
Dept: WOUND CARE | Age: 74
Discharge: HOME | End: 2024-03-13
Attending: PODIATRIST
Payer: MEDICARE

## 2024-03-13 DIAGNOSIS — L97.222 VARICOSE VEINS OF LEFT LOWER EXTREMITY WITH ULCER OF CALF WITH FAT LAYER EXPOSED (HCC): Primary | ICD-10-CM

## 2024-03-13 DIAGNOSIS — I83.022 VARICOSE VEINS OF LEFT LOWER EXTREMITY WITH ULCER OF CALF WITH FAT LAYER EXPOSED (HCC): Primary | ICD-10-CM

## 2024-03-13 PROCEDURE — 11045 DBRDMT SUBQ TISS EACH ADDL: CPT

## 2024-03-13 PROCEDURE — 11042 DBRDMT SUBQ TIS 1ST 20SQCM/<: CPT

## 2024-03-13 RX ORDER — LIDOCAINE HYDROCHLORIDE 40 MG/ML
SOLUTION TOPICAL ONCE
OUTPATIENT
Start: 2024-03-13 | End: 2024-03-13

## 2024-03-13 RX ORDER — GENTAMICIN SULFATE 1 MG/G
OINTMENT TOPICAL ONCE
OUTPATIENT
Start: 2024-03-13 | End: 2024-03-13

## 2024-03-13 RX ORDER — LIDOCAINE HYDROCHLORIDE 20 MG/ML
JELLY TOPICAL ONCE
OUTPATIENT
Start: 2024-03-13 | End: 2024-03-13

## 2024-03-13 RX ORDER — LIDOCAINE 40 MG/G
CREAM TOPICAL ONCE
OUTPATIENT
Start: 2024-03-13 | End: 2024-03-13

## 2024-03-13 RX ORDER — SODIUM CHLOR/HYPOCHLOROUS ACID 0.033 %
SOLUTION, IRRIGATION IRRIGATION ONCE
OUTPATIENT
Start: 2024-03-13 | End: 2024-03-13

## 2024-03-13 RX ORDER — IBUPROFEN 200 MG
TABLET ORAL ONCE
OUTPATIENT
Start: 2024-03-13 | End: 2024-03-13

## 2024-03-13 RX ORDER — BACITRACIN ZINC AND POLYMYXIN B SULFATE 500; 1000 [USP'U]/G; [USP'U]/G
OINTMENT TOPICAL ONCE
OUTPATIENT
Start: 2024-03-13 | End: 2024-03-13

## 2024-03-13 RX ORDER — CLOBETASOL PROPIONATE 0.5 MG/G
OINTMENT TOPICAL ONCE
OUTPATIENT
Start: 2024-03-13 | End: 2024-03-13

## 2024-03-13 RX ORDER — LIDOCAINE HYDROCHLORIDE 40 MG/ML
SOLUTION TOPICAL ONCE
Status: COMPLETED | OUTPATIENT
Start: 2024-03-13 | End: 2024-03-13

## 2024-03-13 RX ORDER — BETAMETHASONE DIPROPIONATE 0.5 MG/G
CREAM TOPICAL ONCE
OUTPATIENT
Start: 2024-03-13 | End: 2024-03-13

## 2024-03-13 RX ORDER — TRIAMCINOLONE ACETONIDE 1 MG/G
OINTMENT TOPICAL ONCE
OUTPATIENT
Start: 2024-03-13 | End: 2024-03-13

## 2024-03-13 RX ORDER — GINSENG 100 MG
CAPSULE ORAL ONCE
OUTPATIENT
Start: 2024-03-13 | End: 2024-03-13

## 2024-03-13 RX ORDER — LIDOCAINE 50 MG/G
OINTMENT TOPICAL ONCE
OUTPATIENT
Start: 2024-03-13 | End: 2024-03-13

## 2024-03-13 RX ADMIN — LIDOCAINE HYDROCHLORIDE: 40 SOLUTION TOPICAL at 10:01

## 2024-03-13 NOTE — DISCHARGE INSTRUCTIONS
Discharge Instructions     Visit Discharge/Physician Orders     Discharge condition: Stable     Assessment of pain at discharge:     Anesthetic used: 4% topical lidocaine     Discharge to: Home     Left via:Private automobile     Accompanied by: accompanied by spouse     ECF/HHA: [facility]     Dressing Orders: clean left leg and right leg with saline, apply hydrofera blue, kerramax super absorber, abd, coban 2, change every twice weekly       Treatment Orders: elevate legs above level of heart  RX start antibiotics as directed minocycline and bactrim   If no improvement with wound with next wound care appt update dr. Bermudez and she will start iv therapy     [facility] followup visit _____________1 week_______  (Please note your next appointment above and if you are unable to keep, kindly give a 24 hour notice. Thank you.)     Physician signature:__________________________        If you experience any of the following, please call the Wound Care Center during business hours:     * Increase in Pain  * Temperature over 101  * Increase in drainage from your wound  * Drainage with a foul odor  * Bleeding  * Increase in swelling  * Need for compression bandage changes due to slippage, breakthrough drainage.     If you need medical attention outside of the business hours of the Wound Care Centers please contact your PCP or go to the nearest emergency room.

## 2024-03-13 NOTE — PROGRESS NOTES
Wound Healing Center  History and Physical/Consultation  Podiatry    Referring Physician : Charles Foss MD  Tuan Vega  MEDICAL RECORD NUMBER:  [id number]  AGE: 73 y.o.   GENDER: male  : 1950  EPISODE DATE:  3/13/2024  Subjective:     Chief Complaint   Patient presents with    Wound Check         HISTORY of PRESENT ILLNESS HPI     Tuan Vega is a 73 y.o. male who presents today for wound/ulcer evaluation.   History of Wound Context:  The patient has had a wound of left leg which was first noted approximately months ago.  This has been treated debridemnet. On their initial visit to the wound healing center, 10/11/23 ,  the patient has noted that the wound has not been improving.  The patient has had similar previous wounds in the past.      Pt is on abx at time of initial visit.      Wound/Ulcer Pain Timing/Severity: constant  Quality of pain: aching  Severity:  4 / 10   Modifying Factors: Pain worsens with walking  Associated Signs/Symptoms: edema, erythema, and drainage    Ulcer Identification:  Ulcer Type: venous  Contributing Factors: edema, venous stasis, and lymphedema    Diabetic/Pressure/Non Pressure Ulcers onl y:  Ulcer: Non-Pressure ulcer, fat layer exposed    If patient has diabetic lower extremity wounds  Magaña Classification of diabetic lower extremity wounds:    Grade Description   []  0 No open wound   []  1 Superficial ulcer involving the full skin thickness   []  2 Deep ulcer involves ligament, tendon, joint capsule, or fascia  No bone involvement or abscess presence   []  3 Deep Ulcer with abcess formation and/or osteomyelitis   []  4 Localized gangrene   []  5 Extensive gangrene of the foot     Wound: N/A    10-18-23  Debrided, bactrim rx given    10/25/2023  Extensive multiple wounds of the left lower extremity debrided, patient has been treated with antibiotics recently    23  Coban 4 layer, debrided, culture taken    23  Debrided, needs id consult from

## 2024-03-20 ENCOUNTER — HOSPITAL ENCOUNTER (OUTPATIENT)
Dept: WOUND CARE | Age: 74
Discharge: HOME | End: 2024-03-20
Attending: PODIATRIST
Payer: MEDICARE

## 2024-03-20 VITALS — TEMPERATURE: 97.7 F | HEART RATE: 82 BPM | DIASTOLIC BLOOD PRESSURE: 70 MMHG | SYSTOLIC BLOOD PRESSURE: 143 MMHG

## 2024-03-20 DIAGNOSIS — I83.022 VARICOSE VEINS OF LEFT LOWER EXTREMITY WITH ULCER OF CALF WITH FAT LAYER EXPOSED (HCC): Primary | ICD-10-CM

## 2024-03-20 DIAGNOSIS — L97.222 VARICOSE VEINS OF LEFT LOWER EXTREMITY WITH ULCER OF CALF WITH FAT LAYER EXPOSED (HCC): Primary | ICD-10-CM

## 2024-03-20 PROCEDURE — 11045 DBRDMT SUBQ TISS EACH ADDL: CPT

## 2024-03-20 PROCEDURE — 11042 DBRDMT SUBQ TIS 1ST 20SQCM/<: CPT

## 2024-03-20 RX ORDER — SODIUM CHLOR/HYPOCHLOROUS ACID 0.033 %
SOLUTION, IRRIGATION IRRIGATION ONCE
OUTPATIENT
Start: 2024-03-20 | End: 2024-03-20

## 2024-03-20 RX ORDER — LIDOCAINE HYDROCHLORIDE 40 MG/ML
SOLUTION TOPICAL ONCE
OUTPATIENT
Start: 2024-03-20 | End: 2024-03-20

## 2024-03-20 RX ORDER — GENTAMICIN SULFATE 1 MG/G
OINTMENT TOPICAL ONCE
OUTPATIENT
Start: 2024-03-20 | End: 2024-03-20

## 2024-03-20 RX ORDER — CLOBETASOL PROPIONATE 0.5 MG/G
OINTMENT TOPICAL ONCE
OUTPATIENT
Start: 2024-03-20 | End: 2024-03-20

## 2024-03-20 RX ORDER — LIDOCAINE HYDROCHLORIDE 20 MG/ML
JELLY TOPICAL ONCE
OUTPATIENT
Start: 2024-03-20 | End: 2024-03-20

## 2024-03-20 RX ORDER — LIDOCAINE 50 MG/G
OINTMENT TOPICAL ONCE
OUTPATIENT
Start: 2024-03-20 | End: 2024-03-20

## 2024-03-20 RX ORDER — TRIAMCINOLONE ACETONIDE 1 MG/G
OINTMENT TOPICAL ONCE
OUTPATIENT
Start: 2024-03-20 | End: 2024-03-20

## 2024-03-20 RX ORDER — IBUPROFEN 200 MG
TABLET ORAL ONCE
OUTPATIENT
Start: 2024-03-20 | End: 2024-03-20

## 2024-03-20 RX ORDER — LIDOCAINE HYDROCHLORIDE 40 MG/ML
SOLUTION TOPICAL ONCE
Status: COMPLETED | OUTPATIENT
Start: 2024-03-20 | End: 2024-03-20

## 2024-03-20 RX ORDER — BACITRACIN ZINC AND POLYMYXIN B SULFATE 500; 1000 [USP'U]/G; [USP'U]/G
OINTMENT TOPICAL ONCE
OUTPATIENT
Start: 2024-03-20 | End: 2024-03-20

## 2024-03-20 RX ORDER — GINSENG 100 MG
CAPSULE ORAL ONCE
OUTPATIENT
Start: 2024-03-20 | End: 2024-03-20

## 2024-03-20 RX ORDER — LIDOCAINE 40 MG/G
CREAM TOPICAL ONCE
OUTPATIENT
Start: 2024-03-20 | End: 2024-03-20

## 2024-03-20 RX ORDER — BETAMETHASONE DIPROPIONATE 0.5 MG/G
CREAM TOPICAL ONCE
OUTPATIENT
Start: 2024-03-20 | End: 2024-03-20

## 2024-03-20 RX ADMIN — LIDOCAINE HYDROCHLORIDE: 40 SOLUTION TOPICAL at 10:39

## 2024-03-20 ASSESSMENT — PAIN DESCRIPTION - FREQUENCY: FREQUENCY: INTERMITTENT

## 2024-03-20 ASSESSMENT — PAIN - FUNCTIONAL ASSESSMENT: PAIN_FUNCTIONAL_ASSESSMENT: PREVENTS OR INTERFERES SOME ACTIVE ACTIVITIES AND ADLS

## 2024-03-20 ASSESSMENT — PAIN DESCRIPTION - PAIN TYPE: TYPE: CHRONIC PAIN

## 2024-03-20 ASSESSMENT — PAIN DESCRIPTION - ONSET: ONSET: ON-GOING

## 2024-03-20 ASSESSMENT — PAIN DESCRIPTION - ORIENTATION: ORIENTATION: RIGHT

## 2024-03-20 ASSESSMENT — PAIN DESCRIPTION - DESCRIPTORS: DESCRIPTORS: DISCOMFORT

## 2024-03-20 ASSESSMENT — PAIN SCALES - GENERAL: PAINLEVEL_OUTOF10: 5

## 2024-03-20 ASSESSMENT — PAIN DESCRIPTION - LOCATION: LOCATION: LEG

## 2024-03-20 NOTE — PROGRESS NOTES
Wound Healing Center  History and Physical/Consultation  Podiatry    Referring Physician : Charles Foss MD  Tuan Vega  MEDICAL RECORD NUMBER:  [id number]  AGE: 73 y.o.   GENDER: male  : 1950  EPISODE DATE:  3/13/2024  Subjective:     Chief Complaint   Patient presents with    Wound Check         HISTORY of PRESENT ILLNESS HPI     Tuan Vega is a 73 y.o. male who presents today for wound/ulcer evaluation.   History of Wound Context:  The patient has had a wound of left leg which was first noted approximately months ago.  This has been treated debridemnet. On their initial visit to the wound healing center, 10/11/23 ,  the patient has noted that the wound has not been improving.  The patient has had similar previous wounds in the past.      Pt is on abx at time of initial visit.      Wound/Ulcer Pain Timing/Severity: constant  Quality of pain: aching  Severity:  4 / 10   Modifying Factors: Pain worsens with walking  Associated Signs/Symptoms: edema, erythema, and drainage    Ulcer Identification:  Ulcer Type: venous  Contributing Factors: edema, venous stasis, and lymphedema    Diabetic/Pressure/Non Pressure Ulcers onl y:  Ulcer: Non-Pressure ulcer, fat layer exposed    If patient has diabetic lower extremity wounds  Magaña Classification of diabetic lower extremity wounds:    Grade Description   []  0 No open wound   []  1 Superficial ulcer involving the full skin thickness   []  2 Deep ulcer involves ligament, tendon, joint capsule, or fascia  No bone involvement or abscess presence   []  3 Deep Ulcer with abcess formation and/or osteomyelitis   []  4 Localized gangrene   []  5 Extensive gangrene of the foot     Wound: N/A    10-18-23  Debrided, bactrim rx given    10/25/2023  Extensive multiple wounds of the left lower extremity debrided, patient has been treated with antibiotics recently    23  Coban 4 layer, debrided, culture taken    23  Debrided, needs id consult from

## 2024-03-20 NOTE — DISCHARGE INSTRUCTIONS
Discharge Instructions            Discharge Instructions      Visit Discharge/Physician Orders     Discharge condition: Stable     Assessment of pain at discharge:     Anesthetic used: 4% topical lidocaine     Discharge to: Home     Left via:Private automobile     Accompanied by: accompanied by spouse     ECF/HHA: [facility]     Dressing Orders: clean left leg and right leg with saline, apply hydrofera blue, kerramax super absorber, abd, coban 2, change every twice weekly       Treatment Orders: elevate legs above level of heart  RX start antibiotics as directed minocycline and bactrim   If no improvement with wound with next wound care appt update dr. Bermudez and she will start iv therapy     [facility] followup visit _____________1 week_______  (Please note your next appointment above and if you are unable to keep, kindly give a 24 hour notice. Thank you.)     Physician signature:__________________________        If you experience any of the following, please call the Wound Care Center during business hours:     * Increase in Pain  * Temperature over 101  * Increase in drainage from your wound  * Drainage with a foul odor  * Bleeding  * Increase in swelling  * Need for compression bandage changes due to slippage, breakthrough drainage.     If you need medical attention outside of the business hours of the Wound Care Centers please contact your PCP or go to the nearest emergency room.

## 2024-03-25 NOTE — DISCHARGE INSTRUCTIONS
Discharge Instructions            Discharge Instructions      Visit Discharge/Physician Orders     Discharge condition: Stable     Assessment of pain at discharge:     Anesthetic used: 4% topical lidocaine     Discharge to: Home     Left via:Private automobile     Accompanied by: accompanied by spouse     ECF/HHA: [facility]     Dressing Orders: clean left leg and right leg with saline, apply aquacel ag, kerramax super absorber, abd, coban 2, change every twice weekly    Zinc oxide to reynaldo wound   Rx for LEVAQUIN 3/27/24  X 1 culture left leg 3/27/24     Treatment Orders: elevate legs above level of heart    If no improvement with wound with next wound care appt update dr. Bermudez and she will start iv therapy     [facility] followup visit _____________1 week_______  (Please note your next appointment above and if you are unable to keep, kindly give a 24 hour notice. Thank you.)     Physician signature:__________________________        If you experience any of the following, please call the Wound Care Center during business hours:     * Increase in Pain  * Temperature over 101  * Increase in drainage from your wound  * Drainage with a foul odor  * Bleeding  * Increase in swelling  * Need for compression bandage changes due to slippage, breakthrough drainage.     If you need medical attention outside of the business hours of the Wound Care Centers please contact your PCP or go to the nearest emergency room.

## 2024-03-27 ENCOUNTER — HOSPITAL ENCOUNTER (OUTPATIENT)
Dept: WOUND CARE | Age: 74
Discharge: HOME | End: 2024-03-27
Attending: PODIATRIST
Payer: MEDICARE

## 2024-03-27 VITALS — DIASTOLIC BLOOD PRESSURE: 98 MMHG | TEMPERATURE: 97.2 F | SYSTOLIC BLOOD PRESSURE: 162 MMHG | HEART RATE: 80 BPM

## 2024-03-27 DIAGNOSIS — I83.022 VARICOSE VEINS OF LEFT LOWER EXTREMITY WITH ULCER OF CALF WITH FAT LAYER EXPOSED (HCC): Primary | ICD-10-CM

## 2024-03-27 DIAGNOSIS — L97.222 VARICOSE VEINS OF LEFT LOWER EXTREMITY WITH ULCER OF CALF WITH FAT LAYER EXPOSED (HCC): Primary | ICD-10-CM

## 2024-03-27 PROCEDURE — 11042 DBRDMT SUBQ TIS 1ST 20SQCM/<: CPT

## 2024-03-27 PROCEDURE — 86403 PARTICLE AGGLUT ANTBDY SCRN: CPT

## 2024-03-27 PROCEDURE — 87070 CULTURE OTHR SPECIMN AEROBIC: CPT

## 2024-03-27 PROCEDURE — 87205 SMEAR GRAM STAIN: CPT

## 2024-03-27 PROCEDURE — 11045 DBRDMT SUBQ TISS EACH ADDL: CPT

## 2024-03-27 RX ORDER — TRIAMCINOLONE ACETONIDE 1 MG/G
OINTMENT TOPICAL ONCE
OUTPATIENT
Start: 2024-03-27 | End: 2024-03-27

## 2024-03-27 RX ORDER — LIDOCAINE HYDROCHLORIDE 20 MG/ML
JELLY TOPICAL ONCE
OUTPATIENT
Start: 2024-03-27 | End: 2024-03-27

## 2024-03-27 RX ORDER — SODIUM CHLOR/HYPOCHLOROUS ACID 0.033 %
SOLUTION, IRRIGATION IRRIGATION ONCE
OUTPATIENT
Start: 2024-03-27 | End: 2024-03-27

## 2024-03-27 RX ORDER — BACITRACIN ZINC AND POLYMYXIN B SULFATE 500; 1000 [USP'U]/G; [USP'U]/G
OINTMENT TOPICAL ONCE
OUTPATIENT
Start: 2024-03-27 | End: 2024-03-27

## 2024-03-27 RX ORDER — CLOBETASOL PROPIONATE 0.5 MG/G
OINTMENT TOPICAL ONCE
OUTPATIENT
Start: 2024-03-27 | End: 2024-03-27

## 2024-03-27 RX ORDER — GENTAMICIN SULFATE 1 MG/G
OINTMENT TOPICAL ONCE
OUTPATIENT
Start: 2024-03-27 | End: 2024-03-27

## 2024-03-27 RX ORDER — LEVOFLOXACIN 500 MG/1
500 TABLET, FILM COATED ORAL DAILY
Status: DISCONTINUED | OUTPATIENT
Start: 2024-03-27 | End: 2024-03-28 | Stop reason: HOSPADM

## 2024-03-27 RX ORDER — IBUPROFEN 200 MG
TABLET ORAL ONCE
OUTPATIENT
Start: 2024-03-27 | End: 2024-03-27

## 2024-03-27 RX ORDER — LIDOCAINE HYDROCHLORIDE 40 MG/ML
SOLUTION TOPICAL ONCE
Status: COMPLETED | OUTPATIENT
Start: 2024-03-27 | End: 2024-03-27

## 2024-03-27 RX ORDER — LIDOCAINE 40 MG/G
CREAM TOPICAL ONCE
OUTPATIENT
Start: 2024-03-27 | End: 2024-03-27

## 2024-03-27 RX ORDER — LIDOCAINE HYDROCHLORIDE 40 MG/ML
SOLUTION TOPICAL ONCE
OUTPATIENT
Start: 2024-03-27 | End: 2024-03-27

## 2024-03-27 RX ORDER — GINSENG 100 MG
CAPSULE ORAL ONCE
OUTPATIENT
Start: 2024-03-27 | End: 2024-03-27

## 2024-03-27 RX ORDER — BETAMETHASONE DIPROPIONATE 0.5 MG/G
CREAM TOPICAL ONCE
OUTPATIENT
Start: 2024-03-27 | End: 2024-03-27

## 2024-03-27 RX ORDER — LIDOCAINE 50 MG/G
OINTMENT TOPICAL ONCE
OUTPATIENT
Start: 2024-03-27 | End: 2024-03-27

## 2024-03-27 RX ADMIN — LIDOCAINE HYDROCHLORIDE 10 ML: 40 SOLUTION TOPICAL at 10:16

## 2024-03-27 NOTE — PROGRESS NOTES
taken    11-8-23  Debrided, needs id consult from culture    11-15-23  Debrided, healing well    11-29-23  Debrided, healing well    12-20-23  Debrided, cont tx and care    12-27-23  Debrided, healing well    1-3-24  Debrided, healing well, cont tx and compression    1-10-24  Debrided, cont tx and care    1-17-24  Debrided, cont tx and care    1-24-24  Debrided, healing well, hydrofera blue with keramax    1-29-24  Great improvement, use dressings as directed    2-7-24  Debrided, Healing well    2-14-24  Debrided, con tx and care    2-21-24  Debrided, cont tx and care    2-28-24  Debrided, cont tx and care    3-6-24  Debrided, cont tx and care, new wound noted right leg, same tx     3-13-24  Debrided, cont tx and care    3-20-24  Debrided, same tx as left leg to right    3-27-24  Debrided, worse on left, dressing wet, culture taken, aquacel ag and coban 2 dressing qod.  levaquin 500mg daily x 7 days          PAST MEDICAL HISTORY      Diagnosis Date    CAD (coronary artery disease)     Cataract, right eye     Complete heart block (HCC)     h/o    Diabetes mellitus (HCC)     Encounter for aspirin therapy     patient uses for heart health    Glaucoma     History of blood transfusion     Hx of blood clots     Hyperlipidemia     Hypertension     Hyperthyroidism     Lower limb ulcer, calf, left, with fat layer exposed (HCC) 10/25/2023    Lymphedema of both lower extremities 10/25/2023    Myocardial infarct (HCC) 2010    Neuropathy     feet    Obesity     Obstructive sleep apnea     on cpap    Onychomycosis     DEMETRIA on CPAP     Osteoarthritis     Pacemaker     [employer]tr[employer]    Stasis dermatitis     h/o on bilateral legs with leg edema    Varicose veins      Past Surgical History:   Procedure Laterality Date    CARDIAC PACEMAKER PLACEMENT  04/09/2010    [employer]tr[employer]    CHOLECYSTECTOMY      COLONOSCOPY  01/20/2016    INTRACAPSULAR CATARACT EXTRACTION Right 04/27/2022    RIGHT EYE CATARACT EXTRACTION IOL IMPLANT AND GONIOTOMY

## 2024-03-30 LAB
MICROORGANISM/AGENT SPEC: ABNORMAL

## 2024-04-03 ENCOUNTER — HOSPITAL ENCOUNTER (OUTPATIENT)
Dept: WOUND CARE | Age: 74
Discharge: HOME | End: 2024-04-03
Attending: PODIATRIST
Payer: MEDICARE

## 2024-04-03 VITALS
RESPIRATION RATE: 22 BRPM | HEART RATE: 84 BPM | SYSTOLIC BLOOD PRESSURE: 144 MMHG | DIASTOLIC BLOOD PRESSURE: 84 MMHG | TEMPERATURE: 96.5 F

## 2024-04-03 DIAGNOSIS — E11.51 TYPE II DIABETES MELLITUS WITH PERIPHERAL CIRCULATORY DISORDER (HCC): ICD-10-CM

## 2024-04-03 DIAGNOSIS — I83.022 VARICOSE VEINS OF LEFT LOWER EXTREMITY WITH ULCER OF CALF WITH FAT LAYER EXPOSED (HCC): Primary | ICD-10-CM

## 2024-04-03 DIAGNOSIS — I87.2 VENOUS INSUFFICIENCY (CHRONIC) (PERIPHERAL): ICD-10-CM

## 2024-04-03 DIAGNOSIS — L97.922 NON-PRESSURE CHRONIC ULCER OF LEFT LOWER LEG WITH FAT LAYER EXPOSED (HCC): ICD-10-CM

## 2024-04-03 DIAGNOSIS — L97.312 VARICOSE VEINS OF RIGHT LOWER EXTREMITY WITH INFLAMMATION, WITH ULCER OF ANKLE WITH FAT LAYER EXPOSED (HCC): ICD-10-CM

## 2024-04-03 DIAGNOSIS — E11.42 DIABETIC POLYNEUROPATHY ASSOCIATED WITH TYPE 2 DIABETES MELLITUS (HCC): ICD-10-CM

## 2024-04-03 DIAGNOSIS — L97.812 NON-PRESSURE CHRONIC ULCER OF OTHER PART OF RIGHT LOWER LEG WITH FAT LAYER EXPOSED (HCC): ICD-10-CM

## 2024-04-03 DIAGNOSIS — I73.9 PVD (PERIPHERAL VASCULAR DISEASE) (HCC): ICD-10-CM

## 2024-04-03 DIAGNOSIS — I83.213 VARICOSE VEINS OF RIGHT LOWER EXTREMITY WITH INFLAMMATION, WITH ULCER OF ANKLE WITH FAT LAYER EXPOSED (HCC): ICD-10-CM

## 2024-04-03 DIAGNOSIS — L97.222 VARICOSE VEINS OF LEFT LOWER EXTREMITY WITH ULCER OF CALF WITH FAT LAYER EXPOSED (HCC): Primary | ICD-10-CM

## 2024-04-03 PROCEDURE — 11042 DBRDMT SUBQ TIS 1ST 20SQCM/<: CPT

## 2024-04-03 PROCEDURE — 11045 DBRDMT SUBQ TISS EACH ADDL: CPT

## 2024-04-03 RX ORDER — GENTAMICIN SULFATE 1 MG/G
OINTMENT TOPICAL ONCE
OUTPATIENT
Start: 2024-04-03 | End: 2024-04-03

## 2024-04-03 RX ORDER — SODIUM CHLOR/HYPOCHLOROUS ACID 0.033 %
SOLUTION, IRRIGATION IRRIGATION ONCE
OUTPATIENT
Start: 2024-04-03 | End: 2024-04-03

## 2024-04-03 RX ORDER — CLOBETASOL PROPIONATE 0.5 MG/G
OINTMENT TOPICAL ONCE
OUTPATIENT
Start: 2024-04-03 | End: 2024-04-03

## 2024-04-03 RX ORDER — TRIAMCINOLONE ACETONIDE 1 MG/G
OINTMENT TOPICAL ONCE
OUTPATIENT
Start: 2024-04-03 | End: 2024-04-03

## 2024-04-03 RX ORDER — GINSENG 100 MG
CAPSULE ORAL ONCE
OUTPATIENT
Start: 2024-04-03 | End: 2024-04-03

## 2024-04-03 RX ORDER — LIDOCAINE HYDROCHLORIDE 20 MG/ML
JELLY TOPICAL ONCE
OUTPATIENT
Start: 2024-04-03 | End: 2024-04-03

## 2024-04-03 RX ORDER — LIDOCAINE HYDROCHLORIDE 40 MG/ML
SOLUTION TOPICAL ONCE
Status: DISCONTINUED | OUTPATIENT
Start: 2024-04-03 | End: 2024-04-04 | Stop reason: HOSPADM

## 2024-04-03 RX ORDER — LIDOCAINE HYDROCHLORIDE 40 MG/ML
SOLUTION TOPICAL ONCE
OUTPATIENT
Start: 2024-04-03 | End: 2024-04-03

## 2024-04-03 RX ORDER — BACITRACIN ZINC AND POLYMYXIN B SULFATE 500; 1000 [USP'U]/G; [USP'U]/G
OINTMENT TOPICAL ONCE
OUTPATIENT
Start: 2024-04-03 | End: 2024-04-03

## 2024-04-03 RX ORDER — BETAMETHASONE DIPROPIONATE 0.5 MG/G
CREAM TOPICAL ONCE
OUTPATIENT
Start: 2024-04-03 | End: 2024-04-03

## 2024-04-03 RX ORDER — LIDOCAINE 50 MG/G
OINTMENT TOPICAL ONCE
OUTPATIENT
Start: 2024-04-03 | End: 2024-04-03

## 2024-04-03 RX ORDER — IBUPROFEN 200 MG
TABLET ORAL ONCE
OUTPATIENT
Start: 2024-04-03 | End: 2024-04-03

## 2024-04-03 RX ORDER — LIDOCAINE 40 MG/G
CREAM TOPICAL ONCE
OUTPATIENT
Start: 2024-04-03 | End: 2024-04-03

## 2024-04-03 ASSESSMENT — PAIN DESCRIPTION - PAIN TYPE: TYPE: CHRONIC PAIN

## 2024-04-03 ASSESSMENT — PAIN - FUNCTIONAL ASSESSMENT: PAIN_FUNCTIONAL_ASSESSMENT: ACTIVITIES ARE NOT PREVENTED

## 2024-04-03 ASSESSMENT — PAIN DESCRIPTION - ORIENTATION: ORIENTATION: RIGHT

## 2024-04-03 ASSESSMENT — PAIN SCALES - GENERAL: PAINLEVEL_OUTOF10: 1

## 2024-04-03 ASSESSMENT — PAIN DESCRIPTION - FREQUENCY: FREQUENCY: INTERMITTENT

## 2024-04-03 ASSESSMENT — PAIN DESCRIPTION - ONSET: ONSET: PROGRESSIVE

## 2024-04-03 ASSESSMENT — PAIN DESCRIPTION - DESCRIPTORS: DESCRIPTORS: BURNING

## 2024-04-03 NOTE — PROGRESS NOTES
Wound Healing Center  History and Physical/Consultation  Podiatry    Referring Physician : Charles Foss MD  Tuan Vega  MEDICAL RECORD NUMBER:  [id number]  AGE: 73 y.o.   GENDER: male  : 1950  EPISODE DATE:  4/3/2024  Subjective:     Chief Complaint   Patient presents with    Wound Check     Wounds bilateral legs         HISTORY of PRESENT ILLNESS HPI     Tuan Vega is a 73 y.o. male who presents today for wound/ulcer evaluation.   History of Wound Context:  The patient has had a wound of left leg which was first noted approximately months ago.  This has been treated debridemnet. On their initial visit to the wound healing center, 10/11/23 ,  the patient has noted that the wound has not been improving.  The patient has had similar previous wounds in the past.      Pt is on abx at time of initial visit.      Wound/Ulcer Pain Timing/Severity: constant  Quality of pain: aching  Severity:  4 / 10   Modifying Factors: Pain worsens with walking  Associated Signs/Symptoms: edema, erythema, and drainage    Ulcer Identification:  Ulcer Type: venous  Contributing Factors: edema, venous stasis, and lymphedema    Diabetic/Pressure/Non Pressure Ulcers onl y:  Ulcer: Non-Pressure ulcer, fat layer exposed    If patient has diabetic lower extremity wounds  Magaña Classification of diabetic lower extremity wounds:    Grade Description   []  0 No open wound   []  1 Superficial ulcer involving the full skin thickness   []  2 Deep ulcer involves ligament, tendon, joint capsule, or fascia  No bone involvement or abscess presence   []  3 Deep Ulcer with abcess formation and/or osteomyelitis   []  4 Localized gangrene   []  5 Extensive gangrene of the foot     Wound: N/A    10-18-23  Debrided, bactrim rx given    10/25/2023  Extensive multiple wounds of the left lower extremity debrided, patient has been treated with antibiotics recently    23  Coban 4 layer, debrided, culture

## 2024-04-03 NOTE — PLAN OF CARE
Problem: Chronic Conditions and Co-morbidities  Goal: Patient's chronic conditions and co-morbidity symptoms are monitored and maintained or improved  Outcome: Progressing     Problem: Pain  Goal: Verbalizes/displays adequate comfort level or baseline comfort level  Outcome: Progressing     Problem: Wound:  Goal: Will show signs of wound healing; wound closure and no evidence of infection  Description: Will show signs of wound healing; wound closure and no evidence of infection  Outcome: Progressing     Problem: Cognitive:  Goal: Knowledge of wound care  Description: Knowledge of wound care  Outcome: Progressing  Goal: Understands risk factors for wounds  Description: Understands risk factors for wounds  Outcome: Progressing     Problem: Wound:  Goal: Will show signs of wound healing; wound closure and no evidence of infection  Description: Will show signs of wound healing; wound closure and no evidence of infection  Outcome: Progressing

## 2024-04-03 NOTE — DISCHARGE INSTRUCTIONS
Discharge Instructions            Discharge Instructions      Visit Discharge/Physician Orders     Discharge condition: Stable     Assessment of pain at discharge:     Anesthetic used: 4% topical lidocaine     Discharge to: Home     Left via:Private automobile     Accompanied by: accompanied by spouse     ECF/HHA: [facility]     Dressing Orders: clean left leg and right leg with saline, apply aquacel ag, kerramax super absorber, abd, coban 2, change twice weekly    Zinc oxide to reynaldo wound        Treatment Orders: elevate legs above level of heart          [facility] followup visit _____________1 week_______  (Please note your next appointment above and if you are unable to keep, kindly give a 24 hour notice. Thank you.)     Physician signature:__________________________        If you experience any of the following, please call the Wound Care Center during business hours:     * Increase in Pain  * Temperature over 101  * Increase in drainage from your wound  * Drainage with a foul odor  * Bleeding  * Increase in swelling  * Need for compression bandage changes due to slippage, breakthrough drainage.     If you need medical attention outside of the business hours of the Wound Care Centers please contact your PCP or go to the nearest emergency room.

## 2024-04-10 ENCOUNTER — HOSPITAL ENCOUNTER (OUTPATIENT)
Dept: WOUND CARE | Age: 74
Discharge: HOME | End: 2024-04-10
Attending: PODIATRIST
Payer: MEDICARE

## 2024-04-10 VITALS
DIASTOLIC BLOOD PRESSURE: 74 MMHG | TEMPERATURE: 98.3 F | HEART RATE: 78 BPM | SYSTOLIC BLOOD PRESSURE: 136 MMHG | RESPIRATION RATE: 20 BRPM

## 2024-04-10 DIAGNOSIS — I83.022 VARICOSE VEINS OF LEFT LOWER EXTREMITY WITH ULCER OF CALF WITH FAT LAYER EXPOSED (HCC): Primary | ICD-10-CM

## 2024-04-10 DIAGNOSIS — L97.222 VARICOSE VEINS OF LEFT LOWER EXTREMITY WITH ULCER OF CALF WITH FAT LAYER EXPOSED (HCC): Primary | ICD-10-CM

## 2024-04-10 PROCEDURE — 11042 DBRDMT SUBQ TIS 1ST 20SQCM/<: CPT

## 2024-04-10 PROCEDURE — 11045 DBRDMT SUBQ TISS EACH ADDL: CPT

## 2024-04-10 RX ORDER — CLOBETASOL PROPIONATE 0.5 MG/G
OINTMENT TOPICAL ONCE
OUTPATIENT
Start: 2024-04-10 | End: 2024-04-10

## 2024-04-10 RX ORDER — LIDOCAINE 40 MG/G
CREAM TOPICAL ONCE
OUTPATIENT
Start: 2024-04-10 | End: 2024-04-10

## 2024-04-10 RX ORDER — LIDOCAINE HYDROCHLORIDE 20 MG/ML
JELLY TOPICAL ONCE
OUTPATIENT
Start: 2024-04-10 | End: 2024-04-10

## 2024-04-10 RX ORDER — TRIAMCINOLONE ACETONIDE 1 MG/G
OINTMENT TOPICAL ONCE
OUTPATIENT
Start: 2024-04-10 | End: 2024-04-10

## 2024-04-10 RX ORDER — LIDOCAINE 50 MG/G
OINTMENT TOPICAL ONCE
OUTPATIENT
Start: 2024-04-10 | End: 2024-04-10

## 2024-04-10 RX ORDER — SODIUM CHLOR/HYPOCHLOROUS ACID 0.033 %
SOLUTION, IRRIGATION IRRIGATION ONCE
OUTPATIENT
Start: 2024-04-10 | End: 2024-04-10

## 2024-04-10 RX ORDER — IBUPROFEN 200 MG
TABLET ORAL ONCE
OUTPATIENT
Start: 2024-04-10 | End: 2024-04-10

## 2024-04-10 RX ORDER — LIDOCAINE HYDROCHLORIDE 40 MG/ML
SOLUTION TOPICAL ONCE
OUTPATIENT
Start: 2024-04-10 | End: 2024-04-10

## 2024-04-10 RX ORDER — GENTAMICIN SULFATE 1 MG/G
OINTMENT TOPICAL ONCE
OUTPATIENT
Start: 2024-04-10 | End: 2024-04-10

## 2024-04-10 RX ORDER — LIDOCAINE HYDROCHLORIDE 40 MG/ML
SOLUTION TOPICAL ONCE
Status: COMPLETED | OUTPATIENT
Start: 2024-04-10 | End: 2024-04-10

## 2024-04-10 RX ORDER — GINSENG 100 MG
CAPSULE ORAL ONCE
OUTPATIENT
Start: 2024-04-10 | End: 2024-04-10

## 2024-04-10 RX ORDER — BACITRACIN ZINC AND POLYMYXIN B SULFATE 500; 1000 [USP'U]/G; [USP'U]/G
OINTMENT TOPICAL ONCE
OUTPATIENT
Start: 2024-04-10 | End: 2024-04-10

## 2024-04-10 RX ORDER — BETAMETHASONE DIPROPIONATE 0.5 MG/G
CREAM TOPICAL ONCE
OUTPATIENT
Start: 2024-04-10 | End: 2024-04-10

## 2024-04-10 RX ADMIN — LIDOCAINE HYDROCHLORIDE 10 ML: 40 SOLUTION TOPICAL at 10:22

## 2024-04-10 NOTE — DISCHARGE INSTRUCTIONS
Discharge Instructions            Discharge Instructions      Visit Discharge/Physician Orders     Discharge condition: Stable     Assessment of pain at discharge:     Anesthetic used: 4% topical lidocaine     Discharge to: Home     Left via:Private automobile     Accompanied by: accompanied by spouse     ECF/HHA: [facility]     Dressing Orders: clean left leg and right leg with saline, apply HF BLUE,  kerramax super absorber, abd, coban 2, change twice weekly    Zinc oxide to reynaldo wound         Treatment Orders: elevate legs above level of heart           [facility] followup visit _____________1 week_______  (Please note your next appointment above and if you are unable to keep, kindly give a 24 hour notice. Thank you.)     Physician signature:__________________________        If you experience any of the following, please call the Wound Care Center during business hours:     * Increase in Pain  * Temperature over 101  * Increase in drainage from your wound  * Drainage with a foul odor  * Bleeding  * Increase in swelling  * Need for compression bandage changes due to slippage, breakthrough drainage.     If you need medical attention outside of the business hours of the Wound Care Centers please contact your PCP or go to the nearest emergency room.

## 2024-04-10 NOTE — PROGRESS NOTES
PACEMAKER PLACEMENT  2010    [employer]tr[employer]    CHOLECYSTECTOMY      COLONOSCOPY  2016    INTRACAPSULAR CATARACT EXTRACTION Right 2022    RIGHT EYE CATARACT EXTRACTION IOL IMPLANT AND GONIOTOMY performed by Jose Camacho MD at [facility] OR    INTRACAPSULAR CATARACT EXTRACTION Left 2022    LEFT EYE CATARACT EXTRACTION IOL IMPLANT performed by Jose Camacho MD at [facility] OR    JOINT REPLACEMENT Bilateral     knees    LEG SURGERY Left 2023    LEFT LEG DEBRIDEMENT performed by Edward Bedolla DPM at [facility] OR    PACEMAKER PLACEMENT  2020    DUAL PPM GR    ([employer])     DR. MARTIN     TRANSESOPHAGEAL ECHOCARDIOGRAM  2020    With      Family History   Problem Relation Age of Onset    Heart Disease Father             Other Mother             Other Brother         gall bladder     Social History     Tobacco Use    Smoking status: Former     Current packs/day: 0.00     Average packs/day: 2.0 packs/day for 25.9 years (51.7 ttl pk-yrs)     Types: Cigarettes     Start date: 1964     Quit date: 1990     Years since quittin.7    Smokeless tobacco: Never   Vaping Use    Vaping Use: Never used   Substance Use Topics    Alcohol use: Yes     Comment: rare   2 beers/month    Drug use: Never     Allergies   Allergen Reactions    Cleocin [Clindamycin] Shortness Of Breath    Sodium Metabisulfite Shortness Of Breath     CHEMICAL NAME FOR POTATO WHITE ADDITIVE     Adaptic Non-Adhering Dressing [Wound Dressings] Other (See Comments)     SKIN BURNS    Cipro [Ciprofloxacin Hcl] Rash    Singulair [Montelukast Sodium] Rash    Vibramycin [Doxycycline Hyclate] Other (See Comments)     TINNITUS     Current Outpatient Medications on File Prior to Encounter   Medication Sig Dispense Refill    ZINC OXIDE, TOPICAL, 10 % CREA Apply 50 g topically daily 1 each 2    carvedilol (COREG) 25 MG tablet Take 1 tablet by mouth 2 times daily (with meals) 180 tablet 0    rosuvastatin (CRESTOR) 5

## 2024-04-15 ENCOUNTER — PROCEDURE VISIT (OUTPATIENT)
Dept: PODIATRY | Age: 74
End: 2024-04-15
Payer: MEDICARE

## 2024-04-15 VITALS — HEIGHT: 67 IN | BODY MASS INDEX: 49.44 KG/M2 | WEIGHT: 315 LBS

## 2024-04-15 DIAGNOSIS — M79.675 PAIN OF TOE OF LEFT FOOT: ICD-10-CM

## 2024-04-15 DIAGNOSIS — B35.1 ONYCHOMYCOSIS: Primary | ICD-10-CM

## 2024-04-15 DIAGNOSIS — E11.51 TYPE II DIABETES MELLITUS WITH PERIPHERAL CIRCULATORY DISORDER (HCC): ICD-10-CM

## 2024-04-15 DIAGNOSIS — R26.2 DIFFICULTY WALKING: ICD-10-CM

## 2024-04-15 DIAGNOSIS — M79.674 PAIN OF TOE OF RIGHT FOOT: ICD-10-CM

## 2024-04-15 DIAGNOSIS — I89.0 LYMPHEDEMA: ICD-10-CM

## 2024-04-15 PROCEDURE — 11721 DEBRIDE NAIL 6 OR MORE: CPT | Performed by: PODIATRIST

## 2024-04-15 PROCEDURE — 99999 PR OFFICE/OUTPT VISIT,PROCEDURE ONLY: CPT | Performed by: PODIATRIST

## 2024-04-15 NOTE — PROGRESS NOTES
Patient here for nail care and diabetic foot exam. Charles Foss MD last visit 3/21/2023   Electronically signed by Dana Vinson LPN on 4/15/2024 at 2:32 PM

## 2024-04-15 NOTE — PROGRESS NOTES
4/15/24     Tuan Vega    : 1950  Sex: male  Age: 73 y.o.    Subjective:  The patient is seen today for evaluation regarding diabetic foot evaluation and mycotic nail care.  No other complaints noted.    Chief Complaint   Patient presents with    Nail Problem     Nail care    Diabetes     Diabetic foot exam       Current Medications:    Current Outpatient Medications:     ZINC OXIDE, TOPICAL, 10 % CREA, Apply 50 g topically daily, Disp: 1 each, Rfl: 2    carvedilol (COREG) 25 MG tablet, Take 1 tablet by mouth 2 times daily (with meals), Disp: 180 tablet, Rfl: 0    rosuvastatin (CRESTOR) 5 MG tablet, Take 1 tablet by mouth daily, Disp: 90 tablet, Rfl: 0    potassium chloride (KLOR-CON M) 20 MEQ extended release tablet, TAKE 1 TABLET BY MOUTH DAILY, Disp: 90 tablet, Rfl: 3    ipratropium 0.5 mg-albuterol 2.5 mg (DUONEB) 0.5-2.5 (3) MG/3ML SOLN nebulizer solution, Inhale 3 mLs into the lungs every 4 hours as needed for Shortness of Breath, Disp: 360 mL, Rfl: 5    BiPAP Machine MISC, by Does not apply route nightly, Disp: , Rfl:     SITagliptin (JANUVIA) 50 MG tablet, Take 1 tablet by mouth every morning, Disp: , Rfl:     Omega-3 Fatty Acids (OMEGA-3 FISH OIL) 1200 MG CAPS, Take 1 capsule by mouth nightly FISH OIL:1200MG OMEGA : 360MG, Disp: , Rfl:     gabapentin (NEURONTIN) 100 MG capsule, Take 2 capsules by mouth every morning. **SEE OTHER ORDER**, Disp: , Rfl:     gabapentin (NEURONTIN) 100 MG capsule, Take 1 capsule by mouth nightly. **SEE OTHER ORDER**, Disp: , Rfl:     glipiZIDE (GLUCOTROL XL) 5 MG extended release tablet, Take 1 tablet by mouth nightly **SEE OTHER ORDER**, Disp: , Rfl:     acetaminophen (TYLENOL) 500 MG tablet, Take 1 tablet by mouth 2 times daily, Disp: , Rfl:     latanoprost (XALATAN) 0.005 % ophthalmic solution, Place 1 drop into both eyes nightly, Disp: , Rfl:     ramipril (ALTACE) 5 MG capsule, Take 1 capsule by mouth nightly, Disp: , Rfl:     liothyronine (CYTOMEL) 25 MCG

## 2024-04-17 ENCOUNTER — HOSPITAL ENCOUNTER (OUTPATIENT)
Dept: WOUND CARE | Age: 74
Discharge: HOME | End: 2024-04-17
Attending: PODIATRIST
Payer: MEDICARE

## 2024-04-17 VITALS
RESPIRATION RATE: 22 BRPM | TEMPERATURE: 96.2 F | HEART RATE: 80 BPM | WEIGHT: 315 LBS | HEIGHT: 67 IN | DIASTOLIC BLOOD PRESSURE: 58 MMHG | BODY MASS INDEX: 49.44 KG/M2 | SYSTOLIC BLOOD PRESSURE: 160 MMHG

## 2024-04-17 DIAGNOSIS — L97.222 VARICOSE VEINS OF LEFT LOWER EXTREMITY WITH ULCER OF CALF WITH FAT LAYER EXPOSED (HCC): Primary | ICD-10-CM

## 2024-04-17 DIAGNOSIS — I83.022 VARICOSE VEINS OF LEFT LOWER EXTREMITY WITH ULCER OF CALF WITH FAT LAYER EXPOSED (HCC): Primary | ICD-10-CM

## 2024-04-17 PROCEDURE — 87205 SMEAR GRAM STAIN: CPT

## 2024-04-17 PROCEDURE — 87077 CULTURE AEROBIC IDENTIFY: CPT

## 2024-04-17 PROCEDURE — 87070 CULTURE OTHR SPECIMN AEROBIC: CPT

## 2024-04-17 PROCEDURE — 11045 DBRDMT SUBQ TISS EACH ADDL: CPT

## 2024-04-17 PROCEDURE — 11042 DBRDMT SUBQ TIS 1ST 20SQCM/<: CPT

## 2024-04-17 RX ORDER — GINSENG 100 MG
CAPSULE ORAL ONCE
OUTPATIENT
Start: 2024-04-17 | End: 2024-04-17

## 2024-04-17 RX ORDER — LIDOCAINE HYDROCHLORIDE 40 MG/ML
SOLUTION TOPICAL ONCE
OUTPATIENT
Start: 2024-04-17 | End: 2024-04-17

## 2024-04-17 RX ORDER — LIDOCAINE HYDROCHLORIDE 20 MG/ML
JELLY TOPICAL ONCE
OUTPATIENT
Start: 2024-04-17 | End: 2024-04-17

## 2024-04-17 RX ORDER — GENTAMICIN SULFATE 1 MG/G
OINTMENT TOPICAL ONCE
OUTPATIENT
Start: 2024-04-17 | End: 2024-04-17

## 2024-04-17 RX ORDER — IBUPROFEN 200 MG
TABLET ORAL ONCE
OUTPATIENT
Start: 2024-04-17 | End: 2024-04-17

## 2024-04-17 RX ORDER — BETAMETHASONE DIPROPIONATE 0.5 MG/G
CREAM TOPICAL ONCE
OUTPATIENT
Start: 2024-04-17 | End: 2024-04-17

## 2024-04-17 RX ORDER — SODIUM CHLOR/HYPOCHLOROUS ACID 0.033 %
SOLUTION, IRRIGATION IRRIGATION ONCE
OUTPATIENT
Start: 2024-04-17 | End: 2024-04-17

## 2024-04-17 RX ORDER — TRIAMCINOLONE ACETONIDE 1 MG/G
OINTMENT TOPICAL ONCE
OUTPATIENT
Start: 2024-04-17 | End: 2024-04-17

## 2024-04-17 RX ORDER — LIDOCAINE HYDROCHLORIDE 40 MG/ML
SOLUTION TOPICAL ONCE
Status: COMPLETED | OUTPATIENT
Start: 2024-04-17 | End: 2024-04-17

## 2024-04-17 RX ORDER — LIDOCAINE 50 MG/G
OINTMENT TOPICAL ONCE
OUTPATIENT
Start: 2024-04-17 | End: 2024-04-17

## 2024-04-17 RX ORDER — BACITRACIN ZINC AND POLYMYXIN B SULFATE 500; 1000 [USP'U]/G; [USP'U]/G
OINTMENT TOPICAL ONCE
OUTPATIENT
Start: 2024-04-17 | End: 2024-04-17

## 2024-04-17 RX ORDER — LIDOCAINE 40 MG/G
CREAM TOPICAL ONCE
OUTPATIENT
Start: 2024-04-17 | End: 2024-04-17

## 2024-04-17 RX ORDER — CLOBETASOL PROPIONATE 0.5 MG/G
OINTMENT TOPICAL ONCE
OUTPATIENT
Start: 2024-04-17 | End: 2024-04-17

## 2024-04-17 RX ADMIN — LIDOCAINE HYDROCHLORIDE: 40 SOLUTION TOPICAL at 11:12

## 2024-04-17 ASSESSMENT — PAIN DESCRIPTION - LOCATION: LOCATION: LEG

## 2024-04-17 ASSESSMENT — PAIN DESCRIPTION - ORIENTATION: ORIENTATION: RIGHT;LEFT

## 2024-04-17 NOTE — PROGRESS NOTES
Wound Healing Center  History and Physical/Consultation  Podiatry    Referring Physician : Charles Foss MD  Tuan Vega  MEDICAL RECORD NUMBER:  [id number]  AGE: 73 y.o.   GENDER: male  : 1950  EPISODE DATE:  4/10/2024  Subjective:     Chief Complaint   Patient presents with    Wound Check     Wounds bilateral legs         HISTORY of PRESENT ILLNESS HPI     Tuan Vega is a 73 y.o. male who presents today for wound/ulcer evaluation.   History of Wound Context:  The patient has had a wound of left leg which was first noted approximately months ago.  This has been treated debridemnet. On their initial visit to the wound healing center, 10/11/23 ,  the patient has noted that the wound has not been improving.  The patient has had similar previous wounds in the past.      Pt is on abx at time of initial visit.      Wound/Ulcer Pain Timing/Severity: constant  Quality of pain: aching  Severity:  4 / 10   Modifying Factors: Pain worsens with walking  Associated Signs/Symptoms: edema, erythema, and drainage    Ulcer Identification:  Ulcer Type: venous  Contributing Factors: edema, venous stasis, and lymphedema    Diabetic/Pressure/Non Pressure Ulcers onl y:  Ulcer: Non-Pressure ulcer, fat layer exposed    If patient has diabetic lower extremity wounds  Magaña Classification of diabetic lower extremity wounds:    Grade Description   []  0 No open wound   []  1 Superficial ulcer involving the full skin thickness   []  2 Deep ulcer involves ligament, tendon, joint capsule, or fascia  No bone involvement or abscess presence   []  3 Deep Ulcer with abcess formation and/or osteomyelitis   []  4 Localized gangrene   []  5 Extensive gangrene of the foot     Wound: N/A    10-18-23  Debrided, bactrim rx given    10/25/2023  Extensive multiple wounds of the left lower extremity debrided, patient has been treated with antibiotics recently    23  Coban 4 layer, debrided, culture

## 2024-04-24 ENCOUNTER — HOSPITAL ENCOUNTER (OUTPATIENT)
Dept: WOUND CARE | Age: 74
Discharge: HOME | End: 2024-04-24
Attending: PODIATRIST
Payer: MEDICARE

## 2024-04-24 VITALS
HEIGHT: 67 IN | BODY MASS INDEX: 49.44 KG/M2 | RESPIRATION RATE: 20 BRPM | SYSTOLIC BLOOD PRESSURE: 152 MMHG | TEMPERATURE: 45.3 F | WEIGHT: 315 LBS | DIASTOLIC BLOOD PRESSURE: 83 MMHG | HEART RATE: 86 BPM

## 2024-04-24 DIAGNOSIS — L97.222 VARICOSE VEINS OF LEFT LOWER EXTREMITY WITH ULCER OF CALF WITH FAT LAYER EXPOSED (HCC): Primary | ICD-10-CM

## 2024-04-24 DIAGNOSIS — I83.022 VARICOSE VEINS OF LEFT LOWER EXTREMITY WITH ULCER OF CALF WITH FAT LAYER EXPOSED (HCC): Primary | ICD-10-CM

## 2024-04-24 PROCEDURE — 87070 CULTURE OTHR SPECIMN AEROBIC: CPT

## 2024-04-24 PROCEDURE — 87205 SMEAR GRAM STAIN: CPT

## 2024-04-24 PROCEDURE — 11042 DBRDMT SUBQ TIS 1ST 20SQCM/<: CPT

## 2024-04-24 PROCEDURE — 11045 DBRDMT SUBQ TISS EACH ADDL: CPT

## 2024-04-24 PROCEDURE — 86403 PARTICLE AGGLUT ANTBDY SCRN: CPT

## 2024-04-24 RX ORDER — LIDOCAINE HYDROCHLORIDE 20 MG/ML
JELLY TOPICAL ONCE
OUTPATIENT
Start: 2024-04-24 | End: 2024-04-24

## 2024-04-24 RX ORDER — BETAMETHASONE DIPROPIONATE 0.5 MG/G
CREAM TOPICAL ONCE
OUTPATIENT
Start: 2024-04-24 | End: 2024-04-24

## 2024-04-24 RX ORDER — LIDOCAINE 50 MG/G
OINTMENT TOPICAL ONCE
OUTPATIENT
Start: 2024-04-24 | End: 2024-04-24

## 2024-04-24 RX ORDER — LIDOCAINE HYDROCHLORIDE 40 MG/ML
SOLUTION TOPICAL ONCE
OUTPATIENT
Start: 2024-04-24 | End: 2024-04-24

## 2024-04-24 RX ORDER — LIDOCAINE HYDROCHLORIDE 40 MG/ML
SOLUTION TOPICAL ONCE
Status: COMPLETED | OUTPATIENT
Start: 2024-04-24 | End: 2024-04-24

## 2024-04-24 RX ORDER — GENTAMICIN SULFATE 1 MG/G
OINTMENT TOPICAL ONCE
OUTPATIENT
Start: 2024-04-24 | End: 2024-04-24

## 2024-04-24 RX ORDER — BACITRACIN ZINC AND POLYMYXIN B SULFATE 500; 1000 [USP'U]/G; [USP'U]/G
OINTMENT TOPICAL ONCE
OUTPATIENT
Start: 2024-04-24 | End: 2024-04-24

## 2024-04-24 RX ORDER — GINSENG 100 MG
CAPSULE ORAL ONCE
OUTPATIENT
Start: 2024-04-24 | End: 2024-04-24

## 2024-04-24 RX ORDER — IBUPROFEN 200 MG
TABLET ORAL ONCE
OUTPATIENT
Start: 2024-04-24 | End: 2024-04-24

## 2024-04-24 RX ORDER — SODIUM CHLOR/HYPOCHLOROUS ACID 0.033 %
SOLUTION, IRRIGATION IRRIGATION ONCE
OUTPATIENT
Start: 2024-04-24 | End: 2024-04-24

## 2024-04-24 RX ORDER — TRIAMCINOLONE ACETONIDE 1 MG/G
OINTMENT TOPICAL ONCE
OUTPATIENT
Start: 2024-04-24 | End: 2024-04-24

## 2024-04-24 RX ORDER — LIDOCAINE 40 MG/G
CREAM TOPICAL ONCE
OUTPATIENT
Start: 2024-04-24 | End: 2024-04-24

## 2024-04-24 RX ORDER — CLOBETASOL PROPIONATE 0.5 MG/G
OINTMENT TOPICAL ONCE
OUTPATIENT
Start: 2024-04-24 | End: 2024-04-24

## 2024-04-24 RX ADMIN — LIDOCAINE HYDROCHLORIDE 20 ML: 40 SOLUTION TOPICAL at 11:09

## 2024-04-24 ASSESSMENT — PAIN DESCRIPTION - LOCATION: LOCATION: LEG

## 2024-04-24 ASSESSMENT — PAIN DESCRIPTION - FREQUENCY: FREQUENCY: INTERMITTENT

## 2024-04-24 ASSESSMENT — PAIN SCALES - GENERAL: PAINLEVEL_OUTOF10: 3

## 2024-04-24 ASSESSMENT — PAIN DESCRIPTION - PAIN TYPE: TYPE: CHRONIC PAIN

## 2024-04-24 ASSESSMENT — PAIN DESCRIPTION - DESCRIPTORS: DESCRIPTORS: BURNING

## 2024-04-24 ASSESSMENT — PAIN - FUNCTIONAL ASSESSMENT: PAIN_FUNCTIONAL_ASSESSMENT: ACTIVITIES ARE NOT PREVENTED

## 2024-04-24 ASSESSMENT — PAIN DESCRIPTION - ORIENTATION: ORIENTATION: RIGHT;LEFT

## 2024-04-24 NOTE — PROGRESS NOTES
facility-administered medications on file prior to encounter.       REVIEW OF SYSTEMS   ROS : All others Negative if blank [], Positive if [x]  General Vascular   [] Fevers [] Claudication   [] Chills [] Rest Pain   Skin Neurologic   [x] Tissue Loss [x] Lower extremity neuropathy     Objective:    BP (!) 152/83   Pulse 86   Temp (!) 45.3 °F (7.4 °C) (Temporal)   Resp 20   Ht 1.702 m (5' 7\")   Wt (!) 161.9 kg (357 lb)   BMI 55.91 kg/m²   Wt Readings from Last 3 Encounters:   04/24/24 (!) 161.9 kg (357 lb)   04/17/24 (!) 161.9 kg (357 lb)   04/15/24 (!) 161.9 kg (357 lb)       PHYSICAL EXAM   CONSTITUTIONAL:   Awake, alert, cooperative   PSYCHIATRIC :  Oriented to time, place and person      normal insight to disease process  EXTREMITIES:   R LE Open wounds are noted   Skin color is normal   Edema is  noted   Sensation intact to light touch   Palpation of the foot does cause pain   5/5 strength DF/PF  L LE Open wounds are 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    Relevant Orders    Initiate Outpatient Wound Care Protocol         Pre Debridement Measurements:  Are located in the Wound/Ulcer Documentation Flow Sheet  Post Debridement Measurements:  Wound/Ulcer Descriptions are Pre Debridement except measurements:     Wound 10/11/23 Leg Left;Anterior;Lateral #1 (Active)   Wound Image   04/17/24 1108   Dressing Status New dressing applied 04/10/24 1112   Wound Cleansed Cleansed with saline 04/10/24 1112   Dressing/Treatment ABD;Alginate 04/10/24 1112   Wound Length (cm) 12 cm 04/24/24 1101   Wound Width (cm) 19 cm 04/24/24 1101   Wound Depth (cm) 0.3 cm

## 2024-04-24 NOTE — DISCHARGE INSTRUCTIONS
Discharge Instructions            Discharge Instructions      Visit Discharge/Physician Orders     Discharge condition: Stable     Assessment of pain at discharge:     Anesthetic used: 4% topical lidocaine     Discharge to: Home     Left via:Private automobile     Accompanied by: accompanied by spouse     [facility]F/[facility]: [facility]     Dressing Orders: clean left leg and right leg with saline, apply HF BLUE,  kerramax super absorber, abd, coban 2, change twice weekly    Zinc oxide to reynaldo wound         Treatment Orders: elevate legs above level of heart  X 1 culture of left leg   REFERRAL TO REHAB         [facility] followup visit _____________1 week_______  (Please note your next appointment above and if you are unable to keep, kindly give a 24 hour notice. Thank you.)     Physician signature:__________________________        If you experience any of the following, please call the Wound Care Center during business hours:     * Increase in Pain  * Temperature over 101  * Increase in drainage from your wound  * Drainage with a foul odor  * Bleeding  * Increase in swelling  * Need for compression bandage changes due to slippage, breakthrough drainage.     If you need medical attention outside of the business hours of the Wound Care Centers please contact your PCP or go to the nearest emergency room.

## 2024-05-01 ENCOUNTER — HOSPITAL ENCOUNTER (OUTPATIENT)
Dept: WOUND CARE | Age: 74
Discharge: HOME | End: 2024-05-01
Attending: PODIATRIST
Payer: MEDICARE

## 2024-05-01 VITALS — HEART RATE: 84 BPM | DIASTOLIC BLOOD PRESSURE: 78 MMHG | SYSTOLIC BLOOD PRESSURE: 144 MMHG | TEMPERATURE: 97.3 F

## 2024-05-01 DIAGNOSIS — L97.222 VARICOSE VEINS OF LEFT LOWER EXTREMITY WITH ULCER OF CALF WITH FAT LAYER EXPOSED (HCC): Primary | ICD-10-CM

## 2024-05-01 DIAGNOSIS — I83.022 VARICOSE VEINS OF LEFT LOWER EXTREMITY WITH ULCER OF CALF WITH FAT LAYER EXPOSED (HCC): Primary | ICD-10-CM

## 2024-05-01 PROCEDURE — 11045 DBRDMT SUBQ TISS EACH ADDL: CPT

## 2024-05-01 PROCEDURE — 11042 DBRDMT SUBQ TIS 1ST 20SQCM/<: CPT

## 2024-05-01 RX ORDER — LIDOCAINE 50 MG/G
OINTMENT TOPICAL ONCE
OUTPATIENT
Start: 2024-05-01 | End: 2024-05-01

## 2024-05-01 RX ORDER — GENTAMICIN SULFATE 1 MG/G
OINTMENT TOPICAL ONCE
OUTPATIENT
Start: 2024-05-01 | End: 2024-05-01

## 2024-05-01 RX ORDER — LIDOCAINE 40 MG/G
CREAM TOPICAL ONCE
OUTPATIENT
Start: 2024-05-01 | End: 2024-05-01

## 2024-05-01 RX ORDER — TRIAMCINOLONE ACETONIDE 1 MG/G
OINTMENT TOPICAL ONCE
OUTPATIENT
Start: 2024-05-01 | End: 2024-05-01

## 2024-05-01 RX ORDER — IBUPROFEN 200 MG
TABLET ORAL ONCE
OUTPATIENT
Start: 2024-05-01 | End: 2024-05-01

## 2024-05-01 RX ORDER — CLOBETASOL PROPIONATE 0.5 MG/G
OINTMENT TOPICAL ONCE
OUTPATIENT
Start: 2024-05-01 | End: 2024-05-01

## 2024-05-01 RX ORDER — BACITRACIN ZINC AND POLYMYXIN B SULFATE 500; 1000 [USP'U]/G; [USP'U]/G
OINTMENT TOPICAL ONCE
OUTPATIENT
Start: 2024-05-01 | End: 2024-05-01

## 2024-05-01 RX ORDER — SODIUM CHLOR/HYPOCHLOROUS ACID 0.033 %
SOLUTION, IRRIGATION IRRIGATION ONCE
OUTPATIENT
Start: 2024-05-01 | End: 2024-05-01

## 2024-05-01 RX ORDER — LIDOCAINE HYDROCHLORIDE 20 MG/ML
JELLY TOPICAL ONCE
OUTPATIENT
Start: 2024-05-01 | End: 2024-05-01

## 2024-05-01 RX ORDER — GINSENG 100 MG
CAPSULE ORAL ONCE
OUTPATIENT
Start: 2024-05-01 | End: 2024-05-01

## 2024-05-01 RX ORDER — LIDOCAINE HYDROCHLORIDE 40 MG/ML
SOLUTION TOPICAL ONCE
OUTPATIENT
Start: 2024-05-01 | End: 2024-05-01

## 2024-05-01 RX ORDER — BETAMETHASONE DIPROPIONATE 0.5 MG/G
CREAM TOPICAL ONCE
OUTPATIENT
Start: 2024-05-01 | End: 2024-05-01

## 2024-05-01 RX ORDER — LIDOCAINE HYDROCHLORIDE 40 MG/ML
SOLUTION TOPICAL ONCE
Status: COMPLETED | OUTPATIENT
Start: 2024-05-01 | End: 2024-05-01

## 2024-05-01 RX ADMIN — LIDOCAINE HYDROCHLORIDE 12 ML: 40 SOLUTION TOPICAL at 11:03

## 2024-05-01 ASSESSMENT — PAIN DESCRIPTION - PAIN TYPE: TYPE: CHRONIC PAIN

## 2024-05-01 ASSESSMENT — PAIN SCALES - GENERAL: PAINLEVEL_OUTOF10: 5

## 2024-05-01 ASSESSMENT — PAIN DESCRIPTION - FREQUENCY: FREQUENCY: INTERMITTENT

## 2024-05-01 ASSESSMENT — PAIN DESCRIPTION - ONSET: ONSET: PROGRESSIVE

## 2024-05-01 ASSESSMENT — PAIN DESCRIPTION - DESCRIPTORS: DESCRIPTORS: BURNING

## 2024-05-01 ASSESSMENT — PAIN - FUNCTIONAL ASSESSMENT: PAIN_FUNCTIONAL_ASSESSMENT: ACTIVITIES ARE NOT PREVENTED

## 2024-05-01 ASSESSMENT — PAIN DESCRIPTION - LOCATION: LOCATION: LEG

## 2024-05-01 ASSESSMENT — PAIN DESCRIPTION - ORIENTATION: ORIENTATION: RIGHT

## 2024-05-01 NOTE — PROGRESS NOTES
consult from culture    11-15-23  Debrided, healing well    11-29-23  Debrided, healing well    12-20-23  Debrided, cont tx and care    12-27-23  Debrided, healing well    1-3-24  Debrided, healing well, cont tx and compression    1-10-24  Debrided, cont tx and care    1-17-24  Debrided, cont tx and care    1-24-24  Debrided, healing well, hydrofera blue with keramax    1-29-24  Great improvement, use dressings as directed    2-7-24  Debrided, Healing well    2-14-24  Debrided, con tx and care    2-21-24  Debrided, cont tx and care    2-28-24  Debrided, cont tx and care    3-6-24  Debrided, cont tx and care, new wound noted right leg, same tx     3-13-24  Debrided, cont tx and care    3-20-24  Debrided, same tx as left leg to right    3-27-24  Debrided, worse on left, dressing wet, culture taken, aquacel ag and coban 2 dressing qod.  levaquin 500mg daily x 7 days    4/3/24  Wounds measuring smaller   Wounds debrided   Continue aquacel ag, super absorbant dressing, zinc to periwound and coban2     4-17-24  Debrided, culture taken, cont tx and care            4-10-24  Debrided, use hydrofera blue, keramax, and dressing as directed     4-24-24  Due to chronicity of wounds, possible rehab placement with treatment?  Debridement, elevate, and offload    5-1-24  Debrided, increased granular base, does not want rehab at this time, states he will be compliant with all instructions    PAST MEDICAL HISTORY      Diagnosis Date    CAD (coronary artery disease)     Cataract, right eye     Complete heart block (HCC)     h/o    Diabetes mellitus (HCC)     Encounter for aspirin therapy     patient uses for heart health    Glaucoma     History of blood transfusion     Hx of blood clots     Hyperlipidemia     Hypertension     Hyperthyroidism     Lower limb ulcer, calf, left, with fat layer exposed (HCC) 10/25/2023    Lymphedema of both lower extremities 10/25/2023    Myocardial infarct (HCC) 2010    Neuropathy     feet    Obesity

## 2024-05-01 NOTE — WOUND CARE
Insurance Verification Request            Ordering Center:     [facility]  [address] [address]  Telephone: [phone]       FAX [phone]    Facility NPI: [id number]  Facility Tax ID [id number]    Melisa Lopez RN    Provider Information:     Provider's Name and NPI Provider's Name: Dr. Prosper Mccarthy    NPI: [id number]    Patient Information:      Tuan Carlson  [address] [place] [address]   [phone]   : 1950  AGE: 73 y.o.     GENDER: male   TODAYS DATE:  2024    Application/product Information:     Benefit Verification Request:    Reason for Request: New Wound    [employer] Fax # [phone]    Trunk/Arms/Legs 73154 (1st 25 sq cm) and Trunk/Arms/Legs 38477 (additional 25 sq cm)    Apligraf, NuShield, and Puraply AM    Has patient been treated with any other Skin Substitute for this Wound:   No    If yes, How many previous applications:  na    WOUND #: 1 and 2    Total Square CM: >100    Procedure setting: Hospital Outpatient Department POS 22    Is the Patient currently in a skilled nursing facility: No     Is the wound work related: No    Procedure date: asap      Insurance:        PRIMARY INSURANCE:  Plan: [facility] MEDICARE COMPLETE  Coverage: [facility] MEDICARE  Effective Date: 2023  Group Number: [unfilled]  Subscriber Number: [id number] - (Medicare Managed)    Payer/Plan Subscr  Sex Relation Sub. Ins. ID Effective Group Num   1. [facility] MEDICARE * TUAN CARLSON* 1950 Male Self [id number]                                   [address] [address]       Patient Information:     Dx Codes:   Diabetic Ulcer [] Yes   [] No,   Venous Ulcer [x] Yes   [] No,   Other [] Yes   [] No    Wound ICD-10 Code:  L97.922 L97.812       No data recorded    Is the Patient a Diabetic: Yes    HgBA1c:    Lab Results   Component Value Date/Time    LABA1C 6.9 2023 10:40 AM        Wound 10/11/23 Leg Left;Anterior;Lateral #1 (Active)

## 2024-05-01 NOTE — PLAN OF CARE
Problem: Chronic Conditions and Co-morbidities  Goal: Patient's chronic conditions and co-morbidity symptoms are monitored and maintained or improved  Outcome: Progressing  Flowsheets (Taken 5/1/2024 1108)  Care Plan - Patient's Chronic Conditions and Co-Morbidity Symptoms are Monitored and Maintained or Improved: Monitor and assess patient's chronic conditions and comorbid symptoms for stability, deterioration, or improvement     Problem: Pain  Goal: Verbalizes/displays adequate comfort level or baseline comfort level  Outcome: Progressing  Flowsheets (Taken 5/1/2024 1036 by Ashely Gregorio RN)  Verbalizes/displays adequate comfort level or baseline comfort level:   Encourage patient to monitor pain and request assistance   Assess pain using appropriate pain scale   Implement non-pharmacological measures as appropriate and evaluate response     Problem: Wound:  Goal: Will show signs of wound healing; wound closure and no evidence of infection  Description: Will show signs of wound healing; wound closure and no evidence of infection  Outcome: Progressing     Problem: Cognitive:  Goal: Knowledge of wound care  Description: Knowledge of wound care  Outcome: Progressing  Goal: Understands risk factors for wounds  Description: Understands risk factors for wounds  Outcome: Progressing     Problem: Wound:  Goal: Will show signs of wound healing; wound closure and no evidence of infection  Description: Will show signs of wound healing; wound closure and no evidence of infection  Outcome: Progressing

## 2024-05-03 NOTE — DISCHARGE INSTRUCTIONS
Discharge Instructions      Visit Discharge/Physician Orders     Discharge condition: Stable     Assessment of pain at discharge:     Anesthetic used: 4% topical lidocaine     Discharge to: Home     Left via:Private automobile     Accompanied by: accompanied by spouse     [facility]F/[facility]: [facility]     Dressing Orders: clean left leg and right leg with saline, apply HF BLUE,  kerramax super absorber, abd, coban 2, change twice weekly    Zinc oxide to reynaldo wound         Treatment Orders: elevate legs above level of heart             [facility] followup visit _____________1 week_______  (Please note your next appointment above and if you are unable to keep, kindly give a 24 hour notice. Thank you.)     Physician signature:__________________________        If you experience any of the following, please call the Wound Care Center during business hours:     * Increase in Pain  * Temperature over 101  * Increase in drainage from your wound  * Drainage with a foul odor  * Bleeding  * Increase in swelling  * Need for compression bandage changes due to slippage, breakthrough drainage.     If you need medical attention outside of the business hours of the Wound Care Centers please contact your PCP or go to the nearest emergency room.

## 2024-05-06 ENCOUNTER — HOSPITAL ENCOUNTER (OUTPATIENT)
Dept: GENERAL RADIOLOGY | Age: 74
Discharge: HOME | End: 2024-05-08
Payer: MEDICARE

## 2024-05-06 ENCOUNTER — HOSPITAL ENCOUNTER (OUTPATIENT)
Age: 74
Discharge: HOME | End: 2024-05-08
Payer: MEDICARE

## 2024-05-06 DIAGNOSIS — M25.511 RIGHT SHOULDER PAIN, UNSPECIFIED CHRONICITY: ICD-10-CM

## 2024-05-06 DIAGNOSIS — S43.51XA SPRAIN OF RIGHT ACROMIOCLAVICULAR JOINT, INITIAL ENCOUNTER: ICD-10-CM

## 2024-05-06 PROCEDURE — 73030 X-RAY EXAM OF SHOULDER: CPT

## 2024-05-08 ENCOUNTER — HOSPITAL ENCOUNTER (OUTPATIENT)
Dept: WOUND CARE | Age: 74
Discharge: HOME | End: 2024-05-08
Attending: PODIATRIST
Payer: MEDICARE

## 2024-05-08 VITALS
TEMPERATURE: 97.7 F | SYSTOLIC BLOOD PRESSURE: 140 MMHG | RESPIRATION RATE: 20 BRPM | DIASTOLIC BLOOD PRESSURE: 76 MMHG | HEART RATE: 86 BPM

## 2024-05-08 DIAGNOSIS — I73.9 PVD (PERIPHERAL VASCULAR DISEASE) (HCC): ICD-10-CM

## 2024-05-08 DIAGNOSIS — I83.022 VARICOSE VEINS OF LEFT LOWER EXTREMITY WITH ULCER OF CALF WITH FAT LAYER EXPOSED (HCC): Primary | ICD-10-CM

## 2024-05-08 DIAGNOSIS — L97.312 VARICOSE VEINS OF RIGHT LOWER EXTREMITY WITH INFLAMMATION, WITH ULCER OF ANKLE WITH FAT LAYER EXPOSED (HCC): ICD-10-CM

## 2024-05-08 DIAGNOSIS — I87.2 VENOUS INSUFFICIENCY (CHRONIC) (PERIPHERAL): ICD-10-CM

## 2024-05-08 DIAGNOSIS — I83.213 VARICOSE VEINS OF RIGHT LOWER EXTREMITY WITH INFLAMMATION, WITH ULCER OF ANKLE WITH FAT LAYER EXPOSED (HCC): ICD-10-CM

## 2024-05-08 DIAGNOSIS — E11.42 DIABETIC POLYNEUROPATHY ASSOCIATED WITH TYPE 2 DIABETES MELLITUS (HCC): ICD-10-CM

## 2024-05-08 DIAGNOSIS — L97.812 NON-PRESSURE CHRONIC ULCER OF OTHER PART OF RIGHT LOWER LEG WITH FAT LAYER EXPOSED (HCC): ICD-10-CM

## 2024-05-08 DIAGNOSIS — L97.922 NON-PRESSURE CHRONIC ULCER OF LEFT LOWER LEG WITH FAT LAYER EXPOSED (HCC): ICD-10-CM

## 2024-05-08 DIAGNOSIS — E11.51 TYPE II DIABETES MELLITUS WITH PERIPHERAL CIRCULATORY DISORDER (HCC): ICD-10-CM

## 2024-05-08 DIAGNOSIS — L97.222 VARICOSE VEINS OF LEFT LOWER EXTREMITY WITH ULCER OF CALF WITH FAT LAYER EXPOSED (HCC): Primary | ICD-10-CM

## 2024-05-08 PROCEDURE — 11042 DBRDMT SUBQ TIS 1ST 20SQCM/<: CPT

## 2024-05-08 PROCEDURE — 11045 DBRDMT SUBQ TISS EACH ADDL: CPT

## 2024-05-08 RX ORDER — GINSENG 100 MG
CAPSULE ORAL ONCE
OUTPATIENT
Start: 2024-05-08 | End: 2024-05-08

## 2024-05-08 RX ORDER — LIDOCAINE 50 MG/G
OINTMENT TOPICAL ONCE
OUTPATIENT
Start: 2024-05-08 | End: 2024-05-08

## 2024-05-08 RX ORDER — LIDOCAINE HYDROCHLORIDE 20 MG/ML
JELLY TOPICAL ONCE
OUTPATIENT
Start: 2024-05-08 | End: 2024-05-08

## 2024-05-08 RX ORDER — LIDOCAINE HYDROCHLORIDE 40 MG/ML
SOLUTION TOPICAL ONCE
OUTPATIENT
Start: 2024-05-08 | End: 2024-05-08

## 2024-05-08 RX ORDER — CLOBETASOL PROPIONATE 0.5 MG/G
OINTMENT TOPICAL ONCE
OUTPATIENT
Start: 2024-05-08 | End: 2024-05-08

## 2024-05-08 RX ORDER — LIDOCAINE 40 MG/G
CREAM TOPICAL ONCE
OUTPATIENT
Start: 2024-05-08 | End: 2024-05-08

## 2024-05-08 RX ORDER — GENTAMICIN SULFATE 1 MG/G
OINTMENT TOPICAL ONCE
OUTPATIENT
Start: 2024-05-08 | End: 2024-05-08

## 2024-05-08 RX ORDER — LIDOCAINE HYDROCHLORIDE 40 MG/ML
SOLUTION TOPICAL ONCE
Status: COMPLETED | OUTPATIENT
Start: 2024-05-08 | End: 2024-05-08

## 2024-05-08 RX ORDER — PREDNISONE 5 MG/1
5 TABLET ORAL DAILY
COMMUNITY

## 2024-05-08 RX ORDER — BETAMETHASONE DIPROPIONATE 0.5 MG/G
CREAM TOPICAL ONCE
OUTPATIENT
Start: 2024-05-08 | End: 2024-05-08

## 2024-05-08 RX ORDER — IBUPROFEN 200 MG
TABLET ORAL ONCE
OUTPATIENT
Start: 2024-05-08 | End: 2024-05-08

## 2024-05-08 RX ORDER — TRIAMCINOLONE ACETONIDE 1 MG/G
OINTMENT TOPICAL ONCE
OUTPATIENT
Start: 2024-05-08 | End: 2024-05-08

## 2024-05-08 RX ORDER — SODIUM CHLOR/HYPOCHLOROUS ACID 0.033 %
SOLUTION, IRRIGATION IRRIGATION ONCE
OUTPATIENT
Start: 2024-05-08 | End: 2024-05-08

## 2024-05-08 RX ORDER — BACITRACIN ZINC AND POLYMYXIN B SULFATE 500; 1000 [USP'U]/G; [USP'U]/G
OINTMENT TOPICAL ONCE
OUTPATIENT
Start: 2024-05-08 | End: 2024-05-08

## 2024-05-08 RX ADMIN — LIDOCAINE HYDROCHLORIDE 6 ML: 40 SOLUTION TOPICAL at 11:38

## 2024-05-08 NOTE — PROGRESS NOTES
with ulcer of calf with fat layer exposed (HCC) - Primary    Relevant Orders    Initiate Outpatient Wound Care Protocol    Varicose veins of right lower extremity with inflammation, with ulcer of ankle with fat layer exposed (HCC)    Venous insufficiency (chronic) (peripheral)         Pre Debridement Measurements:  Are located in the Wound/Ulcer Documentation Flow Sheet  Post Debridement Measurements:  Wound/Ulcer Descriptions are Pre Debridement except measurements:     Wound 10/11/23 Leg Left;Anterior;Lateral #1 (Active)   Wound Image   04/17/24 1108   Dressing Status New dressing applied 05/08/24 1201   Wound Cleansed Cleansed with saline 05/08/24 1201   Dressing/Treatment Alginate with Ag;Other (comment) 05/08/24 1201   Wound Length (cm) 9 cm 05/08/24 1135   Wound Width (cm) 14 cm 05/08/24 1135   Wound Depth (cm) 0.3 cm 05/08/24 1135   Wound Surface Area (cm^2) 126 cm^2 05/08/24 1135   Change in Wound Size % (l*w) 46.72 05/08/24 1135   Wound Volume (cm^3) 37.8 cm^3 05/08/24 1135   Wound Healing % 20 05/08/24 1135   Post-Procedure Length (cm) 9 cm 05/08/24 1143   Post-Procedure Width (cm) 14 cm 05/08/24 1143   Post-Procedure Depth (cm) 0.4 cm 05/08/24 1143   Post-Procedure Surface Area (cm^2) 126 cm^2 05/08/24 1143   Post-Procedure Volume (cm^3) 50.4 cm^3 05/08/24 1143   Wound Assessment Fibrin;Pink/red 05/08/24 1135   Drainage Amount Moderate (25-50%) 05/08/24 1135   Drainage Description Yellow 05/08/24 1135   Odor Mild 05/08/24 1135   Maria Elena-wound Assessment Edematous;Fragile 05/08/24 1135   Number of days: 210       Wound 02/07/24 Leg Right;Lateral #2 (Active)   Wound Image   04/17/24 1108   Dressing Status New dressing applied 05/08/24 1201   Wound Cleansed Cleansed with saline 05/08/24 1201   Dressing/Treatment Alginate with Ag;Other (comment) 05/08/24 1201   Wound Length (cm) 10 cm 05/08/24 1135   Wound Width (cm) 11 cm 05/08/24 1135   Wound Depth (cm) 0.2 cm 05/08/24 1135   Wound Surface Area (cm^2) 110 cm^2

## 2024-05-14 RX ORDER — ROSUVASTATIN CALCIUM 5 MG/1
5 TABLET, COATED ORAL DAILY
Qty: 90 TABLET | Refills: 0 | Status: SHIPPED | OUTPATIENT
Start: 2024-05-14

## 2024-05-14 RX ORDER — CARVEDILOL 25 MG/1
25 TABLET ORAL 2 TIMES DAILY WITH MEALS
Qty: 180 TABLET | Refills: 0 | Status: SHIPPED | OUTPATIENT
Start: 2024-05-14

## 2024-05-15 ENCOUNTER — HOSPITAL ENCOUNTER (OUTPATIENT)
Dept: WOUND CARE | Age: 74
Discharge: HOME | End: 2024-05-15
Attending: PODIATRIST
Payer: MEDICARE

## 2024-05-15 VITALS
TEMPERATURE: 97.6 F | SYSTOLIC BLOOD PRESSURE: 148 MMHG | HEART RATE: 96 BPM | DIASTOLIC BLOOD PRESSURE: 80 MMHG | RESPIRATION RATE: 20 BRPM

## 2024-05-15 DIAGNOSIS — L97.222 VARICOSE VEINS OF LEFT LOWER EXTREMITY WITH ULCER OF CALF WITH FAT LAYER EXPOSED (HCC): Primary | ICD-10-CM

## 2024-05-15 DIAGNOSIS — I83.022 VARICOSE VEINS OF LEFT LOWER EXTREMITY WITH ULCER OF CALF WITH FAT LAYER EXPOSED (HCC): Primary | ICD-10-CM

## 2024-05-15 PROCEDURE — 11045 DBRDMT SUBQ TISS EACH ADDL: CPT

## 2024-05-15 PROCEDURE — 11042 DBRDMT SUBQ TIS 1ST 20SQCM/<: CPT

## 2024-05-15 RX ORDER — BETAMETHASONE DIPROPIONATE 0.5 MG/G
CREAM TOPICAL ONCE
OUTPATIENT
Start: 2024-05-15 | End: 2024-05-15

## 2024-05-15 RX ORDER — IBUPROFEN 200 MG
TABLET ORAL ONCE
OUTPATIENT
Start: 2024-05-15 | End: 2024-05-15

## 2024-05-15 RX ORDER — LIDOCAINE HYDROCHLORIDE 20 MG/ML
JELLY TOPICAL ONCE
OUTPATIENT
Start: 2024-05-15 | End: 2024-05-15

## 2024-05-15 RX ORDER — CLOBETASOL PROPIONATE 0.5 MG/G
OINTMENT TOPICAL ONCE
OUTPATIENT
Start: 2024-05-15 | End: 2024-05-15

## 2024-05-15 RX ORDER — SODIUM CHLOR/HYPOCHLOROUS ACID 0.033 %
SOLUTION, IRRIGATION IRRIGATION ONCE
OUTPATIENT
Start: 2024-05-15 | End: 2024-05-15

## 2024-05-15 RX ORDER — LIDOCAINE 40 MG/G
CREAM TOPICAL ONCE
OUTPATIENT
Start: 2024-05-15 | End: 2024-05-15

## 2024-05-15 RX ORDER — BACITRACIN ZINC AND POLYMYXIN B SULFATE 500; 1000 [USP'U]/G; [USP'U]/G
OINTMENT TOPICAL ONCE
OUTPATIENT
Start: 2024-05-15 | End: 2024-05-15

## 2024-05-15 RX ORDER — GINSENG 100 MG
CAPSULE ORAL ONCE
OUTPATIENT
Start: 2024-05-15 | End: 2024-05-15

## 2024-05-15 RX ORDER — LIDOCAINE HYDROCHLORIDE 40 MG/ML
SOLUTION TOPICAL ONCE
Status: COMPLETED | OUTPATIENT
Start: 2024-05-15 | End: 2024-05-15

## 2024-05-15 RX ORDER — TRIAMCINOLONE ACETONIDE 1 MG/G
OINTMENT TOPICAL ONCE
OUTPATIENT
Start: 2024-05-15 | End: 2024-05-15

## 2024-05-15 RX ORDER — LIDOCAINE 50 MG/G
OINTMENT TOPICAL ONCE
OUTPATIENT
Start: 2024-05-15 | End: 2024-05-15

## 2024-05-15 RX ORDER — GENTAMICIN SULFATE 1 MG/G
OINTMENT TOPICAL ONCE
OUTPATIENT
Start: 2024-05-15 | End: 2024-05-15

## 2024-05-15 RX ORDER — LIDOCAINE HYDROCHLORIDE 40 MG/ML
SOLUTION TOPICAL ONCE
OUTPATIENT
Start: 2024-05-15 | End: 2024-05-15

## 2024-05-15 RX ADMIN — LIDOCAINE HYDROCHLORIDE 15 ML: 40 SOLUTION TOPICAL at 11:42

## 2024-05-15 NOTE — DISCHARGE INSTRUCTIONS
Discharge Instructions      Visit Discharge/Physician Orders     Discharge condition: Stable     Assessment of pain at discharge:     Anesthetic used: 4% topical lidocaine     Discharge to: Home     Left via:Private automobile     Accompanied by: accompanied by spouse     [facility]F/[facility]: [facility]     Dressing Orders: clean left leg and right leg with saline, apply HF BLUE,  kerramax super absorber, abd, coban 2, change twice weekly    Zinc oxide to reynaldo wound         Treatment Orders: elevate legs above level of heart              [facility] followup visit _______1 week_______  (Please note your next appointment above and if you are unable to keep, kindly give a 24 hour notice. Thank you.)     Physician signature:__________________________        If you experience any of the following, please call the Wound Care Center during business hours:     * Increase in Pain  * Temperature over 101  * Increase in drainage from your wound  * Drainage with a foul odor  * Bleeding  * Increase in swelling  * Need for compression bandage changes due to slippage, breakthrough drainage.     If you need medical attention outside of the business hours of the Wound Care Centers please contact your PCP or go to the nearest emergency room.

## 2024-05-15 NOTE — PROGRESS NOTES
Wound Healing Center  History and Physical/Consultation  Podiatry    Referring Physician : Charles Foss MD  Tuan Vega  MEDICAL RECORD NUMBER:  [id number]  AGE: 73 y.o.   GENDER: male  : 1950  EPISODE DATE:  5/15/2024  Subjective:     Chief Complaint   Patient presents with    Wound Check     R/l legs         HISTORY of PRESENT ILLNESS HPI     Tuan Vega is a 73 y.o. male who presents today for wound/ulcer evaluation.   History of Wound Context:  The patient has had a wound of left leg which was first noted approximately months ago.  This has been treated debridemnet. On their initial visit to the wound healing center, 10/11/23 ,  the patient has noted that the wound has not been improving.  The patient has had similar previous wounds in the past.      Pt is on abx at time of initial visit.      Wound/Ulcer Pain Timing/Severity: constant  Quality of pain: aching  Severity:   10   Modifying Factors: Pain worsens with walking  Associated Signs/Symptoms: edema, erythema, and drainage    Ulcer Identification:  Ulcer Type: venous  Contributing Factors: edema, venous stasis, and lymphedema    Diabetic/Pressure/Non Pressure Ulcers onl y:  Ulcer: Non-Pressure ulcer, fat layer exposed    If patient has diabetic lower extremity wounds  Magaña Classification of diabetic lower extremity wounds:    Grade Description   []  0 No open wound   []  1 Superficial ulcer involving the full skin thickness   []  2 Deep ulcer involves ligament, tendon, joint capsule, or fascia  No bone involvement or abscess presence   []  3 Deep Ulcer with abcess formation and/or osteomyelitis   []  4 Localized gangrene   []  5 Extensive gangrene of the foot     Wound: N/A    10-18-23  Debrided, bactrim rx given    10/25/2023  Extensive multiple wounds of the left lower extremity debrided, patient has been treated with antibiotics recently    23  Coban 4 layer, debrided, culture taken    23  Debrided, needs id

## 2024-05-17 NOTE — DISCHARGE INSTRUCTIONS
Discharge Instructions      Visit Discharge/Physician Orders     Discharge condition: Stable     Assessment of pain at discharge:     Anesthetic used: 4% topical lidocaine     Discharge to: Home     Left via:Private automobile     Accompanied by: accompanied by spouse     [facility]F/[facility]: [facility]     Dressing Orders: clean left leg and right leg with saline, apply HF BLUE,  kerramax super absorber, abd, coban 2, change twice weekly    Zinc oxide to reynaldo wound         Treatment Orders: elevate legs above level of heart              [facility] followup visit _______1 week_______  (Please note your next appointment above and if you are unable to keep, kindly give a 24 hour notice. Thank you.)     Physician signature:__________________________        If you experience any of the following, please call the Wound Care Center during business hours:     * Increase in Pain  * Temperature over 101  * Increase in drainage from your wound  * Drainage with a foul odor  * Bleeding  * Increase in swelling  * Need for compression bandage changes due to slippage, breakthrough drainage.     If you need medical attention outside of the business hours of the Wound Care Centers please contact your PCP or go to the nearest emergency room.

## 2024-05-22 ENCOUNTER — HOSPITAL ENCOUNTER (OUTPATIENT)
Dept: WOUND CARE | Age: 74
Discharge: HOME | End: 2024-05-22
Attending: PODIATRIST
Payer: MEDICARE

## 2024-05-22 VITALS
SYSTOLIC BLOOD PRESSURE: 132 MMHG | WEIGHT: 315 LBS | BODY MASS INDEX: 49.44 KG/M2 | HEART RATE: 88 BPM | RESPIRATION RATE: 22 BRPM | HEIGHT: 67 IN | DIASTOLIC BLOOD PRESSURE: 78 MMHG | TEMPERATURE: 98.6 F

## 2024-05-22 DIAGNOSIS — I83.022 VARICOSE VEINS OF LEFT LOWER EXTREMITY WITH ULCER OF CALF WITH FAT LAYER EXPOSED (HCC): Primary | ICD-10-CM

## 2024-05-22 DIAGNOSIS — L97.222 VARICOSE VEINS OF LEFT LOWER EXTREMITY WITH ULCER OF CALF WITH FAT LAYER EXPOSED (HCC): Primary | ICD-10-CM

## 2024-05-22 PROCEDURE — 11045 DBRDMT SUBQ TISS EACH ADDL: CPT

## 2024-05-22 PROCEDURE — 11042 DBRDMT SUBQ TIS 1ST 20SQCM/<: CPT

## 2024-05-22 RX ORDER — LIDOCAINE 50 MG/G
OINTMENT TOPICAL ONCE
OUTPATIENT
Start: 2024-05-22 | End: 2024-05-22

## 2024-05-22 RX ORDER — SODIUM CHLOR/HYPOCHLOROUS ACID 0.033 %
SOLUTION, IRRIGATION IRRIGATION ONCE
OUTPATIENT
Start: 2024-05-22 | End: 2024-05-22

## 2024-05-22 RX ORDER — GENTAMICIN SULFATE 1 MG/G
OINTMENT TOPICAL ONCE
OUTPATIENT
Start: 2024-05-22 | End: 2024-05-22

## 2024-05-22 RX ORDER — IBUPROFEN 200 MG
TABLET ORAL ONCE
OUTPATIENT
Start: 2024-05-22 | End: 2024-05-22

## 2024-05-22 RX ORDER — BETAMETHASONE DIPROPIONATE 0.5 MG/G
CREAM TOPICAL ONCE
OUTPATIENT
Start: 2024-05-22 | End: 2024-05-22

## 2024-05-22 RX ORDER — GINSENG 100 MG
CAPSULE ORAL ONCE
OUTPATIENT
Start: 2024-05-22 | End: 2024-05-22

## 2024-05-22 RX ORDER — LIDOCAINE HYDROCHLORIDE 20 MG/ML
JELLY TOPICAL ONCE
OUTPATIENT
Start: 2024-05-22 | End: 2024-05-22

## 2024-05-22 RX ORDER — LIDOCAINE 40 MG/G
CREAM TOPICAL ONCE
OUTPATIENT
Start: 2024-05-22 | End: 2024-05-22

## 2024-05-22 RX ORDER — CLOBETASOL PROPIONATE 0.5 MG/G
OINTMENT TOPICAL ONCE
OUTPATIENT
Start: 2024-05-22 | End: 2024-05-22

## 2024-05-22 RX ORDER — LIDOCAINE HYDROCHLORIDE 40 MG/ML
SOLUTION TOPICAL ONCE
Status: COMPLETED | OUTPATIENT
Start: 2024-05-22 | End: 2024-05-22

## 2024-05-22 RX ORDER — LIDOCAINE HYDROCHLORIDE 40 MG/ML
SOLUTION TOPICAL ONCE
OUTPATIENT
Start: 2024-05-22 | End: 2024-05-22

## 2024-05-22 RX ORDER — TRIAMCINOLONE ACETONIDE 1 MG/G
OINTMENT TOPICAL ONCE
OUTPATIENT
Start: 2024-05-22 | End: 2024-05-22

## 2024-05-22 RX ORDER — BACITRACIN ZINC AND POLYMYXIN B SULFATE 500; 1000 [USP'U]/G; [USP'U]/G
OINTMENT TOPICAL ONCE
OUTPATIENT
Start: 2024-05-22 | End: 2024-05-22

## 2024-05-22 RX ADMIN — LIDOCAINE HYDROCHLORIDE 25 ML: 40 SOLUTION TOPICAL at 11:13

## 2024-05-22 NOTE — PROGRESS NOTES
Wound Healing Center  History and Physical/Consultation  Podiatry    Referring Physician : Charles Foss MD  Tuan Vega  MEDICAL RECORD NUMBER:  [id number]  AGE: 73 y.o.   GENDER: male  : 1950  EPISODE DATE:  5/15/2024  Subjective:     Chief Complaint   Patient presents with    Wound Check     R/l legs         HISTORY of PRESENT ILLNESS HPI     Tuan Vega is a 73 y.o. male who presents today for wound/ulcer evaluation.   History of Wound Context:  The patient has had a wound of left leg which was first noted approximately months ago.  This has been treated debridemnet. On their initial visit to the wound healing center, 10/11/23 ,  the patient has noted that the wound has not been improving.  The patient has had similar previous wounds in the past.      Pt is on abx at time of initial visit.      Wound/Ulcer Pain Timing/Severity: constant  Quality of pain: aching  Severity:   10   Modifying Factors: Pain worsens with walking  Associated Signs/Symptoms: edema, erythema, and drainage    Ulcer Identification:  Ulcer Type: venous  Contributing Factors: edema, venous stasis, and lymphedema    Diabetic/Pressure/Non Pressure Ulcers onl y:  Ulcer: Non-Pressure ulcer, fat layer exposed    If patient has diabetic lower extremity wounds  Magaña Classification of diabetic lower extremity wounds:    Grade Description   []  0 No open wound   []  1 Superficial ulcer involving the full skin thickness   []  2 Deep ulcer involves ligament, tendon, joint capsule, or fascia  No bone involvement or abscess presence   []  3 Deep Ulcer with abcess formation and/or osteomyelitis   []  4 Localized gangrene   []  5 Extensive gangrene of the foot     Wound: N/A    10-18-23  Debrided, bactrim rx given    10/25/2023  Extensive multiple wounds of the left lower extremity debrided, patient has been treated with antibiotics recently    23  Coban 4 layer, debrided, culture taken    23  Debrided, needs id

## 2024-05-24 NOTE — DISCHARGE INSTRUCTIONS
Discharge Instructions      Visit Discharge/Physician Orders     Discharge condition: Stable     Assessment of pain at discharge:     Anesthetic used: 4% topical lidocaine     Discharge to: Home     Left via:Private automobile     Accompanied by: accompanied by spouse     [facility]F/[facility]: [facility]     Dressing Orders: clean left leg and right leg with saline, apply HF BLUE,  kerramax super absorber, abd, coban 2, change twice weekly  On left leg graft applied on anterior leg, apply versatel, ped well leave intact FOR 1 WEEK, apply HF blue to other wounds on leg. Continue to apply coban 2   and change twice weekly   Zinc oxide to reynaldo wound         Treatment Orders: elevate legs above level of heart          [facility] followup visit _______1 week_______  (Please note your next appointment above and if you are unable to keep, kindly give a 24 hour notice. Thank you.)     Physician signature:__________________________        If you experience any of the following, please call the Wound Care Center during business hours:     * Increase in Pain  * Temperature over 101  * Increase in drainage from your wound  * Drainage with a foul odor  * Bleeding  * Increase in swelling  * Need for compression bandage changes due to slippage, breakthrough drainage.     If you need medical attention outside of the business hours of the Wound Care Centers please contact your PCP or go to the nearest emergency room.

## 2024-05-29 ENCOUNTER — HOSPITAL ENCOUNTER (OUTPATIENT)
Dept: WOUND CARE | Age: 74
Discharge: HOME | End: 2024-05-29
Attending: PODIATRIST
Payer: MEDICARE

## 2024-05-29 VITALS
HEART RATE: 65 BPM | TEMPERATURE: 97 F | WEIGHT: 315 LBS | HEIGHT: 67 IN | BODY MASS INDEX: 49.44 KG/M2 | DIASTOLIC BLOOD PRESSURE: 62 MMHG | RESPIRATION RATE: 20 BRPM | SYSTOLIC BLOOD PRESSURE: 145 MMHG

## 2024-05-29 DIAGNOSIS — I83.022 VARICOSE VEINS OF LEFT LOWER EXTREMITY WITH ULCER OF CALF WITH FAT LAYER EXPOSED (HCC): Primary | ICD-10-CM

## 2024-05-29 DIAGNOSIS — L97.222 VARICOSE VEINS OF LEFT LOWER EXTREMITY WITH ULCER OF CALF WITH FAT LAYER EXPOSED (HCC): Primary | ICD-10-CM

## 2024-05-29 PROCEDURE — 15271 SKIN SUB GRAFT TRNK/ARM/LEG: CPT

## 2024-05-29 RX ORDER — GENTAMICIN SULFATE 1 MG/G
OINTMENT TOPICAL ONCE
OUTPATIENT
Start: 2024-05-29 | End: 2024-05-29

## 2024-05-29 RX ORDER — IBUPROFEN 200 MG
TABLET ORAL ONCE
OUTPATIENT
Start: 2024-05-29 | End: 2024-05-29

## 2024-05-29 RX ORDER — LIDOCAINE HYDROCHLORIDE 20 MG/ML
JELLY TOPICAL ONCE
OUTPATIENT
Start: 2024-05-29 | End: 2024-05-29

## 2024-05-29 RX ORDER — LIDOCAINE HYDROCHLORIDE 40 MG/ML
SOLUTION TOPICAL ONCE
Status: COMPLETED | OUTPATIENT
Start: 2024-05-29 | End: 2024-05-29

## 2024-05-29 RX ORDER — BETAMETHASONE DIPROPIONATE 0.5 MG/G
CREAM TOPICAL ONCE
OUTPATIENT
Start: 2024-05-29 | End: 2024-05-29

## 2024-05-29 RX ORDER — GINSENG 100 MG
CAPSULE ORAL ONCE
OUTPATIENT
Start: 2024-05-29 | End: 2024-05-29

## 2024-05-29 RX ORDER — SODIUM CHLOR/HYPOCHLOROUS ACID 0.033 %
SOLUTION, IRRIGATION IRRIGATION ONCE
OUTPATIENT
Start: 2024-05-29 | End: 2024-05-29

## 2024-05-29 RX ORDER — LIDOCAINE 40 MG/G
CREAM TOPICAL ONCE
OUTPATIENT
Start: 2024-05-29 | End: 2024-05-29

## 2024-05-29 RX ORDER — TRIAMCINOLONE ACETONIDE 1 MG/G
OINTMENT TOPICAL ONCE
OUTPATIENT
Start: 2024-05-29 | End: 2024-05-29

## 2024-05-29 RX ORDER — CLOBETASOL PROPIONATE 0.5 MG/G
OINTMENT TOPICAL ONCE
OUTPATIENT
Start: 2024-05-29 | End: 2024-05-29

## 2024-05-29 RX ORDER — LIDOCAINE 50 MG/G
OINTMENT TOPICAL ONCE
OUTPATIENT
Start: 2024-05-29 | End: 2024-05-29

## 2024-05-29 RX ORDER — LIDOCAINE HYDROCHLORIDE 40 MG/ML
SOLUTION TOPICAL ONCE
OUTPATIENT
Start: 2024-05-29 | End: 2024-05-29

## 2024-05-29 RX ORDER — BACITRACIN ZINC AND POLYMYXIN B SULFATE 500; 1000 [USP'U]/G; [USP'U]/G
OINTMENT TOPICAL ONCE
OUTPATIENT
Start: 2024-05-29 | End: 2024-05-29

## 2024-05-29 RX ADMIN — LIDOCAINE HYDROCHLORIDE 15 ML: 40 SOLUTION TOPICAL at 11:57

## 2024-05-29 NOTE — PROGRESS NOTES
Hx of blood clots     Hyperlipidemia     Hypertension     Hyperthyroidism     Lower limb ulcer, calf, left, with fat layer exposed (HCC) 10/25/2023    Lymphedema of both lower extremities 10/25/2023    Myocardial infarct (HCC) 2010    Neuropathy     feet    Obesity     Obstructive sleep apnea     on cpap    Onychomycosis     DEMETRIA on CPAP     Osteoarthritis     Pacemaker     Medtronic    Stasis dermatitis     h/o on bilateral legs with leg edema    Varicose veins      Past Surgical History:   Procedure Laterality Date    CARDIAC PACEMAKER PLACEMENT  2010    Medtronic    CHOLECYSTECTOMY      COLONOSCOPY  2016    INTRACAPSULAR CATARACT EXTRACTION Right 2022    RIGHT EYE CATARACT EXTRACTION IOL IMPLANT AND GONIOTOMY performed by Jose Camacho MD at [facility] OR    INTRACAPSULAR CATARACT EXTRACTION Left 2022    LEFT EYE CATARACT EXTRACTION IOL IMPLANT performed by Jose Camacho MD at [facility] OR    JOINT REPLACEMENT Bilateral     knees    LEG SURGERY Left 2023    LEFT LEG DEBRIDEMENT performed by Edward Bedolla DPM at [facility] OR    PACEMAKER PLACEMENT  2020    DUAL PPM GR    (MEDTRONIC)     DR. MARTIN     TRANSESOPHAGEAL ECHOCARDIOGRAM  2020    With      Family History   Problem Relation Age of Onset    Heart Disease Father             Other Mother             Other Brother         gall bladder     Social History     Tobacco Use    Smoking status: Former     Current packs/day: 0.00     Average packs/day: 2.0 packs/day for 25.9 years (51.7 ttl pk-yrs)     Types: Cigarettes     Start date: 1964     Quit date: 1990     Years since quittin.9    Smokeless tobacco: Never   Vaping Use    Vaping Use: Never used   Substance Use Topics    Alcohol use: Yes     Comment: rare   2 beers/month    Drug use: Never     Allergies   Allergen Reactions    Cleocin [Clindamycin] Shortness Of Breath    Sodium Metabisulfite Shortness Of Breath     CHEMICAL NAME FOR POTATO

## 2024-06-05 ENCOUNTER — HOSPITAL ENCOUNTER (OUTPATIENT)
Dept: WOUND CARE | Age: 74
Discharge: HOME | End: 2024-06-05
Attending: PODIATRIST
Payer: MEDICARE

## 2024-06-05 VITALS
DIASTOLIC BLOOD PRESSURE: 75 MMHG | TEMPERATURE: 96.9 F | SYSTOLIC BLOOD PRESSURE: 159 MMHG | RESPIRATION RATE: 22 BRPM | BODY MASS INDEX: 49.44 KG/M2 | HEART RATE: 80 BPM | HEIGHT: 67 IN | WEIGHT: 315 LBS

## 2024-06-05 DIAGNOSIS — L97.222 VARICOSE VEINS OF LEFT LOWER EXTREMITY WITH ULCER OF CALF WITH FAT LAYER EXPOSED (HCC): Primary | ICD-10-CM

## 2024-06-05 DIAGNOSIS — I83.022 VARICOSE VEINS OF LEFT LOWER EXTREMITY WITH ULCER OF CALF WITH FAT LAYER EXPOSED (HCC): Primary | ICD-10-CM

## 2024-06-05 PROCEDURE — 11042 DBRDMT SUBQ TIS 1ST 20SQCM/<: CPT

## 2024-06-05 PROCEDURE — 11045 DBRDMT SUBQ TISS EACH ADDL: CPT

## 2024-06-05 RX ORDER — IBUPROFEN 200 MG
TABLET ORAL ONCE
OUTPATIENT
Start: 2024-06-05 | End: 2024-06-05

## 2024-06-05 RX ORDER — SODIUM CHLOR/HYPOCHLOROUS ACID 0.033 %
SOLUTION, IRRIGATION IRRIGATION ONCE
OUTPATIENT
Start: 2024-06-05 | End: 2024-06-05

## 2024-06-05 RX ORDER — LIDOCAINE HYDROCHLORIDE 20 MG/ML
JELLY TOPICAL ONCE
OUTPATIENT
Start: 2024-06-05 | End: 2024-06-05

## 2024-06-05 RX ORDER — BACITRACIN ZINC AND POLYMYXIN B SULFATE 500; 1000 [USP'U]/G; [USP'U]/G
OINTMENT TOPICAL ONCE
OUTPATIENT
Start: 2024-06-05 | End: 2024-06-05

## 2024-06-05 RX ORDER — BETAMETHASONE DIPROPIONATE 0.5 MG/G
CREAM TOPICAL ONCE
OUTPATIENT
Start: 2024-06-05 | End: 2024-06-05

## 2024-06-05 RX ORDER — LIDOCAINE 50 MG/G
OINTMENT TOPICAL ONCE
OUTPATIENT
Start: 2024-06-05 | End: 2024-06-05

## 2024-06-05 RX ORDER — LIDOCAINE HYDROCHLORIDE 40 MG/ML
SOLUTION TOPICAL ONCE
OUTPATIENT
Start: 2024-06-05 | End: 2024-06-05

## 2024-06-05 RX ORDER — TRIAMCINOLONE ACETONIDE 1 MG/G
OINTMENT TOPICAL ONCE
OUTPATIENT
Start: 2024-06-05 | End: 2024-06-05

## 2024-06-05 RX ORDER — CLOBETASOL PROPIONATE 0.5 MG/G
OINTMENT TOPICAL ONCE
OUTPATIENT
Start: 2024-06-05 | End: 2024-06-05

## 2024-06-05 RX ORDER — LIDOCAINE 40 MG/G
CREAM TOPICAL ONCE
OUTPATIENT
Start: 2024-06-05 | End: 2024-06-05

## 2024-06-05 RX ORDER — GINSENG 100 MG
CAPSULE ORAL ONCE
OUTPATIENT
Start: 2024-06-05 | End: 2024-06-05

## 2024-06-05 RX ORDER — LIDOCAINE HYDROCHLORIDE 40 MG/ML
SOLUTION TOPICAL ONCE
Status: COMPLETED | OUTPATIENT
Start: 2024-06-05 | End: 2024-06-05

## 2024-06-05 RX ORDER — GENTAMICIN SULFATE 1 MG/G
OINTMENT TOPICAL ONCE
OUTPATIENT
Start: 2024-06-05 | End: 2024-06-05

## 2024-06-05 RX ADMIN — LIDOCAINE HYDROCHLORIDE 5 ML: 40 SOLUTION TOPICAL at 11:37

## 2024-06-05 NOTE — DISCHARGE INSTRUCTIONS
Discharge Instructions      Visit Discharge/Physician Orders     Discharge condition: Stable     Assessment of pain at discharge:     Anesthetic used: 4% topical lidocaine     Discharge to: Home     Left via:Private automobile     Accompanied by: accompanied by spouse     ECF/HHA: [facility]     Dressing Orders: clean left leg and right leg with saline, apply HF BLUE,  kerramax super absorber, abd, profore, change twice weekly    Zinc oxide to reynaldo wound         Treatment Orders: elevate legs above level of heart           [facility] followup visit _______1 week_______  (Please note your next appointment above and if you are unable to keep, kindly give a 24 hour notice. Thank you.)     Physician signature:__________________________        If you experience any of the following, please call the Wound Care Center during business hours:     * Increase in Pain  * Temperature over 101  * Increase in drainage from your wound  * Drainage with a foul odor  * Bleeding  * Increase in swelling  * Need for compression bandage changes due to slippage, breakthrough drainage.     If you need medical attention outside of the business hours of the Wound Care Centers please contact your PCP or go to the nearest emergency room.

## 2024-06-12 ENCOUNTER — HOSPITAL ENCOUNTER (OUTPATIENT)
Dept: WOUND CARE | Age: 74
Discharge: HOME | End: 2024-06-12
Attending: PODIATRIST
Payer: MEDICARE

## 2024-06-12 VITALS
BODY MASS INDEX: 49.44 KG/M2 | HEART RATE: 82 BPM | WEIGHT: 315 LBS | TEMPERATURE: 96.9 F | RESPIRATION RATE: 20 BRPM | SYSTOLIC BLOOD PRESSURE: 120 MMHG | HEIGHT: 67 IN | DIASTOLIC BLOOD PRESSURE: 80 MMHG

## 2024-06-12 PROCEDURE — 11045 DBRDMT SUBQ TISS EACH ADDL: CPT

## 2024-06-12 PROCEDURE — 11042 DBRDMT SUBQ TIS 1ST 20SQCM/<: CPT

## 2024-06-12 NOTE — DISCHARGE INSTRUCTIONS
Discharge Instructions      Visit Discharge/Physician Orders     Discharge condition: Stable     Assessment of pain at discharge:     Anesthetic used: 4% topical lidocaine     Discharge to: Home     Left via:Private automobile     Accompanied by: accompanied by spouse     ECF/HHA: [facility]     Dressing Orders: clean left leg and right leg with saline, apply HF BLUE,  kerramax super absorber, abd, 4 layer profore, change twice weekly     Zinc oxide to reynaldo wound         Treatment Orders: elevate legs above level of heart           [facility] followup visit _______1 week_______  (Please note your next appointment above and if you are unable to keep, kindly give a 24 hour notice. Thank you.)     Physician signature:__________________________        If you experience any of the following, please call the Wound Care Center during business hours:     * Increase in Pain  * Temperature over 101  * Increase in drainage from your wound  * Drainage with a foul odor  * Bleeding  * Increase in swelling  * Need for compression bandage changes due to slippage, breakthrough drainage.     If you need medical attention outside of the business hours of the Wound Care Centers please contact your PCP or go to the nearest emergency room.

## 2024-06-12 NOTE — PROGRESS NOTES
Wound Healing Center  History and Physical/Consultation  Podiatry    Referring Physician : Charles Foss MD  Tuan Vega  MEDICAL RECORD NUMBER:  [id number]  AGE: 73 y.o.   GENDER: male  : 1950  EPISODE DATE:  2024  Subjective:     Chief Complaint   Patient presents with    Wound Check     Both legs         HISTORY of PRESENT ILLNESS HPI     Tuan Vega is a 73 y.o. male who presents today for wound/ulcer evaluation.   History of Wound Context:  The patient has had a wound of left leg which was first noted approximately months ago.  This has been treated debridemnet. On their initial visit to the wound healing center, 10/11/23 ,  the patient has noted that the wound has not been improving.  The patient has had similar previous wounds in the past.      Pt is on abx at time of initial visit.      Wound/Ulcer Pain Timing/Severity: constant  Quality of pain: aching  Severity:  4 / 10   Modifying Factors: Pain worsens with walking  Associated Signs/Symptoms: edema, erythema, and drainage    Ulcer Identification:  Ulcer Type: venous  Contributing Factors: edema, venous stasis, and lymphedema    Diabetic/Pressure/Non Pressure Ulcers onl y:  Ulcer: Non-Pressure ulcer, fat layer exposed    If patient has diabetic lower extremity wounds  [place] Classification of diabetic lower extremity wounds:    Grade Description   []  0 No open wound   []  1 Superficial ulcer involving the full skin thickness   []  2 Deep ulcer involves ligament, tendon, joint capsule, or fascia  No bone involvement or abscess presence   []  3 Deep Ulcer with abcess formation and/or osteomyelitis   []  4 Localized gangrene   []  5 Extensive gangrene of the foot     Wound: N/A    10-18-23  Debrided, bactrim rx given    10/25/2023  Extensive multiple wounds of the left lower extremity debrided, patient has been treated with antibiotics recently    23  Coban 4 layer, debrided, culture taken    23  Debrided, needs

## 2024-06-17 ENCOUNTER — PROCEDURE VISIT (OUTPATIENT)
Dept: PODIATRY | Age: 74
End: 2024-06-17
Payer: MEDICARE

## 2024-06-17 VITALS — HEIGHT: 67 IN | WEIGHT: 315 LBS | BODY MASS INDEX: 49.44 KG/M2

## 2024-06-17 DIAGNOSIS — I89.0 LYMPHEDEMA: ICD-10-CM

## 2024-06-17 DIAGNOSIS — E11.51 TYPE II DIABETES MELLITUS WITH PERIPHERAL CIRCULATORY DISORDER (HCC): ICD-10-CM

## 2024-06-17 DIAGNOSIS — R26.2 DIFFICULTY WALKING: ICD-10-CM

## 2024-06-17 DIAGNOSIS — B35.1 ONYCHOMYCOSIS: Primary | ICD-10-CM

## 2024-06-17 DIAGNOSIS — M79.675 PAIN OF TOE OF LEFT FOOT: ICD-10-CM

## 2024-06-17 DIAGNOSIS — M79.674 PAIN OF TOE OF RIGHT FOOT: ICD-10-CM

## 2024-06-17 PROCEDURE — 11721 DEBRIDE NAIL 6 OR MORE: CPT | Performed by: PODIATRIST

## 2024-06-17 PROCEDURE — 99999 PR OFFICE/OUTPT VISIT,PROCEDURE ONLY: CPT | Performed by: PODIATRIST

## 2024-06-17 NOTE — PROGRESS NOTES
24     Tuan Vega    : 1950  Sex: male  Age: 73 y.o.    Subjective:  The patient is seen today for evaluation regarding diabetic foot evaluation and mycotic nail care.  No other complaints noted.    Chief Complaint   Patient presents with    Nail Problem     Nail care       Current Medications:    Current Outpatient Medications:     rosuvastatin (CRESTOR) 5 MG tablet, TAKE 1 TABLET BY MOUTH DAILY, Disp: 90 tablet, Rfl: 0    carvedilol (COREG) 25 MG tablet, TAKE 1 TABLET BY MOUTH TWICE  DAILY WITH MEALS, Disp: 180 tablet, Rfl: 0    ZINC OXIDE, TOPICAL, 10 % CREA, Apply 50 g topically daily, Disp: 1 each, Rfl: 2    potassium chloride (KLOR-CON M) 20 MEQ extended release tablet, TAKE 1 TABLET BY MOUTH DAILY, Disp: 90 tablet, Rfl: 3    ipratropium 0.5 mg-albuterol 2.5 mg (DUONEB) 0.5-2.5 (3) MG/3ML SOLN nebulizer solution, Inhale 3 mLs into the lungs every 4 hours as needed for Shortness of Breath, Disp: 360 mL, Rfl: 5    BiPAP Machine MISC, by Does not apply route nightly, Disp: , Rfl:     SITagliptin (JANUVIA) 50 MG tablet, Take 1 tablet by mouth every morning, Disp: , Rfl:     Omega-3 Fatty Acids (OMEGA-3 FISH OIL) 1200 MG CAPS, Take 1 capsule by mouth nightly FISH OIL:1200MG OMEGA : 360MG, Disp: , Rfl:     gabapentin (NEURONTIN) 100 MG capsule, Take 2 capsules by mouth every morning. **SEE OTHER ORDER**, Disp: , Rfl:     gabapentin (NEURONTIN) 100 MG capsule, Take 1 capsule by mouth nightly. **SEE OTHER ORDER**, Disp: , Rfl:     glipiZIDE (GLUCOTROL XL) 5 MG extended release tablet, Take 1 tablet by mouth nightly **SEE OTHER ORDER**, Disp: , Rfl:     acetaminophen (TYLENOL) 500 MG tablet, Take 1 tablet by mouth 2 times daily, Disp: , Rfl:     latanoprost (XALATAN) 0.005 % ophthalmic solution, Place 1 drop into both eyes nightly, Disp: , Rfl:     ramipril (ALTACE) 5 MG capsule, Take 1 capsule by mouth nightly, Disp: , Rfl:     liothyronine (CYTOMEL) 25 MCG tablet, Take 1 tablet by mouth every

## 2024-06-17 NOTE — PROGRESS NOTES
Patient here for nail care. Patient has no new concerns at this time. Charles Foss MD last visit 5/22/2024   Electronically signed by Dana Vinson LPN on 6/17/2024 at 2:32 PM

## 2024-06-19 ENCOUNTER — HOSPITAL ENCOUNTER (OUTPATIENT)
Dept: WOUND CARE | Age: 74
Discharge: HOME | End: 2024-06-19
Attending: PODIATRIST
Payer: MEDICARE

## 2024-06-19 VITALS
HEIGHT: 67 IN | RESPIRATION RATE: 20 BRPM | WEIGHT: 315 LBS | TEMPERATURE: 97.7 F | DIASTOLIC BLOOD PRESSURE: 87 MMHG | SYSTOLIC BLOOD PRESSURE: 168 MMHG | BODY MASS INDEX: 49.44 KG/M2 | HEART RATE: 69 BPM

## 2024-06-19 DIAGNOSIS — I83.022 VARICOSE VEINS OF LEFT LOWER EXTREMITY WITH ULCER OF CALF WITH FAT LAYER EXPOSED (HCC): Primary | ICD-10-CM

## 2024-06-19 DIAGNOSIS — L97.222 VARICOSE VEINS OF LEFT LOWER EXTREMITY WITH ULCER OF CALF WITH FAT LAYER EXPOSED (HCC): Primary | ICD-10-CM

## 2024-06-19 PROCEDURE — 11042 DBRDMT SUBQ TIS 1ST 20SQCM/<: CPT

## 2024-06-19 PROCEDURE — 11045 DBRDMT SUBQ TISS EACH ADDL: CPT

## 2024-06-19 RX ORDER — TRIAMCINOLONE ACETONIDE 1 MG/G
OINTMENT TOPICAL ONCE
OUTPATIENT
Start: 2024-06-19 | End: 2024-06-19

## 2024-06-19 RX ORDER — IBUPROFEN 200 MG
TABLET ORAL ONCE
OUTPATIENT
Start: 2024-06-19 | End: 2024-06-19

## 2024-06-19 RX ORDER — LIDOCAINE HYDROCHLORIDE 40 MG/ML
SOLUTION TOPICAL ONCE
Status: COMPLETED | OUTPATIENT
Start: 2024-06-19 | End: 2024-06-19

## 2024-06-19 RX ORDER — BETAMETHASONE DIPROPIONATE 0.5 MG/G
CREAM TOPICAL ONCE
OUTPATIENT
Start: 2024-06-19 | End: 2024-06-19

## 2024-06-19 RX ORDER — LIDOCAINE 50 MG/G
OINTMENT TOPICAL ONCE
OUTPATIENT
Start: 2024-06-19 | End: 2024-06-19

## 2024-06-19 RX ORDER — LIDOCAINE HYDROCHLORIDE 20 MG/ML
JELLY TOPICAL ONCE
OUTPATIENT
Start: 2024-06-19 | End: 2024-06-19

## 2024-06-19 RX ORDER — LIDOCAINE HYDROCHLORIDE 40 MG/ML
SOLUTION TOPICAL ONCE
OUTPATIENT
Start: 2024-06-19 | End: 2024-06-19

## 2024-06-19 RX ORDER — GENTAMICIN SULFATE 1 MG/G
OINTMENT TOPICAL ONCE
OUTPATIENT
Start: 2024-06-19 | End: 2024-06-19

## 2024-06-19 RX ORDER — BACITRACIN ZINC AND POLYMYXIN B SULFATE 500; 1000 [USP'U]/G; [USP'U]/G
OINTMENT TOPICAL ONCE
OUTPATIENT
Start: 2024-06-19 | End: 2024-06-19

## 2024-06-19 RX ORDER — SODIUM CHLOR/HYPOCHLOROUS ACID 0.033 %
SOLUTION, IRRIGATION IRRIGATION ONCE
OUTPATIENT
Start: 2024-06-19 | End: 2024-06-19

## 2024-06-19 RX ORDER — LIDOCAINE 40 MG/G
CREAM TOPICAL ONCE
OUTPATIENT
Start: 2024-06-19 | End: 2024-06-19

## 2024-06-19 RX ORDER — GINSENG 100 MG
CAPSULE ORAL ONCE
OUTPATIENT
Start: 2024-06-19 | End: 2024-06-19

## 2024-06-19 RX ORDER — CLOBETASOL PROPIONATE 0.5 MG/G
OINTMENT TOPICAL ONCE
OUTPATIENT
Start: 2024-06-19 | End: 2024-06-19

## 2024-06-19 RX ADMIN — LIDOCAINE HYDROCHLORIDE 15 ML: 40 SOLUTION TOPICAL at 11:43

## 2024-06-19 NOTE — DISCHARGE INSTRUCTIONS
Discharge Instructions      Visit Discharge/Physician Orders     Discharge condition: Stable     Assessment of pain at discharge:     Anesthetic used: 4% topical lidocaine     Discharge to: Home     Left via:Private automobile     Accompanied by: accompanied by spouse     [facility]F/HHA: [facility]     Dressing Orders: clean left leg and right leg with saline, apply HF BLUE,  kerramax super absorber, abd, 4 layer profore, change twice weekly     Zinc oxide to reynaldo wound         Treatment Orders: elevate legs above level of heart           [facility] followup visit _______2 weeks_______  (Please note your next appointment above and if you are unable to keep, kindly give a 24 hour notice. Thank you.)     Physician signature:__________________________        If you experience any of the following, please call the Wound Care Center during business hours:     * Increase in Pain  * Temperature over 101  * Increase in drainage from your wound  * Drainage with a foul odor  * Bleeding  * Increase in swelling  * Need for compression bandage changes due to slippage, breakthrough drainage.     If you need medical attention outside of the business hours of the Wound Care Centers please contact your PCP or go to the nearest emergency room.

## 2024-06-19 NOTE — PROGRESS NOTES
Instructions    Written patient dismissal instructions given to patient and signed by patient or POA.         Discharge Instructions         Discharge Instructions      Visit Discharge/Physician Orders     Discharge condition: Stable     Assessment of pain at discharge:     Anesthetic used: 4% topical lidocaine     Discharge to: Home     Left via:Private automobile     Accompanied by: accompanied by spouse     ECF/HHA: [place]     Dressing Orders: clean left leg and right leg with saline, apply HF BLUE,  kerramax super absorber, abd, coban 2, change twice weekly    Zinc oxide to reynaldo wound         Treatment Orders: elevate legs above level of heart              [facility] followup visit _______1 week_______  (Please note your next appointment above and if you are unable to keep, kindly give a 24 hour notice. Thank you.)     Physician signature:__________________________        If you experience any of the following, please call the Wound Care Center during business hours:     * Increase in Pain  * Temperature over 101  * Increase in drainage from your wound  * Drainage with a foul odor  * Bleeding  * Increase in swelling  * Need for compression bandage changes due to slippage, breakthrough drainage.     If you need medical attention outside of the business hours of the Wound Care Centers please contact your PCP or go to the nearest emergency room.                         Electronically signed by Prosper Mccarthy DPM on 5/29/2024 at 12:05 PM

## 2024-07-02 NOTE — DISCHARGE INSTRUCTIONS
Visit Discharge/Physician Orders     Discharge condition: Stable     Assessment of pain at discharge:     Anesthetic used: 4% topical lidocaine     Discharge to: Home     Left via:Private automobile     Accompanied by: accompanied by self     ECF/HHA: [place]     Dressing Orders: clean left leg and right leg with saline, apply HF BLUE,  kerramax super absorber, abd, 4 layer profore, change twice weekly     Zinc oxide to reynaldo wound         Treatment Orders: elevate legs above level of heart           [facility] followup visit _______2 weeks_______  (Please note your next appointment above and if you are unable to keep, kindly give a 24 hour notice. Thank you.)     Physician signature:__________________________        If you experience any of the following, please call the Wound Care Center during business hours:     * Increase in Pain  * Temperature over 101  * Increase in drainage from your wound  * Drainage with a foul odor  * Bleeding  * Increase in swelling  * Need for compression bandage changes due to slippage, breakthrough drainage.     If you need medical attention outside of the business hours of the Wound Care Centers please contact your PCP or go to the nearest emergency room.

## 2024-07-03 ENCOUNTER — HOSPITAL ENCOUNTER (OUTPATIENT)
Dept: WOUND CARE | Age: 74
Discharge: HOME | End: 2024-07-03
Attending: PODIATRIST
Payer: MEDICARE

## 2024-07-03 VITALS
DIASTOLIC BLOOD PRESSURE: 81 MMHG | RESPIRATION RATE: 22 BRPM | TEMPERATURE: 97.3 F | HEART RATE: 82 BPM | SYSTOLIC BLOOD PRESSURE: 144 MMHG

## 2024-07-03 DIAGNOSIS — L97.812 NON-PRESSURE CHRONIC ULCER OF OTHER PART OF RIGHT LOWER LEG WITH FAT LAYER EXPOSED (HCC): ICD-10-CM

## 2024-07-03 DIAGNOSIS — L97.222 VARICOSE VEINS OF LEFT LOWER EXTREMITY WITH ULCER OF CALF WITH FAT LAYER EXPOSED (HCC): Primary | ICD-10-CM

## 2024-07-03 DIAGNOSIS — L97.222 LOWER LIMB ULCER, CALF, LEFT, WITH FAT LAYER EXPOSED (HCC): ICD-10-CM

## 2024-07-03 DIAGNOSIS — I83.022 VARICOSE VEINS OF LEFT LOWER EXTREMITY WITH ULCER OF CALF WITH FAT LAYER EXPOSED (HCC): Primary | ICD-10-CM

## 2024-07-03 PROCEDURE — 11045 DBRDMT SUBQ TISS EACH ADDL: CPT

## 2024-07-03 PROCEDURE — 11042 DBRDMT SUBQ TIS 1ST 20SQCM/<: CPT | Performed by: SURGERY

## 2024-07-03 PROCEDURE — 11042 DBRDMT SUBQ TIS 1ST 20SQCM/<: CPT

## 2024-07-03 PROCEDURE — 11045 DBRDMT SUBQ TISS EACH ADDL: CPT | Performed by: SURGERY

## 2024-07-03 RX ORDER — GENTAMICIN SULFATE 1 MG/G
OINTMENT TOPICAL ONCE
OUTPATIENT
Start: 2024-07-03 | End: 2024-07-03

## 2024-07-03 RX ORDER — CLOBETASOL PROPIONATE 0.5 MG/G
OINTMENT TOPICAL ONCE
OUTPATIENT
Start: 2024-07-03 | End: 2024-07-03

## 2024-07-03 RX ORDER — LIDOCAINE 50 MG/G
OINTMENT TOPICAL ONCE
OUTPATIENT
Start: 2024-07-03 | End: 2024-07-03

## 2024-07-03 RX ORDER — BETAMETHASONE DIPROPIONATE 0.5 MG/G
CREAM TOPICAL ONCE
OUTPATIENT
Start: 2024-07-03 | End: 2024-07-03

## 2024-07-03 RX ORDER — BACITRACIN ZINC AND POLYMYXIN B SULFATE 500; 1000 [USP'U]/G; [USP'U]/G
OINTMENT TOPICAL ONCE
OUTPATIENT
Start: 2024-07-03 | End: 2024-07-03

## 2024-07-03 RX ORDER — GINSENG 100 MG
CAPSULE ORAL ONCE
OUTPATIENT
Start: 2024-07-03 | End: 2024-07-03

## 2024-07-03 RX ORDER — LIDOCAINE HYDROCHLORIDE 20 MG/ML
JELLY TOPICAL ONCE
OUTPATIENT
Start: 2024-07-03 | End: 2024-07-03

## 2024-07-03 RX ORDER — LIDOCAINE HYDROCHLORIDE 40 MG/ML
SOLUTION TOPICAL ONCE
Status: COMPLETED | OUTPATIENT
Start: 2024-07-03 | End: 2024-07-03

## 2024-07-03 RX ORDER — IBUPROFEN 200 MG
TABLET ORAL ONCE
OUTPATIENT
Start: 2024-07-03 | End: 2024-07-03

## 2024-07-03 RX ORDER — TRIAMCINOLONE ACETONIDE 1 MG/G
OINTMENT TOPICAL ONCE
OUTPATIENT
Start: 2024-07-03 | End: 2024-07-03

## 2024-07-03 RX ORDER — LIDOCAINE HYDROCHLORIDE 40 MG/ML
SOLUTION TOPICAL ONCE
OUTPATIENT
Start: 2024-07-03 | End: 2024-07-03

## 2024-07-03 RX ORDER — SODIUM CHLOR/HYPOCHLOROUS ACID 0.033 %
SOLUTION, IRRIGATION IRRIGATION ONCE
OUTPATIENT
Start: 2024-07-03 | End: 2024-07-03

## 2024-07-03 RX ORDER — LIDOCAINE 40 MG/G
CREAM TOPICAL ONCE
OUTPATIENT
Start: 2024-07-03 | End: 2024-07-03

## 2024-07-03 RX ADMIN — LIDOCAINE HYDROCHLORIDE 10 ML: 40 SOLUTION TOPICAL at 10:10

## 2024-07-03 NOTE — PROGRESS NOTES
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) - Primary    Relevant Orders    Initiate Outpatient Wound Care Protocol         Pre Debridement Measurements:  Are located in the Wound/Ulcer Documentation Flow Sheet  Post Debridement Measurements:  Wound/Ulcer Descriptions are Pre Debridement except measurements:     Wound 10/11/23 Leg Left;Anterior;Lateral #1 (Active)   Wound Image    06/12/24 1108   Dressing Status New dressing applied 06/19/24 1219   Wound Cleansed Cleansed with saline 06/19/24 1219   Dressing/Treatment Alginate;ABD 06/19/24 1219   Wound Length (cm) 9 cm 07/03/24 1010   Wound Width (cm) 12.2 cm 07/03/24 1010   Wound Depth (cm) 0.3 cm 07/03/24 1010   Wound Surface Area (cm^2) 109.8 cm^2 07/03/24 1010   Change in Wound Size % (l*w) 53.57 07/03/24 1010   Wound Volume (cm^3) 32.94 cm^3 07/03/24 1010   Wound Healing % 30 07/03/24 1010   Post-Procedure Length (cm) 9.2 cm 07/03/24 1025   Post-Procedure Width (cm) 12.4 cm 07/03/24 1025   Post-Procedure Depth (cm) 0.3 cm 07/03/24 1025   Post-Procedure Surface Area (cm^2) 114.08 cm^2 07/03/24 1025   Post-Procedure Volume (cm^3) 34.224 cm^3 07/03/24 1025   Wound Assessment Fibrin;Pink/red 07/03/24 1010   Drainage Amount Moderate (25-50%) 07/03/24 1010   Drainage Description Yellow;Serosanguinous 07/03/24 1010   Odor None 07/03/24 1010   Maria Elena-wound Assessment Edematous;Intact;Fragile 07/03/24 1010   Number of days: 265       Wound 02/07/24 Leg Right;Lateral #2 (Active)   Wound Image   06/12/24 1108   Dressing Status New dressing applied 06/19/24 1219   Wound Cleansed Cleansed with saline 06/19/24 1219   Dressing/Treatment Alginate;ABD 06/19/24 1219   Wound Length (cm) 8.9 cm 07/03/24 1010   Wound Width (cm) 7.1 cm 07/03/24 1010   Wound Depth (cm) 0.1 cm 07/03/24 1010   Wound Surface Area (cm^2) 63.19 cm^2

## 2024-07-15 NOTE — DISCHARGE INSTRUCTIONS
Visit Discharge/Physician Orders     Discharge condition: Stable     Assessment of pain at discharge:     Anesthetic used: 4% topical lidocaine     Discharge to: Home     Left via:Private automobile     Accompanied by: accompanied by self     ECF/HHA: [place]     Dressing Orders: clean left leg and right leg with saline, apply HF BLUE,  kerramax super absorber, abd, 4 layer profore, change twice weekly     Zinc oxide to reynaldo wound         Treatment Orders: elevate legs above level of heart           [facility] followup visit _______2 weeks_______  (Please note your next appointment above and if you are unable to keep, kindly give a 24 hour notice. Thank you.)     Physician signature:__________________________        If you experience any of the following, please call the Wound Care Center during business hours:     * Increase in Pain  * Temperature over 101  * Increase in drainage from your wound  * Drainage with a foul odor  * Bleeding  * Increase in swelling  * Need for compression bandage changes due to slippage, breakthrough drainage.     If you need medical attention outside of the business hours of the Wound Care Centers please contact your PCP or go to the nearest emergency room.

## 2024-07-17 ENCOUNTER — HOSPITAL ENCOUNTER (OUTPATIENT)
Dept: WOUND CARE | Age: 74
Discharge: HOME | End: 2024-07-17
Attending: PODIATRIST
Payer: MEDICARE

## 2024-07-17 VITALS
TEMPERATURE: 97.7 F | DIASTOLIC BLOOD PRESSURE: 91 MMHG | HEART RATE: 81 BPM | RESPIRATION RATE: 22 BRPM | SYSTOLIC BLOOD PRESSURE: 162 MMHG

## 2024-07-17 DIAGNOSIS — I83.022 VARICOSE VEINS OF LEFT LOWER EXTREMITY WITH ULCER OF CALF WITH FAT LAYER EXPOSED (HCC): Primary | ICD-10-CM

## 2024-07-17 DIAGNOSIS — L97.222 VARICOSE VEINS OF LEFT LOWER EXTREMITY WITH ULCER OF CALF WITH FAT LAYER EXPOSED (HCC): Primary | ICD-10-CM

## 2024-07-17 PROCEDURE — 11045 DBRDMT SUBQ TISS EACH ADDL: CPT

## 2024-07-17 PROCEDURE — 11042 DBRDMT SUBQ TIS 1ST 20SQCM/<: CPT

## 2024-07-17 RX ORDER — LIDOCAINE HYDROCHLORIDE 20 MG/ML
JELLY TOPICAL ONCE
OUTPATIENT
Start: 2024-07-17 | End: 2024-07-17

## 2024-07-17 RX ORDER — LIDOCAINE HYDROCHLORIDE 40 MG/ML
SOLUTION TOPICAL ONCE
OUTPATIENT
Start: 2024-07-17 | End: 2024-07-17

## 2024-07-17 RX ORDER — CLOBETASOL PROPIONATE 0.5 MG/G
OINTMENT TOPICAL ONCE
OUTPATIENT
Start: 2024-07-17 | End: 2024-07-17

## 2024-07-17 RX ORDER — LIDOCAINE 40 MG/G
CREAM TOPICAL ONCE
OUTPATIENT
Start: 2024-07-17 | End: 2024-07-17

## 2024-07-17 RX ORDER — LIDOCAINE HYDROCHLORIDE 40 MG/ML
SOLUTION TOPICAL ONCE
Status: COMPLETED | OUTPATIENT
Start: 2024-07-17 | End: 2024-07-17

## 2024-07-17 RX ORDER — IBUPROFEN 200 MG
TABLET ORAL ONCE
OUTPATIENT
Start: 2024-07-17 | End: 2024-07-17

## 2024-07-17 RX ORDER — SODIUM CHLOR/HYPOCHLOROUS ACID 0.033 %
SOLUTION, IRRIGATION IRRIGATION ONCE
OUTPATIENT
Start: 2024-07-17 | End: 2024-07-17

## 2024-07-17 RX ORDER — BACITRACIN ZINC AND POLYMYXIN B SULFATE 500; 1000 [USP'U]/G; [USP'U]/G
OINTMENT TOPICAL ONCE
OUTPATIENT
Start: 2024-07-17 | End: 2024-07-17

## 2024-07-17 RX ORDER — LIDOCAINE 50 MG/G
OINTMENT TOPICAL ONCE
OUTPATIENT
Start: 2024-07-17 | End: 2024-07-17

## 2024-07-17 RX ORDER — GENTAMICIN SULFATE 1 MG/G
OINTMENT TOPICAL ONCE
OUTPATIENT
Start: 2024-07-17 | End: 2024-07-17

## 2024-07-17 RX ORDER — BETAMETHASONE DIPROPIONATE 0.5 MG/G
CREAM TOPICAL ONCE
OUTPATIENT
Start: 2024-07-17 | End: 2024-07-17

## 2024-07-17 RX ORDER — GINSENG 100 MG
CAPSULE ORAL ONCE
OUTPATIENT
Start: 2024-07-17 | End: 2024-07-17

## 2024-07-17 RX ORDER — TRIAMCINOLONE ACETONIDE 1 MG/G
OINTMENT TOPICAL ONCE
OUTPATIENT
Start: 2024-07-17 | End: 2024-07-17

## 2024-07-17 RX ADMIN — LIDOCAINE HYDROCHLORIDE: 40 SOLUTION TOPICAL at 10:49

## 2024-07-17 NOTE — PROGRESS NOTES
healing well    7/3/2024  Wounds bilaterally look clean and granulating    7-10-24  Debrided, epithelization, healing well    PAST MEDICAL HISTORY      Diagnosis Date    CAD (coronary artery disease)     Cataract, right eye     Complete heart block (HCC)     h/o    Diabetes mellitus (HCC)     Encounter for aspirin therapy     patient uses for heart health    Glaucoma     History of blood transfusion     Hx of blood clots     Hyperlipidemia     Hypertension     Hyperthyroidism     Lower limb ulcer, calf, left, with fat layer exposed (HCC) 10/25/2023    Lymphedema of both lower extremities 10/25/2023    Myocardial infarct ([facility])     Neuropathy     feet    Obesity     Obstructive sleep apnea     on cpap    Onychomycosis     DEMETRIA on CPAP     Osteoarthritis     Pacemaker     Medtronic    Stasis dermatitis     h/o on bilateral legs with leg edema    Varicose veins      Past Surgical History:   Procedure Laterality Date    CARDIAC PACEMAKER PLACEMENT  2010    Medtronic    CHOLECYSTECTOMY      COLONOSCOPY  2016    INTRACAPSULAR CATARACT EXTRACTION Right 2022    RIGHT EYE CATARACT EXTRACTION IOL IMPLANT AND GONIOTOMY performed by Jose Camacho MD at [facility] OR    INTRACAPSULAR CATARACT EXTRACTION Left 2022    LEFT EYE CATARACT EXTRACTION IOL IMPLANT performed by Jose Camacho MD at [facility] OR    JOINT REPLACEMENT Bilateral     knees    LEG SURGERY Left 2023    LEFT LEG DEBRIDEMENT performed by Edward Bedolla DPM at [facility] OR    PACEMAKER PLACEMENT  2020    DUAL PPM GR    ([employer]TR[employer])     DR. MARTIN     TRANSESOPHAGEAL ECHOCARDIOGRAM  2020    With      Family History   Problem Relation Age of Onset    Heart Disease Father             Other Mother             Other Brother         gall bladder     Social History     Tobacco Use    Smoking status: Former     Current packs/day: 0.00     Average packs/day: 2.0 packs/day for 25.9 years (51.7 ttl pk-yrs)     Types:

## 2024-07-31 ENCOUNTER — HOSPITAL ENCOUNTER (OUTPATIENT)
Dept: WOUND CARE | Age: 74
Discharge: HOME | End: 2024-07-31
Attending: PODIATRIST
Payer: MEDICARE

## 2024-07-31 VITALS — RESPIRATION RATE: 20 BRPM | TEMPERATURE: 97 F | HEART RATE: 80 BPM

## 2024-07-31 DIAGNOSIS — L97.222 VARICOSE VEINS OF LEFT LOWER EXTREMITY WITH ULCER OF CALF WITH FAT LAYER EXPOSED (HCC): Primary | ICD-10-CM

## 2024-07-31 DIAGNOSIS — I83.022 VARICOSE VEINS OF LEFT LOWER EXTREMITY WITH ULCER OF CALF WITH FAT LAYER EXPOSED (HCC): Primary | ICD-10-CM

## 2024-07-31 PROCEDURE — 11045 DBRDMT SUBQ TISS EACH ADDL: CPT

## 2024-07-31 PROCEDURE — 11042 DBRDMT SUBQ TIS 1ST 20SQCM/<: CPT

## 2024-07-31 RX ORDER — BACITRACIN ZINC AND POLYMYXIN B SULFATE 500; 1000 [USP'U]/G; [USP'U]/G
OINTMENT TOPICAL ONCE
OUTPATIENT
Start: 2024-07-31 | End: 2024-07-31

## 2024-07-31 RX ORDER — LIDOCAINE 40 MG/G
CREAM TOPICAL ONCE
OUTPATIENT
Start: 2024-07-31 | End: 2024-07-31

## 2024-07-31 RX ORDER — LIDOCAINE HYDROCHLORIDE 40 MG/ML
SOLUTION TOPICAL ONCE
Status: COMPLETED | OUTPATIENT
Start: 2024-07-31 | End: 2024-07-31

## 2024-07-31 RX ORDER — LIDOCAINE 50 MG/G
OINTMENT TOPICAL ONCE
OUTPATIENT
Start: 2024-07-31 | End: 2024-07-31

## 2024-07-31 RX ORDER — LIDOCAINE HYDROCHLORIDE 40 MG/ML
SOLUTION TOPICAL ONCE
OUTPATIENT
Start: 2024-07-31 | End: 2024-07-31

## 2024-07-31 RX ORDER — IBUPROFEN 200 MG
TABLET ORAL ONCE
OUTPATIENT
Start: 2024-07-31 | End: 2024-07-31

## 2024-07-31 RX ORDER — TRIAMCINOLONE ACETONIDE 1 MG/G
OINTMENT TOPICAL ONCE
OUTPATIENT
Start: 2024-07-31 | End: 2024-07-31

## 2024-07-31 RX ORDER — BETAMETHASONE DIPROPIONATE 0.5 MG/G
CREAM TOPICAL ONCE
OUTPATIENT
Start: 2024-07-31 | End: 2024-07-31

## 2024-07-31 RX ORDER — GENTAMICIN SULFATE 1 MG/G
OINTMENT TOPICAL ONCE
OUTPATIENT
Start: 2024-07-31 | End: 2024-07-31

## 2024-07-31 RX ORDER — SODIUM CHLOR/HYPOCHLOROUS ACID 0.033 %
SOLUTION, IRRIGATION IRRIGATION ONCE
OUTPATIENT
Start: 2024-07-31 | End: 2024-07-31

## 2024-07-31 RX ORDER — LIDOCAINE HYDROCHLORIDE 20 MG/ML
JELLY TOPICAL ONCE
OUTPATIENT
Start: 2024-07-31 | End: 2024-07-31

## 2024-07-31 RX ORDER — GINSENG 100 MG
CAPSULE ORAL ONCE
OUTPATIENT
Start: 2024-07-31 | End: 2024-07-31

## 2024-07-31 RX ORDER — CLOBETASOL PROPIONATE 0.5 MG/G
OINTMENT TOPICAL ONCE
OUTPATIENT
Start: 2024-07-31 | End: 2024-07-31

## 2024-07-31 RX ADMIN — LIDOCAINE HYDROCHLORIDE 6 ML: 40 SOLUTION TOPICAL at 11:16

## 2024-07-31 ASSESSMENT — PAIN DESCRIPTION - ORIENTATION: ORIENTATION: RIGHT;LEFT

## 2024-07-31 ASSESSMENT — PAIN - FUNCTIONAL ASSESSMENT: PAIN_FUNCTIONAL_ASSESSMENT: ACTIVITIES ARE NOT PREVENTED

## 2024-07-31 ASSESSMENT — PAIN DESCRIPTION - DESCRIPTORS: DESCRIPTORS: BURNING

## 2024-07-31 ASSESSMENT — PAIN SCALES - GENERAL: PAINLEVEL_OUTOF10: 2

## 2024-07-31 ASSESSMENT — PAIN DESCRIPTION - LOCATION: LOCATION: LEG

## 2024-07-31 NOTE — PLAN OF CARE
Problem: Chronic Conditions and Co-morbidities  Goal: Patient's chronic conditions and co-morbidity symptoms are monitored and maintained or improved  Outcome: Progressing     Problem: Wound:  Goal: Will show signs of wound healing; wound closure and no evidence of infection  Description: Will show signs of wound healing; wound closure and no evidence of infection  Outcome: Progressing     Problem: Arterial:  Goal: Optimize blood flow for wound healing  Description: Optimize blood flow for wound healing  Outcome: Progressing     Problem: Venous:  Goal: Signs of wound healing will improve  Description: Signs of wound healing will improve  Outcome: Progressing

## 2024-07-31 NOTE — PROGRESS NOTES
healing well    7/3/2024  Wounds bilaterally look clean and granulating    7-10-24  Debrided, epithelization, healing well    24  Debrided, healing well    PAST MEDICAL HISTORY      Diagnosis Date    CAD (coronary artery disease)     Cataract, right eye     Complete heart block (HCC)     h/o    Diabetes mellitus (HCC)     Encounter for aspirin therapy     patient uses for heart health    Glaucoma     History of blood transfusion     Hx of blood clots     Hyperlipidemia     Hypertension     Hyperthyroidism     Lower limb ulcer, calf, left, with fat layer exposed (HCC) 10/25/2023    Lymphedema of both lower extremities 10/25/2023    Myocardial infarct ([facility])     Neuropathy     feet    Obesity     Obstructive sleep apnea     on cpap    Onychomycosis     DEMETRIA on CPAP     Osteoarthritis     Pacemaker     Medtronic    Stasis dermatitis     h/o on bilateral legs with leg edema    Varicose veins      Past Surgical History:   Procedure Laterality Date    CARDIAC PACEMAKER PLACEMENT  2010    Medtronic    CHOLECYSTECTOMY      COLONOSCOPY  2016    INTRACAPSULAR CATARACT EXTRACTION Right 2022    RIGHT EYE CATARACT EXTRACTION IOL IMPLANT AND GONIOTOMY performed by Jose Camacho MD at [facility] OR    INTRACAPSULAR CATARACT EXTRACTION Left 2022    LEFT EYE CATARACT EXTRACTION IOL IMPLANT performed by Jose Camacho MD at [facility] OR    JOINT REPLACEMENT Bilateral     knees    LEG SURGERY Left 2023    LEFT LEG DEBRIDEMENT performed by Edward Bedolla DPM at [facility] OR    PACEMAKER PLACEMENT  2020    DUAL PPM GR    ([employer]TR[employer])     DR. MARTIN     TRANSESOPHAGEAL ECHOCARDIOGRAM  2020    With      Family History   Problem Relation Age of Onset    Heart Disease Father             Other Mother             Other Brother         gall bladder     Social History     Tobacco Use    Smoking status: Former     Current packs/day: 0.00     Average packs/day: 2.0 packs/day for 25.9

## 2024-07-31 NOTE — DISCHARGE INSTRUCTIONS
Visit Discharge/Physician Orders     Discharge condition: Stable     Assessment of pain at discharge:     Anesthetic used: 4% topical lidocaine     Discharge to: Home     Left via:Private automobile     Accompanied by: accompanied by self     ECF/HHA: [place]     Dressing Orders: clean left leg and right leg with saline, apply HF BLUE,  kerramax super absorber, abd, 4 layer profore, change twice weekly     Zinc oxide to reynaldo wound         Treatment Orders: elevate legs above level of heart           [facility] followup visit _______1 week_______  (Please note your next appointment above and if you are unable to keep, kindly give a 24 hour notice. Thank you.)     Physician signature:__________________________        If you experience any of the following, please call the Wound Care Center during business hours:     * Increase in Pain  * Temperature over 101  * Increase in drainage from your wound  * Drainage with a foul odor  * Bleeding  * Increase in swelling  * Need for compression bandage changes due to slippage, breakthrough drainage.     If you need medical attention outside of the business hours of the Wound Care Centers please contact your PCP or go to the nearest emergency room.

## 2024-08-06 ENCOUNTER — TELEPHONE (OUTPATIENT)
Dept: NON INVASIVE DIAGNOSTICS | Age: 74
End: 2024-08-06

## 2024-08-06 DIAGNOSIS — R94.31 ABNORMAL ELECTROCARDIOGRAPHY: Primary | ICD-10-CM

## 2024-08-06 DIAGNOSIS — R94.31 ABNORMAL EKG: Primary | ICD-10-CM

## 2024-08-06 RX ORDER — REGADENOSON 0.08 MG/ML
0.4 INJECTION, SOLUTION INTRAVENOUS
OUTPATIENT
Start: 2024-08-06

## 2024-08-06 NOTE — TELEPHONE ENCOUNTER
----- Message from Nnamdi Perez DO sent at 8/2/2024 11:39 AM EDT -----  Regarding: NSVT  15 sec NSVT on remote.    Please arrange:  1. TTE  2. Lexiscan stress    -Nnamdi Perez DO

## 2024-08-07 ENCOUNTER — HOSPITAL ENCOUNTER (OUTPATIENT)
Dept: WOUND CARE | Age: 74
Discharge: HOME | End: 2024-08-07
Attending: PODIATRIST
Payer: MEDICARE

## 2024-08-07 VITALS
SYSTOLIC BLOOD PRESSURE: 149 MMHG | HEIGHT: 67 IN | DIASTOLIC BLOOD PRESSURE: 88 MMHG | TEMPERATURE: 96.4 F | RESPIRATION RATE: 22 BRPM | BODY MASS INDEX: 49.44 KG/M2 | WEIGHT: 315 LBS | HEART RATE: 91 BPM

## 2024-08-07 DIAGNOSIS — I83.022 VARICOSE VEINS OF LEFT LOWER EXTREMITY WITH ULCER OF CALF WITH FAT LAYER EXPOSED (HCC): Primary | ICD-10-CM

## 2024-08-07 DIAGNOSIS — L97.222 VARICOSE VEINS OF LEFT LOWER EXTREMITY WITH ULCER OF CALF WITH FAT LAYER EXPOSED (HCC): Primary | ICD-10-CM

## 2024-08-07 PROCEDURE — 11042 DBRDMT SUBQ TIS 1ST 20SQCM/<: CPT

## 2024-08-07 PROCEDURE — 11045 DBRDMT SUBQ TISS EACH ADDL: CPT

## 2024-08-07 RX ORDER — LIDOCAINE 40 MG/G
CREAM TOPICAL ONCE
OUTPATIENT
Start: 2024-08-07 | End: 2024-08-07

## 2024-08-07 RX ORDER — TRIAMCINOLONE ACETONIDE 1 MG/G
OINTMENT TOPICAL ONCE
OUTPATIENT
Start: 2024-08-07 | End: 2024-08-07

## 2024-08-07 RX ORDER — GENTAMICIN SULFATE 1 MG/G
OINTMENT TOPICAL ONCE
OUTPATIENT
Start: 2024-08-07 | End: 2024-08-07

## 2024-08-07 RX ORDER — LIDOCAINE HYDROCHLORIDE 20 MG/ML
JELLY TOPICAL ONCE
OUTPATIENT
Start: 2024-08-07 | End: 2024-08-07

## 2024-08-07 RX ORDER — IBUPROFEN 200 MG
TABLET ORAL ONCE
OUTPATIENT
Start: 2024-08-07 | End: 2024-08-07

## 2024-08-07 RX ORDER — BACITRACIN ZINC AND POLYMYXIN B SULFATE 500; 1000 [USP'U]/G; [USP'U]/G
OINTMENT TOPICAL ONCE
OUTPATIENT
Start: 2024-08-07 | End: 2024-08-07

## 2024-08-07 RX ORDER — LIDOCAINE HYDROCHLORIDE 40 MG/ML
SOLUTION TOPICAL ONCE
Status: COMPLETED | OUTPATIENT
Start: 2024-08-07 | End: 2024-08-07

## 2024-08-07 RX ORDER — GINSENG 100 MG
CAPSULE ORAL ONCE
OUTPATIENT
Start: 2024-08-07 | End: 2024-08-07

## 2024-08-07 RX ORDER — SODIUM CHLOR/HYPOCHLOROUS ACID 0.033 %
SOLUTION, IRRIGATION IRRIGATION ONCE
OUTPATIENT
Start: 2024-08-07 | End: 2024-08-07

## 2024-08-07 RX ORDER — LIDOCAINE 50 MG/G
OINTMENT TOPICAL ONCE
OUTPATIENT
Start: 2024-08-07 | End: 2024-08-07

## 2024-08-07 RX ORDER — BETAMETHASONE DIPROPIONATE 0.5 MG/G
CREAM TOPICAL ONCE
OUTPATIENT
Start: 2024-08-07 | End: 2024-08-07

## 2024-08-07 RX ORDER — LIDOCAINE HYDROCHLORIDE 40 MG/ML
SOLUTION TOPICAL ONCE
OUTPATIENT
Start: 2024-08-07 | End: 2024-08-07

## 2024-08-07 RX ORDER — CLOBETASOL PROPIONATE 0.5 MG/G
OINTMENT TOPICAL ONCE
OUTPATIENT
Start: 2024-08-07 | End: 2024-08-07

## 2024-08-07 RX ADMIN — LIDOCAINE HYDROCHLORIDE: 40 SOLUTION TOPICAL at 11:24

## 2024-08-07 NOTE — PROGRESS NOTES
08/07/24 1125   Wound Surface Area (cm^2) 29.58 cm^2 08/07/24 1125   Change in Wound Size % (l*w) -886 08/07/24 1125   Wound Volume (cm^3) 2.958 cm^3 08/07/24 1125   Wound Healing % -886 08/07/24 1125   Post-Procedure Length (cm) 5.8 cm 08/07/24 1143   Post-Procedure Width (cm) 5.1 cm 08/07/24 1143   Post-Procedure Depth (cm) 0.2 cm 08/07/24 1143   Post-Procedure Surface Area (cm^2) 29.58 cm^2 08/07/24 1143   Post-Procedure Volume (cm^3) 5.916 cm^3 08/07/24 1143   Wound Assessment Granulation tissue;Fibrin 08/07/24 1125   Drainage Amount Moderate (25-50%) 08/07/24 1125   Drainage Description Serosanguinous 08/07/24 1125   Odor None 08/07/24 1125   Maria Elena-wound Assessment Edematous;Fragile 08/07/24 1125   Margins Defined edges 07/31/24 1109   Number of days: 181                              Procedure Note  Indications:  Based on my examination of this patient's wound(s)/ulcer(s) today, debridement is required to promote healing and evaluate the wound base.    Performed by: Prosper Mccarthy DPM    Consent obtained:  Yes    Time out taken:  Yes    Pain Control: Anesthetic  Anesthetic: 4% Lidocaine Liquid Topical     Debridement:Excisional Debridement    Using curette the wound(s)/ulcer(s) was/were sharply debrided down through and including the removal of subcutaneous tissue.        Devitalized Tissue Debrided:  fibrin, biofilm, slough, and exudate to stimulate bleeding to promote healing, post debridement good bleeding base and wound edges noted    Wound/Ulcer #: 1 and 2    Percent of Wound/Ulcer Debrided: 100%    Total Surface Area Debrided:  140 cm     Estimated Blood Loss:  Minimal  Hemostasis Achieved:  by pressure    Procedural Pain:  2  / 10   Post Procedural Pain:  4 / 10     Response to treatment:  Well tolerated by patient.     A culture was not done.      Plan:     Pt is not a smoker   - Discussed relationship of smoking and negative affects on wound healing   - Emphasized importance of tobacco

## 2024-08-07 NOTE — DISCHARGE INSTRUCTIONS
Visit Discharge/Physician Orders     Discharge condition: Stable     Assessment of pain at discharge:     Anesthetic used: 4% topical lidocaine     Discharge to: Home     Left via:Private automobile     Accompanied by: accompanied by self     ECF/HHA: [place]     Dressing Orders: clean left leg and right leg with saline, apply HF BLUE,  kerramax super absorber, abd, 4 layer profore, change twice weekly     Zinc oxide to reynaldo wound         Treatment Orders: elevate legs above level of heart           [facility] followup visit _______1 week_______  (Please note your next appointment above and if you are unable to keep, kindly give a 24 hour notice. Thank you.)     Physician signature:__________________________        If you experience any of the following, please call the Wound Care Center during business hours:     * Increase in Pain  * Temperature over 101  * Increase in drainage from your wound  * Drainage with a foul odor  * Bleeding  * Increase in swelling  * Need for compression bandage changes due to slippage, breakthrough drainage.     If you need medical attention outside of the business hours of the Wound Care Centers please contact your PCP or go to the nearest emergency room.

## 2024-08-09 ENCOUNTER — TELEPHONE (OUTPATIENT)
Dept: CARDIOLOGY | Age: 74
End: 2024-08-09

## 2024-08-09 NOTE — TELEPHONE ENCOUNTER
Spoke with patient's wife and confirmed Lexiscan stress test appointment on 8/13/24 at 0830. Instructions for test, to hold glipizide and januvia, and COVID-19 preprocedure information reviewed with patient's wife, questions answered. Patient verbalized understanding.

## 2024-08-12 NOTE — TELEPHONE ENCOUNTER
Spoke with patient's wife again, she will have him schedule the echo when he goes in for stress test tomorrow 8/13/24.

## 2024-08-13 ENCOUNTER — TELEPHONE (OUTPATIENT)
Dept: CARDIOLOGY | Age: 74
End: 2024-08-13

## 2024-08-13 NOTE — TELEPHONE ENCOUNTER
CALLED PATIENT TO SEE IF HE WAS COMING TO HIS TEST. NO ONE ANSWERED BUT PATIENTS WIFE CALLED BACK SHORTLY TO RESCHEDULE.    Electronically signed by Nida Hedrick on 8/13/2024 at 8:51 AM

## 2024-08-14 ENCOUNTER — HOSPITAL ENCOUNTER (OUTPATIENT)
Dept: WOUND CARE | Age: 74
Discharge: HOME | End: 2024-08-14
Attending: PODIATRIST
Payer: MEDICARE

## 2024-08-14 VITALS
HEIGHT: 67 IN | TEMPERATURE: 96.8 F | WEIGHT: 315 LBS | RESPIRATION RATE: 22 BRPM | SYSTOLIC BLOOD PRESSURE: 138 MMHG | BODY MASS INDEX: 49.44 KG/M2 | DIASTOLIC BLOOD PRESSURE: 90 MMHG | HEART RATE: 100 BPM

## 2024-08-14 DIAGNOSIS — I83.022 VARICOSE VEINS OF LEFT LOWER EXTREMITY WITH ULCER OF CALF WITH FAT LAYER EXPOSED (HCC): Primary | ICD-10-CM

## 2024-08-14 DIAGNOSIS — L97.222 VARICOSE VEINS OF LEFT LOWER EXTREMITY WITH ULCER OF CALF WITH FAT LAYER EXPOSED (HCC): Primary | ICD-10-CM

## 2024-08-14 PROCEDURE — 11042 DBRDMT SUBQ TIS 1ST 20SQCM/<: CPT

## 2024-08-14 RX ORDER — CLOBETASOL PROPIONATE 0.5 MG/G
OINTMENT TOPICAL ONCE
OUTPATIENT
Start: 2024-08-14 | End: 2024-08-14

## 2024-08-14 RX ORDER — BETAMETHASONE DIPROPIONATE 0.5 MG/G
CREAM TOPICAL ONCE
OUTPATIENT
Start: 2024-08-14 | End: 2024-08-14

## 2024-08-14 RX ORDER — IBUPROFEN 200 MG
TABLET ORAL ONCE
OUTPATIENT
Start: 2024-08-14 | End: 2024-08-14

## 2024-08-14 RX ORDER — BACITRACIN ZINC AND POLYMYXIN B SULFATE 500; 1000 [USP'U]/G; [USP'U]/G
OINTMENT TOPICAL ONCE
OUTPATIENT
Start: 2024-08-14 | End: 2024-08-14

## 2024-08-14 RX ORDER — LIDOCAINE HYDROCHLORIDE 40 MG/ML
SOLUTION TOPICAL ONCE
Status: COMPLETED | OUTPATIENT
Start: 2024-08-14 | End: 2024-08-14

## 2024-08-14 RX ORDER — LIDOCAINE HYDROCHLORIDE 20 MG/ML
JELLY TOPICAL ONCE
OUTPATIENT
Start: 2024-08-14 | End: 2024-08-14

## 2024-08-14 RX ORDER — SODIUM CHLOR/HYPOCHLOROUS ACID 0.033 %
SOLUTION, IRRIGATION IRRIGATION ONCE
OUTPATIENT
Start: 2024-08-14 | End: 2024-08-14

## 2024-08-14 RX ORDER — TRIAMCINOLONE ACETONIDE 1 MG/G
OINTMENT TOPICAL ONCE
OUTPATIENT
Start: 2024-08-14 | End: 2024-08-14

## 2024-08-14 RX ORDER — LIDOCAINE 50 MG/G
OINTMENT TOPICAL ONCE
OUTPATIENT
Start: 2024-08-14 | End: 2024-08-14

## 2024-08-14 RX ORDER — LIDOCAINE HYDROCHLORIDE 40 MG/ML
SOLUTION TOPICAL ONCE
OUTPATIENT
Start: 2024-08-14 | End: 2024-08-14

## 2024-08-14 RX ORDER — LIDOCAINE 40 MG/G
CREAM TOPICAL ONCE
OUTPATIENT
Start: 2024-08-14 | End: 2024-08-14

## 2024-08-14 RX ORDER — GENTAMICIN SULFATE 1 MG/G
OINTMENT TOPICAL ONCE
OUTPATIENT
Start: 2024-08-14 | End: 2024-08-14

## 2024-08-14 RX ORDER — GINSENG 100 MG
CAPSULE ORAL ONCE
OUTPATIENT
Start: 2024-08-14 | End: 2024-08-14

## 2024-08-14 RX ADMIN — LIDOCAINE HYDROCHLORIDE 5 ML: 40 SOLUTION TOPICAL at 11:41

## 2024-08-14 NOTE — PROGRESS NOTES
Wound Healing Center  History and Physical/Consultation  Podiatry    Referring Physician : Charles Foss MD  Tuan Vega  MEDICAL RECORD NUMBER:  [id number]  AGE: 74 y.o.   GENDER: male  : 1950  EPISODE DATE:  2024  Subjective:     Chief Complaint   Patient presents with    Wound Check     Both legs         HISTORY of PRESENT ILLNESS HPI     Tuan Vega is a 74 y.o. male who presents today for wound/ulcer evaluation.   History of Wound Context:  The patient has had a wound of left leg which was first noted approximately months ago.  This has been treated debridemnet. On their initial visit to the wound healing center, 10/11/23 ,  the patient has noted that the wound has not been improving.  The patient has had similar previous wounds in the past.      Pt is on abx at time of initial visit.      Wound/Ulcer Pain Timing/Severity: constant  Quality of pain: aching  Severity:  4 / 10   Modifying Factors: Pain worsens with walking  Associated Signs/Symptoms: edema, erythema, and drainage    Ulcer Identification:  Ulcer Type: venous  Contributing Factors: edema, venous stasis, and lymphedema    Diabetic/Pressure/Non Pressure Ulcers onl y:  Ulcer: Non-Pressure ulcer, fat layer exposed    If patient has diabetic lower extremity wounds  Magaña Classification of diabetic lower extremity wounds:    Grade Description   []  0 No open wound   []  1 Superficial ulcer involving the full skin thickness   []  2 Deep ulcer involves ligament, tendon, joint capsule, or fascia  No bone involvement or abscess presence   []  3 Deep Ulcer with abcess formation and/or osteomyelitis   []  4 Localized gangrene   []  5 Extensive gangrene of the foot     Wound: N/A    10-18-23  Debrided, bactrim rx given    10/25/2023  Extensive multiple wounds of the left lower extremity debrided, patient has been treated with antibiotics recently    23  Coban 4 layer, debrided, culture taken    23  Debrided, needs

## 2024-08-14 NOTE — DISCHARGE INSTRUCTIONS
Visit Discharge/Physician Orders     Discharge condition: Stable     Assessment of pain at discharge:     Anesthetic used: 4% topical lidocaine     Discharge to: Home     Left via:Private automobile     Accompanied by: accompanied by self     ECF/HHA: [place]     Dressing Orders: clean left leg and right leg with saline, apply HF BLUE,  kerramax super absorber, abd, 4 layer profore, change twice weekly     Zinc oxide to reynaldo wound         Treatment Orders: elevate legs above level of heart           [facility] followup visit _______1 week_______  (Please note your next appointment above and if you are unable to keep, kindly give a 24 hour notice. Thank you.)     Physician signature:__________________________        If you experience any of the following, please call the Wound Care Center during business hours:     * Increase in Pain  * Temperature over 101  * Increase in drainage from your wound  * Drainage with a foul odor  * Bleeding  * Increase in swelling  * Need for compression bandage changes due to slippage, breakthrough drainage.     If you need medical attention outside of the business hours of the Wound Care Centers please contact your PCP or go to the nearest emergency room.

## 2024-08-21 ENCOUNTER — HOSPITAL ENCOUNTER (OUTPATIENT)
Dept: WOUND CARE | Age: 74
Discharge: HOME | End: 2024-08-21
Attending: PODIATRIST
Payer: MEDICARE

## 2024-08-21 VITALS
SYSTOLIC BLOOD PRESSURE: 144 MMHG | RESPIRATION RATE: 22 BRPM | HEART RATE: 92 BPM | TEMPERATURE: 96.9 F | DIASTOLIC BLOOD PRESSURE: 84 MMHG

## 2024-08-21 DIAGNOSIS — L97.222 VARICOSE VEINS OF LEFT LOWER EXTREMITY WITH ULCER OF CALF WITH FAT LAYER EXPOSED (HCC): Primary | ICD-10-CM

## 2024-08-21 DIAGNOSIS — I83.022 VARICOSE VEINS OF LEFT LOWER EXTREMITY WITH ULCER OF CALF WITH FAT LAYER EXPOSED (HCC): Primary | ICD-10-CM

## 2024-08-21 PROCEDURE — 11042 DBRDMT SUBQ TIS 1ST 20SQCM/<: CPT

## 2024-08-21 RX ORDER — GINSENG 100 MG
CAPSULE ORAL ONCE
OUTPATIENT
Start: 2024-08-21 | End: 2024-08-21

## 2024-08-21 RX ORDER — LIDOCAINE HYDROCHLORIDE 40 MG/ML
SOLUTION TOPICAL ONCE
OUTPATIENT
Start: 2024-08-21 | End: 2024-08-21

## 2024-08-21 RX ORDER — CLOBETASOL PROPIONATE 0.5 MG/G
OINTMENT TOPICAL ONCE
OUTPATIENT
Start: 2024-08-21 | End: 2024-08-21

## 2024-08-21 RX ORDER — GENTAMICIN SULFATE 1 MG/G
OINTMENT TOPICAL ONCE
OUTPATIENT
Start: 2024-08-21 | End: 2024-08-21

## 2024-08-21 RX ORDER — SODIUM CHLOR/HYPOCHLOROUS ACID 0.033 %
SOLUTION, IRRIGATION IRRIGATION ONCE
OUTPATIENT
Start: 2024-08-21 | End: 2024-08-21

## 2024-08-21 RX ORDER — BETAMETHASONE DIPROPIONATE 0.5 MG/G
CREAM TOPICAL ONCE
OUTPATIENT
Start: 2024-08-21 | End: 2024-08-21

## 2024-08-21 RX ORDER — TRIAMCINOLONE ACETONIDE 1 MG/G
OINTMENT TOPICAL ONCE
OUTPATIENT
Start: 2024-08-21 | End: 2024-08-21

## 2024-08-21 RX ORDER — IBUPROFEN 200 MG
TABLET ORAL ONCE
OUTPATIENT
Start: 2024-08-21 | End: 2024-08-21

## 2024-08-21 RX ORDER — LIDOCAINE HYDROCHLORIDE 20 MG/ML
JELLY TOPICAL ONCE
OUTPATIENT
Start: 2024-08-21 | End: 2024-08-21

## 2024-08-21 RX ORDER — LIDOCAINE 40 MG/G
CREAM TOPICAL ONCE
OUTPATIENT
Start: 2024-08-21 | End: 2024-08-21

## 2024-08-21 RX ORDER — LIDOCAINE HYDROCHLORIDE 40 MG/ML
SOLUTION TOPICAL ONCE
Status: COMPLETED | OUTPATIENT
Start: 2024-08-21 | End: 2024-08-21

## 2024-08-21 RX ORDER — LIDOCAINE 50 MG/G
OINTMENT TOPICAL ONCE
OUTPATIENT
Start: 2024-08-21 | End: 2024-08-21

## 2024-08-21 RX ORDER — BACITRACIN ZINC AND POLYMYXIN B SULFATE 500; 1000 [USP'U]/G; [USP'U]/G
OINTMENT TOPICAL ONCE
OUTPATIENT
Start: 2024-08-21 | End: 2024-08-21

## 2024-08-21 RX ADMIN — LIDOCAINE HYDROCHLORIDE 4 ML: 40 SOLUTION TOPICAL at 12:23

## 2024-08-21 ASSESSMENT — PAIN DESCRIPTION - DESCRIPTORS: DESCRIPTORS: DULL

## 2024-08-21 ASSESSMENT — PAIN SCALES - GENERAL: PAINLEVEL_OUTOF10: 2

## 2024-08-21 ASSESSMENT — PAIN - FUNCTIONAL ASSESSMENT: PAIN_FUNCTIONAL_ASSESSMENT: ACTIVITIES ARE NOT PREVENTED

## 2024-08-21 ASSESSMENT — PAIN DESCRIPTION - ORIENTATION: ORIENTATION: RIGHT

## 2024-08-21 ASSESSMENT — PAIN DESCRIPTION - LOCATION: LOCATION: LEG

## 2024-08-21 NOTE — DISCHARGE INSTRUCTIONS
Visit Discharge/Physician Orders     Discharge condition: Stable     Assessment of pain at discharge:     Anesthetic used: 4% topical lidocaine     Discharge to: Home     Left via:Private automobile     Accompanied by: accompanied by self     ECF/HHA: [place]     Dressing Orders: clean left leg and right leg with saline, apply HF BLUE,  kerramax super absorber, abd, 4 layer profore, change twice weekly     Zinc oxide to reynaldo wound         Treatment Orders: elevate legs above level of heart           [facility] followup visit _______1 week on friday______  (Please note your next appointment above and if you are unable to keep, kindly give a 24 hour notice. Thank you.)     Physician signature:__________________________        If you experience any of the following, please call the Wound Care Center during business hours:     * Increase in Pain  * Temperature over 101  * Increase in drainage from your wound  * Drainage with a foul odor

## 2024-08-21 NOTE — PROGRESS NOTES
Wound Healing Center  History and Physical/Consultation  Podiatry    Referring Physician : Charles Foss MD  Tuan Vega  MEDICAL RECORD NUMBER:  [id number]  AGE: 74 y.o.   GENDER: male  : 1950  EPISODE DATE:  2024  Subjective:     Chief Complaint   Patient presents with    Wound Check     \"MY wounds on my legs are doing better\"         HISTORY of PRESENT ILLNESS HPI     Tuan Vega is a 74 y.o. male who presents today for wound/ulcer evaluation.   History of Wound Context:  The patient has had a wound of left leg which was first noted approximately months ago.  This has been treated debridemnet. On their initial visit to the wound healing center, 10/11/23 ,  the patient has noted that the wound has not been improving.  The patient has had similar previous wounds in the past.      Pt is on abx at time of initial visit.      Wound/Ulcer Pain Timing/Severity: constant  Quality of pain: aching  Severity:  4 / 10   Modifying Factors: Pain worsens with walking  Associated Signs/Symptoms: edema, erythema, and drainage    Ulcer Identification:  Ulcer Type: venous  Contributing Factors: edema, venous stasis, and lymphedema    Diabetic/Pressure/Non Pressure Ulcers onl y:  Ulcer: Non-Pressure ulcer, fat layer exposed    If patient has diabetic lower extremity wounds  Magaña Classification of diabetic lower extremity wounds:    Grade Description   []  0 No open wound   []  1 Superficial ulcer involving the full skin thickness   []  2 Deep ulcer involves ligament, tendon, joint capsule, or fascia  No bone involvement or abscess presence   []  3 Deep Ulcer with abcess formation and/or osteomyelitis   []  4 Localized gangrene   []  5 Extensive gangrene of the foot     Wound: N/A    10-18-23  Debrided, bactrim rx given    10/25/2023  Extensive multiple wounds of the left lower extremity debrided, patient has been treated with antibiotics recently    23  Coban 4 layer, debrided, culture

## 2024-08-23 ENCOUNTER — TELEPHONE (OUTPATIENT)
Dept: CARDIOLOGY | Age: 74
End: 2024-08-23

## 2024-08-23 RX ORDER — ROSUVASTATIN CALCIUM 5 MG/1
5 TABLET, COATED ORAL DAILY
Qty: 90 TABLET | Refills: 0 | Status: SHIPPED | OUTPATIENT
Start: 2024-08-23

## 2024-08-23 RX ORDER — CARVEDILOL 25 MG/1
25 TABLET ORAL 2 TIMES DAILY WITH MEALS
Qty: 180 TABLET | Refills: 0 | Status: SHIPPED | OUTPATIENT
Start: 2024-08-23

## 2024-08-23 NOTE — TELEPHONE ENCOUNTER
Spoke with patient's wife and confirmed Lexiscan stress test appointment on August 27, 2024 at 0900. Instructions for test,current medication list, hold diabetic medication morning of test, and COVID-19 preprocedure information reviewed with patient, questions answered. Patient verbalized understanding.

## 2024-08-26 ENCOUNTER — TELEPHONE (OUTPATIENT)
Dept: CARDIOLOGY | Age: 74
End: 2024-08-26

## 2024-08-26 NOTE — TELEPHONE ENCOUNTER
CALLED PATIENT AND LEFT MESSAGE ABOUT RESCHEDULING STRESS AND ECHO    Electronically signed by Nida Hedrick on 8/26/2024 at 9:10 AM

## 2024-08-27 ENCOUNTER — HOSPITAL ENCOUNTER (OUTPATIENT)
Dept: CARDIOLOGY | Age: 74
Discharge: HOME | End: 2024-08-27
Attending: STUDENT IN AN ORGANIZED HEALTH CARE EDUCATION/TRAINING PROGRAM

## 2024-08-27 NOTE — DISCHARGE INSTRUCTIONS
Visit Discharge/Physician Orders     Discharge condition: Stable     Assessment of pain at discharge:     Anesthetic used: 4% topical lidocaine     Discharge to: Home     Left via:Private automobile     Accompanied by: accompanied by self     ECF/HHA: [place]     Dressing Orders: clean left leg and right leg with saline, apply HF BLUE,  kerramax super absorber, abd, 4 layer profore, change twice weekly     Zinc oxide to reynaldo wound         Treatment Orders: elevate legs above level of heart           [facility] followup visit _______1 week on friday______  (Please note your next appointment above and if you are unable to keep, kindly give a 24 hour notice. Thank you.)     Physician signature:__________________________        If you experience any of the following, please call the Wound Care Center during business hours:     * Increase in Pain  * Temperature over 101  * Increase in drainage from your wound  * Drainage with a foul odor

## 2024-08-30 ENCOUNTER — HOSPITAL ENCOUNTER (OUTPATIENT)
Dept: WOUND CARE | Age: 74
Discharge: HOME | End: 2024-08-30
Attending: PODIATRIST
Payer: MEDICARE

## 2024-08-30 VITALS
SYSTOLIC BLOOD PRESSURE: 141 MMHG | HEART RATE: 82 BPM | RESPIRATION RATE: 22 BRPM | TEMPERATURE: 96.6 F | HEIGHT: 67 IN | BODY MASS INDEX: 49.44 KG/M2 | DIASTOLIC BLOOD PRESSURE: 92 MMHG | WEIGHT: 315 LBS

## 2024-08-30 DIAGNOSIS — L97.222 VARICOSE VEINS OF LEFT LOWER EXTREMITY WITH ULCER OF CALF WITH FAT LAYER EXPOSED (HCC): Primary | ICD-10-CM

## 2024-08-30 DIAGNOSIS — I83.022 VARICOSE VEINS OF LEFT LOWER EXTREMITY WITH ULCER OF CALF WITH FAT LAYER EXPOSED (HCC): Primary | ICD-10-CM

## 2024-08-30 PROCEDURE — 11042 DBRDMT SUBQ TIS 1ST 20SQCM/<: CPT

## 2024-08-30 RX ORDER — BACITRACIN ZINC AND POLYMYXIN B SULFATE 500; 1000 [USP'U]/G; [USP'U]/G
OINTMENT TOPICAL ONCE
OUTPATIENT
Start: 2024-08-30 | End: 2024-08-30

## 2024-08-30 RX ORDER — SODIUM CHLOR/HYPOCHLOROUS ACID 0.033 %
SOLUTION, IRRIGATION IRRIGATION ONCE
OUTPATIENT
Start: 2024-08-30 | End: 2024-08-30

## 2024-08-30 RX ORDER — NEOMYCIN/BACITRACIN/POLYMYXINB 3.5-400-5K
OINTMENT (GRAM) TOPICAL ONCE
OUTPATIENT
Start: 2024-08-30 | End: 2024-08-30

## 2024-08-30 RX ORDER — MUPIROCIN 20 MG/G
OINTMENT TOPICAL ONCE
OUTPATIENT
Start: 2024-08-30 | End: 2024-08-30

## 2024-08-30 RX ORDER — LIDOCAINE HYDROCHLORIDE 20 MG/ML
JELLY TOPICAL ONCE
OUTPATIENT
Start: 2024-08-30 | End: 2024-08-30

## 2024-08-30 RX ORDER — GENTAMICIN SULFATE 1 MG/G
OINTMENT TOPICAL ONCE
OUTPATIENT
Start: 2024-08-30 | End: 2024-08-30

## 2024-08-30 RX ORDER — LIDOCAINE 50 MG/G
OINTMENT TOPICAL ONCE
OUTPATIENT
Start: 2024-08-30 | End: 2024-08-30

## 2024-08-30 RX ORDER — CLOBETASOL PROPIONATE 0.5 MG/G
OINTMENT TOPICAL ONCE
OUTPATIENT
Start: 2024-08-30 | End: 2024-08-30

## 2024-08-30 RX ORDER — LIDOCAINE HYDROCHLORIDE 40 MG/ML
SOLUTION TOPICAL ONCE
OUTPATIENT
Start: 2024-08-30 | End: 2024-08-30

## 2024-08-30 RX ORDER — BETAMETHASONE DIPROPIONATE 0.5 MG/G
CREAM TOPICAL ONCE
OUTPATIENT
Start: 2024-08-30 | End: 2024-08-30

## 2024-08-30 RX ORDER — TRIAMCINOLONE ACETONIDE 1 MG/G
OINTMENT TOPICAL ONCE
OUTPATIENT
Start: 2024-08-30 | End: 2024-08-30

## 2024-08-30 RX ORDER — GINSENG 100 MG
CAPSULE ORAL ONCE
OUTPATIENT
Start: 2024-08-30 | End: 2024-08-30

## 2024-08-30 RX ORDER — LIDOCAINE 40 MG/G
CREAM TOPICAL ONCE
OUTPATIENT
Start: 2024-08-30 | End: 2024-08-30

## 2024-08-30 RX ORDER — LIDOCAINE HYDROCHLORIDE 40 MG/ML
SOLUTION TOPICAL ONCE
Status: DISCONTINUED | OUTPATIENT
Start: 2024-08-30 | End: 2024-08-31 | Stop reason: HOSPADM

## 2024-08-30 RX ORDER — SILVER SULFADIAZINE 10 MG/G
CREAM TOPICAL ONCE
OUTPATIENT
Start: 2024-08-30 | End: 2024-08-30

## 2024-09-04 ENCOUNTER — HOSPITAL ENCOUNTER (OUTPATIENT)
Dept: WOUND CARE | Age: 74
Discharge: HOME | End: 2024-09-04
Attending: PODIATRIST
Payer: MEDICARE

## 2024-09-04 VITALS
HEIGHT: 67 IN | HEART RATE: 94 BPM | RESPIRATION RATE: 22 BRPM | BODY MASS INDEX: 49.44 KG/M2 | TEMPERATURE: 97.8 F | WEIGHT: 315 LBS | DIASTOLIC BLOOD PRESSURE: 88 MMHG | SYSTOLIC BLOOD PRESSURE: 158 MMHG

## 2024-09-04 DIAGNOSIS — I83.022 VARICOSE VEINS OF LEFT LOWER EXTREMITY WITH ULCER OF CALF WITH FAT LAYER EXPOSED (HCC): Primary | ICD-10-CM

## 2024-09-04 DIAGNOSIS — L97.222 VARICOSE VEINS OF LEFT LOWER EXTREMITY WITH ULCER OF CALF WITH FAT LAYER EXPOSED (HCC): Primary | ICD-10-CM

## 2024-09-04 PROCEDURE — 11042 DBRDMT SUBQ TIS 1ST 20SQCM/<: CPT

## 2024-09-04 RX ORDER — TRIAMCINOLONE ACETONIDE 1 MG/G
OINTMENT TOPICAL ONCE
OUTPATIENT
Start: 2024-09-04 | End: 2024-09-04

## 2024-09-04 RX ORDER — GINSENG 100 MG
CAPSULE ORAL ONCE
OUTPATIENT
Start: 2024-09-04 | End: 2024-09-04

## 2024-09-04 RX ORDER — SILVER SULFADIAZINE 10 MG/G
CREAM TOPICAL ONCE
OUTPATIENT
Start: 2024-09-04 | End: 2024-09-04

## 2024-09-04 RX ORDER — CLOBETASOL PROPIONATE 0.5 MG/G
OINTMENT TOPICAL ONCE
OUTPATIENT
Start: 2024-09-04 | End: 2024-09-04

## 2024-09-04 RX ORDER — NEOMYCIN/BACITRACIN/POLYMYXINB 3.5-400-5K
OINTMENT (GRAM) TOPICAL ONCE
OUTPATIENT
Start: 2024-09-04 | End: 2024-09-04

## 2024-09-04 RX ORDER — GENTAMICIN SULFATE 1 MG/G
OINTMENT TOPICAL ONCE
OUTPATIENT
Start: 2024-09-04 | End: 2024-09-04

## 2024-09-04 RX ORDER — MUPIROCIN 20 MG/G
OINTMENT TOPICAL ONCE
OUTPATIENT
Start: 2024-09-04 | End: 2024-09-04

## 2024-09-04 RX ORDER — BACITRACIN ZINC AND POLYMYXIN B SULFATE 500; 1000 [USP'U]/G; [USP'U]/G
OINTMENT TOPICAL ONCE
OUTPATIENT
Start: 2024-09-04 | End: 2024-09-04

## 2024-09-04 RX ORDER — LIDOCAINE HYDROCHLORIDE 20 MG/ML
JELLY TOPICAL ONCE
OUTPATIENT
Start: 2024-09-04 | End: 2024-09-04

## 2024-09-04 RX ORDER — BETAMETHASONE DIPROPIONATE 0.5 MG/G
CREAM TOPICAL ONCE
OUTPATIENT
Start: 2024-09-04 | End: 2024-09-04

## 2024-09-04 RX ORDER — LIDOCAINE HYDROCHLORIDE 40 MG/ML
SOLUTION TOPICAL ONCE
Status: COMPLETED | OUTPATIENT
Start: 2024-09-04 | End: 2024-09-04

## 2024-09-04 RX ORDER — LIDOCAINE 40 MG/G
CREAM TOPICAL ONCE
OUTPATIENT
Start: 2024-09-04 | End: 2024-09-04

## 2024-09-04 RX ORDER — LIDOCAINE HYDROCHLORIDE 40 MG/ML
SOLUTION TOPICAL ONCE
OUTPATIENT
Start: 2024-09-04 | End: 2024-09-04

## 2024-09-04 RX ORDER — SODIUM CHLOR/HYPOCHLOROUS ACID 0.033 %
SOLUTION, IRRIGATION IRRIGATION ONCE
OUTPATIENT
Start: 2024-09-04 | End: 2024-09-04

## 2024-09-04 RX ORDER — LIDOCAINE 50 MG/G
OINTMENT TOPICAL ONCE
OUTPATIENT
Start: 2024-09-04 | End: 2024-09-04

## 2024-09-04 RX ADMIN — LIDOCAINE HYDROCHLORIDE 20 ML: 40 SOLUTION TOPICAL at 11:19

## 2024-09-04 NOTE — PROGRESS NOTES
Cleansed with saline 08/30/24 1213   Dressing/Treatment Alginate with Ag;ABD 08/30/24 1213   Wound Length (cm) 0.5 cm 09/04/24 1122   Wound Width (cm) 1 cm 09/04/24 1122   Wound Depth (cm) 0.1 cm 09/04/24 1122   Wound Surface Area (cm^2) 0.5 cm^2 09/04/24 1122   Change in Wound Size % (l*w) 83.33 09/04/24 1122   Wound Volume (cm^3) 0.05 cm^3 09/04/24 1122   Wound Healing % 83 09/04/24 1122   Post-Procedure Length (cm) 2.6 cm 08/21/24 1227   Post-Procedure Width (cm) 3.1 cm 08/21/24 1227   Post-Procedure Depth (cm) 0.2 cm 08/21/24 1227   Post-Procedure Surface Area (cm^2) 8.06 cm^2 08/21/24 1227   Post-Procedure Volume (cm^3) 1.612 cm^3 08/21/24 1227   Wound Assessment Pink/red 09/04/24 1122   Drainage Amount Small (< 25%) 09/04/24 1122   Drainage Description Serosanguinous 09/04/24 1122   Odor None 09/04/24 1122   Maria Elena-wound Assessment Dry/flaky;Fragile 09/04/24 1122   Margins Attached edges 08/30/24 1142   Number of days: 209       Wound 09/04/24 Left;Lower;Posterior #3 (Active)   Wound Image   09/04/24 1122   Wound Length (cm) 1 cm 09/04/24 1122   Wound Width (cm) 1 cm 09/04/24 1122   Wound Depth (cm) 0.1 cm 09/04/24 1122   Wound Surface Area (cm^2) 1 cm^2 09/04/24 1122   Wound Volume (cm^3) 0.1 cm^3 09/04/24 1122   Wound Assessment Pink/red;Pale granulation tissue 09/04/24 1122   Drainage Amount Small (< 25%) 09/04/24 1122   Drainage Description Serosanguinous 09/04/24 1122   Odor None 09/04/24 1122   Maria Elena-wound Assessment Fragile;Dry/flaky 09/04/24 1122   Number of days: 0                              Procedure Note  Indications:  Based on my examination of this patient's wound(s)/ulcer(s) today, debridement is required to promote healing and evaluate the wound base.    Performed by: Prosper Mccarthy DPM    Consent obtained:  Yes    Time out taken:  Yes    Pain Control: Anesthetic  Anesthetic: 4% Lidocaine Liquid Topical     Debridement:Excisional Debridement    Using curette the wound(s)/ulcer(s) was/were

## 2024-09-04 NOTE — DISCHARGE INSTRUCTIONS
Visit Discharge/Physician Orders     Discharge condition: Stable     Assessment of pain at discharge:     Anesthetic used: 4% topical lidocaine     Discharge to: Home     Left via:Private automobile     Accompanied by: accompanied by self     ECF/HHA: [facility]     Dressing Orders: clean left leg and right leg with saline, apply HF BLUE,  kerramax super absorber, abd, 4 layer profore, change twice weekly     Zinc oxide to reynaldo wound         Treatment Orders: elevate legs above level of heart           [facility] followup visit _______1 week on friday______  (Please note your next appointment above and if you are unable to keep, kindly give a 24 hour notice. Thank you.)     Physician signature:__________________________        If you experience any of the following, please call the Wound Care Center during business hours:     * Increase in Pain  * Temperature over 101  * Increase in drainage from your wound  * Drainage with a foul odor

## 2024-09-11 ENCOUNTER — HOSPITAL ENCOUNTER (OUTPATIENT)
Dept: WOUND CARE | Age: 74
Discharge: HOME | End: 2024-09-11
Attending: PODIATRIST
Payer: MEDICARE

## 2024-09-11 VITALS
SYSTOLIC BLOOD PRESSURE: 156 MMHG | TEMPERATURE: 97.5 F | HEIGHT: 67 IN | DIASTOLIC BLOOD PRESSURE: 86 MMHG | RESPIRATION RATE: 22 BRPM | WEIGHT: 315 LBS | HEART RATE: 88 BPM | BODY MASS INDEX: 49.44 KG/M2

## 2024-09-11 DIAGNOSIS — I83.022 VARICOSE VEINS OF LEFT LOWER EXTREMITY WITH ULCER OF CALF WITH FAT LAYER EXPOSED (HCC): Primary | ICD-10-CM

## 2024-09-11 DIAGNOSIS — L97.222 VARICOSE VEINS OF LEFT LOWER EXTREMITY WITH ULCER OF CALF WITH FAT LAYER EXPOSED (HCC): Primary | ICD-10-CM

## 2024-09-11 PROCEDURE — 11042 DBRDMT SUBQ TIS 1ST 20SQCM/<: CPT

## 2024-09-11 RX ORDER — SILVER SULFADIAZINE 10 MG/G
CREAM TOPICAL ONCE
OUTPATIENT
Start: 2024-09-11 | End: 2024-09-11

## 2024-09-11 RX ORDER — NAPROXEN 500 MG/1
500 TABLET ORAL 2 TIMES DAILY PRN
COMMUNITY
Start: 2024-09-10

## 2024-09-11 RX ORDER — MUPIROCIN 20 MG/G
OINTMENT TOPICAL ONCE
OUTPATIENT
Start: 2024-09-11 | End: 2024-09-11

## 2024-09-11 RX ORDER — BETAMETHASONE DIPROPIONATE 0.5 MG/G
CREAM TOPICAL ONCE
OUTPATIENT
Start: 2024-09-11 | End: 2024-09-11

## 2024-09-11 RX ORDER — NEOMYCIN/BACITRACIN/POLYMYXINB 3.5-400-5K
OINTMENT (GRAM) TOPICAL ONCE
OUTPATIENT
Start: 2024-09-11 | End: 2024-09-11

## 2024-09-11 RX ORDER — LIDOCAINE HYDROCHLORIDE 20 MG/ML
JELLY TOPICAL ONCE
OUTPATIENT
Start: 2024-09-11 | End: 2024-09-11

## 2024-09-11 RX ORDER — TRIAMCINOLONE ACETONIDE 1 MG/G
OINTMENT TOPICAL ONCE
OUTPATIENT
Start: 2024-09-11 | End: 2024-09-11

## 2024-09-11 RX ORDER — LIDOCAINE HYDROCHLORIDE 40 MG/ML
SOLUTION TOPICAL ONCE
OUTPATIENT
Start: 2024-09-11 | End: 2024-09-11

## 2024-09-11 RX ORDER — GINSENG 100 MG
CAPSULE ORAL ONCE
OUTPATIENT
Start: 2024-09-11 | End: 2024-09-11

## 2024-09-11 RX ORDER — SODIUM CHLOR/HYPOCHLOROUS ACID 0.033 %
SOLUTION, IRRIGATION IRRIGATION ONCE
OUTPATIENT
Start: 2024-09-11 | End: 2024-09-11

## 2024-09-11 RX ORDER — GENTAMICIN SULFATE 1 MG/G
OINTMENT TOPICAL ONCE
OUTPATIENT
Start: 2024-09-11 | End: 2024-09-11

## 2024-09-11 RX ORDER — LIDOCAINE HYDROCHLORIDE 40 MG/ML
SOLUTION TOPICAL ONCE
Status: COMPLETED | OUTPATIENT
Start: 2024-09-11 | End: 2024-09-11

## 2024-09-11 RX ORDER — LIDOCAINE 40 MG/G
CREAM TOPICAL ONCE
OUTPATIENT
Start: 2024-09-11 | End: 2024-09-11

## 2024-09-11 RX ORDER — LIDOCAINE 50 MG/G
OINTMENT TOPICAL ONCE
OUTPATIENT
Start: 2024-09-11 | End: 2024-09-11

## 2024-09-11 RX ORDER — BACITRACIN ZINC AND POLYMYXIN B SULFATE 500; 1000 [USP'U]/G; [USP'U]/G
OINTMENT TOPICAL ONCE
OUTPATIENT
Start: 2024-09-11 | End: 2024-09-11

## 2024-09-11 RX ORDER — CLOBETASOL PROPIONATE 0.5 MG/G
OINTMENT TOPICAL ONCE
OUTPATIENT
Start: 2024-09-11 | End: 2024-09-11

## 2024-09-11 RX ADMIN — LIDOCAINE HYDROCHLORIDE 15 ML: 40 SOLUTION TOPICAL at 10:47

## 2024-09-18 ENCOUNTER — HOSPITAL ENCOUNTER (OUTPATIENT)
Dept: WOUND CARE | Age: 74
Discharge: HOME | End: 2024-09-18
Attending: PODIATRIST
Payer: MEDICARE

## 2024-09-18 VITALS
HEART RATE: 69 BPM | SYSTOLIC BLOOD PRESSURE: 172 MMHG | BODY MASS INDEX: 49.44 KG/M2 | WEIGHT: 315 LBS | HEIGHT: 67 IN | RESPIRATION RATE: 22 BRPM | TEMPERATURE: 96.8 F | DIASTOLIC BLOOD PRESSURE: 89 MMHG

## 2024-09-18 PROCEDURE — 99213 OFFICE O/P EST LOW 20 MIN: CPT

## 2024-09-18 RX ORDER — LIDOCAINE 40 MG/G
CREAM TOPICAL ONCE
OUTPATIENT
Start: 2024-09-18 | End: 2024-09-18

## 2024-09-18 RX ORDER — MUPIROCIN 20 MG/G
OINTMENT TOPICAL ONCE
OUTPATIENT
Start: 2024-09-18 | End: 2024-09-18

## 2024-09-18 RX ORDER — CLOBETASOL PROPIONATE 0.5 MG/G
OINTMENT TOPICAL ONCE
OUTPATIENT
Start: 2024-09-18 | End: 2024-09-18

## 2024-09-18 RX ORDER — LIDOCAINE 50 MG/G
OINTMENT TOPICAL ONCE
OUTPATIENT
Start: 2024-09-18 | End: 2024-09-18

## 2024-09-18 RX ORDER — TRIAMCINOLONE ACETONIDE 1 MG/G
OINTMENT TOPICAL ONCE
OUTPATIENT
Start: 2024-09-18 | End: 2024-09-18

## 2024-09-18 RX ORDER — SODIUM CHLOR/HYPOCHLOROUS ACID 0.033 %
SOLUTION, IRRIGATION IRRIGATION ONCE
OUTPATIENT
Start: 2024-09-18 | End: 2024-09-18

## 2024-09-18 RX ORDER — BETAMETHASONE DIPROPIONATE 0.5 MG/G
CREAM TOPICAL ONCE
OUTPATIENT
Start: 2024-09-18 | End: 2024-09-18

## 2024-09-18 RX ORDER — GINSENG 100 MG
CAPSULE ORAL ONCE
OUTPATIENT
Start: 2024-09-18 | End: 2024-09-18

## 2024-09-18 RX ORDER — LIDOCAINE HYDROCHLORIDE 20 MG/ML
JELLY TOPICAL ONCE
OUTPATIENT
Start: 2024-09-18 | End: 2024-09-18

## 2024-09-18 RX ORDER — BACITRACIN ZINC AND POLYMYXIN B SULFATE 500; 1000 [USP'U]/G; [USP'U]/G
OINTMENT TOPICAL ONCE
OUTPATIENT
Start: 2024-09-18 | End: 2024-09-18

## 2024-09-18 RX ORDER — NEOMYCIN/BACITRACIN/POLYMYXINB 3.5-400-5K
OINTMENT (GRAM) TOPICAL ONCE
OUTPATIENT
Start: 2024-09-18 | End: 2024-09-18

## 2024-09-18 RX ORDER — SILVER SULFADIAZINE 10 MG/G
CREAM TOPICAL ONCE
OUTPATIENT
Start: 2024-09-18 | End: 2024-09-18

## 2024-09-18 RX ORDER — GENTAMICIN SULFATE 1 MG/G
OINTMENT TOPICAL ONCE
OUTPATIENT
Start: 2024-09-18 | End: 2024-09-18

## 2024-09-18 RX ORDER — LIDOCAINE HYDROCHLORIDE 40 MG/ML
SOLUTION TOPICAL ONCE
OUTPATIENT
Start: 2024-09-18 | End: 2024-09-18

## 2024-09-25 ENCOUNTER — HOSPITAL ENCOUNTER (OUTPATIENT)
Dept: WOUND CARE | Age: 74
Discharge: HOME | End: 2024-09-25
Attending: PODIATRIST
Payer: MEDICARE

## 2024-09-25 VITALS
WEIGHT: 315 LBS | TEMPERATURE: 97.7 F | RESPIRATION RATE: 22 BRPM | HEART RATE: 90 BPM | SYSTOLIC BLOOD PRESSURE: 176 MMHG | DIASTOLIC BLOOD PRESSURE: 88 MMHG | HEIGHT: 67 IN | BODY MASS INDEX: 49.44 KG/M2

## 2024-09-25 DIAGNOSIS — I83.022 VARICOSE VEINS OF LEFT LOWER EXTREMITY WITH ULCER OF CALF WITH FAT LAYER EXPOSED (HCC): Primary | ICD-10-CM

## 2024-09-25 DIAGNOSIS — L97.222 VARICOSE VEINS OF LEFT LOWER EXTREMITY WITH ULCER OF CALF WITH FAT LAYER EXPOSED (HCC): Primary | ICD-10-CM

## 2024-09-25 PROCEDURE — 11042 DBRDMT SUBQ TIS 1ST 20SQCM/<: CPT

## 2024-09-25 RX ORDER — LIDOCAINE HYDROCHLORIDE 20 MG/ML
JELLY TOPICAL ONCE
OUTPATIENT
Start: 2024-09-25 | End: 2024-09-25

## 2024-09-25 RX ORDER — MUPIROCIN 20 MG/G
OINTMENT TOPICAL ONCE
OUTPATIENT
Start: 2024-09-25 | End: 2024-09-25

## 2024-09-25 RX ORDER — BETAMETHASONE DIPROPIONATE 0.5 MG/G
CREAM TOPICAL ONCE
OUTPATIENT
Start: 2024-09-25 | End: 2024-09-25

## 2024-09-25 RX ORDER — LIDOCAINE 50 MG/G
OINTMENT TOPICAL ONCE
OUTPATIENT
Start: 2024-09-25 | End: 2024-09-25

## 2024-09-25 RX ORDER — TRIAMCINOLONE ACETONIDE 1 MG/G
OINTMENT TOPICAL ONCE
OUTPATIENT
Start: 2024-09-25 | End: 2024-09-25

## 2024-09-25 RX ORDER — BACITRACIN ZINC AND POLYMYXIN B SULFATE 500; 1000 [USP'U]/G; [USP'U]/G
OINTMENT TOPICAL ONCE
OUTPATIENT
Start: 2024-09-25 | End: 2024-09-25

## 2024-09-25 RX ORDER — LIDOCAINE 40 MG/G
CREAM TOPICAL ONCE
OUTPATIENT
Start: 2024-09-25 | End: 2024-09-25

## 2024-09-25 RX ORDER — SODIUM CHLOR/HYPOCHLOROUS ACID 0.033 %
SOLUTION, IRRIGATION IRRIGATION ONCE
OUTPATIENT
Start: 2024-09-25 | End: 2024-09-25

## 2024-09-25 RX ORDER — SILVER SULFADIAZINE 10 MG/G
CREAM TOPICAL ONCE
OUTPATIENT
Start: 2024-09-25 | End: 2024-09-25

## 2024-09-25 RX ORDER — CLOBETASOL PROPIONATE 0.5 MG/G
OINTMENT TOPICAL ONCE
OUTPATIENT
Start: 2024-09-25 | End: 2024-09-25

## 2024-09-25 RX ORDER — GENTAMICIN SULFATE 1 MG/G
OINTMENT TOPICAL ONCE
OUTPATIENT
Start: 2024-09-25 | End: 2024-09-25

## 2024-09-25 RX ORDER — GINSENG 100 MG
CAPSULE ORAL ONCE
OUTPATIENT
Start: 2024-09-25 | End: 2024-09-25

## 2024-09-25 RX ORDER — LIDOCAINE HYDROCHLORIDE 40 MG/ML
SOLUTION TOPICAL ONCE
Status: COMPLETED | OUTPATIENT
Start: 2024-09-25 | End: 2024-09-25

## 2024-09-25 RX ORDER — NEOMYCIN/BACITRACIN/POLYMYXINB 3.5-400-5K
OINTMENT (GRAM) TOPICAL ONCE
OUTPATIENT
Start: 2024-09-25 | End: 2024-09-25

## 2024-09-25 RX ORDER — LIDOCAINE HYDROCHLORIDE 40 MG/ML
SOLUTION TOPICAL ONCE
OUTPATIENT
Start: 2024-09-25 | End: 2024-09-25

## 2024-09-25 RX ADMIN — LIDOCAINE HYDROCHLORIDE 10 ML: 40 SOLUTION TOPICAL at 11:20

## 2024-10-08 NOTE — DISCHARGE INSTRUCTIONS
Visit Discharge/Physician Orders     Discharge condition: Stable     Assessment of pain at discharge:     Anesthetic used:      Discharge to: Home     Left via:Private automobile     Accompanied by: accompanied by self     ECF/HHA:      Dressing Orders: healed, wear own compression              Treatment Orders: elevate legs above level of heart   Compression garments ordered   [facility]         [facility] followup visit _______as needed  (Please note your next appointment above and if you are unable to keep, kindly give a 24 hour notice. Thank you.)     Physician signature:__________________________        If you experience any of the following, please call the Wound Care Center during business hours:     * Increase in Pain  * Temperature over 101  * Increase in drainage from your wound  * Drainage with a foul odor

## 2024-10-09 ENCOUNTER — HOSPITAL ENCOUNTER (OUTPATIENT)
Dept: WOUND CARE | Age: 74
Discharge: HOME | End: 2024-10-09
Attending: PODIATRIST
Payer: MEDICARE

## 2024-10-09 VITALS
WEIGHT: 315 LBS | SYSTOLIC BLOOD PRESSURE: 151 MMHG | TEMPERATURE: 97.6 F | BODY MASS INDEX: 49.44 KG/M2 | RESPIRATION RATE: 22 BRPM | DIASTOLIC BLOOD PRESSURE: 84 MMHG | HEIGHT: 67 IN | HEART RATE: 72 BPM

## 2024-10-09 DIAGNOSIS — I83.022 VARICOSE VEINS OF LEFT LOWER EXTREMITY WITH ULCER OF CALF WITH FAT LAYER EXPOSED (HCC): Primary | ICD-10-CM

## 2024-10-09 DIAGNOSIS — L97.222 VARICOSE VEINS OF LEFT LOWER EXTREMITY WITH ULCER OF CALF WITH FAT LAYER EXPOSED (HCC): Primary | ICD-10-CM

## 2024-10-09 PROCEDURE — 99212 OFFICE O/P EST SF 10 MIN: CPT

## 2024-10-09 RX ORDER — SILVER SULFADIAZINE 10 MG/G
CREAM TOPICAL ONCE
Status: CANCELLED | OUTPATIENT
Start: 2024-10-09 | End: 2024-10-09

## 2024-10-09 RX ORDER — LIDOCAINE HYDROCHLORIDE 20 MG/ML
JELLY TOPICAL ONCE
Status: CANCELLED | OUTPATIENT
Start: 2024-10-09 | End: 2024-10-09

## 2024-10-09 RX ORDER — BACITRACIN ZINC AND POLYMYXIN B SULFATE 500; 1000 [USP'U]/G; [USP'U]/G
OINTMENT TOPICAL ONCE
Status: CANCELLED | OUTPATIENT
Start: 2024-10-09 | End: 2024-10-09

## 2024-10-09 RX ORDER — LIDOCAINE HYDROCHLORIDE 40 MG/ML
SOLUTION TOPICAL ONCE
Status: DISCONTINUED | OUTPATIENT
Start: 2024-10-09 | End: 2024-10-09

## 2024-10-09 RX ORDER — GENTAMICIN SULFATE 1 MG/G
OINTMENT TOPICAL ONCE
Status: CANCELLED | OUTPATIENT
Start: 2024-10-09 | End: 2024-10-09

## 2024-10-09 RX ORDER — GINSENG 100 MG
CAPSULE ORAL ONCE
Status: CANCELLED | OUTPATIENT
Start: 2024-10-09 | End: 2024-10-09

## 2024-10-09 RX ORDER — LIDOCAINE 50 MG/G
OINTMENT TOPICAL ONCE
Status: CANCELLED | OUTPATIENT
Start: 2024-10-09 | End: 2024-10-09

## 2024-10-09 RX ORDER — MUPIROCIN 20 MG/G
OINTMENT TOPICAL ONCE
Status: CANCELLED | OUTPATIENT
Start: 2024-10-09 | End: 2024-10-09

## 2024-10-09 RX ORDER — LIDOCAINE HYDROCHLORIDE 40 MG/ML
SOLUTION TOPICAL ONCE
Status: CANCELLED | OUTPATIENT
Start: 2024-10-09 | End: 2024-10-09

## 2024-10-09 RX ORDER — CLOBETASOL PROPIONATE 0.5 MG/G
OINTMENT TOPICAL ONCE
Status: CANCELLED | OUTPATIENT
Start: 2024-10-09 | End: 2024-10-09

## 2024-10-09 RX ORDER — LIDOCAINE 40 MG/G
CREAM TOPICAL ONCE
Status: CANCELLED | OUTPATIENT
Start: 2024-10-09 | End: 2024-10-09

## 2024-10-09 RX ORDER — TRIAMCINOLONE ACETONIDE 1 MG/G
OINTMENT TOPICAL ONCE
Status: CANCELLED | OUTPATIENT
Start: 2024-10-09 | End: 2024-10-09

## 2024-10-09 RX ORDER — NEOMYCIN/BACITRACIN/POLYMYXINB 3.5-400-5K
OINTMENT (GRAM) TOPICAL ONCE
Status: CANCELLED | OUTPATIENT
Start: 2024-10-09 | End: 2024-10-09

## 2024-10-09 RX ORDER — BETAMETHASONE DIPROPIONATE 0.5 MG/G
CREAM TOPICAL ONCE
Status: CANCELLED | OUTPATIENT
Start: 2024-10-09 | End: 2024-10-09

## 2024-10-09 RX ORDER — SODIUM CHLOR/HYPOCHLOROUS ACID 0.033 %
SOLUTION, IRRIGATION IRRIGATION ONCE
Status: CANCELLED | OUTPATIENT
Start: 2024-10-09 | End: 2024-10-09

## 2024-10-09 NOTE — PLAN OF CARE
Problem: Cognitive:  Goal: Knowledge of wound care  Description: Knowledge of wound care  Outcome: Completed

## 2024-10-09 NOTE — PROGRESS NOTES
Pain:  2  / 10   Post Procedural Pain:  4 / 10     Response to treatment:  Well tolerated by patient.  Healed    A culture was not done.      Plan:     Pt is not a smoker   - Discussed relationship of smoking and negative affects on wound healing   - Emphasized importance of tobacco avoidace/cessation   - \Script for nicotine patch given to patient   - Information regarding support groups for smoking cessation given    In my professional opinion and based off the information that is available at this time this patient has appropriate indication for HBO Therapy: Maybe    Treatment Note please see attached Discharge Instructions    Written patient dismissal instructions given to patient and signed by patient or POA.         Discharge Instructions         Visit Discharge/Physician Orders     Discharge condition: Stable     Assessment of pain at discharge:     Anesthetic used:      Discharge to: Home     Left via:Private automobile     Accompanied by: accompanied by self     ECF/HHA:      Dressing Orders: healed, wear own compression              Treatment Orders: elevate legs above level of heart   Compression garments ordered   Referral to [place] for lymphedema therapy         [facility] followup visit _______as needed  (Please note your next appointment above and if you are unable to keep, kindly give a 24 hour notice. Thank you.)     Physician signature:__________________________        If you experience any of the following, please call the Wound Care Center during business hours:     * Increase in Pain  * Temperature over 101  * Increase in drainage from your wound  * Drainage with a foul odor        Electronically signed by Prosper Mccarthy DPM on 10/9/2024 at 11:36 AM

## 2024-11-13 RX ORDER — CARVEDILOL 25 MG/1
25 TABLET ORAL 2 TIMES DAILY WITH MEALS
Qty: 180 TABLET | Refills: 0 | Status: SHIPPED | OUTPATIENT
Start: 2024-11-13

## 2024-11-25 NOTE — DISCHARGE INSTRUCTIONS
Visit Discharge/Physician Orders    Discharge condition: Stable    Assessment of pain at discharge: min    Anesthetic used: 4% topical lido    Discharge to: Home    Left via:Private automobile    Accompanied by: accompanied by self     ECF/HHA: referral to home care     Dressing Orders: clean leg with saline, apply HF BLUE (ALGINATE IN CLINIC), abd, 4 layer profore. Change 2x per week at home.  Until HF BLUE IS OBTAINED CONTINUE ALGINATE. AND UNTIL HOME CARE STARTS, LEAVE WRAP INTACT    Treatment Orders: BLOOD WORK TO BE OBTAINED- OBTAIN BEFORE NEXT VISIT   CBC,CMP, PROTEIN  INCREASE PROTEIN IN DIET     [facility] followup visit _______________1 WEEK______________  (Please note your next appointment above and if you are unable to keep, kindly give a 24 hour notice. Thank you.)    Physician signature:__________________________      If you experience any of the following, please call the Wound Care Center during business hours:    * Increase in Pain  * Temperature over 101  * Increase in drainage from your wound  * Drainage with a foul odor  * Bleeding  * Increase in swelling  * Need for compression bandage changes due to slippage, breakthrough drainage.    If you need medical attention outside of the business hours of the Wound Care Centers please contact your PCP or go to the nearest emergency room.

## 2024-11-27 ENCOUNTER — HOSPITAL ENCOUNTER (OUTPATIENT)
Dept: WOUND CARE | Age: 74
Discharge: HOME | End: 2024-11-27
Payer: MEDICARE

## 2024-11-27 VITALS
HEART RATE: 72 BPM | RESPIRATION RATE: 18 BRPM | SYSTOLIC BLOOD PRESSURE: 144 MMHG | TEMPERATURE: 97.6 F | DIASTOLIC BLOOD PRESSURE: 73 MMHG

## 2024-11-27 DIAGNOSIS — Z86.711 HISTORY OF PULMONARY EMBOLUS (PE): Primary | ICD-10-CM

## 2024-11-27 DIAGNOSIS — L97.222 LOWER LIMB ULCER, CALF, LEFT, WITH FAT LAYER EXPOSED (HCC): ICD-10-CM

## 2024-11-27 DIAGNOSIS — L97.812 NON-PRESSURE CHRONIC ULCER OF OTHER PART OF RIGHT LOWER LEG WITH FAT LAYER EXPOSED (HCC): ICD-10-CM

## 2024-11-27 DIAGNOSIS — I89.0 LYMPHEDEMA OF BOTH LOWER EXTREMITIES: ICD-10-CM

## 2024-11-27 PROBLEM — I83.009 STASIS ULCER (HCC): Status: RESOLVED | Noted: 2020-12-07 | Resolved: 2024-11-27

## 2024-11-27 PROBLEM — L97.909 STASIS ULCER (HCC): Status: RESOLVED | Noted: 2020-12-07 | Resolved: 2024-11-27

## 2024-11-27 PROBLEM — J96.90 RESPIRATORY FAILURE: Status: RESOLVED | Noted: 2023-12-05 | Resolved: 2024-11-27

## 2024-11-27 PROBLEM — I73.9 PVD (PERIPHERAL VASCULAR DISEASE) (HCC): Status: RESOLVED | Noted: 2019-07-01 | Resolved: 2024-11-27

## 2024-11-27 PROBLEM — Z45.010 ENCOUNTER FOR PACEMAKER AT END OF BATTERY LIFE: Status: RESOLVED | Noted: 2020-07-23 | Resolved: 2024-11-27

## 2024-11-27 PROBLEM — M17.9 OSTEOARTHRITIS OF KNEE: Status: RESOLVED | Noted: 2021-11-18 | Resolved: 2024-11-27

## 2024-11-27 PROBLEM — R60.9 EDEMA: Status: RESOLVED | Noted: 2017-03-24 | Resolved: 2024-11-27

## 2024-11-27 PROBLEM — I87.2 VENOUS INSUFFICIENCY (CHRONIC) (PERIPHERAL): Status: RESOLVED | Noted: 2019-03-22 | Resolved: 2024-11-27

## 2024-11-27 PROCEDURE — 99214 OFFICE O/P EST MOD 30 MIN: CPT

## 2024-11-27 PROCEDURE — 11042 DBRDMT SUBQ TIS 1ST 20SQCM/<: CPT

## 2024-11-27 PROCEDURE — 99215 OFFICE O/P EST HI 40 MIN: CPT | Performed by: SURGERY

## 2024-11-27 PROCEDURE — 11045 DBRDMT SUBQ TISS EACH ADDL: CPT | Performed by: SURGERY

## 2024-11-27 PROCEDURE — 11045 DBRDMT SUBQ TISS EACH ADDL: CPT

## 2024-11-27 PROCEDURE — 11042 DBRDMT SUBQ TIS 1ST 20SQCM/<: CPT | Performed by: SURGERY

## 2024-11-27 ASSESSMENT — PAIN DESCRIPTION - DESCRIPTORS: DESCRIPTORS: DISCOMFORT

## 2024-11-27 ASSESSMENT — PAIN DESCRIPTION - ORIENTATION: ORIENTATION: LEFT

## 2024-11-27 ASSESSMENT — PAIN DESCRIPTION - PAIN TYPE: TYPE: CHRONIC PAIN

## 2024-11-27 ASSESSMENT — PAIN SCALES - GENERAL: PAINLEVEL_OUTOF10: 2

## 2024-11-27 ASSESSMENT — PAIN DESCRIPTION - FREQUENCY: FREQUENCY: INTERMITTENT

## 2024-11-27 ASSESSMENT — PAIN DESCRIPTION - LOCATION: LOCATION: LEG

## 2024-11-27 ASSESSMENT — PAIN - FUNCTIONAL ASSESSMENT: PAIN_FUNCTIONAL_ASSESSMENT: PREVENTS OR INTERFERES SOME ACTIVE ACTIVITIES AND ADLS

## 2024-11-27 ASSESSMENT — PAIN DESCRIPTION - ONSET: ONSET: ON-GOING

## 2024-11-27 NOTE — PROGRESS NOTES
Wound Healing Center /[facility]   History and Physical/Consultation  Vascular    Referring Physician : Charles Foss MD  Tuan Vega  MEDICAL RECORD NUMBER:  [id number]  AGE: 74 y.o.   GENDER: male  : 1950  EPISODE DATE:  2024  Subjective:     Chief Complaint   Patient presents with    Wound Check         HISTORY of PRESENT ILLNESS HPI     Tuan Vega is a 74 y.o. male who presents today for wound/ulcer evaluation.   History of Wound Context:  The patient has had a wound of both calfs which was first noted approximately 1 month, since October.  This has been treated by the patient.  On their initial visit to the wound healing center, 24, the patient has noted that the wound has not been improving.  The patient has had similar previous wounds in the past.        Patient in fact was treated with compression wrapping for ulceration of both legs associate with lymphedema by Dr. Mccarthy with complete healing but since ulcers recur, patient came back to the wound care center for further evaluation    Pt is currently not on abx.      Wound/Ulcer Pain Timing/Severity: constant  Quality of pain: dull, aching  Severity:  4 / 10   Modifying Factors: Pain worsens with walking  Associated Signs/Symptoms: edema, drainage, and pain    Ulcer Identification:  Ulcer Type: diabetic, non-healing/non-surgical, and lymphedema  Contributing Factors: edema, lymphedema, diabetes, and obesity    Diabetic/Pressure/Non Pressure Ulcers only:  Ulcer: Diabetic ulcer, fat layer exposed    If patient has diabetic lower extremity wounds  Magaña Classification of diabetic lower extremity wounds:    Grade Description   []  0 No open wound   []  1 Superficial ulcer involving the full skin thickness   []  2 Deep ulcer involves ligament, tendon, joint capsule, or fascia  No bone involvement or abscess presence   []  3 Deep Ulcer with abcess formation and/or osteomyelitis   []  4 Localized gangrene   []  5

## 2024-11-27 NOTE — PLAN OF CARE
Problem: Pain  Goal: Verbalizes/displays adequate comfort level or baseline comfort level  Outcome: Progressing     Problem: Cognitive:  Goal: Knowledge of wound care  Description: Knowledge of wound care  Outcome: Progressing  Goal: Understands risk factors for wounds  Description: Understands risk factors for wounds  Outcome: Progressing     Problem: Wound:  Goal: Will show signs of wound healing; wound closure and no evidence of infection  Description: Will show signs of wound healing; wound closure and no evidence of infection  Outcome: Progressing     Problem: Venous:  Goal: Signs of wound healing will improve  Description: Signs of wound healing will improve  Outcome: Progressing     Problem: Compression therapy:  Goal: Will be free from complications associated with compression therapy  Description: Will be free from complications associated with compression therapy  Outcome: Progressing

## 2024-11-27 NOTE — WOUND CARE
0909   Wound Surface Area (cm^2) 1.4 cm^2 11/27/24 0909   Wound Volume (cm^3) 0.14 cm^3 11/27/24 0909   Post-Procedure Length (cm) 1.4 cm 11/27/24 0929   Post-Procedure Width (cm) 1 cm 11/27/24 0929   Post-Procedure Depth (cm) 0.2 cm 11/27/24 0929   Post-Procedure Surface Area (cm^2) 1.4 cm^2 11/27/24 0929   Post-Procedure Volume (cm^3) 0.28 cm^3 11/27/24 0929   Wound Assessment Fibrin;Pink/red 11/27/24 0909   Drainage Amount Small (< 25%) 11/27/24 0909   Drainage Description Serosanguinous;Yellow 11/27/24 0909   Odor None 11/27/24 0909   Maria Elena-wound Assessment Dry/flaky;Fragile 11/27/24 0909   Number of days: 0          Treatment plan:   For wound(s): All  Apply primary dressing hf blue ready, kerlix and coban        Supplies Requested :      WOUND #: 1, 2, and 3   PRIMARY DRESSING:  Hydrofera Blue ready   secondary dressing: Bulky roll gauze and Coban     FREQUENCY OF DRESSING CHANGES:  Two times per week         ADDITIONAL ITEMS:  [] Gloves Small  [x] Gloves Medium [] Gloves Large [] Gloves XLarge  [] Tape 1\" [x] Tape 2\" [] Tape 3\"  [] Medipore Tape  [x] Saline  [] Vashe  [] Skin Prep   [] Adhesive Remover   [] Cotton Tip Applicators   [] Other:    Patient Wound(s) Debrided: [x] Yes if yes please add date 11/27/24   [] No    Debribement Type: Excisional/Sharp All wounds debrided     Patient currently being seen by Home Health: [] Yes   [x] No    Duration for needed supplies:  []15  [x]30  []60  []90 Days    Electronically signed by Melisa Lopez RN on 11/27/2024 at 10:21 AM     Provider Information:    Electronically signed   PROVIDER'S NAME: Provider's Name: Dr. Prosper Mccarthy    NPI: [id number]

## 2024-11-29 DIAGNOSIS — L97.222 LOWER LIMB ULCER, CALF, LEFT, WITH FAT LAYER EXPOSED (HCC): ICD-10-CM

## 2024-11-29 DIAGNOSIS — I89.0 LYMPHEDEMA OF BOTH LOWER EXTREMITIES: ICD-10-CM

## 2024-11-29 DIAGNOSIS — L97.812 NON-PRESSURE CHRONIC ULCER OF OTHER PART OF RIGHT LOWER LEG WITH FAT LAYER EXPOSED (HCC): ICD-10-CM

## 2024-11-30 LAB
ALBUMIN: 3.9 G/DL (ref 3.5–5.2)
ALP BLD-CCNC: 70 U/L (ref 40–129)
ALT SERPL-CCNC: 13 U/L (ref 0–40)
ANION GAP SERPL CALCULATED.3IONS-SCNC: 12 MMOL/L (ref 7–16)
AST SERPL-CCNC: 20 U/L (ref 0–39)
BASOPHILS ABSOLUTE: 0.05 K/UL (ref 0–0.2)
BASOPHILS RELATIVE PERCENT: 1 % (ref 0–2)
BILIRUB SERPL-MCNC: 0.4 MG/DL (ref 0–1.2)
BUN BLDV-MCNC: 19 MG/DL (ref 6–23)
CALCIUM SERPL-MCNC: 9.6 MG/DL (ref 8.6–10.2)
CHLORIDE BLD-SCNC: 104 MMOL/L (ref 98–107)
CO2: 27 MMOL/L (ref 22–29)
CREAT SERPL-MCNC: 1 MG/DL (ref 0.7–1.2)
EOSINOPHILS ABSOLUTE: 0.11 K/UL (ref 0.05–0.5)
EOSINOPHILS RELATIVE PERCENT: 2 % (ref 0–6)
GFR, ESTIMATED: 79 ML/MIN/1.73M2
GLUCOSE BLD-MCNC: 154 MG/DL (ref 74–99)
HCT VFR BLD CALC: 41.2 % (ref 37–54)
HEMOGLOBIN: 12.6 G/DL (ref 12.5–16.5)
IMMATURE GRANULOCYTES %: 0 % (ref 0–5)
IMMATURE GRANULOCYTES ABSOLUTE: 0.03 K/UL (ref 0–0.58)
LYMPHOCYTES ABSOLUTE: 1.2 K/UL (ref 1.5–4)
LYMPHOCYTES RELATIVE PERCENT: 17 % (ref 20–42)
MCH RBC QN AUTO: 29 PG (ref 26–35)
MCHC RBC AUTO-ENTMCNC: 30.6 G/DL (ref 32–34.5)
MCV RBC AUTO: 94.7 FL (ref 80–99.9)
MONOCYTES ABSOLUTE: 0.62 K/UL (ref 0.1–0.95)
MONOCYTES RELATIVE PERCENT: 9 % (ref 2–12)
NEUTROPHILS ABSOLUTE: 5.04 K/UL (ref 1.8–7.3)
NEUTROPHILS RELATIVE PERCENT: 72 % (ref 43–80)
PDW BLD-RTO: 14.4 % (ref 11.5–15)
PLATELET # BLD: 189 K/UL (ref 130–450)
PMV BLD AUTO: 10.7 FL (ref 7–12)
POTASSIUM SERPL-SCNC: 4.4 MMOL/L (ref 3.5–5)
RBC # BLD: 4.35 M/UL (ref 3.8–5.8)
SODIUM BLD-SCNC: 143 MMOL/L (ref 132–146)
TOTAL PROTEIN: 7.1 G/DL (ref 6.4–8.3)
WBC # BLD: 7.1 K/UL (ref 4.5–11.5)

## 2024-12-04 ENCOUNTER — HOSPITAL ENCOUNTER (OUTPATIENT)
Dept: WOUND CARE | Age: 74
Discharge: HOME | End: 2024-12-04
Payer: MEDICARE

## 2024-12-04 VITALS
HEART RATE: 91 BPM | BODY MASS INDEX: 53.25 KG/M2 | RESPIRATION RATE: 20 BRPM | SYSTOLIC BLOOD PRESSURE: 154 MMHG | DIASTOLIC BLOOD PRESSURE: 79 MMHG | WEIGHT: 315 LBS | TEMPERATURE: 98.1 F

## 2024-12-04 PROCEDURE — 11045 DBRDMT SUBQ TISS EACH ADDL: CPT

## 2024-12-04 PROCEDURE — 11042 DBRDMT SUBQ TIS 1ST 20SQCM/<: CPT

## 2024-12-04 RX ORDER — LIDOCAINE HYDROCHLORIDE 40 MG/ML
SOLUTION TOPICAL ONCE
Status: COMPLETED | OUTPATIENT
Start: 2024-12-04 | End: 2024-12-04

## 2024-12-04 RX ADMIN — LIDOCAINE HYDROCHLORIDE 20 ML: 40 SOLUTION TOPICAL at 10:48

## 2024-12-04 NOTE — PROGRESS NOTES
dermatitis     h/o on bilateral legs with leg edema    Varicose veins      Past Surgical History:   Procedure Laterality Date    CARDIAC PACEMAKER PLACEMENT  2010    [employer]    CHOLECYSTECTOMY      COLONOSCOPY  2016    INTRACAPSULAR CATARACT EXTRACTION Right 2022    RIGHT EYE CATARACT EXTRACTION IOL IMPLANT AND GONIOTOMY performed by Jose Camacho MD at [facility] OR    INTRACAPSULAR CATARACT EXTRACTION Left 2022    LEFT EYE CATARACT EXTRACTION IOL IMPLANT performed by Jose Camacho MD at [facility] OR    JOINT REPLACEMENT Bilateral     knees    LEG SURGERY Left 2023    LEFT LEG DEBRIDEMENT performed by Edward Bedolla DPM at [facility] OR    PACEMAKER PLACEMENT  2020    DUAL PPM GR    ([employer])     DR. MARTIN     TRANSESOPHAGEAL ECHOCARDIOGRAM  2020    With      Family History   Problem Relation Age of Onset    Heart Disease Father             Other Mother             Other Brother         gall bladder     Social History     Tobacco Use    Smoking status: Former     Current packs/day: 0.00     Average packs/day: 2.0 packs/day for 25.9 years (51.7 ttl pk-yrs)     Types: Cigarettes     Start date: 1964     Quit date: 1990     Years since quittin.4    Smokeless tobacco: Never   Vaping Use    Vaping status: Never Used   Substance Use Topics    Alcohol use: Yes     Comment: rare   2 beers/month    Drug use: Never     Allergies   Allergen Reactions    Cleocin [Clindamycin] Shortness Of Breath    Sodium Metabisulfite Shortness Of Breath     CHEMICAL NAME FOR POTATO WHITE ADDITIVE     Adaptic Non-Adhering Dressing [Wound Dressings] Other (See Comments)     SKIN BURNS    Cipro [Ciprofloxacin Hcl] Rash    Singulair [Montelukast Sodium] Rash    Vibramycin [Doxycycline Hyclate] Other (See Comments)     TINNITUS     Current Outpatient Medications on File Prior to Encounter   Medication Sig Dispense Refill    carvedilol (COREG) 25 MG tablet TAKE 1 TABLET BY

## 2024-12-17 NOTE — DISCHARGE INSTRUCTIONS
Visit Discharge/Physician Orders     Discharge condition: Stable     Assessment of pain at discharge: min     Anesthetic used: 4% topical lido     Discharge to: Home     Left via:Private automobile     Accompanied by: accompanied by self      ECF/HHA: oli      Dressing Orders: clean leg with saline, apply HF BLUE (ALGINATE IN CLINIC), abd, 4 layer profore. Change 2x per week at home.  Until HF BLUE IS OBTAINED CONTINUE ALGINATE. AND UNTIL HOME CARE STARTS, LEAVE WRAP INTACT     Treatment Orders: BLOOD WORK TO BE OBTAINED- OBTAIN BEFORE NEXT VISIT   CBC,CMP, PROTEIN  INCREASE PROTEIN IN DIET      WCC followup visit ______________2 week friday______________  (Please note your next appointment above and if you are unable to keep, kindly give a 24 hour notice. Thank you.)     Physician signature:__________________________        If you experience any of the following, please call the Wound Care Center during business hours:     * Increase in Pain  * Temperature over 101  * Increase in drainage from your wound  * Drainage with a foul odor  * Bleeding  * Increase in swelling  * Need for compression bandage changes due to slippage, breakthrough drainage.     If you need medical attention outside of the business hours of the Wound Care Centers please contact your PCP or go to the nearest emergency room.

## 2024-12-18 ENCOUNTER — HOSPITAL ENCOUNTER (OUTPATIENT)
Dept: WOUND CARE | Age: 74
Discharge: HOME | End: 2024-12-18
Payer: MEDICARE

## 2024-12-18 VITALS
SYSTOLIC BLOOD PRESSURE: 123 MMHG | WEIGHT: 315 LBS | DIASTOLIC BLOOD PRESSURE: 73 MMHG | TEMPERATURE: 97.2 F | HEART RATE: 89 BPM | BODY MASS INDEX: 49.44 KG/M2 | RESPIRATION RATE: 22 BRPM | HEIGHT: 67 IN

## 2024-12-18 PROCEDURE — 11042 DBRDMT SUBQ TIS 1ST 20SQCM/<: CPT

## 2024-12-30 NOTE — DISCHARGE INSTRUCTIONS
Visit Discharge/Physician Orders     Discharge condition: Stable     Assessment of pain at discharge: min     Anesthetic used: 4% topical lido     Discharge to: Home     Left via:Private automobile     Accompanied by: accompanied by self      ECF/HHA: oli .....when wrapping patient's legs, please wrap from base of toes to just below knee!     Dressing Orders: clean left leg with saline, apply HF BLUE (ALGINATE IN CLINIC), abd, 4 layer profore. Change 2x per week at home.  Until HF BLUE IS OBTAINED CONTINUE ALGINATE. AND UNTIL HOME CARE STARTS, LEAVE WRAP INTACT     Treatment Orders: BLOOD WORK TO BE OBTAINED- OBTAIN BEFORE NEXT VISIT   CBC,CMP, PROTEIN  INCREASE PROTEIN IN DIET      WCC followup visit ______________1 week friday______________  (Please note your next appointment above and if you are unable to keep, kindly give a 24 hour notice. Thank you.)     Physician signature:__________________________        If you experience any of the following, please call the Wound Care Center during business hours:     * Increase in Pain  * Temperature over 101  * Increase in drainage from your wound  * Drainage with a foul odor  * Bleeding  * Increase in swelling  * Need for compression bandage changes due to slippage, breakthrough drainage.     If you need medical attention outside of the business hours of the Wound Care Centers please contact your PCP or go to the nearest emergency room.

## 2025-01-03 ENCOUNTER — HOSPITAL ENCOUNTER (OUTPATIENT)
Dept: WOUND CARE | Age: 75
Discharge: HOME | End: 2025-01-03
Payer: MEDICARE

## 2025-01-03 VITALS
TEMPERATURE: 96.8 F | DIASTOLIC BLOOD PRESSURE: 70 MMHG | SYSTOLIC BLOOD PRESSURE: 122 MMHG | RESPIRATION RATE: 20 BRPM | HEART RATE: 78 BPM

## 2025-01-03 PROCEDURE — 11042 DBRDMT SUBQ TIS 1ST 20SQCM/<: CPT

## 2025-01-03 RX ORDER — LIDOCAINE HYDROCHLORIDE 40 MG/ML
SOLUTION TOPICAL ONCE
Status: DISCONTINUED | OUTPATIENT
Start: 2025-01-03 | End: 2025-01-04 | Stop reason: HOSPADM

## 2025-01-03 NOTE — PROGRESS NOTES
Drainage Amount Moderate (25-50%) 11/27/24 0909   Drainage Description Serosanguinous;Yellow 11/27/24 0909   Odor None 11/27/24 0909   Maria Elena-wound Assessment Fragile;Dry/flaky 11/27/24 0909   Number of days: 0       Wound 11/27/24 Pretibial Right #2 (Active)   Wound Image   11/27/24 0909   Wound Length (cm) 3 cm 11/27/24 0909   Wound Width (cm) 1.9 cm 11/27/24 0909   Wound Depth (cm) 0.1 cm 11/27/24 0909   Wound Surface Area (cm^2) 5.7 cm^2 11/27/24 0909   Wound Volume (cm^3) 0.57 cm^3 11/27/24 0909   Post-Procedure Length (cm) 3 cm 11/27/24 0929   Post-Procedure Width (cm) 1.9 cm 11/27/24 0929   Post-Procedure Depth (cm) 0.2 cm 11/27/24 0929   Post-Procedure Surface Area (cm^2) 5.7 cm^2 11/27/24 0929   Post-Procedure Volume (cm^3) 1.14 cm^3 11/27/24 0929   Wound Assessment Fibrin;Pink/red 11/27/24 0909   Drainage Amount Moderate (25-50%) 11/27/24 0909   Drainage Description Serosanguinous;Yellow 11/27/24 0909   Odor None 11/27/24 0909   Maria Elena-wound Assessment Dry/flaky 11/27/24 0909   Number of days: 0       Wound 11/27/24 Leg Right;Lateral #3 (Active)   Wound Image   11/27/24 0909   Wound Length (cm) 1.4 cm 11/27/24 0909   Wound Width (cm) 1 cm 11/27/24 0909   Wound Depth (cm) 0.1 cm 11/27/24 0909   Wound Surface Area (cm^2) 1.4 cm^2 11/27/24 0909   Wound Volume (cm^3) 0.14 cm^3 11/27/24 0909   Post-Procedure Length (cm) 1.4 cm 11/27/24 0929   Post-Procedure Width (cm) 1 cm 11/27/24 0929   Post-Procedure Depth (cm) 0.2 cm 11/27/24 0929   Post-Procedure Surface Area (cm^2) 1.4 cm^2 11/27/24 0929   Post-Procedure Volume (cm^3) 0.28 cm^3 11/27/24 0929   Wound Assessment Fibrin;Pink/red 11/27/24 0909   Drainage Amount Small (< 25%) 11/27/24 0909   Drainage Description Serosanguinous;Yellow 11/27/24 0909   Odor None 11/27/24 0909   Maria Elena-wound Assessment Dry/flaky;Fragile 11/27/24 0909   Number of days: 0       Percent of Wound/Ulcer Debrided: 100%    Total Surface Area Debrided:  2 sq cm     Estimated Blood Loss:

## 2025-01-06 NOTE — DISCHARGE INSTRUCTIONS
Visit Discharge/Physician Orders     Discharge condition: Stable     Assessment of pain at discharge: min     Anesthetic used: 4% topical lido     Discharge to: Home     Left via:Private automobile     Accompanied by: accompanied by self      ECF/HHA: oli .....when wrapping patient's legs, please wrap from base of toes to just below knee!     Dressing Orders: clean left leg with saline, apply HF BLUE (ALGINATE IN CLINIC), abd, 4 layer profore. Change 2x per week at home.  Until HF BLUE IS OBTAINED CONTINUE ALGINATE. AND UNTIL HOME CARE STARTS, LEAVE WRAP INTACT     Treatment Orders: BLOOD WORK TO BE OBTAINED- OBTAIN BEFORE NEXT VISIT   CBC,CMP, PROTEIN  INCREASE PROTEIN IN DIET      WCC followup visit ______________1 week Wednesday____  (Please note your next appointment above and if you are unable to keep, kindly give a 24 hour notice. Thank you.)     Physician signature:__________________________        If you experience any of the following, please call the Wound Care Center during business hours:     * Increase in Pain  * Temperature over 101  * Increase in drainage from your wound  * Drainage with a foul odor  * Bleeding  * Increase in swelling  * Need for compression bandage changes due to slippage, breakthrough drainage.     If you need medical attention outside of the business hours of the Wound Care Centers please contact your PCP or go to the nearest emergency room.

## 2025-01-10 ENCOUNTER — HOSPITAL ENCOUNTER (OUTPATIENT)
Dept: WOUND CARE | Age: 75
Discharge: HOME | End: 2025-01-10
Payer: MEDICARE

## 2025-01-10 VITALS
HEART RATE: 76 BPM | RESPIRATION RATE: 20 BRPM | TEMPERATURE: 96.4 F | SYSTOLIC BLOOD PRESSURE: 163 MMHG | DIASTOLIC BLOOD PRESSURE: 92 MMHG

## 2025-01-10 PROCEDURE — 11042 DBRDMT SUBQ TIS 1ST 20SQCM/<: CPT

## 2025-01-10 NOTE — PROGRESS NOTES
5 Extensive gangrene of the foot     Wound: The patient does have ulcerations, both calfs with a fat layer exposed without minimal exudate, without clinical evidence of cellulitis, no major varicose veins noted,, the patient tells me the leg swelling has remained unchanged, no calf tenderness    Other pertinent information:    The venous ultrasound through the last year, bilaterally no DVT  The lower extremity arterial Doppler study, ankle-brachial index, done last year, normal with good arterial flow  Lab work was reviewed, the CBC and BMP done recently and the CMP done last year revealed low albumin level      11/27/2024  Discussed patient regarding all options, risks benefits and alternatives, the importance of keep the legs elevated to decrease edema were discussed, importance of nutritional multivitamin protein supplement were discussed  Consider compression wrapping of both lower extremities, once ulcers healed, consider lymphedema therapy and then have him measured for compression device once the swelling goes down  The patient also was recommended a baseline blood work including CBC and CMP  All their questions were answered      12-4-24  Debrided, cont tx and care, compression    12-18-24  Debrided, cont tx and care    1-3-25  Debrided, cont tx and care    1-10-25  Debriided, fell on ice this week reinjuring leg. Cont tx and care, compression    PAST MEDICAL HISTORY      Diagnosis Date    CAD (coronary artery disease)     Cataract, right eye     Complete heart block (HCC)     h/o    Diabetes mellitus (HCC)     Encounter for aspirin therapy     patient uses for heart health    Glaucoma     History of blood transfusion     History of pulmonary embolus (PE) 11/27/2024 2016      Hx of blood clots     Hyperlipidemia     Hypertension     Hyperthyroidism     Lower limb ulcer, calf, left, with fat layer exposed (HCC) 10/25/2023    Lymphedema of both lower extremities 10/25/2023    Myocardial infarct (HCC) 2010

## 2025-01-15 ENCOUNTER — HOSPITAL ENCOUNTER (OUTPATIENT)
Dept: WOUND CARE | Age: 75
Discharge: HOME | End: 2025-01-15
Payer: MEDICARE

## 2025-01-15 VITALS
BODY MASS INDEX: 49.44 KG/M2 | HEART RATE: 81 BPM | DIASTOLIC BLOOD PRESSURE: 72 MMHG | TEMPERATURE: 97 F | SYSTOLIC BLOOD PRESSURE: 157 MMHG | WEIGHT: 315 LBS | RESPIRATION RATE: 20 BRPM | HEIGHT: 67 IN

## 2025-01-15 PROCEDURE — 87205 SMEAR GRAM STAIN: CPT

## 2025-01-15 PROCEDURE — 86403 PARTICLE AGGLUT ANTBDY SCRN: CPT

## 2025-01-15 PROCEDURE — 87077 CULTURE AEROBIC IDENTIFY: CPT

## 2025-01-15 PROCEDURE — 11042 DBRDMT SUBQ TIS 1ST 20SQCM/<: CPT

## 2025-01-15 PROCEDURE — 87070 CULTURE OTHR SPECIMN AEROBIC: CPT

## 2025-01-15 NOTE — PROGRESS NOTES
Assessment Dry/flaky;Fragile 11/27/24 0909   Number of days: 0       Percent of Wound/Ulcer Debrided: 100%    Total Surface Area Debrided:  2 sq cm     Estimated Blood Loss:  Minimal    Hemostasis Achieved:  by pressure    Procedural Pain:  5  / 10     Post Procedural Pain:  4 / 10     Response to treatment:  Well tolerated by patient.     A culture was not done.      Plan:     Pt is not a smoker       In my professional opinion and based off the information that is available at this time this patient has appropriate indication for HBO Therapy: No    Treatment Note please see attached Discharge Instructions    Written patient dismissal instructions given to patient and signed by patient or POA.         Discharge Instructions         Visit Discharge/Physician Orders     Discharge condition: Stable     Assessment of pain at discharge: min     Anesthetic used: 4% topical lido     Discharge to: Home     Left via:Private automobile     Accompanied by: accompanied by self      ECF/HHA: oli .....when wrapping patient's legs, please wrap from base of toes to just below knee!     Dressing Orders: clean left leg with saline, apply HF BLUE (ALGINATE IN CLINIC), abd, 4 layer profore. Change 2x per week at home.  Until HF BLUE IS OBTAINED CONTINUE ALGINATE. AND UNTIL HOME CARE STARTS, LEAVE WRAP INTACT     Treatment Orders: BLOOD WORK TO BE OBTAINED- OBTAIN BEFORE NEXT VISIT   CBC,CMP, PROTEIN  INCREASE PROTEIN IN DIET      WCC followup visit ______________1 week Wednesday____  (Please note your next appointment above and if you are unable to keep, kindly give a 24 hour notice. Thank you.)     Physician signature:__________________________        If you experience any of the following, please call the Wound Care Center during business hours:     * Increase in Pain  * Temperature over 101  * Increase in drainage from your wound  * Drainage with a foul odor  * Bleeding  * Increase in swelling  * Need for compression bandage

## 2025-01-22 ENCOUNTER — HOSPITAL ENCOUNTER (OUTPATIENT)
Dept: WOUND CARE | Age: 75
Discharge: HOME | End: 2025-01-22
Payer: MEDICARE

## 2025-01-22 VITALS
RESPIRATION RATE: 22 BRPM | HEART RATE: 86 BPM | DIASTOLIC BLOOD PRESSURE: 74 MMHG | SYSTOLIC BLOOD PRESSURE: 141 MMHG | TEMPERATURE: 97.5 F

## 2025-01-22 PROCEDURE — 11042 DBRDMT SUBQ TIS 1ST 20SQCM/<: CPT

## 2025-01-22 NOTE — PROGRESS NOTES
[]  5 Extensive gangrene of the foot     Wound: The patient does have ulcerations, both calfs with a fat layer exposed without minimal exudate, without clinical evidence of cellulitis, no major varicose veins noted,, the patient tells me the leg swelling has remained unchanged, no calf tenderness    Other pertinent information:    The venous ultrasound through the last year, bilaterally no DVT  The lower extremity arterial Doppler study, ankle-brachial index, done last year, normal with good arterial flow  Lab work was reviewed, the CBC and BMP done recently and the CMP done last year revealed low albumin level      11/27/2024  Discussed patient regarding all options, risks benefits and alternatives, the importance of keep the legs elevated to decrease edema were discussed, importance of nutritional multivitamin protein supplement were discussed  Consider compression wrapping of both lower extremities, once ulcers healed, consider lymphedema therapy and then have him measured for compression device once the swelling goes down  The patient also was recommended a baseline blood work including CBC and CMP  All their questions were answered      12-4-24  Debrided, cont tx and care, compression    12-18-24  Debrided, cont tx and care    1-3-25  Debrided, cont tx and care    1-10-25  Debriided, fell on ice this week reinjuring leg. Cont tx and care, compression    1-15-25  Debrided, cont tx and cont     1-22-25  Debrided, healing well    PAST MEDICAL HISTORY      Diagnosis Date    CAD (coronary artery disease)     Cataract, right eye     Complete heart block (HCC)     h/o    Diabetes mellitus (HCC)     Encounter for aspirin therapy     patient uses for heart health    Glaucoma     History of blood transfusion     History of pulmonary embolus (PE) 11/27/2024 2016      Hx of blood clots     Hyperlipidemia     Hypertension     Hyperthyroidism     Lower limb ulcer, calf, left, with fat layer exposed (HCC) 10/25/2023

## 2025-01-29 ENCOUNTER — HOSPITAL ENCOUNTER (OUTPATIENT)
Dept: WOUND CARE | Age: 75
Discharge: HOME | End: 2025-01-29
Attending: PODIATRIST
Payer: MEDICARE

## 2025-01-29 VITALS
RESPIRATION RATE: 20 BRPM | DIASTOLIC BLOOD PRESSURE: 74 MMHG | SYSTOLIC BLOOD PRESSURE: 140 MMHG | HEART RATE: 82 BPM | TEMPERATURE: 98.9 F | WEIGHT: 315 LBS | BODY MASS INDEX: 49.44 KG/M2 | HEIGHT: 67 IN

## 2025-01-29 PROCEDURE — 11042 DBRDMT SUBQ TIS 1ST 20SQCM/<: CPT

## 2025-01-29 NOTE — PROGRESS NOTES
Wound Healing Center /Hyperbarics   History and Physical/Consultation  Vascular    Referring Physician : Charles Foss MD  Tuan Vega  MEDICAL RECORD NUMBER:  [id number]  AGE: 74 y.o.   GENDER: male  : 1950  EPISODE DATE:  2025  Subjective:     Chief Complaint   Patient presents with    Wound Check     Left leg         HISTORY of PRESENT ILLNESS HPI     Tuan Vega is a 74 y.o. male who presents today for wound/ulcer evaluation.   History of Wound Context:  The patient has had a wound of both calfs which was first noted approximately 1 month, since October.  This has been treated by the patient.  On their initial visit to the wound healing center, 25, the patient has noted that the wound has not been improving.  The patient has had similar previous wounds in the past.        Patient in fact was treated with compression wrapping for ulceration of both legs associate with lymphedema by Dr. Mccarthy with complete healing but since ulcers recur, patient came back to the wound care center for further evaluation    Pt is currently not on abx.      Wound/Ulcer Pain Timing/Severity: constant  Quality of pain: dull, aching  Severity:  4 / 10   Modifying Factors: Pain worsens with walking  Associated Signs/Symptoms: edema, drainage, and pain    Ulcer Identification:  Ulcer Type: diabetic, non-healing/non-surgical, and lymphedema  Contributing Factors: edema, lymphedema, diabetes, and obesity    Diabetic/Pressure/Non Pressure Ulcers only:  Ulcer: Diabetic ulcer, fat layer exposed    If patient has diabetic lower extremity wounds  M[place] Classification of diabetic lower extremity wounds:    Grade Description   []  0 No open wound   []  1 Superficial ulcer involving the full skin thickness   []  2 Deep ulcer involves ligament, tendon, joint capsule, or fascia  No bone involvement or abscess presence   []  3 Deep Ulcer with abcess formation and/or osteomyelitis   []  4 Localized gangrene

## 2025-01-30 NOTE — DISCHARGE INSTRUCTIONS
Visit Discharge/Physician Orders     Discharge condition: Stable     Assessment of pain at discharge: min     Anesthetic used: 4% topical lido     Discharge to: Home     Left via:Private automobile     Accompanied by: accompanied by self      ECF/HHA: oli .....when wrapping patient's legs, please wrap from base of toes to just below knee!     Dressing Orders: clean left leg with saline, apply HF BLUE (ALGINATE IN CLINIC), abd, 4 layer profore. Change 2x per week at home.  Until HF BLUE IS OBTAINED CONTINUE ALGINATE. AND UNTIL HOME CARE STARTS, LEAVE WRAP INTACT     Treatment Orders: BLOOD WORK TO BE OBTAINED- OBTAIN BEFORE NEXT VISIT   CBC,CMP, PROTEIN  INCREASE PROTEIN IN DIET      WCC followup visit ______________1 week Friday with Dr. Adorno____  (Please note your next appointment above and if you are unable to keep, kindly give a 24 hour notice. Thank you.)     Physician signature:__________________________        If you experience any of the following, please call the Wound Care Center during business hours:     * Increase in Pain  * Temperature over 101  * Increase in drainage from your wound  * Drainage with a foul odor  * Bleeding  * Increase in swelling  * Need for compression bandage changes due to slippage, breakthrough drainage.     If you need medical attention outside of the business hours of the Wound Care Centers please contact your PCP or go to the nearest emergency room.

## 2025-02-05 ENCOUNTER — HOSPITAL ENCOUNTER (OUTPATIENT)
Dept: GENERAL RADIOLOGY | Age: 75
Discharge: HOME | End: 2025-02-07
Payer: MEDICARE

## 2025-02-05 ENCOUNTER — HOSPITAL ENCOUNTER (OUTPATIENT)
Dept: WOUND CARE | Age: 75
Discharge: HOME | End: 2025-02-05
Attending: PODIATRIST
Payer: MEDICARE

## 2025-02-05 ENCOUNTER — HOSPITAL ENCOUNTER (OUTPATIENT)
Age: 75
Discharge: HOME | End: 2025-02-07
Payer: MEDICARE

## 2025-02-05 VITALS
DIASTOLIC BLOOD PRESSURE: 56 MMHG | HEART RATE: 82 BPM | RESPIRATION RATE: 20 BRPM | SYSTOLIC BLOOD PRESSURE: 133 MMHG | TEMPERATURE: 97 F

## 2025-02-05 DIAGNOSIS — M79.605 PAIN IN LEFT LEG: ICD-10-CM

## 2025-02-05 DIAGNOSIS — M79.605 PAIN IN LEFT LEG: Primary | ICD-10-CM

## 2025-02-05 PROCEDURE — 11042 DBRDMT SUBQ TIS 1ST 20SQCM/<: CPT

## 2025-02-05 PROCEDURE — 73590 X-RAY EXAM OF LOWER LEG: CPT

## 2025-02-05 RX ORDER — LIDOCAINE HYDROCHLORIDE 40 MG/ML
SOLUTION TOPICAL ONCE
Status: COMPLETED | OUTPATIENT
Start: 2025-02-05 | End: 2025-02-05

## 2025-02-05 RX ADMIN — LIDOCAINE HYDROCHLORIDE 10 ML: 40 SOLUTION TOPICAL at 11:22

## 2025-02-05 NOTE — PROGRESS NOTES
Wound Healing Center /Hyperbarics   History and Physical/Consultation  Vascular    Referring Physician : Charles Foss MD  Tuan Vega  MEDICAL RECORD NUMBER:  [id number]  AGE: 74 y.o.   GENDER: male  : 1950  EPISODE DATE:  2025  Subjective:     Chief Complaint   Patient presents with    Wound Check     Left leg          HISTORY of PRESENT ILLNESS HPI     Tuan Vega is a 74 y.o. male who presents today for wound/ulcer evaluation.   History of Wound Context:  The patient has had a wound of both calfs which was first noted approximately 1 month, since October.  This has been treated by the patient.  On their initial visit to the wound healing center, 25, the patient has noted that the wound has not been improving.  The patient has had similar previous wounds in the past.        Patient in fact was treated with compression wrapping for ulceration of both legs associate with lymphedema by Dr. Mccarthy with complete healing but since ulcers recur, patient came back to the wound care center for further evaluation    Pt is currently not on abx.      Wound/Ulcer Pain Timing/Severity: constant  Quality of pain: dull, aching  Severity:  4 / 10   Modifying Factors: Pain worsens with walking  Associated Signs/Symptoms: edema, drainage, and pain    Ulcer Identification:  Ulcer Type: diabetic, non-healing/non-surgical, and lymphedema  Contributing Factors: edema, lymphedema, diabetes, and obesity    Diabetic/Pressure/Non Pressure Ulcers only:  Ulcer: Diabetic ulcer, fat layer exposed    If patient has diabetic lower extremity wounds  [place] Classification of diabetic lower extremity wounds:    Grade Description   []  0 No open wound   []  1 Superficial ulcer involving the full skin thickness   []  2 Deep ulcer involves ligament, tendon, joint capsule, or fascia  No bone involvement or abscess presence   []  3 Deep Ulcer with abcess formation and/or osteomyelitis   []  4 Localized gangrene

## 2025-02-10 NOTE — DISCHARGE INSTRUCTIONS
Visit Discharge/Physician Orders     Discharge condition: Stable     Assessment of pain at discharge: min     Anesthetic used: 4% topical lido     Discharge to: Home     Left via:Private automobile     Accompanied by: accompanied by self      ECF/HHA: oli .....when wrapping patient's legs, please wrap from base of toes to just below knee!     Dressing Orders: clean left leg with saline, apply HF BLUE (ALGINATE IN CLINIC), dry dressing own compression   Keep arm clean and dry, apply dry dressing for protection- ok to go back to dr. Lr      Treatment Orders: x ray reviewed      [facility] followup visit _________1 week dr. lr____  (Please note your next appointment above and if you are unable to keep, kindly give a 24 hour notice. Thank you.)     Physician signature:__________________________        If you experience any of the following, please call the Wound Care Center during business hours:     * Increase in Pain  * Temperature over 101  * Increase in drainage from your wound  * Drainage with a foul odor  * Bleeding  * Increase in swelling  * Need for compression bandage changes due to slippage, breakthrough drainage.     If you need medical attention outside of the business hours of the Wound Care Centers please contact your PCP or go to the nearest emergency room.

## 2025-02-14 ENCOUNTER — HOSPITAL ENCOUNTER (OUTPATIENT)
Dept: WOUND CARE | Age: 75
Discharge: HOME | End: 2025-02-14
Attending: PODIATRIST
Payer: MEDICARE

## 2025-02-14 VITALS
DIASTOLIC BLOOD PRESSURE: 85 MMHG | TEMPERATURE: 96.2 F | SYSTOLIC BLOOD PRESSURE: 163 MMHG | HEART RATE: 93 BPM | RESPIRATION RATE: 20 BRPM

## 2025-02-14 DIAGNOSIS — L97.922 NON-PRESSURE CHRONIC ULCER OF LEFT LOWER LEG WITH FAT LAYER EXPOSED (HCC): Primary | ICD-10-CM

## 2025-02-14 DIAGNOSIS — S41.101A OPEN WOUND OF RIGHT UPPER ARM, INITIAL ENCOUNTER: ICD-10-CM

## 2025-02-14 PROCEDURE — 11042 DBRDMT SUBQ TIS 1ST 20SQCM/<: CPT

## 2025-02-14 NOTE — PROGRESS NOTES
health    Glaucoma     History of blood transfusion     History of pulmonary embolus (PE) 2024      Hx of blood clots     Hyperlipidemia     Hypertension     Hyperthyroidism     Lower limb ulcer, calf, left, with fat layer exposed (HCC) 10/25/2023    Lymphedema of both lower extremities 10/25/2023    Myocardial infarct (HCC) 2010    Neuropathy     feet    Obesity     Obstructive sleep apnea     on cpap    Onychomycosis     DEMETRIA on CPAP     Osteoarthritis     Pacemaker     [employer]    Stasis dermatitis     h/o on bilateral legs with leg edema    Varicose veins      Past Surgical History:   Procedure Laterality Date    CARDIAC PACEMAKER PLACEMENT  2010    [employer]    CHOLECYSTECTOMY      COLONOSCOPY  2016    INTRACAPSULAR CATARACT EXTRACTION Right 2022    RIGHT EYE CATARACT EXTRACTION IOL IMPLANT AND GONIOTOMY performed by Jose Camacho MD at [facility] OR    INTRACAPSULAR CATARACT EXTRACTION Left 2022    LEFT EYE CATARACT EXTRACTION IOL IMPLANT performed by Jose Camacho MD at [facility] OR    JOINT REPLACEMENT Bilateral     knees    LEG SURGERY Left 2023    LEFT LEG DEBRIDEMENT performed by Edward Bedolla DPM at [facility] OR    PACEMAKER PLACEMENT  2020    DUAL PPM GR    ([employer])     DR. MARTIN     TRANSESOPHAGEAL ECHOCARDIOGRAM  2020    With      Family History   Problem Relation Age of Onset    Heart Disease Father             Other Mother             Other Brother         gall bladder     Social History     Tobacco Use    Smoking status: Former     Current packs/day: 0.00     Average packs/day: 2.0 packs/day for 25.9 years (51.7 ttl pk-yrs)     Types: Cigarettes     Start date: 1964     Quit date: 1990     Years since quittin.6    Smokeless tobacco: Never   Vaping Use    Vaping status: Never Used   Substance Use Topics    Alcohol use: Yes     Comment: rare   2 beers/month    Drug use: Never     Allergies   Allergen Reactions

## 2025-02-17 NOTE — DISCHARGE INSTRUCTIONS
Visit Discharge/Physician Orders     Discharge condition: Stable     Assessment of pain at discharge: min     Anesthetic used: 4% topical lido     Discharge to: Home     Left via:Private automobile     Accompanied by: accompanied by self      ECF/HHA: oli .....when wrapping patient's legs, please wrap from base of toes to just below knee!     Dressing Orders: clean left leg with saline, apply HF BLUE (ALGINATE IN CLINIC), profore wrap. Change every other day.  Keep arm clean and dry, apply dry dressing for protection- ok to go back to dr. Mccarthy      Treatment Orders:     [facility] followup visit _________1 week dr. Mccarthy on Wednesday____  (Please note your next appointment above and if you are unable to keep, kindly give a 24 hour notice. Thank you.)     Physician signature:__________________________        If you experience any of the following, please call the Wound Care Center during business hours:     * Increase in Pain  * Temperature over 101  * Increase in drainage from your wound  * Drainage with a foul odor  * Bleeding  * Increase in swelling  * Need for compression bandage changes due to slippage, breakthrough drainage.     If you need medical attention outside of the business hours of the Wound Care Centers please contact your PCP or go to the nearest emergency room.

## 2025-02-21 ENCOUNTER — HOSPITAL ENCOUNTER (OUTPATIENT)
Dept: WOUND CARE | Age: 75
Discharge: HOME | End: 2025-02-21
Attending: PODIATRIST
Payer: MEDICARE

## 2025-02-21 VITALS
TEMPERATURE: 96.2 F | HEART RATE: 75 BPM | RESPIRATION RATE: 20 BRPM | DIASTOLIC BLOOD PRESSURE: 77 MMHG | HEIGHT: 67 IN | WEIGHT: 315 LBS | SYSTOLIC BLOOD PRESSURE: 161 MMHG | BODY MASS INDEX: 49.44 KG/M2

## 2025-02-21 PROCEDURE — 11042 DBRDMT SUBQ TIS 1ST 20SQCM/<: CPT

## 2025-02-21 RX ORDER — AMOXICILLIN 875 MG/1
500 TABLET, COATED ORAL 3 TIMES DAILY
COMMUNITY
End: 2025-03-03

## 2025-02-21 NOTE — PROGRESS NOTES
you experience any of the following, please call the Wound Care Center during business hours:     * Increase in Pain  * Temperature over 101  * Increase in drainage from your wound  * Drainage with a foul odor  * Bleeding  * Increase in swelling  * Need for compression bandage changes due to slippage, breakthrough drainage.     If you need medical attention outside of the business hours of the Wound Care Centers please contact your PCP or go to the nearest emergency room.        Electronically signed by Prosper Mccarthy DPM on 2/21/2025 at 11:19 AM

## 2025-02-25 NOTE — DISCHARGE INSTRUCTIONS
Visit Discharge/Physician Orders     Discharge condition: Stable     Assessment of pain at discharge: min     Anesthetic used: 4% topical lido     Discharge to: Home     Left via:Private automobile     Accompanied by: accompanied by self      ECF/HHA: oli .....when wrapping patient's legs, please wrap from base of toes to just below knee!     Dressing Orders: clean left leg with saline, apply HF BLUE (ALGINATE IN CLINIC), profore wrap. Change every other day.  Keep arm clean and dry, apply dry dressing for protection- ok to go back to dr. Mccarthy      Treatment Orders:     [facility] followup visit _________1 week dr. Mccarthy on Wednesday____  (Please note your next appointment above and if you are unable to keep, kindly give a 24 hour notice. Thank you.)     Physician signature:__________________________        If you experience any of the following, please call the Wound Care Center during business hours:     * Increase in Pain  * Temperature over 101  * Increase in drainage from your wound  * Drainage with a foul odor  * Bleeding  * Increase in swelling  * Need for compression bandage changes due to slippage, breakthrough drainage.     If you need medical attention outside of the business hours of the Wound Care Centers please contact your PCP or go to the nearest emergency room.

## 2025-02-26 ENCOUNTER — HOSPITAL ENCOUNTER (OUTPATIENT)
Dept: WOUND CARE | Age: 75
Discharge: HOME | End: 2025-02-26
Attending: PODIATRIST
Payer: MEDICARE

## 2025-02-26 VITALS
WEIGHT: 315 LBS | TEMPERATURE: 97.1 F | BODY MASS INDEX: 49.44 KG/M2 | SYSTOLIC BLOOD PRESSURE: 158 MMHG | RESPIRATION RATE: 20 BRPM | DIASTOLIC BLOOD PRESSURE: 92 MMHG | HEART RATE: 79 BPM | HEIGHT: 67 IN

## 2025-02-26 PROCEDURE — 11042 DBRDMT SUBQ TIS 1ST 20SQCM/<: CPT

## 2025-02-26 RX ORDER — LIDOCAINE HYDROCHLORIDE 40 MG/ML
SOLUTION TOPICAL ONCE
Status: COMPLETED | OUTPATIENT
Start: 2025-02-26 | End: 2025-02-26

## 2025-02-26 RX ADMIN — LIDOCAINE HYDROCHLORIDE 5 ML: 40 SOLUTION TOPICAL at 11:06

## 2025-02-26 NOTE — PROGRESS NOTES
Wound Healing Center /Hyperbarics   History and Physical/Consultation  Vascular    Referring Physician : Charles Foss MD  Tuan Vega  MEDICAL RECORD NUMBER:  [id number]  AGE: 74 y.o.   GENDER: male  : 1950  EPISODE DATE:  2025  Subjective:     Chief Complaint   Patient presents with    Wound Check     Left leg         HISTORY of PRESENT ILLNESS HPI     Tuan Vega is a 74 y.o. male who presents today for wound/ulcer evaluation.   History of Wound Context:  The patient has had a wound of both calfs which was first noted approximately 1 month, since October.  This has been treated by the patient.  On their initial visit to the wound healing center, 25, the patient has noted that the wound has not been improving.  The patient has had similar previous wounds in the past.        Patient in fact was treated with compression wrapping for ulceration of both legs associate with lymphedema by Dr. Mccarthy with complete healing but since ulcers recur, patient came back to the wound care center for further evaluation    Pt is currently not on abx.      Wound/Ulcer Pain Timing/Severity: constant  Quality of pain: dull, aching  Severity:  4 / 10   Modifying Factors: Pain worsens with walking  Associated Signs/Symptoms: edema, drainage, and pain    Ulcer Identification:  Ulcer Type: diabetic, non-healing/non-surgical, and lymphedema  Contributing Factors: edema, lymphedema, diabetes, and obesity    Diabetic/Pressure/Non Pressure Ulcers only:  Ulcer: Diabetic ulcer, fat layer exposed    If patient has diabetic lower extremity wounds  Magaña Classification of diabetic lower extremity wounds:    Grade Description   []  0 No open wound   []  1 Superficial ulcer involving the full skin thickness   []  2 Deep ulcer involves ligament, tendon, joint capsule, or fascia  No bone involvement or abscess presence   []  3 Deep Ulcer with abcess formation and/or osteomyelitis   []  4 Localized gangrene

## 2025-03-03 ENCOUNTER — OFFICE VISIT (OUTPATIENT)
Dept: PODIATRY | Age: 75
End: 2025-03-03
Payer: MEDICARE

## 2025-03-03 DIAGNOSIS — I89.0 LYMPHEDEMA: ICD-10-CM

## 2025-03-03 DIAGNOSIS — M79.674 PAIN OF TOE OF RIGHT FOOT: ICD-10-CM

## 2025-03-03 DIAGNOSIS — E11.51 TYPE II DIABETES MELLITUS WITH PERIPHERAL CIRCULATORY DISORDER (HCC): ICD-10-CM

## 2025-03-03 DIAGNOSIS — R26.2 DIFFICULTY WALKING: ICD-10-CM

## 2025-03-03 DIAGNOSIS — M79.675 PAIN OF TOE OF LEFT FOOT: ICD-10-CM

## 2025-03-03 DIAGNOSIS — B35.1 ONYCHOMYCOSIS: Primary | ICD-10-CM

## 2025-03-03 PROCEDURE — 99999 PR OFFICE/OUTPT VISIT,PROCEDURE ONLY: CPT | Performed by: PODIATRIST

## 2025-03-03 PROCEDURE — 11721 DEBRIDE NAIL 6 OR MORE: CPT | Performed by: PODIATRIST

## 2025-03-03 NOTE — PROGRESS NOTES
walking        1. Evaluation and Management  2. Manual and electrical debridement of the mycotic nails was performed for thickness and length to prevent injection and/or ulceration.  3. Discussed additional diabetic lower extremity care techniques with patient today.  We did discuss topical care options regarding the nail bed issues left great toe.  Medication was dispensed for patient to utilize for daily dressing changes  4. We did discuss importance of shoe gear and foot inspection to prevent potential issues.  5. We will see the patient back at a later date for continued podiatric management and care.  Patient was advised to call the office with any questions or concerns prior to their next appointment if needed.        Seen By:    Prosper Donahue Jr, DPM    Electronically signed by Prosper Donahue Jr, DPM on 3/3/2025 at 2:18 PM      This note was created using voice recognition software.  The note was reviewed however may contain grammatical errors.

## 2025-03-03 NOTE — DISCHARGE INSTRUCTIONS
Visit Discharge/Physician Orders     Discharge condition: Stable     Assessment of pain at discharge: min     Anesthetic used: 4% topical lido     Discharge to: Home     Left via:Private automobile     Accompanied by: accompanied by self      ECF/HHA: oli .....when wrapping patient's legs, please wrap from base of toes to just below knee!     Dressing Orders: clean left leg with saline, apply HF BLUE (ALGINATE IN CLINIC), profore wrap. Change every other day.  Left great toe wound-Cleanse with saline, apply guido, cover and secure with dry dressing. Change daily      Treatment Orders: Eat a diet high in protein and vitamin C. Take a multiple vitamin daily unless contraindicated.     [facility] followup visit _________1 week dr. Mccarthy on Wednesday____  (Please note your next appointment above and if you are unable to keep, kindly give a 24 hour notice. Thank you.)     Physician signature:__________________________        If you experience any of the following, please call the Wound Care Center during business hours:     * Increase in Pain  * Temperature over 101  * Increase in drainage from your wound  * Drainage with a foul odor  * Bleeding  * Increase in swelling  * Need for compression bandage changes due to slippage, breakthrough drainage.     If you need medical attention outside of the business hours of the Wound Care Centers please contact your PCP or go to the nearest emergency room.

## 2025-03-05 ENCOUNTER — HOSPITAL ENCOUNTER (OUTPATIENT)
Dept: WOUND CARE | Age: 75
Discharge: HOME | End: 2025-03-05
Attending: PODIATRIST
Payer: MEDICARE

## 2025-03-05 VITALS
RESPIRATION RATE: 20 BRPM | TEMPERATURE: 97.6 F | SYSTOLIC BLOOD PRESSURE: 145 MMHG | HEART RATE: 91 BPM | DIASTOLIC BLOOD PRESSURE: 75 MMHG

## 2025-03-05 PROCEDURE — 11042 DBRDMT SUBQ TIS 1ST 20SQCM/<: CPT

## 2025-03-05 RX ORDER — LIDOCAINE HYDROCHLORIDE 40 MG/ML
SOLUTION TOPICAL ONCE
Status: COMPLETED | OUTPATIENT
Start: 2025-03-05 | End: 2025-03-05

## 2025-03-05 RX ORDER — AMOXICILLIN 875 MG/1
875 TABLET, COATED ORAL 2 TIMES DAILY
COMMUNITY

## 2025-03-05 RX ADMIN — LIDOCAINE HYDROCHLORIDE 10 ML: 40 SOLUTION TOPICAL at 11:28

## 2025-03-05 NOTE — PROGRESS NOTES
_________1 week dr. Mccarthy on Wednesday____  (Please note your next appointment above and if you are unable to keep, kindly give a 24 hour notice. Thank you.)     Physician signature:__________________________        If you experience any of the following, please call the Wound Care Center during business hours:     * Increase in Pain  * Temperature over 101  * Increase in drainage from your wound  * Drainage with a foul odor  * Bleeding  * Increase in swelling  * Need for compression bandage changes due to slippage, breakthrough drainage.     If you need medical attention outside of the business hours of the Wound Care Centers please contact your PCP or go to the nearest emergency room.        Electronically signed by Prosper Mccarthy DPM on 3/5/2025 at 11:48 AM

## 2025-03-12 ENCOUNTER — HOSPITAL ENCOUNTER (OUTPATIENT)
Dept: WOUND CARE | Age: 75
Discharge: HOME | End: 2025-03-12
Attending: PODIATRIST
Payer: MEDICARE

## 2025-03-12 VITALS
SYSTOLIC BLOOD PRESSURE: 168 MMHG | TEMPERATURE: 97.6 F | RESPIRATION RATE: 20 BRPM | HEIGHT: 67 IN | BODY MASS INDEX: 49.44 KG/M2 | WEIGHT: 315 LBS | HEART RATE: 89 BPM | DIASTOLIC BLOOD PRESSURE: 84 MMHG

## 2025-03-12 DIAGNOSIS — I89.0 LYMPHEDEMA OF BOTH LOWER EXTREMITIES: Primary | ICD-10-CM

## 2025-03-12 DIAGNOSIS — L97.222 LOWER LIMB ULCER, CALF, LEFT, WITH FAT LAYER EXPOSED (HCC): ICD-10-CM

## 2025-03-12 PROCEDURE — 11042 DBRDMT SUBQ TIS 1ST 20SQCM/<: CPT

## 2025-03-12 RX ORDER — SODIUM CHLOR/HYPOCHLOROUS ACID 0.033 %
SOLUTION, IRRIGATION IRRIGATION PRN
OUTPATIENT
Start: 2025-03-12

## 2025-03-12 RX ORDER — GINSENG 100 MG
CAPSULE ORAL PRN
OUTPATIENT
Start: 2025-03-12

## 2025-03-12 RX ORDER — LIDOCAINE HYDROCHLORIDE 20 MG/ML
JELLY TOPICAL PRN
OUTPATIENT
Start: 2025-03-12

## 2025-03-12 RX ORDER — MUPIROCIN 20 MG/G
OINTMENT TOPICAL PRN
OUTPATIENT
Start: 2025-03-12

## 2025-03-12 RX ORDER — LIDOCAINE HYDROCHLORIDE 40 MG/ML
SOLUTION TOPICAL PRN
OUTPATIENT
Start: 2025-03-12

## 2025-03-12 RX ORDER — GENTAMICIN SULFATE 1 MG/G
OINTMENT TOPICAL PRN
OUTPATIENT
Start: 2025-03-12

## 2025-03-12 RX ORDER — NEOMYCIN/BACITRACIN/POLYMYXINB 3.5-400-5K
OINTMENT (GRAM) TOPICAL PRN
OUTPATIENT
Start: 2025-03-12

## 2025-03-12 RX ORDER — BACITRACIN ZINC AND POLYMYXIN B SULFATE 500; 1000 [USP'U]/G; [USP'U]/G
OINTMENT TOPICAL PRN
OUTPATIENT
Start: 2025-03-12

## 2025-03-12 RX ORDER — TRIAMCINOLONE ACETONIDE 1 MG/G
OINTMENT TOPICAL PRN
OUTPATIENT
Start: 2025-03-12

## 2025-03-12 RX ORDER — SILVER SULFADIAZINE 10 MG/G
CREAM TOPICAL PRN
OUTPATIENT
Start: 2025-03-12

## 2025-03-12 RX ORDER — LIDOCAINE 40 MG/G
CREAM TOPICAL PRN
OUTPATIENT
Start: 2025-03-12

## 2025-03-12 RX ORDER — LIDOCAINE 50 MG/G
OINTMENT TOPICAL PRN
OUTPATIENT
Start: 2025-03-12

## 2025-03-12 RX ORDER — BETAMETHASONE DIPROPIONATE 0.5 MG/G
CREAM TOPICAL PRN
OUTPATIENT
Start: 2025-03-12

## 2025-03-12 RX ORDER — CLOBETASOL PROPIONATE 0.5 MG/G
OINTMENT TOPICAL PRN
OUTPATIENT
Start: 2025-03-12

## 2025-03-12 NOTE — PROGRESS NOTES
Maria Elena-wound Assessment Maceration 03/12/25 1043   Number of days: 7        Procedure Note  Indications:  Based on my examination of this patient's wound(s)/ulcer(s) today, debridement is required to promote healing and evaluate the wound base.    Performed by: SUSANNA Arellano CNP    Consent obtained:  Yes    Time out taken:  Yes    Pain Control: Anesthetic  Anesthetic: 4% Lidocaine Liquid Topical     Debridement:Excisional Debridement    Using curette the wound(s)/ulcer(s) was/were sharply debrided down through and including the removal of epidermis, dermis, and subcutaneous tissue.        Devitalized Tissue Debrided:  fibrin, biofilm, slough, and exudate to stimulate bleeding to promote healing, post debridement good bleeding base and wound edges noted    Wound/Ulcer #: 1    Percent of Wound/Ulcer Debrided: 10%    Total Surface Area Debrided:  20 sq cm     Estimated Blood Loss:  Minimal  Hemostasis Achieved:  by pressure    Procedural Pain:  5  / 10   Post Procedural Pain:  4 / 10     Response to treatment:  Well tolerated by patient., With complaints of pain.      A culture was not done.      Plan:     Pt is not a smoker       In my professional opinion and based off the information that is available at this time this patient has appropriate indication for HBO Therapy: No    Treatment Note please see attached Discharge Instructions    Written patient dismissal instructions given to patient and signed by patient or POA.         Discharge Instructions         Visit Discharge/Physician Orders     Discharge condition: Stable     Assessment of pain at discharge: min     Anesthetic used: 4% topical lido     Discharge to: Home     Left via:Private automobile     Accompanied by: accompanied by self      ECF/HHA: oli .....when wrapping patient's legs, please wrap from base of toes to just below knee!     Dressing Orders: clean left leg with saline, apply HF BLUE (ALGINATE IN CLINIC), profore wrap. Change

## 2025-03-12 NOTE — DISCHARGE INSTRUCTIONS
Visit Discharge/Physician Orders     Discharge condition: Stable     Assessment of pain at discharge: min     Anesthetic used: 4% topical lido     Discharge to: Home     Left via:Private automobile     Accompanied by: accompanied by self      ECF/HHA: oli .....when wrapping patient's legs, please wrap from base of toes to just below knee!     Dressing Orders: clean left leg with saline, apply HF BLUE (ALGINATE IN CLINIC), profore wrap. Change every other day.  Left great toe wound-Cleanse with saline, apply guido, cover and secure with dry dressing. Change daily   Xeroform and silicon border to right arm. Change 2x per week     Treatment Orders: Eat a diet high in protein and vitamin C. Take a multiple vitamin daily unless contraindicated.     [facility] followup visit _________1 week dr. Mccarthy on Wednesday____  (Please note your next appointment above and if you are unable to keep, kindly give a 24 hour notice. Thank you.)     Physician signature:__________________________        If you experience any of the following, please call the Wound Care Center during business hours:     * Increase in Pain  * Temperature over 101  * Increase in drainage from your wound  * Drainage with a foul odor  * Bleeding  * Increase in swelling  * Need for compression bandage changes due to slippage, breakthrough drainage.     If you need medical attention outside of the business hours of the Wound Care Centers please contact your PCP or go to the nearest emergency room.

## 2025-03-17 ENCOUNTER — OFFICE VISIT (OUTPATIENT)
Dept: PODIATRY | Age: 75
End: 2025-03-17
Payer: MEDICARE

## 2025-03-17 VITALS — BODY MASS INDEX: 54.82 KG/M2 | WEIGHT: 315 LBS

## 2025-03-17 DIAGNOSIS — I89.0 LYMPHEDEMA: ICD-10-CM

## 2025-03-17 DIAGNOSIS — L60.0 OC (ONYCHOCRYPTOSIS): Primary | ICD-10-CM

## 2025-03-17 DIAGNOSIS — E11.51 TYPE II DIABETES MELLITUS WITH PERIPHERAL CIRCULATORY DISORDER (HCC): ICD-10-CM

## 2025-03-17 PROCEDURE — 1124F ACP DISCUSS-NO DSCNMKR DOCD: CPT | Performed by: PODIATRIST

## 2025-03-17 PROCEDURE — 1036F TOBACCO NON-USER: CPT | Performed by: PODIATRIST

## 2025-03-17 PROCEDURE — G8417 CALC BMI ABV UP PARAM F/U: HCPCS | Performed by: PODIATRIST

## 2025-03-17 PROCEDURE — 1159F MED LIST DOCD IN RCRD: CPT | Performed by: PODIATRIST

## 2025-03-17 PROCEDURE — G8427 DOCREV CUR MEDS BY ELIG CLIN: HCPCS | Performed by: PODIATRIST

## 2025-03-17 PROCEDURE — 99213 OFFICE O/P EST LOW 20 MIN: CPT | Performed by: PODIATRIST

## 2025-03-17 NOTE — PROGRESS NOTES
No signs of infection noted digital regions left foot.  No maceration webspaces noted left foot.  Lymphedema issues Celebra to both lower extremities.      Diagnostic Studies:     No results found.      Procedures:    None    Plan Per Assessment  Tuan was seen today for diabetes, nail problem and toe pain.    Diagnoses and all orders for this visit:    OC (onychocryptosis)    Lymphedema    Type II diabetes mellitus with peripheral circulatory disorder (HCC)      Evaluation and management  Debridement ingrown nails performed to patient tolerance.  We did discuss additional diabetic lower extremity care options with patient in detail today.  Patient will be followed up at a later date for continued evaluation and management.      Seen By:    Prosper Donahue Jr, DPM    Electronically signed by Prosper Donahue Jr, DPM on 3/17/2025 at 2:50 PM    This note was created using voice recognition software.  The note was reviewed however may contain grammatical errors.

## 2025-03-18 NOTE — DISCHARGE INSTRUCTIONS
Visit Discharge/Physician Orders     Discharge condition: Stable     Assessment of pain at discharge: min     Anesthetic used: 4% topical lido     Discharge to: Home     Left via:Private automobile     Accompanied by: accompanied by self      ECF/HHA: oli .....when wrapping patient's legs, please wrap from base of toes to just below knee!     Dressing Orders: clean left leg with saline, apply HF BLUE (ALGINATE IN CLINIC), profore wrap. Change every other day.  Left great toe wound-Cleanse with saline, apply guido, cover and secure with dry dressing. Change daily   Xeroform and silicon border to right arm. Change 2x per week     Treatment Orders: Eat a diet high in protein and vitamin C. Take a multiple vitamin daily unless contraindicated.     [facility] followup visit _________1 week dr. Mccarthy on Wednesday____  (Please note your next appointment above and if you are unable to keep, kindly give a 24 hour notice. Thank you.)     Physician signature:__________________________        If you experience any of the following, please call the Wound Care Center during business hours:     * Increase in Pain  * Temperature over 101  * Increase in drainage from your wound  * Drainage with a foul odor  * Bleeding  * Increase in swelling  * Need for compression bandage changes due to slippage, breakthrough drainage.     If you need medical attention outside of the business hours of the Wound Care Centers please contact your PCP or go to the nearest emergency room.

## 2025-03-19 ENCOUNTER — HOSPITAL ENCOUNTER (OUTPATIENT)
Dept: WOUND CARE | Age: 75
Discharge: HOME | End: 2025-03-19
Attending: PODIATRIST
Payer: MEDICARE

## 2025-03-19 VITALS
HEART RATE: 86 BPM | DIASTOLIC BLOOD PRESSURE: 80 MMHG | RESPIRATION RATE: 18 BRPM | TEMPERATURE: 96.7 F | SYSTOLIC BLOOD PRESSURE: 154 MMHG

## 2025-03-19 DIAGNOSIS — L97.922 NON-PRESSURE CHRONIC ULCER OF LEFT LOWER LEG WITH FAT LAYER EXPOSED (HCC): Primary | ICD-10-CM

## 2025-03-19 PROCEDURE — 11045 DBRDMT SUBQ TISS EACH ADDL: CPT

## 2025-03-19 PROCEDURE — 11042 DBRDMT SUBQ TIS 1ST 20SQCM/<: CPT

## 2025-03-19 RX ORDER — MUPIROCIN 20 MG/G
OINTMENT TOPICAL PRN
OUTPATIENT
Start: 2025-03-19

## 2025-03-19 RX ORDER — LIDOCAINE HYDROCHLORIDE 40 MG/ML
SOLUTION TOPICAL PRN
Status: DISCONTINUED | OUTPATIENT
Start: 2025-03-19 | End: 2025-03-20 | Stop reason: HOSPADM

## 2025-03-19 RX ORDER — GENTAMICIN SULFATE 1 MG/G
OINTMENT TOPICAL PRN
OUTPATIENT
Start: 2025-03-19

## 2025-03-19 RX ORDER — LIDOCAINE HYDROCHLORIDE 40 MG/ML
SOLUTION TOPICAL ONCE
Status: COMPLETED | OUTPATIENT
Start: 2025-03-19 | End: 2025-03-19

## 2025-03-19 RX ORDER — BACITRACIN ZINC AND POLYMYXIN B SULFATE 500; 1000 [USP'U]/G; [USP'U]/G
OINTMENT TOPICAL PRN
OUTPATIENT
Start: 2025-03-19

## 2025-03-19 RX ORDER — LIDOCAINE HYDROCHLORIDE 20 MG/ML
JELLY TOPICAL PRN
OUTPATIENT
Start: 2025-03-19

## 2025-03-19 RX ORDER — LIDOCAINE HYDROCHLORIDE 40 MG/ML
SOLUTION TOPICAL PRN
OUTPATIENT
Start: 2025-03-19

## 2025-03-19 RX ORDER — SODIUM CHLOR/HYPOCHLOROUS ACID 0.033 %
SOLUTION, IRRIGATION IRRIGATION PRN
OUTPATIENT
Start: 2025-03-19

## 2025-03-19 RX ORDER — LIDOCAINE 40 MG/G
CREAM TOPICAL PRN
OUTPATIENT
Start: 2025-03-19

## 2025-03-19 RX ORDER — GINSENG 100 MG
CAPSULE ORAL PRN
OUTPATIENT
Start: 2025-03-19

## 2025-03-19 RX ORDER — LIDOCAINE 50 MG/G
OINTMENT TOPICAL PRN
OUTPATIENT
Start: 2025-03-19

## 2025-03-19 RX ORDER — TRIAMCINOLONE ACETONIDE 1 MG/G
OINTMENT TOPICAL PRN
OUTPATIENT
Start: 2025-03-19

## 2025-03-19 RX ORDER — SILVER SULFADIAZINE 10 MG/G
CREAM TOPICAL PRN
OUTPATIENT
Start: 2025-03-19

## 2025-03-19 RX ORDER — NEOMYCIN/BACITRACIN/POLYMYXINB 3.5-400-5K
OINTMENT (GRAM) TOPICAL PRN
OUTPATIENT
Start: 2025-03-19

## 2025-03-19 RX ORDER — CLOBETASOL PROPIONATE 0.5 MG/G
OINTMENT TOPICAL PRN
OUTPATIENT
Start: 2025-03-19

## 2025-03-19 RX ORDER — BETAMETHASONE DIPROPIONATE 0.5 MG/G
CREAM TOPICAL PRN
OUTPATIENT
Start: 2025-03-19

## 2025-03-19 RX ORDER — FOLIC ACID 1 MG/1
1 TABLET ORAL DAILY
COMMUNITY

## 2025-03-19 RX ADMIN — LIDOCAINE HYDROCHLORIDE 4 ML: 40 SOLUTION TOPICAL at 10:32

## 2025-03-19 ASSESSMENT — PAIN DESCRIPTION - DESCRIPTORS: DESCRIPTORS: DULL

## 2025-03-19 ASSESSMENT — PAIN SCALES - GENERAL: PAINLEVEL_OUTOF10: 2

## 2025-03-19 ASSESSMENT — PAIN DESCRIPTION - ORIENTATION: ORIENTATION: LEFT

## 2025-03-19 ASSESSMENT — PAIN DESCRIPTION - LOCATION: LOCATION: LEG

## 2025-03-19 ASSESSMENT — PAIN - FUNCTIONAL ASSESSMENT: PAIN_FUNCTIONAL_ASSESSMENT: ACTIVITIES ARE NOT PREVENTED

## 2025-03-19 NOTE — PROGRESS NOTES
self      ECF/HHA: patriot .....when wrapping patient's legs, please wrap from base of toes to just below knee!     Dressing Orders: clean left leg with saline, apply HF BLUE (ALGINATE IN CLINIC), profore wrap. Change every other day.  Left great toe wound-Cleanse with saline, apply guido, cover and secure with dry dressing. Change daily   Xeroform and silicon border to right arm. Change 2x per week     Treatment Orders: Eat a diet high in protein and vitamin C. Take a multiple vitamin daily unless contraindicated.     [facility] followup visit _________1 week dr. Mccarthy on Wednesday____  (Please note your next appointment above and if you are unable to keep, kindly give a 24 hour notice. Thank you.)     Physician signature:__________________________        If you experience any of the following, please call the Wound Care Center during business hours:     * Increase in Pain  * Temperature over 101  * Increase in drainage from your wound  * Drainage with a foul odor  * Bleeding  * Increase in swelling  * Need for compression bandage changes due to slippage, breakthrough drainage.     If you need medical attention outside of the business hours of the Wound Care Centers please contact your PCP or go to the nearest emergency room.        Electronically signed by Prosper Mccarthy DPM on 3/19/2025 at 11:11 AM

## 2025-03-26 ENCOUNTER — HOSPITAL ENCOUNTER (OUTPATIENT)
Dept: WOUND CARE | Age: 75
Discharge: HOME | End: 2025-03-26
Attending: PODIATRIST
Payer: MEDICARE

## 2025-03-26 VITALS
RESPIRATION RATE: 18 BRPM | SYSTOLIC BLOOD PRESSURE: 170 MMHG | DIASTOLIC BLOOD PRESSURE: 98 MMHG | TEMPERATURE: 97.6 F | HEART RATE: 65 BPM

## 2025-03-26 DIAGNOSIS — L97.922 NON-PRESSURE CHRONIC ULCER OF LEFT LOWER LEG WITH FAT LAYER EXPOSED (HCC): Primary | ICD-10-CM

## 2025-03-26 PROCEDURE — 11042 DBRDMT SUBQ TIS 1ST 20SQCM/<: CPT

## 2025-03-26 RX ORDER — TRIAMCINOLONE ACETONIDE 1 MG/G
OINTMENT TOPICAL PRN
OUTPATIENT
Start: 2025-03-26

## 2025-03-26 RX ORDER — NEOMYCIN/BACITRACIN/POLYMYXINB 3.5-400-5K
OINTMENT (GRAM) TOPICAL PRN
OUTPATIENT
Start: 2025-03-26

## 2025-03-26 RX ORDER — GENTAMICIN SULFATE 1 MG/G
OINTMENT TOPICAL PRN
OUTPATIENT
Start: 2025-03-26

## 2025-03-26 RX ORDER — LIDOCAINE 40 MG/G
CREAM TOPICAL PRN
OUTPATIENT
Start: 2025-03-26

## 2025-03-26 RX ORDER — LIDOCAINE HYDROCHLORIDE 40 MG/ML
SOLUTION TOPICAL PRN
Status: DISCONTINUED | OUTPATIENT
Start: 2025-03-26 | End: 2025-03-27 | Stop reason: HOSPADM

## 2025-03-26 RX ORDER — SODIUM CHLOR/HYPOCHLOROUS ACID 0.033 %
SOLUTION, IRRIGATION IRRIGATION PRN
OUTPATIENT
Start: 2025-03-26

## 2025-03-26 RX ORDER — BACITRACIN ZINC AND POLYMYXIN B SULFATE 500; 1000 [USP'U]/G; [USP'U]/G
OINTMENT TOPICAL PRN
OUTPATIENT
Start: 2025-03-26

## 2025-03-26 RX ORDER — CLOBETASOL PROPIONATE 0.5 MG/G
OINTMENT TOPICAL PRN
OUTPATIENT
Start: 2025-03-26

## 2025-03-26 RX ORDER — MUPIROCIN 20 MG/G
OINTMENT TOPICAL PRN
OUTPATIENT
Start: 2025-03-26

## 2025-03-26 RX ORDER — LIDOCAINE HYDROCHLORIDE 40 MG/ML
SOLUTION TOPICAL PRN
OUTPATIENT
Start: 2025-03-26

## 2025-03-26 RX ORDER — GINSENG 100 MG
CAPSULE ORAL PRN
OUTPATIENT
Start: 2025-03-26

## 2025-03-26 RX ORDER — BETAMETHASONE DIPROPIONATE 0.5 MG/G
CREAM TOPICAL PRN
OUTPATIENT
Start: 2025-03-26

## 2025-03-26 RX ORDER — LIDOCAINE HYDROCHLORIDE 20 MG/ML
JELLY TOPICAL PRN
OUTPATIENT
Start: 2025-03-26

## 2025-03-26 RX ORDER — LIDOCAINE 50 MG/G
OINTMENT TOPICAL PRN
OUTPATIENT
Start: 2025-03-26

## 2025-03-26 RX ORDER — SILVER SULFADIAZINE 10 MG/G
CREAM TOPICAL PRN
OUTPATIENT
Start: 2025-03-26

## 2025-03-26 NOTE — DISCHARGE INSTRUCTIONS
Visit Discharge/Physician Orders     Discharge condition: Stable     Assessment of pain at discharge: min     Anesthetic used: 4% topical lido     Discharge to: Home     Left via:Private automobile     Accompanied by: accompanied by self      ECF/HHA: oli .....when wrapping patient's legs, please wrap from base of toes to just below knee!     Dressing Orders: clean bilateral legs with saline, apply HF BLUE (ALGINATE IN CLINIC), profore wrap. Change every other day.  Left great toe wound-Cleanse with saline, apply guido, cover and secure with dry dressing. Change daily   Xeroform and silicon border to right arm. Change 2x per week     Treatment Orders: Eat a diet high in protein and vitamin C. Take a multiple vitamin daily unless contraindicated.     [facility] followup visit _________1 week dr. Mccarthy on Wednesday____  (Please note your next appointment above and if you are unable to keep, kindly give a 24 hour notice. Thank you.)     Physician signature:__________________________        If you experience any of the following, please call the Wound Care Center during business hours:     * Increase in Pain  * Temperature over 101  * Increase in drainage from your wound  * Drainage with a foul odor  * Bleeding  * Increase in swelling  * Need for compression bandage changes due to slippage, breakthrough drainage.     If you need medical attention outside of the business hours of the Wound Care Centers please contact your PCP or go to the nearest emergency room.

## 2025-03-26 NOTE — PROGRESS NOTES
Wound Healing Center /Hyperbarics   History and Physical/Consultation  Vascular    Referring Physician : Charles Foss MD  Tuan Vega  MEDICAL RECORD NUMBER:  [id number]  AGE: 74 y.o.   GENDER: male  : 1950  EPISODE DATE:  3/26/2025  Subjective:     Chief Complaint   Patient presents with    Wound Check     Left leg          HISTORY of PRESENT ILLNESS HPI     Tuan Vega is a 74 y.o. male who presents today for wound/ulcer evaluation.   History of Wound Context:  The patient has had a wound of both calfs which was first noted approximately 1 month, since October.  This has been treated by the patient.  On their initial visit to the wound healing center, 25, the patient has noted that the wound has not been improving.  The patient has had similar previous wounds in the past.        Patient in fact was treated with compression wrapping for ulceration of both legs associate with lymphedema by Dr. Mccarthy with complete healing but since ulcers recur, patient came back to the wound care center for further evaluation    Pt is currently not on abx.      Wound/Ulcer Pain Timing/Severity: constant  Quality of pain: dull, aching  Severity:  4 / 10   Modifying Factors: Pain worsens with walking  Associated Signs/Symptoms: edema, drainage, and pain    Ulcer Identification:  Ulcer Type: diabetic, non-healing/non-surgical, and lymphedema  Contributing Factors: edema, lymphedema, diabetes, and obesity    Diabetic/Pressure/Non Pressure Ulcers only:  Ulcer: Diabetic ulcer, fat layer exposed    If patient has diabetic lower extremity wounds  Magaña Classification of diabetic lower extremity wounds:    Grade Description   []  0 No open wound   []  1 Superficial ulcer involving the full skin thickness   []  2 Deep ulcer involves ligament, tendon, joint capsule, or fascia  No bone involvement or abscess presence   []  3 Deep Ulcer with abcess formation and/or osteomyelitis   []  4 Localized gangrene

## 2025-03-28 NOTE — DISCHARGE INSTRUCTIONS
Visit Discharge/Physician Orders     Discharge condition: Stable     Assessment of pain at discharge: min     Anesthetic used: 4% topical lido     Discharge to: Home     Left via:Private automobile     Accompanied by: accompanied by self      ECF/HHA: oli .....when wrapping patient's legs, please wrap from base of toes to just below knee!     Dressing Orders: clean bilateral legs with saline, apply HF BLUE (ALGINATE IN CLINIC), profore wrap. Change every other day.  Left great toe wound-Cleanse with saline, apply guido, cover and secure with dry dressing. Change daily   Xeroform and silicon border to right arm. Change 2x per week     Treatment Orders: Eat a diet high in protein and vitamin C. Take a multiple vitamin daily unless contraindicated.     [facility] followup visit _________1 week dr. Mccarthy on Wednesday____  (Please note your next appointment above and if you are unable to keep, kindly give a 24 hour notice. Thank you.)     Physician signature:__________________________        If you experience any of the following, please call the Wound Care Center during business hours:     * Increase in Pain  * Temperature over 101  * Increase in drainage from your wound  * Drainage with a foul odor  * Bleeding  * Increase in swelling  * Need for compression bandage changes due to slippage, breakthrough drainage.     If you need medical attention outside of the business hours of the Wound Care Centers please contact your PCP or go to the nearest emergency room.

## 2025-04-02 ENCOUNTER — HOSPITAL ENCOUNTER (OUTPATIENT)
Dept: WOUND CARE | Age: 75
Discharge: HOME | End: 2025-04-02
Attending: PODIATRIST
Payer: MEDICARE

## 2025-04-02 VITALS
RESPIRATION RATE: 22 BRPM | SYSTOLIC BLOOD PRESSURE: 142 MMHG | HEIGHT: 67 IN | DIASTOLIC BLOOD PRESSURE: 98 MMHG | BODY MASS INDEX: 49.44 KG/M2 | WEIGHT: 315 LBS | HEART RATE: 79 BPM | TEMPERATURE: 97 F

## 2025-04-02 DIAGNOSIS — L97.922 NON-PRESSURE CHRONIC ULCER OF LEFT LOWER LEG WITH FAT LAYER EXPOSED (HCC): Primary | ICD-10-CM

## 2025-04-02 PROCEDURE — 11042 DBRDMT SUBQ TIS 1ST 20SQCM/<: CPT

## 2025-04-02 RX ORDER — LIDOCAINE 50 MG/G
OINTMENT TOPICAL PRN
OUTPATIENT
Start: 2025-04-02

## 2025-04-02 RX ORDER — LIDOCAINE HYDROCHLORIDE 40 MG/ML
SOLUTION TOPICAL PRN
OUTPATIENT
Start: 2025-04-02

## 2025-04-02 RX ORDER — TRIAMCINOLONE ACETONIDE 1 MG/G
OINTMENT TOPICAL PRN
OUTPATIENT
Start: 2025-04-02

## 2025-04-02 RX ORDER — LIDOCAINE 40 MG/G
CREAM TOPICAL PRN
OUTPATIENT
Start: 2025-04-02

## 2025-04-02 RX ORDER — LIDOCAINE HYDROCHLORIDE 20 MG/ML
JELLY TOPICAL PRN
OUTPATIENT
Start: 2025-04-02

## 2025-04-02 RX ORDER — NEOMYCIN/BACITRACIN/POLYMYXINB 3.5-400-5K
OINTMENT (GRAM) TOPICAL PRN
OUTPATIENT
Start: 2025-04-02

## 2025-04-02 RX ORDER — GINSENG 100 MG
CAPSULE ORAL PRN
OUTPATIENT
Start: 2025-04-02

## 2025-04-02 RX ORDER — LIDOCAINE HYDROCHLORIDE 40 MG/ML
SOLUTION TOPICAL PRN
Status: DISCONTINUED | OUTPATIENT
Start: 2025-04-02 | End: 2025-04-03 | Stop reason: HOSPADM

## 2025-04-02 RX ORDER — SILVER SULFADIAZINE 10 MG/G
CREAM TOPICAL PRN
OUTPATIENT
Start: 2025-04-02

## 2025-04-02 RX ORDER — CLOBETASOL PROPIONATE 0.5 MG/G
OINTMENT TOPICAL PRN
OUTPATIENT
Start: 2025-04-02

## 2025-04-02 RX ORDER — MUPIROCIN 20 MG/G
OINTMENT TOPICAL PRN
OUTPATIENT
Start: 2025-04-02

## 2025-04-02 RX ORDER — SODIUM CHLOR/HYPOCHLOROUS ACID 0.033 %
SOLUTION, IRRIGATION IRRIGATION PRN
OUTPATIENT
Start: 2025-04-02

## 2025-04-02 RX ORDER — GENTAMICIN SULFATE 1 MG/G
OINTMENT TOPICAL PRN
OUTPATIENT
Start: 2025-04-02

## 2025-04-02 RX ORDER — BACITRACIN ZINC AND POLYMYXIN B SULFATE 500; 1000 [USP'U]/G; [USP'U]/G
OINTMENT TOPICAL PRN
OUTPATIENT
Start: 2025-04-02

## 2025-04-02 RX ORDER — BETAMETHASONE DIPROPIONATE 0.5 MG/G
CREAM TOPICAL PRN
OUTPATIENT
Start: 2025-04-02

## 2025-04-07 NOTE — DISCHARGE INSTRUCTIONS
Visit Discharge/Physician Orders     Discharge condition: Stable     Assessment of pain at discharge: min     Anesthetic used: 4% topical lido     Discharge to: Home     Left via:Private automobile     Accompanied by: accompanied by self      ECF/HHA: oli .....when wrapping patient's legs, please wrap from base of toes to just below knee!     Dressing Orders: clean bilateral legs with saline, Left leg: apply HF BLUE (ALGINATE IN CLINIC), wrap with profore. Change every other day.  Right leg, apply HF Blue (alginate in clinic), wrap with kerlix. Change daily. Wear velcro compression daily.  Left great toe wound-Cleanse with saline, apply guido, cover and secure with dry dressing. Change daily        Treatment Orders: Eat a diet high in protein and vitamin C. Take a multiple vitamin daily unless contraindicated.     [facility] followup visit _________1 week dr. Mccarthy on Wednesday____  (Please note your next appointment above and if you are unable to keep, kindly give a 24 hour notice. Thank you.)     Physician signature:__________________________        If you experience any of the following, please call the Wound Care Center during business hours:     * Increase in Pain  * Temperature over 101  * Increase in drainage from your wound  * Drainage with a foul odor  * Bleeding  * Increase in swelling  * Need for compression bandage changes due to slippage, breakthrough drainage.     If you need medical attention outside of the business hours of the Wound Care Centers please contact your PCP or go to the nearest emergency room.

## 2025-04-09 ENCOUNTER — HOSPITAL ENCOUNTER (OUTPATIENT)
Dept: WOUND CARE | Age: 75
Discharge: HOME | End: 2025-04-09
Attending: PODIATRIST
Payer: MEDICARE

## 2025-04-09 VITALS
HEIGHT: 67 IN | RESPIRATION RATE: 22 BRPM | BODY MASS INDEX: 49.44 KG/M2 | DIASTOLIC BLOOD PRESSURE: 88 MMHG | SYSTOLIC BLOOD PRESSURE: 150 MMHG | WEIGHT: 315 LBS | TEMPERATURE: 96.7 F | HEART RATE: 80 BPM

## 2025-04-09 DIAGNOSIS — L97.922 NON-PRESSURE CHRONIC ULCER OF LEFT LOWER LEG WITH FAT LAYER EXPOSED (HCC): Primary | ICD-10-CM

## 2025-04-09 PROCEDURE — 11042 DBRDMT SUBQ TIS 1ST 20SQCM/<: CPT

## 2025-04-09 RX ORDER — LIDOCAINE HYDROCHLORIDE 40 MG/ML
SOLUTION TOPICAL PRN
OUTPATIENT
Start: 2025-04-09

## 2025-04-09 RX ORDER — LIDOCAINE HYDROCHLORIDE 40 MG/ML
SOLUTION TOPICAL PRN
Status: DISCONTINUED | OUTPATIENT
Start: 2025-04-09 | End: 2025-04-10 | Stop reason: HOSPADM

## 2025-04-09 RX ORDER — LIDOCAINE HYDROCHLORIDE 20 MG/ML
JELLY TOPICAL PRN
OUTPATIENT
Start: 2025-04-09

## 2025-04-09 RX ORDER — SILVER SULFADIAZINE 10 MG/G
CREAM TOPICAL PRN
OUTPATIENT
Start: 2025-04-09

## 2025-04-09 RX ORDER — CLOBETASOL PROPIONATE 0.5 MG/G
OINTMENT TOPICAL PRN
OUTPATIENT
Start: 2025-04-09

## 2025-04-09 RX ORDER — LIDOCAINE 50 MG/G
OINTMENT TOPICAL PRN
OUTPATIENT
Start: 2025-04-09

## 2025-04-09 RX ORDER — GENTAMICIN SULFATE 1 MG/G
OINTMENT TOPICAL PRN
OUTPATIENT
Start: 2025-04-09

## 2025-04-09 RX ORDER — MUPIROCIN 20 MG/G
OINTMENT TOPICAL PRN
OUTPATIENT
Start: 2025-04-09

## 2025-04-09 RX ORDER — LIDOCAINE 40 MG/G
CREAM TOPICAL PRN
OUTPATIENT
Start: 2025-04-09

## 2025-04-09 RX ORDER — SODIUM CHLOR/HYPOCHLOROUS ACID 0.033 %
SOLUTION, IRRIGATION IRRIGATION PRN
OUTPATIENT
Start: 2025-04-09

## 2025-04-09 RX ORDER — TRIAMCINOLONE ACETONIDE 1 MG/G
OINTMENT TOPICAL PRN
OUTPATIENT
Start: 2025-04-09

## 2025-04-09 RX ORDER — GINSENG 100 MG
CAPSULE ORAL PRN
OUTPATIENT
Start: 2025-04-09

## 2025-04-09 RX ORDER — BACITRACIN ZINC AND POLYMYXIN B SULFATE 500; 1000 [USP'U]/G; [USP'U]/G
OINTMENT TOPICAL PRN
OUTPATIENT
Start: 2025-04-09

## 2025-04-09 RX ORDER — BETAMETHASONE DIPROPIONATE 0.5 MG/G
CREAM TOPICAL PRN
OUTPATIENT
Start: 2025-04-09

## 2025-04-09 RX ORDER — NEOMYCIN/BACITRACIN/POLYMYXINB 3.5-400-5K
OINTMENT (GRAM) TOPICAL PRN
OUTPATIENT
Start: 2025-04-09

## 2025-04-09 NOTE — PROGRESS NOTES
you.)     Physician signature:__________________________        If you experience any of the following, please call the Wound Care Center during business hours:     * Increase in Pain  * Temperature over 101  * Increase in drainage from your wound  * Drainage with a foul odor  * Bleeding  * Increase in swelling  * Need for compression bandage changes due to slippage, breakthrough drainage.     If you need medical attention outside of the business hours of the Wound Care Centers please contact your PCP or go to the nearest emergency room.        Electronically signed by Prosper Mccarthy DPM on 4/9/2025 at 11:52 AM

## 2025-04-16 ENCOUNTER — HOSPITAL ENCOUNTER (OUTPATIENT)
Dept: WOUND CARE | Age: 75
Discharge: HOME | End: 2025-04-16
Attending: PODIATRIST
Payer: MEDICARE

## 2025-04-16 VITALS
HEART RATE: 75 BPM | WEIGHT: 315 LBS | TEMPERATURE: 97 F | BODY MASS INDEX: 49.44 KG/M2 | HEIGHT: 67 IN | RESPIRATION RATE: 22 BRPM | DIASTOLIC BLOOD PRESSURE: 82 MMHG | SYSTOLIC BLOOD PRESSURE: 169 MMHG

## 2025-04-16 DIAGNOSIS — L97.922 NON-PRESSURE CHRONIC ULCER OF LEFT LOWER LEG WITH FAT LAYER EXPOSED (HCC): Primary | ICD-10-CM

## 2025-04-16 PROCEDURE — 11042 DBRDMT SUBQ TIS 1ST 20SQCM/<: CPT

## 2025-04-16 RX ORDER — LIDOCAINE HYDROCHLORIDE 20 MG/ML
JELLY TOPICAL PRN
OUTPATIENT
Start: 2025-04-16

## 2025-04-16 RX ORDER — GENTAMICIN SULFATE 1 MG/G
OINTMENT TOPICAL PRN
OUTPATIENT
Start: 2025-04-16

## 2025-04-16 RX ORDER — LIDOCAINE HYDROCHLORIDE 40 MG/ML
SOLUTION TOPICAL PRN
Status: DISCONTINUED | OUTPATIENT
Start: 2025-04-16 | End: 2025-04-17 | Stop reason: HOSPADM

## 2025-04-16 RX ORDER — GINSENG 100 MG
CAPSULE ORAL PRN
OUTPATIENT
Start: 2025-04-16

## 2025-04-16 RX ORDER — LIDOCAINE HYDROCHLORIDE 40 MG/ML
SOLUTION TOPICAL PRN
OUTPATIENT
Start: 2025-04-16

## 2025-04-16 RX ORDER — BETAMETHASONE DIPROPIONATE 0.5 MG/G
CREAM TOPICAL PRN
OUTPATIENT
Start: 2025-04-16

## 2025-04-16 RX ORDER — SILVER SULFADIAZINE 10 MG/G
CREAM TOPICAL PRN
OUTPATIENT
Start: 2025-04-16

## 2025-04-16 RX ORDER — NEOMYCIN/BACITRACIN/POLYMYXINB 3.5-400-5K
OINTMENT (GRAM) TOPICAL PRN
OUTPATIENT
Start: 2025-04-16

## 2025-04-16 RX ORDER — LIDOCAINE 40 MG/G
CREAM TOPICAL PRN
OUTPATIENT
Start: 2025-04-16

## 2025-04-16 RX ORDER — LIDOCAINE 50 MG/G
OINTMENT TOPICAL PRN
OUTPATIENT
Start: 2025-04-16

## 2025-04-16 RX ORDER — TRIAMCINOLONE ACETONIDE 1 MG/G
OINTMENT TOPICAL PRN
OUTPATIENT
Start: 2025-04-16

## 2025-04-16 RX ORDER — BACITRACIN ZINC AND POLYMYXIN B SULFATE 500; 1000 [USP'U]/G; [USP'U]/G
OINTMENT TOPICAL PRN
OUTPATIENT
Start: 2025-04-16

## 2025-04-16 RX ORDER — MUPIROCIN 20 MG/G
OINTMENT TOPICAL PRN
OUTPATIENT
Start: 2025-04-16

## 2025-04-16 RX ORDER — SODIUM CHLOR/HYPOCHLOROUS ACID 0.033 %
SOLUTION, IRRIGATION IRRIGATION PRN
OUTPATIENT
Start: 2025-04-16

## 2025-04-16 RX ORDER — CLOBETASOL PROPIONATE 0.5 MG/G
OINTMENT TOPICAL PRN
OUTPATIENT
Start: 2025-04-16

## 2025-04-16 RX ADMIN — LIDOCAINE HYDROCHLORIDE 10 ML: 40 SOLUTION TOPICAL at 10:42

## 2025-04-16 NOTE — PROGRESS NOTES
Wound Healing Center /Hyperbarics   History and Physical/Consultation  Vascular    Referring Physician : Charles Foss MD  Tuan Vega  MEDICAL RECORD NUMBER:  [id number]  AGE: 74 y.o.   GENDER: male  : 1950  EPISODE DATE:  2025  Subjective:     Chief Complaint   Patient presents with    Wound Check     Bilateral legs         HISTORY of PRESENT ILLNESS HPI     Tuan Vega is a 74 y.o. male who presents today for wound/ulcer evaluation.   History of Wound Context:  The patient has had a wound of both calfs which was first noted approximately 1 month, since October.  This has been treated by the patient.  On their initial visit to the wound healing center, 25, the patient has noted that the wound has not been improving.  The patient has had similar previous wounds in the past.        Patient in fact was treated with compression wrapping for ulceration of both legs associate with lymphedema by Dr. Mccarthy with complete healing but since ulcers recur, patient came back to the wound care center for further evaluation    Pt is currently not on abx.      Wound/Ulcer Pain Timing/Severity: constant  Quality of pain: dull, aching  Severity:  4 / 10   Modifying Factors: Pain worsens with walking  Associated Signs/Symptoms: edema, drainage, and pain    Ulcer Identification:  Ulcer Type: diabetic, non-healing/non-surgical, and lymphedema  Contributing Factors: edema, lymphedema, diabetes, and obesity    Diabetic/Pressure/Non Pressure Ulcers only:  Ulcer: Diabetic ulcer, fat layer exposed    If patient has diabetic lower extremity wounds  Magaña Classification of diabetic lower extremity wounds:    Grade Description   []  0 No open wound   []  1 Superficial ulcer involving the full skin thickness   []  2 Deep ulcer involves ligament, tendon, joint capsule, or fascia  No bone involvement or abscess presence   []  3 Deep Ulcer with abcess formation and/or osteomyelitis   []  4 Localized

## 2025-04-16 NOTE — DISCHARGE INSTRUCTIONS
Visit Discharge/Physician Orders     Discharge condition: Stable     Assessment of pain at discharge: min     Anesthetic used: 4% topical lido     Discharge to: Home     Left via:Private automobile     Accompanied by: accompanied by self      ECF/HHA: oli .....when wrapping patient's legs, please wrap from base of toes to just below knee!     Dressing Orders: ,clean with saline Left leg: apply HF BLUE (ALGINATE IN CLINIC), wrap with profore. Change every other day.  Right leg Wear velcro compression daily.          Treatment Orders: Eat a diet high in protein and vitamin C. Take a multiple vitamin daily unless contraindicated.     [facility] followup visit _________1 week dr. Mccarthy on Wednesday____  (Please note your next appointment above and if you are unable to keep, kindly give a 24 hour notice. Thank you.)     Physician signature:__________________________        If you experience any of the following, please call the Wound Care Center during business hours:     * Increase in Pain  * Temperature over 101  * Increase in drainage from your wound  * Drainage with a foul odor  * Bleeding  * Increase in swelling  * Need for compression bandage changes due to slippage, breakthrough drainage.     If you need medical attention outside of the business hours of the Wound Care Centers please contact your PCP or go to the nearest emergency room.

## 2025-04-21 NOTE — DISCHARGE INSTRUCTIONS
Visit Discharge/Physician Orders     Discharge condition: Stable     Assessment of pain at discharge: min     Anesthetic used: 4% topical lido     Discharge to: Home     Left via:Private automobile     Accompanied by: accompanied by self      ECF/HHA: oli .....when wrapping patient's legs, please wrap from base of toes to just below knee!     Dressing Orders: ,clean with saline Left leg: apply HF BLUE (ALGINATE IN CLINIC), wrap with profore. Change every other day.  Right leg Wear velcro compression daily.           Treatment Orders: Eat a diet high in protein and vitamin C. Take a multiple vitamin daily unless contraindicated.     [facility] followup visit _________1 week dr. Mccarthy on Wednesday____  (Please note your next appointment above and if you are unable to keep, kindly give a 24 hour notice. Thank you.)     Physician signature:__________________________        If you experience any of the following, please call the Wound Care Center during business hours:     * Increase in Pain  * Temperature over 101  * Increase in drainage from your wound  * Drainage with a foul odor  * Bleeding  * Increase in swelling  * Need for compression bandage changes due to slippage, breakthrough drainage.     If you need medical attention outside of the business hours of the Wound Care Centers please contact your PCP or go to the nearest emergency room.

## 2025-04-23 ENCOUNTER — HOSPITAL ENCOUNTER (OUTPATIENT)
Dept: WOUND CARE | Age: 75
Discharge: HOME | End: 2025-04-23
Attending: PODIATRIST
Payer: MEDICARE

## 2025-04-23 VITALS
BODY MASS INDEX: 49.44 KG/M2 | DIASTOLIC BLOOD PRESSURE: 98 MMHG | WEIGHT: 315 LBS | TEMPERATURE: 97 F | SYSTOLIC BLOOD PRESSURE: 147 MMHG | HEIGHT: 67 IN | RESPIRATION RATE: 20 BRPM | HEART RATE: 83 BPM

## 2025-04-23 DIAGNOSIS — L97.222 LOWER LIMB ULCER, CALF, LEFT, WITH FAT LAYER EXPOSED (HCC): ICD-10-CM

## 2025-04-23 DIAGNOSIS — L97.922 NON-PRESSURE CHRONIC ULCER OF LEFT LOWER LEG WITH FAT LAYER EXPOSED (HCC): Primary | ICD-10-CM

## 2025-04-23 PROCEDURE — 11042 DBRDMT SUBQ TIS 1ST 20SQCM/<: CPT | Performed by: NURSE PRACTITIONER

## 2025-04-23 PROCEDURE — 11042 DBRDMT SUBQ TIS 1ST 20SQCM/<: CPT

## 2025-04-23 RX ORDER — LIDOCAINE HYDROCHLORIDE 20 MG/ML
JELLY TOPICAL PRN
OUTPATIENT
Start: 2025-04-23

## 2025-04-23 RX ORDER — SODIUM CHLOR/HYPOCHLOROUS ACID 0.033 %
SOLUTION, IRRIGATION IRRIGATION PRN
OUTPATIENT
Start: 2025-04-23

## 2025-04-23 RX ORDER — LIDOCAINE HYDROCHLORIDE 40 MG/ML
SOLUTION TOPICAL PRN
Status: DISCONTINUED | OUTPATIENT
Start: 2025-04-23 | End: 2025-04-24 | Stop reason: HOSPADM

## 2025-04-23 RX ORDER — LIDOCAINE 50 MG/G
OINTMENT TOPICAL PRN
OUTPATIENT
Start: 2025-04-23

## 2025-04-23 RX ORDER — SILVER SULFADIAZINE 10 MG/G
CREAM TOPICAL PRN
OUTPATIENT
Start: 2025-04-23

## 2025-04-23 RX ORDER — LIDOCAINE 40 MG/G
CREAM TOPICAL PRN
OUTPATIENT
Start: 2025-04-23

## 2025-04-23 RX ORDER — TRIAMCINOLONE ACETONIDE 1 MG/G
OINTMENT TOPICAL PRN
OUTPATIENT
Start: 2025-04-23

## 2025-04-23 RX ORDER — LIDOCAINE HYDROCHLORIDE 40 MG/ML
SOLUTION TOPICAL PRN
OUTPATIENT
Start: 2025-04-23

## 2025-04-23 RX ORDER — NEOMYCIN/BACITRACIN/POLYMYXINB 3.5-400-5K
OINTMENT (GRAM) TOPICAL PRN
OUTPATIENT
Start: 2025-04-23

## 2025-04-23 RX ORDER — BETAMETHASONE DIPROPIONATE 0.5 MG/G
CREAM TOPICAL PRN
OUTPATIENT
Start: 2025-04-23

## 2025-04-23 RX ORDER — CLOBETASOL PROPIONATE 0.5 MG/G
OINTMENT TOPICAL PRN
OUTPATIENT
Start: 2025-04-23

## 2025-04-23 RX ORDER — GINSENG 100 MG
CAPSULE ORAL PRN
OUTPATIENT
Start: 2025-04-23

## 2025-04-23 RX ORDER — BACITRACIN ZINC AND POLYMYXIN B SULFATE 500; 1000 [USP'U]/G; [USP'U]/G
OINTMENT TOPICAL PRN
OUTPATIENT
Start: 2025-04-23

## 2025-04-23 RX ORDER — GENTAMICIN SULFATE 1 MG/G
OINTMENT TOPICAL PRN
OUTPATIENT
Start: 2025-04-23

## 2025-04-23 RX ORDER — MUPIROCIN 20 MG/G
OINTMENT TOPICAL PRN
OUTPATIENT
Start: 2025-04-23

## 2025-04-23 RX ADMIN — LIDOCAINE HYDROCHLORIDE 5 ML: 40 SOLUTION TOPICAL at 10:38

## 2025-04-23 NOTE — PROGRESS NOTES
Wound Healing Center /Hyperbarics   History and Physical/Consultation  Vascular    Referring Physician : Charles Foss MD  Tuan Vega  MEDICAL RECORD NUMBER:  [id number]  AGE: 74 y.o.   GENDER: male  : 1950  EPISODE DATE:  2025  Subjective:     Chief Complaint   Patient presents with    Wound Check     Left leg         HISTORY of PRESENT ILLNESS HPI     Tuan Vega is a 74 y.o. male who presents today for wound/ulcer evaluation.   History of Wound Context:  The patient has had a wound of both calfs which was first noted approximately 1 month, since October.  This has been treated by the patient.  On their initial visit to the wound healing center, 25, the patient has noted that the wound has not been improving.  The patient has had similar previous wounds in the past.        Patient in fact was treated with compression wrapping for ulceration of both legs associate with lymphedema by Dr. Mccarthy with complete healing but since ulcers recur, patient came back to the wound care center for further evaluation    Pt is currently not on abx.      Wound/Ulcer Pain Timing/Severity: constant  Quality of pain: dull, aching  Severity:  4 / 10   Modifying Factors: Pain worsens with walking  Associated Signs/Symptoms: edema, drainage, and pain    Ulcer Identification:  Ulcer Type: diabetic, non-healing/non-surgical, and lymphedema  Contributing Factors: edema, lymphedema, diabetes, and obesity    Diabetic/Pressure/Non Pressure Ulcers only:  Ulcer: Diabetic ulcer, fat layer exposed    If patient has diabetic lower extremity wounds  Magaña Classification of diabetic lower extremity wounds:    Grade Description   []  0 No open wound   []  1 Superficial ulcer involving the full skin thickness   []  2 Deep ulcer involves ligament, tendon, joint capsule, or fascia  No bone involvement or abscess presence   []  3 Deep Ulcer with abcess formation and/or osteomyelitis   []  4 Localized gangrene

## 2025-04-27 RX ORDER — AMOXICILLIN 500 MG/1
500 CAPSULE ORAL 3 TIMES DAILY
Qty: 30 CAPSULE | Refills: 0 | Status: SHIPPED | OUTPATIENT
Start: 2025-04-27 | End: 2025-05-07

## 2025-04-30 ENCOUNTER — HOSPITAL ENCOUNTER (OUTPATIENT)
Dept: WOUND CARE | Age: 75
Discharge: HOME | End: 2025-04-30
Attending: PODIATRIST
Payer: MEDICARE

## 2025-04-30 VITALS
WEIGHT: 315 LBS | RESPIRATION RATE: 20 BRPM | DIASTOLIC BLOOD PRESSURE: 94 MMHG | HEIGHT: 67 IN | SYSTOLIC BLOOD PRESSURE: 160 MMHG | BODY MASS INDEX: 49.44 KG/M2 | TEMPERATURE: 96.7 F | HEART RATE: 84 BPM

## 2025-04-30 DIAGNOSIS — L97.922 NON-PRESSURE CHRONIC ULCER OF LEFT LOWER LEG WITH FAT LAYER EXPOSED (HCC): Primary | ICD-10-CM

## 2025-04-30 PROCEDURE — 11042 DBRDMT SUBQ TIS 1ST 20SQCM/<: CPT

## 2025-04-30 RX ORDER — LIDOCAINE 40 MG/G
CREAM TOPICAL PRN
OUTPATIENT
Start: 2025-04-30

## 2025-04-30 RX ORDER — NEOMYCIN/BACITRACIN/POLYMYXINB 3.5-400-5K
OINTMENT (GRAM) TOPICAL PRN
OUTPATIENT
Start: 2025-04-30

## 2025-04-30 RX ORDER — LIDOCAINE HYDROCHLORIDE 20 MG/ML
JELLY TOPICAL PRN
OUTPATIENT
Start: 2025-04-30

## 2025-04-30 RX ORDER — TRIAMCINOLONE ACETONIDE 1 MG/G
OINTMENT TOPICAL PRN
OUTPATIENT
Start: 2025-04-30

## 2025-04-30 RX ORDER — BACITRACIN ZINC AND POLYMYXIN B SULFATE 500; 1000 [USP'U]/G; [USP'U]/G
OINTMENT TOPICAL PRN
OUTPATIENT
Start: 2025-04-30

## 2025-04-30 RX ORDER — BETAMETHASONE DIPROPIONATE 0.5 MG/G
CREAM TOPICAL PRN
OUTPATIENT
Start: 2025-04-30

## 2025-04-30 RX ORDER — MUPIROCIN 20 MG/G
OINTMENT TOPICAL PRN
OUTPATIENT
Start: 2025-04-30

## 2025-04-30 RX ORDER — LIDOCAINE 50 MG/G
OINTMENT TOPICAL PRN
OUTPATIENT
Start: 2025-04-30

## 2025-04-30 RX ORDER — LIDOCAINE HYDROCHLORIDE 40 MG/ML
SOLUTION TOPICAL PRN
OUTPATIENT
Start: 2025-04-30

## 2025-04-30 RX ORDER — GENTAMICIN SULFATE 1 MG/G
OINTMENT TOPICAL PRN
OUTPATIENT
Start: 2025-04-30

## 2025-04-30 RX ORDER — CLOBETASOL PROPIONATE 0.5 MG/G
OINTMENT TOPICAL PRN
OUTPATIENT
Start: 2025-04-30

## 2025-04-30 RX ORDER — SILVER SULFADIAZINE 10 MG/G
CREAM TOPICAL PRN
OUTPATIENT
Start: 2025-04-30

## 2025-04-30 RX ORDER — GINSENG 100 MG
CAPSULE ORAL PRN
OUTPATIENT
Start: 2025-04-30

## 2025-04-30 RX ORDER — LIDOCAINE HYDROCHLORIDE 40 MG/ML
SOLUTION TOPICAL PRN
Status: DISCONTINUED | OUTPATIENT
Start: 2025-04-30 | End: 2025-05-01 | Stop reason: HOSPADM

## 2025-04-30 RX ORDER — SODIUM CHLOR/HYPOCHLOROUS ACID 0.033 %
SOLUTION, IRRIGATION IRRIGATION PRN
OUTPATIENT
Start: 2025-04-30

## 2025-04-30 RX ADMIN — LIDOCAINE HYDROCHLORIDE 5 ML: 40 SOLUTION TOPICAL at 10:21

## 2025-04-30 NOTE — DISCHARGE INSTRUCTIONS
Visit Discharge/Physician Orders     Discharge condition: Stable     Assessment of pain at discharge: min     Anesthetic used: 4% topical lido     Discharge to: Home     Left via:Private automobile     Accompanied by: accompanied by self      ECF/HHA: oli .....when wrapping patient's legs, please wrap from base of toes to just below knee!     Dressing Orders: ,clean with saline Left leg: apply HF BLUE (ALGINATE IN CLINIC), wrap with profore. Change every other day.  Right leg Wear velcro compression daily.           Treatment Orders: Eat a diet high in protein and vitamin C. Take a multiple vitamin daily unless contraindicated.     [facility] followup visit _________1 week dr. Mccarthy on Wednesday____  (Please note your next appointment above and if you are unable to keep, kindly give a 24 hour notice. Thank you.)     Physician signature:__________________________        If you experience any of the following, please call the Wound Care Center during business hours:     * Increase in Pain  * Temperature over 101  * Increase in drainage from your wound  * Drainage with a foul odor  * Bleeding  * Increase in swelling  * Need for compression bandage changes due to slippage, breakthrough drainage.     If you need medical attention outside of the business hours of the Wound Care Centers please contact your PCP or go to the nearest emergency room.

## 2025-04-30 NOTE — PROGRESS NOTES
Wound Healing Center /Hyperbarics   History and Physical/Consultation  Vascular    Referring Physician : Charles Foss MD  Tuan Vega  MEDICAL RECORD NUMBER:  [id number]  AGE: 74 y.o.   GENDER: male  : 1950  EPISODE DATE:  2025  Subjective:     Chief Complaint   Patient presents with    Wound Check     Left leg         HISTORY of PRESENT ILLNESS HPI     Tuan Vega is a 74 y.o. male who presents today for wound/ulcer evaluation.   History of Wound Context:  The patient has had a wound of both calfs which was first noted approximately 1 month, since October.  This has been treated by the patient.  On their initial visit to the wound healing center, 25, the patient has noted that the wound has not been improving.  The patient has had similar previous wounds in the past.        Patient in fact was treated with compression wrapping for ulceration of both legs associate with lymphedema by Dr. Mccarthy with complete healing but since ulcers recur, patient came back to the wound care center for further evaluation    Pt is currently not on abx.      Wound/Ulcer Pain Timing/Severity: constant  Quality of pain: dull, aching  Severity:  4 / 10   Modifying Factors: Pain worsens with walking  Associated Signs/Symptoms: edema, drainage, and pain    Ulcer Identification:  Ulcer Type: diabetic, non-healing/non-surgical, and lymphedema  Contributing Factors: edema, lymphedema, diabetes, and obesity    Diabetic/Pressure/Non Pressure Ulcers only:  Ulcer: Diabetic ulcer, fat layer exposed    If patient has diabetic lower extremity wounds  Magaña Classification of diabetic lower extremity wounds:    Grade Description   []  0 No open wound   []  1 Superficial ulcer involving the full skin thickness   []  2 Deep ulcer involves ligament, tendon, joint capsule, or fascia  No bone involvement or abscess presence   []  3 Deep Ulcer with abcess formation and/or osteomyelitis   []  4 Localized gangrene

## 2025-05-07 ENCOUNTER — HOSPITAL ENCOUNTER (OUTPATIENT)
Dept: WOUND CARE | Age: 75
Discharge: HOME | End: 2025-05-07
Attending: PODIATRIST
Payer: MEDICARE

## 2025-05-07 VITALS
DIASTOLIC BLOOD PRESSURE: 89 MMHG | BODY MASS INDEX: 49.44 KG/M2 | RESPIRATION RATE: 20 BRPM | WEIGHT: 315 LBS | HEIGHT: 67 IN | TEMPERATURE: 96.9 F | SYSTOLIC BLOOD PRESSURE: 167 MMHG | HEART RATE: 84 BPM

## 2025-05-07 DIAGNOSIS — L97.922 NON-PRESSURE CHRONIC ULCER OF LEFT LOWER LEG WITH FAT LAYER EXPOSED (HCC): Primary | ICD-10-CM

## 2025-05-07 PROCEDURE — 99213 OFFICE O/P EST LOW 20 MIN: CPT

## 2025-05-07 RX ORDER — BACITRACIN ZINC AND POLYMYXIN B SULFATE 500; 1000 [USP'U]/G; [USP'U]/G
OINTMENT TOPICAL PRN
OUTPATIENT
Start: 2025-05-07

## 2025-05-07 RX ORDER — LIDOCAINE HYDROCHLORIDE 20 MG/ML
JELLY TOPICAL PRN
OUTPATIENT
Start: 2025-05-07

## 2025-05-07 RX ORDER — SILVER SULFADIAZINE 10 MG/G
CREAM TOPICAL PRN
OUTPATIENT
Start: 2025-05-07

## 2025-05-07 RX ORDER — TRIAMCINOLONE ACETONIDE 1 MG/G
OINTMENT TOPICAL PRN
OUTPATIENT
Start: 2025-05-07

## 2025-05-07 RX ORDER — MUPIROCIN 20 MG/G
OINTMENT TOPICAL PRN
OUTPATIENT
Start: 2025-05-07

## 2025-05-07 RX ORDER — CLOBETASOL PROPIONATE 0.5 MG/G
OINTMENT TOPICAL PRN
OUTPATIENT
Start: 2025-05-07

## 2025-05-07 RX ORDER — SODIUM CHLOR/HYPOCHLOROUS ACID 0.033 %
SOLUTION, IRRIGATION IRRIGATION PRN
OUTPATIENT
Start: 2025-05-07

## 2025-05-07 RX ORDER — NEOMYCIN/BACITRACIN/POLYMYXINB 3.5-400-5K
OINTMENT (GRAM) TOPICAL PRN
OUTPATIENT
Start: 2025-05-07

## 2025-05-07 RX ORDER — BETAMETHASONE DIPROPIONATE 0.5 MG/G
CREAM TOPICAL PRN
OUTPATIENT
Start: 2025-05-07

## 2025-05-07 RX ORDER — LIDOCAINE 50 MG/G
OINTMENT TOPICAL PRN
OUTPATIENT
Start: 2025-05-07

## 2025-05-07 RX ORDER — GENTAMICIN SULFATE 1 MG/G
OINTMENT TOPICAL PRN
OUTPATIENT
Start: 2025-05-07

## 2025-05-07 RX ORDER — GINSENG 100 MG
CAPSULE ORAL PRN
OUTPATIENT
Start: 2025-05-07

## 2025-05-07 RX ORDER — LIDOCAINE HYDROCHLORIDE 40 MG/ML
SOLUTION TOPICAL PRN
Status: DISCONTINUED | OUTPATIENT
Start: 2025-05-07 | End: 2025-05-08 | Stop reason: HOSPADM

## 2025-05-07 RX ORDER — LIDOCAINE 40 MG/G
CREAM TOPICAL PRN
OUTPATIENT
Start: 2025-05-07

## 2025-05-07 RX ORDER — LIDOCAINE HYDROCHLORIDE 40 MG/ML
SOLUTION TOPICAL PRN
OUTPATIENT
Start: 2025-05-07

## 2025-05-07 RX ADMIN — LIDOCAINE HYDROCHLORIDE 2 ML: 40 SOLUTION TOPICAL at 11:08

## 2025-05-07 NOTE — PROGRESS NOTES
IOL IMPLANT performed by Jose Camacho MD at [facility] OR    JOINT REPLACEMENT Bilateral     knees    LEG SURGERY Left 2023    LEFT LEG DEBRIDEMENT performed by Edward Bedolla DPM at [facility] OR    PACEMAKER PLACEMENT  2020    DUAL PPM GR    ([employer])     DR. MARTIN     TRANSESOPHAGEAL ECHOCARDIOGRAM  2020    With      Family History   Problem Relation Age of Onset    Heart Disease Father             Other Mother             Other Brother         gall bladder     Social History     Tobacco Use    Smoking status: Former     Current packs/day: 0.00     Average packs/day: 2.0 packs/day for 25.9 years (51.7 ttl pk-yrs)     Types: Cigarettes     Start date: 1964     Quit date: 1990     Years since quittin.8    Smokeless tobacco: Never   Vaping Use    Vaping status: Never Used   Substance Use Topics    Alcohol use: Yes     Comment: rare   2 beers/month    Drug use: Never     Allergies   Allergen Reactions    Cleocin [Clindamycin] Shortness Of Breath    Sodium Metabisulfite Shortness Of Breath     CHEMICAL NAME FOR POTATO WHITE ADDITIVE     Adaptic Non-Adhering Dressing [Wound Dressings] Other (See Comments)     SKIN BURNS    Cipro [Ciprofloxacin Hcl] Rash    Singulair [Montelukast Sodium] Rash    Vibramycin [Doxycycline Hyclate] Other (See Comments)     TINNITUS     Current Outpatient Medications on File Prior to Encounter   Medication Sig Dispense Refill    amoxicillin (AMOXIL) 500 MG capsule Take 1 capsule by mouth 3 times daily for 10 days 30 capsule 0    folic acid (FOLVITE) 1 MG tablet Take 1 tablet by mouth daily      carvedilol (COREG) 25 MG tablet TAKE 1 TABLET BY MOUTH TWICE  DAILY WITH MEALS 180 tablet 0    naproxen (NAPROSYN) 500 MG tablet Take 1 tablet by mouth 2 times daily as needed for Pain      rosuvastatin (CRESTOR) 5 MG tablet TAKE 1 TABLET BY MOUTH DAILY 90 tablet 0    ZINC OXIDE, TOPICAL, 10 % CREA Apply 50 g topically daily 1 each 2    potassium

## 2025-05-07 NOTE — DISCHARGE INSTRUCTIONS
Visit Discharge/Physician Orders     Discharge condition: Stable     Assessment of pain at discharge: min     Anesthetic used: 4% topical lido     Discharge to: Home     Left via:Private automobile     Accompanied by: accompanied by self      ECF/HHA: oli .....when wrapping patient's legs, please wrap from base of toes to just below knee!     Dressing Orders: ,clean with saline Left leg: apply HF BLUE (ALGINATE IN CLINIC), wrap with profore. Change every other day.  Right leg Wear velcro compression daily.           Treatment Orders: Eat a diet high in protein and vitamin C. Take a multiple vitamin daily unless contraindicated.     [facility] followup visit _________1 week dr. Mccarthy on Wednesday____  (Please note your next appointment above and if you are unable to keep, kindly give a 24 hour notice. Thank you.)     Physician signature:__________________________        If you experience any of the following, please call the Wound Care Center during business hours:     * Increase in Pain  * Temperature over 101  * Increase in drainage from your wound  * Drainage with a foul odor  * Bleeding  * Increase in swelling  * Need for compression bandage changes due to slippage, breakthrough drainage.     If you need medical attention outside of the business hours of the Wound Care Centers please contact your PCP or go to the nearest emergency room

## 2025-05-09 NOTE — DISCHARGE INSTRUCTIONS
Visit Discharge/Physician Orders     Discharge condition: Stable     Assessment of pain at discharge: min     Anesthetic used: 4% topical lido     Discharge to: Home     Left via:Private automobile     Accompanied by: accompanied by self      ECF/HHA: oli .....when wrapping patient's legs, please wrap from base of toes to just below knee!     Dressing Orders: ,clean with saline Left leg: apply HF BLUE (ALGINATE IN CLINIC), wrap with profore. Change every other day.  Right leg Wear velcro compression daily.           Treatment Orders: Eat a diet high in protein and vitamin C. Take a multiple vitamin daily unless contraindicated.     [facility] followup visit _________1 week dr. Mccarthy on Wednesday____  (Please note your next appointment above and if you are unable to keep, kindly give a 24 hour notice. Thank you.)     Physician signature:__________________________        If you experience any of the following, please call the Wound Care Center during business hours:     * Increase in Pain  * Temperature over 101  * Increase in drainage from your wound  * Drainage with a foul odor  * Bleeding  * Increase in swelling  * Need for compression bandage changes due to slippage, breakthrough drainage.     If you need medical attention outside of the business hours of the Wound Care Centers please contact your PCP or go to the nearest emergency room

## 2025-05-14 ENCOUNTER — HOSPITAL ENCOUNTER (OUTPATIENT)
Dept: WOUND CARE | Age: 75
Discharge: HOME | End: 2025-05-14
Attending: PODIATRIST
Payer: MEDICARE

## 2025-05-14 VITALS
DIASTOLIC BLOOD PRESSURE: 98 MMHG | BODY MASS INDEX: 49.44 KG/M2 | HEIGHT: 67 IN | SYSTOLIC BLOOD PRESSURE: 161 MMHG | TEMPERATURE: 97.1 F | WEIGHT: 315 LBS | RESPIRATION RATE: 20 BRPM | HEART RATE: 83 BPM

## 2025-05-14 DIAGNOSIS — L97.922 NON-PRESSURE CHRONIC ULCER OF LEFT LOWER LEG WITH FAT LAYER EXPOSED (HCC): Primary | ICD-10-CM

## 2025-05-14 PROCEDURE — 11042 DBRDMT SUBQ TIS 1ST 20SQCM/<: CPT

## 2025-05-14 RX ORDER — LIDOCAINE 50 MG/G
OINTMENT TOPICAL PRN
OUTPATIENT
Start: 2025-05-14

## 2025-05-14 RX ORDER — TRIAMCINOLONE ACETONIDE 1 MG/G
OINTMENT TOPICAL PRN
OUTPATIENT
Start: 2025-05-14

## 2025-05-14 RX ORDER — GINSENG 100 MG
CAPSULE ORAL PRN
OUTPATIENT
Start: 2025-05-14

## 2025-05-14 RX ORDER — LIDOCAINE 40 MG/G
CREAM TOPICAL PRN
OUTPATIENT
Start: 2025-05-14

## 2025-05-14 RX ORDER — SILVER SULFADIAZINE 10 MG/G
CREAM TOPICAL PRN
OUTPATIENT
Start: 2025-05-14

## 2025-05-14 RX ORDER — MUPIROCIN 20 MG/G
OINTMENT TOPICAL PRN
OUTPATIENT
Start: 2025-05-14

## 2025-05-14 RX ORDER — LIDOCAINE HYDROCHLORIDE 20 MG/ML
JELLY TOPICAL PRN
OUTPATIENT
Start: 2025-05-14

## 2025-05-14 RX ORDER — BETAMETHASONE DIPROPIONATE 0.5 MG/G
CREAM TOPICAL PRN
OUTPATIENT
Start: 2025-05-14

## 2025-05-14 RX ORDER — LIDOCAINE HYDROCHLORIDE 40 MG/ML
SOLUTION TOPICAL PRN
OUTPATIENT
Start: 2025-05-14

## 2025-05-14 RX ORDER — SODIUM CHLOR/HYPOCHLOROUS ACID 0.033 %
SOLUTION, IRRIGATION IRRIGATION PRN
OUTPATIENT
Start: 2025-05-14

## 2025-05-14 RX ORDER — NEOMYCIN/BACITRACIN/POLYMYXINB 3.5-400-5K
OINTMENT (GRAM) TOPICAL PRN
OUTPATIENT
Start: 2025-05-14

## 2025-05-14 RX ORDER — GENTAMICIN SULFATE 1 MG/G
OINTMENT TOPICAL PRN
OUTPATIENT
Start: 2025-05-14

## 2025-05-14 RX ORDER — LIDOCAINE HYDROCHLORIDE 40 MG/ML
SOLUTION TOPICAL PRN
Status: DISCONTINUED | OUTPATIENT
Start: 2025-05-14 | End: 2025-05-15 | Stop reason: HOSPADM

## 2025-05-14 RX ORDER — CLOBETASOL PROPIONATE 0.5 MG/G
OINTMENT TOPICAL PRN
OUTPATIENT
Start: 2025-05-14

## 2025-05-14 RX ORDER — BACITRACIN ZINC AND POLYMYXIN B SULFATE 500; 1000 [USP'U]/G; [USP'U]/G
OINTMENT TOPICAL PRN
OUTPATIENT
Start: 2025-05-14

## 2025-05-14 NOTE — PROGRESS NOTES
Wound Healing Center /Hyperbarics   History and Physical/Consultation  Vascular    Referring Physician : Charles Foss MD  Tuan Vega  MEDICAL RECORD NUMBER:  [id number]  AGE: 74 y.o.   GENDER: male  : 1950  EPISODE DATE:  2025  Subjective:     Chief Complaint   Patient presents with    Wound Check         HISTORY of PRESENT ILLNESS HPI     Tuan Vega is a 74 y.o. male who presents today for wound/ulcer evaluation.   History of Wound Context:  The patient has had a wound of both calfs which was first noted approximately 1 month, since October.  This has been treated by the patient.  On their initial visit to the wound healing center, 25, the patient has noted that the wound has not been improving.  The patient has had similar previous wounds in the past.        Patient in fact was treated with compression wrapping for ulceration of both legs associate with lymphedema by Dr. Mccarthy with complete healing but since ulcers recur, patient came back to the wound care center for further evaluation    Pt is currently not on abx.      Wound/Ulcer Pain Timing/Severity: constant  Quality of pain: dull, aching  Severity:  4 / 10   Modifying Factors: Pain worsens with walking  Associated Signs/Symptoms: edema, drainage, and pain    Ulcer Identification:  Ulcer Type: diabetic, non-healing/non-surgical, and lymphedema  Contributing Factors: edema, lymphedema, diabetes, and obesity    Diabetic/Pressure/Non Pressure Ulcers only:  Ulcer: Diabetic ulcer, fat layer exposed    If patient has diabetic lower extremity wounds  Magaña Classification of diabetic lower extremity wounds:    Grade Description   []  0 No open wound   []  1 Superficial ulcer involving the full skin thickness   []  2 Deep ulcer involves ligament, tendon, joint capsule, or fascia  No bone involvement or abscess presence   []  3 Deep Ulcer with abcess formation and/or osteomyelitis   []  4 Localized gangrene   []  5

## 2025-05-21 ENCOUNTER — HOSPITAL ENCOUNTER (OUTPATIENT)
Dept: WOUND CARE | Age: 75
Discharge: HOME | End: 2025-05-21
Attending: PODIATRIST
Payer: MEDICARE

## 2025-05-21 VITALS
DIASTOLIC BLOOD PRESSURE: 85 MMHG | SYSTOLIC BLOOD PRESSURE: 146 MMHG | TEMPERATURE: 96.9 F | RESPIRATION RATE: 18 BRPM | HEIGHT: 67 IN | WEIGHT: 315 LBS | BODY MASS INDEX: 49.44 KG/M2 | HEART RATE: 77 BPM

## 2025-05-21 DIAGNOSIS — L97.922 NON-PRESSURE CHRONIC ULCER OF LEFT LOWER LEG WITH FAT LAYER EXPOSED (HCC): Primary | ICD-10-CM

## 2025-05-21 PROCEDURE — 11042 DBRDMT SUBQ TIS 1ST 20SQCM/<: CPT

## 2025-05-21 RX ORDER — LIDOCAINE HYDROCHLORIDE 20 MG/ML
JELLY TOPICAL PRN
OUTPATIENT
Start: 2025-05-21

## 2025-05-21 RX ORDER — NEOMYCIN/BACITRACIN/POLYMYXINB 3.5-400-5K
OINTMENT (GRAM) TOPICAL PRN
OUTPATIENT
Start: 2025-05-21

## 2025-05-21 RX ORDER — TRIAMCINOLONE ACETONIDE 1 MG/G
OINTMENT TOPICAL PRN
OUTPATIENT
Start: 2025-05-21

## 2025-05-21 RX ORDER — GINSENG 100 MG
CAPSULE ORAL PRN
OUTPATIENT
Start: 2025-05-21

## 2025-05-21 RX ORDER — SILVER SULFADIAZINE 10 MG/G
CREAM TOPICAL PRN
OUTPATIENT
Start: 2025-05-21

## 2025-05-21 RX ORDER — CLOBETASOL PROPIONATE 0.5 MG/G
OINTMENT TOPICAL PRN
OUTPATIENT
Start: 2025-05-21

## 2025-05-21 RX ORDER — GENTAMICIN SULFATE 1 MG/G
OINTMENT TOPICAL PRN
OUTPATIENT
Start: 2025-05-21

## 2025-05-21 RX ORDER — LIDOCAINE HYDROCHLORIDE 40 MG/ML
SOLUTION TOPICAL ONCE
Status: COMPLETED | OUTPATIENT
Start: 2025-05-21 | End: 2025-05-21

## 2025-05-21 RX ORDER — LIDOCAINE 40 MG/G
CREAM TOPICAL PRN
OUTPATIENT
Start: 2025-05-21

## 2025-05-21 RX ORDER — LIDOCAINE HYDROCHLORIDE 40 MG/ML
SOLUTION TOPICAL PRN
OUTPATIENT
Start: 2025-05-21

## 2025-05-21 RX ORDER — LIDOCAINE 50 MG/G
OINTMENT TOPICAL PRN
OUTPATIENT
Start: 2025-05-21

## 2025-05-21 RX ORDER — SODIUM CHLOR/HYPOCHLOROUS ACID 0.033 %
SOLUTION, IRRIGATION IRRIGATION PRN
OUTPATIENT
Start: 2025-05-21

## 2025-05-21 RX ORDER — MUPIROCIN 20 MG/G
OINTMENT TOPICAL PRN
OUTPATIENT
Start: 2025-05-21

## 2025-05-21 RX ORDER — BETAMETHASONE DIPROPIONATE 0.5 MG/G
CREAM TOPICAL PRN
OUTPATIENT
Start: 2025-05-21

## 2025-05-21 RX ORDER — BACITRACIN ZINC AND POLYMYXIN B SULFATE 500; 1000 [USP'U]/G; [USP'U]/G
OINTMENT TOPICAL PRN
OUTPATIENT
Start: 2025-05-21

## 2025-05-21 RX ADMIN — LIDOCAINE HYDROCHLORIDE 5 ML: 40 SOLUTION TOPICAL at 11:22

## 2025-05-21 NOTE — PROGRESS NOTES
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) - Primary       Pre Debridement Measurements:  Are located in the Wound/Ulcer Documentation Flow Sheet    Post Debridement Measurements:  Wound/Ulcer Descriptions are Pre Debridement except measurements:  Wound 04/16/25 Leg Left;Lateral;Upper #4 (Active)   Wound Image   05/21/25 1124   Dressing Status New dressing applied 05/07/25 1136   Wound Cleansed Cleansed with saline 05/07/25 1136   Dressing/Treatment ABD;Alginate 05/07/25 1136   Wound Length (cm) 0.6 cm 05/21/25 1124   Wound Width (cm) 0.5 cm 05/21/25 1124   Wound Depth (cm) 0.2 cm 05/21/25 1124   Wound Surface Area (cm^2) 0.3 cm^2 05/21/25 1124   Change in Wound Size % (l*w) 94.79 05/21/25 1124   Wound Volume (cm^3) 0.06 cm^3 05/21/25 1124   Wound Healing % 95 05/21/25 1124   Post-Procedure Length (cm) 4.6 cm 05/14/25 1123   Post-Procedure Width (cm) 4 cm 05/14/25 1123   Post-Procedure Depth (cm) 0.2 cm 05/14/25 1123   Post-Procedure Surface Area (cm^2) 18.4 cm^2 05/14/25 1123   Post-Procedure Volume (cm^3) 3.68 cm^3 05/14/25 1123   Wound Assessment Fibrin;Pink/red 05/21/25 1124   Drainage Amount Small (< 25%) 05/21/25 1124   Drainage Description Serosanguinous 05/21/25 1124   Odor None 05/21/25 1124   Maria Elena-wound Assessment Blanchable erythema;Fragile 05/21/25 1124   Margins Attached edges 05/14/25 1111   Wound Thickness Description not for Pressure Injury Full thickness 05/14/25 1111   Number of days: 35       Wound 05/21/25 Leg Left;Lower;Lateral #5 (Active)   Wound Image   05/21/25 1124   Wound Length (cm) 1.1 cm 05/21/25 1124   Wound Width (cm) 1.5 cm 05/21/25 1124   Wound Depth (cm) 0.1 cm 05/21/25 1124   Wound Surface Area (cm^2) 1.65 cm^2 05/21/25 1124   Wound Volume (cm^3) 0.165 cm^3 05/21/25 1124   Wound Assessment Fibrin;Pink/red 05/21/25 1124   Drainage Amount Small (< 25%) 05/21/25 1124   Drainage Description Serosanguinous 05/21/25 1124   Odor

## 2025-05-21 NOTE — DISCHARGE INSTRUCTIONS
Visit Discharge/Physician Orders     Discharge condition: Stable     Assessment of pain at discharge: min     Anesthetic used: 4% topical lido     Discharge to: Home     Left via:Private automobile     Accompanied by: accompanied by self      ECF/HHA: oli .....when wrapping patient's legs, please wrap from base of toes to just below knee!     Dressing Orders: ,clean with saline Left leg: apply HF BLUE (ALGINATE IN CLINIC), wrap with profore. Change every other day.  Right leg Wear velcro compression daily.           Treatment Orders: Eat a diet high in protein and vitamin C. Take a multiple vitamin daily unless contraindicated.     [facility] followup visit _________1 week dr. Mccarthy on Wednesday____  (Please note your next appointment above and if you are unable to keep, kindly give a 24 hour notice. Thank you.)     Physician signature:__________________________        If you experience any of the following, please call the Wound Care Center during business hours:     * Increase in Pain  * Temperature over 101  * Increase in drainage from your wound  * Drainage with a foul odor  * Bleeding  * Increase in swelling  * Need for compression bandage changes due to slippage, breakthrough drainage.     If you need medical attention outside of the business hours of the Wound Care Centers please contact your PCP or go to the nearest emergency room

## 2025-05-28 ENCOUNTER — HOSPITAL ENCOUNTER (OUTPATIENT)
Dept: WOUND CARE | Age: 75
Discharge: HOME | End: 2025-05-28
Attending: PODIATRIST

## 2025-05-28 VITALS
RESPIRATION RATE: 18 BRPM | HEIGHT: 67 IN | DIASTOLIC BLOOD PRESSURE: 96 MMHG | WEIGHT: 315 LBS | SYSTOLIC BLOOD PRESSURE: 144 MMHG | TEMPERATURE: 96.7 F | HEART RATE: 88 BPM | BODY MASS INDEX: 49.44 KG/M2

## 2025-05-28 DIAGNOSIS — L97.922 NON-PRESSURE CHRONIC ULCER OF LEFT LOWER LEG WITH FAT LAYER EXPOSED (HCC): Primary | ICD-10-CM

## 2025-05-28 RX ORDER — MUPIROCIN 20 MG/G
OINTMENT TOPICAL PRN
Status: CANCELLED | OUTPATIENT
Start: 2025-05-28

## 2025-05-28 RX ORDER — LIDOCAINE 50 MG/G
OINTMENT TOPICAL PRN
Status: CANCELLED | OUTPATIENT
Start: 2025-05-28

## 2025-05-28 RX ORDER — BACITRACIN ZINC AND POLYMYXIN B SULFATE 500; 1000 [USP'U]/G; [USP'U]/G
OINTMENT TOPICAL PRN
Status: CANCELLED | OUTPATIENT
Start: 2025-05-28

## 2025-05-28 RX ORDER — LIDOCAINE HYDROCHLORIDE 20 MG/ML
JELLY TOPICAL PRN
Status: CANCELLED | OUTPATIENT
Start: 2025-05-28

## 2025-05-28 RX ORDER — BETAMETHASONE DIPROPIONATE 0.5 MG/G
CREAM TOPICAL PRN
Status: CANCELLED | OUTPATIENT
Start: 2025-05-28

## 2025-05-28 RX ORDER — TRIAMCINOLONE ACETONIDE 1 MG/G
OINTMENT TOPICAL PRN
Status: CANCELLED | OUTPATIENT
Start: 2025-05-28

## 2025-05-28 RX ORDER — LIDOCAINE HYDROCHLORIDE 40 MG/ML
SOLUTION TOPICAL PRN
Status: DISCONTINUED | OUTPATIENT
Start: 2025-05-28 | End: 2025-05-28

## 2025-05-28 RX ORDER — SILVER SULFADIAZINE 10 MG/G
CREAM TOPICAL PRN
Status: CANCELLED | OUTPATIENT
Start: 2025-05-28

## 2025-05-28 RX ORDER — CLOBETASOL PROPIONATE 0.5 MG/G
OINTMENT TOPICAL PRN
Status: CANCELLED | OUTPATIENT
Start: 2025-05-28

## 2025-05-28 RX ORDER — CLOTRIMAZOLE AND BETAMETHASONE DIPROPIONATE 10; .64 MG/G; MG/G
CREAM TOPICAL
Qty: 90 G | Refills: 3 | Status: SHIPPED | OUTPATIENT
Start: 2025-05-28

## 2025-05-28 RX ORDER — LIDOCAINE HYDROCHLORIDE 40 MG/ML
SOLUTION TOPICAL PRN
Status: CANCELLED | OUTPATIENT
Start: 2025-05-28

## 2025-05-28 RX ORDER — SODIUM CHLOR/HYPOCHLOROUS ACID 0.033 %
SOLUTION, IRRIGATION IRRIGATION PRN
Status: CANCELLED | OUTPATIENT
Start: 2025-05-28

## 2025-05-28 RX ORDER — GENTAMICIN SULFATE 1 MG/G
OINTMENT TOPICAL PRN
Status: CANCELLED | OUTPATIENT
Start: 2025-05-28

## 2025-05-28 RX ORDER — GINSENG 100 MG
CAPSULE ORAL PRN
Status: CANCELLED | OUTPATIENT
Start: 2025-05-28

## 2025-05-28 RX ORDER — NEOMYCIN/BACITRACIN/POLYMYXINB 3.5-400-5K
OINTMENT (GRAM) TOPICAL PRN
Status: CANCELLED | OUTPATIENT
Start: 2025-05-28

## 2025-05-28 RX ORDER — LIDOCAINE 40 MG/G
CREAM TOPICAL PRN
Status: CANCELLED | OUTPATIENT
Start: 2025-05-28

## 2025-05-28 RX ADMIN — LIDOCAINE HYDROCHLORIDE 10 ML: 40 SOLUTION TOPICAL at 11:36

## 2025-05-28 NOTE — DISCHARGE INSTRUCTIONS
Written         Visit Discharge/Physician Orders     Discharge condition: Stable     Assessment of pain at discharge: min     Anesthetic used: 4% topical lido     Discharge to: Home     Left via:Private automobile     Accompanied by: accompanied by self      ECF/HHA: oli .....when wrapping patient's legs, please wrap from base of toes to just below knee!     Dressing Orders: ,wounds healed  Antifungal cream prescribed, begin to use as directed   Wear velcro compression wraps daily        Treatment Orders: Eat a diet high in protein and vitamin C. Take a multiple vitamin daily unless contraindicated.     [facility] followup visit __1 month_______  (Please note your next appointment above and if you are unable to keep, kindly give a 24 hour notice. Thank you.)     Physician signature:__________________________        If you experience any of the following, please call the Wound Care Center during business hours:     * Increase in Pain  * Temperature over 101  * Increase in drainage from your wound  * Drainage with a foul odor  * Bleeding  * Increase in swelling  * Need for compression bandage changes due to slippage, breakthrough drainage.     If you need medical attention outside of the business hours of the Wound Care Centers please contact your PCP or go to the nearest emergency room

## 2025-05-28 NOTE — PROGRESS NOTES
Wound Healing Center /Hyperbarics   History and Physical/Consultation  Vascular    Referring Physician : Charles Foss MD  Tuan Vega  MEDICAL RECORD NUMBER:  [id number]  AGE: 74 y.o.   GENDER: male  : 1950  EPISODE DATE:  2025  Subjective:     Chief Complaint   Patient presents with    Wound Check         HISTORY of PRESENT ILLNESS HPI     Tuan Vega is a 74 y.o. male who presents today for wound/ulcer evaluation.   History of Wound Context:  The patient has had a wound of both calfs which was first noted approximately 1 month, since October.  This has been treated by the patient.  On their initial visit to the wound healing center, 25, the patient has noted that the wound has not been improving.  The patient has had similar previous wounds in the past.        Patient in fact was treated with compression wrapping for ulceration of both legs associate with lymphedema by Dr. Mccarthy with complete healing but since ulcers recur, patient came back to the wound care center for further evaluation    Pt is currently not on abx.      Wound/Ulcer Pain Timing/Severity: constant  Quality of pain: dull, aching  Severity:  4 / 10   Modifying Factors: Pain worsens with walking  Associated Signs/Symptoms: edema, drainage, and pain    Ulcer Identification:  Ulcer Type: diabetic, non-healing/non-surgical, and lymphedema  Contributing Factors: edema, lymphedema, diabetes, and obesity    Diabetic/Pressure/Non Pressure Ulcers only:  Ulcer: Diabetic ulcer, fat layer exposed    If patient has diabetic lower extremity wounds  Magaña Classification of diabetic lower extremity wounds:    Grade Description   []  0 No open wound   []  1 Superficial ulcer involving the full skin thickness   []  2 Deep ulcer involves ligament, tendon, joint capsule, or fascia  No bone involvement or abscess presence   []  3 Deep Ulcer with abcess formation and/or osteomyelitis   []  4 Localized gangrene   []  5

## 2025-06-23 NOTE — DISCHARGE INSTRUCTIONS
Visit Discharge/Physician Orders     Discharge condition: Stable     Assessment of pain at discharge: min     Anesthetic used: 4% topical lido     Discharge to: Home     Left via:Private automobile     Accompanied by: accompanied by self      ECF/[facility]HA:  [facility]     Dressing Orders: Left arm: Cleanse with saline, xeroform, gauze, dry dressing. Change daily.        Treatment Orders: Eat a diet high in protein and vitamin C. Take a multiple vitamin daily unless contraindicated.     [facility] followup visit _________2 weeks Tila________  (Please note your next appointment above and if you are unable to keep, kindly give a 24 hour notice. Thank you.)     Physician signature:__________________________        If you experience any of the following, please call the Wound Care Center during business hours:     * Increase in Pain  * Temperature over 101  * Increase in drainage from your wound  * Drainage with a foul odor  * Bleeding  * Increase in swelling  * Need for compression bandage changes due to slippage, breakthrough drainage.     If you need medical attention outside of the business hours of the Wound Care Centers please contact your PCP or go to the nearest emergency room

## 2025-06-25 ENCOUNTER — HOSPITAL ENCOUNTER (OUTPATIENT)
Dept: WOUND CARE | Age: 75
Discharge: HOME | End: 2025-06-25
Attending: PODIATRIST
Payer: MEDICARE

## 2025-06-25 VITALS
HEART RATE: 87 BPM | SYSTOLIC BLOOD PRESSURE: 122 MMHG | DIASTOLIC BLOOD PRESSURE: 86 MMHG | BODY MASS INDEX: 49.44 KG/M2 | RESPIRATION RATE: 20 BRPM | HEIGHT: 67 IN | WEIGHT: 315 LBS | TEMPERATURE: 97.4 F

## 2025-06-25 DIAGNOSIS — I89.0 LYMPHEDEMA OF BOTH LOWER EXTREMITIES: ICD-10-CM

## 2025-06-25 DIAGNOSIS — S41.112A SKIN TEAR OF LEFT UPPER ARM WITHOUT COMPLICATION, INITIAL ENCOUNTER: ICD-10-CM

## 2025-06-25 DIAGNOSIS — S41.111A: Primary | ICD-10-CM

## 2025-06-25 DIAGNOSIS — L97.922 NON-PRESSURE CHRONIC ULCER OF LEFT LOWER LEG WITH FAT LAYER EXPOSED (HCC): Primary | ICD-10-CM

## 2025-06-25 PROBLEM — L97.222 LOWER LIMB ULCER, CALF, LEFT, WITH FAT LAYER EXPOSED (HCC): Status: RESOLVED | Noted: 2023-10-25 | Resolved: 2025-06-25

## 2025-06-25 PROBLEM — L97.812 NON-PRESSURE CHRONIC ULCER OF OTHER PART OF RIGHT LOWER LEG WITH FAT LAYER EXPOSED (HCC): Status: RESOLVED | Noted: 2021-09-24 | Resolved: 2025-06-25

## 2025-06-25 PROCEDURE — 99214 OFFICE O/P EST MOD 30 MIN: CPT | Performed by: SURGERY

## 2025-06-25 PROCEDURE — 97597 DBRDMT OPN WND 1ST 20 CM/<: CPT | Performed by: SURGERY

## 2025-06-25 PROCEDURE — 97597 DBRDMT OPN WND 1ST 20 CM/<: CPT

## 2025-06-25 RX ORDER — MUPIROCIN 2 %
OINTMENT (GRAM) TOPICAL PRN
OUTPATIENT
Start: 2025-06-25

## 2025-06-25 RX ORDER — TRIAMCINOLONE ACETONIDE 1 MG/G
OINTMENT TOPICAL PRN
OUTPATIENT
Start: 2025-06-25

## 2025-06-25 RX ORDER — CLOBETASOL PROPIONATE 0.5 MG/G
OINTMENT TOPICAL PRN
OUTPATIENT
Start: 2025-06-25

## 2025-06-25 RX ORDER — NEOMYCIN/BACITRACIN/POLYMYXINB 3.5-400-5K
OINTMENT (GRAM) TOPICAL PRN
OUTPATIENT
Start: 2025-06-25

## 2025-06-25 RX ORDER — GINSENG 100 MG
CAPSULE ORAL PRN
OUTPATIENT
Start: 2025-06-25

## 2025-06-25 RX ORDER — BACITRACIN ZINC AND POLYMYXIN B SULFATE 500; 1000 [USP'U]/G; [USP'U]/G
OINTMENT TOPICAL PRN
OUTPATIENT
Start: 2025-06-25

## 2025-06-25 RX ORDER — SODIUM CHLOR/HYPOCHLOROUS ACID 0.033 %
SOLUTION, IRRIGATION IRRIGATION PRN
OUTPATIENT
Start: 2025-06-25

## 2025-06-25 RX ORDER — GENTAMICIN SULFATE 1 MG/G
OINTMENT TOPICAL PRN
OUTPATIENT
Start: 2025-06-25

## 2025-06-25 RX ORDER — BETAMETHASONE DIPROPIONATE 0.5 MG/G
CREAM TOPICAL PRN
OUTPATIENT
Start: 2025-06-25

## 2025-06-25 RX ORDER — SILVER SULFADIAZINE 10 MG/G
CREAM TOPICAL PRN
OUTPATIENT
Start: 2025-06-25

## 2025-06-25 RX ORDER — LIDOCAINE HYDROCHLORIDE 20 MG/ML
JELLY TOPICAL PRN
OUTPATIENT
Start: 2025-06-25

## 2025-06-25 RX ORDER — LIDOCAINE 40 MG/G
CREAM TOPICAL PRN
OUTPATIENT
Start: 2025-06-25

## 2025-06-25 RX ORDER — LIDOCAINE HYDROCHLORIDE 40 MG/ML
SOLUTION TOPICAL PRN
OUTPATIENT
Start: 2025-06-25

## 2025-06-25 RX ORDER — LIDOCAINE 50 MG/G
OINTMENT TOPICAL PRN
OUTPATIENT
Start: 2025-06-25

## 2025-06-25 NOTE — PROGRESS NOTES
Wound Healing Center /Hyperbarics   History and Physical/Consultation  Vascular    Referring Physician : Charles Foss MD  Tuan Vega  MEDICAL RECORD NUMBER:  [id number]  AGE: 74 y.o.   GENDER: male  : 1950  EPISODE DATE:  2025  Subjective:     Chief Complaint   Patient presents with    Wound Check         HISTORY of PRESENT ILLNESS HPI     Tuan Vega is a 74 y.o. male who presents today for wound/ulcer evaluation.   History of Wound Context:  The patient has had a wound of left forearm close to the elbow which was first noted approximately 3 days ago, patient fell down and scraped his skin of the left upper forearm.  This has been treated by the patient.  On their initial visit to the wound healing center, 25, the patient has noted that the wound has not been improving.  The patient has had similar previous wounds in the past, mainly involving the lower extremity that healed completely.     Patient has a swelling of the leg due to lymphedema, currently wearing compression stockings    Pt is currently not on abx.      Wound/Ulcer Pain Timing/Severity: constant  Quality of pain: aching, burning  Severity:  4 / 10   Modifying Factors: None  Associated Signs/Symptoms: drainage and pain    Ulcer Identification:  Ulcer Type: non-healing/non-surgical and traumatic  Contributing Factors: diabetes    Diabetic/Pressure/Non Pressure Ulcers only:  Ulcer: Diabetic ulcer, limited to breakdown of skin    If patient has diabetic lower extremity wounds  [place] Classification of diabetic lower extremity wounds:    Grade Description   []  0 No open wound   []  1 Superficial ulcer involving the full skin thickness   []  2 Deep ulcer involves ligament, tendon, joint capsule, or fascia  No bone involvement or abscess presence   []  3 Deep Ulcer with abcess formation and/or osteomyelitis   []  4 Localized gangrene   []  5 Extensive gangrene of the foot     Wound: Does have a skin tear of the

## 2025-06-25 NOTE — WOUND CARE
Wound Care Supplies      Supply Company:     "[facility] [address] [place] [id number]  p: [phone] f: [phone]     Ordering Center:     [facility]  [address], [address]  [facility] [address] [phone]  WOUND CARE Dept: [phone]   FAX NUMBER [phone]    Patient Information:      Tuan Carlson  [address] [id number] [phone]   : 1950  AGE: 74 y.o.     GENDER: male   EPISODE DATE: 2025    Insurance:      PRIMARY INSURANCE:  Plan: [facility] MEDICARE COMPLETE  Coverage: [facility] MEDICARE  Effective Date: 2023  Group Number: [unfilled]  Subscriber Number: [id number] - (Medicare Managed)    Payer/Plan Subscr  Sex Relation Sub. Ins. ID Effective Group Num   1. 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) - Primary       WOUNDS REQUIRING DRESSING SUPPLIES:     Wound 25 Arm Left;Lower #6 (Active)   Wound Etiology Skin Tear 25 1035   Wound Length (cm) 3 cm 25 1035   Wound Width (cm) 3 cm 25 1035   Wound Depth (cm) 0.1 cm 25 1035   Wound Surface Area (cm^2) 9 cm^2 25 1035   Wound Volume (cm^3) 0.9 cm^3 25 1035   Post-Procedure Length (cm) 3.1 cm 25 1116   Post-Procedure Width (cm) 3.1 cm 25 1116   Post-Procedure Depth (cm) 0.1 cm 25 1116   Post-Procedure Surface Area (cm^2) 9.61 cm^2 25 1116   Post-Procedure Volume (cm^3) 0.961 cm^3 25 1116   Wound Assessment Pink/red;Fibrin;Bleeding 25 1035   Drainage Amount Moderate (25-50%) 25 1035   Drainage

## 2025-07-11 ENCOUNTER — HOSPITAL ENCOUNTER (OUTPATIENT)
Dept: WOUND CARE | Age: 75
Discharge: HOME | End: 2025-07-11
Attending: PODIATRIST

## 2025-07-11 NOTE — DISCHARGE INSTRUCTIONS
Visit Discharge/Physician Orders     Discharge condition: Stable     Assessment of pain at discharge: min     Anesthetic used: 4% topical lido     Discharge to: Home     Left via:Private automobile     Accompanied by: accompanied by self      ECF/HHA:  [facility]     Dressing Orders: Left arm: Cleanse with saline, xeroform, gauze, dry dressing. Change daily.        Treatment Orders: Eat a diet high in protein and vitamin C. Take a multiple vitamin daily unless contraindicated.     [facility] followup visit _________2 weeks Tila________  (Please note your next appointment above and if you are unable to keep, kindly give a 24 hour notice. Thank you.)     Physician signature:__________________________        If you experience any of the following, please call the Wound Care Center during business hours:     * Increase in Pain  * Temperature over 101  * Increase in drainage from your wound  * Drainage with a foul odor  * Bleeding  * Increase in swelling  * Need for compression bandage changes due to slippage, breakthrough drainage.     If you need medical attention outside of the business hours of the Wound Care Centers please contact your PCP or go to the nearest emergency room

## 2025-07-14 RX ORDER — SEMAGLUTIDE 0.68 MG/ML
0.25 INJECTION, SOLUTION SUBCUTANEOUS
Qty: 3 ML | Refills: 0 | Status: SHIPPED | OUTPATIENT
Start: 2025-07-14

## (undated) DEVICE — CANNULA OPHTH 25GA 7 8IN ORNG 45DEG ANG 4MM FR END DOME SHP

## (undated) DEVICE — GLOVE ORANGE PI 7   MSG9070

## (undated) DEVICE — MIRROR HANDLE DELUXE OCTAGONAL: Brand: HENRY SCHEIN

## (undated) DEVICE — PACK PROCEDURE SURG SURG CATARACT CUSTOM

## (undated) DEVICE — COVER MICROSCOPE KNOB LG

## (undated) DEVICE — EYE EXTRAS

## (undated) DEVICE — GAUZE,SPONGE,4"X4",16PLY,XRAY,STRL,LF: Brand: MEDLINE

## (undated) DEVICE — BNDG,ELSTC,MATRIX,STRL,6"X5YD,LF,HOOK&LP: Brand: MEDLINE

## (undated) DEVICE — MARKER,SKIN,WI/RULER AND LABELS: Brand: MEDLINE

## (undated) DEVICE — STERILE POLYISOPRENE POWDER-FREE SURGICAL GLOVES: Brand: PROTEXIS

## (undated) DEVICE — NEEDLE FLTR 18GA L1.5IN MEM THK5UM BLNT DISP

## (undated) DEVICE — DRAPE,EXTREMITY,89X128,STERILE: Brand: MEDLINE

## (undated) DEVICE — SYRINGE, LUER LOCK, 10ML: Brand: MEDLINE

## (undated) DEVICE — GLOVE ORTHO 7   MSG9470

## (undated) DEVICE — BANDAGE,GAUZE,BULKEE II,4.5"X4.1YD,STRL: Brand: MEDLINE

## (undated) DEVICE — SYRINGE, LUER LOCK, 5ML: Brand: MEDLINE

## (undated) DEVICE — NDLCTR: FOAM/MAG 40CT 64/CS: Brand: MEDICAL ACTION INDUSTRIES

## (undated) DEVICE — SOLUTION IV IRRIG WATER 1000ML POUR BRL 2F7114

## (undated) DEVICE — GLASSES SAFETY PROTCT GRN

## (undated) DEVICE — APPLICATOR MEDICATED 26 CC SOLUTION HI LT ORNG CHLORAPREP

## (undated) DEVICE — THE MONARCH® "D" CARTRIDGE IS A SINGLE-USE POLYPROPYLENE CARTRIDGE FOR POSTERIOR CHAMBER IOL DELIVERY: Brand: MONARCH® III

## (undated) DEVICE — SET PHACO

## (undated) DEVICE — 40436 HEAD REST OCULAR: Brand: 40436 HEAD REST OCULAR

## (undated) DEVICE — DRESSING,GAUZE,XEROFORM,CURAD,5"X9",ST: Brand: CURAD

## (undated) DEVICE — PAD PREP L 2 PLY 70% ISO ALC NONWOVEN SFT ABSRB TOP ANTISEP

## (undated) DEVICE — SOLUTION IRRIGATION BAL SALT SOLUTION 15 ML STRL BSS

## (undated) DEVICE — SOLUTION IRRIG 1000ML 0.9% SOD CHL USP POUR PLAS BTL

## (undated) DEVICE — PACK PROCEDURE SURG GEN CUST

## (undated) DEVICE — SOLUTION IV IRRIG POUR BRL 0.9% SODIUM CHL 2F7124

## (undated) DEVICE — PACK PROC FLD MGMT SYS CENTURION CUST

## (undated) DEVICE — SCALPEL 15 DEG NO 715

## (undated) DEVICE — CLEARCUT® SLIT KNIFE INTREPID MICRO-COAXIAL SYSTEM 2.4 SB: Brand: CLEARCUT®; INTREPID

## (undated) DEVICE — BLADE,STAINLESS-STEEL,15,STRL,DISPOSABLE: Brand: MEDLINE

## (undated) DEVICE — 4-PORT MANIFOLD: Brand: NEPTUNE 2

## (undated) DEVICE — BLADE,STAINLESS-STEEL,10,STRL,DISPOSABLE: Brand: MEDLINE

## (undated) DEVICE — TOWEL,OR,DSP,ST,BLUE,STD,6/PK,12PK/CS: Brand: MEDLINE

## (undated) DEVICE — ELECTRODE PT RET AD L9FT HI MOIST COND ADH HYDRGEL CORDED

## (undated) DEVICE — GLOVE SURG SZ 8 CRM LTX FREE POLYISOPRENE POLYMER BEAD ANTI

## (undated) DEVICE — SWAB SPEC COLL SHFT L5.25IN POLYUR FOAM TIP SFT DBL MEDIA

## (undated) DEVICE — PACK,EXTREMITY I: Brand: MEDLINE

## (undated) DEVICE — MEDI-VAC NON-CONDUCTIVE SUCTION TUBING: Brand: CARDINAL HEALTH

## (undated) DEVICE — SHIELD EYE W3XL2.5IN UNIV CLR PLAS LTWT

## (undated) DEVICE — GAUZE,SPONGE,4"X4",8PLY,STRL,LF,10/TRAY: Brand: MEDLINE

## (undated) DEVICE — DRESSING GZ W1XL8IN COT XRFRM N ADH OVERWRAP CURAD

## (undated) DEVICE — GOWN,SIRUS,FABRNF,XL,20/CS: Brand: MEDLINE